# Patient Record
Sex: MALE | Race: WHITE | NOT HISPANIC OR LATINO | Employment: OTHER | URBAN - METROPOLITAN AREA
[De-identification: names, ages, dates, MRNs, and addresses within clinical notes are randomized per-mention and may not be internally consistent; named-entity substitution may affect disease eponyms.]

---

## 2017-01-01 PROBLEM — J96.01 ACUTE HYPOXEMIC RESPIRATORY FAILURE: Status: ACTIVE | Noted: 2017-01-01

## 2017-01-03 ENCOUNTER — OFFICE VISIT (OUTPATIENT)
Dept: INTERNAL MEDICINE | Facility: CLINIC | Age: 72
End: 2017-01-03
Payer: MEDICARE

## 2017-01-03 VITALS
SYSTOLIC BLOOD PRESSURE: 148 MMHG | TEMPERATURE: 98 F | HEIGHT: 65 IN | HEART RATE: 88 BPM | DIASTOLIC BLOOD PRESSURE: 88 MMHG | RESPIRATION RATE: 15 BRPM | BODY MASS INDEX: 25.68 KG/M2 | WEIGHT: 154.13 LBS

## 2017-01-03 DIAGNOSIS — E78.5 HYPERLIPIDEMIA, UNSPECIFIED HYPERLIPIDEMIA TYPE: ICD-10-CM

## 2017-01-03 DIAGNOSIS — G20.A1 PARKINSON DISEASE: ICD-10-CM

## 2017-01-03 DIAGNOSIS — I10 ESSENTIAL HYPERTENSION: ICD-10-CM

## 2017-01-03 DIAGNOSIS — G89.29 CHRONIC LOW BACK PAIN, UNSPECIFIED BACK PAIN LATERALITY, WITH SCIATICA PRESENCE UNSPECIFIED: ICD-10-CM

## 2017-01-03 DIAGNOSIS — I27.20 PULMONARY HTN: ICD-10-CM

## 2017-01-03 DIAGNOSIS — I25.118 CORONARY ARTERY DISEASE OF NATIVE ARTERY OF NATIVE HEART WITH STABLE ANGINA PECTORIS: ICD-10-CM

## 2017-01-03 DIAGNOSIS — F41.9 ANXIETY: ICD-10-CM

## 2017-01-03 DIAGNOSIS — R06.09 DYSPNEA ON EXERTION: Primary | ICD-10-CM

## 2017-01-03 DIAGNOSIS — M54.5 CHRONIC LOW BACK PAIN, UNSPECIFIED BACK PAIN LATERALITY, WITH SCIATICA PRESENCE UNSPECIFIED: ICD-10-CM

## 2017-01-03 DIAGNOSIS — J20.9 ACUTE BRONCHITIS, UNSPECIFIED ORGANISM: ICD-10-CM

## 2017-01-03 PROBLEM — M54.50 CHRONIC LOW BACK PAIN: Status: ACTIVE | Noted: 2017-01-03

## 2017-01-03 PROCEDURE — 99215 OFFICE O/P EST HI 40 MIN: CPT | Mod: S$PBB,,, | Performed by: INTERNAL MEDICINE

## 2017-01-03 PROCEDURE — 99999 PR PBB SHADOW E&M-EST. PATIENT-LVL IV: CPT | Mod: PBBFAC,,, | Performed by: INTERNAL MEDICINE

## 2017-01-03 PROCEDURE — 99214 OFFICE O/P EST MOD 30 MIN: CPT | Mod: PBBFAC,PO | Performed by: INTERNAL MEDICINE

## 2017-01-03 NOTE — PROGRESS NOTES
Subjective:       Patient ID: Hector Kellogg is a 71 y.o. male.    Chief Complaint: Follow-up    HPI   Pt with Parkinson's disease on Sinemet is here for evaluation of 2 episodes of low BP associated with dizziness/lightheadedness. Pt denies syncope. His BP was in the 70s/40s at that time. Pt was then hospitalized from 12/30/16- 1/1/17 for SOB with chills and cough. He had a low grade fever of 100.8 but vitals were stable and BP remained normal during the stay. CXR was negative for infiltrate, Labs were unremarkable, LE US negative for DVT and Cx came back negative including for flu. Pt was started on Avelox. He had a 6 minute walk test which was positive for hypoxemia and he was given O2 to use with activity. Last ECHO showed mild pulmonary HTN but preserved EF. Since discharge, pt denies any hypotension and breathing has improved.    Review of Systems   Constitutional: Negative for activity change, appetite change, chills, diaphoresis, fatigue, fever and unexpected weight change.   HENT: Negative for congestion, mouth sores, postnasal drip, rhinorrhea, sinus pressure, sneezing, sore throat, trouble swallowing and voice change.    Eyes: Negative for discharge, itching and visual disturbance.   Respiratory: Negative for cough, chest tightness, shortness of breath and wheezing.    Cardiovascular: Negative for chest pain, palpitations and leg swelling.   Gastrointestinal: Negative for abdominal pain, blood in stool, constipation, diarrhea, nausea and vomiting.   Endocrine: Negative for cold intolerance and heat intolerance.   Genitourinary: Negative for difficulty urinating, dysuria, flank pain, hematuria and urgency.   Musculoskeletal: Negative for arthralgias, back pain, myalgias and neck pain.   Skin: Negative for rash and wound.   Allergic/Immunologic: Negative for environmental allergies and food allergies.   Neurological: Positive for tremors. Negative for dizziness, seizures, syncope, weakness and  headaches.   Hematological: Negative for adenopathy. Does not bruise/bleed easily.   Psychiatric/Behavioral: Negative for confusion, sleep disturbance and suicidal ideas. The patient is not nervous/anxious.        Objective:      Physical Exam   Constitutional: He is oriented to person, place, and time. He appears well-developed and well-nourished. No distress.   HENT:   Head: Normocephalic and atraumatic.   Right Ear: External ear normal.   Left Ear: External ear normal.   Nose: Nose normal.   Mouth/Throat: Oropharynx is clear and moist. No oropharyngeal exudate.   Eyes: Conjunctivae and EOM are normal. Pupils are equal, round, and reactive to light. Right eye exhibits no discharge. Left eye exhibits no discharge. No scleral icterus.   Neck: Normal range of motion. Neck supple. No JVD present. No thyromegaly present.   Cardiovascular: Normal rate, regular rhythm, normal heart sounds and intact distal pulses.    No murmur heard.  Pulmonary/Chest: Effort normal and breath sounds normal. No respiratory distress. He has no wheezes. He has no rales.   Abdominal: Soft. Bowel sounds are normal. He exhibits no distension. There is no tenderness. There is no guarding.   Musculoskeletal: He exhibits no edema.   Lymphadenopathy:     He has no cervical adenopathy.   Neurological: He is alert and oriented to person, place, and time.   Skin: Skin is warm and dry. No rash noted. He is not diaphoretic. No pallor.   Psychiatric: He has a normal mood and affect. Judgment normal.       Assessment:       1. Dyspnea on exertion    2. Pulmonary HTN    3. Acute bronchitis, unspecified organism    4. Coronary artery disease of native artery of native heart with stable angina pectoris    5. Essential hypertension    6. Hyperlipidemia, unspecified hyperlipidemia type    7. Parkinson disease    8. Anxiety    9. Chronic low back pain, unspecified back pain laterality, with sciatica presence unspecified        Plan:    1. MARINA with mild Pulm  HTN- Improving since given home O2, suspect pulmonary etiology        Pt will f/u with Pulmonary for PFTs/further evaluation    2. Acute bronchitis- resolved, s/p therapy with Avelox   3. CAD- S/P NSTEMI and PCI in 2006, with repeat CATH(10/11/15) placing stents to RCA and OM1- stable       Continue BB/statin/ASA; pt has stopped the Imdur 2/2 to low BP       F/u with Cardiology on 11/3/15   4. HTN- stable on Toprol XL 50 mg daily   5. HLD- controlled, continue Lipitor 10 mg daily   6. Parkinson's disease- stable on Sinemet, managed by Neuro   7. Anxiety- stable on Celexa 20 mg daily   8. LBP with Neuropathic pain- stable on Gabapentin 300 mg BID    Over 1/2 of 40 minute visit spent reviewing pt's medical/hospitalization records, education/discussion of pt's medical conditions and medical management

## 2017-01-13 ENCOUNTER — HOSPITAL ENCOUNTER (OUTPATIENT)
Dept: PULMONOLOGY | Facility: CLINIC | Age: 72
Discharge: HOME | End: 2017-01-13
Payer: MEDICARE

## 2017-01-13 ENCOUNTER — OFFICE VISIT (OUTPATIENT)
Dept: PULMONOLOGY | Facility: CLINIC | Age: 72
End: 2017-01-13
Payer: MEDICARE

## 2017-01-13 VITALS
BODY MASS INDEX: 25.71 KG/M2 | OXYGEN SATURATION: 96 % | SYSTOLIC BLOOD PRESSURE: 140 MMHG | DIASTOLIC BLOOD PRESSURE: 84 MMHG | WEIGHT: 154.31 LBS | HEIGHT: 65 IN | RESPIRATION RATE: 14 BRPM | HEART RATE: 83 BPM

## 2017-01-13 VITALS — BODY MASS INDEX: 27.29 KG/M2 | HEIGHT: 63 IN | WEIGHT: 154 LBS

## 2017-01-13 DIAGNOSIS — R06.09 DYSPNEA ON EXERTION: ICD-10-CM

## 2017-01-13 DIAGNOSIS — I25.118 CORONARY ARTERY DISEASE OF NATIVE ARTERY OF NATIVE HEART WITH STABLE ANGINA PECTORIS: ICD-10-CM

## 2017-01-13 DIAGNOSIS — J44.9 CHRONIC OBSTRUCTIVE PULMONARY DISEASE, UNSPECIFIED COPD TYPE: ICD-10-CM

## 2017-01-13 DIAGNOSIS — R06.09 DYSPNEA ON EXERTION: Primary | ICD-10-CM

## 2017-01-13 PROBLEM — J96.01 ACUTE HYPOXEMIC RESPIRATORY FAILURE: Status: RESOLVED | Noted: 2017-01-01 | Resolved: 2017-01-13

## 2017-01-13 LAB
POST FEV1 FVC: 0.53
POST FEV1: 1.28
POST FVC: 2.41
PRE FEV1 FVC: 51
PRE FEV1: 1.14
PRE FVC: 2.23
PREDICTED FEV1 FVC: 81
PREDICTED FEV1: 2.07
PREDICTED FVC: 2.59

## 2017-01-13 PROCEDURE — 99204 OFFICE O/P NEW MOD 45 MIN: CPT | Mod: 25,S$PBB,, | Performed by: INTERNAL MEDICINE

## 2017-01-13 PROCEDURE — 94620 PR PULMONARY STRESS TESTING,SIMPLE: CPT | Mod: PBBFAC | Performed by: INTERNAL MEDICINE

## 2017-01-13 PROCEDURE — 94620 PR PULMONARY STRESS TESTING,SIMPLE: CPT | Mod: 26,S$PBB,, | Performed by: INTERNAL MEDICINE

## 2017-01-13 PROCEDURE — 99999 PR PBB SHADOW E&M-EST. PATIENT-LVL III: CPT | Mod: PBBFAC,,, | Performed by: INTERNAL MEDICINE

## 2017-01-13 PROCEDURE — 94060 EVALUATION OF WHEEZING: CPT | Mod: 26,S$PBB,, | Performed by: INTERNAL MEDICINE

## 2017-01-13 PROCEDURE — 94729 DIFFUSING CAPACITY: CPT | Mod: 26,S$PBB,, | Performed by: INTERNAL MEDICINE

## 2017-01-13 NOTE — PATIENT INSTRUCTIONS
Pre/Post Spirometry, DLCO, and 6MWT.  Phone review and decide on medical therapy if obstructive lung disease is confirmed.

## 2017-01-13 NOTE — PROGRESS NOTES
Subjective:       Patient ID: Hector Kellogg is a 71 y.o. male.    Chief Complaint: Shortness of Breath    HPI HISTORY OF PRESENT ILLNESS:  Mr. Kellogg is a 71-year-old former smoker who   comes for evaluation of exertional dyspnea.  He was hospitalized here at the end   of December with fever and an episode of chills or rigors.  Micro studies done   during that admission were negative; however, he was treated with empiric   antibiotics and had a good clinical response.  During that admission, he was   also noted to have shortness of breath and exercise-related hypoxemia.  He was   discharged with supplemental oxygen.  Although, he feels that his respiratory   symptoms are now back to baseline, he does have the longstanding symptom of   shortness of breath with modest exertion.  The fever and chills have not   recurred.    Mr. Kellogg and his family believe that his shortness of breath has been   present for several years.  He has never sought any evaluation for this problem.    He does not have any associated symptoms of cough or wheezing.  He has not had   exertional or pleuritic pain.  He has used a Dulera inhaler (prescribed for his   sister) on a few occasions and feels that this has provided a beneficial effect.    Mr. Kellogg does not know of any history for childhood respiratory problems   or allergic disease.  He does have the history of coronary artery disease and is   status post placement of several coronary artery stents.  He also is followed   in the Neurology Clinic here for treatment of Parkinson disease.  He has not had   any prominent upper GI symptoms.  He is not usually bothered by sinus   symptoms.    Mr. Kellogg worked for many years as a  and .  He believes   that he had fairly regular exposure to asbestos during his working years.  He   also has smoked approximately one pack of cigarettes per day for 40 years.  He   discontinued smoking two years ago.  His  "family history is of note in that his   sister has some form of lung disease.  She is a nonsmoker, but has been told   that she has "large lungs."  He does not know of any other family history for   lung problems.    DATA:  I have reviewed the images from a chest x-ray done last month at the time   of his hospital admission.  The cardiac silhouette does not appear enlarged.    There are no acute cardiovascular abnormalities.  The lungs appear clear.  The   diaphragm is somewhat flattened suggesting obstructive lung disease.  There are   no changes in this recent radiograph in comparison to a previous film from   October 2015.      CB/HN  dd: 01/13/2017 17:38:09 (CST)  td: 01/14/2017 13:02:55 (CST)  Doc ID   #[id number]  Job ID #[id number]    CC:       Review of Systems   Constitutional: Negative for fever and fatigue.   HENT: Negative for postnasal drip, sinus pressure, voice change and congestion.    Respiratory: Positive for shortness of breath and dyspnea on extertion. Negative for cough, sputum production and wheezing.    Cardiovascular: Negative for chest pain and leg swelling.   Genitourinary: Negative for difficulty urinating.   Musculoskeletal: Positive for gait problem. Negative for arthralgias and back pain.   Skin: Negative for rash.   Gastrointestinal: Negative for abdominal pain and acid reflux.   Neurological: Negative for dizziness and weakness.   Hematological: Negative for adenopathy.       Objective:      Physical Exam   Constitutional: He is oriented to person, place, and time. He appears well-developed and well-nourished.   HENT:   Head: Normocephalic.   Mouth/Throat: Oropharynx is clear and moist. No oropharyngeal exudate.   Neck: Normal range of motion. Neck supple. No JVD present. No tracheal deviation present. No thyromegaly present.   Cardiovascular: Normal rate, regular rhythm and normal heart sounds.    No murmur heard.  Pulmonary/Chest: Symmetric chest wall expansion. No stridor. He has " decreased breath sounds. He has no wheezes. He has no rhonchi. He has no rales. He exhibits no tenderness.   Abdominal: Soft.   Musculoskeletal: He exhibits no edema.   Lymphadenopathy:     He has no cervical adenopathy.   Neurological: He is alert and oriented to person, place, and time.   Resting tremor UEs.   Skin: Skin is warm and dry. No rash noted. No erythema. Nails show no clubbing.   Psychiatric: He has a normal mood and affect.   Vitals reviewed.    Personal Diagnostic Review    Pulmonary Function Tests 1/13/2017   Ordering Provider MD Kelly   Performing nurse/tech/RT SCOT Oneill   Diagnosis Shortness of Breath   Height 63   Weight 2464   BMI (Calculated) 27.3   Patient Race    6MWT Status completed without stopping   Patient Reported Dyspnea   Was O2 used? No   6MW Distance walked (feet) 1100   Distance walked (meters) 335.28   Did patient stop? No   Type of assistive device(s) used? no assistive devices   Oxygen Saturation 97   Supplemental Oxygen Room Air   Heart Rate 78   Blood Pressure 142/76   [place] Dyspnea Rating  nothing at all   Oxygen Saturation 93   Supplemental Oxygen Room Air   Heart Rate 101   Blood Pressure 144/74   [place] Dyspnea Rating  light   Recovery Time (seconds) 40   Oxygen Saturation 97   Supplemental Oxygen Room Air   Heart Rate 89   Is procedure ready for interpretation? Yes         Assessment:       1. Dyspnea on exertion    2. Coronary artery disease of native artery of native heart with stable angina pectoris    3. Chronic obstructive pulmonary disease, unspecified COPD type        Outpatient Encounter Prescriptions as of 1/13/2017   Medication Sig Dispense Refill    aspirin (ECOTRIN) 81 MG EC tablet Take 81 mg by mouth once daily.      atorvastatin (LIPITOR) 10 MG tablet Take 1 tablet (10 mg total) by mouth once daily. 90 tablet 3    carbidopa-levodopa  mg (SINEMET)  mg per tablet Take 1 tablet by mouth 4 (four) times daily. 360 tablet 3    citalopram  (CELEXA) 20 MG tablet TAKE ONE TABLET BY MOUTH ONCE DAILY 30 tablet 11    co-enzyme Q-10 50 mg capsule Take 100 mg by mouth once daily.      gabapentin (NEURONTIN) 400 MG capsule Take 1 capsule (400 mg total) by mouth 2 (two) times daily. 180 capsule 3    loratadine (CLARITIN) 10 mg tablet Take 10 mg by mouth as needed for Allergies.      metoprolol succinate (TOPROL-XL) 50 MG 24 hr tablet Take 1 tablet (50 mg total) by mouth once daily. 90 tablet 3    mometasone-formoterol 100-5 mcg/actuation HFAA Inhale into the lungs. Take 2 puffs twice daily. Patient takes as needed.      multivitamin capsule Take 1 capsule by mouth once daily.      nitroGLYCERIN (NITROSTAT) 0.4 MG SL tablet Place 1 tablet (0.4 mg total) under the tongue every 5 (five) minutes as needed for Chest pain. 25 tablet 4     No facility-administered encounter medications on file as of 1/13/2017.      Orders Placed This Encounter   Procedures    Spirometry with/without bronchodilator     Standing Status:   Future     Number of Occurrences:   1     Standing Expiration Date:   1/13/2018    DLCO-Carbon Monoxide Diffusing Capacity     Standing Status:   Future     Number of Occurrences:   1     Standing Expiration Date:   1/13/2018    Stress test, pulmonary     Standing Status:   Future     Number of Occurrences:   1     Standing Expiration Date:   1/13/2018     Plan:     Pre/Post Spirometry, DLCO, and 6MWT.  Phone review and decide on medical therapy if obstructive lung disease is confirmed.

## 2017-01-13 NOTE — LETTER
January 13, 2017      Blue Mcgill, DO  [address] [facility] [place] [address]           [facility]  [address]  [place] [address]  Phone: [phone]          Patient: Hector Kellogg   MR Number: [id number]   YOB: 1945   Date of Visit: 1/13/2017       Dear Dr. Blue Mcgill:    Thank you for referring Hector Kellogg to me for evaluation. Attached you will find relevant portions of my assessment and plan of care.    If you have questions, please do not hesitate to call me. I look forward to following Hector Kellogg along with you.    Sincerely,    MARIO Mendoza MD    Enclosure  CC:  No Recipients    If you would like to receive this communication electronically, please contact [email or web address] or [phone] to request more information on [employer] access.    For providers and/or their staff who would like to refer a patient to [facility], please contact us through our one-stop-shop provider referral line, [facility], at [phone].    If you feel you have received this communication in error or would no longer like to receive these types of communications, please e-mail [email or web address]

## 2017-01-14 NOTE — PROCEDURES
Hector Kellogg is a 71 y.o.  male patient, who presents for a 6 minute walk test ordered by Camden Mendoza MD.  The diagnosis is Shortness of Breath.  The patient's BMI is 27.3 kg/m2.  Predicted distance (lower limit of normal) is 341.11 meters.      Test Results:    The test was completed without stopping.  The total time walked was 360 seconds.  During walking, the patient reported:  Dyspnea. The patient used no assistive devices during testing.     01/13/2017---------Distance: 335.28 meters (1100 feet)     O2 Sat % Supplemental Oxygen Heart Rate Blood Pressure Salina Scale   Pre-exercise  (Resting) 97 % Room Air 78 bpm 142/76 mmHg 0   During Exercise 93 % Room Air 101 bpm 144/74 mmHg 2   Post-exercise  (Recovery) 97 % Room Air  89 bpm       Recovery Time: 40 seconds    Performing nurse/tech: SCOT Oneill      PREVIOUS STUDY:   The patient has not had a previous study.      CLINICAL INTERPRETATION:  Six minute walk distance is 335.28 meters (1100 feet) with light dyspnea.  During exercise, there was desaturation while breathing room air.  Blood pressure remained stable and Heart rate increased significantly with walking.  The patient did not report non-pulmonary symptoms during exercise.  No previous study performed.  Based upon age and body mass index, exercise capacity is less than predicted.

## 2017-01-19 ENCOUNTER — PATIENT MESSAGE (OUTPATIENT)
Dept: PULMONOLOGY | Facility: CLINIC | Age: 72
End: 2017-01-19

## 2017-01-23 ENCOUNTER — TELEPHONE (OUTPATIENT)
Dept: PULMONOLOGY | Facility: CLINIC | Age: 72
End: 2017-01-23

## 2017-01-23 DIAGNOSIS — J43.2 CENTRILOBULAR EMPHYSEMA: Primary | ICD-10-CM

## 2017-01-23 RX ORDER — BUDESONIDE AND FORMOTEROL FUMARATE DIHYDRATE 160; 4.5 UG/1; UG/1
2 AEROSOL RESPIRATORY (INHALATION) EVERY 12 HOURS
Qty: 1 INHALER | Refills: 6 | Status: SHIPPED | OUTPATIENT
Start: 2017-01-23 | End: 2017-03-21

## 2017-01-23 NOTE — TELEPHONE ENCOUNTER
Pt informed that recent PFTs show obstruction with some BD response.  I have asked him to begin Symbicort on a twice daily schedule in place of the intermittent use of Dulera.  He will call if resp symptoms remain unimproved.  He may also need Albuterol if he feels he needs a short term med for control of daily symptoms.

## 2017-01-23 NOTE — TELEPHONE ENCOUNTER
----- Message from Starla Bay sent at 1/23/2017 10:51 AM CST -----  Contact: Patient wife Eileen: [phone]  Patient wife is calling for the results from PFT.  She can be reached at [phone].    Please call.  Thanks

## 2017-03-21 ENCOUNTER — TELEPHONE (OUTPATIENT)
Dept: PULMONOLOGY | Facility: CLINIC | Age: 72
End: 2017-03-21

## 2017-03-21 DIAGNOSIS — J43.2 CENTRILOBULAR EMPHYSEMA: Primary | ICD-10-CM

## 2017-03-21 NOTE — TELEPHONE ENCOUNTER
----- Message from Chase Dennis sent at 3/21/2017  1:57 PM CDT -----  Contact: Pt   Pt would like a call back from nurse in ref to in home oxygen     Pt also would like to speak about refill for inhaler.      Can be reached at [phone]

## 2017-04-17 DIAGNOSIS — F41.1 GENERALIZED ANXIETY DISORDER: ICD-10-CM

## 2017-04-17 RX ORDER — CITALOPRAM 20 MG/1
20 TABLET, FILM COATED ORAL DAILY
Qty: 30 TABLET | Refills: 11 | Status: SHIPPED | OUTPATIENT
Start: 2017-04-17 | End: 2018-04-16 | Stop reason: SDUPTHER

## 2017-06-30 ENCOUNTER — PATIENT MESSAGE (OUTPATIENT)
Dept: CARDIOLOGY | Facility: CLINIC | Age: 72
End: 2017-06-30

## 2017-06-30 RX ORDER — METOPROLOL SUCCINATE 50 MG/1
50 TABLET, EXTENDED RELEASE ORAL DAILY
Qty: 90 TABLET | Refills: 3 | Status: SHIPPED | OUTPATIENT
Start: 2017-06-30 | End: 2018-07-02 | Stop reason: SDUPTHER

## 2017-07-21 ENCOUNTER — PATIENT MESSAGE (OUTPATIENT)
Dept: PULMONOLOGY | Facility: CLINIC | Age: 72
End: 2017-07-21

## 2017-07-21 ENCOUNTER — LAB VISIT (OUTPATIENT)
Dept: LAB | Facility: HOSPITAL | Age: 72
End: 2017-07-21
Attending: INTERNAL MEDICINE
Payer: MEDICARE

## 2017-07-21 DIAGNOSIS — I10 ESSENTIAL HYPERTENSION: ICD-10-CM

## 2017-07-21 DIAGNOSIS — E78.2 MIXED HYPERLIPIDEMIA: ICD-10-CM

## 2017-07-21 DIAGNOSIS — Z78.9 STATIN INTOLERANCE: ICD-10-CM

## 2017-07-21 DIAGNOSIS — I25.118 CORONARY ARTERY DISEASE OF NATIVE ARTERY OF NATIVE HEART WITH STABLE ANGINA PECTORIS: ICD-10-CM

## 2017-07-21 LAB
ALBUMIN SERPL BCP-MCNC: 3.5 G/DL
ALP SERPL-CCNC: 88 U/L
ALT SERPL W/O P-5'-P-CCNC: 22 U/L
ANION GAP SERPL CALC-SCNC: 7 MMOL/L
AST SERPL-CCNC: 26 U/L
BASOPHILS # BLD AUTO: 0.01 K/UL
BASOPHILS NFR BLD: 0.2 %
BILIRUB SERPL-MCNC: 0.5 MG/DL
BUN SERPL-MCNC: 23 MG/DL
CALCIUM SERPL-MCNC: 10 MG/DL
CHLORIDE SERPL-SCNC: 103 MMOL/L
CHOLEST/HDLC SERPL: 7.2 {RATIO}
CO2 SERPL-SCNC: 30 MMOL/L
CREAT SERPL-MCNC: 1.2 MG/DL
DIFFERENTIAL METHOD: NORMAL
EOSINOPHIL # BLD AUTO: 0.1 K/UL
EOSINOPHIL NFR BLD: 1.6 %
ERYTHROCYTE [DISTWIDTH] IN BLOOD BY AUTOMATED COUNT: 13.7 %
EST. GFR  (AFRICAN AMERICAN): >60 ML/MIN/1.73 M^2
EST. GFR  (NON AFRICAN AMERICAN): >60 ML/MIN/1.73 M^2
GLUCOSE SERPL-MCNC: 91 MG/DL
HCT VFR BLD AUTO: 45 %
HDL/CHOLESTEROL RATIO: 13.9 %
HDLC SERPL-MCNC: 296 MG/DL
HDLC SERPL-MCNC: 41 MG/DL
HGB BLD-MCNC: 14.5 G/DL
LDLC SERPL CALC-MCNC: 200.8 MG/DL
LYMPHOCYTES # BLD AUTO: 2 K/UL
LYMPHOCYTES NFR BLD: 32.8 %
MCH RBC QN AUTO: 30.8 PG
MCHC RBC AUTO-ENTMCNC: 32.2 G/DL
MCV RBC AUTO: 96 FL
MONOCYTES # BLD AUTO: 0.5 K/UL
MONOCYTES NFR BLD: 8.1 %
NEUTROPHILS # BLD AUTO: 3.5 K/UL
NEUTROPHILS NFR BLD: 57.1 %
NONHDLC SERPL-MCNC: 255 MG/DL
PLATELET # BLD AUTO: 198 K/UL
PMV BLD AUTO: 10.1 FL
POTASSIUM SERPL-SCNC: 4.6 MMOL/L
PROT SERPL-MCNC: 7.2 G/DL
RBC # BLD AUTO: 4.71 M/UL
SODIUM SERPL-SCNC: 140 MMOL/L
TRIGL SERPL-MCNC: 271 MG/DL
WBC # BLD AUTO: 6.15 K/UL

## 2017-07-21 PROCEDURE — 80061 LIPID PANEL: CPT

## 2017-07-21 PROCEDURE — 36415 COLL VENOUS BLD VENIPUNCTURE: CPT

## 2017-07-21 PROCEDURE — 80053 COMPREHEN METABOLIC PANEL: CPT

## 2017-07-21 PROCEDURE — 85025 COMPLETE CBC W/AUTO DIFF WBC: CPT

## 2017-07-28 ENCOUNTER — OFFICE VISIT (OUTPATIENT)
Dept: CARDIOLOGY | Facility: CLINIC | Age: 72
End: 2017-07-28
Payer: MEDICARE

## 2017-07-28 VITALS
DIASTOLIC BLOOD PRESSURE: 79 MMHG | HEART RATE: 72 BPM | SYSTOLIC BLOOD PRESSURE: 156 MMHG | HEIGHT: 65 IN | BODY MASS INDEX: 25.68 KG/M2 | WEIGHT: 154.13 LBS

## 2017-07-28 DIAGNOSIS — I10 ESSENTIAL HYPERTENSION: ICD-10-CM

## 2017-07-28 DIAGNOSIS — E78.2 MIXED HYPERLIPIDEMIA: ICD-10-CM

## 2017-07-28 DIAGNOSIS — Z78.9 STATIN INTOLERANCE: ICD-10-CM

## 2017-07-28 DIAGNOSIS — I25.118 CORONARY ARTERY DISEASE OF NATIVE ARTERY OF NATIVE HEART WITH STABLE ANGINA PECTORIS: Primary | ICD-10-CM

## 2017-07-28 PROCEDURE — 99999 PR PBB SHADOW E&M-EST. PATIENT-LVL III: CPT | Mod: PBBFAC,,, | Performed by: INTERNAL MEDICINE

## 2017-07-28 PROCEDURE — 1126F AMNT PAIN NOTED NONE PRSNT: CPT | Mod: ,,, | Performed by: INTERNAL MEDICINE

## 2017-07-28 PROCEDURE — 99213 OFFICE O/P EST LOW 20 MIN: CPT | Mod: PBBFAC | Performed by: INTERNAL MEDICINE

## 2017-07-28 PROCEDURE — 1159F MED LIST DOCD IN RCRD: CPT | Mod: ,,, | Performed by: INTERNAL MEDICINE

## 2017-07-28 PROCEDURE — 99214 OFFICE O/P EST MOD 30 MIN: CPT | Mod: S$PBB,,, | Performed by: INTERNAL MEDICINE

## 2017-07-28 RX ORDER — EZETIMIBE 10 MG/1
10 TABLET ORAL DAILY
Qty: 30 TABLET | Refills: 11 | Status: SHIPPED | OUTPATIENT
Start: 2017-07-28 | End: 2017-10-27

## 2017-07-28 NOTE — PROGRESS NOTES
Subjective:   Patient ID:  Hector Kellogg is a 72 y.o. male who presents for follow-up of Coronary Artery Disease (6 months fu)      HPI: He is accompanied by his wife and daughter. He hs been feeling well. He stopped pravastatin due to myalgias. He has not tolerated any statins. He is eating sweets. Hector Kellogg denies any chest pain, PND, orthopnea, palpitations, leg edema, or syncope. He is doing yard work. He has chronic dyspnea and was recently diagnosed with diagnosed. His symptoms improve with use of his inhaler.    Echo (11/15): LVEF 55-60%.    ECG (12/3/16): NSR 84 bpm.    Past Medical History:   Diagnosis Date    Chronic back pain greater than 3 months duration     Coronary artery disease     Hyperlipidemia     Hypertension     MI, old 2006    Parkinson disease        Past Surgical History:   Procedure Laterality Date    CARDIAC CATHETERIZATION  2006    x 3    CARDIAC CATHETERIZATION  10/11/2015    x 2    CORONARY ANGIOPLASTY  2006, 2015    3 stents    HEMORRHOID SURGERY         Social History     Social History    Marital status:      Spouse name: N/A    Number of children: N/A    Years of education: N/A     Social History Main Topics    Smoking status: Former Smoker     Packs/day: 1.00     Years: 40.00     Quit date: 2/1/2014    Smokeless tobacco: Never Used    Alcohol use Yes      Comment: socially    Drug use: Unknown    Sexual activity: Not on file     Other Topics Concern    Not on file     Social History Narrative    No narrative on file       Family History   Problem Relation Age of Onset    Heart attack Father     Heart disease Maternal Uncle     Heart disease Paternal Uncle     Hypertension Neg Hx     Asthma Neg Hx     Emphysema Neg Hx        Patient's Medications   New Prescriptions    EVOLOCUMAB (REPATHA SURECLICK) 140 MG/ML PNIJ    Inject 140 mg into the skin every 14 (fourteen) days.    EZETIMIBE (ZETIA) 10 MG TABLET    Take 1 tablet (10 mg total)  by mouth once daily.   Previous Medications    ASPIRIN (ECOTRIN) 81 MG EC TABLET    Take 81 mg by mouth once daily.    CARBIDOPA-LEVODOPA  MG (SINEMET)  MG PER TABLET    Take 1 tablet by mouth 4 (four) times daily.    CITALOPRAM (CELEXA) 20 MG TABLET    Take 1 tablet (20 mg total) by mouth once daily.    CO-ENZYME Q-10 50 MG CAPSULE    Take 100 mg by mouth once daily.    GABAPENTIN (NEURONTIN) 400 MG CAPSULE    Take 1 capsule (400 mg total) by mouth 2 (two) times daily.    LORATADINE (CLARITIN) 10 MG TABLET    Take 10 mg by mouth as needed for Allergies.    METOPROLOL SUCCINATE (TOPROL-XL) 50 MG 24 HR TABLET    Take 1 tablet (50 mg total) by mouth once daily.    MOMETASONE-FORMOTEROL (DULERA) 200-5 MCG/ACTUATION INHALER    Inhale 2 puffs into the lungs 2 (two) times daily.    MULTIVITAMIN CAPSULE    Take 1 capsule by mouth once daily.    NITROGLYCERIN (NITROSTAT) 0.4 MG SL TABLET    Place 1 tablet (0.4 mg total) under the tongue every 5 (five) minutes as needed for Chest pain.   Modified Medications    No medications on file   Discontinued Medications    ATORVASTATIN (LIPITOR) 10 MG TABLET    Take 1 tablet (10 mg total) by mouth once daily.       Review of Systems   Constitution: Negative for weakness, malaise/fatigue and weight gain.   HENT: Negative for headaches and hearing loss.    Eyes: Negative for visual disturbance.   Cardiovascular: Negative for chest pain, claudication, dyspnea on exertion, leg swelling, near-syncope, orthopnea, palpitations, paroxysmal nocturnal dyspnea and syncope.   Respiratory: Negative for cough, shortness of breath, sleep disturbances due to breathing, snoring and wheezing.    Endocrine: Negative for cold intolerance, heat intolerance, polydipsia, polyphagia and polyuria.   Hematologic/Lymphatic: Negative for bleeding problem. Does not bruise/bleed easily.   Skin: Negative for rash and suspicious lesions.   Musculoskeletal: Positive for back pain. Negative for arthritis,  "falls, joint pain, muscle weakness and myalgias.   Gastrointestinal: Negative for abdominal pain, change in bowel habit, constipation, diarrhea, heartburn, hematochezia, melena and nausea.   Genitourinary: Negative for hematuria and nocturia.   Neurological: Positive for tremors. Negative for excessive daytime sleepiness, dizziness, light-headedness and loss of balance.   Psychiatric/Behavioral: Negative for depression. The patient is not nervous/anxious.    Allergic/Immunologic: Negative for environmental allergies.       BP (!) 156/79 (BP Location: Left arm, Patient Position: Sitting, BP Method: Automatic)   Pulse 72   Ht 5' 5" (1.651 m)   Wt 69.9 kg (154 lb 1.6 oz)   BMI 25.64 kg/m²     Objective:   Physical Exam   Constitutional: He is oriented to person, place, and time. He appears well-developed and well-nourished.        HENT:   Head: Normocephalic and atraumatic.   Mouth/Throat: Oropharynx is clear and moist.   Eyes: Conjunctivae and EOM are normal. Pupils are equal, round, and reactive to light. No scleral icterus.   Neck: Normal range of motion. Neck supple. No hepatojugular reflux and no JVD present. No tracheal deviation present. No thyromegaly present.   Cardiovascular: Normal rate, regular rhythm, normal heart sounds and intact distal pulses.  PMI is not displaced.    Pulses:       Carotid pulses are 2+ on the right side, and 2+ on the left side.       Radial pulses are 2+ on the right side, and 2+ on the left side.        Dorsalis pedis pulses are 2+ on the right side, and 2+ on the left side.        Posterior tibial pulses are 2+ on the right side, and 2+ on the left side.   Pulmonary/Chest: Effort normal and breath sounds normal.   Abdominal: Soft. Bowel sounds are normal. He exhibits no distension and no mass. There is no hepatosplenomegaly. There is no tenderness.   Musculoskeletal: He exhibits no edema or tenderness.   Lymphadenopathy:     He has no cervical adenopathy.   Neurological: He is " alert and oriented to person, place, and time.   Resting tremor left hand.   Skin: Skin is warm and dry. No rash noted. No cyanosis or erythema. Nails show no clubbing.   Psychiatric: He has a normal mood and affect. His speech is normal and behavior is normal.       Lab Results   Component Value Date     07/21/2017    K 4.6 07/21/2017     07/21/2017    CO2 30 (H) 07/21/2017    BUN 23 07/21/2017    CREATININE 1.2 07/21/2017    GLU 91 07/21/2017    MG 2.1 12/31/2016    AST 26 07/21/2017    ALT 22 07/21/2017    ALBUMIN 3.5 07/21/2017    PROT 7.2 07/21/2017    BILITOT 0.5 07/21/2017    WBC 6.15 07/21/2017    HGB 14.5 07/21/2017    HCT 45.0 07/21/2017    MCV 96 07/21/2017     07/21/2017    INR 1.0 10/10/2015    TSH 1.196 08/21/2014    CHOL 296 (H) 07/21/2017    HDL 41 07/21/2017    LDLCALC 200.8 (H) 07/21/2017    TRIG 271 (H) 07/21/2017    BNP 48 12/30/2016       Assessment:     1. Coronary artery disease of native artery of native heart with stable angina pectoris : He is asymptomatic with preserved LVEF. Last stent 2015. Continue asa. He is intolerant of statins.   2. Essential hypertension : Repeat blood pressure 122/84. Continue metoprolol.   3. Mixed hyperlipidemia : Start zetia 10 mg daily. Repatha ordered for known CAD, statin intolerance and LDL of 200. Follow up FLP and liver panel in 6 weeks.   4. Statin intolerance        Plan:     Hector was seen today for coronary artery disease.    Diagnoses and all orders for this visit:    Coronary artery disease of native artery of native heart with stable angina pectoris  -     ezetimibe (ZETIA) 10 mg tablet; Take 1 tablet (10 mg total) by mouth once daily.  -     evolocumab (REPATHA SURECLICK) 140 mg/mL PnIj; Inject 140 mg into the skin every 14 (fourteen) days.  -     Hepatic function panel; Future  -     Lipid panel; Future    Essential hypertension  -     ezetimibe (ZETIA) 10 mg tablet; Take 1 tablet (10 mg total) by mouth once daily.  -      evolocumab (REPATHA SURECLICK) 140 mg/mL PnIj; Inject 140 mg into the skin every 14 (fourteen) days.  -     Hepatic function panel; Future  -     Lipid panel; Future    Mixed hyperlipidemia  -     ezetimibe (ZETIA) 10 mg tablet; Take 1 tablet (10 mg total) by mouth once daily.  -     evolocumab (REPATHA SURECLICK) 140 mg/mL PnIj; Inject 140 mg into the skin every 14 (fourteen) days.  -     Hepatic function panel; Future  -     Lipid panel; Future    Statin intolerance  -     ezetimibe (ZETIA) 10 mg tablet; Take 1 tablet (10 mg total) by mouth once daily.  -     evolocumab (REPATHA SURECLICK) 140 mg/mL PnIj; Inject 140 mg into the skin every 14 (fourteen) days.  -     Hepatic function panel; Future  -     Lipid panel; Future        Thank you for allowing me to participate in this patient's care. Please do not hesitate to contact me with any questions or concerns.

## 2017-07-31 ENCOUNTER — TELEPHONE (OUTPATIENT)
Dept: PHARMACY | Facility: CLINIC | Age: 72
End: 2017-07-31

## 2017-08-02 ENCOUNTER — OFFICE VISIT (OUTPATIENT)
Dept: NEUROLOGY | Facility: CLINIC | Age: 72
End: 2017-08-02
Payer: MEDICARE

## 2017-08-02 VITALS
BODY MASS INDEX: 25.85 KG/M2 | HEIGHT: 65 IN | DIASTOLIC BLOOD PRESSURE: 84 MMHG | HEART RATE: 69 BPM | SYSTOLIC BLOOD PRESSURE: 145 MMHG | WEIGHT: 155.19 LBS

## 2017-08-02 DIAGNOSIS — M48.061 STENOSIS OF LATERAL RECESS OF LUMBAR SPINE: ICD-10-CM

## 2017-08-02 DIAGNOSIS — G20.A1 PARKINSON DISEASE: ICD-10-CM

## 2017-08-02 PROCEDURE — 99214 OFFICE O/P EST MOD 30 MIN: CPT | Mod: S$PBB,,, | Performed by: PSYCHIATRY & NEUROLOGY

## 2017-08-02 PROCEDURE — 3077F SYST BP >= 140 MM HG: CPT | Mod: ,,, | Performed by: PSYCHIATRY & NEUROLOGY

## 2017-08-02 PROCEDURE — 3008F BODY MASS INDEX DOCD: CPT | Mod: ,,, | Performed by: PSYCHIATRY & NEUROLOGY

## 2017-08-02 PROCEDURE — 1159F MED LIST DOCD IN RCRD: CPT | Mod: ,,, | Performed by: PSYCHIATRY & NEUROLOGY

## 2017-08-02 PROCEDURE — 1126F AMNT PAIN NOTED NONE PRSNT: CPT | Mod: ,,, | Performed by: PSYCHIATRY & NEUROLOGY

## 2017-08-02 PROCEDURE — 99999 PR PBB SHADOW E&M-EST. PATIENT-LVL III: CPT | Mod: PBBFAC,,, | Performed by: PSYCHIATRY & NEUROLOGY

## 2017-08-02 PROCEDURE — 3079F DIAST BP 80-89 MM HG: CPT | Mod: ,,, | Performed by: PSYCHIATRY & NEUROLOGY

## 2017-08-02 PROCEDURE — 99213 OFFICE O/P EST LOW 20 MIN: CPT | Mod: PBBFAC | Performed by: PSYCHIATRY & NEUROLOGY

## 2017-08-02 RX ORDER — GABAPENTIN 400 MG/1
400 CAPSULE ORAL 2 TIMES DAILY
Qty: 180 CAPSULE | Refills: 3 | Status: SHIPPED | OUTPATIENT
Start: 2017-08-02 | End: 2018-10-29 | Stop reason: SDUPTHER

## 2017-08-02 RX ORDER — CARBIDOPA AND LEVODOPA 25; 100 MG/1; MG/1
1 TABLET ORAL
Qty: 450 TABLET | Refills: 3 | Status: SHIPPED | OUTPATIENT
Start: 2017-08-02 | End: 2018-10-29 | Stop reason: SDUPTHER

## 2017-08-02 NOTE — PROGRESS NOTES
"Name: Hector Kellogg  MRN: [id number]   CSN: [id number]      Date: 08/02/2017    Chief Complaint / Interval History:  PD  - a bit more off time between dosing  - peak is pretty good  - gait is frustrating  - memory holding up  - family pleased    From 12/06  1) A bit more off time in between dosing  2) More cramping when off, gabapentin helps at night  3) Would be willing to take meds four times per day    History of Present Illness (HPI):  68 yo with diagnosis of PD, symptoms began 1.5 years ago.  First noted tremor in the L hand, "all left side".  Drags his left foot, generally slower overall.  Dx 1 year ago, had [employer] as a confirmatory study.  Says he had an MRI of the low back as well, was feeling "pinched nerves" in the back and into the both legs.  Was prescribed "some drug that cost $400" and didn;t take. Also prescribed pramipexole per the notes but also said he "never took it."    Was in [place] for 2 years, now re-establishing care here.    Has questions about gabapentin - 800 now taking 1/2 tab at night only and has no significant pain.  Will ibuprofen for breakthrough.  Not taking Norco.    Retired , [employer], left at age 65.    Nonmotor/Premotor ROS:  Hyposmia (HENT)?No per him  RBD/sleep issues (Constitutional)?Yes - acts out dreams  Depression/anxiety (Psychiatric)?No - better on Citalopram  Fatigue (Constitutional)?Yes  Constipation (GI)?Yes  - uses OTC stool softener  Urinary issues ()?Yes - urgency  Sexual dysfunction ()?Yes - some ED.    Orthostasis (Cardiovascular)?No  Leg swelling (Cardiovascular)? No  Falls (Musculoskeletal)?No  Cognitive impairment (Neurologic)?No  Psychoses (Psychiatric)?No  Pain/Paresthesia (Neurologic)?Yes  Visual changes (Eyes)?No  Moles / skin changes (Skin)?Yes - seborrhea, has a scaly mole on the R side of the head  Stridor / SOB (Pulm)?Yes - with acticity  Bruising (Heme)?No    Past Medical History: The patient  has a past medical history of Chronic back " pain greater than 3 months duration; Coronary artery disease; Hyperlipidemia; Hypertension; MI, old (2006); and Parkinson disease.    Social History: The patient  reports that he quit smoking about 3 years ago. He has a 40.00 pack-year smoking history. He has never used smokeless tobacco. He reports that he drinks alcohol.  Rare social drink.    Family History: Their family history includes Heart attack in his father; Heart disease in his maternal uncle and paternal uncle.    Allergies: Statins-hmg-coa reductase inhibitors     Meds:   Current Outpatient Prescriptions on File Prior to Visit   Medication Sig Dispense Refill    aspirin (ECOTRIN) 81 MG EC tablet Take 81 mg by mouth once daily.      carbidopa-levodopa  mg (SINEMET)  mg per tablet Take 1 tablet by mouth 4 (four) times daily. 360 tablet 3    citalopram (CELEXA) 20 MG tablet Take 1 tablet (20 mg total) by mouth once daily. 30 tablet 11    co-enzyme Q-10 50 mg capsule Take 100 mg by mouth once daily.      evolocumab (REPATHA SURECLICK) 140 mg/mL PnIj Inject 140 mg into the skin every 14 (fourteen) days. 2 Syringe 11    gabapentin (NEURONTIN) 400 MG capsule Take 1 capsule (400 mg total) by mouth 2 (two) times daily. 180 capsule 3    loratadine (CLARITIN) 10 mg tablet Take 10 mg by mouth as needed for Allergies.      metoprolol succinate (TOPROL-XL) 50 MG 24 hr tablet Take 1 tablet (50 mg total) by mouth once daily. 90 tablet 3    multivitamin capsule Take 1 capsule by mouth once daily.      ezetimibe (ZETIA) 10 mg tablet Take 1 tablet (10 mg total) by mouth once daily. 30 tablet 11    mometasone-formoterol (DULERA) 200-5 mcg/actuation inhaler Inhale 2 puffs into the lungs 2 (two) times daily. 1 Inhaler 6    nitroGLYCERIN (NITROSTAT) 0.4 MG SL tablet Place 1 tablet (0.4 mg total) under the tongue every 5 (five) minutes as needed for Chest pain. 25 tablet 4     No current facility-administered medications on file prior to visit.   "    Exam:  BP (!) 145/84   Pulse 69   Ht 5' 5" (1.651 m)   Wt 70.4 kg (155 lb 3.3 oz)   BMI 25.83 kg/m²     Constitutional  Well-developed, well-nourished, appears stated age   * Specialized movement exam  Mild hypophonic speech.    Mild facial masking.   L>R cogwheel rigidity - moderate at worst     L>R bradykinesia - mild.   Only rare rest tremor in L hand   No other dystonia, chorea, athetosis, myoclonus, or tics.   No motor impersistence.   Normal-based gait.   + mildly abnormal arm swing B, worse on the L  Good stride length, a bit of freezing   No postural instability.      Laboratory/Radiological:  - Results:  Lab Visit on 07/21/2017   Component Date Value Ref Range Status    Cholesterol 07/21/2017 296* 120 - 199 mg/dL Final    Triglycerides 07/21/2017 271* 30 - 150 mg/dL Final    HDL 07/21/2017 41  40 - 75 mg/dL Final    LDL Cholesterol 07/21/2017 200.8* 63.0 - 159.0 mg/dL Final    HDL/Chol Ratio 07/21/2017 13.9* 20.0 - 50.0 % Final    Total Cholesterol/HDL Ratio 07/21/2017 7.2* 2.0 - 5.0 Final    Non-HDL Cholesterol 07/21/2017 255  mg/dL Final    WBC 07/21/2017 6.15  3.90 - 12.70 K/uL Final    RBC 07/21/2017 4.71  4.60 - 6.20 M/uL Final    Hemoglobin 07/21/2017 14.5  14.0 - 18.0 g/dL Final    Hematocrit 07/21/2017 45.0  40.0 - 54.0 % Final    MCV 07/21/2017 96  82 - 98 fL Final    MCH 07/21/2017 30.8  27.0 - 31.0 pg Final    MCHC 07/21/2017 32.2  32.0 - 36.0 g/dL Final    RDW 07/21/2017 13.7  11.5 - 14.5 % Final    Platelets 07/21/2017 198  150 - 350 K/uL Final    MPV 07/21/2017 10.1  9.2 - 12.9 fL Final    Gran # 07/21/2017 3.5  1.8 - 7.7 K/uL Final    Lymph # 07/21/2017 2.0  1.0 - 4.8 K/uL Final    Mono # 07/21/2017 0.5  0.3 - 1.0 K/uL Final    Eos # 07/21/2017 0.1  0.0 - 0.5 K/uL Final    Baso # 07/21/2017 0.01  0.00 - 0.20 K/uL Final    Gran% 07/21/2017 57.1  38.0 - 73.0 % Final    Lymph% 07/21/2017 32.8  18.0 - 48.0 % Final    Mono% 07/21/2017 8.1  4.0 - 15.0 % Final    " Eosinophil% 2017 1.6  0.0 - 8.0 % Final    Basophil% 2017 0.2  0.0 - 1.9 % Final    Differential Method 2017 Automated   Final    Sodium 2017 140  136 - 145 mmol/L Final    Potassium 2017 4.6  3.5 - 5.1 mmol/L Final    Chloride 2017 103  95 - 110 mmol/L Final    CO2 2017 30* 23 - 29 mmol/L Final    Glucose 2017 91  70 - 110 mg/dL Final    BUN, Bld 2017 23  8 - 23 mg/dL Final    Creatinine 2017 1.2  0.5 - 1.4 mg/dL Final    Calcium 2017 10.0  8.7 - 10.5 mg/dL Final    Total Protein 2017 7.2  6.0 - 8.4 g/dL Final    Albumin 2017 3.5  3.5 - 5.2 g/dL Final    Total Bilirubin 2017 0.5  0.1 - 1.0 mg/dL Final    Alkaline Phosphatase 2017 88  55 - 135 U/L Final    AST 2017 26  10 - 40 U/L Final    ALT 2017 22  10 - 44 U/L Final    Anion Gap 2017 7* 8 - 16 mmol/L Final    eGFR if African American 2017 >60.0  >60 mL/min/1.73 m^2 Final    eGFR if non African American 2017 >60.0  >60 mL/min/1.73 m^2 Final       - Independent review of images: none available.    Reviewed records of:  1) Grade 1 anterolisthesis of spine  2) Positive SHAHRZAD scan    Diagnoses:          1) Idiopathic PD, tremor dominant.  On CD/LD.  2) Low back pain    Medical Decision Makin) CD/LD 5x a day now  2) Consider additional Azilect or Xadago next  3) Medi diet and exercise    Isis f/u    Eliu Klein MD, MPH  Division of Movement and Memory Disorders  [facility]  [phone]

## 2017-08-02 NOTE — LETTER
August 5, 2017      Blue Mcgill, DO  [address] [facility] [id number]           [facility]  [address]  [place] [address]  Phone: [phone]  Fax: [phone]          Patient: Hector Kellogg   MR Number: [id number]   YOB: 1945   Date of Visit: 8/2/2017       Dear Dr. Blue Mcgill:    Thank you for referring Hector Kellogg to me for evaluation. Attached you will find relevant portions of my assessment and plan of care.    If you have questions, please do not hesitate to call me. I look forward to following Hector Kellogg along with you.    Sincerely,        Enclosure  CC:  No Recipients    If you would like to receive this communication electronically, please contact [email or web address] or [phone] to request more information on [facility] access.    For providers and/or their staff who would like to refer a patient to [facility], please contact us through our one-stop-shop provider referral line, [facility], at [phone].    If you feel you have received this communication in error or would no longer like to receive these types of communications, please e-mail [email or web address]

## 2017-08-25 ENCOUNTER — PATIENT MESSAGE (OUTPATIENT)
Dept: CARDIOLOGY | Facility: CLINIC | Age: 72
End: 2017-08-25

## 2017-08-25 DIAGNOSIS — Z12.11 COLON CANCER SCREENING: ICD-10-CM

## 2017-10-24 ENCOUNTER — TELEPHONE (OUTPATIENT)
Dept: CARDIOLOGY | Facility: CLINIC | Age: 72
End: 2017-10-24

## 2017-10-24 NOTE — TELEPHONE ENCOUNTER
The Repatha shouldn't lower his blood pressure. It can be a side effect of the Parkinsons. If his pressure remains low, he should hold the metoprolol.

## 2017-10-24 NOTE — TELEPHONE ENCOUNTER
----- Message from Cristy Schmidt sent at 10/24/2017  2:43 PM CDT -----  Contact: pt's wife  Pt's wife called because the pt has been having a very low blood pressure the past couple days.  She states he just started the Repatha shots and since his BP has been low.  (86/56)  The pt did not take the Metoporal this am because the BP was low.  She has questions on what they should do.  She can be reached @ [phone].  He is a pt of Dr. MESSI Salinas and LOV 7/28/17.  Thanks!!

## 2017-10-24 NOTE — TELEPHONE ENCOUNTER
,         LOV:7/28/17,Next F/U:10/27/17,this Fri.Pt's wife said that over the past several days her husbands b/p has been running on the low side.She reports that it has been 90's/60's.She said that he has been c/o being weak and light headed with standing.She said that today is the first day that she decided to hold his metoprolol dose b/c his b/p this morning was 120/84 but later it dropped to 86/56.She does report that he is not c/o c/p,SOB or swelling,states that he does not drink enough fluids.He is scheduled to see you on Friday.She thought that the low b/p could be a S.E.of the Repatha b/c he did take his first shot recently.She wanted to know if his b/p remains low if she should hold the metoprolol tomorrow as well.Otherwise he will see you on Friday as scheduled.Please advise.Thanks,Chasity

## 2017-10-25 ENCOUNTER — LAB VISIT (OUTPATIENT)
Dept: LAB | Facility: HOSPITAL | Age: 72
End: 2017-10-25
Attending: INTERNAL MEDICINE
Payer: MEDICARE

## 2017-10-25 DIAGNOSIS — Z78.9 STATIN INTOLERANCE: ICD-10-CM

## 2017-10-25 DIAGNOSIS — E78.2 MIXED HYPERLIPIDEMIA: ICD-10-CM

## 2017-10-25 DIAGNOSIS — I25.118 CORONARY ARTERY DISEASE OF NATIVE ARTERY OF NATIVE HEART WITH STABLE ANGINA PECTORIS: ICD-10-CM

## 2017-10-25 DIAGNOSIS — I10 ESSENTIAL HYPERTENSION: ICD-10-CM

## 2017-10-25 LAB
ALBUMIN SERPL BCP-MCNC: 3.4 G/DL
ALP SERPL-CCNC: 89 U/L
ALT SERPL W/O P-5'-P-CCNC: <5 U/L
AST SERPL-CCNC: 20 U/L
BILIRUB DIRECT SERPL-MCNC: 0.2 MG/DL
BILIRUB SERPL-MCNC: 0.5 MG/DL
CHOLEST SERPL-MCNC: 165 MG/DL
CHOLEST/HDLC SERPL: 3.2 {RATIO}
HDLC SERPL-MCNC: 51 MG/DL
HDLC SERPL: 30.9 %
LDLC SERPL CALC-MCNC: 75.4 MG/DL
NONHDLC SERPL-MCNC: 114 MG/DL
PROT SERPL-MCNC: 7.3 G/DL
TRIGL SERPL-MCNC: 193 MG/DL

## 2017-10-25 PROCEDURE — 36415 COLL VENOUS BLD VENIPUNCTURE: CPT

## 2017-10-25 PROCEDURE — 80061 LIPID PANEL: CPT

## 2017-10-25 PROCEDURE — 80076 HEPATIC FUNCTION PANEL: CPT

## 2017-10-27 ENCOUNTER — OFFICE VISIT (OUTPATIENT)
Dept: CARDIOLOGY | Facility: CLINIC | Age: 72
End: 2017-10-27
Payer: MEDICARE

## 2017-10-27 VITALS
HEART RATE: 85 BPM | HEIGHT: 65 IN | BODY MASS INDEX: 25.9 KG/M2 | WEIGHT: 155.44 LBS | SYSTOLIC BLOOD PRESSURE: 145 MMHG | DIASTOLIC BLOOD PRESSURE: 69 MMHG

## 2017-10-27 DIAGNOSIS — Z78.9 STATIN INTOLERANCE: ICD-10-CM

## 2017-10-27 DIAGNOSIS — E78.2 MIXED HYPERLIPIDEMIA: ICD-10-CM

## 2017-10-27 DIAGNOSIS — G20.A1 PARKINSON'S DISEASE: ICD-10-CM

## 2017-10-27 DIAGNOSIS — I25.10 CORONARY ARTERY DISEASE INVOLVING NATIVE CORONARY ARTERY OF NATIVE HEART WITHOUT ANGINA PECTORIS: Primary | ICD-10-CM

## 2017-10-27 DIAGNOSIS — I10 ESSENTIAL HYPERTENSION: ICD-10-CM

## 2017-10-27 DIAGNOSIS — J43.2 CENTRILOBULAR EMPHYSEMA: ICD-10-CM

## 2017-10-27 PROCEDURE — 99213 OFFICE O/P EST LOW 20 MIN: CPT | Mod: S$PBB,,, | Performed by: INTERNAL MEDICINE

## 2017-10-27 PROCEDURE — 99214 OFFICE O/P EST MOD 30 MIN: CPT | Mod: PBBFAC | Performed by: INTERNAL MEDICINE

## 2017-10-27 PROCEDURE — 99999 PR PBB SHADOW E&M-EST. PATIENT-LVL IV: CPT | Mod: PBBFAC,,, | Performed by: INTERNAL MEDICINE

## 2017-10-27 NOTE — PROGRESS NOTES
Subjective:   Patient ID:  Hector Kellogg is a 72 y.o. male who presents for follow-up of Coronary Artery Disease (3 month f/u )      HPI: He had some low blood pressure readings last week which his wife thinks was due to him not drinking enough water. He had only been drinking about one small bottle a day. After increasing his PO intake, his blood pressure readings normalized. He has stable exertional dyspnea, class III due to COPD. He has not had any chest discomfort. Home blood pressure readings are now running 120/70's. His last echo was 11/15 and showed an LVEF of 55%. He has been tolerating Repatha.    ECG (12/30/16): NSR 84 bpm.    Past Medical History:   Diagnosis Date    Chronic back pain greater than 3 months duration     Coronary artery disease     Hyperlipidemia     Hypertension     MI, old 2006    Parkinson disease        Past Surgical History:   Procedure Laterality Date    CARDIAC CATHETERIZATION  2006    x 3    CARDIAC CATHETERIZATION  10/11/2015    x 2    CORONARY ANGIOPLASTY  2006, 2015    3 stents    HEMORRHOID SURGERY         Social History     Social History    Marital status:      Spouse name: N/A    Number of children: N/A    Years of education: N/A     Social History Main Topics    Smoking status: Former Smoker     Packs/day: 1.00     Years: 40.00     Quit date: 2/1/2014    Smokeless tobacco: Never Used    Alcohol use Yes      Comment: socially    Drug use: Unknown    Sexual activity: Not Asked     Other Topics Concern    None     Social History Narrative    None       Family History   Problem Relation Age of Onset    Heart attack Father     Heart disease Maternal Uncle     Heart disease Paternal Uncle     Hypertension Neg Hx     Asthma Neg Hx     Emphysema Neg Hx        Patient's Medications   New Prescriptions    No medications on file   Previous Medications    ASPIRIN (ECOTRIN) 81 MG EC TABLET    Take 81 mg by mouth once daily.    CARBIDOPA-LEVODOPA   MG (SINEMET)  MG PER TABLET    Take 1 tablet by mouth 5 (five) times daily.    CITALOPRAM (CELEXA) 20 MG TABLET    Take 1 tablet (20 mg total) by mouth once daily.    EVOLOCUMAB (REPATHA SURECLICK) 140 MG/ML PNIJ    Inject 140 mg into the skin every 14 (fourteen) days.    GABAPENTIN (NEURONTIN) 400 MG CAPSULE    Take 1 capsule (400 mg total) by mouth 2 (two) times daily.    LORATADINE (CLARITIN) 10 MG TABLET    Take 10 mg by mouth as needed for Allergies.    METOPROLOL SUCCINATE (TOPROL-XL) 50 MG 24 HR TABLET    Take 1 tablet (50 mg total) by mouth once daily.    MOMETASONE-FORMOTEROL (DULERA) 200-5 MCG/ACTUATION INHALER    Inhale 2 puffs into the lungs 2 (two) times daily.    MULTIVITAMIN CAPSULE    Take 1 capsule by mouth once daily.    NITROGLYCERIN (NITROSTAT) 0.4 MG SL TABLET    Place 1 tablet (0.4 mg total) under the tongue every 5 (five) minutes as needed for Chest pain.   Modified Medications    No medications on file   Discontinued Medications    CO-ENZYME Q-10 50 MG CAPSULE    Take 100 mg by mouth once daily.    EZETIMIBE (ZETIA) 10 MG TABLET    Take 1 tablet (10 mg total) by mouth once daily.       Review of Systems   Constitution: Negative for weakness, malaise/fatigue and weight gain.   HENT: Negative for hearing loss.    Eyes: Negative for visual disturbance.   Cardiovascular: Positive for dyspnea on exertion. Negative for chest pain, claudication, leg swelling, near-syncope, orthopnea, palpitations, paroxysmal nocturnal dyspnea and syncope.   Respiratory: Negative for cough, shortness of breath, sleep disturbances due to breathing, snoring and wheezing.    Endocrine: Negative for cold intolerance, heat intolerance, polydipsia, polyphagia and polyuria.   Hematologic/Lymphatic: Negative for bleeding problem. Does not bruise/bleed easily.   Skin: Negative for rash and suspicious lesions.   Musculoskeletal: Positive for muscle cramps. Negative for arthritis, falls, joint pain, muscle weakness  "and myalgias.   Gastrointestinal: Negative for abdominal pain, change in bowel habit, constipation, diarrhea, heartburn, hematochezia, melena and nausea.   Genitourinary: Negative for hematuria and nocturia.   Neurological: Positive for tremors. Negative for excessive daytime sleepiness, dizziness, headaches, light-headedness and loss of balance.   Psychiatric/Behavioral: Negative for depression. The patient is not nervous/anxious.    Allergic/Immunologic: Negative for environmental allergies.       BP (!) 145/69 (BP Location: Left arm, Patient Position: Sitting, BP Method: Medium (Automatic))   Pulse 85   Ht 5' 5" (1.651 m)   Wt 70.5 kg (155 lb 6.8 oz)   BMI 25.86 kg/m²     Objective:   Physical Exam   Constitutional: He is oriented to person, place, and time. He appears well-developed and well-nourished.        HENT:   Head: Normocephalic and atraumatic.   Mouth/Throat: Oropharynx is clear and moist.   Eyes: Conjunctivae and EOM are normal. Pupils are equal, round, and reactive to light. No scleral icterus.   Neck: Normal range of motion. Neck supple. No hepatojugular reflux and no JVD present. No tracheal deviation present. No thyromegaly present.   Cardiovascular: Normal rate, regular rhythm, normal heart sounds and intact distal pulses.  PMI is not displaced.    Pulses:       Carotid pulses are 2+ on the right side, and 2+ on the left side.       Radial pulses are 2+ on the right side, and 2+ on the left side.        Dorsalis pedis pulses are 2+ on the right side, and 2+ on the left side.        Posterior tibial pulses are 2+ on the right side, and 2+ on the left side.   Pulmonary/Chest: Effort normal and breath sounds normal.   Abdominal: Soft. Bowel sounds are normal. He exhibits no distension and no mass. There is no hepatosplenomegaly. There is no tenderness.   Musculoskeletal: He exhibits no edema or tenderness.   Lymphadenopathy:     He has no cervical adenopathy.   Neurological: He is alert and " oriented to person, place, and time.   Resting tremor   Skin: Skin is warm and dry. No rash noted. No cyanosis or erythema. Nails show no clubbing.   Psychiatric: He has a normal mood and affect. His speech is normal and behavior is normal.       Lab Results   Component Value Date     07/21/2017    K 4.6 07/21/2017     07/21/2017    CO2 30 (H) 07/21/2017    BUN 23 07/21/2017    CREATININE 1.2 07/21/2017    GLU 91 07/21/2017    MG 2.1 12/31/2016    AST 20 10/25/2017    ALT <5 (L) 10/25/2017    ALBUMIN 3.4 (L) 10/25/2017    PROT 7.3 10/25/2017    BILITOT 0.5 10/25/2017    WBC 6.15 07/21/2017    HGB 14.5 07/21/2017    HCT 45.0 07/21/2017    MCV 96 07/21/2017     07/21/2017    INR 1.0 10/10/2015    TSH 1.196 08/21/2014    CHOL 165 10/25/2017    HDL 51 10/25/2017    LDLCALC 75.4 10/25/2017    TRIG 193 (H) 10/25/2017    BNP 48 12/30/2016       Assessment:     1. Coronary artery disease involving native coronary artery of native heart without angina pectoris : He is asymptomatic with preserved LVEF. Continue asa and Repatha.   2. Centrilobular emphysema    3. Parkinson's disease    4. Mixed hyperlipidemia : He has responded nicely to Repatha. Stop zetia.   5. Essential hypertension : Home blood pressure readings are at goal. Some lability likely due to underlying autonomic dysfunction.   6. Statin intolerance        Plan:     Hector was seen today for coronary artery disease.    Diagnoses and all orders for this visit:    Coronary artery disease involving native coronary artery of native heart without angina pectoris    Centrilobular emphysema    Parkinson's disease    Mixed hyperlipidemia    Essential hypertension    Statin intolerance        Thank you for allowing me to participate in this patient's care. Please do not hesitate to contact me with any questions or concerns.

## 2017-12-12 DIAGNOSIS — J43.8 OTHER EMPHYSEMA: Primary | ICD-10-CM

## 2017-12-13 ENCOUNTER — OFFICE VISIT (OUTPATIENT)
Dept: PULMONOLOGY | Facility: CLINIC | Age: 72
End: 2017-12-13
Payer: MEDICARE

## 2017-12-13 ENCOUNTER — HOSPITAL ENCOUNTER (OUTPATIENT)
Dept: PULMONOLOGY | Facility: CLINIC | Age: 72
Discharge: HOME | End: 2017-12-13
Payer: MEDICARE

## 2017-12-13 VITALS — BODY MASS INDEX: 24.99 KG/M2 | HEIGHT: 65 IN | WEIGHT: 150 LBS

## 2017-12-13 VITALS
SYSTOLIC BLOOD PRESSURE: 142 MMHG | BODY MASS INDEX: 27.7 KG/M2 | DIASTOLIC BLOOD PRESSURE: 98 MMHG | HEIGHT: 63 IN | WEIGHT: 156.31 LBS | HEART RATE: 85 BPM | OXYGEN SATURATION: 95 % | RESPIRATION RATE: 16 BRPM

## 2017-12-13 DIAGNOSIS — G20.A1 PARKINSON'S DISEASE: Primary | ICD-10-CM

## 2017-12-13 DIAGNOSIS — J43.2 CENTRILOBULAR EMPHYSEMA: ICD-10-CM

## 2017-12-13 DIAGNOSIS — R09.02 EXERCISE HYPOXEMIA: ICD-10-CM

## 2017-12-13 DIAGNOSIS — J43.8 OTHER EMPHYSEMA: ICD-10-CM

## 2017-12-13 PROCEDURE — 99213 OFFICE O/P EST LOW 20 MIN: CPT | Mod: PBBFAC | Performed by: INTERNAL MEDICINE

## 2017-12-13 PROCEDURE — 94620 PR PULMONARY STRESS TESTING,SIMPLE: CPT | Mod: PBBFAC | Performed by: INTERNAL MEDICINE

## 2017-12-13 PROCEDURE — 99214 OFFICE O/P EST MOD 30 MIN: CPT | Mod: 25,S$PBB,, | Performed by: INTERNAL MEDICINE

## 2017-12-13 PROCEDURE — 94620 PR PULMONARY STRESS TESTING,SIMPLE: CPT | Mod: 26,S$PBB,, | Performed by: INTERNAL MEDICINE

## 2017-12-13 PROCEDURE — 99999 PR PBB SHADOW E&M-EST. PATIENT-LVL III: CPT | Mod: PBBFAC,,, | Performed by: INTERNAL MEDICINE

## 2017-12-14 NOTE — PATIENT INSTRUCTIONS
Urged more regular use of Dulera.  OK to stop home O2.  Return visit 6 months with Spirometry and DLCO.

## 2017-12-14 NOTE — PROGRESS NOTES
Subjective:       Patient ID: Hector Kellogg is a 72 y.o. male.    Chief Complaint: COPD    HPI HISTORY OF PRESENT ILLNESS:  Mr. Kellogg is a 72-year-old former smoker who   comes for an interval assessment of chronic obstructive lung disease.  His most   recent previous visit here was in January.  He is interested in having his home   oxygen picked up since he has not used this in recent months.  He does describe   shortness of breath with exertion, but this has been stable in degree.  He has   not had any recent symptoms to suggest an active respiratory infection.  Mr. Kellogg has been prescribed Dulera to use for maintenance treatment of   chronic obstructive lung disease.  He has not used this medication on a daily   basis.  He does feel that it provides a beneficial effect at those times when he   has used it.  Mr. Kellogg is also currently taking Claritin for treatment of   chronic rhinitis.  He does not have any associated symptoms to suggest sinus   infection.  He is not having any prominent upper GI symptoms.    Mr. Kellogg is followed in the [facility] for treatment of Parkinson   disease.  He feels that his symptoms related to this condition are fairly   stable.    DATA:  I have reviewed the results of the six-minute walk study which Mr. Kellogg did earlier today.  He was able to walk a distance of 1000 feet.    His room air oximetry declined from a resting value of 99% to 89%.  He describes   minimal dyspnea at rest and mild dyspnea during the walk.  These results are   similar to the walk which was performed in January.  At that time he was able   to walk a distance of 1100 feet.  His oxygen saturation declined from 97% to   93%.      CB/HN  dd: 12/13/2017 20:35:20 (CST)  td: 12/14/2017 16:41:23 (CST)  Doc ID   #[id number]  Job ID #[id number]    CC:       Review of Systems   Constitutional: Negative for fever and fatigue.   HENT: Negative for postnasal drip, sinus pressure, voice  change and congestion.    Respiratory: Positive for dyspnea on extertion. Negative for cough, sputum production, shortness of breath and wheezing.    Cardiovascular: Negative for chest pain and leg swelling.   Genitourinary: Negative for difficulty urinating.   Musculoskeletal: Positive for gait problem. Negative for arthralgias and back pain.   Skin: Negative for rash.   Gastrointestinal: Negative for abdominal pain and acid reflux.   Neurological: Negative for dizziness and weakness.   Hematological: Negative for adenopathy.       Objective:      Physical Exam   Constitutional: He is oriented to person, place, and time. He appears well-developed and well-nourished.   HENT:   Head: Normocephalic.   Mouth/Throat: Oropharynx is clear and moist. No oropharyngeal exudate.   Neck: Normal range of motion. Neck supple. No JVD present. No tracheal deviation present. No thyromegaly present.   Cardiovascular: Normal rate, regular rhythm and normal heart sounds.    No murmur heard.  Pulmonary/Chest: Symmetric chest wall expansion. No stridor. He has decreased breath sounds. He has no wheezes. He has no rhonchi. He has no rales. He exhibits no tenderness.   Abdominal: Soft.   Musculoskeletal: He exhibits no edema.   Lymphadenopathy:     He has no cervical adenopathy.   Neurological: He is alert and oriented to person, place, and time.   Skin: Skin is warm and dry. No rash noted. No erythema. Nails show no clubbing.   Psychiatric: He has a normal mood and affect.   Vitals reviewed.    Personal Diagnostic Review    Pulmonary Function Tests 12/13/2017   Ordering Provider MD Kelly.   Performing nurse/tech/RT SCOT Javier.   Diagnosis Qualify for Oxygen   Height 65   Weight 2400   BMI (Calculated) 25   Patient Race    6MWT Status completed without stopping   Patient Reported Dyspnea;Leg pain   Was O2 used? No   6MW Distance walked (feet) 1000   Distance walked (meters) 304.8   Did patient stop? No   Type of assistive  device(s) used? no assistive devices   Oxygen Saturation 99   Supplemental Oxygen Room Air   Heart Rate 84   Blood Pressure 156/76   [place] Dyspnea Rating  very light   Oxygen Saturation 89   Supplemental Oxygen Room Air   Heart Rate 108   Blood Pressure 143/80   [place] Dyspnea Rating  light   Recovery Time (seconds) 135   Oxygen Saturation 93   Supplemental Oxygen Room Air   Heart Rate 89   Is procedure ready for interpretation? Yes   Some recent data might be hidden         Assessment:       1. Parkinson's disease    2. Centrilobular emphysema    3. Exercise hypoxemia        Outpatient Encounter Prescriptions as of 12/13/2017   Medication Sig Dispense Refill    aspirin (ECOTRIN) 81 MG EC tablet Take 81 mg by mouth once daily.      carbidopa-levodopa  mg (SINEMET)  mg per tablet Take 1 tablet by mouth 5 (five) times daily. 450 tablet 3    citalopram (CELEXA) 20 MG tablet Take 1 tablet (20 mg total) by mouth once daily. 30 tablet 11    evolocumab (REPATHA SURECLICK) 140 mg/mL PnIj Inject 140 mg into the skin every 14 (fourteen) days. 2 Syringe 11    gabapentin (NEURONTIN) 400 MG capsule Take 1 capsule (400 mg total) by mouth 2 (two) times daily. 180 capsule 3    loratadine (CLARITIN) 10 mg tablet Take 10 mg by mouth as needed for Allergies.      metoprolol succinate (TOPROL-XL) 50 MG 24 hr tablet Take 1 tablet (50 mg total) by mouth once daily. 90 tablet 3    mometasone-formoterol (DULERA) 200-5 mcg/actuation inhaler Inhale 2 puffs into the lungs 2 (two) times daily. 1 Inhaler 6    multivitamin capsule Take 1 capsule by mouth once daily.      nitroGLYCERIN (NITROSTAT) 0.4 MG SL tablet Place 1 tablet (0.4 mg total) under the tongue every 5 (five) minutes as needed for Chest pain. 25 tablet 4     No facility-administered encounter medications on file as of 12/13/2017.      Orders Placed This Encounter   Procedures    Spirometry without Bronchodilator     Standing Status:   Future     Standing  Expiration Date:   8/14/2018    DLCO-Carbon Monoxide Diffusing Capacity     Standing Status:   Future     Standing Expiration Date:   8/14/2018     Plan:     Urged more regular use of Dulera.  OK to stop home O2.  Return visit 6 months with Spirometry and DLCO.

## 2017-12-14 NOTE — PROCEDURES
Hector Kellogg is a 72 y.o.  male patient, who presents for a 6 minute walk test ordered by Camden Mendoza MD.  The diagnosis is Qualify for Oxygen; COPD/Emphysema.  The patient's BMI is 25 kg/m2.  Predicted distance (lower limit of normal) is 347.07 meters.      Test Results:    The test was completed without stopping.  The total time walked was 360 seconds.  During walking, the patient reported:  Dyspnea, Leg pain.  The patient used no assistive devices during testing.     12/13/2017---------Distance: 304.8 meters (1000 feet)     O2 Sat % Supplemental Oxygen Heart Rate Blood Pressure [place] Scale   Pre-exercise  (Resting) 99 % Room Air 84 bpm 156/76 mmHg 1   During Exercise 89 % Room Air 108 bpm 143/80 mmHg 2   Post-exercise  (Recovery) 93 % Room Air  89 bpm       Recovery Time:  135 seconds    Performing nurse/tech:  SCOT Javier.      PREVIOUS STUDY:   01/13/2017---------Distance: 335.28 meters (1100 feet)       O2 Sat % Supplemental Oxygen Heart Rate Blood Pressure [place] Scale   Pre-exercise  (Resting) 97 % Room Air 78 bpm 142/76 mmHg 0   During Exercise 93 % Room Air 101 bpm 144/74 mmHg 2   Post-exercise  (Recovery) 97 % Room Air  89 bpm           CLINICAL INTERPRETATION:  Six minute walk distance is 304.8 meters (1000 feet) with light dyspnea.  During exercise, there was significant desaturation while breathing room air.  Blood pressure remained stable and Heart rate increased significantly with walking.  Hypertension was present prior to exercise.  The patient reported non-pulmonary symptoms during exercise.  Since the previous study in January 2017, exercise capacity may be somewhat worse.  Based upon age and body mass index, exercise capacity is less than predicted.

## 2017-12-15 ENCOUNTER — OFFICE VISIT (OUTPATIENT)
Dept: OPTOMETRY | Facility: CLINIC | Age: 72
End: 2017-12-15
Payer: MEDICARE

## 2017-12-15 DIAGNOSIS — H25.13 NUCLEAR SCLEROSIS, BILATERAL: Primary | ICD-10-CM

## 2017-12-15 DIAGNOSIS — H40.053 BILATERAL OCULAR HYPERTENSION: ICD-10-CM

## 2017-12-15 PROCEDURE — 99999 PR PBB SHADOW E&M-EST. PATIENT-LVL II: CPT | Mod: PBBFAC,,, | Performed by: OPTOMETRIST

## 2017-12-15 PROCEDURE — 92015 DETERMINE REFRACTIVE STATE: CPT | Mod: ,,, | Performed by: OPTOMETRIST

## 2017-12-15 PROCEDURE — 92004 COMPRE OPH EXAM NEW PT 1/>: CPT | Mod: S$PBB,,, | Performed by: OPTOMETRIST

## 2017-12-15 PROCEDURE — 99212 OFFICE O/P EST SF 10 MIN: CPT | Mod: PBBFAC,PO | Performed by: OPTOMETRIST

## 2017-12-20 ENCOUNTER — PATIENT MESSAGE (OUTPATIENT)
Dept: PULMONOLOGY | Facility: CLINIC | Age: 72
End: 2017-12-20

## 2018-02-12 ENCOUNTER — OFFICE VISIT (OUTPATIENT)
Dept: OPHTHALMOLOGY | Facility: CLINIC | Age: 73
End: 2018-02-12
Payer: MEDICARE

## 2018-02-12 DIAGNOSIS — H40.003 GLAUCOMA SUSPECT OF BOTH EYES: ICD-10-CM

## 2018-02-12 DIAGNOSIS — H25.13 NUCLEAR SCLEROSIS OF BOTH EYES: Primary | ICD-10-CM

## 2018-02-12 DIAGNOSIS — H52.7 REFRACTIVE ERROR: ICD-10-CM

## 2018-02-12 DIAGNOSIS — I10 ESSENTIAL HYPERTENSION: ICD-10-CM

## 2018-02-12 PROCEDURE — 76514 ECHO EXAM OF EYE THICKNESS: CPT | Mod: 26,S$PBB,, | Performed by: OPHTHALMOLOGY

## 2018-02-12 PROCEDURE — 99212 OFFICE O/P EST SF 10 MIN: CPT | Mod: PBBFAC,PO | Performed by: OPHTHALMOLOGY

## 2018-02-12 PROCEDURE — 92014 COMPRE OPH EXAM EST PT 1/>: CPT | Mod: S$PBB,,, | Performed by: OPHTHALMOLOGY

## 2018-02-12 PROCEDURE — 76514 ECHO EXAM OF EYE THICKNESS: CPT | Mod: PBBFAC,PO | Performed by: OPHTHALMOLOGY

## 2018-02-12 PROCEDURE — 92136 OPHTHALMIC BIOMETRY: CPT | Mod: PBBFAC,PO | Performed by: OPHTHALMOLOGY

## 2018-02-12 PROCEDURE — 99999 PR PBB SHADOW E&M-EST. PATIENT-LVL II: CPT | Mod: PBBFAC,,, | Performed by: OPHTHALMOLOGY

## 2018-02-12 RX ORDER — OFLOXACIN 3 MG/ML
1 SOLUTION/ DROPS OPHTHALMIC 4 TIMES DAILY
Qty: 5 ML | Refills: 1 | Status: SHIPPED | OUTPATIENT
Start: 2018-03-03 | End: 2018-03-13

## 2018-02-12 RX ORDER — NEPAFENAC 3 MG/ML
1 SUSPENSION/ DROPS OPHTHALMIC DAILY
Qty: 3 ML | Refills: 1 | Status: SHIPPED | OUTPATIENT
Start: 2018-03-03 | End: 2018-02-14 | Stop reason: ALTCHOICE

## 2018-02-12 RX ORDER — DIFLUPREDNATE OPHTHALMIC 0.5 MG/ML
1 EMULSION OPHTHALMIC 4 TIMES DAILY
Qty: 5 ML | Refills: 1 | Status: SHIPPED | OUTPATIENT
Start: 2018-03-06 | End: 2018-02-14 | Stop reason: ALTCHOICE

## 2018-02-12 NOTE — PROGRESS NOTES
Subjective:       Patient ID: Hector Kellogg is a 72 y.o. male.    Chief Complaint: Cataract (Cataract Eval per Dr. Jones )    HPI     Cataract    Additional comments: Cataract Eval per Dr. Jones            Comments   Cataract Eval per Dr. Jones   Denies eye pain. H/o floaters every once in a while. Sometimes see   sparkles after coughing.   Vision is always blurry. Pt states that he don't wear glasses.   Tearing due to allergies. No burning or itching. No problems with glare.     Meds: No gtt        Last edited by ROX Strong on 2/12/2018  9:35 AM. (History)             Assessment:       1. Nuclear sclerosis of both eyes    2. Glaucoma suspect of both eyes    3. Essential hypertension    4. Refractive error        Plan:       Visually significant cataract OU -Pt. Wants Sx.     Glaucoma suspect OU-Pt with elevated IOP's OU, increased c/d ratios (OD>OS) with healthy appearing rims. CCT's:  (-1),  (-1).  HTN-No retinopathy OU.  RE      Cataract Surgery Consent: Patient with a visually significant cataract with difficulties of ADLs, reading, driving, night vision, glare (any and all).  Discussed with Patient/Family/Caregiver: options, risks and benefits, expectations of cataract surgery, utilized an eye model with questions and answers to facilitate discussion.  Discussed lens options and patient understands that glasses may be required for optimal vision for distance and/or near vision after cataract surgery.  The Patient/Family/Caregiver  voice good understanding and patient wishes to proceed with surgery.  The patient will likely benefit from surgery and patient signed consent for Right Eye.  CE OD 3/6/18 SN60WF 22.0,        OS 3/20/18 SN60WF 22.0.  Start Alphagan P bid OU.  Control HTN.

## 2018-02-14 ENCOUNTER — TELEPHONE (OUTPATIENT)
Dept: OPHTHALMOLOGY | Facility: CLINIC | Age: 73
End: 2018-02-14

## 2018-02-14 DIAGNOSIS — H25.13 NUCLEAR SCLEROTIC CATARACT OF BOTH EYES: Primary | ICD-10-CM

## 2018-02-14 RX ORDER — KETOROLAC TROMETHAMINE 5 MG/ML
1 SOLUTION OPHTHALMIC 4 TIMES DAILY
Qty: 5 ML | Refills: 1 | Status: SHIPPED | OUTPATIENT
Start: 2018-03-03 | End: 2018-05-16

## 2018-02-14 RX ORDER — PREDNISOLONE ACETATE 10 MG/ML
1 SUSPENSION/ DROPS OPHTHALMIC 4 TIMES DAILY
Qty: 5 ML | Refills: 1 | Status: SHIPPED | OUTPATIENT
Start: 2018-03-06 | End: 2018-04-05

## 2018-02-14 NOTE — TELEPHONE ENCOUNTER
----- Message from Lucas Strong sent at 2/14/2018  8:07 AM CST -----  Contact: Mrs. Valdez Kellogg states when they were in the office on two days ago, they received a coupon for the drops but they can't afford the drops. She would like for you to contact her at [phone]

## 2018-02-15 ENCOUNTER — TELEPHONE (OUTPATIENT)
Dept: OPHTHALMOLOGY | Facility: CLINIC | Age: 73
End: 2018-02-15

## 2018-02-15 ENCOUNTER — PATIENT MESSAGE (OUTPATIENT)
Dept: SURGERY | Facility: HOSPITAL | Age: 73
End: 2018-02-15

## 2018-02-15 DIAGNOSIS — H25.11 NUCLEAR SCLEROSIS, RIGHT: Primary | ICD-10-CM

## 2018-02-27 ENCOUNTER — TELEPHONE (OUTPATIENT)
Dept: OPHTHALMOLOGY | Facility: CLINIC | Age: 73
End: 2018-02-27

## 2018-02-27 DIAGNOSIS — H25.12 NS (NUCLEAR SCLEROSIS), LEFT: Primary | ICD-10-CM

## 2018-02-28 ENCOUNTER — TELEPHONE (OUTPATIENT)
Dept: OPHTHALMOLOGY | Facility: CLINIC | Age: 73
End: 2018-02-28

## 2018-03-03 NOTE — H&P
[facility]  History & Physical    Subjective:      Chief Complaint/Reason for Admission:     Hector Kellogg is a 72 y.o. male.    Past Medical History:   Diagnosis Date    Cataract     Chronic back pain greater than 3 months duration     Coronary artery disease     Hyperlipidemia     Hypertension     MI, old 2006    Parkinson disease      Past Surgical History:   Procedure Laterality Date    CARDIAC CATHETERIZATION  2006    x 3    CARDIAC CATHETERIZATION  10/11/2015    x 2    CORONARY ANGIOPLASTY  2006, 2015    3 stents    HEMORRHOID SURGERY       Family History   Problem Relation Age of Onset    Heart attack Father     Cataracts Father     Heart disease Maternal Uncle     Heart disease Paternal Uncle     Hypertension Neg Hx     Asthma Neg Hx     Emphysema Neg Hx     Amblyopia Neg Hx     Blindness Neg Hx     Macular degeneration Neg Hx     Retinal detachment Neg Hx     Strabismus Neg Hx      Social History   Substance Use Topics    Smoking status: Former Smoker     Packs/day: 1.00     Years: 40.00     Quit date: 2/1/2014    Smokeless tobacco: Never Used    Alcohol use Yes      Comment: socially       No prescriptions prior to admission.     Review of patient's allergies indicates:   Allergen Reactions    Statins-hmg-coa reductase inhibitors Other (See Comments)     Weakness and muscle aches        Review of Systems   Eyes: Positive for blurred vision.   All other systems reviewed and are negative.      Objective:      Vital Signs (Most Recent)       Vital Signs Range (Last 24H):       Physical Exam   Constitutional: He is oriented to person, place, and time. He appears well-developed and well-nourished.   HENT:   Head: Normocephalic.   Eyes: Conjunctivae and EOM are normal. Pupils are equal, round, and reactive to light.   Neck: Normal range of motion. Neck supple.   Cardiovascular: Normal rate.    Pulmonary/Chest: Effort normal and breath sounds normal.    Abdominal: Soft. Bowel sounds are normal.   Musculoskeletal: Normal range of motion.   Neurological: He is alert and oriented to person, place, and time.   Skin: Skin is warm.   Psychiatric: He has a normal mood and affect.       Data Review:   ECG:     Assessment:      Cataract OD.    Plan:    CE OD.

## 2018-03-05 NOTE — PRE-PROCEDURE INSTRUCTIONS
PreOp Instructions given:     - Verbal medication information (what to hold and what to take)   - NPO guidelines   - Arrival place directions given;    - Bathing with antibacterial soap   - Don't wear any jewelry or bring any valuables AM of surgery   - No makeup or moisturizer to face   - No perfume/cologne, powder, lotions or aftershave     Pt.'S WIFE verbalized understanding.     PT'S WIFE, CARLOS ALBERTO, Denies any  history of side effects or issues with anesthesia or sedation.    PT'S WIFE, CARLOS ALBERTO, REPORTS TO THIS RN THAT THE PT WILL ONLY TAKE HIS B/P MEDICATION IN THE AM AND TAKE THE REMAINDER OF HIS ROUTINE MEDS WHEN HE RETURNS HOME AFTER SURGERY.

## 2018-03-06 ENCOUNTER — ANESTHESIA EVENT (OUTPATIENT)
Dept: SURGERY | Facility: HOSPITAL | Age: 73
End: 2018-03-06
Payer: MEDICARE

## 2018-03-06 ENCOUNTER — HOSPITAL ENCOUNTER (OUTPATIENT)
Facility: HOSPITAL | Age: 73
Discharge: HOME | End: 2018-03-06
Attending: OPHTHALMOLOGY | Admitting: OPHTHALMOLOGY
Payer: MEDICARE

## 2018-03-06 ENCOUNTER — SURGERY (OUTPATIENT)
Age: 73
End: 2018-03-06

## 2018-03-06 ENCOUNTER — ANESTHESIA (OUTPATIENT)
Dept: SURGERY | Facility: HOSPITAL | Age: 73
End: 2018-03-06
Payer: MEDICARE

## 2018-03-06 VITALS
HEIGHT: 65 IN | TEMPERATURE: 99 F | HEART RATE: 68 BPM | WEIGHT: 150 LBS | SYSTOLIC BLOOD PRESSURE: 130 MMHG | BODY MASS INDEX: 24.99 KG/M2 | DIASTOLIC BLOOD PRESSURE: 80 MMHG | RESPIRATION RATE: 18 BRPM | OXYGEN SATURATION: 100 %

## 2018-03-06 DIAGNOSIS — H25.10 SENILE NUCLEAR SCLEROSIS: ICD-10-CM

## 2018-03-06 PROCEDURE — 37000008 HC ANESTHESIA 1ST 15 MINUTES: Performed by: OPHTHALMOLOGY

## 2018-03-06 PROCEDURE — 36000706: Performed by: OPHTHALMOLOGY

## 2018-03-06 PROCEDURE — 63600175 PHARM REV CODE 636 W HCPCS: Performed by: NURSE ANESTHETIST, CERTIFIED REGISTERED

## 2018-03-06 PROCEDURE — 71000015 HC POSTOP RECOV 1ST HR: Performed by: OPHTHALMOLOGY

## 2018-03-06 PROCEDURE — V2632 POST CHMBR INTRAOCULAR LENS: HCPCS | Performed by: OPHTHALMOLOGY

## 2018-03-06 PROCEDURE — 37000009 HC ANESTHESIA EA ADD 15 MINS: Performed by: OPHTHALMOLOGY

## 2018-03-06 PROCEDURE — 63600175 PHARM REV CODE 636 W HCPCS: Performed by: OPHTHALMOLOGY

## 2018-03-06 PROCEDURE — D9220A PRA ANESTHESIA: Mod: ANES,,, | Performed by: ANESTHESIOLOGY

## 2018-03-06 PROCEDURE — D9220A PRA ANESTHESIA: Mod: CRNA,,, | Performed by: NURSE ANESTHETIST, CERTIFIED REGISTERED

## 2018-03-06 PROCEDURE — 25000003 PHARM REV CODE 250: Performed by: OPHTHALMOLOGY

## 2018-03-06 PROCEDURE — 36000707: Performed by: OPHTHALMOLOGY

## 2018-03-06 PROCEDURE — 66984 XCAPSL CTRC RMVL W/O ECP: CPT | Mod: RT,,, | Performed by: OPHTHALMOLOGY

## 2018-03-06 DEVICE — LENS 22.0: Type: IMPLANTABLE DEVICE | Site: EYE | Status: FUNCTIONAL

## 2018-03-06 RX ORDER — HYDROCODONE BITARTRATE AND ACETAMINOPHEN 5; 325 MG/1; MG/1
1 TABLET ORAL EVERY 4 HOURS PRN
Status: DISCONTINUED | OUTPATIENT
Start: 2018-03-06 | End: 2018-03-06 | Stop reason: HOSPADM

## 2018-03-06 RX ORDER — PHENYLEPHRINE HYDROCHLORIDE 25 MG/ML
1 SOLUTION/ DROPS OPHTHALMIC
Status: DISCONTINUED | OUTPATIENT
Start: 2018-03-06 | End: 2018-03-06 | Stop reason: HOSPADM

## 2018-03-06 RX ORDER — SODIUM CHLORIDE 9 MG/ML
INJECTION, SOLUTION INTRAVENOUS CONTINUOUS
Status: DISCONTINUED | OUTPATIENT
Start: 2018-03-06 | End: 2018-03-06 | Stop reason: HOSPADM

## 2018-03-06 RX ORDER — TROPICAMIDE 10 MG/ML
1 SOLUTION/ DROPS OPHTHALMIC
Status: DISCONTINUED | OUTPATIENT
Start: 2018-03-06 | End: 2018-03-06 | Stop reason: HOSPADM

## 2018-03-06 RX ORDER — LIDOCAINE HYDROCHLORIDE 40 MG/ML
INJECTION, SOLUTION RETROBULBAR
Status: DISCONTINUED
Start: 2018-03-06 | End: 2018-03-06 | Stop reason: HOSPADM

## 2018-03-06 RX ORDER — OFLOXACIN 3 MG/ML
1 SOLUTION/ DROPS OPHTHALMIC
Status: COMPLETED | OUTPATIENT
Start: 2018-03-06 | End: 2018-03-06

## 2018-03-06 RX ORDER — ACETAMINOPHEN 325 MG/1
650 TABLET ORAL EVERY 4 HOURS PRN
Status: DISCONTINUED | OUTPATIENT
Start: 2018-03-06 | End: 2018-03-06 | Stop reason: HOSPADM

## 2018-03-06 RX ORDER — MIDAZOLAM HYDROCHLORIDE 1 MG/ML
INJECTION, SOLUTION INTRAMUSCULAR; INTRAVENOUS
Status: DISCONTINUED | OUTPATIENT
Start: 2018-03-06 | End: 2018-03-06

## 2018-03-06 RX ORDER — CYCLOPENTOLATE HYDROCHLORIDE 10 MG/ML
1 SOLUTION/ DROPS OPHTHALMIC
Status: COMPLETED | OUTPATIENT
Start: 2018-03-06 | End: 2018-03-06

## 2018-03-06 RX ORDER — PROPARACAINE HYDROCHLORIDE 5 MG/ML
1 SOLUTION/ DROPS OPHTHALMIC
Status: DISCONTINUED | OUTPATIENT
Start: 2018-03-06 | End: 2018-03-06 | Stop reason: HOSPADM

## 2018-03-06 RX ORDER — EPINEPHRINE 1 MG/ML
INJECTION, SOLUTION INTRACARDIAC; INTRAMUSCULAR; INTRAVENOUS; SUBCUTANEOUS
Status: DISCONTINUED | OUTPATIENT
Start: 2018-03-06 | End: 2018-03-06 | Stop reason: HOSPADM

## 2018-03-06 RX ORDER — LIDOCAINE HYDROCHLORIDE 40 MG/ML
INJECTION, SOLUTION RETROBULBAR
Status: DISCONTINUED | OUTPATIENT
Start: 2018-03-06 | End: 2018-03-06 | Stop reason: HOSPADM

## 2018-03-06 RX ORDER — EPINEPHRINE 1 MG/ML
INJECTION, SOLUTION INTRACARDIAC; INTRAMUSCULAR; INTRAVENOUS; SUBCUTANEOUS
Status: DISCONTINUED
Start: 2018-03-06 | End: 2018-03-06 | Stop reason: HOSPADM

## 2018-03-06 RX ORDER — OFLOXACIN 3 MG/ML
SOLUTION/ DROPS OPHTHALMIC
Status: DISCONTINUED | OUTPATIENT
Start: 2018-03-06 | End: 2018-03-06 | Stop reason: HOSPADM

## 2018-03-06 RX ORDER — PREDNISOLONE ACETATE 10 MG/ML
SUSPENSION/ DROPS OPHTHALMIC
Status: DISCONTINUED
Start: 2018-03-06 | End: 2018-03-06 | Stop reason: HOSPADM

## 2018-03-06 RX ORDER — PREDNISOLONE ACETATE 10 MG/ML
SUSPENSION/ DROPS OPHTHALMIC
Status: DISCONTINUED | OUTPATIENT
Start: 2018-03-06 | End: 2018-03-06 | Stop reason: HOSPADM

## 2018-03-06 RX ADMIN — EPINEPHRINE 0.3 MG: 1 INJECTION, SOLUTION INTRAMUSCULAR; SUBCUTANEOUS at 08:03

## 2018-03-06 RX ADMIN — TROPICAMIDE 1 DROP: 10 SOLUTION/ DROPS OPHTHALMIC at 07:03

## 2018-03-06 RX ADMIN — LIDOCAINE HYDROCHLORIDE 5 ML: 40 INJECTION, SOLUTION RETROBULBAR; TOPICAL at 08:03

## 2018-03-06 RX ADMIN — CYCLOPENTOLATE HYDROCHLORIDE 1 DROP: 10 SOLUTION/ DROPS OPHTHALMIC at 07:03

## 2018-03-06 RX ADMIN — MIDAZOLAM HYDROCHLORIDE 1 MG: 1 INJECTION, SOLUTION INTRAMUSCULAR; INTRAVENOUS at 08:03

## 2018-03-06 RX ADMIN — OFLOXACIN 1 DROP: 3 SOLUTION OPHTHALMIC at 07:03

## 2018-03-06 RX ADMIN — PHENYLEPHRINE HYDROCHLORIDE 1 DROP: 25 SOLUTION/ DROPS OPHTHALMIC at 07:03

## 2018-03-06 RX ADMIN — SODIUM CHLORIDE 1000 ML: 0.9 INJECTION, SOLUTION INTRAVENOUS at 07:03

## 2018-03-06 RX ADMIN — PROPARACAINE HYDROCHLORIDE 1 DROP: 5 SOLUTION/ DROPS OPHTHALMIC at 07:03

## 2018-03-06 RX ADMIN — OFLOXACIN 1 DROP: 3 SOLUTION/ DROPS OPHTHALMIC at 08:03

## 2018-03-06 RX ADMIN — PREDNISOLONE ACETATE 1 DROP: 10 SUSPENSION/ DROPS OPHTHALMIC at 08:03

## 2018-03-06 NOTE — ANESTHESIA POSTPROCEDURE EVALUATION
"Anesthesia Post Evaluation    Patient: Hector Kellogg    Procedure(s) Performed: Procedure(s) (LRB):  PHACOEMULSIFICATION-ASPIRATION-CATARACT (Right)  INSERTION-INTRAOCULAR LENS (IOL) (Right)    Final Anesthesia Type: MAC  Patient location during evaluation: PACU  Patient participation: Yes- Able to Participate  Level of consciousness: awake and alert  Post-procedure vital signs: reviewed and stable  Pain management: adequate  Airway patency: patent  PONV status at discharge: No PONV  Anesthetic complications: no      Cardiovascular status: blood pressure returned to baseline  Respiratory status: unassisted  Hydration status: euvolemic  Follow-up not needed.        Visit Vitals  /80   Pulse 68   Temp 37.1 °C (98.8 °F) (Temporal)   Resp 18   Ht 5' 5" (1.651 m)   Wt 68 kg (150 lb)   SpO2 100%   BMI 24.96 kg/m²       Pain/Lis Score: Pain Assessment Performed: Yes (3/6/2018  9:18 AM)  Presence of Pain: denies (3/6/2018  9:18 AM)  Lis Score: 10 (3/6/2018  9:11 AM)      "

## 2018-03-06 NOTE — BRIEF OP NOTE
Operative Note     SUMMARY     Surgery Date: 3/6/2018    Surgeon(s) and Role:      Luke Liu MD - Primary    Pre-op Diagnosis:  Nuclear sclerosis     Post-op/ Diagnosis:  Same    Final Diagnosis: Cataract    Procedure(s) (LRB):  PHACOEMULSIFICATION-ASPIRATION-CATARACT   INSERTION-INTRAOCULAR LENS (IOL)     Anesthesia: Monitored Anesthesia Care    Findings/Key Components:  Cataract    Outcome: Tolerated procedure well    Estimated Blood Loss: None         Specimens     None          Follow-up:  Tomorrow in clinic      Discharge Note      SUMMARY     Admit Date: 3/6/2018    Attending Physician: Luke Liu MD    Discharge Physician: Luke Liu MD    Discharge Date: 3/6/2018    Final Diagnosis: Cataract    Outcome: Tolerated procedure well    Disposition: Discharge to Home.    Medications:       Hector Kellogg   Home Medication Instructions [id number]    Printed on:03/06/18 0847   Medication Information                      aspirin (ECOTRIN) 81 MG EC tablet  Take 81 mg by mouth once daily.             carbidopa-levodopa  mg (SINEMET)  mg per tablet  Take 1 tablet by mouth 5 (five) times daily.             citalopram (CELEXA) 20 MG tablet  Take 1 tablet (20 mg total) by mouth once daily.             gabapentin (NEURONTIN) 400 MG capsule  Take 1 capsule (400 mg total) by mouth 2 (two) times daily.             ketorolac 0.5% (ACULAR) 0.5 % Drop  Place 1 drop into the right eye 4 (four) times daily.             loratadine (CLARITIN) 10 mg tablet  Take 10 mg by mouth as needed for Allergies.             metoprolol succinate (TOPROL-XL) 50 MG 24 hr tablet  Take 1 tablet (50 mg total) by mouth once daily.             mometasone-formoterol (DULERA) 200-5 mcg/actuation inhaler  Inhale 2 puffs into the lungs 2 (two) times daily.             multivitamin capsule  Take 1 capsule by mouth once daily.             nitroGLYCERIN (NITROSTAT) 0.4 MG SL tablet  Place 1 tablet  (0.4 mg total) under the tongue every 5 (five) minutes as needed for Chest pain.             ofloxacin (OCUFLOX) 0.3 % ophthalmic solution  Place 1 drop into the right eye 4 (four) times daily.             prednisoLONE acetate (PRED FORTE) 1 % DrpS  Place 1 drop into the right eye 4 (four) times daily.                   Patient Discharge Instructions:     Keep Deluna Shield over operated eye when not using drops.     DIET:  Regular    Activity: No heavy lifting or bending X 1 week.    Follow-up:  Tomorrow in clinic

## 2018-03-06 NOTE — ANESTHESIA PREPROCEDURE EVALUATION
03/06/2018  Hector Kellogg is a 72 y.o., male.    Anesthesia Evaluation         Review of Systems  Anesthesia Hx:  No problems with previous Anesthesia   Social:  Former Smoker, Alcohol Use    EENT/Dental:   Cataract   Cardiovascular:   Hypertension Past MI CAD   hyperlipidemia Pulmonary HTN   Pulmonary:   COPD Shortness of breath    Musculoskeletal:  Spine Disorders: lumbar Chronic Pain    Neurological:   Parkinson disease   Psych:   anxiety          Physical Exam  General:  Well nourished    Airway/Jaw/Neck:  Airway Findings: Mouth Opening: Normal Tongue: Normal  General Airway Assessment: Adult  Mallampati: II            Mental Status:  Mental Status Findings:  Alert and Oriented, Cooperative         Anesthesia Plan  Type of Anesthesia, risks & benefits discussed:  Anesthesia Type:  MAC  Patient's Preference:   Intra-op Monitoring Plan: standard ASA monitors  Intra-op Monitoring Plan Comments:   Post Op Pain Control Plan:   Post Op Pain Control Plan Comments:   Induction:   IV  Beta Blocker:  Patient is on a Beta-Blocker and has received one dose within the past 24 hours (No further documentation required).       Informed Consent: Patient understands risks and agrees with Anesthesia plan.  Questions answered. Anesthesia consent signed with patient.  ASA Score: 3     Day of Surgery Review of History & Physical:    H&P update referred to the surgeon.         Ready For Surgery From Anesthesia Perspective.

## 2018-03-06 NOTE — OP NOTE
DATE OF PROCEDURE:  03/06/2018.    SURGEON:  Luke Liu M.D.    PREOPERATIVE DIAGNOSIS:  Nuclear sclerotic cataract, right eye.     POSTOPERATIVE DIAGNOSIS:  Nuclear sclerotic cataract, right eye.     PROCEDURE:  Clear corneal phacoemulsification with posterior chamber intraocular   lens implant, right eye.     ANESTHESIA:  Monitored anesthesia care with 2% Xylocaine jelly topically, 1%   free lidocaine topically and intrachamberly.     PROCEDURE IN DETAIL:  After the appropriate preoperative consent was obtained,   the patient had the 2% Xylocaine jelly applied to the cornea.  The patient was   then brought to the operating room and placed into the supine position.  The   right periorbit was then prepped and draped in the usual sterile ophthalmic   fashion.  A lid speculum was then inserted into the right eye.  Several drops of   the 1% lidocaine were placed onto the right cornea.  The 1% lidocaine was also   applied to the perilimbal region with the Weck-seble sponge.  Using the 0.12-mm   forceps and the Super Sharp blade, a paracentesis site was made at the 12   o'clock position.  Approximately 0.5 mL of the 1% lidocaine was injected into   the anterior chamber.  Next, Viscoat was injected into the anterior chamber   through the paracentesis site.  The 2.75-mm keratome and the cyclodialysis   spatula were used to create a 2.75-mm clear corneal temporal groove.  The   cystitomy needle and Utrata forceps were then used to create a continuous tear   360-degree capsulorrhexis.  BSS in a cannula was then used for hydrodissection.    Phacoemulsification then proceeded in a stop-and-chop fashion without any   complications.  Another 0.5 mL of the 1% lidocaine was injected into the   anterior chamber.  The curved tip irrigation aspiration handpiece was then used   to remove the residual cortical material from the capsular bag.  Again, the 1%   lidocaine was applied to the perilimbal region with the Weck-seble  sponge.  [place]   GV was then injected into the anterior chamber and capsular bag.  An [employer] intraocular lens model SN60WF, 22.0 diopters in power, and serial   #[id number] was injected into the capsular bag with the lens injector.  The   Sinskey hook was used to position the lens into its proper place.  The residual   viscoelastic material was removed from the anterior chamber and capsular bag   with the curved tip irrigation aspiration handpiece.  Both wounds were hydrated   with BSS on a cannula.  Both wounds were checked and found to be watertight.    The lid speculum was then removed from the right eye.  The patient then had 1   drop of Vigamox ophthalmic drop and 1 drop of Econopred +1% ophthalmic drop   instilled onto the right cornea.  The eye was then shielded.  The patient   tolerated the procedure well and was then brought to the recovery room in good   condition.      TIM  dd: 03/06/2018 12:39:59 (CST)  td: 03/06/2018 15:54:19 (CST)  Doc ID   #[id number]  Job ID #[id number]    CC:

## 2018-03-06 NOTE — TRANSFER OF CARE
"Anesthesia Transfer of Care Note    Patient: Hector Kellogg    Procedure(s) Performed: Procedure(s) (LRB):  PHACOEMULSIFICATION-ASPIRATION-CATARACT (Right)  INSERTION-INTRAOCULAR LENS (IOL) (Right)    Patient location: PACU    Anesthesia Type: MAC    Transport from OR: Transported from OR on room air with adequate spontaneous ventilation    Post pain: adequate analgesia    Post assessment: no apparent anesthetic complications and tolerated procedure well    Post vital signs: stable    Level of consciousness: awake, alert and oriented    Nausea/Vomiting: no nausea/vomiting    Complications: none    Transfer of care protocol was followed      Last vitals:   Visit Vitals  BP (!) 156/87 (BP Location: Right arm, Patient Position: Lying)   Pulse 75   Temp 37.1 °C (98.8 °F) (Temporal)   Resp 20   Ht 5' 5" (1.651 m)   Wt 68 kg (150 lb)   SpO2 97%   BMI 24.96 kg/m²     "

## 2018-03-07 ENCOUNTER — OFFICE VISIT (OUTPATIENT)
Dept: OPHTHALMOLOGY | Facility: CLINIC | Age: 73
End: 2018-03-07
Payer: MEDICARE

## 2018-03-07 VITALS — DIASTOLIC BLOOD PRESSURE: 82 MMHG | HEART RATE: 78 BPM | SYSTOLIC BLOOD PRESSURE: 134 MMHG

## 2018-03-07 DIAGNOSIS — Z98.890 POST-OPERATIVE STATE: Primary | ICD-10-CM

## 2018-03-07 DIAGNOSIS — H40.003 GLAUCOMA SUSPECT OF BOTH EYES: ICD-10-CM

## 2018-03-07 PROCEDURE — 99213 OFFICE O/P EST LOW 20 MIN: CPT | Mod: PBBFAC,PO | Performed by: OPHTHALMOLOGY

## 2018-03-07 PROCEDURE — 99999 PR PBB SHADOW E&M-EST. PATIENT-LVL III: CPT | Mod: PBBFAC,,, | Performed by: OPHTHALMOLOGY

## 2018-03-07 PROCEDURE — 99024 POSTOP FOLLOW-UP VISIT: CPT | Mod: POP,,, | Performed by: OPHTHALMOLOGY

## 2018-03-07 NOTE — PROGRESS NOTES
Subjective:       Patient ID: Hector Kellogg is a 72 y.o. male.    Chief Complaint: Post-op Evaluation (1 day PO OD. CE IOL 3/06/2018 OD. )    HPI     Post-op Evaluation    Additional comments: 1 day PO OD. CE IOL 3/06/2018 OD.            Comments   1 day PO OD. CE IOL 3/06/2018 OD.   Denies eye pain and f/f.   No discomfort on today.     Meds: Ofloxacin QID OD             Pred  QID OD             Ketorolac QID OD              Alphagan P BID OU ( did not used drops on last night)       Last edited by ROX Strogn on 3/7/2018  8:09 AM. (History)             Assessment:       1. Post-operative state    2. Glaucoma suspect of both eyes        Plan:       S/p CE OD- Doing well.     Glaucoma suspect-IOP's elevated OU but Pt's wife has not put in Alphagan in 2 days.      Restart Alphagan bid OU.  CPM OD.  RTC 1 wk.

## 2018-03-14 ENCOUNTER — OFFICE VISIT (OUTPATIENT)
Dept: OPHTHALMOLOGY | Facility: CLINIC | Age: 73
End: 2018-03-14
Payer: MEDICARE

## 2018-03-14 ENCOUNTER — TELEPHONE (OUTPATIENT)
Dept: OPHTHALMOLOGY | Facility: CLINIC | Age: 73
End: 2018-03-14

## 2018-03-14 DIAGNOSIS — Z98.890 POST-OPERATIVE STATE: Primary | ICD-10-CM

## 2018-03-14 DIAGNOSIS — H25.12 NS (NUCLEAR SCLEROSIS), LEFT: ICD-10-CM

## 2018-03-14 DIAGNOSIS — H40.003 GLAUCOMA SUSPECT OF BOTH EYES: ICD-10-CM

## 2018-03-14 PROCEDURE — 99212 OFFICE O/P EST SF 10 MIN: CPT | Mod: PBBFAC,PO,25 | Performed by: OPHTHALMOLOGY

## 2018-03-14 PROCEDURE — 99999 PR PBB SHADOW E&M-EST. PATIENT-LVL II: CPT | Mod: PBBFAC,,, | Performed by: OPHTHALMOLOGY

## 2018-03-14 PROCEDURE — 92136 OPHTHALMIC BIOMETRY: CPT | Mod: 26,S$PBB,LT, | Performed by: OPHTHALMOLOGY

## 2018-03-14 PROCEDURE — 99024 POSTOP FOLLOW-UP VISIT: CPT | Mod: POP,,, | Performed by: OPHTHALMOLOGY

## 2018-03-14 PROCEDURE — 92136 OPHTHALMIC BIOMETRY: CPT | Mod: PBBFAC,PO | Performed by: OPHTHALMOLOGY

## 2018-03-14 NOTE — PROGRESS NOTES
Subjective:       Patient ID: Hector Kellogg is a 72 y.o. male.    Chief Complaint: Post-op Evaluation (1 week po od)    HPI     Post-op Evaluation    Additional comments: 1 week po od           Comments   1 week po od    Pt states Va OD is well.  Pt denies pain and discharge.     Eyemeds  PF TID OD  Ketorlac TID OD       Last edited by Lino Schwartz on 3/14/2018  9:53 AM. (History)             Assessment:       1. Post-operative state    2. Glaucoma suspect of both eyes    3. NS (nuclear sclerosis), left        Plan:       S/p CE OD- Doing well.     Glaucoma suspect OU-IOP's still elevated OU on Alphagan.  Visually significant cataract OS -Pt. Wants Sx.          Taper gtts OD.  D/c Alphagan OU & start Combigan OU.  Cataract Surgery Consent: Patient with a visually significant cataract with difficulties of ADLs, reading, driving, night vision, glare (any and all).  Discussed with Patient/Family/Caregiver: options, risks and benefits, expectations of cataract surgery, utilized an eye model with questions and answers to facilitate discussion.  Discussed lens options and patient understands that glasses may be required for optimal vision for distance and/or near vision after cataract surgery.  The Patient/Family/Caregiver  voice good understanding and patient wishes to proceed with surgery.  The patient will likely benefit from surgery and patient signed consent for Left Eye.  CE OS 3/20/18.

## 2018-03-15 ENCOUNTER — TELEPHONE (OUTPATIENT)
Dept: OPTOMETRY | Facility: CLINIC | Age: 73
End: 2018-03-15

## 2018-03-15 NOTE — TELEPHONE ENCOUNTER
Consulted w/ Dr. Cross due to Dr. Liu being in sx.  Dr. Cross advised pt discontinue gttps until Dr. Liu advises what tx he wants for the pt.  If pt symptoms worsen to call PCP immediately.  Ms. Kellogg verbally agreed/understood.  Pt BP at 230 PM was 127/74 saying pt feeling better.

## 2018-03-15 NOTE — TELEPHONE ENCOUNTER
Notified Mrs. Arellano per Dr. Liu to remain on Combigan that it is unlikely the gtts would cause a great decrease in BP unless pt is dehydrated or not eating/drinking right.  Mrs. Arellano said she thought about it and pt was dehydrated a bit all morning due to not drinking a lot.  Also advised pt to put slight pressure on punctal areas to avoid gtts draining down into chest area; if pt continues feeling negative side effects to call Dr. Liu and PCP.

## 2018-03-17 NOTE — H&P
[facility]  History & Physical    Subjective:      Chief Complaint/Reason for Admission:     Hector Kellogg is a 72 y.o. male.    Past Medical History:   Diagnosis Date    Cataract     Chronic back pain greater than 3 months duration     Coronary artery disease     Hyperlipidemia     Hypertension     MI, old 2006    Parkinson disease      Past Surgical History:   Procedure Laterality Date    CARDIAC CATHETERIZATION  2006    x 3    CARDIAC CATHETERIZATION  10/11/2015    x 2    CATARACT EXTRACTION W/  INTRAOCULAR LENS IMPLANT Right 03/06/2018    Dr. Liu    CORONARY ANGIOPLASTY  2006, 2015    3 stents    HEMORRHOID SURGERY       Family History   Problem Relation Age of Onset    Heart attack Father     Cataracts Father     Heart disease Maternal Uncle     Heart disease Paternal Uncle     Hypertension Neg Hx     Asthma Neg Hx     Emphysema Neg Hx     Amblyopia Neg Hx     Blindness Neg Hx     Macular degeneration Neg Hx     Retinal detachment Neg Hx     Strabismus Neg Hx      Social History   Substance Use Topics    Smoking status: Former Smoker     Packs/day: 1.00     Years: 40.00     Quit date: 2/1/2014    Smokeless tobacco: Never Used    Alcohol use Yes      Comment: socially       No prescriptions prior to admission.     Review of patient's allergies indicates:   Allergen Reactions    Statins-hmg-coa reductase inhibitors Other (See Comments)     Weakness and muscle aches        Review of Systems   Eyes: Positive for blurred vision.   All other systems reviewed and are negative.      Objective:      Vital Signs (Most Recent)       Vital Signs Range (Last 24H):       Physical Exam   Constitutional: He is oriented to person, place, and time. He appears well-developed and well-nourished.   HENT:   Head: Normocephalic.   Eyes: Conjunctivae and EOM are normal. Pupils are equal, round, and reactive to light.   Neck: Normal range of motion. Neck supple.    Cardiovascular: Normal rate.    Pulmonary/Chest: Effort normal and breath sounds normal.   Abdominal: Soft. Bowel sounds are normal.   Musculoskeletal: Normal range of motion.   Neurological: He is alert and oriented to person, place, and time.   Skin: Skin is warm.   Psychiatric: He has a normal mood and affect.       Data Review:   ECG     Assessment:      Cataract OS.    Plan:    CE OS.

## 2018-03-20 ENCOUNTER — ANESTHESIA (OUTPATIENT)
Dept: SURGERY | Facility: HOSPITAL | Age: 73
End: 2018-03-20
Payer: MEDICARE

## 2018-03-20 ENCOUNTER — ANESTHESIA EVENT (OUTPATIENT)
Dept: SURGERY | Facility: HOSPITAL | Age: 73
End: 2018-03-20
Payer: MEDICARE

## 2018-03-20 ENCOUNTER — HOSPITAL ENCOUNTER (OUTPATIENT)
Facility: HOSPITAL | Age: 73
Discharge: HOME | End: 2018-03-20
Attending: OPHTHALMOLOGY | Admitting: OPHTHALMOLOGY
Payer: MEDICARE

## 2018-03-20 ENCOUNTER — SURGERY (OUTPATIENT)
Age: 73
End: 2018-03-20

## 2018-03-20 VITALS
HEART RATE: 64 BPM | WEIGHT: 150 LBS | TEMPERATURE: 98 F | HEIGHT: 65 IN | OXYGEN SATURATION: 95 % | RESPIRATION RATE: 18 BRPM | DIASTOLIC BLOOD PRESSURE: 72 MMHG | BODY MASS INDEX: 24.99 KG/M2 | SYSTOLIC BLOOD PRESSURE: 149 MMHG

## 2018-03-20 DIAGNOSIS — H25.10 SENILE NUCLEAR SCLEROSIS: ICD-10-CM

## 2018-03-20 PROCEDURE — 63600175 PHARM REV CODE 636 W HCPCS: Performed by: NURSE ANESTHETIST, CERTIFIED REGISTERED

## 2018-03-20 PROCEDURE — 37000009 HC ANESTHESIA EA ADD 15 MINS: Performed by: OPHTHALMOLOGY

## 2018-03-20 PROCEDURE — D9220A PRA ANESTHESIA: Mod: CRNA,,, | Performed by: NURSE ANESTHETIST, CERTIFIED REGISTERED

## 2018-03-20 PROCEDURE — D9220A PRA ANESTHESIA: Mod: ANES,,, | Performed by: ANESTHESIOLOGY

## 2018-03-20 PROCEDURE — 37000008 HC ANESTHESIA 1ST 15 MINUTES: Performed by: OPHTHALMOLOGY

## 2018-03-20 PROCEDURE — 36000706: Performed by: OPHTHALMOLOGY

## 2018-03-20 PROCEDURE — 71000015 HC POSTOP RECOV 1ST HR: Performed by: OPHTHALMOLOGY

## 2018-03-20 PROCEDURE — 25000003 PHARM REV CODE 250

## 2018-03-20 PROCEDURE — 66984 XCAPSL CTRC RMVL W/O ECP: CPT | Mod: 79,LT,, | Performed by: OPHTHALMOLOGY

## 2018-03-20 PROCEDURE — 36000707: Performed by: OPHTHALMOLOGY

## 2018-03-20 PROCEDURE — 25000003 PHARM REV CODE 250: Performed by: OPHTHALMOLOGY

## 2018-03-20 PROCEDURE — V2632 POST CHMBR INTRAOCULAR LENS: HCPCS | Performed by: OPHTHALMOLOGY

## 2018-03-20 PROCEDURE — 63600175 PHARM REV CODE 636 W HCPCS: Performed by: OPHTHALMOLOGY

## 2018-03-20 DEVICE — LENS 22.0: Type: IMPLANTABLE DEVICE | Site: EYE | Status: FUNCTIONAL

## 2018-03-20 RX ORDER — CYCLOPENTOLATE HYDROCHLORIDE 10 MG/ML
1 SOLUTION/ DROPS OPHTHALMIC
Status: DISCONTINUED | OUTPATIENT
Start: 2018-03-20 | End: 2018-03-20 | Stop reason: HOSPADM

## 2018-03-20 RX ORDER — LIDOCAINE HYDROCHLORIDE 40 MG/ML
INJECTION, SOLUTION RETROBULBAR
Status: DISCONTINUED | OUTPATIENT
Start: 2018-03-20 | End: 2018-03-20 | Stop reason: HOSPADM

## 2018-03-20 RX ORDER — EPINEPHRINE 1 MG/ML
INJECTION, SOLUTION INTRACARDIAC; INTRAMUSCULAR; INTRAVENOUS; SUBCUTANEOUS
Status: DISCONTINUED | OUTPATIENT
Start: 2018-03-20 | End: 2018-03-20 | Stop reason: HOSPADM

## 2018-03-20 RX ORDER — PHENYLEPHRINE HYDROCHLORIDE 25 MG/ML
SOLUTION/ DROPS OPHTHALMIC
Status: COMPLETED
Start: 2018-03-20 | End: 2018-03-20

## 2018-03-20 RX ORDER — LIDOCAINE HYDROCHLORIDE 40 MG/ML
INJECTION, SOLUTION RETROBULBAR
Status: DISCONTINUED
Start: 2018-03-20 | End: 2018-03-20 | Stop reason: HOSPADM

## 2018-03-20 RX ORDER — PREDNISOLONE ACETATE 10 MG/ML
SUSPENSION/ DROPS OPHTHALMIC
Status: DISCONTINUED
Start: 2018-03-20 | End: 2018-03-20 | Stop reason: HOSPADM

## 2018-03-20 RX ORDER — PHENYLEPHRINE HYDROCHLORIDE 25 MG/ML
1 SOLUTION/ DROPS OPHTHALMIC
Status: DISCONTINUED | OUTPATIENT
Start: 2018-03-20 | End: 2018-03-20 | Stop reason: HOSPADM

## 2018-03-20 RX ORDER — PREDNISOLONE ACETATE 10 MG/ML
SUSPENSION/ DROPS OPHTHALMIC
Status: DISCONTINUED | OUTPATIENT
Start: 2018-03-20 | End: 2018-03-20 | Stop reason: HOSPADM

## 2018-03-20 RX ORDER — SODIUM CHLORIDE 9 MG/ML
INJECTION, SOLUTION INTRAVENOUS CONTINUOUS
Status: DISCONTINUED | OUTPATIENT
Start: 2018-03-20 | End: 2018-03-20 | Stop reason: HOSPADM

## 2018-03-20 RX ORDER — FENTANYL CITRATE 50 UG/ML
INJECTION, SOLUTION INTRAMUSCULAR; INTRAVENOUS
Status: DISCONTINUED | OUTPATIENT
Start: 2018-03-20 | End: 2018-03-20

## 2018-03-20 RX ORDER — ACETAMINOPHEN 325 MG/1
650 TABLET ORAL EVERY 4 HOURS PRN
Status: DISCONTINUED | OUTPATIENT
Start: 2018-03-20 | End: 2018-03-20 | Stop reason: HOSPADM

## 2018-03-20 RX ORDER — TROPICAMIDE 10 MG/ML
SOLUTION/ DROPS OPHTHALMIC
Status: COMPLETED
Start: 2018-03-20 | End: 2018-03-20

## 2018-03-20 RX ORDER — PROPARACAINE HYDROCHLORIDE 5 MG/ML
1 SOLUTION/ DROPS OPHTHALMIC
Status: DISCONTINUED | OUTPATIENT
Start: 2018-03-20 | End: 2018-03-20 | Stop reason: HOSPADM

## 2018-03-20 RX ORDER — TROPICAMIDE 10 MG/ML
1 SOLUTION/ DROPS OPHTHALMIC
Status: DISCONTINUED | OUTPATIENT
Start: 2018-03-20 | End: 2018-03-20 | Stop reason: HOSPADM

## 2018-03-20 RX ORDER — MIDAZOLAM HYDROCHLORIDE 1 MG/ML
INJECTION, SOLUTION INTRAMUSCULAR; INTRAVENOUS
Status: DISCONTINUED | OUTPATIENT
Start: 2018-03-20 | End: 2018-03-20

## 2018-03-20 RX ORDER — EPINEPHRINE 1 MG/ML
INJECTION, SOLUTION INTRACARDIAC; INTRAMUSCULAR; INTRAVENOUS; SUBCUTANEOUS
Status: DISCONTINUED
Start: 2018-03-20 | End: 2018-03-20 | Stop reason: HOSPADM

## 2018-03-20 RX ORDER — CYCLOPENTOLATE HYDROCHLORIDE 10 MG/ML
SOLUTION/ DROPS OPHTHALMIC
Status: COMPLETED
Start: 2018-03-20 | End: 2018-03-20

## 2018-03-20 RX ORDER — PROPARACAINE HYDROCHLORIDE 5 MG/ML
SOLUTION/ DROPS OPHTHALMIC
Status: COMPLETED
Start: 2018-03-20 | End: 2018-03-20

## 2018-03-20 RX ORDER — HYDROCODONE BITARTRATE AND ACETAMINOPHEN 5; 325 MG/1; MG/1
1 TABLET ORAL EVERY 4 HOURS PRN
Status: DISCONTINUED | OUTPATIENT
Start: 2018-03-20 | End: 2018-03-20 | Stop reason: HOSPADM

## 2018-03-20 RX ORDER — OFLOXACIN 3 MG/ML
SOLUTION/ DROPS OPHTHALMIC
Status: COMPLETED
Start: 2018-03-20 | End: 2018-03-20

## 2018-03-20 RX ORDER — OFLOXACIN 3 MG/ML
1 SOLUTION/ DROPS OPHTHALMIC
Status: COMPLETED | OUTPATIENT
Start: 2018-03-20 | End: 2018-03-20

## 2018-03-20 RX ADMIN — SODIUM CHLORIDE 1000 ML: 0.9 INJECTION, SOLUTION INTRAVENOUS at 08:03

## 2018-03-20 RX ADMIN — PROPARACAINE HYDROCHLORIDE 1 DROP: 5 SOLUTION/ DROPS OPHTHALMIC at 08:03

## 2018-03-20 RX ADMIN — PHENYLEPHRINE HYDROCHLORIDE 1 DROP: 25 SOLUTION/ DROPS OPHTHALMIC at 08:03

## 2018-03-20 RX ADMIN — TROPICAMIDE 1 DROP: 10 SOLUTION/ DROPS OPHTHALMIC at 08:03

## 2018-03-20 RX ADMIN — LIDOCAINE HYDROCHLORIDE 5 ML: 40 INJECTION, SOLUTION RETROBULBAR; TOPICAL at 10:03

## 2018-03-20 RX ADMIN — MIDAZOLAM HYDROCHLORIDE 1 MG: 1 INJECTION, SOLUTION INTRAMUSCULAR; INTRAVENOUS at 10:03

## 2018-03-20 RX ADMIN — CYCLOPENTOLATE HYDROCHLORIDE 1 DROP: 10 SOLUTION/ DROPS OPHTHALMIC at 08:03

## 2018-03-20 RX ADMIN — SODIUM CHONDROITIN SULFATE / SODIUM HYALURONATE 1.05 ML: 0.55-0.5 INJECTION INTRAOCULAR at 10:03

## 2018-03-20 RX ADMIN — MIDAZOLAM HYDROCHLORIDE 1 MG: 1 INJECTION, SOLUTION INTRAMUSCULAR; INTRAVENOUS at 09:03

## 2018-03-20 RX ADMIN — OFLOXACIN 1 DROP: 3 SOLUTION/ DROPS OPHTHALMIC at 08:03

## 2018-03-20 RX ADMIN — EPINEPHRINE 0.3 ML: 1 INJECTION, SOLUTION INTRAMUSCULAR; SUBCUTANEOUS at 10:03

## 2018-03-20 RX ADMIN — PREDNISOLONE ACETATE 2 DROP: 10 SUSPENSION/ DROPS OPHTHALMIC at 10:03

## 2018-03-20 RX ADMIN — FENTANYL CITRATE 50 MCG: 50 INJECTION, SOLUTION INTRAMUSCULAR; INTRAVENOUS at 10:03

## 2018-03-20 RX ADMIN — OFLOXACIN 1 DROP: 3 SOLUTION OPHTHALMIC at 08:03

## 2018-03-20 NOTE — OP NOTE
DATE OF PROCEDURE:  03/20/2018    SURGEON:  Luke Liu M.D.    PREOPERATIVE DIAGNOSIS:  Nuclear sclerotic cataract, left eye.    POSTOPERATIVE DIAGNOSIS:  Nuclear sclerotic cataract, left eye.    PROCEDURE:  Clear corneal phacoemulsification with posterior chamber intraocular   lens implant, left eye.    ANESTHESIA:  Monitored anesthesia care with 2% Xylocaine jelly topically, 1%   free lidocaine topically and intrachamberly.    PROCEDURE IN DETAIL:  After the appropriate preoperative consent was obtained,   the patient had the 2% Xylocaine jelly applied to the cornea.  The patient was   then brought to the operating room and placed into the supine position.  The   left periorbit was then prepped and draped in the usual sterile ophthalmic   fashion.  A lid speculum was then inserted into the left eye.  Several drops of   the 1% lidocaine were placed onto the left cornea.  The 1% lidocaine was also   applied to the perilimbal region with the Weck-seble sponge.  Using the 0.12-mm   forceps and the Super Sharp blade, a paracentesis site was made at the 6 o'clock   position.  Approximately 0.5 mL of the 1% lidocaine was injected into the   anterior chamber.  Next, Viscoat was injected into the anterior chamber through   the paracentesis site.  The 2.75-mm keratome and the cyclodialysis spatula were   used to create a 2.75-mm clear corneal temporal groove.  The cystitomy needle   and Utrata forceps were then used to create a continuous tear 360-degree   capsulorrhexis.  BSS in a cannula was then used for hydrodissection.    Phacoemulsification then proceeded in a stop-and-chop fashion without any   complications.  Another 0.5 mL of the 1% lidocaine was injected into the   anterior chamber.  The curved tip irrigation aspiration handpiece was then used   to remove the residual cortical material from the capsular bag.  Again, the 1%   lidocaine was applied to the perilimbal region with the Weck-seble sponge.  [place]    GV was then injected into the anterior chamber and capsular bag.  An [employer] intraocular lens model SN60WF, 22.0 diopters in power, and serial   #[id number] was injected into the capsular bag with the lens injector.  The   Sinskey hook was used to position the lens into its proper place.  The residual   viscoelastic material was removed from the anterior chamber and capsular bag   with the curved tip irrigation aspiration handpiece.  Both wounds were hydrated   with BSS on a cannula.  Both wounds were checked and found to be watertight.    The lid speculum was then removed from the left eye.  The patient then had 1   drop of Vigamox ophthalmic drop and 1 drop of Econopred +1% ophthalmic drop   instilled onto the left cornea.  The eye was then shielded.  The patient   tolerated the procedure well and was then brought to the recovery room in good   condition.      JAMES/IN  dd: 03/20/2018 14:10:38 (CDT)  td: 03/20/2018 18:48:31 (CDT)  Doc ID   #[id number]  Job ID #[id number]    CC:

## 2018-03-20 NOTE — BRIEF OP NOTE
Operative Note     SUMMARY     Surgery Date: 3/20/2018    Surgeon(s) and Role:      Luke Liu MD - Primary    Pre-op Diagnosis:  Nuclear sclerosis     Post-op/ Diagnosis:  Same    Final Diagnosis: Cataract    Procedure(s) (LRB):  PHACOEMULSIFICATION-ASPIRATION-CATARACT   INSERTION-INTRAOCULAR LENS (IOL)     Anesthesia: Monitored Anesthesia Care    Findings/Key Components:  Cataract    Outcome: Tolerated procedure well    Estimated Blood Loss: None         Specimens     None          Follow-up:  Tomorrow in clinic      Discharge Note      SUMMARY     Admit Date: 3/20/2018    Attending Physician: Luke Liu MD    Discharge Physician: Luke Liu MD    Discharge Date: 3/20/2018    Final Diagnosis: Cataract    Outcome: Tolerated procedure well    Disposition: Discharge to Home.    Medications:       Hector Kellogg   Home Medication Instructions [id number]    Printed on:03/20/18 1031   Medication Information                      aspirin (ECOTRIN) 81 MG EC tablet  Take 81 mg by mouth once daily.             carbidopa-levodopa  mg (SINEMET)  mg per tablet  Take 1 tablet by mouth 5 (five) times daily.             citalopram (CELEXA) 20 MG tablet  Take 1 tablet (20 mg total) by mouth once daily.             gabapentin (NEURONTIN) 400 MG capsule  Take 1 capsule (400 mg total) by mouth 2 (two) times daily.             ketorolac 0.5% (ACULAR) 0.5 % Drop  Place 1 drop into the right eye 4 (four) times daily.             loratadine (CLARITIN) 10 mg tablet  Take 10 mg by mouth as needed for Allergies.             metoprolol succinate (TOPROL-XL) 50 MG 24 hr tablet  Take 1 tablet (50 mg total) by mouth once daily.             mometasone-formoterol (DULERA) 200-5 mcg/actuation inhaler  Inhale 2 puffs into the lungs 2 (two) times daily.             multivitamin capsule  Take 1 capsule by mouth once daily.             nitroGLYCERIN (NITROSTAT) 0.4 MG SL tablet  Place 1 tablet  (0.4 mg total) under the tongue every 5 (five) minutes as needed for Chest pain.             prednisoLONE acetate (PRED FORTE) 1 % DrpS  Place 1 drop into the right eye 4 (four) times daily.                   Patient Discharge Instructions:     Keep Deluna Shield over operated eye when not using drops.     DIET:  Regular    Activity: No heavy lifting or bending X 1 week.    Follow-up:  Tomorrow in clinic

## 2018-03-20 NOTE — TRANSFER OF CARE
"Anesthesia Transfer of Care Note    Patient: Hector Kellogg    Procedure(s) Performed: Procedure(s) (LRB):  PHACOEMULSIFICATION-ASPIRATION-CATARACT (Left)  INSERTION-INTRAOCULAR LENS (IOL) (Left)    Patient location: PACU    Anesthesia Type: MAC    Transport from OR: Transported from OR on room air with adequate spontaneous ventilation    Post pain: adequate analgesia    Post assessment: no apparent anesthetic complications    Post vital signs: stable    Level of consciousness: awake, alert and oriented    Nausea/Vomiting: no nausea/vomiting    Complications: none    Transfer of care protocol was followed      Last vitals:   Visit Vitals  BP (!) 161/85 (BP Location: Right arm, Patient Position: Lying)   Pulse 70   Temp 36.7 °C (98.1 °F) (Temporal)   Resp 18   Ht 5' 5" (1.651 m)   Wt 68 kg (150 lb)   SpO2 98%   BMI 24.96 kg/m²     "

## 2018-03-20 NOTE — ANESTHESIA PREPROCEDURE EVALUATION
03/20/2018  Hector Kellogg is a 72 y.o., male.    Anesthesia Evaluation    I have reviewed the Patient Summary Reports.    I have reviewed the Nursing Notes.   I have reviewed the Medications.     Review of Systems  Anesthesia Hx:  No problems with previous Anesthesia  History of prior surgery of interest to airway management or planning: Previous anesthesia: General Denies Family Hx of Anesthesia complications.   Denies Personal Hx of Anesthesia complications.   Social:  Non-Smoker, Former Smoker    Hematology/Oncology:  Hematology Normal   Oncology Normal     EENT/Dental:EENT/Dental Normal   Cardiovascular:   Exercise tolerance: poor Hypertension Past MI CAD  CABG/stent  hyperlipidemia    Pulmonary:   COPD, mild Shortness of breath    Renal/:  Renal/ Normal     Hepatic/GI:   GERD, well controlled    Musculoskeletal:  Musculoskeletal Normal    Neurological:   Neuromuscular Disease, Parkinsons disease   Endocrine:  Endocrine Normal    Dermatological:  Skin Normal    Psych:  Psychiatric Normal           Physical Exam  General:  Well nourished    Airway/Jaw/Neck:  Airway Findings: Mouth Opening: Small, but > 3cm Tongue: Normal  General Airway Assessment: Adult, Average  Mallampati: III  Improves to II with phonation.  TM Distance: 4 - 6 cm        Eyes/Ears/Nose:  EYES/EARS/NOSE FINDINGS: Normal   Dental:  Dental Findings: Periodontal disease, Mild   Chest/Lungs:  Chest/Lungs Findings: Clear to auscultation, Normal Respiratory Rate     Heart/Vascular:  Heart Findings: Rate: Normal  Rhythm: Regular Rhythm  Sounds: Normal  Heart murmur: negative Vascular Findings: Normal    Abdomen:  Abdomen Findings: Normal    Musculoskeletal:  Musculoskeletal Findings: Normal   Skin:  Skin Findings: Normal    Mental Status:  Mental Status Findings:  Cooperative, Alert and Oriented         Anesthesia Plan  Type of  Anesthesia, risks & benefits discussed:  Anesthesia Type:  general, MAC  Patient's Preference:   Intra-op Monitoring Plan: standard ASA monitors  Intra-op Monitoring Plan Comments:   Post Op Pain Control Plan:   Post Op Pain Control Plan Comments:   Induction:   IV  Beta Blocker:  Patient is on a Beta-Blocker and has received one dose within the past 24 hours (No further documentation required).       Informed Consent: Patient understands risks and agrees with Anesthesia plan.  Questions answered. Anesthesia consent signed with patient.  ASA Score: 3     Day of Surgery Review of History & Physical:            Ready For Surgery From Anesthesia Perspective.

## 2018-03-20 NOTE — ANESTHESIA POSTPROCEDURE EVALUATION
"Anesthesia Post Evaluation    Patient: Hector Kellogg    Procedure(s) Performed: Procedure(s) (LRB):  PHACOEMULSIFICATION-ASPIRATION-CATARACT (Left)  INSERTION-INTRAOCULAR LENS (IOL) (Left)    Final Anesthesia Type: MAC  Patient location during evaluation: PACU  Patient participation: Yes- Able to Participate  Level of consciousness: awake and alert and oriented  Post-procedure vital signs: reviewed and stable  Pain management: adequate  Airway patency: patent  PONV status at discharge: No PONV  Anesthetic complications: no      Cardiovascular status: hemodynamically stable  Respiratory status: unassisted, spontaneous ventilation and room air  Hydration status: euvolemic  Follow-up not needed.        Visit Vitals  BP (!) 152/82 (BP Location: Right arm, Patient Position: Lying)   Pulse 70   Temp 36.7 °C (98.1 °F) (Temporal)   Resp 18   Ht 5' 5" (1.651 m)   Wt 68 kg (150 lb)   SpO2 95%   BMI 24.96 kg/m²       Pain/Lis Score: Pain Assessment Performed: Yes (3/20/2018 10:53 AM)  Presence of Pain: denies (3/20/2018 10:53 AM)  Lis Score: 10 (3/20/2018 10:33 AM)      "

## 2018-03-20 NOTE — ANESTHESIA RELEASE NOTE
"Anesthesia Release from PACU Note    Patient: Hector Kellogg    Procedure(s) Performed: Procedure(s) (LRB):  PHACOEMULSIFICATION-ASPIRATION-CATARACT (Left)  INSERTION-INTRAOCULAR LENS (IOL) (Left)    Anesthesia type: MAC    Post pain: Adequate analgesia    Post assessment: no apparent anesthetic complications, tolerated procedure well and no evidence of recall    Last Vitals:   Visit Vitals  BP (!) 152/82 (BP Location: Right arm, Patient Position: Lying)   Pulse 70   Temp 36.7 °C (98.1 °F) (Temporal)   Resp 18   Ht 5' 5" (1.651 m)   Wt 68 kg (150 lb)   SpO2 95%   BMI 24.96 kg/m²       Post vital signs: stable    Level of consciousness: awake, alert  and oriented    Nausea/Vomiting: no nausea/no vomiting    Complications: none    Airway Patency: patent    Respiratory: unassisted, spontaneous ventilation, room air    Cardiovascular: stable and blood pressure at baseline    Hydration: euvolemic  "

## 2018-03-20 NOTE — PLAN OF CARE
Discharge instructions given. Patient verbalized understanding. Pt states that he has drops at home. Consents in chart. No complaints at this time.

## 2018-03-21 ENCOUNTER — OFFICE VISIT (OUTPATIENT)
Dept: OPHTHALMOLOGY | Facility: CLINIC | Age: 73
End: 2018-03-21
Payer: MEDICARE

## 2018-03-21 VITALS — DIASTOLIC BLOOD PRESSURE: 86 MMHG | SYSTOLIC BLOOD PRESSURE: 134 MMHG | HEART RATE: 68 BPM

## 2018-03-21 DIAGNOSIS — H40.003 GLAUCOMA SUSPECT OF BOTH EYES: ICD-10-CM

## 2018-03-21 DIAGNOSIS — Z98.890 POST-OPERATIVE STATE: Primary | ICD-10-CM

## 2018-03-21 PROCEDURE — 99024 POSTOP FOLLOW-UP VISIT: CPT | Mod: POP,,, | Performed by: OPHTHALMOLOGY

## 2018-03-21 PROCEDURE — 99213 OFFICE O/P EST LOW 20 MIN: CPT | Mod: PBBFAC,PO | Performed by: OPHTHALMOLOGY

## 2018-03-21 PROCEDURE — 99999 PR PBB SHADOW E&M-EST. PATIENT-LVL III: CPT | Mod: PBBFAC,,, | Performed by: OPHTHALMOLOGY

## 2018-03-21 NOTE — PROGRESS NOTES
Subjective:       Patient ID: Hector Kellogg is a 72 y.o. male.    Chief Complaint: Post-op Evaluation (1 day po os)    HPI     Post-op Evaluation    Additional comments: 1 day po os           Comments   S/p phaco OS w/IOL - 3/20/18    1 day po os -    Pt states sx went well. Pt states OS feels sheri. Pt denies pain and   discomfort.     Eyemeds  Ket/PF/Oflx QID OS           Last edited by Lino Schwartz on 3/21/2018 10:03 AM. (History)             Assessment:       1. Post-operative state    2. Glaucoma suspect of both eyes        Plan:       S/p CE OU- Doing well.  Glaucoma suspect OU-IOP fine OD but elevated OS. IOP OS lowered in office by burping incision.           CPM OS.  Taper gtts OD.  Cont Combigan OU.  RTC 1 wk.

## 2018-03-28 ENCOUNTER — OFFICE VISIT (OUTPATIENT)
Dept: OPHTHALMOLOGY | Facility: CLINIC | Age: 73
End: 2018-03-28
Payer: MEDICARE

## 2018-03-28 DIAGNOSIS — Z98.890 POST-OPERATIVE STATE: Primary | ICD-10-CM

## 2018-03-28 DIAGNOSIS — H40.003 GLAUCOMA SUSPECT OF BOTH EYES: ICD-10-CM

## 2018-03-28 PROCEDURE — 99999 PR PBB SHADOW E&M-EST. PATIENT-LVL II: CPT | Mod: PBBFAC,,, | Performed by: OPHTHALMOLOGY

## 2018-03-28 PROCEDURE — 99212 OFFICE O/P EST SF 10 MIN: CPT | Mod: PBBFAC,PO | Performed by: OPHTHALMOLOGY

## 2018-03-28 PROCEDURE — 99024 POSTOP FOLLOW-UP VISIT: CPT | Mod: POP,,, | Performed by: OPHTHALMOLOGY

## 2018-03-28 NOTE — PROGRESS NOTES
Subjective:       Patient ID: Hector Kellogg is a 73 y.o. male.    Chief Complaint: Post-op Evaluation (1 week po os)    HPI     Post-op Evaluation    Additional comments: 1 week po os           Comments   1 week po os    Pt states VA OS is well. Pt denies pain and discomfort.     Eyemeds  PF/ Ket TID OS       Last edited by Lino Schwartz on 3/28/2018  9:07 AM. (History)             Assessment:       1. Post-operative state    2. Glaucoma suspect of both eyes        Plan:       S/p CE OU- Doing well.  Glaucoma suspect OU-IOP better OS today.        Taper gtts OU.  Cont Combigan OU.  RTC 3 wks.

## 2018-04-16 DIAGNOSIS — F41.1 GENERALIZED ANXIETY DISORDER: ICD-10-CM

## 2018-04-17 RX ORDER — CITALOPRAM 20 MG/1
20 TABLET, FILM COATED ORAL DAILY
Qty: 30 TABLET | Refills: 11 | Status: SHIPPED | OUTPATIENT
Start: 2018-04-17 | End: 2019-04-10 | Stop reason: SDUPTHER

## 2018-04-18 ENCOUNTER — OFFICE VISIT (OUTPATIENT)
Dept: OPHTHALMOLOGY | Facility: CLINIC | Age: 73
End: 2018-04-18
Payer: MEDICARE

## 2018-04-18 DIAGNOSIS — H40.003 GLAUCOMA SUSPECT OF BOTH EYES: ICD-10-CM

## 2018-04-18 DIAGNOSIS — Z98.890 POST-OPERATIVE STATE: Primary | ICD-10-CM

## 2018-04-18 PROCEDURE — 99024 POSTOP FOLLOW-UP VISIT: CPT | Mod: POP,,, | Performed by: OPHTHALMOLOGY

## 2018-04-18 PROCEDURE — 99212 OFFICE O/P EST SF 10 MIN: CPT | Mod: PBBFAC,PO | Performed by: OPHTHALMOLOGY

## 2018-04-18 PROCEDURE — 99999 PR PBB SHADOW E&M-EST. PATIENT-LVL II: CPT | Mod: PBBFAC,,, | Performed by: OPHTHALMOLOGY

## 2018-04-18 NOTE — PROGRESS NOTES
Subjective:       Patient ID: Hector Kellogg is a 73 y.o. male.    Chief Complaint: Post-op Evaluation and Glaucoma Suspect    HPI     3 wk. PO CE/IOL OU. Using Combigan BID OU.  Vision good  Wearing OTC   +2.25 readers.    Last edited by Evelyne Souza on 4/18/2018  8:52 AM. (History)             Assessment:       1. Post-operative state    2. Glaucoma suspect of both eyes        Plan:       S/p CE OU- Doing well.  Glaucoma suspect OU-IOP's better OU today.        D/c Combigan OU.  Restart Alphagan P bid OU.  RTC 4 wks for IOP's & recheck MRx.

## 2018-05-16 ENCOUNTER — OFFICE VISIT (OUTPATIENT)
Dept: OPHTHALMOLOGY | Facility: CLINIC | Age: 73
End: 2018-05-16
Payer: MEDICARE

## 2018-05-16 DIAGNOSIS — H40.003 GLAUCOMA SUSPECT OF BOTH EYES: ICD-10-CM

## 2018-05-16 DIAGNOSIS — Z98.890 POST-OPERATIVE STATE: Primary | ICD-10-CM

## 2018-05-16 DIAGNOSIS — H52.7 REFRACTIVE ERROR: ICD-10-CM

## 2018-05-16 PROCEDURE — 99999 PR PBB SHADOW E&M-EST. PATIENT-LVL II: CPT | Mod: PBBFAC,,, | Performed by: OPHTHALMOLOGY

## 2018-05-16 PROCEDURE — 99024 POSTOP FOLLOW-UP VISIT: CPT | Mod: POP,,, | Performed by: OPHTHALMOLOGY

## 2018-05-16 PROCEDURE — 99212 OFFICE O/P EST SF 10 MIN: CPT | Mod: PBBFAC,PO | Performed by: OPHTHALMOLOGY

## 2018-05-16 RX ORDER — TIMOLOL MALEATE 5 MG/ML
1 SOLUTION/ DROPS OPHTHALMIC 2 TIMES DAILY
Qty: 5 ML | Refills: 6 | Status: SHIPPED | OUTPATIENT
Start: 2018-05-16 | End: 2020-05-11

## 2018-05-16 NOTE — PROGRESS NOTES
Subjective:       Patient ID: Hector Kellogg is a 73 y.o. male.    Chief Complaint: Glaucoma Suspect (IOP and MRX)    HPI     Glaucoma Suspect    Additional comments: IOP and MRX           Comments   DSL- 4/18/18    Pt is here for IOP Check. Pt has been using Alphagan as directed. Pt has   eye allergies.     [employer]  Alphagan P BID OU        Last edited by Lino Schwartz on 5/16/2018  9:21 AM. (History)             Assessment:       1. Post-operative state    2. Glaucoma suspect of both eyes    3. Refractive error        Plan:       S/p CE OU- Doing well.  Glaucoma suspect OU-IOP's acceptable on Alphagan OU.  RE-Pt does not want MRx.      D/c Alphagan OU.  Start T1/2 bid OU to save $.  RTC Dr Jones in 3 mos for IOP's.

## 2018-07-02 RX ORDER — METOPROLOL SUCCINATE 50 MG/1
50 TABLET, EXTENDED RELEASE ORAL DAILY
Qty: 90 TABLET | Refills: 3 | Status: SHIPPED | OUTPATIENT
Start: 2018-07-02 | End: 2019-07-01 | Stop reason: SDUPTHER

## 2018-07-31 ENCOUNTER — TELEPHONE (OUTPATIENT)
Dept: NEUROLOGY | Facility: CLINIC | Age: 73
End: 2018-07-31

## 2018-07-31 NOTE — TELEPHONE ENCOUNTER
----- Message from Sue Albert sent at 7/31/2018 11:30 AM CDT -----  Contact: Eileen (Wife) [phone]  Needs Advice    Reason for call:    Eileen would like to speak to someone regarding scheduling the patient's follow up appointment     Communication Preference:PHONE     Additional Information:

## 2018-09-24 ENCOUNTER — TELEPHONE (OUTPATIENT)
Dept: OPHTHALMOLOGY | Facility: CLINIC | Age: 73
End: 2018-09-24

## 2018-10-09 ENCOUNTER — OFFICE VISIT (OUTPATIENT)
Dept: OPTOMETRY | Facility: CLINIC | Age: 73
End: 2018-10-09
Payer: MEDICARE

## 2018-10-09 DIAGNOSIS — H40.053 BILATERAL OCULAR HYPERTENSION: Primary | ICD-10-CM

## 2018-10-09 PROCEDURE — 99212 OFFICE O/P EST SF 10 MIN: CPT | Mod: PBBFAC,PO | Performed by: OPTOMETRIST

## 2018-10-09 PROCEDURE — 99999 PR PBB SHADOW E&M-EST. PATIENT-LVL II: CPT | Mod: PBBFAC,,, | Performed by: OPTOMETRIST

## 2018-10-09 PROCEDURE — 92012 INTRM OPH EXAM EST PATIENT: CPT | Mod: S$PBB,,, | Performed by: OPTOMETRIST

## 2018-10-09 PROCEDURE — 92020 GONIOSCOPY: CPT | Mod: S$PBB,,, | Performed by: OPTOMETRIST

## 2018-10-09 PROCEDURE — 92020 GONIOSCOPY: CPT | Mod: PBBFAC,PO | Performed by: OPTOMETRIST

## 2018-10-09 NOTE — PROGRESS NOTES
HPI     DLS:5/16/18 Dr. Liu  Pt states his [facility] is good here for IOP check might need reading glasses but    Does not want glasses at this time. Has OTC reader +1.75 did not bring   them.   No f/f  timolol gtts BID OU    Last edited by Blue Jones, OD on 10/9/2018  9:32 AM. (History)        ROS     Positive for: Eyes (cat surgery Ou/glauc tx)    Last edited by Blue Jones, OD on 10/9/2018  9:47 AM. (History)        Assessment /Plan     For exam results, see Encounter Report.    Bilateral ocular hypertension  -     [place] Visual Field - OU - Extended - Both Eyes; Future  -     Posterior Segment OCT Optic Nerve- Both eyes; Future      1. Mild pco sp pciol OU--pt happy w otc readers  2. Hx OHT OU.  When I saw pt prior to cat surgery iop 24 OU without meds.  Now 16 on gurdeep BID OU from Dr vicnent.  Mod cupping.  Pach: 557/558.   Angles open by gonio    PLAN:    1. Since IOP good will have pt reduce GURDEEP to qAM OU  2. rtc 3 mos: HVF 24-2 sf/OCT.  appt w me after for iop ck/review.  If VF good may have pt stop drops altogether

## 2018-10-11 ENCOUNTER — OFFICE VISIT (OUTPATIENT)
Dept: NEUROLOGY | Facility: CLINIC | Age: 73
End: 2018-10-11
Payer: MEDICARE

## 2018-10-11 VITALS
WEIGHT: 155.44 LBS | HEART RATE: 68 BPM | SYSTOLIC BLOOD PRESSURE: 154 MMHG | BODY MASS INDEX: 25.9 KG/M2 | DIASTOLIC BLOOD PRESSURE: 90 MMHG | HEIGHT: 65 IN

## 2018-10-11 DIAGNOSIS — G20.A1 PARKINSON DISEASE: Primary | ICD-10-CM

## 2018-10-11 PROCEDURE — 99213 OFFICE O/P EST LOW 20 MIN: CPT | Mod: S$PBB,,, | Performed by: PSYCHIATRY & NEUROLOGY

## 2018-10-11 PROCEDURE — 99999 PR PBB SHADOW E&M-EST. PATIENT-LVL III: CPT | Mod: PBBFAC,,, | Performed by: PSYCHIATRY & NEUROLOGY

## 2018-10-11 PROCEDURE — 99213 OFFICE O/P EST LOW 20 MIN: CPT | Mod: PBBFAC | Performed by: PSYCHIATRY & NEUROLOGY

## 2018-10-11 NOTE — PROGRESS NOTES
"Name: Hector Kellogg  MRN: [id number]   CSN: [id number]      Date: 10/11/2018    Chief Complaint / Interval History:  PD  - still getting pinched nerve pain in the back or legs  - will take an extra gabapentin  - takes cd/ld every 3-4 hours  - gait is stable  -   -   -   -   -     From Summer 2017:  - a bit more off time between dosing  - peak is pretty good  - gait is frustrating  - memory holding up  - family pleased    From 12/06  1) A bit more off time in between dosing  2) More cramping when off, gabapentin helps at night  3) Would be willing to take meds four times per day    History of Present Illness (HPI):  68 yo with diagnosis of PD, symptoms began 1.5 years ago.  First noted tremor in the L hand, "all left side".  Drags his left foot, generally slower overall.  Dx 1 year ago, had [employer] as a confirmatory study.  Says he had an MRI of the low back as well, was feeling "pinched nerves" in the back and into the both legs.  Was prescribed "some drug that cost $400" and didn;t take. Also prescribed pramipexole per the notes but also said he "never took it."    Was in [place] for 2 years, now re-establishing care here.    Has questions about gabapentin - 800 now taking 1/2 tab at night only and has no significant pain.  Will ibuprofen for breakthrough.  Not taking Norco.    Retired , [employer], left at age 65.    Nonmotor/Premotor ROS:  Hyposmia (HENT)?No per him  RBD/sleep issues (Constitutional)?Yes - acts out dreams  Depression/anxiety (Psychiatric)?No - better on Citalopram  Fatigue (Constitutional)?Yes  Constipation (GI)?Yes  - uses OTC stool softener  Urinary issues ()?Yes - urgency  Sexual dysfunction ()?Yes - some ED.    Orthostasis (Cardiovascular)?No  Leg swelling (Cardiovascular)? No  Falls (Musculoskeletal)?No  Cognitive impairment (Neurologic)?No  Psychoses (Psychiatric)?No  Pain/Paresthesia (Neurologic)?Yes  Visual changes (Eyes)?No  Moles / skin changes (Skin)?Yes - seborrhea, has a " scaly mole on the R side of the head  Stridor / SOB (Pulm)?Yes - with acticity  Bruising (Heme)?No    Past Medical History: The patient  has a past medical history of Cataract, Chronic back pain greater than 3 months duration, Coronary artery disease, Hyperlipidemia, Hypertension, MI, old (2006), and Parkinson disease.    Social History: The patient  reports that he quit smoking about 4 years ago. He has a 40.00 pack-year smoking history. he has never used smokeless tobacco. He reports that he drinks alcohol. He reports that he does not use drugs.  Rare social drink.    Family History: Their family history includes Cataracts in his father; Heart attack in his father; Heart disease in his maternal uncle and paternal uncle.    Allergies: Statins-hmg-coa reductase inhibitors     Meds:   Current Outpatient Medications on File Prior to Visit   Medication Sig Dispense Refill    aspirin (ECOTRIN) 81 MG EC tablet Take 81 mg by mouth once daily.      carbidopa-levodopa  mg (SINEMET)  mg per tablet Take 1 tablet by mouth 5 (five) times daily. 450 tablet 3    citalopram (CELEXA) 20 MG tablet Take 1 tablet (20 mg total) by mouth once daily. 30 tablet 11    gabapentin (NEURONTIN) 400 MG capsule Take 1 capsule (400 mg total) by mouth 2 (two) times daily. 180 capsule 3    loratadine (CLARITIN) 10 mg tablet Take 10 mg by mouth as needed for Allergies.      metoprolol succinate (TOPROL-XL) 50 MG 24 hr tablet Take 1 tablet (50 mg total) by mouth once daily. 90 tablet 3    mometasone-formoterol (DULERA) 200-5 mcg/actuation inhaler Inhale 2 puffs into the lungs 2 (two) times daily. 1 Inhaler 6    multivitamin capsule Take 1 capsule by mouth once daily.      timolol maleate 0.5% (TIMOPTIC) 0.5 % Drop Place 1 drop into both eyes 2 (two) times daily. 5 mL 6    nitroGLYCERIN (NITROSTAT) 0.4 MG SL tablet Place 1 tablet (0.4 mg total) under the tongue every 5 (five) minutes as needed for Chest pain. 25 tablet 4     No  "current facility-administered medications on file prior to visit.      Exam:  BP (!) 154/90   Pulse 68   Ht 5' 5" (1.651 m)   Wt 70.5 kg (155 lb 6.8 oz)   BMI 25.86 kg/m²     Constitutional  Well-developed, well-nourished, appears stated age   * Specialized movement exam  Mild hypophonic speech.    Mild facial masking.   L>R cogwheel rigidity - MILD at worst     L>R bradykinesia - mild.   Only rare rest tremor in L hand   No other dystonia, chorea, athetosis, myoclonus, or tics.   No motor impersistence.   Normal-based gait.   + mildly abnormal arm swing B, worse on the L  Good stride length, NO FREEZING NOW   No postural instability.      Laboratory/Radiological:  - Results:  No visits with results within 3 Month(s) from this visit.   Latest known visit with results is:   Lab Visit on 10/25/2017   Component Date Value Ref Range Status    Total Protein 10/25/2017 7.3  6.0 - 8.4 g/dL Final    Albumin 10/25/2017 3.4* 3.5 - 5.2 g/dL Final    Total Bilirubin 10/25/2017 0.5  0.1 - 1.0 mg/dL Final    Bilirubin, Direct 10/25/2017 0.2  0.1 - 0.3 mg/dL Final    AST 10/25/2017 20  10 - 40 U/L Final    ALT 10/25/2017 <5* 10 - 44 U/L Final    Alkaline Phosphatase 10/25/2017 89  55 - 135 U/L Final    Cholesterol 10/25/2017 165  120 - 199 mg/dL Final    Triglycerides 10/25/2017 193* 30 - 150 mg/dL Final    HDL 10/25/2017 51  40 - 75 mg/dL Final    LDL Cholesterol 10/25/2017 75.4  63.0 - 159.0 mg/dL Final    HDL/Chol Ratio 10/25/2017 30.9  20.0 - 50.0 % Final    Total Cholesterol/HDL Ratio 10/25/2017 3.2  2.0 - 5.0 Final    Non-HDL Cholesterol 10/25/2017 114  mg/dL Final     - Independent review of images: none available.    Reviewed records of:  1) Grade 1 anterolisthesis of spine  2) Positive SHAHRZAD scan    Diagnoses:          1) Idiopathic PD, tremor dominant.  On CD/LD.  2) Low back pain  3) INSOMNIA - WORSE.    Medical Decision Makin) CD/LD 5x a day now  2) Consider additional Azilect or Xadago next - HE " DECLINES AGAIN  3) Medi diet and exercise  4) ADD MELATONIN FOR SLEEP NOW          Eliu Klein MD, MPH  Division of Movement and Memory Disorders  [facility]  [phone]

## 2018-10-29 ENCOUNTER — PATIENT MESSAGE (OUTPATIENT)
Dept: NEUROLOGY | Facility: CLINIC | Age: 73
End: 2018-10-29

## 2018-10-29 DIAGNOSIS — G20.A1 PARKINSON DISEASE: ICD-10-CM

## 2018-10-29 DIAGNOSIS — M48.061 STENOSIS OF LATERAL RECESS OF LUMBAR SPINE: ICD-10-CM

## 2018-10-30 ENCOUNTER — PATIENT MESSAGE (OUTPATIENT)
Dept: NEUROLOGY | Facility: CLINIC | Age: 73
End: 2018-10-30

## 2018-10-30 RX ORDER — CARBIDOPA AND LEVODOPA 25; 100 MG/1; MG/1
1 TABLET ORAL
Qty: 450 TABLET | Refills: 3 | Status: SHIPPED | OUTPATIENT
Start: 2018-10-30 | End: 2021-04-01

## 2018-10-30 RX ORDER — GABAPENTIN 400 MG/1
400 CAPSULE ORAL 2 TIMES DAILY
Qty: 180 CAPSULE | Refills: 3 | Status: SHIPPED | OUTPATIENT
Start: 2018-10-30 | End: 2019-08-16

## 2018-10-30 NOTE — TELEPHONE ENCOUNTER
----- Message from Karan Peterson sent at 10/30/2018 11:28 AM CDT -----  Contact: Patient @ [phone]  Patient is calling to get an update on the medication refill request for ( carbidopa-levodopa  mg (SINEMET)  mg per tablet ) (gabapentin (NEURONTIN) 400 MG capsule )     [id number] [address]  Phone: [phone] Fax: [phone]

## 2018-10-30 NOTE — TELEPHONE ENCOUNTER
See mychart message from ANDER Kahn notifying patient medication has been sent to provider to fill.

## 2018-11-16 DIAGNOSIS — Z12.11 COLON CANCER SCREENING: ICD-10-CM

## 2018-12-18 ENCOUNTER — OFFICE VISIT (OUTPATIENT)
Dept: UROLOGY | Facility: CLINIC | Age: 73
End: 2018-12-18
Attending: UROLOGY
Payer: MEDICARE

## 2018-12-18 VITALS
BODY MASS INDEX: 25.83 KG/M2 | SYSTOLIC BLOOD PRESSURE: 148 MMHG | HEIGHT: 65 IN | WEIGHT: 155 LBS | HEART RATE: 74 BPM | DIASTOLIC BLOOD PRESSURE: 89 MMHG

## 2018-12-18 DIAGNOSIS — R31.0 GROSS HEMATURIA: ICD-10-CM

## 2018-12-18 PROCEDURE — 99204 OFFICE O/P NEW MOD 45 MIN: CPT | Mod: S$GLB,,, | Performed by: UROLOGY

## 2018-12-18 PROCEDURE — 51798 US URINE CAPACITY MEASURE: CPT | Mod: S$GLB,,, | Performed by: UROLOGY

## 2018-12-18 PROCEDURE — 87086 URINE CULTURE/COLONY COUNT: CPT

## 2018-12-18 NOTE — PROGRESS NOTES
"Subjective:      Hector Kellogg is a 73 y.o. male who was self-referred for evaluation of hematuria.      Hematuria  Patient complains of gross hematuria. Onset of hematuria was 3 days ago and was sudden in onset. He had similar episode 6 mos ago. There is a history of nephrolithiasis. There is not a history of urologic trauma. Other urologic symptoms include nocturia, urgency, frequency. Patient admits to history of tobacco use - smoked until 4 years ago. Patient denies history of  surgeries. Prior workup has been none.    The following portions of the patient's history were reviewed and updated as appropriate: allergies, current medications, past family history, past medical history, past social history, past surgical history and problem list.    Review of Systems  Constitutional: no fever or chills  ENT: no nasal congestion or sore throat  Respiratory: no cough or shortness of breath  Cardiovascular: no chest pain or palpitations  Gastrointestinal: no nausea or vomiting, tolerating diet  Genitourinary: as per HPI  Hematologic/Lymphatic: no easy bruising or lymphadenopathy  Musculoskeletal: no arthralgias or myalgias  Neurological: no seizures or tremors  Behavioral/Psych: no auditory or visual hallucinations     Objective:   Vitals: BP (!) 148/89 (BP Location: Left arm, Patient Position: Sitting, BP Method: Large (Automatic))   Pulse 74   Ht 5' 5" (1.651 m)   Wt 70.3 kg (155 lb)   BMI 25.79 kg/m²     Physical Exam   Physical Exam   Constitutional: He is oriented to person, place, and time. He appears well-developed and well-nourished. No distress.   HENT:   Head: Normocephalic and atraumatic.   Right Ear: External ear normal.   Left Ear: External ear normal.   Nose: Nose normal.   Mouth/Throat: Oropharynx is clear and moist.   Eyes: Conjunctivae and EOM are normal. Right eye exhibits no discharge. Left eye exhibits no discharge. No scleral icterus.   Neck: Neck supple. No tracheal deviation present. No " thyromegaly present.   Cardiovascular: Normal rate and regular rhythm. PMI is not displaced.   Pulmonary/Chest: Effort normal. No respiratory distress. He exhibits no tenderness.   Abdominal: Soft. He exhibits no distension. There is no tenderness. There is no CVA tenderness. No hernia.   Musculoskeletal: He exhibits no edema.   Neurological: He is alert and oriented to person, place, and time.   Skin: Skin is warm and dry. No rash noted. No erythema.   Psychiatric: He has a normal mood and affect. His speech is normal and behavior is normal. Judgment and thought content normal. His mood appears not anxious. Cognition and memory are normal.      Lab Review   Urinalysis demonstrates positive for red blood cells  Lab Results   Component Value Date    WBC 6.15 07/21/2017    HGB 14.5 07/21/2017    HCT 45.0 07/21/2017    MCV 96 07/21/2017     07/21/2017     Lab Results   Component Value Date    CREATININE 1.2 07/21/2017    BUN 23 07/21/2017       Assessment:     1. Gross hematuria        Plan:   -- Discussed differential diagnosis for hematuria, including infection, nephrolithiasis, benign prostatic bleeding, and neoplasms of kidney, ureter, or bladder.  -- Recommended proceeding with full hematuria workup, to include CT urogram and cystoscopy  -- Will send urine for culture  -- CT urogram next available  -- Cystoscopy in clinic after CT

## 2018-12-19 LAB — BACTERIA UR CULT: NO GROWTH

## 2018-12-27 ENCOUNTER — HOSPITAL ENCOUNTER (OUTPATIENT)
Dept: RADIOLOGY | Facility: OTHER | Age: 73
Discharge: HOME | End: 2018-12-27
Attending: UROLOGY
Payer: MEDICARE

## 2018-12-27 ENCOUNTER — PES CALL (OUTPATIENT)
Dept: ADMINISTRATIVE | Facility: CLINIC | Age: 73
End: 2018-12-27

## 2018-12-27 DIAGNOSIS — R31.0 GROSS HEMATURIA: ICD-10-CM

## 2018-12-27 PROCEDURE — 25500020 PHARM REV CODE 255: Performed by: UROLOGY

## 2018-12-27 PROCEDURE — 74178 CT ABD&PLV WO CNTR FLWD CNTR: CPT | Mod: 26,,, | Performed by: RADIOLOGY

## 2018-12-27 PROCEDURE — 74178 CT ABD&PLV WO CNTR FLWD CNTR: CPT | Mod: TC

## 2018-12-27 RX ADMIN — IOHEXOL 150 ML: 350 INJECTION, SOLUTION INTRAVENOUS at 10:12

## 2019-01-07 ENCOUNTER — PROCEDURE VISIT (OUTPATIENT)
Dept: UROLOGY | Facility: CLINIC | Age: 74
End: 2019-01-07
Attending: UROLOGY
Payer: MEDICARE

## 2019-01-07 VITALS
WEIGHT: 154.13 LBS | BODY MASS INDEX: 25.68 KG/M2 | SYSTOLIC BLOOD PRESSURE: 143 MMHG | HEIGHT: 65 IN | DIASTOLIC BLOOD PRESSURE: 88 MMHG | HEART RATE: 87 BPM

## 2019-01-07 DIAGNOSIS — N20.0 NEPHROLITHIASIS: Primary | ICD-10-CM

## 2019-01-07 DIAGNOSIS — R31.0 GROSS HEMATURIA: ICD-10-CM

## 2019-01-07 LAB
BILIRUB SERPL-MCNC: ABNORMAL MG/DL
BLOOD URINE, POC: 50
COLOR, POC UA: ABNORMAL
GLUCOSE UR QL STRIP: NORMAL
KETONES UR QL STRIP: ABNORMAL
LEUKOCYTE ESTERASE URINE, POC: ABNORMAL
NITRITE, POC UA: ABNORMAL
PH, POC UA: 7
PROTEIN, POC: ABNORMAL
SPECIFIC GRAVITY, POC UA: 1.01
UROBILINOGEN, POC UA: NORMAL

## 2019-01-07 PROCEDURE — 52000 CYSTOSCOPY: ICD-10-PCS | Mod: S$GLB,,, | Performed by: UROLOGY

## 2019-01-07 PROCEDURE — 52000 CYSTOURETHROSCOPY: CPT | Mod: S$GLB,,, | Performed by: UROLOGY

## 2019-01-07 PROCEDURE — 81002 URINALYSIS NONAUTO W/O SCOPE: CPT | Mod: S$GLB,,, | Performed by: UROLOGY

## 2019-01-07 PROCEDURE — 81002 POCT URINE DIPSTICK WITHOUT MICROSCOPE: ICD-10-PCS | Mod: S$GLB,,, | Performed by: UROLOGY

## 2019-01-07 RX ORDER — LIDOCAINE HYDROCHLORIDE 20 MG/ML
JELLY TOPICAL
Status: COMPLETED | OUTPATIENT
Start: 2019-01-07 | End: 2019-01-07

## 2019-01-07 RX ORDER — CIPROFLOXACIN 500 MG/1
500 TABLET ORAL
Status: COMPLETED | OUTPATIENT
Start: 2019-01-07 | End: 2019-01-07

## 2019-01-07 RX ADMIN — CIPROFLOXACIN 500 MG: 500 TABLET ORAL at 12:01

## 2019-01-07 RX ADMIN — LIDOCAINE HYDROCHLORIDE 5 ML: 20 JELLY TOPICAL at 12:01

## 2019-01-07 NOTE — PROCEDURES
Cystoscopy  Date/Time: 1/7/2019 12:18 PM  Performed by: Ottoniel Obando MD  Authorized by: Ottoniel Obando MD     Consent Done?:  Yes (Written)  Indications: hematuria    Position:  Supine  Anesthesia:  Lidocaine jelly  Preparation: Patient was prepped and draped in usual sterile fashion      Scope type:  Flexible cystoscope  External exam normal: Yes    Urethra normal: Yes    Prostate normal: No          Hyperplasia (Bilobar)(Length (cm):  5)Bladder neck normal: Bladder neck normal   Bladder normal: Yes (No tumors, lesions, or stones noted.  Normal bilateral UOs.)      Patient tolerance:  Patient tolerated the procedure well with no immediate complications    Imaging  CT Urogram reviewed.  Small renal stones but no lesions.     Impression:   Negative hematuria workup for malignancy  Nephrolithiasis    Plan:  -- FU 6 mos w/ KUB to monitor stones

## 2019-01-08 LAB
BILIRUB SERPL-MCNC: ABNORMAL MG/DL
BLOOD URINE, POC: 250
COLOR, POC UA: ABNORMAL
GLUCOSE UR QL STRIP: ABNORMAL
KETONES UR QL STRIP: ABNORMAL
LEUKOCYTE ESTERASE URINE, POC: ABNORMAL
NITRITE, POC UA: ABNORMAL
PH, POC UA: 8
POC RESIDUAL URINE VOLUME: 23 ML (ref 0–100)
PROTEIN, POC: ABNORMAL
SPECIFIC GRAVITY, POC UA: 1
UROBILINOGEN, POC UA: ABNORMAL

## 2019-04-10 DIAGNOSIS — F41.1 GENERALIZED ANXIETY DISORDER: ICD-10-CM

## 2019-04-10 RX ORDER — CITALOPRAM 20 MG/1
20 TABLET, FILM COATED ORAL DAILY
Qty: 30 TABLET | Refills: 11 | Status: SHIPPED | OUTPATIENT
Start: 2019-04-10 | End: 2020-03-30 | Stop reason: SDUPTHER

## 2019-06-06 ENCOUNTER — HOSPITAL ENCOUNTER (OUTPATIENT)
Dept: CARDIOLOGY | Facility: CLINIC | Age: 74
Discharge: HOME | End: 2019-06-06
Payer: MEDICARE

## 2019-06-06 ENCOUNTER — OFFICE VISIT (OUTPATIENT)
Dept: CARDIOLOGY | Facility: CLINIC | Age: 74
End: 2019-06-06
Payer: MEDICARE

## 2019-06-06 VITALS
HEART RATE: 81 BPM | SYSTOLIC BLOOD PRESSURE: 143 MMHG | HEIGHT: 65 IN | BODY MASS INDEX: 26 KG/M2 | WEIGHT: 156.06 LBS | OXYGEN SATURATION: 97 % | DIASTOLIC BLOOD PRESSURE: 90 MMHG

## 2019-06-06 DIAGNOSIS — I73.9 CLAUDICATION: ICD-10-CM

## 2019-06-06 DIAGNOSIS — E78.2 MIXED HYPERLIPIDEMIA: ICD-10-CM

## 2019-06-06 DIAGNOSIS — G20.A1 PARKINSON'S DISEASE: Primary | ICD-10-CM

## 2019-06-06 DIAGNOSIS — Z78.9 STATIN INTOLERANCE: ICD-10-CM

## 2019-06-06 DIAGNOSIS — J43.2 CENTRILOBULAR EMPHYSEMA: ICD-10-CM

## 2019-06-06 DIAGNOSIS — I25.10 CORONARY ARTERY DISEASE INVOLVING NATIVE CORONARY ARTERY OF NATIVE HEART WITHOUT ANGINA PECTORIS: ICD-10-CM

## 2019-06-06 DIAGNOSIS — R06.09 DYSPNEA ON EXERTION: ICD-10-CM

## 2019-06-06 DIAGNOSIS — I10 ESSENTIAL HYPERTENSION: ICD-10-CM

## 2019-06-06 PROCEDURE — 99999 PR PBB SHADOW E&M-EST. PATIENT-LVL IV: ICD-10-PCS | Mod: PBBFAC,,, | Performed by: INTERNAL MEDICINE

## 2019-06-06 PROCEDURE — 1101F PR PT FALLS ASSESS DOC 0-1 FALLS W/OUT INJ PAST YR: ICD-10-PCS | Mod: CPTII,S$GLB,, | Performed by: INTERNAL MEDICINE

## 2019-06-06 PROCEDURE — 3077F PR MOST RECENT SYSTOLIC BLOOD PRESSURE >= 140 MM HG: ICD-10-PCS | Mod: CPTII,S$GLB,, | Performed by: INTERNAL MEDICINE

## 2019-06-06 PROCEDURE — 93000 ELECTROCARDIOGRAM COMPLETE: CPT | Mod: S$GLB,,, | Performed by: INTERNAL MEDICINE

## 2019-06-06 PROCEDURE — 3080F PR MOST RECENT DIASTOLIC BLOOD PRESSURE >= 90 MM HG: ICD-10-PCS | Mod: CPTII,S$GLB,, | Performed by: INTERNAL MEDICINE

## 2019-06-06 PROCEDURE — 99214 OFFICE O/P EST MOD 30 MIN: CPT | Mod: S$GLB,,, | Performed by: INTERNAL MEDICINE

## 2019-06-06 PROCEDURE — 3080F DIAST BP >= 90 MM HG: CPT | Mod: CPTII,S$GLB,, | Performed by: INTERNAL MEDICINE

## 2019-06-06 PROCEDURE — 99999 PR PBB SHADOW E&M-EST. PATIENT-LVL IV: CPT | Mod: PBBFAC,,, | Performed by: INTERNAL MEDICINE

## 2019-06-06 PROCEDURE — 93000 EKG 12-LEAD: ICD-10-PCS | Mod: S$GLB,,, | Performed by: INTERNAL MEDICINE

## 2019-06-06 PROCEDURE — 99214 PR OFFICE/OUTPT VISIT, EST, LEVL IV, 30-39 MIN: ICD-10-PCS | Mod: S$GLB,,, | Performed by: INTERNAL MEDICINE

## 2019-06-06 PROCEDURE — 1101F PT FALLS ASSESS-DOCD LE1/YR: CPT | Mod: CPTII,S$GLB,, | Performed by: INTERNAL MEDICINE

## 2019-06-06 PROCEDURE — 99499 RISK ADDL DX/OHS AUDIT: ICD-10-PCS | Mod: S$GLB,,, | Performed by: INTERNAL MEDICINE

## 2019-06-06 PROCEDURE — 3077F SYST BP >= 140 MM HG: CPT | Mod: CPTII,S$GLB,, | Performed by: INTERNAL MEDICINE

## 2019-06-06 PROCEDURE — 99499 UNLISTED E&M SERVICE: CPT | Mod: S$GLB,,, | Performed by: INTERNAL MEDICINE

## 2019-06-06 RX ORDER — ROSUVASTATIN CALCIUM 40 MG/1
40 TABLET, COATED ORAL NIGHTLY
Qty: 90 TABLET | Refills: 3 | Status: SHIPPED | OUTPATIENT
Start: 2019-06-06 | End: 2020-06-04 | Stop reason: SDUPTHER

## 2019-06-06 NOTE — PATIENT INSTRUCTIONS
Start rosuvastatin 40 mg nightly.    Fasting blood work in 6 weeks.    If muscle pain worsens, stop the rosuvastatin and we will retry Repatha.

## 2019-06-06 NOTE — PROGRESS NOTES
Subjective:   Patient ID:  Hector Kellogg is a 74 y.o. male who presents for follow-up of Coronary Artery Disease; Hyperlipidemia; and Hypertension      HPI: I last saw Mr. Kellogg in 2017. He feels like his Parkinson's has progressed. He has stable FC II dyspnea and reports bilateral leg pain from the hip down R> L after walking about 1/2 block. He has no rest pain, pnd, orthopnea, leg edema or chest pain. His patient assistance for Repatha ran out after three months. He has had muscle pain and weakness in the past attributed to statin use, however, it is still present off of all statins for years.  His last echo was 11/15 and showed an LVEF of 55%.    ECG (12/30/16): NSR 84    Past Medical History:   Diagnosis Date    Cataract     Chronic back pain greater than 3 months duration     Coronary artery disease     Hyperlipidemia     Hypertension     MI, old 2006    Parkinson disease        Past Surgical History:   Procedure Laterality Date    CARDIAC CATHETERIZATION  2006    x 3    CARDIAC CATHETERIZATION  10/11/2015    x 2    CATARACT EXTRACTION W/  INTRAOCULAR LENS IMPLANT Right 03/06/2018    Dr. Liu    CORONARY ANGIOPLASTY  2006, 2015    3 stents    HEMORRHOID SURGERY      INSERTION-INTRAOCULAR LENS (IOL) Left 3/20/2018    Performed by [facility] at [facility] OR [facility]    INSERTION-INTRAOCULAR LENS (IOL) Right 3/6/2018    [facility]formed [facility] at [facility] OR [facility]    PHACOEMULSIFICATION-ASPIRATION-CATARACT Left 3/20/2018    Performed by Luke iLu [facility] at [facility] OR [facility]    PHACOEMULSIFICATION-ASPIRATION-CATARACT Right 3/6/2018    Performed by Luke [facility] at [facility] OR [facility]       Social History     Socioeconomic History    Marital status:      Spouse name: Not on file    Number of children: Not on file    Years of education: Not on file    Highest education level: Not on file   Occupational History    Not on file   Social  Needs    Financial resource strain: Not on file    Food insecurity:     Worry: Not on file     Inability: Not on file    Transportation needs:     Medical: Not on file     Non-medical: Not on file   Tobacco Use    Smoking status: Former Smoker     Packs/day: 1.00     Years: 40.00     Pack years: 40.00     Last attempt to quit: 2014     Years since quittin.3    Smokeless tobacco: Never Used   Substance and Sexual Activity    Alcohol use: Yes     Comment: socially    Drug use: No    Sexual activity: Not on file   Lifestyle    Physical activity:     Days per week: Not on file     Minutes per session: Not on file    Stress: Not on file   Relationships    Social connections:     Talks on phone: Not on file     Gets together: Not on file     Attends Buddhist service: Not on file     Active member of club or organization: Not on file     Attends meetings of clubs or organizations: Not on file     Relationship status: Not on file   Other Topics Concern    Not on file   Social History Narrative    Not on file       Family History   Problem Relation Age of Onset    Heart attack Father     Cataracts Father     Heart disease Maternal Uncle     Heart disease Paternal Uncle     Hypertension Neg Hx     Asthma Neg Hx     Emphysema Neg Hx     Amblyopia Neg Hx     Blindness Neg Hx     Macular degeneration Neg Hx     Retinal detachment Neg Hx     Strabismus Neg Hx        Patient's Medications   New Prescriptions    ROSUVASTATIN (CRESTOR) 40 MG TAB    Take 1 tablet (40 mg total) by mouth every evening.   Previous Medications    ASPIRIN (ECOTRIN) 81 MG EC TABLET    Take 81 mg by mouth once daily.    CARBIDOPA-LEVODOPA  MG (SINEMET)  MG PER TABLET    Take 1 tablet by mouth 5 (five) times daily.    CITALOPRAM (CELEXA) 20 MG TABLET    Take 1 tablet (20 mg total) by mouth once daily.    GABAPENTIN (NEURONTIN) 400 MG CAPSULE    Take 1 capsule (400 mg total) by mouth 2 (two) times daily.     LORATADINE (CLARITIN) 10 MG TABLET    Take 10 mg by mouth as needed for Allergies.    METOPROLOL SUCCINATE (TOPROL-XL) 50 MG 24 HR TABLET    Take 1 tablet (50 mg total) by mouth once daily.    MOMETASONE-FORMOTEROL (DULERA) 200-5 MCG/ACTUATION INHALER    Inhale 2 puffs into the lungs 2 (two) times daily.    MULTIVITAMIN CAPSULE    Take 1 capsule by mouth once daily.    NITROGLYCERIN (NITROSTAT) 0.4 MG SL TABLET    Place 1 tablet (0.4 mg total) under the tongue every 5 (five) minutes as needed for Chest pain.    TIMOLOL MALEATE 0.5% (TIMOPTIC) 0.5 % DROP    Place 1 drop into both eyes 2 (two) times daily.   Modified Medications    No medications on file   Discontinued Medications    No medications on file       Review of Systems   Constitution: Negative for malaise/fatigue and weight gain.   HENT: Negative for hearing loss.    Eyes: Negative for visual disturbance.   Cardiovascular: Positive for claudication and dyspnea on exertion. Negative for chest pain, leg swelling, near-syncope, orthopnea, palpitations, paroxysmal nocturnal dyspnea and syncope.   Respiratory: Negative for cough, shortness of breath, sleep disturbances due to breathing, snoring and wheezing.    Endocrine: Negative for cold intolerance, heat intolerance, polydipsia, polyphagia and polyuria.   Hematologic/Lymphatic: Negative for bleeding problem. Does not bruise/bleed easily.   Skin: Negative for rash and suspicious lesions.   Musculoskeletal: Negative for arthritis, falls, joint pain, muscle weakness and myalgias.   Gastrointestinal: Negative for abdominal pain, change in bowel habit, constipation, diarrhea, heartburn, hematochezia, melena and nausea.   Genitourinary: Negative for hematuria and nocturia.   Neurological: Positive for loss of balance and tremors. Negative for excessive daytime sleepiness, dizziness, headaches, light-headedness and weakness.   Psychiatric/Behavioral: Negative for depression. The patient is not nervous/anxious.   "  Allergic/Immunologic: Negative for environmental allergies.       BP (!) 143/90   Pulse 81   Ht 5' 5" (1.651 m)   Wt 70.8 kg (156 lb 1.4 oz)   SpO2 97%   BMI 25.97 kg/m²     Objective:   Physical Exam   Constitutional: He is oriented to person, place, and time. He appears well-developed and well-nourished.        HENT:   Head: Normocephalic and atraumatic.   Mouth/Throat: Oropharynx is clear and moist.   Eyes: Pupils are equal, round, and reactive to light. Conjunctivae and EOM are normal. No scleral icterus.   Neck: Normal range of motion. Neck supple. No hepatojugular reflux and no JVD present. No tracheal deviation present. No thyromegaly present.   Cardiovascular: Normal rate, regular rhythm, normal heart sounds and intact distal pulses. PMI is not displaced.   Pulses:       Carotid pulses are 2+ on the right side, and 2+ on the left side.       Radial pulses are 2+ on the right side, and 2+ on the left side.        Dorsalis pedis pulses are 2+ on the right side, and 2+ on the left side.        Posterior tibial pulses are 2+ on the right side, and 2+ on the left side.   Pulmonary/Chest: Effort normal and breath sounds normal.   Abdominal: Soft. Bowel sounds are normal. He exhibits no distension and no mass. There is no hepatosplenomegaly. There is no tenderness.   Musculoskeletal: He exhibits no edema or tenderness.   Lymphadenopathy:     He has no cervical adenopathy.   Neurological: He is alert and oriented to person, place, and time.   Resting tremor present   Skin: Skin is warm and dry. No rash noted. No cyanosis or erythema. Nails show no clubbing.   Psychiatric: He has a normal mood and affect. His speech is normal and behavior is normal.       Lab Results   Component Value Date     07/21/2017    K 4.6 07/21/2017     07/21/2017    CO2 30 (H) 07/21/2017    BUN 23 07/21/2017    CREATININE 1.0 12/18/2018    GLU 91 07/21/2017    MG 2.1 12/31/2016    AST 20 10/25/2017    ALT <5 (L) 10/25/2017 "    ALBUMIN 3.4 (L) 10/25/2017    PROT 7.3 10/25/2017    BILITOT 0.5 10/25/2017    WBC 6.15 07/21/2017    HGB 14.5 07/21/2017    HCT 45.0 07/21/2017    MCV 96 07/21/2017     07/21/2017    INR 1.0 10/10/2015    TSH 1.196 08/21/2014    CHOL 165 10/25/2017    HDL 51 10/25/2017    LDLCALC 75.4 10/25/2017    TRIG 193 (H) 10/25/2017    BNP 48 12/30/2016       Assessment:     1. Parkinson's disease    2. Centrilobular emphysema    3. Coronary artery disease involving native coronary artery of native heart without angina pectoris : no angina. Will retry rosuvastatin 40 mg daily with follow up labs in 6 weeks. ECG today. Echo prior to next visit. Continue asa.   4. Mixed hyperlipidemia : Start crestor 40 mg daily and monitor for symptoms. If myalgias worsen, will re-apply for PCSK-9 inhibitor.   5. Essential hypertension : I have enrolled him in Digital HTN program. Home readings reported as normal. He did not take his metoprolol today.   6. Dyspnea on exertion : Stable, likely secondary to #2. No evidence of CHF.   7. Statin intolerance    8. Claudication : ALFONSO's ordered.       Plan:     Hector was seen today for coronary artery disease, hyperlipidemia and hypertension.    Diagnoses and all orders for this visit:    Parkinson's disease    Centrilobular emphysema    Coronary artery disease involving native coronary artery of native heart without angina pectoris  -     Comprehensive metabolic panel; Future  -     Lipid panel; Future  -     CBC auto differential; Future  -     TSH; Future  -     CV US Ankle Br[employer]ial [employer] WO Stress; Future  -     EKG 12-lead  -     Transthoracic echo (TTE) 2D with Color Flow; Future    Mixed hyperlipidemia  -     Comprehensive metabolic panel; Future  -     Lipid panel; Future  -     CBC auto differential; Future  -     TSH; Future  -     CV US Ankle Br[employer]ial [employer] WO Stress; Future  -     EKG 12-lead  -     Transthoracic echo (TTE) 2D with Color Flow;  Future    Essential hypertension  -     Hypertension Digital Medicine ([facility]) Enrollment Order  -     Hypertension Digital Medicine ([facility]): Assign Onboarding Questionnaires  -     Comprehensive metabolic panel; Future  -     Lipid panel; Future  -     CBC auto differential; Future  -     TSH; Future  -     CV US Ankle Br[employer] WO Stress; Future  -     EKG 12-lead  -     Transthoracic echo (TTE) 2D with Color Flow; Future    Dyspnea on exertion  -     Comprehensive metabolic panel; Future  -     Lipid panel; Future  -     CBC auto differential; Future  -     TSH; Future  -     CV US Ankle [employer] WO Stress; Future  -     EKG 12-lead  -     Transthoracic echo (TTE) 2D with Color Flow; Future    Statin intolerance  -     Comprehensive metabolic panel; Future  -     Lipid panel; Future  -     CBC auto differential; Future  -     TSH; Future  -     CV US Ankle Br[employer] WO Stress; Future  -     EKG 12-lead  -     Transthoracic echo (TTE) 2D with Color Flow; Future    Claudication  -     Comprehensive metabolic panel; Future  -     Lipid panel; Future  -     CBC auto differential; Future  -     TSH; Future  -     CV US Ankle Br[employer] WO Stress; Future  -     EKG 12-lead  -     Transthoracic echo (TTE) 2D with Color Flow; Future    Other orders  -     rosuvastatin (CRESTOR) 40 MG Tab; Take 1 tablet (40 mg total) by mouth every evening.        Thank you for allowing me to participate in this patient's care. Please do not hesitate to contact me with any questions or concerns.

## 2019-06-29 ENCOUNTER — PATIENT MESSAGE (OUTPATIENT)
Dept: CARDIOLOGY | Facility: CLINIC | Age: 74
End: 2019-06-29

## 2019-07-01 RX ORDER — METOPROLOL SUCCINATE 50 MG/1
50 TABLET, EXTENDED RELEASE ORAL DAILY
Qty: 90 TABLET | Refills: 3 | Status: SHIPPED | OUTPATIENT
Start: 2019-07-01 | End: 2020-06-23 | Stop reason: SDUPTHER

## 2019-07-02 RX ORDER — METOPROLOL SUCCINATE 50 MG/1
TABLET, EXTENDED RELEASE ORAL
Qty: 90 TABLET | Refills: 3 | Status: SHIPPED | OUTPATIENT
Start: 2019-07-02 | End: 2019-08-01

## 2019-07-02 RX ORDER — METOPROLOL SUCCINATE 50 MG/1
50 TABLET, EXTENDED RELEASE ORAL DAILY
Qty: 90 TABLET | Refills: 3 | Status: SHIPPED | OUTPATIENT
Start: 2019-07-02 | End: 2019-08-01

## 2019-07-18 ENCOUNTER — CLINICAL SUPPORT (OUTPATIENT)
Dept: CARDIOLOGY | Facility: CLINIC | Age: 74
End: 2019-07-18
Attending: INTERNAL MEDICINE
Payer: MEDICARE

## 2019-07-18 ENCOUNTER — PATIENT MESSAGE (OUTPATIENT)
Dept: CARDIOLOGY | Facility: CLINIC | Age: 74
End: 2019-07-18

## 2019-07-18 ENCOUNTER — LAB VISIT (OUTPATIENT)
Dept: LAB | Facility: HOSPITAL | Age: 74
End: 2019-07-18
Attending: INTERNAL MEDICINE
Payer: MEDICARE

## 2019-07-18 DIAGNOSIS — R06.09 DYSPNEA ON EXERTION: ICD-10-CM

## 2019-07-18 DIAGNOSIS — I25.10 CORONARY ARTERY DISEASE INVOLVING NATIVE CORONARY ARTERY OF NATIVE HEART WITHOUT ANGINA PECTORIS: ICD-10-CM

## 2019-07-18 DIAGNOSIS — I10 ESSENTIAL HYPERTENSION: ICD-10-CM

## 2019-07-18 DIAGNOSIS — E78.2 MIXED HYPERLIPIDEMIA: ICD-10-CM

## 2019-07-18 DIAGNOSIS — I73.9 CLAUDICATION: ICD-10-CM

## 2019-07-18 DIAGNOSIS — Z78.9 STATIN INTOLERANCE: ICD-10-CM

## 2019-07-18 LAB
ALBUMIN SERPL BCP-MCNC: 3.6 G/DL (ref 3.5–5.2)
ALP SERPL-CCNC: 86 U/L (ref 55–135)
ALT SERPL W/O P-5'-P-CCNC: 23 U/L (ref 10–44)
ANION GAP SERPL CALC-SCNC: 9 MMOL/L (ref 8–16)
AST SERPL-CCNC: 30 U/L (ref 10–40)
BASOPHILS # BLD AUTO: 0.03 K/UL (ref 0–0.2)
BASOPHILS NFR BLD: 0.5 % (ref 0–1.9)
BILIRUB SERPL-MCNC: 0.6 MG/DL (ref 0.1–1)
BUN SERPL-MCNC: 17 MG/DL (ref 8–23)
CALCIUM SERPL-MCNC: 9.8 MG/DL (ref 8.7–10.5)
CHLORIDE SERPL-SCNC: 106 MMOL/L (ref 95–110)
CHOLEST SERPL-MCNC: 158 MG/DL (ref 120–199)
CHOLEST/HDLC SERPL: 2.9 {RATIO} (ref 2–5)
CO2 SERPL-SCNC: 29 MMOL/L (ref 23–29)
CREAT SERPL-MCNC: 1.1 MG/DL (ref 0.5–1.4)
DIFFERENTIAL METHOD: ABNORMAL
EOSINOPHIL # BLD AUTO: 0.1 K/UL (ref 0–0.5)
EOSINOPHIL NFR BLD: 2.1 % (ref 0–8)
ERYTHROCYTE [DISTWIDTH] IN BLOOD BY AUTOMATED COUNT: 12.5 % (ref 11.5–14.5)
EST. GFR  (AFRICAN AMERICAN): >60 ML/MIN/1.73 M^2
EST. GFR  (NON AFRICAN AMERICAN): >60 ML/MIN/1.73 M^2
GLUCOSE SERPL-MCNC: 88 MG/DL (ref 70–110)
HCT VFR BLD AUTO: 48.1 % (ref 40–54)
HDLC SERPL-MCNC: 55 MG/DL (ref 40–75)
HDLC SERPL: 34.8 % (ref 20–50)
HGB BLD-MCNC: 15.1 G/DL (ref 14–18)
IMM GRANULOCYTES # BLD AUTO: 0.01 K/UL (ref 0–0.04)
IMM GRANULOCYTES NFR BLD AUTO: 0.2 % (ref 0–0.5)
LDLC SERPL CALC-MCNC: 80.8 MG/DL (ref 63–159)
LYMPHOCYTES # BLD AUTO: 1.9 K/UL (ref 1–4.8)
LYMPHOCYTES NFR BLD: 31.3 % (ref 18–48)
MCH RBC QN AUTO: 31.5 PG (ref 27–31)
MCHC RBC AUTO-ENTMCNC: 31.4 G/DL (ref 32–36)
MCV RBC AUTO: 100 FL (ref 82–98)
MONOCYTES # BLD AUTO: 0.7 K/UL (ref 0.3–1)
MONOCYTES NFR BLD: 11.4 % (ref 4–15)
NEUTROPHILS # BLD AUTO: 3.4 K/UL (ref 1.8–7.7)
NEUTROPHILS NFR BLD: 54.5 % (ref 38–73)
NONHDLC SERPL-MCNC: 103 MG/DL
NRBC BLD-RTO: 0 /100 WBC
PLATELET # BLD AUTO: 164 K/UL (ref 150–350)
PMV BLD AUTO: 10.4 FL (ref 9.2–12.9)
POTASSIUM SERPL-SCNC: 4.7 MMOL/L (ref 3.5–5.1)
PROT SERPL-MCNC: 7.1 G/DL (ref 6–8.4)
RBC # BLD AUTO: 4.79 M/UL (ref 4.6–6.2)
SODIUM SERPL-SCNC: 144 MMOL/L (ref 136–145)
TRIGL SERPL-MCNC: 111 MG/DL (ref 30–150)
TSH SERPL DL<=0.005 MIU/L-ACNC: 2.15 UIU/ML (ref 0.4–4)
WBC # BLD AUTO: 6.14 K/UL (ref 3.9–12.7)

## 2019-07-18 PROCEDURE — 93922 CV US ANKLE BRACHIAL INDICES RESTING (CUPID ONLY): ICD-10-PCS | Mod: S$GLB,,, | Performed by: INTERNAL MEDICINE

## 2019-07-18 PROCEDURE — 93922 UPR/L XTREMITY ART 2 LEVELS: CPT | Mod: S$GLB,,, | Performed by: INTERNAL MEDICINE

## 2019-07-18 PROCEDURE — 36415 COLL VENOUS BLD VENIPUNCTURE: CPT

## 2019-07-18 PROCEDURE — 80061 LIPID PANEL: CPT

## 2019-07-18 PROCEDURE — 85025 COMPLETE CBC W/AUTO DIFF WBC: CPT

## 2019-07-18 PROCEDURE — 80053 COMPREHEN METABOLIC PANEL: CPT

## 2019-07-18 PROCEDURE — 84443 ASSAY THYROID STIM HORMONE: CPT

## 2019-07-19 ENCOUNTER — PATIENT MESSAGE (OUTPATIENT)
Dept: CARDIOLOGY | Facility: CLINIC | Age: 74
End: 2019-07-19

## 2019-07-19 LAB
LEFT ABI: 1.09
LEFT ARM BP: 138 MMHG
LEFT DORSALIS PEDIS: 149 MMHG
LEFT POSTERIOR TIBIAL: 161 MMHG
RIGHT ABI: 1.09
RIGHT ARM BP: 148 MMHG
RIGHT DORSALIS PEDIS: 138 MMHG
RIGHT POSTERIOR TIBIAL: 162 MMHG

## 2019-07-22 ENCOUNTER — TELEPHONE (OUTPATIENT)
Dept: NEUROLOGY | Facility: CLINIC | Age: 74
End: 2019-07-22

## 2019-07-22 NOTE — TELEPHONE ENCOUNTER
----- Message from Brianna Somers sent at 7/22/2019 11:30 AM CDT -----  Contact: Eileen (wife) @ [phone]  Pts wife says she left a message last week to request a f/u appt with Dr Klein but has not heard from anyone yet.  Pls call.

## 2019-08-01 ENCOUNTER — PATIENT MESSAGE (OUTPATIENT)
Dept: UROLOGY | Facility: CLINIC | Age: 74
End: 2019-08-01

## 2019-08-01 ENCOUNTER — OFFICE VISIT (OUTPATIENT)
Dept: UROLOGY | Facility: CLINIC | Age: 74
End: 2019-08-01
Attending: UROLOGY
Payer: MEDICARE

## 2019-08-01 ENCOUNTER — HOSPITAL ENCOUNTER (OUTPATIENT)
Dept: RADIOLOGY | Facility: OTHER | Age: 74
Discharge: HOME | End: 2019-08-01
Attending: UROLOGY
Payer: MEDICARE

## 2019-08-01 VITALS
SYSTOLIC BLOOD PRESSURE: 167 MMHG | HEART RATE: 68 BPM | HEIGHT: 65 IN | WEIGHT: 156.06 LBS | BODY MASS INDEX: 26 KG/M2 | DIASTOLIC BLOOD PRESSURE: 95 MMHG

## 2019-08-01 DIAGNOSIS — N20.0 NEPHROLITHIASIS: ICD-10-CM

## 2019-08-01 DIAGNOSIS — R39.15 URGENCY OF URINATION: Primary | ICD-10-CM

## 2019-08-01 LAB
BILIRUB SERPL-MCNC: ABNORMAL MG/DL
BLOOD URINE, POC: ABNORMAL
COLOR, POC UA: ABNORMAL
GLUCOSE UR QL STRIP: NORMAL
KETONES UR QL STRIP: ABNORMAL
LEUKOCYTE ESTERASE URINE, POC: ABNORMAL
NITRITE, POC UA: ABNORMAL
PH, POC UA: 7
POC RESIDUAL URINE VOLUME: 52 ML (ref 0–100)
PROTEIN, POC: ABNORMAL
SPECIFIC GRAVITY, POC UA: 1
UROBILINOGEN, POC UA: NORMAL

## 2019-08-01 PROCEDURE — 51798 POCT BLADDER SCAN: ICD-10-PCS | Mod: S$GLB,,, | Performed by: UROLOGY

## 2019-08-01 PROCEDURE — 3077F PR MOST RECENT SYSTOLIC BLOOD PRESSURE >= 140 MM HG: ICD-10-PCS | Mod: CPTII,S$GLB,, | Performed by: UROLOGY

## 2019-08-01 PROCEDURE — 74018 RADEX ABDOMEN 1 VIEW: CPT | Mod: 26,,, | Performed by: RADIOLOGY

## 2019-08-01 PROCEDURE — 3080F DIAST BP >= 90 MM HG: CPT | Mod: CPTII,S$GLB,, | Performed by: UROLOGY

## 2019-08-01 PROCEDURE — 81002 URINALYSIS NONAUTO W/O SCOPE: CPT | Mod: S$GLB,,, | Performed by: UROLOGY

## 2019-08-01 PROCEDURE — 3080F PR MOST RECENT DIASTOLIC BLOOD PRESSURE >= 90 MM HG: ICD-10-PCS | Mod: CPTII,S$GLB,, | Performed by: UROLOGY

## 2019-08-01 PROCEDURE — 74018 RADEX ABDOMEN 1 VIEW: CPT | Mod: TC,FY

## 2019-08-01 PROCEDURE — 74018 XR ABDOMEN AP 1 VIEW: ICD-10-PCS | Mod: 26,,, | Performed by: RADIOLOGY

## 2019-08-01 PROCEDURE — 81002 POCT URINE DIPSTICK WITHOUT MICROSCOPE: ICD-10-PCS | Mod: S$GLB,,, | Performed by: UROLOGY

## 2019-08-01 PROCEDURE — 1101F PR PT FALLS ASSESS DOC 0-1 FALLS W/OUT INJ PAST YR: ICD-10-PCS | Mod: CPTII,S$GLB,, | Performed by: UROLOGY

## 2019-08-01 PROCEDURE — 1101F PT FALLS ASSESS-DOCD LE1/YR: CPT | Mod: CPTII,S$GLB,, | Performed by: UROLOGY

## 2019-08-01 PROCEDURE — 99214 PR OFFICE/OUTPT VISIT, EST, LEVL IV, 30-39 MIN: ICD-10-PCS | Mod: 25,S$GLB,, | Performed by: UROLOGY

## 2019-08-01 PROCEDURE — 99214 OFFICE O/P EST MOD 30 MIN: CPT | Mod: 25,S$GLB,, | Performed by: UROLOGY

## 2019-08-01 PROCEDURE — 51798 US URINE CAPACITY MEASURE: CPT | Mod: S$GLB,,, | Performed by: UROLOGY

## 2019-08-01 PROCEDURE — 3077F SYST BP >= 140 MM HG: CPT | Mod: CPTII,S$GLB,, | Performed by: UROLOGY

## 2019-08-01 NOTE — PROGRESS NOTES
"Subjective:      Hector Kellogg is a 74 y.o. male who returns today regarding his renal stones; also complains of LUTS.    He previously presented for gross hematuria in 12/2018. Cystoscopy was negative aside from showing bilobar hyperplasia of the prostate. CT urogram was negative other than nonobstructing stones in the left renal pelvis. Urine culture was negative. He presents today for follow up of stones. KUB shows persistent 4 mm stone in the approximate location of the left renal pelvis. There is what appears to be a pelvic phlebolith on the right. He has no left flank pain, only lower back pain on the right, radiating to the hip and down his right leg.     He does report LUTS. He complains of urgency with incontinence, as well as frequency, nocturia, with occasional incomplete emptying and weak stream. No gross hematuria or dysuria. AUA SS 9/3. His main complaint is urgency. He is not on any medications to help him urinate. He has Parkinson's disease. Prostate was noted to be enlarged on prior UT urogram.    The following portions of the patient's history were reviewed and updated as appropriate: allergies, current medications, past family history, past medical history, past social history, past surgical history and problem list.    Review of Systems  A comprehensive multipoint review of systems was negative except as otherwise stated in the HPI.     Objective:   Vitals: BP (!) 167/95 (BP Location: Left arm, Patient Position: Sitting, BP Method: Large (Automatic))   Pulse 68   Ht 5' 5" (1.651 m)   Wt 70.8 kg (156 lb 1.4 oz)   BMI 25.97 kg/m²     Physical Exam   General: alert and oriented, no acute distress  Respiratory: Symmetric expansion, non-labored breathing  Cardiovascular: regular rate and rhythm  Abdomen: soft, non distended  Genitourinary: no penile lesions or discharge, no testicular masses, normal scrotum  Rectal: the prostate is 50 gms and without nodules and normal rectal tone  Skin: " normal coloration and turgor, no rashes, no suspicious skin lesions noted  Neuro: resting tremor in bilateral upper extremities  Psych: normal judgment and insight, normal mood/affect and non-anxious    PVR 52 cc    Lab Review   Urinalysis demonstrates positive for leukocytes, red blood cells  Lab Results   Component Value Date    WBC 6.14 07/18/2019    HGB 15.1 07/18/2019    HCT 48.1 07/18/2019     (H) 07/18/2019     07/18/2019     Lab Results   Component Value Date    CREATININE 1.1 07/18/2019    BUN 17 07/18/2019     Imaging (all images personally reviewed; agree with report below)  Results for orders placed during the hospital encounter of 08/01/19   X-Ray Abdomen AP 1 View    Narrative EXAMINATION:  XR ABDOMEN AP 1 VIEW    CLINICAL HISTORY:  Calculus of kidney    TECHNIQUE:  AP View(s) of the abdomen was performed.    COMPARISON:  CT 12/27/2018    FINDINGS:  There is an approximately 4 mm calcific density overlying the left hemiabdomen which may represent the left renal calculus seen on prior CT.  No definitive calcific densities overlying the expected region of the right kidney.  No evidence of intestinal obstruction.  No free fluid or free gas.  No acute osseous findings.      Impression 4 mm calcification likely representing a left-sided nephrolith.      Electronically signed by: Desean Traore MD  Date:    08/01/2019  Time:    10:02       Assessment and Plan:   Urgency of urination  Nephrolithiasis    Monitor renal stones w/ KUB q 6 mos  Explained that urinary symptoms may be 2/2 Parkinson's or BPH (or both)  Will trial myrbetriq  FU 6 weeks

## 2019-08-06 RX ORDER — SOLIFENACIN SUCCINATE 10 MG/1
10 TABLET, FILM COATED ORAL DAILY
Qty: 30 TABLET | Refills: 11 | Status: SHIPPED | OUTPATIENT
Start: 2019-08-06 | End: 2020-01-10

## 2019-08-16 ENCOUNTER — PATIENT MESSAGE (OUTPATIENT)
Dept: NEUROLOGY | Facility: CLINIC | Age: 74
End: 2019-08-16

## 2019-08-16 DIAGNOSIS — G20.A1 PARKINSON DISEASE: ICD-10-CM

## 2019-08-16 DIAGNOSIS — M48.061 STENOSIS OF LATERAL RECESS OF LUMBAR SPINE: ICD-10-CM

## 2019-08-20 RX ORDER — GABAPENTIN 400 MG/1
400 CAPSULE ORAL 3 TIMES DAILY
Qty: 180 CAPSULE | Refills: 3 | Status: SHIPPED | OUTPATIENT
Start: 2019-08-20 | End: 2020-09-24

## 2019-09-05 ENCOUNTER — OFFICE VISIT (OUTPATIENT)
Dept: NEUROLOGY | Facility: CLINIC | Age: 74
End: 2019-09-05
Payer: MEDICARE

## 2019-09-05 VITALS
BODY MASS INDEX: 25.64 KG/M2 | DIASTOLIC BLOOD PRESSURE: 73 MMHG | WEIGHT: 153.88 LBS | HEART RATE: 75 BPM | SYSTOLIC BLOOD PRESSURE: 119 MMHG | HEIGHT: 65 IN

## 2019-09-05 DIAGNOSIS — J44.9 CHRONIC OBSTRUCTIVE PULMONARY DISEASE, UNSPECIFIED COPD TYPE: Primary | ICD-10-CM

## 2019-09-05 DIAGNOSIS — G20.A1 PARKINSON DISEASE: ICD-10-CM

## 2019-09-05 PROCEDURE — 3074F SYST BP LT 130 MM HG: CPT | Mod: CPTII,S$GLB,, | Performed by: PSYCHIATRY & NEUROLOGY

## 2019-09-05 PROCEDURE — 3078F PR MOST RECENT DIASTOLIC BLOOD PRESSURE < 80 MM HG: ICD-10-PCS | Mod: CPTII,S$GLB,, | Performed by: PSYCHIATRY & NEUROLOGY

## 2019-09-05 PROCEDURE — 3078F DIAST BP <80 MM HG: CPT | Mod: CPTII,S$GLB,, | Performed by: PSYCHIATRY & NEUROLOGY

## 2019-09-05 PROCEDURE — 99999 PR PBB SHADOW E&M-EST. PATIENT-LVL IV: CPT | Mod: PBBFAC,,, | Performed by: PSYCHIATRY & NEUROLOGY

## 2019-09-05 PROCEDURE — 99499 RISK ADDL DX/OHS AUDIT: ICD-10-PCS | Mod: S$GLB,,, | Performed by: PSYCHIATRY & NEUROLOGY

## 2019-09-05 PROCEDURE — 1101F PR PT FALLS ASSESS DOC 0-1 FALLS W/OUT INJ PAST YR: ICD-10-PCS | Mod: CPTII,S$GLB,, | Performed by: PSYCHIATRY & NEUROLOGY

## 2019-09-05 PROCEDURE — 99999 PR PBB SHADOW E&M-EST. PATIENT-LVL IV: ICD-10-PCS | Mod: PBBFAC,,, | Performed by: PSYCHIATRY & NEUROLOGY

## 2019-09-05 PROCEDURE — 1101F PT FALLS ASSESS-DOCD LE1/YR: CPT | Mod: CPTII,S$GLB,, | Performed by: PSYCHIATRY & NEUROLOGY

## 2019-09-05 PROCEDURE — 99499 UNLISTED E&M SERVICE: CPT | Mod: S$GLB,,, | Performed by: PSYCHIATRY & NEUROLOGY

## 2019-09-05 PROCEDURE — 99214 PR OFFICE/OUTPT VISIT, EST, LEVL IV, 30-39 MIN: ICD-10-PCS | Mod: S$GLB,,, | Performed by: PSYCHIATRY & NEUROLOGY

## 2019-09-05 PROCEDURE — 99214 OFFICE O/P EST MOD 30 MIN: CPT | Mod: S$GLB,,, | Performed by: PSYCHIATRY & NEUROLOGY

## 2019-09-05 PROCEDURE — 3074F PR MOST RECENT SYSTOLIC BLOOD PRESSURE < 130 MM HG: ICD-10-PCS | Mod: CPTII,S$GLB,, | Performed by: PSYCHIATRY & NEUROLOGY

## 2019-09-05 NOTE — PROGRESS NOTES
"Name: Hector Kellogg  MRN: [id number]   CSN: [id number]      Date: 09/05/2019    Chief Complaint / Interval History:  PD  - feels like he cannot do things  - not apathy, wants to work or do things  - lung issue are limiting him from COPD  - still taking cd/ld 25/100 1 TID-5x/day  - memory is ok  - appetite is good  - no constipation now  - bladder is quick    From Oct 2018:  - still getting pinched nerve pain in the back or legs  - will take an extra gabapentin  - takes cd/ld every 3-4 hours  - gait is stable    From Summer 2017:  - a bit more off time between dosing  - peak is pretty good  - gait is frustrating  - memory holding up  - family pleased    From 12/06  1) A bit more off time in between dosing  2) More cramping when off, gabapentin helps at night  3) Would be willing to take meds four times per day    History of Present Illness (HPI):  70 yo with diagnosis of PD, symptoms began 1.5 years ago.  First noted tremor in the L hand, "all left side".  Drags his left foot, generally slower overall.  Dx 1 year ago, had [employer] as a confirmatory study.  Says he had an MRI of the low back as well, was feeling "pinched nerves" in the back and into the both legs.  Was prescribed "some drug that cost $400" and didn;t take. Also prescribed pramipexole per the notes but also said he "never took it."    Was in [place] for 2 years, now re-establishing care here.    Has questions about gabapentin - 800 now taking 1/2 tab at night only and has no significant pain.  Will ibuprofen for breakthrough.  Not taking Norco.    Retired , [employer], left at age 65.    Nonmotor/Premotor ROS:  Hyposmia (HENT)?No per him  RBD/sleep issues (Constitutional)?Yes - acts out dreams  Depression/anxiety (Psychiatric)?No - better on Citalopram  Fatigue (Constitutional)?Yes  Constipation (GI)?Yes  - uses OTC stool softener  Urinary issues ()?Yes - urgency  Sexual dysfunction ()?Yes - some ED.    Orthostasis (Cardiovascular)?No  Leg " swelling (Cardiovascular)? No  Falls (Musculoskeletal)?No  Cognitive impairment (Neurologic)?No  Psychoses (Psychiatric)?No  Pain/Paresthesia (Neurologic)?Yes  Visual changes (Eyes)?No  Moles / skin changes (Skin)?Yes - seborrhea, has a scaly mole on the R side of the head  Stridor / SOB (Pulm)?Yes - with acticity  Bruising (Heme)?No    Past Medical History: The patient  has a past medical history of Cataract, Chronic back pain greater than 3 months duration, Coronary artery disease, Hyperlipidemia, Hypertension, MI, old (2006), and Parkinson disease.    Social History: The patient  reports that he quit smoking about 5 years ago. He has a 40.00 pack-year smoking history. He has never used smokeless tobacco. He reports that he drinks alcohol. He reports that he does not use drugs.  Rare social drink.    Family History: Their family history includes Cataracts in his father; Heart attack in his father; Heart disease in his maternal uncle and paternal uncle.    Allergies: Statins-hmg-coa reductase inhibitors     Meds:   Current Outpatient Medications on File Prior to Visit   Medication Sig Dispense Refill    aspirin (ECOTRIN) 81 MG EC tablet Take 81 mg by mouth once daily.      carbidopa-levodopa  mg (SINEMET)  mg per tablet Take 1 tablet by mouth 5 (five) times daily. 450 tablet 3    citalopram (CELEXA) 20 MG tablet Take 1 tablet (20 mg total) by mouth once daily. 30 tablet 11    gabapentin (NEURONTIN) 400 MG capsule Take 1 capsule (400 mg total) by mouth 3 (three) times daily. 180 capsule 3    metoprolol succinate (TOPROL-XL) 50 MG 24 hr tablet Take 1 tablet (50 mg total) by mouth once daily. 90 tablet 3    mometasone-formoterol (DULERA) 200-5 mcg/actuation inhaler Inhale 2 puffs into the lungs 2 (two) times daily. 1 Inhaler 6    multivitamin capsule Take 1 capsule by mouth once daily.      nitroGLYCERIN (NITROSTAT) 0.4 MG SL tablet Place 1 tablet (0.4 mg total) under the tongue every 5 (five)  "minutes as needed for Chest pain. 25 tablet 4    rosuvastatin (CRESTOR) 40 MG Tab Take 1 tablet (40 mg total) by mouth every evening. 90 tablet 3    loratadine (CLARITIN) 10 mg tablet Take 10 mg by mouth as needed for Allergies.      mirabegron (MYRBETRIQ) 50 mg Tb24 Take 1 tablet (50 mg total) by mouth once daily. 30 tablet 11    solifenacin (VESICARE) 10 MG tablet Take 1 tablet (10 mg total) by mouth once daily. 30 tablet 11    timolol maleate 0.5% (TIMOPTIC) 0.5 % Drop Place 1 drop into both eyes 2 (two) times daily. 5 mL 6     No current facility-administered medications on file prior to visit.      Exam:  /73   Pulse 75   Ht 5' 5" (1.651 m)   Wt 69.8 kg (153 lb 14.1 oz)   BMI 25.61 kg/m²     Constitutional  Well-developed, well-nourished, appears stated age   * Specialized movement exam  Mild hypophonic speech.    Mild facial masking.   L>R cogwheel rigidity     L>R bradykinesia - mild.   Only rare rest tremor in L hand   No other dystonia, chorea, athetosis, myoclonus, or tics.   No motor impersistence.   Normal-based gait.   + mildly abnormal arm swing B, worse on the L  Good stride length, no freezing   No postural instability.      Laboratory/Radiological:  - Results:  Office Visit on 08/01/2019   Component Date Value Ref Range Status    Color, UA 08/01/2019 Yel   Final    Spec Grav UA 08/01/2019 1.000   Final    pH, UA 08/01/2019 7   Final    WBC, UA 08/01/2019 Trace   Final    Nitrite, UA 08/01/2019 neg   Final    Protein 08/01/2019 Trace   Final    Glucose, UA 08/01/2019 normal   Final    Ketones, UA 08/01/2019 neg   Final    Urobilinogen, UA 08/01/2019 normal   Final    Bilirubin 08/01/2019 neg   Final    Blood, UA 08/01/2019 5-10   Final    POC Residual Urine Volume 08/01/2019 52  0 - 100 mL Final   Clinical Support on 07/18/2019   Component Date Value Ref Range Status    Left arm BP 07/18/2019 138  mmHg Final    Right arm BP 07/18/2019 148  mmHg Final    Left posterior " tibial 07/18/2019 161  mmHg Final    Right posterior tibial 07/18/2019 162  mmHg Final    Left dorsalis pedis 07/18/2019 149  mmHg Final    Right dorsalis pedis 07/18/2019 138  mmHg Final    Left ALFONSO 07/18/2019 1.09   Final    Right ALFONSO 07/18/2019 1.09   Final   Lab Visit on 07/18/2019   Component Date Value Ref Range Status    Sodium 07/18/2019 144  136 - 145 mmol/L Final    Potassium 07/18/2019 4.7  3.5 - 5.1 mmol/L Final    Chloride 07/18/2019 106  95 - 110 mmol/L Final    CO2 07/18/2019 29  23 - 29 mmol/L Final    Glucose 07/18/2019 88  70 - 110 mg/dL Final    BUN, Bld 07/18/2019 17  8 - 23 mg/dL Final    Creatinine 07/18/2019 1.1  0.5 - 1.4 mg/dL Final    Calcium 07/18/2019 9.8  8.7 - 10.5 mg/dL Final    Total Protein 07/18/2019 7.1  6.0 - 8.4 g/dL Final    Albumin 07/18/2019 3.6  3.5 - 5.2 g/dL Final    Total Bilirubin 07/18/2019 0.6  0.1 - 1.0 mg/dL Final    Alkaline Phosphatase 07/18/2019 86  55 - 135 U/L Final    AST 07/18/2019 30  10 - 40 U/L Final    ALT 07/18/2019 23  10 - 44 U/L Final    Anion Gap 07/18/2019 9  8 - 16 mmol/L Final    eGFR if African American 07/18/2019 >60.0  >60 mL/min/1.73 m^2 Final    eGFR if non African American 07/18/2019 >60.0  >60 mL/min/1.73 m^2 Final    Cholesterol 07/18/2019 158  120 - 199 mg/dL Final    Triglycerides 07/18/2019 111  30 - 150 mg/dL Final    HDL 07/18/2019 55  40 - 75 mg/dL Final    LDL Cholesterol 07/18/2019 80.8  63.0 - 159.0 mg/dL Final    Hdl/Cholesterol Ratio 07/18/2019 34.8  20.0 - 50.0 % Final    Total Cholesterol/HDL Ratio 07/18/2019 2.9  2.0 - 5.0 Final    Non-HDL Cholesterol 07/18/2019 103  mg/dL Final    WBC 07/18/2019 6.14  3.90 - 12.70 K/uL Final    RBC 07/18/2019 4.79  4.60 - 6.20 M/uL Final    Hemoglobin 07/18/2019 15.1  14.0 - 18.0 g/dL Final    Hematocrit 07/18/2019 48.1  40.0 - 54.0 % Final    Mean Corpuscular Volume 07/18/2019 100* 82 - 98 fL Final    Mean Corpuscular Hemoglobin 07/18/2019 31.5* 27.0 - 31.0  pg Final    Mean Corpuscular Hemoglobin Conc 07/18/2019 31.4* 32.0 - 36.0 g/dL Final    RDW 07/18/2019 12.5  11.5 - 14.5 % Final    Platelets 07/18/2019 164  150 - 350 K/uL Final    MPV 07/18/2019 10.4  9.2 - 12.9 fL Final    Immature Granulocytes 07/18/2019 0.2  0.0 - 0.5 % Final    Gran # (ANC) 07/18/2019 3.4  1.8 - 7.7 K/uL Final    Immature Grans (Abs) 07/18/2019 0.01  0.00 - 0.04 K/uL Final    Lymph # 07/18/2019 1.9  1.0 - 4.8 K/uL Final    Mono # 07/18/2019 0.7  0.3 - 1.0 K/uL Final    Eos # 07/18/2019 0.1  0.0 - 0.5 K/uL Final    Baso # 07/18/2019 0.03  0.00 - 0.20 K/uL Final    nRBC 07/18/2019 0  0 /100 WBC Final    Gran% 07/18/2019 54.5  38.0 - 73.0 % Final    Lymph% 07/18/2019 31.3  18.0 - 48.0 % Final    Mono% 07/18/2019 11.4  4.0 - 15.0 % Final    Eosinophil% 07/18/2019 2.1  0.0 - 8.0 % Final    Basophil% 07/18/2019 0.5  0.0 - 1.9 % Final    Differential Method 07/18/2019 Automated   Final    TSH 07/18/2019 2.152  0.400 - 4.000 uIU/mL Final     - Independent review of images: none available.    Reviewed records of:  1) Grade 1 anterolisthesis of spine  2) Positive SHAHRZAD scan    Diagnoses:          1) Idiopathic PD, tremor dominant.  On CD/LD.  2) Low back pain  3) SOB - 2/2 COPD    Medical Decision Making:  - boost the cd/ld 25/100 1.5 up to QID (9-1-5-9)  - continue to push exercise  -           Eliu Klein MD, MPH  Division of Movement and Memory Disorders  [facility]  [phone]

## 2019-12-16 DIAGNOSIS — M25.551 RIGHT HIP PAIN: Primary | ICD-10-CM

## 2019-12-19 ENCOUNTER — HOSPITAL ENCOUNTER (OUTPATIENT)
Dept: RADIOLOGY | Facility: HOSPITAL | Age: 74
Discharge: HOME | End: 2019-12-19
Attending: ORTHOPAEDIC SURGERY
Payer: MEDICARE

## 2019-12-19 ENCOUNTER — OFFICE VISIT (OUTPATIENT)
Dept: ORTHOPEDICS | Facility: CLINIC | Age: 74
End: 2019-12-19
Payer: MEDICARE

## 2019-12-19 VITALS
SYSTOLIC BLOOD PRESSURE: 119 MMHG | HEIGHT: 65 IN | WEIGHT: 153.88 LBS | BODY MASS INDEX: 25.64 KG/M2 | RESPIRATION RATE: 18 BRPM | HEART RATE: 77 BPM | TEMPERATURE: 98 F | DIASTOLIC BLOOD PRESSURE: 70 MMHG

## 2019-12-19 DIAGNOSIS — S76.011A MUSCLE STRAIN OF RIGHT GLUTEAL REGION, INITIAL ENCOUNTER: Primary | ICD-10-CM

## 2019-12-19 DIAGNOSIS — M62.9 HAMSTRING TIGHTNESS OF BOTH LOWER EXTREMITIES: ICD-10-CM

## 2019-12-19 DIAGNOSIS — M25.551 RIGHT HIP PAIN: ICD-10-CM

## 2019-12-19 PROCEDURE — 99204 PR OFFICE/OUTPT VISIT, NEW, LEVL IV, 45-59 MIN: ICD-10-PCS | Mod: S$GLB,,, | Performed by: ORTHOPAEDIC SURGERY

## 2019-12-19 PROCEDURE — 1159F PR MEDICATION LIST DOCUMENTED IN MEDICAL RECORD: ICD-10-PCS | Mod: S$GLB,,, | Performed by: ORTHOPAEDIC SURGERY

## 2019-12-19 PROCEDURE — 3078F PR MOST RECENT DIASTOLIC BLOOD PRESSURE < 80 MM HG: ICD-10-PCS | Mod: CPTII,S$GLB,, | Performed by: ORTHOPAEDIC SURGERY

## 2019-12-19 PROCEDURE — 73502 X-RAY EXAM HIP UNI 2-3 VIEWS: CPT | Mod: 26,RT,, | Performed by: RADIOLOGY

## 2019-12-19 PROCEDURE — 3074F SYST BP LT 130 MM HG: CPT | Mod: CPTII,S$GLB,, | Performed by: ORTHOPAEDIC SURGERY

## 2019-12-19 PROCEDURE — 99499 UNLISTED E&M SERVICE: CPT | Mod: S$GLB,,, | Performed by: ORTHOPAEDIC SURGERY

## 2019-12-19 PROCEDURE — 99999 PR PBB SHADOW E&M-EST. PATIENT-LVL III: ICD-10-PCS | Mod: PBBFAC,,, | Performed by: ORTHOPAEDIC SURGERY

## 2019-12-19 PROCEDURE — 1125F PR PAIN SEVERITY QUANTIFIED, PAIN PRESENT: ICD-10-PCS | Mod: S$GLB,,, | Performed by: ORTHOPAEDIC SURGERY

## 2019-12-19 PROCEDURE — 73502 X-RAY EXAM HIP UNI 2-3 VIEWS: CPT | Mod: TC,RT

## 2019-12-19 PROCEDURE — 99999 PR PBB SHADOW E&M-EST. PATIENT-LVL III: CPT | Mod: PBBFAC,,, | Performed by: ORTHOPAEDIC SURGERY

## 2019-12-19 PROCEDURE — 3078F DIAST BP <80 MM HG: CPT | Mod: CPTII,S$GLB,, | Performed by: ORTHOPAEDIC SURGERY

## 2019-12-19 PROCEDURE — 1101F PT FALLS ASSESS-DOCD LE1/YR: CPT | Mod: CPTII,S$GLB,, | Performed by: ORTHOPAEDIC SURGERY

## 2019-12-19 PROCEDURE — 99499 RISK ADDL DX/OHS AUDIT: ICD-10-PCS | Mod: S$GLB,,, | Performed by: ORTHOPAEDIC SURGERY

## 2019-12-19 PROCEDURE — 1101F PR PT FALLS ASSESS DOC 0-1 FALLS W/OUT INJ PAST YR: ICD-10-PCS | Mod: CPTII,S$GLB,, | Performed by: ORTHOPAEDIC SURGERY

## 2019-12-19 PROCEDURE — 1125F AMNT PAIN NOTED PAIN PRSNT: CPT | Mod: S$GLB,,, | Performed by: ORTHOPAEDIC SURGERY

## 2019-12-19 PROCEDURE — 3074F PR MOST RECENT SYSTOLIC BLOOD PRESSURE < 130 MM HG: ICD-10-PCS | Mod: CPTII,S$GLB,, | Performed by: ORTHOPAEDIC SURGERY

## 2019-12-19 PROCEDURE — 1159F MED LIST DOCD IN RCRD: CPT | Mod: S$GLB,,, | Performed by: ORTHOPAEDIC SURGERY

## 2019-12-19 PROCEDURE — 99204 OFFICE O/P NEW MOD 45 MIN: CPT | Mod: S$GLB,,, | Performed by: ORTHOPAEDIC SURGERY

## 2019-12-19 PROCEDURE — 73502 XR HIP 2 VIEW RIGHT: ICD-10-PCS | Mod: 26,RT,, | Performed by: RADIOLOGY

## 2019-12-19 NOTE — PROGRESS NOTES
Orthopaedic Surgery  H&P Note    eHctor Kellogg  12/19/2019     HPI: Hector Kellogg is a 74 y.o. male with a history of Parkinson's CAD and dyspnea on exertion who presents to clinic with a couple month history of right posterior buttock pain.  Patient states that he has pain walking which is alleviated when he lays down the patient typically ambulates without any assistance.  He denies any numbness or tingling.  His recent ABIs were within normal limits.  He has no groin pain has not had any recent falls.    Past Medical History:   Diagnosis Date    Cataract     Chronic back pain greater than 3 months duration     Coronary artery disease     Hyperlipidemia     Hypertension     MI, old 2006    Parkinson disease      Past Surgical History:   Procedure Laterality Date    CARDIAC CATHETERIZATION  2006    x 3    CARDIAC CATHETERIZATION  10/11/2015    x 2    CATARACT EXTRACTION W/  INTRAOCULAR LENS IMPLANT Right 03/06/2018    Dr. Liu    CORONARY ANGIOPLASTY  2006, 2015    3 stents    HEMORRHOID SURGERY       Family History   Problem Relation Age of Onset    Heart attack Father     Cataracts Father     Heart disease Maternal Uncle     Heart disease Paternal Uncle     Hypertension Neg Hx     Asthma Neg Hx     Emphysema Neg Hx     Amblyopia Neg Hx     Blindness Neg Hx     Macular degeneration Neg Hx     Retinal detachment Neg Hx     Strabismus Neg Hx      Social History     Socioeconomic History    Marital status:      Spouse name: Not on file    Number of children: Not on file    Years of education: Not on file    Highest education level: Not on file   Occupational History    Not on file   Social Needs    Financial resource strain: Not on file    Food insecurity:     Worry: Not on file     Inability: Not on file    Transportation needs:     Medical: Not on file     Non-medical: Not on file   Tobacco Use    Smoking status: Former Smoker     Packs/day: 1.00     Years:  40.00     Pack years: 40.00     Last attempt to quit: 2014     Years since quittin.8    Smokeless tobacco: Never Used   Substance and Sexual Activity    Alcohol use: Yes     Comment: socially    Drug use: No    Sexual activity: Not on file   Lifestyle    Physical activity:     Days per week: Not on file     Minutes per session: Not on file    Stress: Not on file   Relationships    Social connections:     Talks on phone: Not on file     Gets together: Not on file     Attends Anabaptist service: Not on file     Active member of club or organization: Not on file     Attends meetings of clubs or organizations: Not on file     Relationship status: Not on file   Other Topics Concern    Not on file   Social History Narrative    Not on file     Current Outpatient Medications on File Prior to Visit   Medication Sig    aspirin (ECOTRIN) 81 MG EC tablet Take 81 mg by mouth once daily.    carbidopa-levodopa  mg (SINEMET)  mg per tablet Take 1 tablet by mouth 5 (five) times daily.    citalopram (CELEXA) 20 MG tablet Take 1 tablet (20 mg total) by mouth once daily.    gabapentin (NEURONTIN) 400 MG capsule Take 1 capsule (400 mg total) by mouth 3 (three) times daily.    loratadine (CLARITIN) 10 mg tablet Take 10 mg by mouth as needed for Allergies.    metoprolol succinate (TOPROL-XL) 50 MG 24 hr tablet Take 1 tablet (50 mg total) by mouth once daily.    multivitamin capsule Take 1 capsule by mouth once daily.    rosuvastatin (CRESTOR) 40 MG Tab Take 1 tablet (40 mg total) by mouth every evening.    mirabegron (MYRBETRIQ) 50 mg Tb24 Take 1 tablet (50 mg total) by mouth once daily.    mometasone-formoterol (DULERA) 200-5 mcg/actuation inhaler Inhale 2 puffs into the lungs 2 (two) times daily.    nitroGLYCERIN (NITROSTAT) 0.4 MG SL tablet Place 1 tablet (0.4 mg total) under the tongue every 5 (five) minutes as needed for Chest pain.    solifenacin (VESICARE) 10 MG tablet Take 1 tablet (10 mg  total) by mouth once daily.    timolol maleate 0.5% (TIMOPTIC) 0.5 % Drop Place 1 drop into both eyes 2 (two) times daily.     No current facility-administered medications on file prior to visit.        Review of Systems:    Patient denies constitutional symptoms, cardiac symptoms, GI symptoms, psychiatric symptoms, endocrine related symptoms. He does get dyspnea on exertion.  The remainder of the musculoskeletal ROS is included in the HPI.    Physical Exam:    PE:    AA&O x 4.  NAD  HEENT:  NCAT, sclera nonicteric  Lungs:  Respirations are equal and unlabored.  CV:  2+ bilateral upper and lower extremity pulses.  Skin:  Intact throughout.    MSK:  HEENT: normocephalic, atraumatic  Resp: no increased work of breathing  CV: regular rate and rhythm  MSK: moves b/l upper extremities well    left upper and right upper extremity:  Skin intact  no ecchymoses, erythema, or swelling  Sensation intact in M/U/R nerve distributions  Motor intact AIN/PIN/U/R nerves  TTP none  2+ DR pulse    right lower extremity:  Skin intact  no ecchymoses, erythema, or swelling  Sensation intact SP/DP/T/Sural/Saphenous nerves  Motor intact EHL/FHL/TA/gastroc  There is mild tenderness to palpation the posterior buttocks area in the region of the short external rotators  Straight leg raise test is negative Stinchfield is also negative  Significantly tight gluteal musculature as well as significant tightness of the hamstrings and posterior chain  Palpable DP/PT pulses    He has a stooped shuffling gait secondary to Parkinson's.  He is consistently performing isometric muscle contractions to maintain this posterior.    Diagnostic Results: radiographs of the right hip show no acute fracture and minimal arthritic change    A/P:  74 y.o. male with posterior gluteal and hamstring tightness with a history of Parkinson's CAD and dyspnea on exertion.  - discussed with the patient the nature of his overall clinical picture with his Parkinson's he stands  in a slightly hunched position and puts excess strain on his low back and posterior gluteal muscles.  Discussed with him the importance of conditioning and stretching of the posterior chain of his lower extremities.  I went over a series of stretches with him to work on this and discussed posture with him as well. This will be more difficult given his Parkinson's.  He would like to think about beginning formal physical therapy and he will get back with us.  At present he gets short of breath very quickly and searching for a pulmonologist.  He does not feel that he would be able to meet the requirements of formal physical therapy at this point time due to his dyspnea.

## 2019-12-26 DIAGNOSIS — M48.061 STENOSIS OF LATERAL RECESS OF LUMBAR SPINE: ICD-10-CM

## 2019-12-26 DIAGNOSIS — G20.A1 PARKINSON DISEASE: ICD-10-CM

## 2019-12-27 RX ORDER — CARBIDOPA AND LEVODOPA 25; 100 MG/1; MG/1
1 TABLET ORAL
Qty: 450 TABLET | Refills: 3 | Status: CANCELLED | OUTPATIENT
Start: 2019-12-27

## 2019-12-27 NOTE — TELEPHONE ENCOUNTER
Carbidopa/levodopa refill called into [facility] pharmacy. # [phone] with 3 additional refills.

## 2020-01-03 ENCOUNTER — TELEPHONE (OUTPATIENT)
Dept: PULMONOLOGY | Facility: CLINIC | Age: 75
End: 2020-01-03

## 2020-01-03 DIAGNOSIS — J44.9 CHRONIC OBSTRUCTIVE PULMONARY DISEASE, UNSPECIFIED COPD TYPE: Primary | ICD-10-CM

## 2020-01-10 ENCOUNTER — HOSPITAL ENCOUNTER (OUTPATIENT)
Dept: PULMONOLOGY | Facility: CLINIC | Age: 75
Discharge: HOME | End: 2020-01-10
Payer: MEDICARE

## 2020-01-10 ENCOUNTER — OFFICE VISIT (OUTPATIENT)
Dept: PULMONOLOGY | Facility: CLINIC | Age: 75
End: 2020-01-10
Payer: MEDICARE

## 2020-01-10 VITALS
OXYGEN SATURATION: 96 % | BODY MASS INDEX: 25.64 KG/M2 | SYSTOLIC BLOOD PRESSURE: 140 MMHG | WEIGHT: 153.88 LBS | HEART RATE: 77 BPM | HEIGHT: 65 IN | DIASTOLIC BLOOD PRESSURE: 90 MMHG

## 2020-01-10 DIAGNOSIS — Z12.9 SCREENING FOR CANCER: ICD-10-CM

## 2020-01-10 DIAGNOSIS — J44.9 CHRONIC OBSTRUCTIVE PULMONARY DISEASE, UNSPECIFIED COPD TYPE: ICD-10-CM

## 2020-01-10 DIAGNOSIS — J44.9 CHRONIC OBSTRUCTIVE PULMONARY DISEASE, UNSPECIFIED COPD TYPE: Primary | ICD-10-CM

## 2020-01-10 LAB
DLCO ADJ PRE: 11.16 ML/(MIN*MMHG) (ref 13.56–27.42)
DLCO SINGLE BREATH LLN: 13.56
DLCO SINGLE BREATH PRE REF: 54.5 %
DLCO SINGLE BREATH REF: 20.49
DLCOC SBVA LLN: 2.17
DLCOC SBVA PRE REF: 98.5 %
DLCOC SBVA REF: 3.59
DLCOC SINGLE BREATH LLN: 13.56
DLCOC SINGLE BREATH PRE REF: 54.5 %
DLCOC SINGLE BREATH REF: 20.49
DLCOCSBVAULN: 5.01
DLCOCSINGLEBREATHULN: 27.42
DLCOSINGLEBREATHULN: 27.42
DLCOVA LLN: 2.17
DLCOVA PRE REF: 98.5 %
DLCOVA PRE: 3.54 ML/(MIN*MMHG*L) (ref 2.17–5.01)
DLCOVA REF: 3.59
DLCOVAULN: 5.01
DLVAADJ PRE: 3.54 ML/(MIN*MMHG*L) (ref 2.17–5.01)
FEF 25 75 LLN: 0.76
FEF 25 75 PRE REF: 21.3 %
FEF 25 75 REF: 1.87
FEV05 LLN: 0.78
FEV05 REF: 1.92
FEV1 FVC LLN: 62
FEV1 FVC PRE REF: 66.2 %
FEV1 FVC REF: 77
FEV1 LLN: 1.71
FEV1 PRE REF: 40.2 %
FEV1 REF: 2.41
FVC LLN: 2.31
FVC PRE REF: 60.6 %
FVC REF: 3.16
IVC PRE: 1.85 L (ref 2.31–4)
IVC SINGLE BREATH LLN: 2.31
IVC SINGLE BREATH PRE REF: 58.6 %
IVC SINGLE BREATH REF: 3.16
IVCSINGLEBREATHULN: 4
PEF LLN: 4.56
PEF PRE REF: 54.5 %
PEF REF: 6.44
PHYSICIAN COMMENT: ABNORMAL
PRE DLCO: 11.16 ML/(MIN*MMHG) (ref 13.56–27.42)
PRE FEF 25 75: 0.4 L/S (ref 0.76–2.97)
PRE FET 100: 6.66 SEC
PRE FEV05 REF: 35.3 %
PRE FEV1 FVC: 50.73 % (ref 62.3–90.88)
PRE FEV1: 0.97 L (ref 1.71–3.11)
PRE FEV5: 0.68 L (ref 0.78–3.05)
PRE FVC: 1.91 L (ref 2.31–4)
PRE PEF: 3.51 L/S (ref 4.56–8.32)
VA PRE: 3.16 L (ref 5.55–5.55)
VA SINGLE BREATH LLN: 5.55
VA SINGLE BREATH PRE REF: 56.8 %
VA SINGLE BREATH REF: 5.55
VASINGLEBREATHULN: 5.55

## 2020-01-10 PROCEDURE — 99999 PR PBB SHADOW E&M-EST. PATIENT-LVL III: CPT | Mod: PBBFAC,,, | Performed by: NURSE PRACTITIONER

## 2020-01-10 PROCEDURE — 99499 UNLISTED E&M SERVICE: CPT | Mod: S$GLB,,, | Performed by: NURSE PRACTITIONER

## 2020-01-10 PROCEDURE — 99214 PR OFFICE/OUTPT VISIT, EST, LEVL IV, 30-39 MIN: ICD-10-PCS | Mod: 25,S$GLB,, | Performed by: NURSE PRACTITIONER

## 2020-01-10 PROCEDURE — 94010 BREATHING CAPACITY TEST: ICD-10-PCS | Mod: S$GLB,,, | Performed by: INTERNAL MEDICINE

## 2020-01-10 PROCEDURE — 94729 DIFFUSING CAPACITY: CPT | Mod: S$GLB,,, | Performed by: INTERNAL MEDICINE

## 2020-01-10 PROCEDURE — 94729 PR C02/MEMBANE DIFFUSE CAPACITY: ICD-10-PCS | Mod: S$GLB,,, | Performed by: INTERNAL MEDICINE

## 2020-01-10 PROCEDURE — 94010 BREATHING CAPACITY TEST: CPT | Mod: S$GLB,,, | Performed by: INTERNAL MEDICINE

## 2020-01-10 PROCEDURE — 99999 PR PBB SHADOW E&M-EST. PATIENT-LVL III: ICD-10-PCS | Mod: PBBFAC,,, | Performed by: NURSE PRACTITIONER

## 2020-01-10 PROCEDURE — 99499 RISK ADDL DX/OHS AUDIT: ICD-10-PCS | Mod: S$GLB,,, | Performed by: NURSE PRACTITIONER

## 2020-01-10 PROCEDURE — 99214 OFFICE O/P EST MOD 30 MIN: CPT | Mod: 25,S$GLB,, | Performed by: NURSE PRACTITIONER

## 2020-01-10 NOTE — PROGRESS NOTES
Subjective:       Patient ID: Hector Kellogg is a 74 y.o. male.    Chief Complaint: Shortness of Breath    HPI:   Hector Kellogg is a 74 y.o. male with hx of parkinson, HTN, HLD, CAD and COPD  who presents with SOB. Has previously followed with Dr. Mendoza, pulmonary in 2017. Pt is new to me.     Since 2017. He reports COPD has been stable with use of Dulera. Over the last few months, he explains having an increase in shortness of breath. He wanted to follow up with pulmonary for COPD evaluation.     Patient  experiences chronic mMRC 3 symptoms of dyspnea. Notes to have mild chronic cough. Explains had 0 COPD exacerbations in the last one year; 0 hospitalizations for respiratory problems. Reports has not required increase in albuterol inhaler. Reports able to perform ADLs. Discloses has been on oxygen therapy in the past and does not want to use at th    He explains not on oxygen therapy and does not want to use at this time.     Former smoker quit in . Reports 40 pack year hx.     Review of Systems   Constitutional: Negative for fever, chills, weight loss and night sweats.   HENT: Negative for postnasal drip, rhinorrhea, trouble swallowing and congestion.    Respiratory: Positive for cough and shortness of breath. Negative for sputum production, choking, chest tightness, wheezing, dyspnea on extertion and use of rescue inhaler.    Cardiovascular: Negative for chest pain and leg swelling.   Musculoskeletal: Positive for gait problem (See HPI). Negative for joint swelling.   Skin: Negative for rash.   Gastrointestinal: Negative for acid reflux.   Neurological: Negative for dizziness and light-headedness.   Hematological: Negative for adenopathy.   Psychiatric/Behavioral: Negative for sleep disturbance.       Social History     Tobacco Use    Smoking status: Former Smoker     Packs/day: 1.00     Years: 40.00     Pack years: 40.00     Last attempt to quit: 2014     Years since quittin.9     Smokeless tobacco: Never Used   Substance Use Topics    Alcohol use: Yes     Comment: socially       Review of patient's allergies indicates:   Allergen Reactions    Statins-hmg-coa reductase inhibitors Other (See Comments)     Weakness and muscle aches     Past Medical History:   Diagnosis Date    Cataract     Chronic back pain greater than 3 months duration     Coronary artery disease     Hyperlipidemia     Hypertension     MI, old 2006    Parkinson disease      Past Surgical History:   Procedure Laterality Date    CARDIAC CATHETERIZATION  2006    x 3    CARDIAC CATHETERIZATION  10/11/2015    x 2    CATARACT EXTRACTION W/  INTRAOCULAR LENS IMPLANT Right 03/06/2018    Dr. Liu    CORONARY ANGIOPLASTY  2006, 2015    3 stents    HEMORRHOID SURGERY       Current Outpatient Medications on File Prior to Visit   Medication Sig    aspirin (ECOTRIN) 81 MG EC tablet Take 81 mg by mouth once daily.    carbidopa-levodopa  mg (SINEMET)  mg per tablet Take 1 tablet by mouth 5 (five) times daily.    citalopram (CELEXA) 20 MG tablet Take 1 tablet (20 mg total) by mouth once daily.    gabapentin (NEURONTIN) 400 MG capsule Take 1 capsule (400 mg total) by mouth 3 (three) times daily.    loratadine (CLARITIN) 10 mg tablet Take 10 mg by mouth as needed for Allergies.    metoprolol succinate (TOPROL-XL) 50 MG 24 hr tablet Take 1 tablet (50 mg total) by mouth once daily.    mirabegron (MYRBETRIQ) 50 mg Tb24 Take 1 tablet (50 mg total) by mouth once daily.    multivitamin capsule Take 1 capsule by mouth once daily.    rosuvastatin (CRESTOR) 40 MG Tab Take 1 tablet (40 mg total) by mouth every evening.    nitroGLYCERIN (NITROSTAT) 0.4 MG SL tablet Place 1 tablet (0.4 mg total) under the tongue every 5 (five) minutes as needed for Chest pain.    timolol maleate 0.5% (TIMOPTIC) 0.5 % Drop Place 1 drop into both eyes 2 (two) times daily.     No current facility-administered medications on file  "prior to visit.        Objective:       Vitals:    01/10/20 1317   BP: (!) 140/90   BP Location: Left arm   Patient Position: Sitting   Pulse: 77   SpO2: 96%   Weight: 69.8 kg (153 lb 14.1 oz)   Height: 5' 5" (1.651 m)       Physical Exam   Constitutional: He is oriented to person, place, and time. He appears well-developed and well-nourished. No distress.   HENT:   Head: Normocephalic.   Neck: Normal range of motion. Neck supple.   Cardiovascular: Normal rate, regular rhythm and normal heart sounds.   Pulmonary/Chest: Normal expansion, effort normal and breath sounds normal. No respiratory distress. He has no wheezes. He has no rhonchi.   Abdominal: Soft. Bowel sounds are normal. There is no tenderness.   Musculoskeletal: Normal range of motion. He exhibits no edema.   Lymphadenopathy: No supraclavicular adenopathy is present.     He has no cervical adenopathy.   Neurological: He is alert and oriented to person, place, and time. Gait abnormal.   Skin: Skin is warm and dry. Nails show no clubbing.   Psychiatric: He has a normal mood and affect. His behavior is normal.   Vitals reviewed.    Personal Diagnostic Review  Reviewed PFTs with patient in office today.       Assessment:     Problem List Items Addressed This Visit        Pulmonary    Chronic obstructive pulmonary disease - Primary    Overview     Spirometry shows obstruction with severe ventilatory impairment. This impairment may be due to the obstruction  alone but restrictive disease is not ruled out. DLCO is moderately decreased.         Current Assessment & Plan     Reports experiencing increase in SOB.     Plan:  -Prescribe Trelegy. Has severe ventilatory impairment and increase shortness of breath. He may benefit from triple therapy. I discussed side effects of medication including LAMA side effects. He verbalizes understanding and agrees to start medication.   -Continue Albuterol as needed.   -Discussed 6 MWT to qualify for oxygen- he reports does not " want oxygen and will hold off on 6 MWT to see if improvement with Trelegy.   -Reports UTD on vaccines; cannot take Flu vaccine as per medical hx.   -Need LDCT with smoking hx and quit < 15 years ago.   -Follow up in 3 month- would like to schedule in [facility].            Relevant Medications    fluticasone-umeclidin-vilanter (TRELEGY ELLIPTA) 100-62.5-25 mcg DsDv       Oncology    Screening for cancer    Current Assessment & Plan     Low Dose Screening CT Chest    Cigarettes - 40 pack yr hx  Still smoking -  NO  QUIT 2014    Date of last LD CT - none    I have discussed with pt about using a screening CT of the chest due to history of cigarette smoking.  We have discussed the possible findings and possible actions as a result of these findings.  The pt would like to proceed.           Relevant Medications    fluticasone-umeclidin-vilanter (TRELEGY ELLIPTA) 100-62.5-25 mcg DsDv    Other Relevant Orders    CT Chest Without Contrast      Follow up in 3 months -[place] location

## 2020-01-10 NOTE — LETTER
January 11, 2020      Eliu Klein MD  [address]  [facility] [address]           [facility]  [address]  [facility] [address]  Phone: [phone]          Patient: Hector Kellogg   MR Number: [id number]   YOB: 1945   Date of Visit: 1/10/2020       Dear Dr. Eliu Klein:    Thank you for referring Hector Kellogg to me for evaluation. Attached you will find relevant portions of my assessment and plan of care.    If you have questions, please do not hesitate to call me. I look forward to following Hector Kellogg along with you.    Sincerely,    Lisa Rosa, NP    Enclosure  CC:  No Recipients    If you would like to receive this communication electronically, please contact [email or web address] or [phone] to request more information on [employer] Link access.    For providers and/or their staff who would like to refer a patient to [facility], please contact us through our one-stop-shop provider referral line, [facility], at [phone].    If you feel you have received this communication in error or would no longer like to receive these types of communications, please e-mail [email or web address]

## 2020-01-11 PROBLEM — Z12.9 SCREENING FOR CANCER: Status: ACTIVE | Noted: 2020-01-11

## 2020-01-11 NOTE — ASSESSMENT & PLAN NOTE
Reports experiencing increase in SOB.     Plan:  -Prescribe Trelegy. Has severe ventilatory impairment and increase shortness of breath. He may benefit from triple therapy. I discussed side effects of medication including LAMA side effects. He verbalizes understanding and agrees to start medication.   -Continue Albuterol as needed.   -Discussed 6 MWT to qualify for oxygen- he reports does not want oxygen and will hold off on 6 MWT to see if improvement with Trelegy.   -Reports UTD on vaccines; cannot take Flu vaccine as per medical hx.   -Need LDCT with smoking hx and quit < 15 years ago.   -Follow up in 3 month- would like to schedule in [place] location.

## 2020-01-21 ENCOUNTER — HOSPITAL ENCOUNTER (OUTPATIENT)
Dept: RADIOLOGY | Facility: HOSPITAL | Age: 75
Discharge: HOME | End: 2020-01-21
Attending: NURSE PRACTITIONER
Payer: MEDICARE

## 2020-01-21 DIAGNOSIS — Z12.9 SCREENING FOR CANCER: ICD-10-CM

## 2020-01-21 PROCEDURE — 71250 CT THORAX DX C-: CPT | Mod: TC

## 2020-01-21 PROCEDURE — 71250 CT THORAX DX C-: CPT | Mod: 26,,, | Performed by: RADIOLOGY

## 2020-01-21 PROCEDURE — 71250 CT CHEST WITHOUT CONTRAST: ICD-10-PCS | Mod: 26,,, | Performed by: RADIOLOGY

## 2020-02-04 ENCOUNTER — PATIENT MESSAGE (OUTPATIENT)
Dept: NEUROLOGY | Facility: CLINIC | Age: 75
End: 2020-02-04

## 2020-02-05 DIAGNOSIS — R91.1 LUNG NODULE: ICD-10-CM

## 2020-02-05 NOTE — PROGRESS NOTES
Spoke with patient and discussed the CT results.  Told that he had a 0.6 cm lung nodule.  Will repeat  Ct follow-up  as per guidelines in 1 year.     Also discussed with patient having 1.8 cm thyroid nodule.  Will refer him to his primary care for further evaluation.    Patient explains doing well with trelegy inhaler states his breathing has significantly improved.

## 2020-02-06 ENCOUNTER — PATIENT MESSAGE (OUTPATIENT)
Dept: NEUROLOGY | Facility: CLINIC | Age: 75
End: 2020-02-06

## 2020-02-18 ENCOUNTER — TELEPHONE (OUTPATIENT)
Dept: NEUROLOGY | Facility: CLINIC | Age: 75
End: 2020-02-18

## 2020-02-18 NOTE — TELEPHONE ENCOUNTER
----- Message from Celeste Arreaga MA sent at 2/17/2020  5:36 PM CST -----  Contact: pt      ----- Message -----  From: Cristy Moore  Sent: 2/17/2020   1:41 PM CST  To: [facility] Now[facility] Staff    Pt would like to be called back regarding his back ang leg pain- pt states that the muscle relaxed is not working - he would like to be seen asap    Pt can be reached at [phone]

## 2020-02-19 ENCOUNTER — TELEPHONE (OUTPATIENT)
Dept: INTERNAL MEDICINE | Facility: CLINIC | Age: 75
End: 2020-02-19

## 2020-02-19 DIAGNOSIS — E04.1 THYROID NODULE: Primary | ICD-10-CM

## 2020-02-19 NOTE — TELEPHONE ENCOUNTER
----- Message from Lisa Rosa NP sent at 2/5/2020  3:04 PM CST -----  Panlobular emphysematous changes.  No mass, pneumothorax, or pleural fluid.  0.6 cm solid soft tissue nodule in the inferior lingular segment the left lower lobe (axial series 4, 350).  Additional scattered pulmonary micro nodules measuring between 0.1-0.3 cm    Incidental finding of 1.8 cm heterogenous nodule in the right lobe of the thyroid. Will refer patient to PCP for further evaluation.

## 2020-02-19 NOTE — TELEPHONE ENCOUNTER
Spoke with wife and patient on speaker phone.    Gave 's commetns and said someone will call to use ultrasound.    They expressed understanding.

## 2020-02-20 ENCOUNTER — HOSPITAL ENCOUNTER (OUTPATIENT)
Dept: RADIOLOGY | Facility: HOSPITAL | Age: 75
Discharge: HOME | End: 2020-02-20
Attending: INTERNAL MEDICINE
Payer: MEDICARE

## 2020-02-20 ENCOUNTER — PATIENT MESSAGE (OUTPATIENT)
Dept: NEUROLOGY | Facility: CLINIC | Age: 75
End: 2020-02-20

## 2020-02-20 DIAGNOSIS — E04.1 THYROID NODULE: ICD-10-CM

## 2020-02-20 PROCEDURE — 76536 US SOFT TISSUE HEAD NECK THYROID: ICD-10-PCS | Mod: 26,,, | Performed by: RADIOLOGY

## 2020-02-20 PROCEDURE — 76536 US EXAM OF HEAD AND NECK: CPT | Mod: 26,,, | Performed by: RADIOLOGY

## 2020-02-20 PROCEDURE — 76536 US EXAM OF HEAD AND NECK: CPT | Mod: TC

## 2020-02-22 ENCOUNTER — TELEPHONE (OUTPATIENT)
Dept: INTERNAL MEDICINE | Facility: CLINIC | Age: 75
End: 2020-02-22

## 2020-03-06 ENCOUNTER — OFFICE VISIT (OUTPATIENT)
Dept: NEUROLOGY | Facility: CLINIC | Age: 75
End: 2020-03-06
Payer: MEDICARE

## 2020-03-06 VITALS
BODY MASS INDEX: 25.49 KG/M2 | HEIGHT: 65 IN | DIASTOLIC BLOOD PRESSURE: 87 MMHG | WEIGHT: 153 LBS | HEART RATE: 81 BPM | SYSTOLIC BLOOD PRESSURE: 132 MMHG

## 2020-03-06 DIAGNOSIS — M48.061 STENOSIS OF LATERAL RECESS OF LUMBAR SPINE: Primary | ICD-10-CM

## 2020-03-06 DIAGNOSIS — G20.A1 PARKINSON DISEASE: ICD-10-CM

## 2020-03-06 PROCEDURE — 3075F PR MOST RECENT SYSTOLIC BLOOD PRESS GE 130-139MM HG: ICD-10-PCS | Mod: CPTII,S$GLB,, | Performed by: PSYCHIATRY & NEUROLOGY

## 2020-03-06 PROCEDURE — 99215 OFFICE O/P EST HI 40 MIN: CPT | Mod: S$GLB,,, | Performed by: PSYCHIATRY & NEUROLOGY

## 2020-03-06 PROCEDURE — 1159F PR MEDICATION LIST DOCUMENTED IN MEDICAL RECORD: ICD-10-PCS | Mod: S$GLB,,, | Performed by: PSYCHIATRY & NEUROLOGY

## 2020-03-06 PROCEDURE — 1101F PR PT FALLS ASSESS DOC 0-1 FALLS W/OUT INJ PAST YR: ICD-10-PCS | Mod: CPTII,S$GLB,, | Performed by: PSYCHIATRY & NEUROLOGY

## 2020-03-06 PROCEDURE — 3075F SYST BP GE 130 - 139MM HG: CPT | Mod: CPTII,S$GLB,, | Performed by: PSYCHIATRY & NEUROLOGY

## 2020-03-06 PROCEDURE — 1159F MED LIST DOCD IN RCRD: CPT | Mod: S$GLB,,, | Performed by: PSYCHIATRY & NEUROLOGY

## 2020-03-06 PROCEDURE — 1125F AMNT PAIN NOTED PAIN PRSNT: CPT | Mod: S$GLB,,, | Performed by: PSYCHIATRY & NEUROLOGY

## 2020-03-06 PROCEDURE — 1101F PT FALLS ASSESS-DOCD LE1/YR: CPT | Mod: CPTII,S$GLB,, | Performed by: PSYCHIATRY & NEUROLOGY

## 2020-03-06 PROCEDURE — 3079F PR MOST RECENT DIASTOLIC BLOOD PRESSURE 80-89 MM HG: ICD-10-PCS | Mod: CPTII,S$GLB,, | Performed by: PSYCHIATRY & NEUROLOGY

## 2020-03-06 PROCEDURE — 99215 PR OFFICE/OUTPT VISIT, EST, LEVL V, 40-54 MIN: ICD-10-PCS | Mod: S$GLB,,, | Performed by: PSYCHIATRY & NEUROLOGY

## 2020-03-06 PROCEDURE — 99999 PR PBB SHADOW E&M-EST. PATIENT-LVL III: CPT | Mod: PBBFAC,,, | Performed by: PSYCHIATRY & NEUROLOGY

## 2020-03-06 PROCEDURE — 1125F PR PAIN SEVERITY QUANTIFIED, PAIN PRESENT: ICD-10-PCS | Mod: S$GLB,,, | Performed by: PSYCHIATRY & NEUROLOGY

## 2020-03-06 PROCEDURE — 99999 PR PBB SHADOW E&M-EST. PATIENT-LVL III: ICD-10-PCS | Mod: PBBFAC,,, | Performed by: PSYCHIATRY & NEUROLOGY

## 2020-03-06 PROCEDURE — 3079F DIAST BP 80-89 MM HG: CPT | Mod: CPTII,S$GLB,, | Performed by: PSYCHIATRY & NEUROLOGY

## 2020-03-06 RX ORDER — LIDOCAINE 50 MG/G
1 PATCH TOPICAL DAILY
Qty: 30 PATCH | Refills: 5 | Status: SHIPPED | OUTPATIENT
Start: 2020-03-06 | End: 2020-06-08

## 2020-03-06 RX ORDER — CYCLOBENZAPRINE HCL 5 MG
1 TABLET ORAL 2 TIMES DAILY
COMMUNITY
Start: 2020-02-07 | End: 2020-06-08

## 2020-03-06 NOTE — PROGRESS NOTES
"Name: Hector Kellogg  MRN: [id number]   CSN: [id number]      Date: 03/06/2020    Chief Complaint / Interval History:  PD  - pain is the biggest issues now, worse with walking, better to sit or lie flat  - more lower back and into the R leg  - rare tingling of the leg and into the toes, toes will cramp  -  -  -  -  -    From Sept 2019:  - feels like he cannot do things  - not apathy, wants to work or do things  - lung issue are limiting him from COPD  - still taking cd/ld 25/100 1 TID-5x/day  - memory is ok  - appetite is good  - no constipation now  - bladder is quick    From Oct 2018:  - still getting pinched nerve pain in the back or legs  - will take an extra gabapentin  - takes cd/ld every 3-4 hours  - gait is stable    From Summer 2017:  - a bit more off time between dosing  - peak is pretty good  - gait is frustrating  - memory holding up  - family pleased    From 12/06  1) A bit more off time in between dosing  2) More cramping when off, gabapentin helps at night  3) Would be willing to take meds four times per day    History of Present Illness (HPI):  70 yo with diagnosis of PD, symptoms began 1.5 years ago.  First noted tremor in the L hand, "all left side".  Drags his left foot, generally slower overall.  Dx 1 year ago, had [employer] as a confirmatory study.  Says he had an MRI of the low back as well, was feeling "pinched nerves" in the back and into the both legs.  Was prescribed "some drug that cost $400" and didn;t take. Also prescribed pramipexole per the notes but also said he "never took it."    Was in [place] for 2 years, now re-establishing care here.    Has questions about gabapentin - 800 now taking 1/2 tab at night only and has no significant pain.  Will ibuprofen for breakthrough.  Not taking Norco.    Retired , [employer], left at age 65.    Nonmotor/Premotor ROS:  Hyposmia (HENT)?No per him  RBD/sleep issues (Constitutional)?Yes - acts out dreams  Depression/anxiety (Psychiatric)?No - " better on Citalopram  Fatigue (Constitutional)?Yes  Constipation (GI)?Yes  - uses OTC stool softener  Urinary issues ()?Yes - urgency  Sexual dysfunction ()?Yes - some ED.    Orthostasis (Cardiovascular)?No  Leg swelling (Cardiovascular)? No  Falls (Musculoskeletal)?No  Cognitive impairment (Neurologic)?No  Psychoses (Psychiatric)?No  Pain/Paresthesia (Neurologic)?Yes  Visual changes (Eyes)?No  Moles / skin changes (Skin)?Yes - seborrhea, has a scaly mole on the R side of the head  Stridor / SOB (Pulm)?Yes - with acticity  Bruising (Heme)?No    Past Medical History: The patient  has a past medical history of Cataract, Chronic back pain greater than 3 months duration, Coronary artery disease, Hyperlipidemia, Hypertension, MI, old (2006), and Parkinson disease.    Social History: The patient  reports that he quit smoking about 6 years ago. He has a 40.00 pack-year smoking history. He has never used smokeless tobacco. He reports that he drinks alcohol. He reports that he does not use drugs.  Rare social drink.    Family History: Their family history includes Cataracts in his father; Heart attack in his father; Heart disease in his maternal uncle and paternal uncle.    Allergies: Statins-hmg-coa reductase inhibitors     Meds:   Current Outpatient Medications on File Prior to Visit   Medication Sig Dispense Refill    aspirin (ECOTRIN) 81 MG EC tablet Take 81 mg by mouth once daily.      carbidopa-levodopa  mg (SINEMET)  mg per tablet Take 1 tablet by mouth 5 (five) times daily. 450 tablet 3    citalopram (CELEXA) 20 MG tablet Take 1 tablet (20 mg total) by mouth once daily. 30 tablet 11    fluticasone-umeclidin-vilanter (TRELEGY ELLIPTA) 100-62.5-25 mcg DsDv Inhale 1 puff into the lungs once daily. 60 each 6    gabapentin (NEURONTIN) 400 MG capsule Take 1 capsule (400 mg total) by mouth 3 (three) times daily. 180 capsule 3    loratadine (CLARITIN) 10 mg tablet Take 10 mg by mouth as needed for  "Allergies.      metoprolol succinate (TOPROL-XL) 50 MG 24 hr tablet Take 1 tablet (50 mg total) by mouth once daily. 90 tablet 3    multivitamin capsule Take 1 capsule by mouth once daily.      rosuvastatin (CRESTOR) 40 MG Tab Take 1 tablet (40 mg total) by mouth every evening. 90 tablet 3    cyclobenzaprine (FLEXERIL) 5 MG tablet Take 1 tablet by mouth 2 (two) times daily.      mirabegron (MYRBETRIQ) 50 mg Tb24 Take 1 tablet (50 mg total) by mouth once daily. 30 tablet 11    nitroGLYCERIN (NITROSTAT) 0.4 MG SL tablet Place 1 tablet (0.4 mg total) under the tongue every 5 (five) minutes as needed for Chest pain. 25 tablet 4    timolol maleate 0.5% (TIMOPTIC) 0.5 % Drop Place 1 drop into both eyes 2 (two) times daily. 5 mL 6     No current facility-administered medications on file prior to visit.      Exam:  /87   Pulse 81   Ht 5' 5" (1.651 m)   Wt 69.4 kg (153 lb)   BMI 25.46 kg/m²     Constitutional  Well-developed, well-nourished, appears stated age   * Specialized movement exam  Mild hypophonic speech.    Mild facial masking.   Automatism of the left arm   L>R cogwheel rigidity - mild     L>R bradykinesia - mild.   Only rare rest tremor in L hand   No other dystonia, chorea, athetosis, myoclonus, or tics.   No motor impersistence.   Normal-based gait.   + mildly abnormal arm swing B, worse on the L  Good stride length, no freezing   No postural instability.      Laboratory/Radiological:  - Results:  Hospital Outpatient Visit on 01/10/2020   Component Date Value Ref Range Status    Physician Comment 01/10/2020    Final                    Value:Spirometry shows obstruction with severe ventilatory impairment.  This impairment may be due to the obstruction alone but restrictive disease is not ruled out. DLCO is moderately decreased.     Notes:  No recent hemoglobin value available.  DLCO interpretation assumes a normal hemoglobin value. Consider lung volume determination to help explain the etiology " for the reduced FVC (restriction and/or air trapping).       Pre FVC 01/10/2020 1.91* 2.31 - 4.00 L Final    PRE FEV5 01/10/2020 0.68* 0.78 - 3.05 L Final    Pre FEV1 01/10/2020 0.97* 1.71 - 3.11 L Final    Pre FEV1 FVC 01/10/2020 50.73* 62.30 - 90.88 % Final    Pre FEF 25 75 01/10/2020 0.40* 0.76 - 2.97 L/s Final    Pre PEF 01/10/2020 3.51* 4.56 - 8.32 L/s Final    Pre  01/10/2020 6.66  sec Final    Pre DLCO 01/10/2020 11.16* 13.56 - 27.42 ml/(min*mmHg) Final    DLCO ADJ PRE 01/10/2020 11.16* 13.56 - 27.42 ml/(min*mmHg) Final    DLCOVA PRE 01/10/2020 3.54  2.17 - 5.01 ml/(min*mmHg*L) Final    DLVAAdj PRE 01/10/2020 3.54  2.17 - 5.01 ml/(min*mmHg*L) Final    VA PRE 01/10/2020 3.16* 5.55 - 5.55 L Final    IVC PRE 01/10/2020 1.85* 2.31 - 4.00 L Final    FVC Ref 01/10/2020 3.16   Final    FVC LLN 01/10/2020 2.31   Final    FVC Pre Ref 01/10/2020 60.6  % Final    FEV05 REF 01/10/2020 1.92   Final    FEV05 LLN 01/10/2020 0.78   Final    PRE FEV05 REF 01/10/2020 35.3  % Final    FEV1 Ref 01/10/2020 2.41   Final    FEV1 LLN 01/10/2020 1.71   Final    FEV1 Pre Ref 01/10/2020 40.2  % Final    FEV1 FVC Ref 01/10/2020 77   Final    FEV1 FVC LLN 01/10/2020 62   Final    FEV1 FVC Pre Ref 01/10/2020 66.2  % Final    FEF 25 75 Ref 01/10/2020 1.87   Final    FEF 25 75 LLN 01/10/2020 0.76   Final    FEF 25 75 Pre Ref 01/10/2020 21.3  % Final    PEF Ref 01/10/2020 6.44   Final    PEF LLN 01/10/2020 4.56   Final    PEF Pre Ref 01/10/2020 54.5  % Final    DLCO Single Breath Ref 01/10/2020 20.49   Final    DLCO Single Breath LLN 01/10/2020 13.56   Final    DLCO SINGLEBREATH ULN 01/10/2020 27.42   Final    DLCO Single Breath Pre Ref 01/10/2020 54.5  % Final    DLCOc Single Breath Ref 01/10/2020 20.49   Final    DLCOc Single Breath LLN 01/10/2020 13.56   Final    DLCOC SINGLEBREATH ULN 01/10/2020 27.42   Final    DLCOc Single Breath Pre Ref 01/10/2020 54.5  % Final    DLCOVA Ref 01/10/2020  3.59   Final    DLCOVA LLN 01/10/2020 2.17   Final    DLCOVA ULN 01/10/2020 5.01   Final    DLCOVA Pre Ref 01/10/2020 98.5  % Final    DLCOc SBVA Ref 01/10/2020 3.59   Final    DLCOc SBVA LLN 01/10/2020 2.17   Final    DLCOCSBVA ULN 01/10/2020 5.01   Final    DLCOc SBVA Pre Ref 01/10/2020 98.5  % Final    VA Single Breath Ref 01/10/2020 5.55   Final    VA Single Breath LLN 01/10/2020 5.55   Final    VA SINGLEBREATH ULN 01/10/2020 5.55   Final    VA Single Breath Pre Ref 01/10/2020 56.8  % Final    IVC Single Breath Ref 01/10/2020 3.16   Final    IVC Single Breath LLN 01/10/2020 2.31   Final    IVC SINGLEBREATH ULN 01/10/2020 4.00   Final    IVC Single Breath Pre Ref 01/10/2020 58.6  % Final     - Independent review of images: none available.    Reviewed records of:  1) Grade 1 anterolisthesis of spine  2) Positive SHAHRZAD scan    Diagnoses:          1) Idiopathic PD, tremor dominant.  On CD/LD.  2) Low back pain --> now worse.  C/W LSS and sciatica    Medical Decision Making:  - continue the cd/ld 25/100 1.5 up to QID (9-1-5-9)  - MRI lumbar spine --> Back/Spine to follow  - continue to push exercise as tolerated          Eliu Klein MD, MPH  Division of Movement and Memory Disorders  [facility]  [phone]

## 2020-03-07 ENCOUNTER — PATIENT MESSAGE (OUTPATIENT)
Dept: NEUROLOGY | Facility: CLINIC | Age: 75
End: 2020-03-07

## 2020-03-09 ENCOUNTER — TELEPHONE (OUTPATIENT)
Dept: NEUROLOGY | Facility: CLINIC | Age: 75
End: 2020-03-09

## 2020-03-09 NOTE — TELEPHONE ENCOUNTER
----- Message from Reginald Mendoza sent at 3/9/2020  3:05 PM CDT -----  Contact: daughter @ [phone]  Asking to speak w/ staff, states PA is needed for lidocaine (LIDODERM) 5 %

## 2020-03-10 ENCOUNTER — TELEPHONE (OUTPATIENT)
Dept: NEUROLOGY | Facility: CLINIC | Age: 75
End: 2020-03-10

## 2020-03-10 NOTE — TELEPHONE ENCOUNTER
----- Message from Alanna Cobb sent at 3/10/2020 10:55 AM CDT -----  Contact: Brigitte with [employer].  [phone]   Brigitte is calling to get prior authorization  On ( Lidocaine Patch 5% )  Reference # [id number].  Please call Brigitte at [phone].  Thanks

## 2020-03-12 ENCOUNTER — HOSPITAL ENCOUNTER (OUTPATIENT)
Dept: RADIOLOGY | Facility: HOSPITAL | Age: 75
Discharge: HOME | End: 2020-03-12
Attending: PSYCHIATRY & NEUROLOGY
Payer: MEDICARE

## 2020-03-12 DIAGNOSIS — M48.061 STENOSIS OF LATERAL RECESS OF LUMBAR SPINE: ICD-10-CM

## 2020-03-12 DIAGNOSIS — G20.A1 PARKINSON DISEASE: ICD-10-CM

## 2020-03-12 PROCEDURE — 72148 MRI LUMBAR SPINE W/O DYE: CPT | Mod: TC

## 2020-03-12 PROCEDURE — 72148 MRI LUMBAR SPINE W/O DYE: CPT | Mod: 26,,, | Performed by: RADIOLOGY

## 2020-03-12 PROCEDURE — 72148 MRI LUMBAR SPINE WITHOUT CONTRAST: ICD-10-PCS | Mod: 26,,, | Performed by: RADIOLOGY

## 2020-03-16 ENCOUNTER — TELEPHONE (OUTPATIENT)
Dept: NEUROLOGY | Facility: CLINIC | Age: 75
End: 2020-03-16

## 2020-03-16 NOTE — TELEPHONE ENCOUNTER
----- Message from Susanne Menjivar MA sent at 3/16/2020 12:59 PM CDT -----  Contact: self/[phone]  Lab results . Needs more explanation.

## 2020-03-16 NOTE — TELEPHONE ENCOUNTER
----- Message from Karan Peterson sent at 3/16/2020  4:44 PM CDT -----  Contact: Eileen ( spouse ) @ [phone]  Caller calling to discuss the test results, antonella call

## 2020-03-23 ENCOUNTER — TELEPHONE (OUTPATIENT)
Dept: NEUROLOGY | Facility: CLINIC | Age: 75
End: 2020-03-23

## 2020-03-23 NOTE — TELEPHONE ENCOUNTER
----- Message from Reginald Mendoza sent at 3/23/2020  3:12 PM CDT -----  Contact: wife @ [phone]  Calling to speak w/ the doctor to confirm pt's next step in care

## 2020-03-30 ENCOUNTER — PATIENT MESSAGE (OUTPATIENT)
Dept: NEUROLOGY | Facility: CLINIC | Age: 75
End: 2020-03-30

## 2020-03-30 DIAGNOSIS — F41.1 GENERALIZED ANXIETY DISORDER: ICD-10-CM

## 2020-04-01 DIAGNOSIS — F41.1 GENERALIZED ANXIETY DISORDER: ICD-10-CM

## 2020-04-01 RX ORDER — CITALOPRAM 20 MG/1
20 TABLET, FILM COATED ORAL DAILY
Qty: 30 TABLET | Refills: 11 | Status: SHIPPED | OUTPATIENT
Start: 2020-04-01 | End: 2020-06-08

## 2020-04-06 RX ORDER — CITALOPRAM 20 MG/1
20 TABLET, FILM COATED ORAL DAILY
Qty: 30 TABLET | Refills: 10 | Status: SHIPPED | OUTPATIENT
Start: 2020-04-06 | End: 2021-04-01

## 2020-04-21 ENCOUNTER — PATIENT MESSAGE (OUTPATIENT)
Dept: NEUROLOGY | Facility: CLINIC | Age: 75
End: 2020-04-21

## 2020-05-07 ENCOUNTER — TELEPHONE (OUTPATIENT)
Dept: NEUROLOGY | Facility: CLINIC | Age: 75
End: 2020-05-07

## 2020-05-07 NOTE — TELEPHONE ENCOUNTER
----- Message from Reginald Mendoza sent at 5/7/2020  9:33 AM CDT -----  Pranav Torres,    Mrs. Kellogg called saying she's hasn't gotten a response back about MRI results that were completed on 03/12, ordered by Dr. Klein. Chart shows she called on 03/16 and 04/21. She's asking for a call back asap today, pls. She can be reached at [phone].

## 2020-05-08 ENCOUNTER — TELEPHONE (OUTPATIENT)
Dept: NEUROLOGY | Facility: CLINIC | Age: 75
End: 2020-05-08

## 2020-05-08 NOTE — TELEPHONE ENCOUNTER
----- Message from Lalitha Mack sent at 5/8/2020  1:16 PM CDT -----  Contact: Eileen (wife) @ [phone]   asking to speak with someone in Dr. Klein's office regarding the patient's condition, and wants to get results from the March MRI done, also needing to know the plan of care. Please call.

## 2020-05-11 ENCOUNTER — OFFICE VISIT (OUTPATIENT)
Dept: INTERNAL MEDICINE | Facility: CLINIC | Age: 75
End: 2020-05-11
Payer: MEDICARE

## 2020-05-11 VITALS
DIASTOLIC BLOOD PRESSURE: 60 MMHG | HEART RATE: 94 BPM | TEMPERATURE: 98 F | SYSTOLIC BLOOD PRESSURE: 124 MMHG | HEIGHT: 65 IN | RESPIRATION RATE: 16 BRPM | WEIGHT: 161.63 LBS | OXYGEN SATURATION: 95 % | BODY MASS INDEX: 26.93 KG/M2

## 2020-05-11 DIAGNOSIS — I73.9 CLAUDICATION: ICD-10-CM

## 2020-05-11 DIAGNOSIS — I27.20 PULMONARY HTN: ICD-10-CM

## 2020-05-11 DIAGNOSIS — M54.16 SPINAL STENOSIS OF LUMBAR REGION WITH RADICULOPATHY: Primary | ICD-10-CM

## 2020-05-11 DIAGNOSIS — M48.061 SPINAL STENOSIS OF LUMBAR REGION WITH RADICULOPATHY: Primary | ICD-10-CM

## 2020-05-11 PROCEDURE — 99999 PR PBB SHADOW E&M-EST. PATIENT-LVL III: ICD-10-PCS | Mod: PBBFAC,,, | Performed by: INTERNAL MEDICINE

## 2020-05-11 PROCEDURE — 1159F PR MEDICATION LIST DOCUMENTED IN MEDICAL RECORD: ICD-10-PCS | Mod: S$GLB,,, | Performed by: INTERNAL MEDICINE

## 2020-05-11 PROCEDURE — 99999 PR PBB SHADOW E&M-EST. PATIENT-LVL III: CPT | Mod: PBBFAC,,, | Performed by: INTERNAL MEDICINE

## 2020-05-11 PROCEDURE — 99499 RISK ADDL DX/OHS AUDIT: ICD-10-PCS | Mod: S$GLB,,, | Performed by: INTERNAL MEDICINE

## 2020-05-11 PROCEDURE — 1101F PT FALLS ASSESS-DOCD LE1/YR: CPT | Mod: CPTII,S$GLB,, | Performed by: INTERNAL MEDICINE

## 2020-05-11 PROCEDURE — 1125F AMNT PAIN NOTED PAIN PRSNT: CPT | Mod: S$GLB,,, | Performed by: INTERNAL MEDICINE

## 2020-05-11 PROCEDURE — 3078F PR MOST RECENT DIASTOLIC BLOOD PRESSURE < 80 MM HG: ICD-10-PCS | Mod: CPTII,S$GLB,, | Performed by: INTERNAL MEDICINE

## 2020-05-11 PROCEDURE — 3074F SYST BP LT 130 MM HG: CPT | Mod: CPTII,S$GLB,, | Performed by: INTERNAL MEDICINE

## 2020-05-11 PROCEDURE — 1101F PR PT FALLS ASSESS DOC 0-1 FALLS W/OUT INJ PAST YR: ICD-10-PCS | Mod: CPTII,S$GLB,, | Performed by: INTERNAL MEDICINE

## 2020-05-11 PROCEDURE — 3074F PR MOST RECENT SYSTOLIC BLOOD PRESSURE < 130 MM HG: ICD-10-PCS | Mod: CPTII,S$GLB,, | Performed by: INTERNAL MEDICINE

## 2020-05-11 PROCEDURE — 1159F MED LIST DOCD IN RCRD: CPT | Mod: S$GLB,,, | Performed by: INTERNAL MEDICINE

## 2020-05-11 PROCEDURE — 99203 OFFICE O/P NEW LOW 30 MIN: CPT | Mod: S$GLB,,, | Performed by: INTERNAL MEDICINE

## 2020-05-11 PROCEDURE — 99499 UNLISTED E&M SERVICE: CPT | Mod: S$GLB,,, | Performed by: INTERNAL MEDICINE

## 2020-05-11 PROCEDURE — 1125F PR PAIN SEVERITY QUANTIFIED, PAIN PRESENT: ICD-10-PCS | Mod: S$GLB,,, | Performed by: INTERNAL MEDICINE

## 2020-05-11 PROCEDURE — 3078F DIAST BP <80 MM HG: CPT | Mod: CPTII,S$GLB,, | Performed by: INTERNAL MEDICINE

## 2020-05-11 PROCEDURE — 99203 PR OFFICE/OUTPT VISIT, NEW, LEVL III, 30-44 MIN: ICD-10-PCS | Mod: S$GLB,,, | Performed by: INTERNAL MEDICINE

## 2020-05-11 RX ORDER — MELOXICAM 15 MG/1
15 TABLET ORAL DAILY
Qty: 30 TABLET | Refills: 3 | Status: SHIPPED | OUTPATIENT
Start: 2020-05-11 | End: 2020-06-08

## 2020-05-11 NOTE — PROGRESS NOTES
Subjective:       Patient ID: Hector Kellogg is a 75 y.o. male.    Chief Complaint: Back Pain (lower) and Leg Pain (right)    HPI   Pt with lumbar spinal stenosis, pulmonary HTN, claudication is here for f/u. He was seen by Neurology back in March and had a MRI of his lumbar spine which showed advance arthritis with neural foraminal narrowing and moderate spinal stenosis. He has continues to have B/L LBP which right sided leg pain.   Review of Systems   Constitutional: Negative for activity change, appetite change, chills, diaphoresis, fatigue, fever and unexpected weight change.   HENT: Negative for postnasal drip, rhinorrhea, sinus pressure, sneezing, sore throat, trouble swallowing and voice change.    Respiratory: Negative for cough, shortness of breath and wheezing.    Cardiovascular: Negative for chest pain, palpitations and leg swelling.   Gastrointestinal: Negative for abdominal pain, blood in stool, constipation, diarrhea, nausea and vomiting.   Genitourinary: Negative for dysuria and hematuria.   Musculoskeletal: Positive for arthralgias and back pain. Negative for myalgias.   Skin: Negative for rash and wound.   Allergic/Immunologic: Negative for environmental allergies and food allergies.   Neurological: Positive for numbness. Negative for weakness and headaches.   Hematological: Negative for adenopathy. Does not bruise/bleed easily.       Objective:      Physical Exam   Constitutional: He is oriented to person, place, and time. He appears well-developed and well-nourished. No distress.   HENT:   Head: Normocephalic and atraumatic.   Eyes: Pupils are equal, round, and reactive to light. Conjunctivae and EOM are normal. Right eye exhibits no discharge. Left eye exhibits no discharge. No scleral icterus.   Neck: Normal range of motion. Neck supple. No JVD present.   Cardiovascular: Normal rate, regular rhythm and normal heart sounds.   No murmur heard.  Pulmonary/Chest: Effort normal and breath sounds  normal. No respiratory distress. He has no wheezes. He has no rales.   Musculoskeletal: He exhibits no edema.        Lumbar back: He exhibits tenderness and pain.   Lymphadenopathy:     He has no cervical adenopathy.   Neurological: He is alert and oriented to person, place, and time.   Skin: Skin is warm and dry. No rash noted. He is not diaphoretic. No pallor.       Assessment:       1. Spinal stenosis of lumbar region with radiculopathy    2. Pulmonary HTN    3. Claudication        Plan:    1. Referral to Pain Management for evaluation        Increase Gabapentin 400 mg to TID, Rx Mobic 15 mg daily    2/3. Stable, will follow

## 2020-05-14 ENCOUNTER — PATIENT MESSAGE (OUTPATIENT)
Dept: PAIN MEDICINE | Facility: OTHER | Age: 75
End: 2020-05-14

## 2020-05-14 ENCOUNTER — OFFICE VISIT (OUTPATIENT)
Dept: PAIN MEDICINE | Facility: CLINIC | Age: 75
End: 2020-05-14
Attending: ANESTHESIOLOGY
Payer: MEDICARE

## 2020-05-14 VITALS
HEART RATE: 94 BPM | TEMPERATURE: 98 F | HEIGHT: 65 IN | SYSTOLIC BLOOD PRESSURE: 138 MMHG | WEIGHT: 161 LBS | BODY MASS INDEX: 26.82 KG/M2 | DIASTOLIC BLOOD PRESSURE: 77 MMHG | RESPIRATION RATE: 18 BRPM

## 2020-05-14 DIAGNOSIS — M47.9 OSTEOARTHRITIS OF SPINE, UNSPECIFIED SPINAL OSTEOARTHRITIS COMPLICATION STATUS, UNSPECIFIED SPINAL REGION: ICD-10-CM

## 2020-05-14 DIAGNOSIS — M47.819 SPONDYLOSIS WITHOUT MYELOPATHY: ICD-10-CM

## 2020-05-14 DIAGNOSIS — M51.36 DDD (DEGENERATIVE DISC DISEASE), LUMBAR: ICD-10-CM

## 2020-05-14 DIAGNOSIS — M46.1 SACROILIITIS: ICD-10-CM

## 2020-05-14 DIAGNOSIS — M54.15 RADICULOPATHY OF THORACOLUMBAR REGION: Primary | ICD-10-CM

## 2020-05-14 PROCEDURE — 1159F PR MEDICATION LIST DOCUMENTED IN MEDICAL RECORD: ICD-10-PCS | Mod: S$GLB,,, | Performed by: ANESTHESIOLOGY

## 2020-05-14 PROCEDURE — 1101F PR PT FALLS ASSESS DOC 0-1 FALLS W/OUT INJ PAST YR: ICD-10-PCS | Mod: CPTII,S$GLB,, | Performed by: ANESTHESIOLOGY

## 2020-05-14 PROCEDURE — 99999 PR PBB SHADOW E&M-EST. PATIENT-LVL IV: CPT | Mod: PBBFAC,,, | Performed by: ANESTHESIOLOGY

## 2020-05-14 PROCEDURE — 99204 OFFICE O/P NEW MOD 45 MIN: CPT | Mod: GC,S$GLB,, | Performed by: ANESTHESIOLOGY

## 2020-05-14 PROCEDURE — 1101F PT FALLS ASSESS-DOCD LE1/YR: CPT | Mod: CPTII,S$GLB,, | Performed by: ANESTHESIOLOGY

## 2020-05-14 PROCEDURE — 99499 UNLISTED E&M SERVICE: CPT | Mod: S$GLB,,, | Performed by: ANESTHESIOLOGY

## 2020-05-14 PROCEDURE — 1125F PR PAIN SEVERITY QUANTIFIED, PAIN PRESENT: ICD-10-PCS | Mod: S$GLB,,, | Performed by: ANESTHESIOLOGY

## 2020-05-14 PROCEDURE — 3078F PR MOST RECENT DIASTOLIC BLOOD PRESSURE < 80 MM HG: ICD-10-PCS | Mod: CPTII,S$GLB,, | Performed by: ANESTHESIOLOGY

## 2020-05-14 PROCEDURE — 3075F SYST BP GE 130 - 139MM HG: CPT | Mod: CPTII,S$GLB,, | Performed by: ANESTHESIOLOGY

## 2020-05-14 PROCEDURE — 1125F AMNT PAIN NOTED PAIN PRSNT: CPT | Mod: S$GLB,,, | Performed by: ANESTHESIOLOGY

## 2020-05-14 PROCEDURE — 3078F DIAST BP <80 MM HG: CPT | Mod: CPTII,S$GLB,, | Performed by: ANESTHESIOLOGY

## 2020-05-14 PROCEDURE — 3075F PR MOST RECENT SYSTOLIC BLOOD PRESS GE 130-139MM HG: ICD-10-PCS | Mod: CPTII,S$GLB,, | Performed by: ANESTHESIOLOGY

## 2020-05-14 PROCEDURE — 1159F MED LIST DOCD IN RCRD: CPT | Mod: S$GLB,,, | Performed by: ANESTHESIOLOGY

## 2020-05-14 PROCEDURE — 99499 RISK ADDL DX/OHS AUDIT: ICD-10-PCS | Mod: S$GLB,,, | Performed by: ANESTHESIOLOGY

## 2020-05-14 PROCEDURE — 99999 PR PBB SHADOW E&M-EST. PATIENT-LVL IV: ICD-10-PCS | Mod: PBBFAC,,, | Performed by: ANESTHESIOLOGY

## 2020-05-14 PROCEDURE — 99204 PR OFFICE/OUTPT VISIT, NEW, LEVL IV, 45-59 MIN: ICD-10-PCS | Mod: GC,S$GLB,, | Performed by: ANESTHESIOLOGY

## 2020-05-14 NOTE — LETTER
May 16, 2020      Blue Mcgill, DO  [address]  [place] [address]           [facility]  [address]  [place] [address]  Phone: [phone]  Fax: [phone]          Patient: Hector Kellogg   MR Number: [id number]   YOB: 1945   Date of Visit: 5/14/2020       Dear Dr. Blue Mcgill:    Thank you for referring Hector Kellogg to me for evaluation. Attached you will find relevant portions of my assessment and plan of care.    If you have questions, please do not hesitate to call me. I look forward to following Hector Kellogg along with you.    Sincerely,    Ramiro Calvillo MD    Enclosure  CC:  No Recipients    If you would like to receive this communication electronically, please contact [email or web address] or [phone] to request more information on [email or web address] Link access.    For providers and/or their staff who would like to refer a patient to [facility], please contact us through our one-stop-shop provider referral line, [facility], at [phone].    If you feel you have received this communication in error or would no longer like to receive these types of communications, please e-mail [email or web address]

## 2020-05-14 NOTE — PROGRESS NOTES
Chronic Pain - New Consult    Referring Physician: Blue Mcgill, *    Chief Complaint: Right Lower Back Pain       SUBJECTIVE:    Hector Kellogg presents to the clinic for the evaluation of right lower back buttock pain. The pain started 5-10 years ago following falling off scaffold at work. Since then, symptoms have been worsening.  The pain is located in the right buttock area and radiates to the down right leg to knee.  The pain is described as sharp and is rated as 4/10. The pain is rated with a score of  4/10 on the BEST day and a score of 9/10 on the WORST day.   Symptoms interfere with daily activity.   Has occasional paresthesias and numbness to RLE all the down the leg.   The pain is exacerbated by Standing, Walking and Morning.    The pain is mitigated by heat, laying down and rest.   He reports spending 2 hours per day reclining. The patient reports 4 hours of uninterrupted sleep per night.    Patient denies night fever/night sweats, urinary incontinence, bowel incontinence, significant weight loss, significant motor weakness and loss of sensations.    Interval History 05/14/2020:  74 y/o M with hx of CAD s/p PCI (2008 and 2017), mild Pulm HTN, Essential HTN, Parkinson's Disease and COPD( Former smoker x 40 years).  On ASA 81 mg for CAD. Formerly on Plavix for DAPT  On Medical Therapy with Gabapentin without any side effects.   Started Meloxican this week and acknowledges being effective.     Has not had evaluation from Orthopaedics / Neurosurgery  No procedure hx for current symptoms     Physical Therapy/Home Exercise: No     Pain Disability Index Review:  Last 3 PDI Scores 5/14/2020   Pain Disability Index (PDI) 56       Pain Medications:    Gabapentin 1200 mg TID  Meloxican 15 mg    Pain Procedures: none    Imaging:   3/12/20 MRI Lumbar Spine  Narrative     EXAMINATION:  MRI LUMBAR SPINE WITHOUT CONTRAST    CLINICAL HISTORY:  Low back pain, >6wks conservative tx, persistent-progressive  sx, surgical candidate; Spinal stenosis, lumbar region without neurogenic claudication    TECHNIQUE:  Multiplanar, multisequence MR images were acquired from the thoracolumbar junction to the sacrum without contrast.    COMPARISON:  None available.    FINDINGS:  Alignment: Grade 1 anterolisthesis of L4-L5 and L5-S1.  Alignment of the lumbar spine is otherwise anatomic.    Vertebrae: Normal marrow signal. No fracture.  Bilateral L5 spondylolysis.    Discs: Scattered mild to moderate disc height loss of the lumbar spine.  Note made of increased STIR signal within the L2-L3 disc space.    Cord: Normal.  Conus terminates at L1.    Degenerative findings:    T12-L1: No spinal canal stenosis or neural foraminal narrowing.    L1-L2: Circumferential disc bulge with mild bilateral facet arthropathy.  No spinal canal stenosis or neural foraminal narrowing.    L2-L3: Circumferential disc bulge with mild bilateral facet arthropathy resulting in mild bilateral neural foraminal narrowing.  No spinal canal stenosis    L3-L4: Circumferential disc bulge with moderate bilateral facet arthropathy and ligamentum flavum hypertrophy results in mild spinal canal stenosis and mild bilateral neural foraminal narrowing.    L4-L5: Grade 1 anterolisthesis with diffuse disc bulge with severe bilateral facet arthropathy and ligamentum flavum hypertrophy results in moderate spinal canal stenosis and severe bilateral neural foraminal narrowing.    L5-S1: Circumferential disc bulge with moderate bilateral facet arthropathy resulting in moderate spinal canal stenosis and moderate bilateral neural foraminal narrowing.    Paraspinal muscles & soft tissues: Exophytic simple cysts in the left kidney.      Impression       1. Multilevel degenerative changes of the lumbar spine, most significant at the levels of L4-L5 and L5-S1.  At L4-L5 there is moderate spinal canal stenosis and severe bilateral neural foraminal narrowing.  L5-S1, there is moderate  spinal canal stenosis and moderate bilateral neural foraminal narrowing.  2. Bilateral spondylolysis at L5 grade 1 anterolisthesis of L5-S1.  3. Increased STIR signal within the L2-L3 disc space.  While this is favored to be degenerative, similar findings may be seen with early discitis.  Clinical correlation with lab parameters advised.         Past Medical History:   Diagnosis Date    Cataract     Chronic back pain greater than 3 months duration     Coronary artery disease     Hyperlipidemia     Hypertension     MI, old 2006    Parkinson disease      Past Surgical History:   Procedure Laterality Date    CARDIAC CATHETERIZATION  2006    x 3    CARDIAC CATHETERIZATION  10/11/2015    x 2    CATARACT EXTRACTION W/  INTRAOCULAR LENS IMPLANT Right 2018    Dr. Liu    CORONARY ANGIOPLASTY  ,     3 stents    HEMORRHOID SURGERY       Social History     Socioeconomic History    Marital status:      Spouse name: Not on file    Number of children: Not on file    Years of education: Not on file    Highest education level: Not on file   Occupational History    Not on file   Social Needs    Financial resource strain: Not hard at all    Food insecurity:     Worry: Never true     Inability: Never true    Transportation needs:     Medical: No     Non-medical: No   Tobacco Use    Smoking status: Former Smoker     Packs/day: 1.00     Years: 40.00     Pack years: 40.00     Last attempt to quit: 2014     Years since quittin.2    Smokeless tobacco: Never Used   Substance and Sexual Activity    Alcohol use: Yes     Frequency: Never     Binge frequency: Never     Comment: socially    Drug use: No    Sexual activity: Not on file   Lifestyle    Physical activity:     Days per week: 0 days     Minutes per session: Not on file    Stress: Only a little   Relationships    Social connections:     Talks on phone: Once a week     Gets together: Once a week     Attends [employer]  service: Not on file     Active member of club or organization: No     Attends meetings of clubs or organizations: Never     Relationship status:    Other Topics Concern    Not on file   Social History Narrative    Not on file     Family History   Problem Relation Age of Onset    Heart attack Father     Cataracts Father     Heart disease Maternal Uncle     Heart disease Paternal Uncle     Hypertension Neg Hx     Asthma Neg Hx     Emphysema Neg Hx     Amblyopia Neg Hx     Blindness Neg Hx     Macular degeneration Neg Hx     Retinal detachment Neg Hx     Strabismus Neg Hx        Review of patient's allergies indicates:   Allergen Reactions    Statins-hmg-coa reductase inhibitors Other (See Comments)     Weakness and muscle aches       Current Outpatient Medications   Medication Sig    aspirin (ECOTRIN) 81 MG EC tablet Take 81 mg by mouth once daily.    carbidopa-levodopa  mg (SINEMET)  mg per tablet Take 1 tablet by mouth 5 (five) times daily.    citalopram (CELEXA) 20 MG tablet Take 1 tablet (20 mg total) by mouth once daily.    citalopram (CELEXA) 20 MG tablet TAKE 1 TABLET (20 MG TOTAL) BY MOUTH ONCE DAILY.    cyclobenzaprine (FLEXERIL) 5 MG tablet Take 1 tablet by mouth 2 (two) times daily.    fluticasone-umeclidin-vilanter (TRELEGY ELLIPTA) 100-62.5-25 mcg DsDv Inhale 1 puff into the lungs once daily.    gabapentin (NEURONTIN) 400 MG capsule Take 1 capsule (400 mg total) by mouth 3 (three) times daily.    lidocaine (LIDODERM) 5 % Place 1 patch onto the skin once daily. Remove & Discard patch within 12 hours or as directed by MD    loratadine (CLARITIN) 10 mg tablet Take 10 mg by mouth as needed for Allergies.    meloxicam (MOBIC) 15 MG tablet Take 1 tablet (15 mg total) by mouth once daily.    metoprolol succinate (TOPROL-XL) 50 MG 24 hr tablet Take 1 tablet (50 mg total) by mouth once daily.    multivitamin capsule Take 1 capsule by mouth once daily.     "nitroGLYCERIN (NITROSTAT) 0.4 MG SL tablet Place 1 tablet (0.4 mg total) under the tongue every 5 (five) minutes as needed for Chest pain.    rosuvastatin (CRESTOR) 40 MG Tab Take 1 tablet (40 mg total) by mouth every evening.     No current facility-administered medications for this visit.        REVIEW OF SYSTEMS:    GENERAL:  No weight loss, malaise or fevers.  HEENT:  Negative for frequent or significant headaches.  NECK:  Negative for lumps, goiter, pain and significant neck swelling.  RESPIRATORY:  Negative for cough, wheezing or shortness of breath.  CARDIOVASCULAR:  Negative for chest pain, leg swelling or palpitations.  GI:  Negative for abdominal discomfort, blood in stools or black stools or change in bowel habits.  MUSCULOSKELETAL:  See HPI.  SKIN:  Negative for lesions, rash, and itching.  PSYCH:  Positive for sleep disturbance, mood disorder.  HEMATOLOGY/LYMPHOLOGY:  Negative for prolonged bleeding, bruising easily or swollen nodes.  NEURO:   UE tremors present  All other reviewed and negative other than HPI.    OBJECTIVE:    Resp 18   Ht 5' 5" (1.651 m)   BMI 26.89 kg/m²     PHYSICAL EXAMINATION:    General appearance: Well appearing, in no acute distress, alert and oriented x3.  Psych:  Mood and affect appropriate.  Skin: Skin color, texture, turgor normal, no rashes or lesions, in both upper and lower body.  Head/face:  Normocephalic, atraumatic. No palpable lymph nodes.  Neck: No pain to palpation over the cervical paraspinous muscles. Spurling Negative. No pain with neck flexion, extension, or lateral flexion.   Cor: RRR  Pulm: CTA  GI:  Soft and non-tender.  Back: Straight leg raising in the sitting and supine positions is negative to radicular pain. Moderate pain to palpation over the spine or costovertebral angles. Normal range of motion without pain reproduction. Positive axial loading test bilateral. Positive FABERE,Ganselin and Yeoman's test on the both side.negative FADIR  Extremities: " Peripheral joint ROM is full and pain free without obvious instability or laxity in all four extremities. No deformities, edema, or skin discoloration. Good capillary refill.  Musculoskeletal: Shoulder, hip, sacroiliac and knee provocative maneuvers are negative. Bilateral upper and lower extremity strength is normal and symmetric.    No paraspinal muscle tenderness. Point tenderness to Right GTB. Point tenderness at Right Gluteus. No atrophy or tone abnormalities are noted.  Neuro: Bilateral upper and lower extremity coordination and muscle stretch reflexes are physiologic and symmetric.  Plantar response are downgoing. No loss of sensation is noted. UE tremor is present  Gait: normal.    ASSESSMENT: 75 y.o. year old male with right lower back pain, consistent with l    1. Radiculopathy of thoracolumbar region  Ambulatory referral/consult to Pain Clinic    X-Ray Lumbar Complete With Flex And Ext    X-Ray Hips Bilateral 2 View Incl AP Pelvis   2. DDD (degenerative disc disease), lumbar  Ambulatory referral/consult to Pain Clinic    X-Ray Lumbar Complete With Flex And Ext    X-Ray Hips Bilateral 2 View Incl AP Pelvis   3. Sacroiliitis  Ambulatory referral/consult to Pain Clinic    X-Ray Lumbar Complete With Flex And Ext    X-Ray Hips Bilateral 2 View Incl AP Pelvis   4. Spondylosis without myelopathy  Ambulatory referral/consult to Pain Clinic    X-Ray Lumbar Complete With Flex And Ext    X-Ray Hips Bilateral 2 View Incl AP Pelvis   5. Osteoarthritis of spine, unspecified spinal osteoarthritis complication status, unspecified spinal region  Ambulatory referral/consult to Pain Clinic    X-Ray Lumbar Complete With Flex And Ext    X-Ray Hips Bilateral 2 View Incl AP Pelvis         PLAN:     - I have stressed the importance of physical activity and a home exercise plan to help with pain and improve health.  - Order Flexion-Extension X-rays of the Lumbar spine and bilateral hips to rule out any instability.  - Patient can  continue with medications for now since they are providing benefits, using them appropriately, and without side effects.  - Schedule for a Right Transforaminal epidural steroid injection at L4/5 and L5/S1 to help with his pain and progress with a home exercise plan.  - RTC 10 days after procedure  - Counseled patient regarding the importance of activity modification, constant sleeping habits and physical therapy.    The above plan and management options were discussed at length with patient. Patient is in agreement with the above and verbalized understanding. It will be communicated with the referring physician via electronic record, fax, or mail.     I have personally reviewed the history and exam of this patient and agree with the resident/fellow/NPs note as stated above.    Ramiro Calvillo MD    05/14/2020

## 2020-05-14 NOTE — H&P (VIEW-ONLY)
Chronic Pain - New Consult    Referring Physician: Blue Mcgill, *    Chief Complaint: Right Lower Back Pain       SUBJECTIVE:    Hector Kellogg presents to the clinic for the evaluation of right lower back buttock pain. The pain started 5-10 years ago following falling off scaffold at work. Since then, symptoms have been worsening.  The pain is located in the right buttock area and radiates to the down right leg to knee.  The pain is described as sharp and is rated as 4/10. The pain is rated with a score of  4/10 on the BEST day and a score of 9/10 on the WORST day.   Symptoms interfere with daily activity.   Has occasional paresthesias and numbness to RLE all the down the leg.   The pain is exacerbated by Standing, Walking and Morning.    The pain is mitigated by heat, laying down and rest.   He reports spending 2 hours per day reclining. The patient reports 4 hours of uninterrupted sleep per night.    Patient denies night fever/night sweats, urinary incontinence, bowel incontinence, significant weight loss, significant motor weakness and loss of sensations.    Interval History 05/14/2020:  74 y/o M with hx of CAD s/p PCI (2008 and 2017), mild Pulm HTN, Essential HTN, Parkinson's Disease and COPD( Former smoker x 40 years).  On ASA 81 mg for CAD. Formerly on Plavix for DAPT  On Medical Therapy with Gabapentin without any side effects.   Started Meloxican this week and acknowledges being effective.     Has not had evaluation from Orthopaedics / Neurosurgery  No procedure hx for current symptoms     Physical Therapy/Home Exercise: No     Pain Disability Index Review:  Last 3 PDI Scores 5/14/2020   Pain Disability Index (PDI) 56       Pain Medications:    Gabapentin 1200 mg TID  Meloxican 15 mg    Pain Procedures: none    Imaging:   3/12/20 MRI Lumbar Spine  Narrative     EXAMINATION:  MRI LUMBAR SPINE WITHOUT CONTRAST    CLINICAL HISTORY:  Low back pain, >6wks conservative tx, persistent-progressive  sx, surgical candidate; Spinal stenosis, lumbar region without neurogenic claudication    TECHNIQUE:  Multiplanar, multisequence MR images were acquired from the thoracolumbar junction to the sacrum without contrast.    COMPARISON:  None available.    FINDINGS:  Alignment: Grade 1 anterolisthesis of L4-L5 and L5-S1.  Alignment of the lumbar spine is otherwise anatomic.    Vertebrae: Normal marrow signal. No fracture.  Bilateral L5 spondylolysis.    Discs: Scattered mild to moderate disc height loss of the lumbar spine.  Note made of increased STIR signal within the L2-L3 disc space.    Cord: Normal.  Conus terminates at L1.    Degenerative findings:    T12-L1: No spinal canal stenosis or neural foraminal narrowing.    L1-L2: Circumferential disc bulge with mild bilateral facet arthropathy.  No spinal canal stenosis or neural foraminal narrowing.    L2-L3: Circumferential disc bulge with mild bilateral facet arthropathy resulting in mild bilateral neural foraminal narrowing.  No spinal canal stenosis    L3-L4: Circumferential disc bulge with moderate bilateral facet arthropathy and ligamentum flavum hypertrophy results in mild spinal canal stenosis and mild bilateral neural foraminal narrowing.    L4-L5: Grade 1 anterolisthesis with diffuse disc bulge with severe bilateral facet arthropathy and ligamentum flavum hypertrophy results in moderate spinal canal stenosis and severe bilateral neural foraminal narrowing.    L5-S1: Circumferential disc bulge with moderate bilateral facet arthropathy resulting in moderate spinal canal stenosis and moderate bilateral neural foraminal narrowing.    Paraspinal muscles & soft tissues: Exophytic simple cysts in the left kidney.      Impression       1. Multilevel degenerative changes of the lumbar spine, most significant at the levels of L4-L5 and L5-S1.  At L4-L5 there is moderate spinal canal stenosis and severe bilateral neural foraminal narrowing.  L5-S1, there is moderate  spinal canal stenosis and moderate bilateral neural foraminal narrowing.  2. Bilateral spondylolysis at L5 grade 1 anterolisthesis of L5-S1.  3. Increased STIR signal within the L2-L3 disc space.  While this is favored to be degenerative, similar findings may be seen with early discitis.  Clinical correlation with lab parameters advised.         Past Medical History:   Diagnosis Date    Cataract     Chronic back pain greater than 3 months duration     Coronary artery disease     Hyperlipidemia     Hypertension     MI, old 2006    Parkinson disease      Past Surgical History:   Procedure Laterality Date    CARDIAC CATHETERIZATION  2006    x 3    CARDIAC CATHETERIZATION  10/11/2015    x 2    CATARACT EXTRACTION W/  INTRAOCULAR LENS IMPLANT Right 2018    Dr. Liu    CORONARY ANGIOPLASTY  ,     3 stents    HEMORRHOID SURGERY       Social History     Socioeconomic History    Marital status:      Spouse name: Not on file    Number of children: Not on file    Years of education: Not on file    Highest education level: Not on file   Occupational History    Not on file   Social Needs    Financial resource strain: Not hard at all    Food insecurity:     Worry: Never true     Inability: Never true    Transportation needs:     Medical: No     Non-medical: No   Tobacco Use    Smoking status: Former Smoker     Packs/day: 1.00     Years: 40.00     Pack years: 40.00     Last attempt to quit: 2014     Years since quittin.2    Smokeless tobacco: Never Used   Substance and Sexual Activity    Alcohol use: Yes     Frequency: Never     Binge frequency: Never     Comment: socially    Drug use: No    Sexual activity: Not on file   Lifestyle    Physical activity:     Days per week: 0 days     Minutes per session: Not on file    Stress: Only a little   Relationships    Social connections:     Talks on phone: Once a week     Gets together: Once a week     Attends [employer]  service: Not on file     Active member of club or organization: No     Attends meetings of clubs or organizations: Never     Relationship status:    Other Topics Concern    Not on file   Social History Narrative    Not on file     Family History   Problem Relation Age of Onset    Heart attack Father     Cataracts Father     Heart disease Maternal Uncle     Heart disease Paternal Uncle     Hypertension Neg Hx     Asthma Neg Hx     Emphysema Neg Hx     Amblyopia Neg Hx     Blindness Neg Hx     Macular degeneration Neg Hx     Retinal detachment Neg Hx     Strabismus Neg Hx        Review of patient's allergies indicates:   Allergen Reactions    Statins-hmg-coa reductase inhibitors Other (See Comments)     Weakness and muscle aches       Current Outpatient Medications   Medication Sig    aspirin (ECOTRIN) 81 MG EC tablet Take 81 mg by mouth once daily.    carbidopa-levodopa  mg (SINEMET)  mg per tablet Take 1 tablet by mouth 5 (five) times daily.    citalopram (CELEXA) 20 MG tablet Take 1 tablet (20 mg total) by mouth once daily.    citalopram (CELEXA) 20 MG tablet TAKE 1 TABLET (20 MG TOTAL) BY MOUTH ONCE DAILY.    cyclobenzaprine (FLEXERIL) 5 MG tablet Take 1 tablet by mouth 2 (two) times daily.    fluticasone-umeclidin-vilanter (TRELEGY ELLIPTA) 100-62.5-25 mcg DsDv Inhale 1 puff into the lungs once daily.    gabapentin (NEURONTIN) 400 MG capsule Take 1 capsule (400 mg total) by mouth 3 (three) times daily.    lidocaine (LIDODERM) 5 % Place 1 patch onto the skin once daily. Remove & Discard patch within 12 hours or as directed by MD    loratadine (CLARITIN) 10 mg tablet Take 10 mg by mouth as needed for Allergies.    meloxicam (MOBIC) 15 MG tablet Take 1 tablet (15 mg total) by mouth once daily.    metoprolol succinate (TOPROL-XL) 50 MG 24 hr tablet Take 1 tablet (50 mg total) by mouth once daily.    multivitamin capsule Take 1 capsule by mouth once daily.     "nitroGLYCERIN (NITROSTAT) 0.4 MG SL tablet Place 1 tablet (0.4 mg total) under the tongue every 5 (five) minutes as needed for Chest pain.    rosuvastatin (CRESTOR) 40 MG Tab Take 1 tablet (40 mg total) by mouth every evening.     No current facility-administered medications for this visit.        REVIEW OF SYSTEMS:    GENERAL:  No weight loss, malaise or fevers.  HEENT:  Negative for frequent or significant headaches.  NECK:  Negative for lumps, goiter, pain and significant neck swelling.  RESPIRATORY:  Negative for cough, wheezing or shortness of breath.  CARDIOVASCULAR:  Negative for chest pain, leg swelling or palpitations.  GI:  Negative for abdominal discomfort, blood in stools or black stools or change in bowel habits.  MUSCULOSKELETAL:  See HPI.  SKIN:  Negative for lesions, rash, and itching.  PSYCH:  Positive for sleep disturbance, mood disorder.  HEMATOLOGY/LYMPHOLOGY:  Negative for prolonged bleeding, bruising easily or swollen nodes.  NEURO:   UE tremors present  All other reviewed and negative other than HPI.    OBJECTIVE:    Resp 18   Ht 5' 5" (1.651 m)   BMI 26.89 kg/m²     PHYSICAL EXAMINATION:    General appearance: Well appearing, in no acute distress, alert and oriented x3.  Psych:  Mood and affect appropriate.  Skin: Skin color, texture, turgor normal, no rashes or lesions, in both upper and lower body.  Head/face:  Normocephalic, atraumatic. No palpable lymph nodes.  Neck: No pain to palpation over the cervical paraspinous muscles. Spurling Negative. No pain with neck flexion, extension, or lateral flexion.   Cor: RRR  Pulm: CTA  GI:  Soft and non-tender.  Back: Straight leg raising in the sitting and supine positions is negative to radicular pain. Moderate pain to palpation over the spine or costovertebral angles. Normal range of motion without pain reproduction. Positive axial loading test bilateral. Positive FABERE,Ganselin and Yeoman's test on the both side.negative FADIR  Extremities: " Peripheral joint ROM is full and pain free without obvious instability or laxity in all four extremities. No deformities, edema, or skin discoloration. Good capillary refill.  Musculoskeletal: Shoulder, hip, sacroiliac and knee provocative maneuvers are negative. Bilateral upper and lower extremity strength is normal and symmetric.    No paraspinal muscle tenderness. Point tenderness to Right GTB. Point tenderness at Right Gluteus. No atrophy or tone abnormalities are noted.  Neuro: Bilateral upper and lower extremity coordination and muscle stretch reflexes are physiologic and symmetric.  Plantar response are downgoing. No loss of sensation is noted. UE tremor is present  Gait: normal.    ASSESSMENT: 75 y.o. year old male with right lower back pain, consistent with l    1. Radiculopathy of thoracolumbar region  Ambulatory referral/consult to Pain Clinic    X-Ray Lumbar Complete With Flex And Ext    X-Ray Hips Bilateral 2 View Incl AP Pelvis   2. DDD (degenerative disc disease), lumbar  Ambulatory referral/consult to Pain Clinic    X-Ray Lumbar Complete With Flex And Ext    X-Ray Hips Bilateral 2 View Incl AP Pelvis   3. Sacroiliitis  Ambulatory referral/consult to Pain Clinic    X-Ray Lumbar Complete With Flex And Ext    X-Ray Hips Bilateral 2 View Incl AP Pelvis   4. Spondylosis without myelopathy  Ambulatory referral/consult to Pain Clinic    X-Ray Lumbar Complete With Flex And Ext    X-Ray Hips Bilateral 2 View Incl AP Pelvis   5. Osteoarthritis of spine, unspecified spinal osteoarthritis complication status, unspecified spinal region  Ambulatory referral/consult to Pain Clinic    X-Ray Lumbar Complete With Flex And Ext    X-Ray Hips Bilateral 2 View Incl AP Pelvis         PLAN:     - I have stressed the importance of physical activity and a home exercise plan to help with pain and improve health.  - Order Flexion-Extension X-rays of the Lumbar spine and bilateral hips to rule out any instability.  - Patient can  continue with medications for now since they are providing benefits, using them appropriately, and without side effects.  - Schedule for a Right Transforaminal epidural steroid injection at L4/5 and L5/S1 to help with his pain and progress with a home exercise plan.  - RTC 10 days after procedure  - Counseled patient regarding the importance of activity modification, constant sleeping habits and physical therapy.    The above plan and management options were discussed at length with patient. Patient is in agreement with the above and verbalized understanding. It will be communicated with the referring physician via electronic record, fax, or mail.     I have personally reviewed the history and exam of this patient and agree with the resident/fellow/NPs note as stated above.    Ramiro Calvillo MD    05/14/2020

## 2020-05-15 DIAGNOSIS — Z12.11 COLON CANCER SCREENING: ICD-10-CM

## 2020-05-16 PROBLEM — M47.819 SPONDYLOSIS WITHOUT MYELOPATHY: Status: ACTIVE | Noted: 2020-05-16

## 2020-05-16 PROBLEM — M51.369 DDD (DEGENERATIVE DISC DISEASE), LUMBAR: Status: ACTIVE | Noted: 2020-05-16

## 2020-05-16 PROBLEM — M47.9 OSTEOARTHRITIS OF SPINE: Status: ACTIVE | Noted: 2020-05-16

## 2020-05-16 PROBLEM — M54.15 RADICULOPATHY OF THORACOLUMBAR REGION: Status: ACTIVE | Noted: 2020-05-16

## 2020-05-16 PROBLEM — M51.36 DDD (DEGENERATIVE DISC DISEASE), LUMBAR: Status: ACTIVE | Noted: 2020-05-16

## 2020-05-16 NOTE — PATIENT INSTRUCTIONS
Exercise guide:    https://order.edilma.nih.gov/sites/default/files/2018-04/edilma-exercise-guide.pdf    https://ck1sgxe.edilma.nih.gov/workout-videos/    To rate your experience with Dr Calvillo please click on the link below    [email or web address]

## 2020-05-25 ENCOUNTER — LAB VISIT (OUTPATIENT)
Dept: INTERNAL MEDICINE | Facility: CLINIC | Age: 75
End: 2020-05-25
Payer: MEDICARE

## 2020-05-25 DIAGNOSIS — Z01.818 PREOP TESTING: ICD-10-CM

## 2020-05-25 DIAGNOSIS — Z01.818 PREOP TESTING: Primary | ICD-10-CM

## 2020-05-25 LAB — SARS-COV-2 RNA RESP QL NAA+PROBE: NOT DETECTED

## 2020-05-25 PROCEDURE — U0003 INFECTIOUS AGENT DETECTION BY NUCLEIC ACID (DNA OR RNA); SEVERE ACUTE RESPIRATORY SYNDROME CORONAVIRUS 2 (SARS-COV-2) (CORONAVIRUS DISEASE [COVID-19]), AMPLIFIED PROBE TECHNIQUE, MAKING USE OF HIGH THROUGHPUT TECHNOLOGIES AS DESCRIBED BY CMS-2020-01-R: HCPCS

## 2020-05-27 ENCOUNTER — HOSPITAL ENCOUNTER (OUTPATIENT)
Facility: OTHER | Age: 75
Discharge: HOME | End: 2020-05-27
Attending: ANESTHESIOLOGY | Admitting: ANESTHESIOLOGY
Payer: MEDICARE

## 2020-05-27 VITALS
HEART RATE: 86 BPM | HEIGHT: 64 IN | TEMPERATURE: 99 F | OXYGEN SATURATION: 95 % | BODY MASS INDEX: 26.46 KG/M2 | DIASTOLIC BLOOD PRESSURE: 86 MMHG | SYSTOLIC BLOOD PRESSURE: 142 MMHG | WEIGHT: 155 LBS | RESPIRATION RATE: 18 BRPM

## 2020-05-27 DIAGNOSIS — M54.15 RADICULOPATHY OF THORACOLUMBAR REGION: Primary | ICD-10-CM

## 2020-05-27 DIAGNOSIS — G89.29 CHRONIC PAIN: ICD-10-CM

## 2020-05-27 PROCEDURE — 64484 NJX AA&/STRD TFRM EPI L/S EA: CPT | Mod: RT,,, | Performed by: ANESTHESIOLOGY

## 2020-05-27 PROCEDURE — 64484 PRA INJECT ANES/STEROID FORAMEN LUMBAR/SACRAL W IMG GUIDE ,EA ADD LEVEL: ICD-10-PCS | Mod: RT,,, | Performed by: ANESTHESIOLOGY

## 2020-05-27 PROCEDURE — 25000003 PHARM REV CODE 250: Performed by: ANESTHESIOLOGY

## 2020-05-27 PROCEDURE — 64483 PR EPIDURAL INJ, ANES/STEROID, TRANSFORAMINAL, LUMB/SACR, SNGL LEVL: ICD-10-PCS | Mod: RT,,, | Performed by: ANESTHESIOLOGY

## 2020-05-27 PROCEDURE — 25500020 PHARM REV CODE 255: Performed by: ANESTHESIOLOGY

## 2020-05-27 PROCEDURE — 63600175 PHARM REV CODE 636 W HCPCS: Performed by: ANESTHESIOLOGY

## 2020-05-27 PROCEDURE — 64483 NJX AA&/STRD TFRM EPI L/S 1: CPT

## 2020-05-27 PROCEDURE — 64484 NJX AA&/STRD TFRM EPI L/S EA: CPT

## 2020-05-27 PROCEDURE — 64483 NJX AA&/STRD TFRM EPI L/S 1: CPT | Mod: RT,,, | Performed by: ANESTHESIOLOGY

## 2020-05-27 PROCEDURE — 25000003 PHARM REV CODE 250: Performed by: PHYSICAL MEDICINE & REHABILITATION

## 2020-05-27 RX ORDER — LIDOCAINE HYDROCHLORIDE 5 MG/ML
INJECTION, SOLUTION INFILTRATION; INTRAVENOUS
Status: DISCONTINUED | OUTPATIENT
Start: 2020-05-27 | End: 2020-05-27 | Stop reason: HOSPADM

## 2020-05-27 RX ORDER — FENTANYL CITRATE 50 UG/ML
INJECTION, SOLUTION INTRAMUSCULAR; INTRAVENOUS
Status: DISCONTINUED | OUTPATIENT
Start: 2020-05-27 | End: 2020-05-27 | Stop reason: HOSPADM

## 2020-05-27 RX ORDER — LIDOCAINE HYDROCHLORIDE 10 MG/ML
INJECTION INFILTRATION; PERINEURAL
Status: DISCONTINUED | OUTPATIENT
Start: 2020-05-27 | End: 2020-05-27 | Stop reason: HOSPADM

## 2020-05-27 RX ORDER — MIDAZOLAM HYDROCHLORIDE 1 MG/ML
INJECTION INTRAMUSCULAR; INTRAVENOUS
Status: DISCONTINUED | OUTPATIENT
Start: 2020-05-27 | End: 2020-05-27 | Stop reason: HOSPADM

## 2020-05-27 RX ORDER — SODIUM CHLORIDE 9 MG/ML
500 INJECTION, SOLUTION INTRAVENOUS CONTINUOUS
Status: DISCONTINUED | OUTPATIENT
Start: 2020-05-27 | End: 2020-05-27 | Stop reason: HOSPADM

## 2020-05-27 RX ORDER — DEXAMETHASONE SODIUM PHOSPHATE 10 MG/ML
INJECTION INTRAMUSCULAR; INTRAVENOUS
Status: DISCONTINUED | OUTPATIENT
Start: 2020-05-27 | End: 2020-05-27 | Stop reason: HOSPADM

## 2020-05-27 RX ADMIN — SODIUM CHLORIDE 500 ML: 0.9 INJECTION, SOLUTION INTRAVENOUS at 10:05

## 2020-05-27 NOTE — DISCHARGE INSTRUCTIONS

## 2020-05-27 NOTE — DISCHARGE SUMMARY
Discharge Note  Short Stay      SUMMARY     Admit Date: 5/27/2020    Attending Physician: Naseem Rae      Discharge Physician: Naseem Rae      Discharge Date: 5/27/2020 11:22 AM    Procedure(s) (LRB):  INJECTION, STEROID, EPIDURAL, TRANSFORAMINAL APPROACH (Right)    Final Diagnosis: Lumbar radiculopathy [M54.16]    Disposition: Home or self care    Patient Instructions:   Current Discharge Medication List      CONTINUE these medications which have NOT CHANGED    Details   aspirin (ECOTRIN) 81 MG EC tablet Take 81 mg by mouth once daily.      carbidopa-levodopa  mg (SINEMET)  mg per tablet Take 1 tablet by mouth 5 (five) times daily.  Qty: 450 tablet, Refills: 3    Associated Diagnoses: Parkinson disease; Stenosis of lateral recess of lumbar spine      !! citalopram (CELEXA) 20 MG tablet Take 1 tablet (20 mg total) by mouth once daily.  Qty: 30 tablet, Refills: 11    Associated Diagnoses: Generalized anxiety disorder      !! citalopram (CELEXA) 20 MG tablet TAKE 1 TABLET (20 MG TOTAL) BY MOUTH ONCE DAILY.  Qty: 30 tablet, Refills: 10    Associated Diagnoses: Generalized anxiety disorder      cyclobenzaprine (FLEXERIL) 5 MG tablet Take 1 tablet by mouth 2 (two) times daily.      fluticasone-umeclidin-vilanter (TRELEGY ELLIPTA) 100-62.5-25 mcg DsDv Inhale 1 puff into the lungs once daily.  Qty: 60 each, Refills: 6    Associated Diagnoses: Screening for cancer; Chronic obstructive pulmonary disease, unspecified COPD type      gabapentin (NEURONTIN) 400 MG capsule Take 1 capsule (400 mg total) by mouth 3 (three) times daily.  Qty: 180 capsule, Refills: 3    Associated Diagnoses: Parkinson disease; Stenosis of lateral recess of lumbar spine      lidocaine (LIDODERM) 5 % Place 1 patch onto the skin once daily. Remove & Discard patch within 12 hours or as directed by MD  Qty: 30 patch, Refills: 5    Associated Diagnoses: Stenosis of lateral recess of lumbar spine; Parkinson disease      loratadine (CLARITIN) 10  mg tablet Take 10 mg by mouth as needed for Allergies.      meloxicam (MOBIC) 15 MG tablet Take 1 tablet (15 mg total) by mouth once daily.  Qty: 30 tablet, Refills: 3      metoprolol succinate (TOPROL-XL) 50 MG 24 hr tablet Take 1 tablet (50 mg total) by mouth once daily.  Qty: 90 tablet, Refills: 3      multivitamin capsule Take 1 capsule by mouth once daily.      nitroGLYCERIN (NITROSTAT) 0.4 MG SL tablet Place 1 tablet (0.4 mg total) under the tongue every 5 (five) minutes as needed for Chest pain.  Qty: 25 tablet, Refills: 4      rosuvastatin (CRESTOR) 40 MG Tab Take 1 tablet (40 mg total) by mouth every evening.  Qty: 90 tablet, Refills: 3       !! - Potential duplicate medications found. Please discuss with provider.              Discharge Diagnosis: Lumbar radiculopathy [M54.16]  Condition on Discharge: Stable with no complications to procedure   Diet on Discharge: Same as before.  Activity: as per instruction sheet.  Discharge to: Home with a responsible adult.  Follow up: 2-4 weeks       Please call my office or pager at [phone] if experienced any weakness or loss of sensation, fever > 101.5, pain uncontrolled with oral medications, persistent nausea/vomiting/or diarrhea, redness or drainage from the incisions, or any other worrisome concerns. If physician on call was not reached or could not communicate with our office for any reason please go to the nearest emergency department

## 2020-05-27 NOTE — OP NOTE
Patient Name: Hector Kellogg  MRN: [id number]    INFORMED CONSENT: The procedure, risks, benefits and options were discussed with patient. There are no contraindications to the procedure. The patient expressed understanding and agreed to proceed. The personnel performing the procedure was discussed. I verify that I personally obtained Hector's consent prior to the start of the procedure and the signed consent can be found on the patient's chart.    Procedure Date: 05/27/2020    Anesthesia: Topical    Pre Procedure diagnosis: Lumbar radiculopathy [M54.16]  1. Radiculopathy of thoracolumbar region    2. Chronic pain      Post-Procedure diagnosis: SAME      Sedation: Yes - Fentanyl 100 mcg and Midazolam 2 mg    PROCEDURE:Right L4/5 and L5/S1   TRANSFORAMINAL EPIDURAL STEROID INJECTION        DESCRIPTION OF PROCEDURE: The patient was brought to the procedure room. After performing time out IV access was obtained prior to the procedure. The patient was positioned prone on the fluoroscopy table. Continuous hemodynamic monitoring was initiated including blood pressure, EKG, and pulse oximetry. . The skin was prepped with chlorhexidine three times and draped in a sterile fashion. Skin anesthesia was achieved using 3 mL of lidocaine 1% over the respective injection site.     An oblique fluoroscopic view was obtained, with the superior articular process of the inferior vertebral body aligned with the pedicle. The tip of a 22-gauge 3.5-inch Quincke-type spinal needle was advanced toward the 6 oclock position of the pedicle under intermittent fluoroscopic guidance. Confirmation of proper needle position was made with AP, oblique, and lateral fluoroscopic views. Negative aspiration for blood or CSF was confirmed. 2 mL of Omnipaque 300 was injected. Live fluoroscopic imaging revealed a clear outline of the spinal nerve with proximal spread of agent through the neural foramen into the anterior epidural space. A total  combination of 3 mL of Lidocaine 0.5% and 10 mg decadron was injected at each level. Contrast spread was noted from L4 to S1 level. There was no pain on injection. The needle was removed and bleeding was nil.  A sterile dressing was applied. Hector was taken back to the recovery room for further observation.     Blood Loss: Nill  Specimen: None    Naseem Rae MD

## 2020-06-08 ENCOUNTER — OFFICE VISIT (OUTPATIENT)
Dept: PAIN MEDICINE | Facility: CLINIC | Age: 75
End: 2020-06-08
Payer: MEDICARE

## 2020-06-08 DIAGNOSIS — M51.36 DDD (DEGENERATIVE DISC DISEASE), LUMBAR: Primary | ICD-10-CM

## 2020-06-08 DIAGNOSIS — M47.9 OSTEOARTHRITIS OF SPINE, UNSPECIFIED SPINAL OSTEOARTHRITIS COMPLICATION STATUS, UNSPECIFIED SPINAL REGION: ICD-10-CM

## 2020-06-08 DIAGNOSIS — M47.819 SPONDYLOSIS WITHOUT MYELOPATHY: ICD-10-CM

## 2020-06-08 DIAGNOSIS — M54.15 RADICULOPATHY OF THORACOLUMBAR REGION: ICD-10-CM

## 2020-06-08 DIAGNOSIS — M54.16 LUMBAR RADICULOPATHY: ICD-10-CM

## 2020-06-08 PROCEDURE — 99442 PR PHYSICIAN TELEPHONE EVALUATION 11-20 MIN: CPT | Mod: 95,,, | Performed by: NURSE PRACTITIONER

## 2020-06-08 PROCEDURE — 99499 UNLISTED E&M SERVICE: CPT | Mod: 95,,, | Performed by: NURSE PRACTITIONER

## 2020-06-08 PROCEDURE — 99499 RISK ADDL DX/OHS AUDIT: ICD-10-PCS | Mod: 95,,, | Performed by: NURSE PRACTITIONER

## 2020-06-08 PROCEDURE — 99442 PR PHYSICIAN TELEPHONE EVALUATION 11-20 MIN: ICD-10-PCS | Mod: 95,,, | Performed by: NURSE PRACTITIONER

## 2020-06-08 NOTE — PROGRESS NOTES
Chronic Pain - New Consult    Referring Physician: No ref. provider found    Chief Complaint: Right Lower Back Pain       SUBJECTIVE:    Hector Kellogg presents to the clinic for the evaluation of right lower back buttock pain. The pain started 5-10 years ago following falling off scaffold at work. Since then, symptoms have been worsening.  The pain is located in the right buttock area and radiates to the down right leg to knee.  The pain is described as sharp and is rated as 4/10. The pain is rated with a score of  4/10 on the BEST day and a score of 9/10 on the WORST day.   Symptoms interfere with daily activity.   Has occasional paresthesias and numbness to RLE all the down the leg.   The pain is exacerbated by Standing, Walking and Morning.    The pain is mitigated by heat, laying down and rest.   He reports spending 2 hours per day reclining. The patient reports 4 hours of uninterrupted sleep per night.    Patient denies night fever/night sweats, urinary incontinence, bowel incontinence, significant weight loss, significant motor weakness and loss of sensations.    Interval History 05/14/2020:  76 y/o M with hx of CAD s/p PCI (2008 and 2017), mild Pulm HTN, Essential HTN, Parkinson's Disease and COPD( Former smoker x 40 years).  On ASA 81 mg for CAD. Formerly on Plavix for DAPT  On Medical Therapy with Gabapentin without any side effects.   Started Meloxican this week and acknowledges being effective.       Chronic Pain-Tele-Medicine-Established Note (Follow up visit)      The patient location is: Home  The chief complaint leading to consultation is: pain  Visit type: Virtual visit with synchronous audio and video  Total time spent with patient: 11 min  Each patient to whom he or she provides medical services by telemedicine is:  (1) informed of the relationship between the physician and patient and the respective role of any other health care provider with respect to management of the patient; and (2)  notified that he or she may decline to receive medical services by telemedicine and may withdraw from such care at any time.    Interval history 06/08/2020  Patient presents for telephone follow-up of lower back pain and right lower leg pain.  Patient is status post right L4/5 and L5/S1 TFESI on 5/27/2020.  Patient states 100% resolution of lower back pain and right leg pain.  Patient denies any neurological changes, denies any health changes.  Patient denies any neurological emergency symptoms such as loss of bowel/bladder, and denies saddle paresthesias.  Patient rates pain 0/10.  Patient is no longer taking Mobic, takes Flexeril 5 mg g and gabapentin 400 mg p.r.n.    Has not had evaluation from Orthopaedics / Neurosurgery  No procedure hx for current symptoms     Physical Therapy/Home Exercise: No     Pain Disability Index Review:  Last 3 PDI Scores 5/14/2020   Pain Disability Index (PDI) 56       Pain Medications:    Gabapentin 1200 mg TID  Meloxican 15 mg    Pain Procedures:   5/27/2020 Right L4/5, and L5/S1 TFESI 100% relief.     Imaging:   3/12/20 MRI Lumbar Spine  Narrative     EXAMINATION:  MRI LUMBAR SPINE WITHOUT CONTRAST    CLINICAL HISTORY:  Low back pain, >6wks conservative tx, persistent-progressive sx, surgical candidate; Spinal stenosis, lumbar region without neurogenic claudication    TECHNIQUE:  Multiplanar, multisequence MR images were acquired from the thoracolumbar junction to the sacrum without contrast.    COMPARISON:  None available.    FINDINGS:  Alignment: Grade 1 anterolisthesis of L4-L5 and L5-S1.  Alignment of the lumbar spine is otherwise anatomic.    Vertebrae: Normal marrow signal. No fracture.  Bilateral L5 spondylolysis.    Discs: Scattered mild to moderate disc height loss of the lumbar spine.  Note made of increased STIR signal within the L2-L3 disc space.    Cord: Normal.  Conus terminates at L1.    Degenerative findings:    T12-L1: No spinal canal stenosis or neural foraminal  narrowing.    L1-L2: Circumferential disc bulge with mild bilateral facet arthropathy.  No spinal canal stenosis or neural foraminal narrowing.    L2-L3: Circumferential disc bulge with mild bilateral facet arthropathy resulting in mild bilateral neural foraminal narrowing.  No spinal canal stenosis    L3-L4: Circumferential disc bulge with moderate bilateral facet arthropathy and ligamentum flavum hypertrophy results in mild spinal canal stenosis and mild bilateral neural foraminal narrowing.    L4-L5: Grade 1 anterolisthesis with diffuse disc bulge with severe bilateral facet arthropathy and ligamentum flavum hypertrophy results in moderate spinal canal stenosis and severe bilateral neural foraminal narrowing.    L5-S1: Circumferential disc bulge with moderate bilateral facet arthropathy resulting in moderate spinal canal stenosis and moderate bilateral neural foraminal narrowing.    Paraspinal muscles & soft tissues: Exophytic simple cysts in the left kidney.      Impression       1. Multilevel degenerative changes of the lumbar spine, most significant at the levels of L4-L5 and L5-S1.  At L4-L5 there is moderate spinal canal stenosis and severe bilateral neural foraminal narrowing.  L5-S1, there is moderate spinal canal stenosis and moderate bilateral neural foraminal narrowing.  2. Bilateral spondylolysis at L5 grade 1 anterolisthesis of L5-S1.  3. Increased STIR signal within the L2-L3 disc space.  While this is favored to be degenerative, similar findings may be seen with early discitis.  Clinical correlation with lab parameters advised.         Past Medical History:   Diagnosis Date    Cataract     Chronic back pain greater than 3 months duration     Coronary artery disease     DDD (degenerative disc disease), lumbar 5/16/2020    Hyperlipidemia     Hypertension     MI, old 2006    Parkinson disease      Past Surgical History:   Procedure Laterality Date    CARDIAC CATHETERIZATION  2006    x 3     CARDIAC CATHETERIZATION  10/11/2015    x 2    CATARACT EXTRACTION W/  INTRAOCULAR LENS IMPLANT Right 2018    Dr. Liu    CORONARY ANGIOPLASTY  ,     3 stents    HEMORRHOID SURGERY      TRANSFORAMINAL EPIDURAL INJECTION OF STEROID Right 2020    Procedure: INJECTION, STEROID, EPIDURAL, TRANSFORAMINAL APPROACH;  Surgeon: Ramiro Calvillo MD;  Location: [facility];  Service: Pain Management;  Laterality: Right;  Right TF ASHISH L4-L5, L5-S1    PTcannot come earlier than 12:30     Social History     Socioeconomic History    Marital status:      Spouse name: Not on file    Number of children: Not on file    Years of education: Not on file    Highest education level: Not on file   Occupational History    Not on file   Social Needs    Financial resource strain: Not hard at all    Food insecurity:     Worry: Never true     Inability: Never true    Transportation needs:     Medical: No     Non-medical: No   Tobacco Use    Smoking status: Former Smoker     Packs/day: 1.00     Years: 40.00     Pack years: 40.00     Last attempt to quit: 2014     Years since quittin.3    Smokeless tobacco: Never Used   Substance and Sexual Activity    Alcohol use: Yes     Frequency: Never     Binge frequency: Never     Comment: socially    Drug use: No    Sexual activity: Not on file   Lifestyle    Physical activity:     Days per week: 0 days     Minutes per session: Not on file    Stress: Only a little   Relationships    Social connections:     Talks on phone: Once a week     Gets together: Once a week     Attends Nondenominational service: Not on file     Active member of club or organization: No     Attends meetings of clubs or organizations: Never     Relationship status:    Other Topics Concern    Not on file   Social History Narrative    Not on file     Family History   Problem Relation Age of Onset    Heart attack Father     Cataracts Father     Heart disease Maternal Uncle      Heart disease Paternal Uncle     Hypertension Neg Hx     Asthma Neg Hx     Emphysema Neg Hx     Amblyopia Neg Hx     Blindness Neg Hx     Macular degeneration Neg Hx     Retinal detachment Neg Hx     Strabismus Neg Hx        Review of patient's allergies indicates:   Allergen Reactions    Statins-hmg-coa reductase inhibitors Other (See Comments)     Weakness and muscle aches       Current Outpatient Medications   Medication Sig    aspirin (ECOTRIN) 81 MG EC tablet Take 81 mg by mouth once daily.    carbidopa-levodopa  mg (SINEMET)  mg per tablet Take 1 tablet by mouth 5 (five) times daily.    citalopram (CELEXA) 20 MG tablet TAKE 1 TABLET (20 MG TOTAL) BY MOUTH ONCE DAILY.    fluticasone-umeclidin-vilanter (TRELEGY ELLIPTA) 100-62.5-25 mcg DsDv Inhale 1 puff into the lungs once daily.    gabapentin (NEURONTIN) 400 MG capsule Take 1 capsule (400 mg total) by mouth 3 (three) times daily.    loratadine (CLARITIN) 10 mg tablet Take 10 mg by mouth as needed for Allergies.    metoprolol succinate (TOPROL-XL) 50 MG 24 hr tablet Take 1 tablet (50 mg total) by mouth once daily.    multivitamin capsule Take 1 capsule by mouth once daily.    nitroGLYCERIN (NITROSTAT) 0.4 MG SL tablet Place 1 tablet (0.4 mg total) under the tongue every 5 (five) minutes as needed for Chest pain.    rosuvastatin (CRESTOR) 40 MG Tab TAKE 1 TABLET (40 MG TOTAL) BY MOUTH EVERY EVENING.     No current facility-administered medications for this visit.        REVIEW OF SYSTEMS:    GENERAL:  No weight loss, malaise or fevers.  HEENT:  Negative for frequent or significant headaches.  NECK:  Negative for lumps, goiter, pain and significant neck swelling.  RESPIRATORY:  Negative for cough, wheezing or shortness of breath.  CARDIOVASCULAR:  Negative for chest pain, leg swelling or palpitations.  GI:  Negative for abdominal discomfort, blood in stools or black stools or change in bowel habits.  MUSCULOSKELETAL:  See  HPI.  SKIN:  Negative for lesions, rash, and itching.  PSYCH:  Positive for sleep disturbance, mood disorder.  HEMATOLOGY/LYMPHOLOGY:  Negative for prolonged bleeding, bruising easily or swollen nodes.  NEURO:   UE tremors present  All other reviewed and negative other than HPI.    OBJECTIVE:    There were no vitals taken for this visit.    PHYSICAL EXAMINATION:  Voice normal.  Normal affect without evidence of distress.      Prior exam PHYSICAL EXAMINATION:    General appearance: Well appearing, in no acute distress, alert and oriented x3.  Psych:  Mood and affect appropriate.  Skin: Skin color, texture, turgor normal, no rashes or lesions, in both upper and lower body.  Head/face:  Normocephalic, atraumatic. No palpable lymph nodes.  Neck: No pain to palpation over the cervical paraspinous muscles. Spurling Negative. No pain with neck flexion, extension, or lateral flexion.   Cor: RRR  Pulm: CTA  GI:  Soft and non-tender.  Back: Straight leg raising in the sitting and supine positions is negative to radicular pain. Moderate pain to palpation over the spine or costovertebral angles. Normal range of motion without pain reproduction. Positive axial loading test bilateral. Positive FABERE,Ganselin and Yeoman's test on the both side.negative FADIR  Extremities: Peripheral joint ROM is full and pain free without obvious instability or laxity in all four extremities. No deformities, edema, or skin discoloration. Good capillary refill.  Musculoskeletal: Shoulder, hip, sacroiliac and knee provocative maneuvers are negative. Bilateral upper and lower extremity strength is normal and symmetric.    No paraspinal muscle tenderness. Point tenderness to Right GTB. Point tenderness at Right Gluteus. No atrophy or tone abnormalities are noted.  Neuro: Bilateral upper and lower extremity coordination and muscle stretch reflexes are physiologic and symmetric.  Plantar response are downgoing. No loss of sensation is noted. UE tremor  is present  Gait: normal.    ASSESSMENT: 75 y.o. year old male with right lower back pain, consistent with l    1. DDD (degenerative disc disease), lumbar     2. Spondylosis without myelopathy     3. Osteoarthritis of spine, unspecified spinal osteoarthritis complication status, unspecified spinal region     4. Lumbar radiculopathy     5. Radiculopathy of thoracolumbar region           PLAN:     - Prior images reviewed  - Pt s/p Right L4/5 &L5/S1 TFESI with 100% resolution of symptoms.   - Continue Gabapentin   -Continue Flexiril  - I have stressed the importance of physical activity and a home exercise plan to help with pain and improve health.  - Patient can continue with medications for now since they are providing benefits, using them appropriately, and without side effects.  - Counseled patient regarding the importance of physical therapy.  - RTC 2 months or sooner if needed.     The above plan and management options were discussed at length with patient. Patient is in agreement with the above and verbalized understanding. It will be communicated with the referring physician via electronic record, fax, or mail.    Marcelo Washington NP    06/08/2020

## 2020-06-10 ENCOUNTER — OFFICE VISIT (OUTPATIENT)
Dept: PULMONOLOGY | Facility: CLINIC | Age: 75
End: 2020-06-10
Payer: MEDICARE

## 2020-06-10 VITALS
BODY MASS INDEX: 26.89 KG/M2 | WEIGHT: 157.5 LBS | OXYGEN SATURATION: 98 % | HEIGHT: 64 IN | DIASTOLIC BLOOD PRESSURE: 81 MMHG | SYSTOLIC BLOOD PRESSURE: 125 MMHG | HEART RATE: 90 BPM

## 2020-06-10 DIAGNOSIS — R91.8 LUNG NODULES: ICD-10-CM

## 2020-06-10 DIAGNOSIS — J44.9 CHRONIC OBSTRUCTIVE PULMONARY DISEASE, UNSPECIFIED COPD TYPE: Primary | ICD-10-CM

## 2020-06-10 PROCEDURE — 99213 PR OFFICE/OUTPT VISIT, EST, LEVL III, 20-29 MIN: ICD-10-PCS | Mod: S$GLB,,, | Performed by: NURSE PRACTITIONER

## 2020-06-10 PROCEDURE — 99213 OFFICE O/P EST LOW 20 MIN: CPT | Mod: S$GLB,,, | Performed by: NURSE PRACTITIONER

## 2020-06-10 PROCEDURE — 99999 PR PBB SHADOW E&M-EST. PATIENT-LVL III: ICD-10-PCS | Mod: PBBFAC,,, | Performed by: NURSE PRACTITIONER

## 2020-06-10 PROCEDURE — 99999 PR PBB SHADOW E&M-EST. PATIENT-LVL III: CPT | Mod: PBBFAC,,, | Performed by: NURSE PRACTITIONER

## 2020-06-10 RX ORDER — ALBUTEROL SULFATE 90 UG/1
2 AEROSOL, METERED RESPIRATORY (INHALATION) EVERY 6 HOURS PRN
Qty: 18 G | Refills: 6 | Status: SHIPPED | OUTPATIENT
Start: 2020-06-10 | End: 2021-11-12 | Stop reason: SDUPTHER

## 2020-06-10 NOTE — ASSESSMENT & PLAN NOTE
Reports having some improvement with use of Trelegy daily. Can experience some shortness of breath with activity. Has chronic lowe back pain which he has not been able to address because of COVID pandemic. He reports has been deconditioned. Recently he reports followed with pain management and had steriod injection- now able to perform ADL (baseline with Parkinson Hx).     Plan:  -Continue Trelegy. He reports benefiting from medication use. Discussed to rinse mouth after use.   -Prescribe albuterol as needed for shortness of breath or wheezing  -Discussed increase in exercise regimen for possible deconditioning.   -Continue annual LDCT screening- has < 1 cm lung nodules and significant smoking hx.   - Reports UTD on vaccines- cannot take Flu vaccine as per past medical HX.   -Follow up in 6 months.

## 2020-06-10 NOTE — PROGRESS NOTES
Subjective:       Patient ID: Hector Kellogg is a 75 y.o. male.    Chief Complaint: Shortness of Breath    HPI:   Hector Kellogg is a 75 y.o. male with hx of parkinson, HTN, HLD, CAD and COPD  who presents with SOB. Has previously followed with Dr. Mendoza, pulmonary in 2017. Pt is new to me.     Since 2017. He reports COPD has been stable with use of Dulera. Over the last few months, he explains having an increase in shortness of breath. He wanted to follow up with pulmonary for COPD evaluation.     Patient  experiences chronic mMRC 3 symptoms of dyspnea. Notes to have mild chronic cough. Explains had 0 COPD exacerbations in the last one year; 0 hospitalizations for respiratory problems. Reports has not required increase in albuterol inhaler. Reports able to perform ADLs. Discloses has been on oxygen therapy in the past and does not want to use at th    He explains not on oxygen therapy and does not want to use at this time.     Former smoker quit in 2014. Reports 40 pack year hx.     Interval hx 6/10/2020-    Mr. Kellogg presents to Pulmonary Clinic for COPD follow-up.  He is accompanied by his wife.  On his last visitation in January, he was started on Trelegy for his COPD.  Both he and his wife explain trilogy has significantly helped in his cough and breathing issues.  Today, he reports having no chronic cough.  Explains had 0 COPD exacerbations or hospitalizations for respiratory problems.  He remains experiencing chronic MMRC symptoms of dyspnea.  He does endorse some of his shortness of breath is from deconditioning.  Explains having chronic low back pain and was unable to follow-up with pain management in relation to the COVID-19 pandemic.  However, he was recently followed with pain management and received a steroid injection and now able to perform his ADLs.  He reports his Parkinson's is at baseline without any increase of symptoms.  He denies fever or chills.     Review of Systems    Constitutional: Negative for fever, chills, weight loss and night sweats.   HENT: Negative for postnasal drip, rhinorrhea, trouble swallowing and congestion.    Respiratory: Positive for shortness of breath. Negative for cough, sputum production, choking, chest tightness, wheezing, dyspnea on extertion and use of rescue inhaler.    Cardiovascular: Negative for chest pain and leg swelling.   Musculoskeletal: Positive for back pain (Reports chronic) and gait problem (See HPI).   Neurological: Negative for dizziness.       Social History     Tobacco Use    Smoking status: Former Smoker     Packs/day: 1.00     Years: 40.00     Pack years: 40.00     Last attempt to quit: 2014     Years since quittin.3    Smokeless tobacco: Never Used   Substance Use Topics    Alcohol use: Yes     Frequency: Never     Binge frequency: Never     Comment: socially       Review of patient's allergies indicates:   Allergen Reactions    Statins-hmg-coa reductase inhibitors Other (See Comments)     Weakness and muscle aches     Past Medical History:   Diagnosis Date    Cataract     Chronic back pain greater than 3 months duration     Coronary artery disease     DDD (degenerative disc disease), lumbar 2020    Hyperlipidemia     Hypertension     MI, old 2006    Parkinson disease      Past Surgical History:   Procedure Laterality Date    CARDIAC CATHETERIZATION  2006    x 3    CARDIAC CATHETERIZATION  10/11/2015    x 2    CATARACT EXTRACTION W/  INTRAOCULAR LENS IMPLANT Right 2018    Dr. Liu    CORONARY ANGIOPLASTY  ,     3 stents    HEMORRHOID SURGERY      TRANSFORAMINAL EPIDURAL INJECTION OF STEROID Right 2020    Procedure: INJECTION, STEROID, EPIDURAL, TRANSFORAMINAL APPROACH;  Surgeon: Ramiro Calvillo MD;  Location: [facility];  Service: Pain Management;  Laterality: Right;  Right TF ASHISH L4-L5, L5-S1    PTcannot come earlier than 12:30     Current Outpatient Medications on File  "Prior to Visit   Medication Sig    aspirin (ECOTRIN) 81 MG EC tablet Take 81 mg by mouth once daily.    carbidopa-levodopa  mg (SINEMET)  mg per tablet Take 1 tablet by mouth 5 (five) times daily.    citalopram (CELEXA) 20 MG tablet TAKE 1 TABLET (20 MG TOTAL) BY MOUTH ONCE DAILY.    fluticasone-umeclidin-vilanter (TRELEGY ELLIPTA) 100-62.5-25 mcg DsDv Inhale 1 puff into the lungs once daily.    gabapentin (NEURONTIN) 400 MG capsule Take 1 capsule (400 mg total) by mouth 3 (three) times daily.    loratadine (CLARITIN) 10 mg tablet Take 10 mg by mouth as needed for Allergies.    metoprolol succinate (TOPROL-XL) 50 MG 24 hr tablet Take 1 tablet (50 mg total) by mouth once daily.    multivitamin capsule Take 1 capsule by mouth once daily.    rosuvastatin (CRESTOR) 40 MG Tab TAKE 1 TABLET (40 MG TOTAL) BY MOUTH EVERY EVENING.    nitroGLYCERIN (NITROSTAT) 0.4 MG SL tablet Place 1 tablet (0.4 mg total) under the tongue every 5 (five) minutes as needed for Chest pain.     No current facility-administered medications on file prior to visit.      Objective:       Vitals:    06/10/20 1303   BP: 125/81   BP Location: Left arm   Patient Position: Sitting   BP Method: Large (Automatic)   Pulse: 90   SpO2: 98%   Weight: 71.5 kg (157 lb 8.3 oz)   Height: 5' 4" (1.626 m)   Ambulated the length of hallway Pre HR 86 and O2 sats 96%; Post walk  and O2 sats 93%.     Physical Exam   Constitutional: He is oriented to person, place, and time. He appears well-developed and well-nourished. No distress.   Cardiovascular: Normal rate, regular rhythm and normal heart sounds.   Pulmonary/Chest: Normal expansion, effort normal and breath sounds normal. No respiratory distress. He has no wheezes. He has no rhonchi.   Musculoskeletal: Normal range of motion. He exhibits no edema.   Lymphadenopathy: No supraclavicular adenopathy is present.     He has no cervical adenopathy.   Neurological: He is alert and oriented to " person, place, and time. Gait abnormal.   He is able to ambulate in hallway without issue.    Skin: Skin is warm and dry. Nails show no clubbing.   Psychiatric: He has a normal mood and affect. His behavior is normal.   Vitals reviewed.    Personal Diagnostic Review  Spirometry 1/10/2020-  Spirometry shows obstruction with severe ventilatory impairment.  This impairment may be due to the obstruction alone but restrictive disease is not ruled out. DLCO is moderately decreased.     CT of chest 1/10/2020-  Airways: Central airways are patent.    Lungs/Pleura: Lungs are expanded.  Panlobular emphysematous changes.  No mass, pneumothorax, or pleural fluid.  0.6 cm solid soft tissue nodule in the inferior lingular segment the left lower lobe (axial series 4, 350).  Additional scattered pulmonary micro nodules measuring between 0.1-0.3 cm.    Esophagus: Normal.    Upper Abdomen: Small hiatal hernia.  Hyperdense material within stomach and duodenum, likely ingested material.  No acute abnormality of the partially imaged upper abdomen.        6 MWT 12/13/2017-  12/13/2017---------Distance: 304.8 meters (1000 feet)     O2 Sat % Supplemental Oxygen Heart Rate Blood Pressure [place]   Scale   Pre-exercise  (Resting) 99 % Room Air 84 bpm 156/76 mmHg 1   During Exercise 89 % Room Air 108 bpm 143/80 mmHg 2   Post-exercise  (Recovery) 93 % Room Air  89 bpm         Assessment:     Problem List Items Addressed This Visit        Pulmonary    Chronic obstructive pulmonary disease - Primary    Overview     Spirometry shows obstruction with severe ventilatory impairment. This impairment may be due to the obstruction  alone but restrictive disease is not ruled out. DLCO is moderately decreased.         Current Assessment & Plan     Reports having some improvement with use of Trelegy daily. Can experience some shortness of breath with activity. Has chronic lowe back pain which he has not been able to address because of COVID pandemic. He  reports has been deconditioned. Recently he reports followed with pain management and had steriod injection- now able to perform ADL (baseline with Parkinson Hx).     Plan:  -Continue Trelegy. He reports benefiting from medication use. Discussed to rinse mouth after use.   -Prescribe albuterol as needed for shortness of breath or wheezing  -Discussed increase in exercise regimen for possible deconditioning.   -Continue annual LDCT screening- has < 1 cm lung nodules and significant smoking hx.   - Reports UTD on vaccines- cannot take Flu vaccine as per past medical HX.   -Follow up in 6 months.          Relevant Medications    albuterol (PROVENTIL/VENTOLIN HFA) 90 mcg/actuation inhaler    Lung nodules    Overview     CT of chest 1/10/2020  Lungs/Pleura: Lungs are expanded.  Panlobular emphysematous changes.  No mass, pneumothorax, or pleural fluid.  0.6 cm solid soft tissue nodule in the inferior lingular segment the left lower lobe (axial series 4, 350).  Additional scattered pulmonary micro nodules measuring between 0.1-0.3 cm.         Current Assessment & Plan     Follow up with non contrast CT of chest in 12 months (1/2021).            Follow up in 6 months or sooner if needed.

## 2020-07-10 ENCOUNTER — OFFICE VISIT (OUTPATIENT)
Dept: NEUROLOGY | Facility: CLINIC | Age: 75
End: 2020-07-10
Payer: MEDICARE

## 2020-07-10 VITALS
HEART RATE: 82 BPM | BODY MASS INDEX: 26.46 KG/M2 | SYSTOLIC BLOOD PRESSURE: 123 MMHG | WEIGHT: 155 LBS | DIASTOLIC BLOOD PRESSURE: 81 MMHG | HEIGHT: 64 IN

## 2020-07-10 DIAGNOSIS — M48.061 STENOSIS OF LATERAL RECESS OF LUMBAR SPINE: ICD-10-CM

## 2020-07-10 DIAGNOSIS — G20.A1 PARKINSON DISEASE: Primary | ICD-10-CM

## 2020-07-10 PROCEDURE — 3079F PR MOST RECENT DIASTOLIC BLOOD PRESSURE 80-89 MM HG: ICD-10-PCS | Mod: CPTII,S$GLB,, | Performed by: PSYCHIATRY & NEUROLOGY

## 2020-07-10 PROCEDURE — 3079F DIAST BP 80-89 MM HG: CPT | Mod: CPTII,S$GLB,, | Performed by: PSYCHIATRY & NEUROLOGY

## 2020-07-10 PROCEDURE — 99999 PR PBB SHADOW E&M-EST. PATIENT-LVL III: ICD-10-PCS | Mod: PBBFAC,,, | Performed by: PSYCHIATRY & NEUROLOGY

## 2020-07-10 PROCEDURE — 99214 OFFICE O/P EST MOD 30 MIN: CPT | Mod: S$GLB,,, | Performed by: PSYCHIATRY & NEUROLOGY

## 2020-07-10 PROCEDURE — 3074F PR MOST RECENT SYSTOLIC BLOOD PRESSURE < 130 MM HG: ICD-10-PCS | Mod: CPTII,S$GLB,, | Performed by: PSYCHIATRY & NEUROLOGY

## 2020-07-10 PROCEDURE — 1101F PT FALLS ASSESS-DOCD LE1/YR: CPT | Mod: CPTII,S$GLB,, | Performed by: PSYCHIATRY & NEUROLOGY

## 2020-07-10 PROCEDURE — 99214 PR OFFICE/OUTPT VISIT, EST, LEVL IV, 30-39 MIN: ICD-10-PCS | Mod: S$GLB,,, | Performed by: PSYCHIATRY & NEUROLOGY

## 2020-07-10 PROCEDURE — 1125F PR PAIN SEVERITY QUANTIFIED, PAIN PRESENT: ICD-10-PCS | Mod: S$GLB,,, | Performed by: PSYCHIATRY & NEUROLOGY

## 2020-07-10 PROCEDURE — 1101F PR PT FALLS ASSESS DOC 0-1 FALLS W/OUT INJ PAST YR: ICD-10-PCS | Mod: CPTII,S$GLB,, | Performed by: PSYCHIATRY & NEUROLOGY

## 2020-07-10 PROCEDURE — 1125F AMNT PAIN NOTED PAIN PRSNT: CPT | Mod: S$GLB,,, | Performed by: PSYCHIATRY & NEUROLOGY

## 2020-07-10 PROCEDURE — 99999 PR PBB SHADOW E&M-EST. PATIENT-LVL III: CPT | Mod: PBBFAC,,, | Performed by: PSYCHIATRY & NEUROLOGY

## 2020-07-10 PROCEDURE — 1159F MED LIST DOCD IN RCRD: CPT | Mod: S$GLB,,, | Performed by: PSYCHIATRY & NEUROLOGY

## 2020-07-10 PROCEDURE — 3074F SYST BP LT 130 MM HG: CPT | Mod: CPTII,S$GLB,, | Performed by: PSYCHIATRY & NEUROLOGY

## 2020-07-10 PROCEDURE — 1159F PR MEDICATION LIST DOCUMENTED IN MEDICAL RECORD: ICD-10-PCS | Mod: S$GLB,,, | Performed by: PSYCHIATRY & NEUROLOGY

## 2020-07-10 RX ORDER — ALBUTEROL SULFATE 108 UG/1
AEROSOL, METERED RESPIRATORY (INHALATION)
COMMUNITY
Start: 2020-06-10 | End: 2021-01-13

## 2020-07-10 RX ORDER — METHYLPREDNISOLONE 4 MG/1
TABLET ORAL
Qty: 1 PACKAGE | Refills: 0 | Status: SHIPPED | OUTPATIENT
Start: 2020-07-10 | End: 2020-07-31

## 2020-07-10 RX ORDER — FLUTICASONE FUROATE, UMECLIDINIUM BROMIDE AND VILANTEROL TRIFENATATE 100; 62.5; 25 UG/1; UG/1; UG/1
POWDER RESPIRATORY (INHALATION)
COMMUNITY
Start: 2020-06-09 | End: 2020-09-30 | Stop reason: SDUPTHER

## 2020-07-10 NOTE — PROGRESS NOTES
"Name: Hector Kellogg  MRN: [id number]   CSN: [id number]      Date: 07/10/2020    Chief Complaint / Interval History:    From Sept 2019:  - feels like he cannot do things  - not apathy, wants to work or do things  - lung issue are limiting him from COPD  - still taking cd/ld 25/100 1 TID-5x/day  - memory is ok  - appetite is good  - no constipation now  - bladder is quick    From Oct 2018:  - still getting pinched nerve pain in the back or legs  - will take an extra gabapentin  - takes cd/ld every 3-4 hours  - gait is stable    From Summer 2017:  - a bit more off time between dosing  - peak is pretty good  - gait is frustrating  - memory holding up  - family pleased    From 12/06  1) A bit more off time in between dosing  2) More cramping when off, gabapentin helps at night  3) Would be willing to take meds four times per day    History of Present Illness (HPI):  68 yo with diagnosis of PD, symptoms began 1.5 years ago.  First noted tremor in the L hand, "all left side".  Drags his left foot, generally slower overall.  Dx 1 year ago, had [employer] as a confirmatory study.  Says he had an MRI of the low back as well, was feeling "pinched nerves" in the back and into the both legs.  Was prescribed "some drug that cost $400" and didn;t take. Also prescribed pramipexole per the notes but also said he "never took it."    Was in [place] for 2 years, now re-establishing care here.    Has questions about gabapentin - 800 now taking 1/2 tab at night only and has no significant pain.  Will ibuprofen for breakthrough.  Not taking Norco.    Retired , [employer], left at age 65.    Nonmotor/Premotor ROS:  Hyposmia (HENT)?No per him  RBD/sleep issues (Constitutional)?Yes - acts out dreams  Depression/anxiety (Psychiatric)?No - better on Citalopram  Fatigue (Constitutional)?Yes  Constipation (GI)?Yes  - uses OTC stool softener  Urinary issues ()?Yes - urgency  Sexual dysfunction ()?Yes - some ED.    Orthostasis " (Cardiovascular)?No  Leg swelling (Cardiovascular)? No  Falls (Musculoskeletal)?No  Cognitive impairment (Neurologic)?No  Psychoses (Psychiatric)?No  Pain/Paresthesia (Neurologic)?Yes  Visual changes (Eyes)?No  Moles / skin changes (Skin)?Yes - seborrhea, has a scaly mole on the R side of the head  Stridor / SOB (Pulm)?Yes - with acticity  Bruising (Heme)?No    Past Medical History: The patient  has a past medical history of Cataract, Chronic back pain greater than 3 months duration, Coronary artery disease, DDD (degenerative disc disease), lumbar (5/16/2020), Hyperlipidemia, Hypertension, MI, old (2006), and Parkinson disease.    Social History: The patient  reports that he quit smoking about 6 years ago. He has a 40.00 pack-year smoking history. He has never used smokeless tobacco. He reports current alcohol use. He reports that he does not use drugs.  Rare social drink.    Family History: Their family history includes Cataracts in his father; Heart attack in his father; Heart disease in his maternal uncle and paternal uncle.    Allergies: Statins-hmg-coa reductase inhibitors     Meds:   Current Outpatient Medications on File Prior to Visit   Medication Sig Dispense Refill    albuterol (PROVENTIL/VENTOLIN HFA) 90 mcg/actuation inhaler Inhale 2 puffs into the lungs every 6 (six) hours as needed for Wheezing or Shortness of Breath. Rescue 18 g 6    aspirin (ECOTRIN) 81 MG EC tablet Take 81 mg by mouth once daily.      carbidopa-levodopa  mg (SINEMET)  mg per tablet Take 1 tablet by mouth 5 (five) times daily. 450 tablet 3    citalopram (CELEXA) 20 MG tablet TAKE 1 TABLET (20 MG TOTAL) BY MOUTH ONCE DAILY. 30 tablet 10    fluticasone-umeclidin-vilanter (TRELEGY ELLIPTA) 100-62.5-25 mcg DsDv Inhale 1 puff into the lungs once daily. 60 each 6    loratadine (CLARITIN) 10 mg tablet Take 10 mg by mouth as needed for Allergies.      metoprolol succinate (TOPROL-XL) 50 MG 24 hr tablet Take 1 tablet (50 mg  "total) by mouth once daily. 90 tablet 3    multivitamin capsule Take 1 capsule by mouth once daily.      PROVENTIL HFA 90 mcg/actuation inhaler       rosuvastatin (CRESTOR) 40 MG Tab TAKE 1 TABLET (40 MG TOTAL) BY MOUTH EVERY EVENING. 90 tablet 2    TRELEGY ELLIPTA 100-62.5-25 mcg DsDv       gabapentin (NEURONTIN) 400 MG capsule Take 1 capsule (400 mg total) by mouth 3 (three) times daily. (Patient not taking: Reported on 7/10/2020) 180 capsule 3    nitroGLYCERIN (NITROSTAT) 0.4 MG SL tablet Place 1 tablet (0.4 mg total) under the tongue every 5 (five) minutes as needed for Chest pain. 25 tablet 4     No current facility-administered medications on file prior to visit.      Exam:  /81   Pulse 82   Ht 5' 4" (1.626 m)   Wt 70.3 kg (155 lb)   BMI 26.61 kg/m²     Constitutional  Well-developed, well-nourished, appears stated age   * Specialized movement exam  Mild hypophonic speech.    Mild facial masking.   Automatism of the left arm   L>R cogwheel rigidity - mild     L>R bradykinesia - mild.   Only rare rest tremor in L hand   No other dystonia, chorea, athetosis, myoclonus, or tics.   No motor impersistence.   Normal-based gait.   + mildly abnormal arm swing B, worse on the L  Good stride length, no freezing   No postural instability.      Laboratory/Radiological:  - Results:  Lab Visit on 2020   Component Date Value Ref Range Status    SARS-CoV2 (COVID-19) Qualitative P* 2020 Not Detected  Not Detected Final     - Independent review of images: none available.    Reviewed records of:  1) Grade 1 anterolisthesis of spine  2) Positive SHAHRZAD scan    Diagnoses:          1) Idiopathic PD, tremor dominant.  On CD/LD.  2) Low back pain --> now worse.  C/W LSS and sciatica    Medical Decision Makin) add Medrol dose pack now  2) reconsider facet blcokw with Pain  3) maybe more oral pain meds  4) continue sinemet for now          Eliu Klein MD, MPH  Division of Movement and Memory " Disorders  [facility]  [phone]

## 2020-07-10 NOTE — LETTER
July 21, 2020      Blue Mcgill, DO  [address] [facility] [place] [address]           [facility]  [address]  [place] [address]  Phone: [phone]  Fax: [phone]          Patient: Hector Kellogg   MR Number: [id number]   YOB: 1945   Date of Visit: 7/10/2020       Dear Dr. Blue Mcgill:    Thank you for referring Hector Kellogg to me for evaluation. Attached you will find relevant portions of my assessment and plan of care.    If you have questions, please do not hesitate to call me. I look forward to following Hector Kellogg along with you.    Sincerely,    Eliu Klein MD    Enclosure  CC:  No Recipients    If you would like to receive this communication electronically, please contact [email or web address] or [phone] to request more information on [employer] access.    For providers and/or their staff who would like to refer a patient to [facility], please contact us through our one-stop-shop provider referral line, [facility], at [phone].    If you feel you have received this communication in error or would no longer like to receive these types of communications, please e-mail [email or web address]

## 2020-07-31 ENCOUNTER — TELEPHONE (OUTPATIENT)
Dept: PAIN MEDICINE | Facility: CLINIC | Age: 75
End: 2020-07-31

## 2020-07-31 NOTE — TELEPHONE ENCOUNTER
Staff left detailed message regarding appointment 8/3/20 at 1:20pm with Marcelo Washington Np as in office visit and to inform patient of direct line to call before entering the building also to arrive 15 minutes early but we ask that patient come alone unless its an essential visitor.

## 2020-08-03 ENCOUNTER — OFFICE VISIT (OUTPATIENT)
Dept: PAIN MEDICINE | Facility: CLINIC | Age: 75
End: 2020-08-03
Payer: MEDICARE

## 2020-08-03 VITALS
WEIGHT: 154.13 LBS | RESPIRATION RATE: 18 BRPM | HEIGHT: 64 IN | BODY MASS INDEX: 26.31 KG/M2 | DIASTOLIC BLOOD PRESSURE: 77 MMHG | TEMPERATURE: 98 F | SYSTOLIC BLOOD PRESSURE: 132 MMHG | HEART RATE: 73 BPM | OXYGEN SATURATION: 98 %

## 2020-08-03 DIAGNOSIS — M51.36 DDD (DEGENERATIVE DISC DISEASE), LUMBAR: ICD-10-CM

## 2020-08-03 DIAGNOSIS — M54.16 LUMBAR RADICULOPATHY: Primary | ICD-10-CM

## 2020-08-03 DIAGNOSIS — M47.817 LUMBOSACRAL SPONDYLOSIS WITHOUT MYELOPATHY: ICD-10-CM

## 2020-08-03 PROCEDURE — 3075F PR MOST RECENT SYSTOLIC BLOOD PRESS GE 130-139MM HG: ICD-10-PCS | Mod: CPTII,S$GLB,, | Performed by: NURSE PRACTITIONER

## 2020-08-03 PROCEDURE — 99213 OFFICE O/P EST LOW 20 MIN: CPT | Mod: S$GLB,,, | Performed by: NURSE PRACTITIONER

## 2020-08-03 PROCEDURE — 99999 PR PBB SHADOW E&M-EST. PATIENT-LVL IV: ICD-10-PCS | Mod: PBBFAC,,, | Performed by: NURSE PRACTITIONER

## 2020-08-03 PROCEDURE — 99999 PR PBB SHADOW E&M-EST. PATIENT-LVL IV: CPT | Mod: PBBFAC,,, | Performed by: NURSE PRACTITIONER

## 2020-08-03 PROCEDURE — 99213 PR OFFICE/OUTPT VISIT, EST, LEVL III, 20-29 MIN: ICD-10-PCS | Mod: S$GLB,,, | Performed by: NURSE PRACTITIONER

## 2020-08-03 PROCEDURE — 3078F DIAST BP <80 MM HG: CPT | Mod: CPTII,S$GLB,, | Performed by: NURSE PRACTITIONER

## 2020-08-03 PROCEDURE — 1101F PR PT FALLS ASSESS DOC 0-1 FALLS W/OUT INJ PAST YR: ICD-10-PCS | Mod: CPTII,S$GLB,, | Performed by: NURSE PRACTITIONER

## 2020-08-03 PROCEDURE — 3078F PR MOST RECENT DIASTOLIC BLOOD PRESSURE < 80 MM HG: ICD-10-PCS | Mod: CPTII,S$GLB,, | Performed by: NURSE PRACTITIONER

## 2020-08-03 PROCEDURE — 3075F SYST BP GE 130 - 139MM HG: CPT | Mod: CPTII,S$GLB,, | Performed by: NURSE PRACTITIONER

## 2020-08-03 PROCEDURE — 1101F PT FALLS ASSESS-DOCD LE1/YR: CPT | Mod: CPTII,S$GLB,, | Performed by: NURSE PRACTITIONER

## 2020-08-03 PROCEDURE — 1125F AMNT PAIN NOTED PAIN PRSNT: CPT | Mod: S$GLB,,, | Performed by: NURSE PRACTITIONER

## 2020-08-03 PROCEDURE — 1125F PR PAIN SEVERITY QUANTIFIED, PAIN PRESENT: ICD-10-PCS | Mod: S$GLB,,, | Performed by: NURSE PRACTITIONER

## 2020-08-03 PROCEDURE — 99499 UNLISTED E&M SERVICE: CPT | Mod: S$GLB,,, | Performed by: NURSE PRACTITIONER

## 2020-08-03 PROCEDURE — 99499 RISK ADDL DX/OHS AUDIT: ICD-10-PCS | Mod: S$GLB,,, | Performed by: NURSE PRACTITIONER

## 2020-08-03 PROCEDURE — 1159F PR MEDICATION LIST DOCUMENTED IN MEDICAL RECORD: ICD-10-PCS | Mod: S$GLB,,, | Performed by: NURSE PRACTITIONER

## 2020-08-03 PROCEDURE — 1159F MED LIST DOCD IN RCRD: CPT | Mod: S$GLB,,, | Performed by: NURSE PRACTITIONER

## 2020-08-03 NOTE — H&P (VIEW-ONLY)
Chronic Pain - New Consult    Referring Physician: No ref. provider found    Chief Complaint: Right Lower Back Pain       SUBJECTIVE:    Hector Kellogg presents to the clinic for the evaluation of right lower back buttock pain. The pain started 5-10 years ago following falling off scaffold at work. Since then, symptoms have been worsening.  The pain is located in the right buttock area and radiates to the down right leg to knee.  The pain is described as sharp and is rated as 4/10. The pain is rated with a score of  4/10 on the BEST day and a score of 9/10 on the WORST day.   Symptoms interfere with daily activity.   Has occasional paresthesias and numbness to RLE all the down the leg.   The pain is exacerbated by Standing, Walking and Morning.    The pain is mitigated by heat, laying down and rest.   He reports spending 2 hours per day reclining. The patient reports 4 hours of uninterrupted sleep per night.    Patient denies night fever/night sweats, urinary incontinence, bowel incontinence, significant weight loss, significant motor weakness and loss of sensations.    Interval History 05/14/2020:  76 y/o M with hx of CAD s/p PCI (2008 and 2017), mild Pulm HTN, Essential HTN, Parkinson's Disease and COPD( Former smoker x 40 years).  On ASA 81 mg for CAD. Formerly on Plavix for DAPT  On Medical Therapy with Gabapentin without any side effects.   Started Meloxican this week and acknowledges being effective.       Interval history 06/08/2020:  Patient presents for telephone follow-up of lower back pain and right lower leg pain.  Patient is status post right L4/5 and L5/S1 TFESI on 5/27/2020.  Patient states 100% resolution of lower back pain and right leg pain.  Patient denies any neurological changes, denies any health changes.  Patient denies any neurological emergency symptoms such as loss of bowel/bladder, and denies saddle paresthesias.  Patient rates pain 0/10.  Patient is no longer taking Mobic, takes  Flexeril 5 mg g and gabapentin 400 mg p.r.n.      Interval History 8/4/2020:  The patient presents for follow-up of lower back pain.  He has associated radiculopathy down lateral right leg stopping at ankle.  He has had prior TF ASHISH with good relief of the symptoms which lasted approx 4 weeks. He states now however Back has become more bothersome than leg pain. Denies any loss of b/b or saddle paresthesias. Continue to take Flexeril 5mg and gaapentin 400mg PRN which provide some relief without adverse side effects.     Has not had evaluation from Orthopaedics / Neurosurgery  No procedure hx for current symptoms     Physical Therapy/Home Exercise: No     Pain Disability Index Review:  Last 3 PDI Scores 8/3/2020 5/14/2020   Pain Disability Index (PDI) 57 56       Pain Medications:    Gabapentin 1200 mg TID  Meloxican 15 mg    Pain Procedures:   5/27/2020 Right L4/5, and L5/S1 TFESI 100% relief.     Imaging:   3/12/20 MRI Lumbar Spine  Narrative     EXAMINATION:  MRI LUMBAR SPINE WITHOUT CONTRAST    CLINICAL HISTORY:  Low back pain, >6wks conservative tx, persistent-progressive sx, surgical candidate; Spinal stenosis, lumbar region without neurogenic claudication    TECHNIQUE:  Multiplanar, multisequence MR images were acquired from the thoracolumbar junction to the sacrum without contrast.    COMPARISON:  None available.    FINDINGS:  Alignment: Grade 1 anterolisthesis of L4-L5 and L5-S1.  Alignment of the lumbar spine is otherwise anatomic.    Vertebrae: Normal marrow signal. No fracture.  Bilateral L5 spondylolysis.    Discs: Scattered mild to moderate disc height loss of the lumbar spine.  Note made of increased STIR signal within the L2-L3 disc space.    Cord: Normal.  Conus terminates at L1.    Degenerative findings:    T12-L1: No spinal canal stenosis or neural foraminal narrowing.    L1-L2: Circumferential disc bulge with mild bilateral facet arthropathy.  No spinal canal stenosis or neural foraminal  narrowing.    L2-L3: Circumferential disc bulge with mild bilateral facet arthropathy resulting in mild bilateral neural foraminal narrowing.  No spinal canal stenosis    L3-L4: Circumferential disc bulge with moderate bilateral facet arthropathy and ligamentum flavum hypertrophy results in mild spinal canal stenosis and mild bilateral neural foraminal narrowing.    L4-L5: Grade 1 anterolisthesis with diffuse disc bulge with severe bilateral facet arthropathy and ligamentum flavum hypertrophy results in moderate spinal canal stenosis and severe bilateral neural foraminal narrowing.    L5-S1: Circumferential disc bulge with moderate bilateral facet arthropathy resulting in moderate spinal canal stenosis and moderate bilateral neural foraminal narrowing.    Paraspinal muscles & soft tissues: Exophytic simple cysts in the left kidney.      Impression       1. Multilevel degenerative changes of the lumbar spine, most significant at the levels of L4-L5 and L5-S1.  At L4-L5 there is moderate spinal canal stenosis and severe bilateral neural foraminal narrowing.  L5-S1, there is moderate spinal canal stenosis and moderate bilateral neural foraminal narrowing.  2. Bilateral spondylolysis at L5 grade 1 anterolisthesis of L5-S1.  3. Increased STIR signal within the L2-L3 disc space.  While this is favored to be degenerative, similar findings may be seen with early discitis.  Clinical correlation with lab parameters advised.         Past Medical History:   Diagnosis Date    Cataract     Chronic back pain greater than 3 months duration     Coronary artery disease     DDD (degenerative disc disease), lumbar 5/16/2020    Hyperlipidemia     Hypertension     MI, old 2006    Parkinson disease      Past Surgical History:   Procedure Laterality Date    CARDIAC CATHETERIZATION  2006    x 3    CARDIAC CATHETERIZATION  10/11/2015    x 2    CATARACT EXTRACTION W/  INTRAOCULAR LENS IMPLANT Right 03/06/2018    Dr. Liu     CORONARY ANGIOPLASTY  ,     3 stents    HEMORRHOID SURGERY      TRANSFORAMINAL EPIDURAL INJECTION OF STEROID Right 2020    Procedure: INJECTION, STEROID, EPIDURAL, TRANSFORAMINAL APPROACH;  Surgeon: Ramiro Calvillo MD;  Location: [facility] [place];  Service: Pain Management;  Laterality: Right;  Right TF ASHISH L4-L5, L5-S1    PTcannot come earlier than 12:30     Social History     Socioeconomic History    Marital status:      Spouse name: Not on file    Number of children: Not on file    Years of education: Not on file    Highest education level: Not on file   Occupational History    Not on file   Social Needs    Financial resource strain: Not hard at all    Food insecurity     Worry: Never true     Inability: Never true    Transportation needs     Medical: No     Non-medical: No   Tobacco Use    Smoking status: Former Smoker     Packs/day: 1.00     Years: 40.00     Pack years: 40.00     Quit date: 2014     Years since quittin.5    Smokeless tobacco: Never Used   Substance and Sexual Activity    Alcohol use: Yes     Frequency: Never     Binge frequency: Never     Comment: socially    Drug use: No    Sexual activity: Not on file   Lifestyle    Physical activity     Days per week: 0 days     Minutes per session: Not on file    Stress: Only a little   Relationships    Social connections     Talks on phone: Once a week     Gets together: Once a week     Attends Voodoo service: Not on file     Active member of club or organization: No     Attends meetings of clubs or organizations: Never     Relationship status:    Other Topics Concern    Not on file   Social History Narrative    Not on file     Family History   Problem Relation Age of Onset    Heart attack Father     Cataracts Father     Heart disease Maternal Uncle     Heart disease Paternal Uncle     Hypertension Neg Hx     Asthma Neg Hx     Emphysema Neg Hx     Amblyopia Neg Hx     Blindness Neg Hx      Macular degeneration Neg Hx     Retinal detachment Neg Hx     Strabismus Neg Hx        Review of patient's allergies indicates:  No Active Allergies    Current Outpatient Medications   Medication Sig    albuterol (PROVENTIL/VENTOLIN HFA) 90 mcg/actuation inhaler Inhale 2 puffs into the lungs every 6 (six) hours as needed for Wheezing or Shortness of Breath. Rescue    aspirin (ECOTRIN) 81 MG EC tablet Take 81 mg by mouth once daily.    carbidopa-levodopa  mg (SINEMET)  mg per tablet Take 1 tablet by mouth 5 (five) times daily.    citalopram (CELEXA) 20 MG tablet TAKE 1 TABLET (20 MG TOTAL) BY MOUTH ONCE DAILY.    fluticasone-umeclidin-vilanter (TRELEGY ELLIPTA) 100-62.5-25 mcg DsDv Inhale 1 puff into the lungs once daily.    gabapentin (NEURONTIN) 400 MG capsule Take 1 capsule (400 mg total) by mouth 3 (three) times daily.    loratadine (CLARITIN) 10 mg tablet Take 10 mg by mouth as needed for Allergies.    metoprolol succinate (TOPROL-XL) 50 MG 24 hr tablet Take 1 tablet (50 mg total) by mouth once daily.    multivitamin capsule Take 1 capsule by mouth once daily.    PROVENTIL HFA 90 mcg/actuation inhaler     rosuvastatin (CRESTOR) 40 MG Tab TAKE 1 TABLET (40 MG TOTAL) BY MOUTH EVERY EVENING.    TRELEGY ELLIPTA 100-62.5-25 mcg DsDv     nitroGLYCERIN (NITROSTAT) 0.4 MG SL tablet Place 1 tablet (0.4 mg total) under the tongue every 5 (five) minutes as needed for Chest pain.     No current facility-administered medications for this visit.        REVIEW OF SYSTEMS:    GENERAL:  No weight loss, malaise or fevers.  HEENT:  Negative for frequent or significant headaches.  NECK:  Negative for lumps, goiter, pain and significant neck swelling.  RESPIRATORY:  Negative for cough, wheezing or shortness of breath.  CARDIOVASCULAR:  Negative for chest pain, leg swelling or palpitations.  GI:  Negative for abdominal discomfort, blood in stools or black stools or change in bowel habits.  MUSCULOSKELETAL:   "See HPI.  SKIN:  Negative for lesions, rash, and itching.  PSYCH:  Positive for sleep disturbance, mood disorder.  HEMATOLOGY/LYMPHOLOGY:  Negative for prolonged bleeding, bruising easily or swollen nodes.  NEURO:   UE tremors present  All other reviewed and negative other than HPI.    OBJECTIVE:    /77   Pulse 73   Temp 97.7 °F (36.5 °C) (Temporal)   Resp 18   Ht 5' 4" (1.626 m)   Wt 69.9 kg (154 lb 1.6 oz)   SpO2 98%   BMI 26.45 kg/m²         Prior exam PHYSICAL EXAMINATION:    General appearance: Well appearing, in no acute distress, alert and oriented x3.  Psych:  Mood and affect appropriate.  Skin: Skin color, texture, turgor normal, no rashes or lesions, in both upper and lower body.  Head/face:  Normocephalic, atraumatic. No palpable lymph nodes.  Neck: No pain to palpation over the cervical paraspinous muscles. Spurling Negative. No pain with neck flexion, extension, or lateral flexion.   Cor: RRR  Pulm: CTA  GI:  Soft and non-tender.  Back: +Slump on right, +facet loading to right lower lumbar> pain with forward flexion.   Extremities: Peripheral joint ROM is full and pain free without obvious instability or laxity in all four extremities. No deformities, edema, or skin discoloration. Good capillary refill.  Musculoskeletal: Shoulder, hip, sacroiliac and knee provocative maneuvers are negative. Bilateral upper and lower extremity strength is normal and symmetric.    No paraspinal muscle tenderness. Point tenderness to Right GTB. Point tenderness at Right Gluteus. No atrophy or tone abnormalities are noted.  Neuro: Bilateral upper and lower extremity coordination and muscle stretch reflexes are physiologic and symmetric.  Plantar response are downgoing. No loss of sensation is noted. UE tremor is present  Gait: normal.    ASSESSMENT: 75 y.o. year old male with right lower back pain, consistent with l    1. Lumbar radiculopathy     2. Lumbosacral spondylosis without myelopathy     3. DDD " (degenerative disc disease), lumbar           PLAN:     - Prior records reviewed   - Pt s/p Right L4/5 &L5/S1 TFESI with 100% resolution of symptoms but returned one month after  - He has seen Dr. Klein who recommended considering Facet injection  - With pain reproduced by Facet loading and significant a.m. stiffness voiced   - Schedule Right L4-5,L5-S1 facet joint injection  - Consider MBB/RFA if limited relief  - Also consider repeat Right L4/5, L5/S1 TFESI as it was beneficial in past but again voiced Back>Leg pain at this time.   - Continue Gabapentin   - Continue Flexiril  - I have stressed the importance of physical activity and a home exercise plan to help with pain and improve health.  - Patient can continue with medications for now since they are providing benefits, using them appropriately, and without side effects.  - Counseled patient regarding the importance of physical therapy.  - RTC 2 weeks after procedure for re-evaluation.     The above plan and management options were discussed at length with patient. Patient is in agreement with the above and verbalized understanding. It will be communicated with the referring physician via electronic record, fax, or mail.    Marcelo Washington NP    08/03/2020

## 2020-08-03 NOTE — PROGRESS NOTES
Chronic Pain - New Consult    Referring Physician: No ref. provider found    Chief Complaint: Right Lower Back Pain       SUBJECTIVE:    Hector Kellogg presents to the clinic for the evaluation of right lower back buttock pain. The pain started 5-10 years ago following falling off scaffold at work. Since then, symptoms have been worsening.  The pain is located in the right buttock area and radiates to the down right leg to knee.  The pain is described as sharp and is rated as 4/10. The pain is rated with a score of  4/10 on the BEST day and a score of 9/10 on the WORST day.   Symptoms interfere with daily activity.   Has occasional paresthesias and numbness to RLE all the down the leg.   The pain is exacerbated by Standing, Walking and Morning.    The pain is mitigated by heat, laying down and rest.   He reports spending 2 hours per day reclining. The patient reports 4 hours of uninterrupted sleep per night.    Patient denies night fever/night sweats, urinary incontinence, bowel incontinence, significant weight loss, significant motor weakness and loss of sensations.    Interval History 05/14/2020:  76 y/o M with hx of CAD s/p PCI (2008 and 2017), mild Pulm HTN, Essential HTN, Parkinson's Disease and COPD( Former smoker x 40 years).  On ASA 81 mg for CAD. Formerly on Plavix for DAPT  On Medical Therapy with Gabapentin without any side effects.   Started Meloxican this week and acknowledges being effective.       Interval history 06/08/2020:  Patient presents for telephone follow-up of lower back pain and right lower leg pain.  Patient is status post right L4/5 and L5/S1 TFESI on 5/27/2020.  Patient states 100% resolution of lower back pain and right leg pain.  Patient denies any neurological changes, denies any health changes.  Patient denies any neurological emergency symptoms such as loss of bowel/bladder, and denies saddle paresthesias.  Patient rates pain 0/10.  Patient is no longer taking Mobic, takes  Flexeril 5 mg g and gabapentin 400 mg p.r.n.      Interval History 8/4/2020:  The patient presents for follow-up of lower back pain.  He has associated radiculopathy down lateral right leg stopping at ankle.  He has had prior TF ASHISH with good relief of the symptoms which lasted approx 4 weeks. He states now however Back has become more bothersome than leg pain. Denies any loss of b/b or saddle paresthesias. Continue to take Flexeril 5mg and gaapentin 400mg PRN which provide some relief without adverse side effects.     Has not had evaluation from Orthopaedics / Neurosurgery  No procedure hx for current symptoms     Physical Therapy/Home Exercise: No     Pain Disability Index Review:  Last 3 PDI Scores 8/3/2020 5/14/2020   Pain Disability Index (PDI) 57 56       Pain Medications:    Gabapentin 1200 mg TID  Meloxican 15 mg    Pain Procedures:   5/27/2020 Right L4/5, and L5/S1 TFESI 100% relief.     Imaging:   3/12/20 MRI Lumbar Spine  Narrative     EXAMINATION:  MRI LUMBAR SPINE WITHOUT CONTRAST    CLINICAL HISTORY:  Low back pain, >6wks conservative tx, persistent-progressive sx, surgical candidate; Spinal stenosis, lumbar region without neurogenic claudication    TECHNIQUE:  Multiplanar, multisequence MR images were acquired from the thoracolumbar junction to the sacrum without contrast.    COMPARISON:  None available.    FINDINGS:  Alignment: Grade 1 anterolisthesis of L4-L5 and L5-S1.  Alignment of the lumbar spine is otherwise anatomic.    Vertebrae: Normal marrow signal. No fracture.  Bilateral L5 spondylolysis.    Discs: Scattered mild to moderate disc height loss of the lumbar spine.  Note made of increased STIR signal within the L2-L3 disc space.    Cord: Normal.  Conus terminates at L1.    Degenerative findings:    T12-L1: No spinal canal stenosis or neural foraminal narrowing.    L1-L2: Circumferential disc bulge with mild bilateral facet arthropathy.  No spinal canal stenosis or neural foraminal  narrowing.    L2-L3: Circumferential disc bulge with mild bilateral facet arthropathy resulting in mild bilateral neural foraminal narrowing.  No spinal canal stenosis    L3-L4: Circumferential disc bulge with moderate bilateral facet arthropathy and ligamentum flavum hypertrophy results in mild spinal canal stenosis and mild bilateral neural foraminal narrowing.    L4-L5: Grade 1 anterolisthesis with diffuse disc bulge with severe bilateral facet arthropathy and ligamentum flavum hypertrophy results in moderate spinal canal stenosis and severe bilateral neural foraminal narrowing.    L5-S1: Circumferential disc bulge with moderate bilateral facet arthropathy resulting in moderate spinal canal stenosis and moderate bilateral neural foraminal narrowing.    Paraspinal muscles & soft tissues: Exophytic simple cysts in the left kidney.      Impression       1. Multilevel degenerative changes of the lumbar spine, most significant at the levels of L4-L5 and L5-S1.  At L4-L5 there is moderate spinal canal stenosis and severe bilateral neural foraminal narrowing.  L5-S1, there is moderate spinal canal stenosis and moderate bilateral neural foraminal narrowing.  2. Bilateral spondylolysis at L5 grade 1 anterolisthesis of L5-S1.  3. Increased STIR signal within the L2-L3 disc space.  While this is favored to be degenerative, similar findings may be seen with early discitis.  Clinical correlation with lab parameters advised.         Past Medical History:   Diagnosis Date    Cataract     Chronic back pain greater than 3 months duration     Coronary artery disease     DDD (degenerative disc disease), lumbar 5/16/2020    Hyperlipidemia     Hypertension     MI, old 2006    Parkinson disease      Past Surgical History:   Procedure Laterality Date    CARDIAC CATHETERIZATION  2006    x 3    CARDIAC CATHETERIZATION  10/11/2015    x 2    CATARACT EXTRACTION W/  INTRAOCULAR LENS IMPLANT Right 03/06/2018    Dr. Liu     CORONARY ANGIOPLASTY  ,     3 stents    HEMORRHOID SURGERY      TRANSFORAMINAL EPIDURAL INJECTION OF STEROID Right 2020    Procedure: INJECTION, STEROID, EPIDURAL, TRANSFORAMINAL APPROACH;  Surgeon: Ramiro Calvillo MD;  Location: [facility] [place];  Service: Pain Management;  Laterality: Right;  Right TF ASHISH L4-L5, L5-S1    PTcannot come earlier than 12:30     Social History     Socioeconomic History    Marital status:      Spouse name: Not on file    Number of children: Not on file    Years of education: Not on file    Highest education level: Not on file   Occupational History    Not on file   Social Needs    Financial resource strain: Not hard at all    Food insecurity     Worry: Never true     Inability: Never true    Transportation needs     Medical: No     Non-medical: No   Tobacco Use    Smoking status: Former Smoker     Packs/day: 1.00     Years: 40.00     Pack years: 40.00     Quit date: 2014     Years since quittin.5    Smokeless tobacco: Never Used   Substance and Sexual Activity    Alcohol use: Yes     Frequency: Never     Binge frequency: Never     Comment: socially    Drug use: No    Sexual activity: Not on file   Lifestyle    Physical activity     Days per week: 0 days     Minutes per session: Not on file    Stress: Only a little   Relationships    Social connections     Talks on phone: Once a week     Gets together: Once a week     Attends Cheondo[employer] service: Not on file     Active member of club or organization: No     Attends meetings of clubs or organizations: Never     Relationship status:    Other Topics Concern    Not on file   Social History Narrative    Not on file     Family History   Problem Relation Age of Onset    Heart attack Father     Cataracts Father     Heart disease Maternal Uncle     Heart disease Paternal Uncle     Hypertension Neg Hx     Asthma Neg Hx     Emphysema Neg Hx     Amblyopia Neg Hx     Blindness Neg Hx      Macular degeneration Neg Hx     Retinal detachment Neg Hx     Strabismus Neg Hx        Review of patient's allergies indicates:  No Active Allergies    Current Outpatient Medications   Medication Sig    albuterol (PROVENTIL/VENTOLIN HFA) 90 mcg/actuation inhaler Inhale 2 puffs into the lungs every 6 (six) hours as needed for Wheezing or Shortness of Breath. Rescue    aspirin (ECOTRIN) 81 MG EC tablet Take 81 mg by mouth once daily.    carbidopa-levodopa  mg (SINEMET)  mg per tablet Take 1 tablet by mouth 5 (five) times daily.    citalopram (CELEXA) 20 MG tablet TAKE 1 TABLET (20 MG TOTAL) BY MOUTH ONCE DAILY.    fluticasone-umeclidin-vilanter (TRELEGY ELLIPTA) 100-62.5-25 mcg DsDv Inhale 1 puff into the lungs once daily.    gabapentin (NEURONTIN) 400 MG capsule Take 1 capsule (400 mg total) by mouth 3 (three) times daily.    loratadine (CLARITIN) 10 mg tablet Take 10 mg by mouth as needed for Allergies.    metoprolol succinate (TOPROL-XL) 50 MG 24 hr tablet Take 1 tablet (50 mg total) by mouth once daily.    multivitamin capsule Take 1 capsule by mouth once daily.    PROVENTIL HFA 90 mcg/actuation inhaler     rosuvastatin (CRESTOR) 40 MG Tab TAKE 1 TABLET (40 MG TOTAL) BY MOUTH EVERY EVENING.    TRELEGY ELLIPTA 100-62.5-25 mcg DsDv     nitroGLYCERIN (NITROSTAT) 0.4 MG SL tablet Place 1 tablet (0.4 mg total) under the tongue every 5 (five) minutes as needed for Chest pain.     No current facility-administered medications for this visit.        REVIEW OF SYSTEMS:    GENERAL:  No weight loss, malaise or fevers.  HEENT:  Negative for frequent or significant headaches.  NECK:  Negative for lumps, goiter, pain and significant neck swelling.  RESPIRATORY:  Negative for cough, wheezing or shortness of breath.  CARDIOVASCULAR:  Negative for chest pain, leg swelling or palpitations.  GI:  Negative for abdominal discomfort, blood in stools or black stools or change in bowel habits.  MUSCULOSKELETAL:   "See HPI.  SKIN:  Negative for lesions, rash, and itching.  PSYCH:  Positive for sleep disturbance, mood disorder.  HEMATOLOGY/LYMPHOLOGY:  Negative for prolonged bleeding, bruising easily or swollen nodes.  NEURO:   UE tremors present  All other reviewed and negative other than HPI.    OBJECTIVE:    /77   Pulse 73   Temp 97.7 °F (36.5 °C) (Temporal)   Resp 18   Ht 5' 4" (1.626 m)   Wt 69.9 kg (154 lb 1.6 oz)   SpO2 98%   BMI 26.45 kg/m²         Prior exam PHYSICAL EXAMINATION:    General appearance: Well appearing, in no acute distress, alert and oriented x3.  Psych:  Mood and affect appropriate.  Skin: Skin color, texture, turgor normal, no rashes or lesions, in both upper and lower body.  Head/face:  Normocephalic, atraumatic. No palpable lymph nodes.  Neck: No pain to palpation over the cervical paraspinous muscles. Spurling Negative. No pain with neck flexion, extension, or lateral flexion.   Cor: RRR  Pulm: CTA  GI:  Soft and non-tender.  Back: +Slump on right, +facet loading to right lower lumbar> pain with forward flexion.   Extremities: Peripheral joint ROM is full and pain free without obvious instability or laxity in all four extremities. No deformities, edema, or skin discoloration. Good capillary refill.  Musculoskeletal: Shoulder, hip, sacroiliac and knee provocative maneuvers are negative. Bilateral upper and lower extremity strength is normal and symmetric.    No paraspinal muscle tenderness. Point tenderness to Right GTB. Point tenderness at Right Gluteus. No atrophy or tone abnormalities are noted.  Neuro: Bilateral upper and lower extremity coordination and muscle stretch reflexes are physiologic and symmetric.  Plantar response are downgoing. No loss of sensation is noted. UE tremor is present  Gait: normal.    ASSESSMENT: 75 y.o. year old male with right lower back pain, consistent with l    1. Lumbar radiculopathy     2. Lumbosacral spondylosis without myelopathy     3. DDD " (degenerative disc disease), lumbar           PLAN:     - Prior records reviewed   - Pt s/p Right L4/5 &L5/S1 TFESI with 100% resolution of symptoms but returned one month after  - He has seen Dr. Klein who recommended considering Facet injection  - With pain reproduced by Facet loading and significant a.m. stiffness voiced   - Schedule Right L4-5,L5-S1 facet joint injection  - Consider MBB/RFA if limited relief  - Also consider repeat Right L4/5, L5/S1 TFESI as it was beneficial in past but again voiced Back>Leg pain at this time.   - Continue Gabapentin   - Continue Flexiril  - I have stressed the importance of physical activity and a home exercise plan to help with pain and improve health.  - Patient can continue with medications for now since they are providing benefits, using them appropriately, and without side effects.  - Counseled patient regarding the importance of physical therapy.  - RTC 2 weeks after procedure for re-evaluation.     The above plan and management options were discussed at length with patient. Patient is in agreement with the above and verbalized understanding. It will be communicated with the referring physician via electronic record, fax, or mail.    Marcelo Washington NP    08/03/2020

## 2020-08-05 ENCOUNTER — HOSPITAL ENCOUNTER (OUTPATIENT)
Facility: OTHER | Age: 75
Discharge: HOME | End: 2020-08-05
Attending: ANESTHESIOLOGY | Admitting: ANESTHESIOLOGY
Payer: MEDICARE

## 2020-08-05 VITALS
WEIGHT: 150 LBS | TEMPERATURE: 99 F | DIASTOLIC BLOOD PRESSURE: 74 MMHG | HEART RATE: 69 BPM | SYSTOLIC BLOOD PRESSURE: 146 MMHG | RESPIRATION RATE: 20 BRPM | HEIGHT: 64 IN | OXYGEN SATURATION: 95 % | BODY MASS INDEX: 25.61 KG/M2

## 2020-08-05 DIAGNOSIS — M47.819 SPONDYLOSIS WITHOUT MYELOPATHY: Primary | ICD-10-CM

## 2020-08-05 DIAGNOSIS — M47.817 SPONDYLOSIS OF LUMBOSACRAL REGION, UNSPECIFIED SPINAL OSTEOARTHRITIS COMPLICATION STATUS: ICD-10-CM

## 2020-08-05 DIAGNOSIS — M47.9 OSTEOARTHRITIS OF SPINE: ICD-10-CM

## 2020-08-05 PROCEDURE — 25000003 PHARM REV CODE 250: Performed by: ANESTHESIOLOGY

## 2020-08-05 PROCEDURE — 64493 INJ PARAVERT F JNT L/S 1 LEV: CPT | Mod: RT | Performed by: ANESTHESIOLOGY

## 2020-08-05 PROCEDURE — 64493 PR INJ DX/THER AGNT PARAVERT FACET JOINT,IMG GUIDE,LUMBAR/SAC,1ST LVL: ICD-10-PCS | Mod: RT,,, | Performed by: ANESTHESIOLOGY

## 2020-08-05 PROCEDURE — 63600175 PHARM REV CODE 636 W HCPCS: Performed by: ANESTHESIOLOGY

## 2020-08-05 PROCEDURE — 64494 INJ PARAVERT F JNT L/S 2 LEV: CPT | Mod: RT,,, | Performed by: ANESTHESIOLOGY

## 2020-08-05 PROCEDURE — 64494 PR INJ DX/THER AGNT PARAVERT FACET JOINT,IMG GUIDE,LUMBAR/SAC, 2ND LEVEL: ICD-10-PCS | Mod: RT,,, | Performed by: ANESTHESIOLOGY

## 2020-08-05 PROCEDURE — 64493 INJ PARAVERT F JNT L/S 1 LEV: CPT | Mod: RT,,, | Performed by: ANESTHESIOLOGY

## 2020-08-05 PROCEDURE — 64494 INJ PARAVERT F JNT L/S 2 LEV: CPT | Mod: RT | Performed by: ANESTHESIOLOGY

## 2020-08-05 PROCEDURE — 25500020 PHARM REV CODE 255: Performed by: ANESTHESIOLOGY

## 2020-08-05 RX ORDER — DEXAMETHASONE SODIUM PHOSPHATE 4 MG/ML
INJECTION, SOLUTION INTRA-ARTICULAR; INTRALESIONAL; INTRAMUSCULAR; INTRAVENOUS; SOFT TISSUE
Status: DISCONTINUED | OUTPATIENT
Start: 2020-08-05 | End: 2020-08-05 | Stop reason: HOSPADM

## 2020-08-05 RX ORDER — LIDOCAINE HYDROCHLORIDE 10 MG/ML
INJECTION INFILTRATION; PERINEURAL
Status: DISCONTINUED | OUTPATIENT
Start: 2020-08-05 | End: 2020-08-05 | Stop reason: HOSPADM

## 2020-08-05 RX ORDER — MIDAZOLAM HYDROCHLORIDE 1 MG/ML
INJECTION INTRAMUSCULAR; INTRAVENOUS
Status: DISCONTINUED | OUTPATIENT
Start: 2020-08-05 | End: 2020-08-05 | Stop reason: HOSPADM

## 2020-08-05 RX ORDER — BUPIVACAINE HYDROCHLORIDE 2.5 MG/ML
INJECTION, SOLUTION EPIDURAL; INFILTRATION; INTRACAUDAL
Status: DISCONTINUED | OUTPATIENT
Start: 2020-08-05 | End: 2020-08-05 | Stop reason: HOSPADM

## 2020-08-05 RX ORDER — SODIUM CHLORIDE 9 MG/ML
INJECTION, SOLUTION INTRAVENOUS ONCE
Status: COMPLETED | OUTPATIENT
Start: 2020-08-05 | End: 2020-08-05

## 2020-08-05 RX ORDER — ALPRAZOLAM 0.5 MG/1
0.5 TABLET ORAL ONCE
Status: DISCONTINUED | OUTPATIENT
Start: 2020-08-05 | End: 2020-08-05 | Stop reason: HOSPADM

## 2020-08-05 RX ADMIN — SODIUM CHLORIDE: 0.9 INJECTION, SOLUTION INTRAVENOUS at 01:08

## 2020-08-05 NOTE — DISCHARGE INSTRUCTIONS

## 2020-08-05 NOTE — INTERVAL H&P NOTE
The patient has been examined and the H&P has been reviewed:    I concur with the findings and no changes have occurred since H&P was written.    Anesthesia/Surgery risks, benefits and alternative options discussed and understood by patient/family.          Active Hospital Problems    Diagnosis  POA    Osteoarthritis of spine [M47.9]  Yes      Resolved Hospital Problems   No resolved problems to display.

## 2020-08-05 NOTE — DISCHARGE SUMMARY
Discharge Note  Short Stay      SUMMARY     Admit Date: 8/5/2020    Attending Physician: Ramiro Calvillo      Discharge Physician: Ramiro Calvillo      Discharge Date: 8/5/2020 2:47 PM    Procedure(s) (LRB):  INJECTION, FACET JOINT, L4-L5 , L5-S1 (Right)    Final Diagnosis: Lumbar spondylosis [M47.816]    Disposition: Home or self care    Patient Instructions:   Current Discharge Medication List      CONTINUE these medications which have NOT CHANGED    Details   carbidopa-levodopa  mg (SINEMET)  mg per tablet Take 1 tablet by mouth 5 (five) times daily.  Qty: 450 tablet, Refills: 3    Associated Diagnoses: Parkinson disease; Stenosis of lateral recess of lumbar spine      citalopram (CELEXA) 20 MG tablet TAKE 1 TABLET (20 MG TOTAL) BY MOUTH ONCE DAILY.  Qty: 30 tablet, Refills: 10    Associated Diagnoses: Generalized anxiety disorder      gabapentin (NEURONTIN) 400 MG capsule Take 1 capsule (400 mg total) by mouth 3 (three) times daily.  Qty: 180 capsule, Refills: 3    Associated Diagnoses: Parkinson disease; Stenosis of lateral recess of lumbar spine      metoprolol succinate (TOPROL-XL) 50 MG 24 hr tablet Take 1 tablet (50 mg total) by mouth once daily.  Qty: 90 tablet, Refills: 3    Comments: .      rosuvastatin (CRESTOR) 40 MG Tab TAKE 1 TABLET (40 MG TOTAL) BY MOUTH EVERY EVENING.  Qty: 90 tablet, Refills: 2      !! albuterol (PROVENTIL/VENTOLIN HFA) 90 mcg/actuation inhaler Inhale 2 puffs into the lungs every 6 (six) hours as needed for Wheezing or Shortness of Breath. Rescue  Qty: 18 g, Refills: 6    Associated Diagnoses: Chronic obstructive pulmonary disease, unspecified COPD type      aspirin (ECOTRIN) 81 MG EC tablet Take 81 mg by mouth once daily.      !! fluticasone-umeclidin-vilanter (TRELEGY ELLIPTA) 100-62.5-25 mcg DsDv Inhale 1 puff into the lungs once daily.  Qty: 60 each, Refills: 6    Associated Diagnoses: Screening for cancer; Chronic obstructive pulmonary disease, unspecified COPD  type      loratadine (CLARITIN) 10 mg tablet Take 10 mg by mouth as needed for Allergies.      multivitamin capsule Take 1 capsule by mouth once daily.      nitroGLYCERIN (NITROSTAT) 0.4 MG SL tablet Place 1 tablet (0.4 mg total) under the tongue every 5 (five) minutes as needed for Chest pain.  Qty: 25 tablet, Refills: 4      !! PROVENTIL HFA 90 mcg/actuation inhaler       !! TRELEGY ELLIPTA 100-62.5-25 mcg DsDv        !! - Potential duplicate medications found. Please discuss with provider.              Discharge Diagnosis: Lumbar spondylosis [M47.816]  Condition on Discharge: Stable with no complications to procedure   Diet on Discharge: Same as before.  Activity: as per instruction sheet.  Discharge to: Home with a responsible adult.  Follow up: 2-4 weeks       Please call my office or pager at [phone] if experienced any weakness or loss of sensation, fever > 101.5, pain uncontrolled with oral medications, persistent nausea/vomiting/or diarrhea, redness or drainage from the incisions, or any other worrisome concerns. If physician on call was not reached or could not communicate with our office for any reason please go to the nearest emergency department

## 2020-08-19 ENCOUNTER — TELEPHONE (OUTPATIENT)
Dept: PAIN MEDICINE | Facility: CLINIC | Age: 75
End: 2020-08-19

## 2020-08-19 NOTE — TELEPHONE ENCOUNTER
Staff spoke with patient to confirm appointment scheduled 8/21/20 at 11:20 am with Marcelo Washington as a audio call patient was informed that provider may call 15 minutes before or after patient verbalized understanding.

## 2020-08-21 ENCOUNTER — OFFICE VISIT (OUTPATIENT)
Dept: PAIN MEDICINE | Facility: CLINIC | Age: 75
End: 2020-08-21
Payer: MEDICARE

## 2020-08-21 DIAGNOSIS — M54.16 LUMBAR RADICULOPATHY: ICD-10-CM

## 2020-08-21 DIAGNOSIS — M47.819 SPONDYLOSIS WITHOUT MYELOPATHY: ICD-10-CM

## 2020-08-21 DIAGNOSIS — M47.817 SPONDYLOSIS OF LUMBOSACRAL REGION, UNSPECIFIED SPINAL OSTEOARTHRITIS COMPLICATION STATUS: Primary | ICD-10-CM

## 2020-08-21 PROCEDURE — 99442 PR PHYSICIAN TELEPHONE EVALUATION 11-20 MIN: ICD-10-PCS | Mod: 95,,, | Performed by: NURSE PRACTITIONER

## 2020-08-21 PROCEDURE — 99499 RISK ADDL DX/OHS AUDIT: ICD-10-PCS | Mod: 95,,, | Performed by: NURSE PRACTITIONER

## 2020-08-21 PROCEDURE — 99499 UNLISTED E&M SERVICE: CPT | Mod: 95,,, | Performed by: NURSE PRACTITIONER

## 2020-08-21 PROCEDURE — 99442 PR PHYSICIAN TELEPHONE EVALUATION 11-20 MIN: CPT | Mod: 95,,, | Performed by: NURSE PRACTITIONER

## 2020-08-21 NOTE — PROGRESS NOTES
Chronic Pain - New Consult    Referring Physician: No ref. provider found    Chief Complaint: Right Lower Back Pain       SUBJECTIVE:    Hector Kellogg presents to the clinic for the evaluation of right lower back buttock pain. The pain started 5-10 years ago following falling off scaffold at work. Since then, symptoms have been worsening.  The pain is located in the right buttock area and radiates to the down right leg to knee.  The pain is described as sharp and is rated as 4/10. The pain is rated with a score of  4/10 on the BEST day and a score of 9/10 on the WORST day.   Symptoms interfere with daily activity.   Has occasional paresthesias and numbness to RLE all the down the leg.   The pain is exacerbated by Standing, Walking and Morning.    The pain is mitigated by heat, laying down and rest.   He reports spending 2 hours per day reclining. The patient reports 4 hours of uninterrupted sleep per night.    Patient denies night fever/night sweats, urinary incontinence, bowel incontinence, significant weight loss, significant motor weakness and loss of sensations.    Interval History 05/14/2020:  74 y/o M with hx of CAD s/p PCI (2008 and 2017), mild Pulm HTN, Essential HTN, Parkinson's Disease and COPD( Former smoker x 40 years).  On ASA 81 mg for CAD. Formerly on Plavix for DAPT  On Medical Therapy with Gabapentin without any side effects.   Started Meloxican this week and acknowledges being effective.       Interval history 06/08/2020:  Patient presents for telephone follow-up of lower back pain and right lower leg pain.  Patient is status post right L4/5 and L5/S1 TFESI on 5/27/2020.  Patient states 100% resolution of lower back pain and right leg pain.  Patient denies any neurological changes, denies any health changes.  Patient denies any neurological emergency symptoms such as loss of bowel/bladder, and denies saddle paresthesias.  Patient rates pain 0/10.  Patient is no longer taking Mobic, takes  Flexeril 5 mg g and gabapentin 400 mg p.r.n.      Interval History 8/4/2020:  The patient presents for follow-up of lower back pain.  He has associated radiculopathy down lateral right leg stopping at ankle.  He has had prior TF ASHISH with good relief of the symptoms which lasted approx 4 weeks. He states now however Back has become more bothersome than leg pain. Denies any loss of b/b or saddle paresthesias. Continue to take Flexeril 5mg and gaapentin 400mg PRN which provide some relief without adverse side effects.     Interval History 8/21/2020:  The patient present for follow up of lower back pain. Pt states he had no relief from prior Right L4-5 & L5-S1 facet joint injection not even short term per patient. Pt continues to have focal right sided pain, states radiculopathy goes down anterior and posterior thigh which stops just below the knee. The patient denies myelopathic symptoms such as handwriting changes or difficulty with buttons/coins/keys. Denies perineal paresthesias, bowel/bladder dysfunction. He is taking Gabapentin which provided good relief.       Has not had evaluation from Orthopaedics / Neurosurgery  No procedure hx for current symptoms     Physical Therapy/Home Exercise: No     Pain Disability Index Review:  Last 3 PDI Scores 8/3/2020 5/14/2020   Pain Disability Index (PDI) 57 56       Pain Medications:    Gabapentin 1200 mg TID  Meloxican 15 mg    Pain Procedures:   5/27/2020 Right L4/5, and L5/S1 TFESI 100% relief for 4 weeks  8/5/2020   Right L4-5 & L5-S1 Facet joint injection with minimal relief.     Imaging:   3/12/20 MRI Lumbar Spine  Narrative     EXAMINATION:  MRI LUMBAR SPINE WITHOUT CONTRAST    CLINICAL HISTORY:  Low back pain, >6wks conservative tx, persistent-progressive sx, surgical candidate; Spinal stenosis, lumbar region without neurogenic claudication    TECHNIQUE:  Multiplanar, multisequence MR images were acquired from the thoracolumbar junction to the sacrum without  contrast.    COMPARISON:  None available.    FINDINGS:  Alignment: Grade 1 anterolisthesis of L4-L5 and L5-S1.  Alignment of the lumbar spine is otherwise anatomic.    Vertebrae: Normal marrow signal. No fracture.  Bilateral L5 spondylolysis.    Discs: Scattered mild to moderate disc height loss of the lumbar spine.  Note made of increased STIR signal within the L2-L3 disc space.    Cord: Normal.  Conus terminates at L1.    Degenerative findings:    T12-L1: No spinal canal stenosis or neural foraminal narrowing.    L1-L2: Circumferential disc bulge with mild bilateral facet arthropathy.  No spinal canal stenosis or neural foraminal narrowing.    L2-L3: Circumferential disc bulge with mild bilateral facet arthropathy resulting in mild bilateral neural foraminal narrowing.  No spinal canal stenosis    L3-L4: Circumferential disc bulge with moderate bilateral facet arthropathy and ligamentum flavum hypertrophy results in mild spinal canal stenosis and mild bilateral neural foraminal narrowing.    L4-L5: Grade 1 anterolisthesis with diffuse disc bulge with severe bilateral facet arthropathy and ligamentum flavum hypertrophy results in moderate spinal canal stenosis and severe bilateral neural foraminal narrowing.    L5-S1: Circumferential disc bulge with moderate bilateral facet arthropathy resulting in moderate spinal canal stenosis and moderate bilateral neural foraminal narrowing.    Paraspinal muscles & soft tissues: Exophytic simple cysts in the left kidney.      Impression       1. Multilevel degenerative changes of the lumbar spine, most significant at the levels of L4-L5 and L5-S1.  At L4-L5 there is moderate spinal canal stenosis and severe bilateral neural foraminal narrowing.  L5-S1, there is moderate spinal canal stenosis and moderate bilateral neural foraminal narrowing.  2. Bilateral spondylolysis at L5 grade 1 anterolisthesis of L5-S1.  3. Increased STIR signal within the L2-L3 disc space.  While this is  favored to be degenerative, similar findings may be seen with early discitis.  Clinical correlation with lab parameters advised.         Past Medical History:   Diagnosis Date    Cataract     Chronic back pain greater than 3 months duration     Coronary artery disease     DDD (degenerative disc disease), lumbar 2020    Hyperlipidemia     Hypertension     MI, old 2006    Parkinson disease      Past Surgical History:   Procedure Laterality Date    CARDIAC CATHETERIZATION  2006    x 3    CARDIAC CATHETERIZATION  10/11/2015    x 2    CATARACT EXTRACTION W/  INTRAOCULAR LENS IMPLANT Right 2018    Dr. Liu    CORONARY ANGIOPLASTY  ,     3 stents    HEMORRHOID SURGERY      INJECTION OF FACET JOINT Right 2020    Procedure: INJECTION, FACET JOINT, L4-L5 , L5-S1;  Surgeon: Ramiro Calvillo MD;  Location: [facility];  Service: Pain Management;  Laterality: Right;    TRANSFORAMINAL EPIDURAL INJECTION OF STEROID Right 2020    Procedure: INJECTION, STEROID, EPIDURAL, TRANSFORAMINAL APPROACH;  Surgeon: Ramiro Calvillo MD;  Location: [facility];  Service: Pain Management;  Laterality: Right;  Right TF ASHISH L4-L5, L5-S1    PTcannot come earlier than 12:30     Social History     Socioeconomic History    Marital status:      Spouse name: Not on file    Number of children: Not on file    Years of education: Not on file    Highest education level: Not on file   Occupational History    Not on file   Social Needs    Financial resource strain: Not hard at all    Food insecurity     Worry: Never true     Inability: Never true    Transportation needs     Medical: No     Non-medical: No   Tobacco Use    Smoking status: Former Smoker     Packs/day: 1.00     Years: 40.00     Pack years: 40.00     Quit date: 2014     Years since quittin.5    Smokeless tobacco: Never Used   Substance and Sexual Activity    Alcohol use: Yes     Frequency: Never     Binge  frequency: Never     Comment: socially    Drug use: No    Sexual activity: Not on file   Lifestyle    Physical activity     Days per week: 0 days     Minutes per session: Not on file    Stress: Only a little   Relationships    Social connections     Talks on phone: Once a week     Gets together: Once a week     Attends Congregational service: Not on file     Active member of club or organization: No     Attends meetings of clubs or organizations: Never     Relationship status:    Other Topics Concern    Not on file   Social History Narrative    Not on file     Family History   Problem Relation Age of Onset    Heart attack Father     Cataracts Father     Heart disease Maternal Uncle     Heart disease Paternal Uncle     Hypertension Neg Hx     Asthma Neg Hx     Emphysema Neg Hx     Amblyopia Neg Hx     Blindness Neg Hx     Macular degeneration Neg Hx     Retinal detachment Neg Hx     Strabismus Neg Hx        Review of patient's allergies indicates:  No Known Allergies    Current Outpatient Medications   Medication Sig    albuterol (PROVENTIL/VENTOLIN HFA) 90 mcg/actuation inhaler Inhale 2 puffs into the lungs every 6 (six) hours as needed for Wheezing or Shortness of Breath. Rescue    aspirin (ECOTRIN) 81 MG EC tablet Take 81 mg by mouth once daily.    carbidopa-levodopa  mg (SINEMET)  mg per tablet Take 1 tablet by mouth 5 (five) times daily.    citalopram (CELEXA) 20 MG tablet TAKE 1 TABLET (20 MG TOTAL) BY MOUTH ONCE DAILY.    fluticasone-umeclidin-vilanter (TRELEGY ELLIPTA) 100-62.5-25 mcg DsDv Inhale 1 puff into the lungs once daily.    gabapentin (NEURONTIN) 400 MG capsule Take 1 capsule (400 mg total) by mouth 3 (three) times daily.    loratadine (CLARITIN) 10 mg tablet Take 10 mg by mouth as needed for Allergies.    metoprolol succinate (TOPROL-XL) 50 MG 24 hr tablet Take 1 tablet (50 mg total) by mouth once daily.    multivitamin capsule Take 1 capsule by mouth once  daily.    nitroGLYCERIN (NITROSTAT) 0.4 MG SL tablet Place 1 tablet (0.4 mg total) under the tongue every 5 (five) minutes as needed for Chest pain.    PROVENTIL HFA 90 mcg/actuation inhaler     rosuvastatin (CRESTOR) 40 MG Tab TAKE 1 TABLET (40 MG TOTAL) BY MOUTH EVERY EVENING.    TRELEGY ELLIPTA 100-62.5-25 mcg DsDv      No current facility-administered medications for this visit.        REVIEW OF SYSTEMS:    GENERAL:  No weight loss, malaise or fevers.  HEENT:  Negative for frequent or significant headaches.  NECK:  Negative for lumps, goiter, pain and significant neck swelling.  RESPIRATORY:  Negative for cough, wheezing or shortness of breath.  CARDIOVASCULAR:  Negative for chest pain, leg swelling or palpitations.  GI:  Negative for abdominal discomfort, blood in stools or black stools or change in bowel habits.  MUSCULOSKELETAL:  See HPI.  SKIN:  Negative for lesions, rash, and itching.  PSYCH:  Positive for sleep disturbance, mood disorder.  HEMATOLOGY/LYMPHOLOGY:  Negative for prolonged bleeding, bruising easily or swollen nodes.  NEURO:   UE tremors present  All other reviewed and negative other than HPI.    OBJECTIVE:    There were no vitals taken for this visit.    PHYSICAL EXAMINATION:  Voice normal.  Normal affect without evidence of distress.      Prior exam PHYSICAL EXAMINATION:    General appearance: Well appearing, in no acute distress, alert and oriented x3.  Psych:  Mood and affect appropriate.  Skin: Skin color, texture, turgor normal, no rashes or lesions, in both upper and lower body.  Head/face:  Normocephalic, atraumatic. No palpable lymph nodes.  Neck: No pain to palpation over the cervical paraspinous muscles. Spurling Negative. No pain with neck flexion, extension, or lateral flexion.   Cor: RRR  Pulm: CTA  GI:  Soft and non-tender.  Back: +Slump on right, +facet loading to right lower lumbar> pain with forward flexion.   Extremities: Peripheral joint ROM is full and pain free without  obvious instability or laxity in all four extremities. No deformities, edema, or skin discoloration. Good capillary refill.  Musculoskeletal: Shoulder, hip, sacroiliac and knee provocative maneuvers are negative. Bilateral upper and lower extremity strength is normal and symmetric.    No paraspinal muscle tenderness. Point tenderness to Right GTB. Point tenderness at Right Gluteus. No atrophy or tone abnormalities are noted.  Neuro: Bilateral upper and lower extremity coordination and muscle stretch reflexes are physiologic and symmetric.  Plantar response are downgoing. No loss of sensation is noted. UE tremor is present  Gait: normal.    ASSESSMENT: 75 y.o. year old male with right lower back pain, consistent with l    1. Spondylosis of lumbosacral region, unspecified spinal osteoarthritis complication status     2. Lumbar radiculopathy     3. Spondylosis without myelopathy         PLAN:     - Prior records reviewed   - He is s/p Right L4-5,L5-[id number] joint injection with no stated benefit, not even short term  - Pt s/p Right L4/5 &L5/S1 TFESI with 100% resolution of symptoms but returned one month after  - Will schedule for repeat TFESI as there was benefit    - Continue Gabapentin   - Continue Flexiril  - I have stressed the importance of physical activity and a home exercise plan to help with pain and improve health.  - Patient can continue with medications for now since they are providing benefits, using them appropriately, and without side effects.  - Counseled patient regarding the importance of physical therapy.  - RTC 2 weeks after procedure for re-evaluation in person for a re-examination and discuss further Treatment  Options.    The above plan and management options were discussed at length with patient. Patient is in agreement with the above and verbalized understanding. It will be communicated with the referring physician via electronic record, fax, or mail.    Marcelo Washington NP    08/24/2020

## 2020-08-21 NOTE — H&P (VIEW-ONLY)
Chronic Pain - New Consult    Referring Physician: No ref. provider found    Chief Complaint: Right Lower Back Pain       SUBJECTIVE:    Hector Kellogg presents to the clinic for the evaluation of right lower back buttock pain. The pain started 5-10 years ago following falling off scaffold at work. Since then, symptoms have been worsening.  The pain is located in the right buttock area and radiates to the down right leg to knee.  The pain is described as sharp and is rated as 4/10. The pain is rated with a score of  4/10 on the BEST day and a score of 9/10 on the WORST day.   Symptoms interfere with daily activity.   Has occasional paresthesias and numbness to RLE all the down the leg.   The pain is exacerbated by Standing, Walking and Morning.    The pain is mitigated by heat, laying down and rest.   He reports spending 2 hours per day reclining. The patient reports 4 hours of uninterrupted sleep per night.    Patient denies night fever/night sweats, urinary incontinence, bowel incontinence, significant weight loss, significant motor weakness and loss of sensations.    Interval History 05/14/2020:  76 y/o M with hx of CAD s/p PCI (2008 and 2017), mild Pulm HTN, Essential HTN, Parkinson's Disease and COPD( Former smoker x 40 years).  On ASA 81 mg for CAD. Formerly on Plavix for DAPT  On Medical Therapy with Gabapentin without any side effects.   Started Meloxican this week and acknowledges being effective.       Interval history 06/08/2020:  Patient presents for telephone follow-up of lower back pain and right lower leg pain.  Patient is status post right L4/5 and L5/S1 TFESI on 5/27/2020.  Patient states 100% resolution of lower back pain and right leg pain.  Patient denies any neurological changes, denies any health changes.  Patient denies any neurological emergency symptoms such as loss of bowel/bladder, and denies saddle paresthesias.  Patient rates pain 0/10.  Patient is no longer taking Mobic, takes  Flexeril 5 mg g and gabapentin 400 mg p.r.n.      Interval History 8/4/2020:  The patient presents for follow-up of lower back pain.  He has associated radiculopathy down lateral right leg stopping at ankle.  He has had prior TF ASHISH with good relief of the symptoms which lasted approx 4 weeks. He states now however Back has become more bothersome than leg pain. Denies any loss of b/b or saddle paresthesias. Continue to take Flexeril 5mg and gaapentin 400mg PRN which provide some relief without adverse side effects.     Interval History 8/21/2020:  The patient present for follow up of lower back pain. Pt states he had no relief from prior Right L4-5 & L5-S1 facet joint injection not even short term per patient. Pt continues to have focal right sided pain, states radiculopathy goes down anterior and posterior thigh which stops just below the knee. The patient denies myelopathic symptoms such as handwriting changes or difficulty with buttons/coins/keys. Denies perineal paresthesias, bowel/bladder dysfunction. He is taking Gabapentin which provided good relief.       Has not had evaluation from Orthopaedics / Neurosurgery  No procedure hx for current symptoms     Physical Therapy/Home Exercise: No     Pain Disability Index Review:  Last 3 PDI Scores 8/3/2020 5/14/2020   Pain Disability Index (PDI) 57 56       Pain Medications:    Gabapentin 1200 mg TID  Meloxican 15 mg    Pain Procedures:   5/27/2020 Right L4/5, and L5/S1 TFESI 100% relief for 4 weeks  8/5/2020   Right L4-5 & L5-S1 Facet joint injection with minimal relief.     Imaging:   3/12/20 MRI Lumbar Spine  Narrative     EXAMINATION:  MRI LUMBAR SPINE WITHOUT CONTRAST    CLINICAL HISTORY:  Low back pain, >6wks conservative tx, persistent-progressive sx, surgical candidate; Spinal stenosis, lumbar region without neurogenic claudication    TECHNIQUE:  Multiplanar, multisequence MR images were acquired from the thoracolumbar junction to the sacrum without  contrast.    COMPARISON:  None available.    FINDINGS:  Alignment: Grade 1 anterolisthesis of L4-L5 and L5-S1.  Alignment of the lumbar spine is otherwise anatomic.    Vertebrae: Normal marrow signal. No fracture.  Bilateral L5 spondylolysis.    Discs: Scattered mild to moderate disc height loss of the lumbar spine.  Note made of increased STIR signal within the L2-L3 disc space.    Cord: Normal.  Conus terminates at L1.    Degenerative findings:    T12-L1: No spinal canal stenosis or neural foraminal narrowing.    L1-L2: Circumferential disc bulge with mild bilateral facet arthropathy.  No spinal canal stenosis or neural foraminal narrowing.    L2-L3: Circumferential disc bulge with mild bilateral facet arthropathy resulting in mild bilateral neural foraminal narrowing.  No spinal canal stenosis    L3-L4: Circumferential disc bulge with moderate bilateral facet arthropathy and ligamentum flavum hypertrophy results in mild spinal canal stenosis and mild bilateral neural foraminal narrowing.    L4-L5: Grade 1 anterolisthesis with diffuse disc bulge with severe bilateral facet arthropathy and ligamentum flavum hypertrophy results in moderate spinal canal stenosis and severe bilateral neural foraminal narrowing.    L5-S1: Circumferential disc bulge with moderate bilateral facet arthropathy resulting in moderate spinal canal stenosis and moderate bilateral neural foraminal narrowing.    Paraspinal muscles & soft tissues: Exophytic simple cysts in the left kidney.      Impression       1. Multilevel degenerative changes of the lumbar spine, most significant at the levels of L4-L5 and L5-S1.  At L4-L5 there is moderate spinal canal stenosis and severe bilateral neural foraminal narrowing.  L5-S1, there is moderate spinal canal stenosis and moderate bilateral neural foraminal narrowing.  2. Bilateral spondylolysis at L5 grade 1 anterolisthesis of L5-S1.  3. Increased STIR signal within the L2-L3 disc space.  While this is  favored to be degenerative, similar findings may be seen with early discitis.  Clinical correlation with lab parameters advised.         Past Medical History:   Diagnosis Date    Cataract     Chronic back pain greater than 3 months duration     Coronary artery disease     DDD (degenerative disc disease), lumbar 2020    Hyperlipidemia     Hypertension     MI, old 2006    Parkinson disease      Past Surgical History:   Procedure Laterality Date    CARDIAC CATHETERIZATION  2006    x 3    CARDIAC CATHETERIZATION  10/11/2015    x 2    CATARACT EXTRACTION W/  INTRAOCULAR LENS IMPLANT Right 2018    Dr. Liu    CORONARY ANGIOPLASTY  ,     3 stents    HEMORRHOID SURGERY      INJECTION OF FACET JOINT Right 2020    Procedure: INJECTION, FACET JOINT, L4-L5 , L5-S1;  Surgeon: Ramiro Calvillo MD;  Location: [facility];  Service: Pain Management;  Laterality: Right;    TRANSFORAMINAL EPIDURAL INJECTION OF STEROID Right 2020    Procedure: INJECTION, STEROID, EPIDURAL, TRANSFORAMINAL APPROACH;  Surgeon: Ramiro Calvillo MD;  Location: [facility];  Service: Pain Management;  Laterality: Right;  Right TF ASHISH L4-L5, L5-S1    PTcannot come earlier than 12:30     Social History     Socioeconomic History    Marital status:      Spouse name: Not on file    Number of children: Not on file    Years of education: Not on file    Highest education level: Not on file   Occupational History    Not on file   Social Needs    Financial resource strain: Not hard at all    Food insecurity     Worry: Never true     Inability: Never true    Transportation needs     Medical: No     Non-medical: No   Tobacco Use    Smoking status: Former Smoker     Packs/day: 1.00     Years: 40.00     Pack years: 40.00     Quit date: 2014     Years since quittin.5    Smokeless tobacco: Never Used   Substance and Sexual Activity    Alcohol use: Yes     Frequency: Never     Binge  frequency: Never     Comment: socially    Drug use: No    Sexual activity: Not on file   Lifestyle    Physical activity     Days per week: 0 days     Minutes per session: Not on file    Stress: Only a little   Relationships    Social connections     Talks on phone: Once a week     Gets together: Once a week     Attends Sabianism service: Not on file     Active member of club or organization: No     Attends meetings of clubs or organizations: Never     Relationship status:    Other Topics Concern    Not on file   Social History Narrative    Not on file     Family History   Problem Relation Age of Onset    Heart attack Father     Cataracts Father     Heart disease Maternal Uncle     Heart disease Paternal Uncle     Hypertension Neg Hx     Asthma Neg Hx     Emphysema Neg Hx     Amblyopia Neg Hx     Blindness Neg Hx     Macular degeneration Neg Hx     Retinal detachment Neg Hx     Strabismus Neg Hx        Review of patient's allergies indicates:  No Known Allergies    Current Outpatient Medications   Medication Sig    albuterol (PROVENTIL/VENTOLIN HFA) 90 mcg/actuation inhaler Inhale 2 puffs into the lungs every 6 (six) hours as needed for Wheezing or Shortness of Breath. Rescue    aspirin (ECOTRIN) 81 MG EC tablet Take 81 mg by mouth once daily.    carbidopa-levodopa  mg (SINEMET)  mg per tablet Take 1 tablet by mouth 5 (five) times daily.    citalopram (CELEXA) 20 MG tablet TAKE 1 TABLET (20 MG TOTAL) BY MOUTH ONCE DAILY.    fluticasone-umeclidin-vilanter (TRELEGY ELLIPTA) 100-62.5-25 mcg DsDv Inhale 1 puff into the lungs once daily.    gabapentin (NEURONTIN) 400 MG capsule Take 1 capsule (400 mg total) by mouth 3 (three) times daily.    loratadine (CLARITIN) 10 mg tablet Take 10 mg by mouth as needed for Allergies.    metoprolol succinate (TOPROL-XL) 50 MG 24 hr tablet Take 1 tablet (50 mg total) by mouth once daily.    multivitamin capsule Take 1 capsule by mouth once  daily.    nitroGLYCERIN (NITROSTAT) 0.4 MG SL tablet Place 1 tablet (0.4 mg total) under the tongue every 5 (five) minutes as needed for Chest pain.    PROVENTIL HFA 90 mcg/actuation inhaler     rosuvastatin (CRESTOR) 40 MG Tab TAKE 1 TABLET (40 MG TOTAL) BY MOUTH EVERY EVENING.    TRELEGY ELLIPTA 100-62.5-25 mcg DsDv      No current facility-administered medications for this visit.        REVIEW OF SYSTEMS:    GENERAL:  No weight loss, malaise or fevers.  HEENT:  Negative for frequent or significant headaches.  NECK:  Negative for lumps, goiter, pain and significant neck swelling.  RESPIRATORY:  Negative for cough, wheezing or shortness of breath.  CARDIOVASCULAR:  Negative for chest pain, leg swelling or palpitations.  GI:  Negative for abdominal discomfort, blood in stools or black stools or change in bowel habits.  MUSCULOSKELETAL:  See HPI.  SKIN:  Negative for lesions, rash, and itching.  PSYCH:  Positive for sleep disturbance, mood disorder.  HEMATOLOGY/LYMPHOLOGY:  Negative for prolonged bleeding, bruising easily or swollen nodes.  NEURO:   UE tremors present  All other reviewed and negative other than HPI.    OBJECTIVE:    There were no vitals taken for this visit.    PHYSICAL EXAMINATION:  Voice normal.  Normal affect without evidence of distress.      Prior exam PHYSICAL EXAMINATION:    General appearance: Well appearing, in no acute distress, alert and oriented x3.  Psych:  Mood and affect appropriate.  Skin: Skin color, texture, turgor normal, no rashes or lesions, in both upper and lower body.  Head/face:  Normocephalic, atraumatic. No palpable lymph nodes.  Neck: No pain to palpation over the cervical paraspinous muscles. Spurling Negative. No pain with neck flexion, extension, or lateral flexion.   Cor: RRR  Pulm: CTA  GI:  Soft and non-tender.  Back: +Slump on right, +facet loading to right lower lumbar> pain with forward flexion.   Extremities: Peripheral joint ROM is full and pain free without  obvious instability or laxity in all four extremities. No deformities, edema, or skin discoloration. Good capillary refill.  Musculoskeletal: Shoulder, hip, sacroiliac and knee provocative maneuvers are negative. Bilateral upper and lower extremity strength is normal and symmetric.    No paraspinal muscle tenderness. Point tenderness to Right GTB. Point tenderness at Right Gluteus. No atrophy or tone abnormalities are noted.  Neuro: Bilateral upper and lower extremity coordination and muscle stretch reflexes are physiologic and symmetric.  Plantar response are downgoing. No loss of sensation is noted. UE tremor is present  Gait: normal.    ASSESSMENT: 75 y.o. year old male with right lower back pain, consistent with l    1. Spondylosis of lumbosacral region, unspecified spinal osteoarthritis complication status     2. Lumbar radiculopathy     3. Spondylosis without myelopathy         PLAN:     - Prior records reviewed   - He is s/p Right L4-5,L5-P7fnewu joint injection with no stated benefit, not even short term  - Pt s/p Right L4/5 &L5/S1 TFESI with 100% resolution of symptoms but returned one month after  - Will schedule for repeat TFESI as there was benefit    - Continue Gabapentin   - Continue Flexiril  - I have stressed the importance of physical activity and a home exercise plan to help with pain and improve health.  - Patient can continue with medications for now since they are providing benefits, using them appropriately, and without side effects.  - Counseled patient regarding the importance of physical therapy.  - RTC 2 weeks after procedure for re-evaluation in person for a re-examination and discuss further Treatment  Options.    The above plan and management options were discussed at length with patient. Patient is in agreement with the above and verbalized understanding. It will be communicated with the referring physician via electronic record, fax, or mail.    Marcelo Washington NP    08/24/2020

## 2020-09-09 ENCOUNTER — HOSPITAL ENCOUNTER (OUTPATIENT)
Facility: OTHER | Age: 75
Discharge: HOME | End: 2020-09-09
Attending: ANESTHESIOLOGY | Admitting: ANESTHESIOLOGY
Payer: MEDICARE

## 2020-09-09 VITALS
OXYGEN SATURATION: 95 % | WEIGHT: 150 LBS | TEMPERATURE: 99 F | DIASTOLIC BLOOD PRESSURE: 82 MMHG | SYSTOLIC BLOOD PRESSURE: 147 MMHG | RESPIRATION RATE: 18 BRPM | HEIGHT: 64 IN | BODY MASS INDEX: 25.61 KG/M2 | HEART RATE: 80 BPM

## 2020-09-09 DIAGNOSIS — G89.29 CHRONIC PAIN: ICD-10-CM

## 2020-09-09 DIAGNOSIS — M51.36 DDD (DEGENERATIVE DISC DISEASE), LUMBAR: Primary | ICD-10-CM

## 2020-09-09 PROCEDURE — 64483 NJX AA&/STRD TFRM EPI L/S 1: CPT | Mod: RT,,, | Performed by: ANESTHESIOLOGY

## 2020-09-09 PROCEDURE — 64483 PR EPIDURAL INJ, ANES/STEROID, TRANSFORAMINAL, LUMB/SACR, SNGL LEVL: ICD-10-PCS | Mod: RT,,, | Performed by: ANESTHESIOLOGY

## 2020-09-09 PROCEDURE — 64484 PRA INJECT ANES/STEROID FORAMEN LUMBAR/SACRAL W IMG GUIDE ,EA ADD LEVEL: ICD-10-PCS | Mod: RT,,, | Performed by: ANESTHESIOLOGY

## 2020-09-09 PROCEDURE — 25000003 PHARM REV CODE 250: Performed by: ANESTHESIOLOGY

## 2020-09-09 PROCEDURE — 64484 NJX AA&/STRD TFRM EPI L/S EA: CPT | Mod: RT | Performed by: ANESTHESIOLOGY

## 2020-09-09 PROCEDURE — 64483 NJX AA&/STRD TFRM EPI L/S 1: CPT | Mod: RT | Performed by: ANESTHESIOLOGY

## 2020-09-09 PROCEDURE — 64484 NJX AA&/STRD TFRM EPI L/S EA: CPT | Mod: RT,,, | Performed by: ANESTHESIOLOGY

## 2020-09-09 PROCEDURE — 63600175 PHARM REV CODE 636 W HCPCS: Performed by: ANESTHESIOLOGY

## 2020-09-09 PROCEDURE — 25000003 PHARM REV CODE 250: Performed by: STUDENT IN AN ORGANIZED HEALTH CARE EDUCATION/TRAINING PROGRAM

## 2020-09-09 PROCEDURE — 25500020 PHARM REV CODE 255: Performed by: ANESTHESIOLOGY

## 2020-09-09 RX ORDER — LIDOCAINE HYDROCHLORIDE 10 MG/ML
INJECTION INFILTRATION; PERINEURAL
Status: DISCONTINUED | OUTPATIENT
Start: 2020-09-09 | End: 2020-09-09 | Stop reason: HOSPADM

## 2020-09-09 RX ORDER — FENTANYL CITRATE 50 UG/ML
INJECTION, SOLUTION INTRAMUSCULAR; INTRAVENOUS
Status: DISCONTINUED | OUTPATIENT
Start: 2020-09-09 | End: 2020-09-09 | Stop reason: HOSPADM

## 2020-09-09 RX ORDER — DEXAMETHASONE SODIUM PHOSPHATE 10 MG/ML
INJECTION INTRAMUSCULAR; INTRAVENOUS
Status: DISCONTINUED | OUTPATIENT
Start: 2020-09-09 | End: 2020-09-09 | Stop reason: HOSPADM

## 2020-09-09 RX ORDER — MIDAZOLAM HYDROCHLORIDE 1 MG/ML
INJECTION INTRAMUSCULAR; INTRAVENOUS
Status: DISCONTINUED | OUTPATIENT
Start: 2020-09-09 | End: 2020-09-09 | Stop reason: HOSPADM

## 2020-09-09 RX ORDER — LIDOCAINE HYDROCHLORIDE 5 MG/ML
INJECTION, SOLUTION INFILTRATION; INTRAVENOUS
Status: DISCONTINUED | OUTPATIENT
Start: 2020-09-09 | End: 2020-09-09 | Stop reason: HOSPADM

## 2020-09-09 RX ORDER — SODIUM CHLORIDE 9 MG/ML
500 INJECTION, SOLUTION INTRAVENOUS CONTINUOUS
Status: DISCONTINUED | OUTPATIENT
Start: 2020-09-09 | End: 2020-09-09 | Stop reason: HOSPADM

## 2020-09-09 RX ADMIN — SODIUM CHLORIDE 500 ML: 0.9 INJECTION, SOLUTION INTRAVENOUS at 09:09

## 2020-09-09 NOTE — DISCHARGE INSTRUCTIONS

## 2020-09-09 NOTE — DISCHARGE SUMMARY
Discharge Note  Short Stay      SUMMARY     Admit Date: 9/9/2020    Attending Physician: Ramiro Calvillo      Discharge Physician: Ramiro Calvillo      Discharge Date: 9/9/2020 10:51 AM    Procedure(s) (LRB):  INJECTION, STEROID, EPIDURAL, TRANSFORAMINAL APPROACH (Right)    Final Diagnosis: Lumbar radiculopathy [M54.16]    Disposition: Home or self care    Patient Instructions:   Current Discharge Medication List      CONTINUE these medications which have NOT CHANGED    Details   !! albuterol (PROVENTIL/VENTOLIN HFA) 90 mcg/actuation inhaler Inhale 2 puffs into the lungs every 6 (six) hours as needed for Wheezing or Shortness of Breath. Rescue  Qty: 18 g, Refills: 6    Associated Diagnoses: Chronic obstructive pulmonary disease, unspecified COPD type      aspirin (ECOTRIN) 81 MG EC tablet Take 81 mg by mouth once daily.      carbidopa-levodopa  mg (SINEMET)  mg per tablet Take 1 tablet by mouth 5 (five) times daily.  Qty: 450 tablet, Refills: 3    Associated Diagnoses: Parkinson disease; Stenosis of lateral recess of lumbar spine      citalopram (CELEXA) 20 MG tablet TAKE 1 TABLET (20 MG TOTAL) BY MOUTH ONCE DAILY.  Qty: 30 tablet, Refills: 10    Associated Diagnoses: Generalized anxiety disorder      !! fluticasone-umeclidin-vilanter (TRELEGY ELLIPTA) 100-62.5-25 mcg DsDv Inhale 1 puff into the lungs once daily.  Qty: 60 each, Refills: 6    Associated Diagnoses: Screening for cancer; Chronic obstructive pulmonary disease, unspecified COPD type      gabapentin (NEURONTIN) 400 MG capsule Take 1 capsule (400 mg total) by mouth 3 (three) times daily.  Qty: 180 capsule, Refills: 3    Associated Diagnoses: Parkinson disease; Stenosis of lateral recess of lumbar spine      loratadine (CLARITIN) 10 mg tablet Take 10 mg by mouth as needed for Allergies.      metoprolol succinate (TOPROL-XL) 50 MG 24 hr tablet Take 1 tablet (50 mg total) by mouth once daily.  Qty: 90 tablet, Refills: 3    Comments: .       multivitamin capsule Take 1 capsule by mouth once daily.      nitroGLYCERIN (NITROSTAT) 0.4 MG SL tablet Place 1 tablet (0.4 mg total) under the tongue every 5 (five) minutes as needed for Chest pain.  Qty: 25 tablet, Refills: 4      !! PROVENTIL HFA 90 mcg/actuation inhaler       rosuvastatin (CRESTOR) 40 MG Tab TAKE 1 TABLET (40 MG TOTAL) BY MOUTH EVERY EVENING.  Qty: 90 tablet, Refills: 2      !! TRELEGY ELLIPTA 100-62.5-25 mcg DsDv        !! - Potential duplicate medications found. Please discuss with provider.              Discharge Diagnosis: Lumbar radiculopathy [M54.16]  Condition on Discharge: Stable with no complications to procedure   Diet on Discharge: Same as before.  Activity: as per instruction sheet.  Discharge to: Home with a responsible adult.  Follow up: 2-4 weeks       Please call my office or pager at [phone] if experienced any weakness or loss of sensation, fever > 101.5, pain uncontrolled with oral medications, persistent nausea/vomiting/or diarrhea, redness or drainage from the incisions, or any other worrisome concerns. If physician on call was not reached or could not communicate with our office for any reason please go to the nearest emergency department

## 2020-09-09 NOTE — OP NOTE
Patient Name: Hector Kellogg  MRN: [id number]    INFORMED CONSENT: The procedure, risks, benefits and options were discussed with patient. There are no contraindications to the procedure. The patient expressed understanding and agreed to proceed. The personnel performing the procedure was discussed. I verify that I personally obtained Hector's consent prior to the start of the procedure and the signed consent can be found on the patient's chart.    Procedure Date: 09/09/2020    Anesthesia: Topical    Pre Procedure diagnosis: Lumbar radiculopathy [M54.16]  1. DDD (degenerative disc disease), lumbar    2. Chronic pain      Post-Procedure diagnosis: SAME      Sedation: Yes - Fentanyl 50 mcg and Midazolam 2 mg    PROCEDURE:Right L4/5 and L5/S1 TRANSFORAMINAL EPIDURAL STEROID INJECTION    DESCRIPTION OF PROCEDURE: The patient was brought to the procedure room. After performing time out IV access was obtained prior to the procedure. The patient was positioned prone on the fluoroscopy table. Continuous hemodynamic monitoring was initiated including blood pressure, EKG, and pulse oximetry. . The skin was prepped with chlorhexidine three times and draped in a sterile fashion. Skin anesthesia was achieved using 3 mL of lidocaine 1% over the respective injection site.     An oblique fluoroscopic view was obtained, with the superior articular process of the inferior vertebral body aligned with the pedicle. The tip of a 22-gauge 3.5-inch Quincke-type spinal needle was advanced toward the 6 oclock position of the pedicle under intermittent fluoroscopic guidance. Confirmation of proper needle position was made with AP, oblique, and lateral fluoroscopic views. Negative aspiration for blood or CSF was confirmed. 2 mL of Omnipaque 300 was injected. Live fluoroscopic imaging revealed a clear outline of the spinal nerve with proximal spread of agent through the neural foramen into the anterior epidural space. A total combination of  3 mL of Lidocaine 0.5% and 5 mg decadron was injected at each level. Contrast spread was noted from L3 to L5 level. There was no pain on injection. The needle was removed and bleeding was nil.  A sterile dressing was applied. Hector was taken back to the recovery room for further observation.     Blood Loss: Nill  Specimen: None    Ramiro Calvillo MD

## 2020-09-11 ENCOUNTER — TELEPHONE (OUTPATIENT)
Dept: PAIN MEDICINE | Facility: CLINIC | Age: 75
End: 2020-09-11

## 2020-09-11 NOTE — TELEPHONE ENCOUNTER
Contacted/spoke to patient regarding rescheduling appointment on 09/21/2020 at 11:15am with Dr. Calvillo due to provider in surgery.    Patient agreed, appointment rescheduled to 09/17/2020 at 11:30am. Patient expressed understanding.

## 2020-09-16 ENCOUNTER — TELEPHONE (OUTPATIENT)
Dept: PAIN MEDICINE | Facility: CLINIC | Age: 75
End: 2020-09-16

## 2020-09-17 ENCOUNTER — OFFICE VISIT (OUTPATIENT)
Dept: PAIN MEDICINE | Facility: CLINIC | Age: 75
End: 2020-09-17
Attending: ANESTHESIOLOGY
Payer: MEDICARE

## 2020-09-17 VITALS
BODY MASS INDEX: 25.97 KG/M2 | RESPIRATION RATE: 18 BRPM | SYSTOLIC BLOOD PRESSURE: 169 MMHG | HEART RATE: 78 BPM | DIASTOLIC BLOOD PRESSURE: 91 MMHG | OXYGEN SATURATION: 100 % | WEIGHT: 152.13 LBS | HEIGHT: 64 IN

## 2020-09-17 DIAGNOSIS — M54.17 LUMBOSACRAL RADICULOPATHY: ICD-10-CM

## 2020-09-17 DIAGNOSIS — M47.819 SPONDYLOSIS WITHOUT MYELOPATHY: ICD-10-CM

## 2020-09-17 DIAGNOSIS — M51.36 DDD (DEGENERATIVE DISC DISEASE), LUMBAR: Primary | ICD-10-CM

## 2020-09-17 PROCEDURE — 1125F AMNT PAIN NOTED PAIN PRSNT: CPT | Mod: S$GLB,,, | Performed by: ANESTHESIOLOGY

## 2020-09-17 PROCEDURE — 1159F PR MEDICATION LIST DOCUMENTED IN MEDICAL RECORD: ICD-10-PCS | Mod: S$GLB,,, | Performed by: ANESTHESIOLOGY

## 2020-09-17 PROCEDURE — 99214 PR OFFICE/OUTPT VISIT, EST, LEVL IV, 30-39 MIN: ICD-10-PCS | Mod: GC,S$GLB,, | Performed by: ANESTHESIOLOGY

## 2020-09-17 PROCEDURE — 1159F MED LIST DOCD IN RCRD: CPT | Mod: S$GLB,,, | Performed by: ANESTHESIOLOGY

## 2020-09-17 PROCEDURE — 3080F PR MOST RECENT DIASTOLIC BLOOD PRESSURE >= 90 MM HG: ICD-10-PCS | Mod: CPTII,S$GLB,, | Performed by: ANESTHESIOLOGY

## 2020-09-17 PROCEDURE — 1125F PR PAIN SEVERITY QUANTIFIED, PAIN PRESENT: ICD-10-PCS | Mod: S$GLB,,, | Performed by: ANESTHESIOLOGY

## 2020-09-17 PROCEDURE — 99214 OFFICE O/P EST MOD 30 MIN: CPT | Mod: GC,S$GLB,, | Performed by: ANESTHESIOLOGY

## 2020-09-17 PROCEDURE — 3080F DIAST BP >= 90 MM HG: CPT | Mod: CPTII,S$GLB,, | Performed by: ANESTHESIOLOGY

## 2020-09-17 PROCEDURE — 1101F PR PT FALLS ASSESS DOC 0-1 FALLS W/OUT INJ PAST YR: ICD-10-PCS | Mod: CPTII,S$GLB,, | Performed by: ANESTHESIOLOGY

## 2020-09-17 PROCEDURE — 3077F SYST BP >= 140 MM HG: CPT | Mod: CPTII,S$GLB,, | Performed by: ANESTHESIOLOGY

## 2020-09-17 PROCEDURE — 3077F PR MOST RECENT SYSTOLIC BLOOD PRESSURE >= 140 MM HG: ICD-10-PCS | Mod: CPTII,S$GLB,, | Performed by: ANESTHESIOLOGY

## 2020-09-17 PROCEDURE — 99999 PR PBB SHADOW E&M-EST. PATIENT-LVL V: CPT | Mod: PBBFAC,,, | Performed by: ANESTHESIOLOGY

## 2020-09-17 PROCEDURE — 1101F PT FALLS ASSESS-DOCD LE1/YR: CPT | Mod: CPTII,S$GLB,, | Performed by: ANESTHESIOLOGY

## 2020-09-17 PROCEDURE — 99999 PR PBB SHADOW E&M-EST. PATIENT-LVL V: ICD-10-PCS | Mod: PBBFAC,,, | Performed by: ANESTHESIOLOGY

## 2020-09-17 NOTE — PROGRESS NOTES
Chronic patient Established Note (Follow up visit)      SUBJECTIVE:  Interval History 09/17/2020:  Hector Kellogg presents to the clinic for a follow-up appointment from 09/09/2020 -RIGHT TF ASHISH L4/5 and L5/S1 . Since the last visit, Hector Kellogg states the pain has been improving. Current pain intensity is 4/10.  He reports 50% pain improvement.  Currently he is taking gabapentin 400mg BID (he was prescribed to take it TID).  He has not done any physical therapy.    HPI  Hector Kellogg presents to the clinic for the evaluation of right lower back buttock pain. The pain started 5-10 years ago following falling off scaffold at work. Since then, symptoms have been worsening.  The pain is located in the right buttock area and radiates to the down right leg to knee.  The pain is described as sharp and is rated as 4/10. The pain is rated with a score of  4/10 on the BEST day and a score of 9/10 on the WORST day.   Symptoms interfere with daily activity.   Has occasional paresthesias and numbness to RLE all the down the leg.   The pain is exacerbated by Standing, Walking and Morning.    The pain is mitigated by heat, laying down and rest.   He reports spending 2 hours per day reclining. The patient reports 4 hours of uninterrupted sleep per night.     Patient denies night fever/night sweats, urinary incontinence, bowel incontinence, significant weight loss, significant motor weakness and loss of sensations.     Interval History 05/14/2020:  74 y/o M with hx of CAD s/p PCI (2008 and 2017), mild Pulm HTN, Essential HTN, Parkinson's Disease and COPD( Former smoker x 40 years).  On ASA 81 mg for CAD. Formerly on Plavix for DAPT  On Medical Therapy with Gabapentin without any side effects.   Started Meloxican this week and acknowledges being effective.         Interval history 06/08/2020:  Patient presents for telephone follow-up of lower back pain and right lower leg pain.  Patient is status post right L4/5  and L5/S1 TFESI on 5/27/2020.  Patient states 100% resolution of lower back pain and right leg pain.  Patient denies any neurological changes, denies any health changes.  Patient denies any neurological emergency symptoms such as loss of bowel/bladder, and denies saddle paresthesias.  Patient rates pain 0/10.  Patient is no longer taking Mobic, takes Flexeril 5 mg g and gabapentin 400 mg p.r.n.        Interval History 8/4/2020:  The patient presents for follow-up of lower back pain.  He has associated radiculopathy down lateral right leg stopping at ankle.  He has had prior TF ASHISH with good relief of the symptoms which lasted approx 4 weeks. He states now however Back has become more bothersome than leg pain. Denies any loss of b/b or saddle paresthesias. Continue to take Flexeril 5mg and gaapentin 400mg PRN which provide some relief without adverse side effects.      Interval History 8/21/2020:  The patient present for follow up of lower back pain. Pt states he had no relief from prior Right L4-5 & L5-S1 facet joint injection not even short term per patient. Pt continues to have focal right sided pain, states radiculopathy goes down anterior and posterior thigh which stops just below the knee. The patient denies myelopathic symptoms such as handwriting changes or difficulty with buttons/coins/keys. Denies perineal paresthesias, bowel/bladder dysfunction. He is taking Gabapentin which provided good relief.        Pain Medications:  Gabapentin 400 mg TID  Meloxican 15 mg  Opioid Contract: no     report:  Not applicable    Pain Procedures:   09/09/2020 - RIGHT TF ASHISH L4/5 and L5/S1 - 50% improvement  5/27/2020 Right L4/5, and L5/S1 TFESI 100% relief for 4 weeks  8/5/2020   Right L4-5 & L5-S1 Facet joint injection with minimal relief.      Physical Therapy/Home Exercise: no    Imaging:   3/12/20 MRI Lumbar Spine  Narrative       EXAMINATION:  MRI LUMBAR SPINE WITHOUT CONTRAST    CLINICAL HISTORY:  Low back pain,  >6wks conservative tx, persistent-progressive sx, surgical candidate; Spinal stenosis, lumbar region without neurogenic claudication    TECHNIQUE:  Multiplanar, multisequence MR images were acquired from the thoracolumbar junction to the sacrum without contrast.    COMPARISON:  None available.    FINDINGS:  Alignment: Grade 1 anterolisthesis of L4-L5 and L5-S1.  Alignment of the lumbar spine is otherwise anatomic.    Vertebrae: Normal marrow signal. No fracture.  Bilateral L5 spondylolysis.    Discs: Scattered mild to moderate disc height loss of the lumbar spine.  Note made of increased STIR signal within the L2-L3 disc space.    Cord: Normal.  Conus terminates at L1.    Degenerative findings:    T12-L1: No spinal canal stenosis or neural foraminal narrowing.    L1-L2: Circumferential disc bulge with mild bilateral facet arthropathy.  No spinal canal stenosis or neural foraminal narrowing.    L2-L3: Circumferential disc bulge with mild bilateral facet arthropathy resulting in mild bilateral neural foraminal narrowing.  No spinal canal stenosis    L3-L4: Circumferential disc bulge with moderate bilateral facet arthropathy and ligamentum flavum hypertrophy results in mild spinal canal stenosis and mild bilateral neural foraminal narrowing.    L4-L5: Grade 1 anterolisthesis with diffuse disc bulge with severe bilateral facet arthropathy and ligamentum flavum hypertrophy results in moderate spinal canal stenosis and severe bilateral neural foraminal narrowing.    L5-S1: Circumferential disc bulge with moderate bilateral facet arthropathy resulting in moderate spinal canal stenosis and moderate bilateral neural foraminal narrowing.    Paraspinal muscles & soft tissues: Exophytic simple cysts in the left kidney.       Impression         1. Multilevel degenerative changes of the lumbar spine, most significant at the levels of L4-L5 and L5-S1.  At L4-L5 there is moderate spinal canal stenosis and severe bilateral neural  foraminal narrowing.  L5-S1, there is moderate spinal canal stenosis and moderate bilateral neural foraminal narrowing.  2. Bilateral spondylolysis at L5 grade 1 anterolisthesis of L5-S1.  3. Increased STIR signal within the L2-L3 disc space.  While this is favored to be degenerative, similar findings may be seen with early discitis.  Clinical correlation with lab parameters advised.       Allergies: Review of patient's allergies indicates:  No Known Allergies    Current Medications:   Current Outpatient Medications   Medication Sig Dispense Refill    albuterol (PROVENTIL/VENTOLIN HFA) 90 mcg/actuation inhaler Inhale 2 puffs into the lungs every 6 (six) hours as needed for Wheezing or Shortness of Breath. Rescue 18 g 6    aspirin (ECOTRIN) 81 MG EC tablet Take 81 mg by mouth once daily.      carbidopa-levodopa  mg (SINEMET)  mg per tablet Take 1 tablet by mouth 5 (five) times daily. 450 tablet 3    citalopram (CELEXA) 20 MG tablet TAKE 1 TABLET (20 MG TOTAL) BY MOUTH ONCE DAILY. 30 tablet 10    fluticasone-umeclidin-vilanter (TRELEGY ELLIPTA) 100-62.5-25 mcg DsDv Inhale 1 puff into the lungs once daily. 60 each 6    gabapentin (NEURONTIN) 400 MG capsule Take 1 capsule (400 mg total) by mouth 3 (three) times daily. 180 capsule 3    loratadine (CLARITIN) 10 mg tablet Take 10 mg by mouth as needed for Allergies.      metoprolol succinate (TOPROL-XL) 50 MG 24 hr tablet Take 1 tablet (50 mg total) by mouth once daily. 90 tablet 3    multivitamin capsule Take 1 capsule by mouth once daily.      nitroGLYCERIN (NITROSTAT) 0.4 MG SL tablet Place 1 tablet (0.4 mg total) under the tongue every 5 (five) minutes as needed for Chest pain. 25 tablet 4    PROVENTIL HFA 90 mcg/actuation inhaler       rosuvastatin (CRESTOR) 40 MG Tab TAKE 1 TABLET (40 MG TOTAL) BY MOUTH EVERY EVENING. 90 tablet 2    TRELEGY ELLIPTA 100-62.5-25 mcg DsDv        No current facility-administered medications for this visit.       REVIEW OF SYSTEMS:    GENERAL:  No weight loss, malaise or fevers.  HEENT:  Negative for frequent or significant headaches. H/o cataracts.  NECK:  Negative for lumps, goiter, pain and significant neck swelling.  RESPIRATORY:  Negative for cough, wheezing or shortness of breath.  CARDIOVASCULAR:  Negative for chest pain, leg swelling or palpitations. HTN. HLD. CAD. H/o MI.  GI:  Negative for abdominal discomfort, blood in stools or black stools or change in bowel habits.  MUSCULOSKELETAL:  See HPI.  SKIN:  Negative for lesions, rash, and itching.  PSYCH:  Depression.  HEMATOLOGY/LYMPHOLOGY:  Negative for prolonged bleeding, bruising easily or swollen nodes.  NEURO:   Parkinson's disease.  All other reviewed and negative other than HPI.    Past Medical History:  Past Medical History:   Diagnosis Date    Cataract     Chronic back pain greater than 3 months duration     Coronary artery disease     DDD (degenerative disc disease), lumbar 5/16/2020    Hyperlipidemia     Hypertension     MI, old 2006    Parkinson disease        Past Surgical History:  Past Surgical History:   Procedure Laterality Date    CARDIAC CATHETERIZATION  2006    x 3    CARDIAC CATHETERIZATION  10/11/2015    x 2    CATARACT EXTRACTION W/  INTRAOCULAR LENS IMPLANT Right 03/06/2018    Dr. Liu    CORONARY ANGIOPLASTY  2006, 2015    3 stents    HEMORRHOID SURGERY      INJECTION OF FACET JOINT Right 8/5/2020    Procedure: INJECTION, FACET JOINT, L4-L5 , L5-S1;  Surgeon: Ramiro Calvillo MD;  Location: [facility] PAIN T;  Service: Pain Management;  Laterality: Right;    TRANSFORAMINAL EPIDURAL INJECTION OF STEROID Right 5/27/2020    Procedure: INJECTION, STEROID, EPIDURAL, TRANSFORAMINAL APPROACH;  Surgeon: Ramiro Calvillo MD;  Location: [facility] PAIN MGT;  Service: Pain Management;  Laterality: Right;  Right TF ASHISH L4-L5, L5-S1    PTcannot come earlier than 12:30    TRANSFORAMINAL EPIDURAL INJECTION OF STEROID Right 9/9/2020     Procedure: INJECTION, STEROID, EPIDURAL, TRANSFORAMINAL APPROACH;  Surgeon: Ramiro Calvillo MD;  Location: [facility] [place] PAIN T;  Service: Pain Management;  Laterality: Right;  RIGHT TF ASHISH L4/5 and L5/S1       Family History:  Family History   Problem Relation Age of Onset    Heart attack Father     Cataracts Father     Heart disease Maternal Uncle     Heart disease Paternal Uncle     Hypertension Neg Hx     Asthma Neg Hx     Emphysema Neg Hx     Amblyopia Neg Hx     Blindness Neg Hx     Macular degeneration Neg Hx     Retinal detachment Neg Hx     Strabismus Neg Hx        Social History:  Social History     Socioeconomic History    Marital status:      Spouse name: Not on file    Number of children: Not on file    Years of education: Not on file    Highest education level: Not on file   Occupational History    Not on file   Social Needs    Financial resource strain: Not hard at all    Food insecurity     Worry: Never true     Inability: Never true    Transportation needs     Medical: No     Non-medical: No   Tobacco Use    Smoking status: Former Smoker     Packs/day: 1.00     Years: 40.00     Pack years: 40.00     Quit date: 2014     Years since quittin.6    Smokeless tobacco: Never Used   Substance and Sexual Activity    Alcohol use: Yes     Frequency: Never     Binge frequency: Never     Comment: socially    Drug use: No    Sexual activity: Not on file   Lifestyle    Physical activity     Days per week: 0 days     Minutes per session: Not on file    Stress: Only a little   Relationships    Social connections     Talks on phone: Once a week     Gets together: Once a week     Attends Restorationism service: Not on file     Active member of club or organization: No     Attends meetings of clubs or organizations: Never     Relationship status:    Other Topics Concern    Not on file   Social History Narrative    Not on file       OBJECTIVE:    BP (!) 169/91   Pulse 78    "Resp 18   Ht 5' 4" (1.626 m)   Wt 69 kg (152 lb 1.9 oz)   SpO2 100%   BMI 26.11 kg/m²     PHYSICAL EXAMINATION:    General appearance: Well appearing, in no acute distress, alert and oriented x3.  Psych:  Mood and affect appropriate.  Skin: Skin color, texture, turgor normal, no rashes or lesions, in both upper and lower body.  Head/face:  Atraumatic, normocephalic. No palpable lymph nodes  Neck: No pain to palpation over the cervical paraspinous muscles. Spurling Negative. No pain with neck flexion, extension, or lateral flexion. .  Cor: RRR  Pulm: CTA  GI: Abdomen soft and non-tender.  Back: Straight leg raising in supine position is negative to radicular pain. Positive facet loading. Decreased ROM in flexion and extension  Extremities: Peripheral joint ROM is full and pain free without obvious instability or laxity in both lower extremities. No deformities, edema, or skin discoloration. Good capillary refill.  Musculoskeletal: Hip, sacroiliac and knee provocative maneuvers are negative. Bilateral lower extremity strength is normal and symmetric.  No atrophy or tone abnormalities are noted.  Neuro: Bilateral lower extremity coordination and muscle stretch reflexes are physiologic and symmetric.  Plantar response are downgoing. No loss of sensation is noted.  Gait: antalgic    ASSESSMENT: 75 y.o. year old male with low back pain, consistent with        1. DDD (degenerative disc disease), lumbar  Ambulatory referral/consult to Physical/Occupational Therapy   2. Lumbosacral radiculopathy  Ambulatory referral/consult to Physical/Occupational Therapy   3. Spondylosis without myelopathy  Ambulatory referral/consult to Physical/Occupational Therapy       PLAN:     - I have stressed the importance of physical activity and a home exercise plan to help with pain and improve health.  - Patient can continue with medications for now since they are providing benefits, using them appropriately, and without side effects.  - " will order PT/OT for evaluation and treatment  - RTC 4 weeks with MODESTA Robertson  - Counseled patient regarding the importance of activity modification, constant sleeping habits and physical therapy.    The above plan and management options were discussed at length with patient. Patient is in agreement with the above and verbalized understanding.    Mara Brice,    I have personally reviewed the history and exam of this patient and agree with the resident/fellow/NPs note as stated above.    Ramiro Calvillo MD    09/17/2020

## 2020-09-22 ENCOUNTER — PATIENT MESSAGE (OUTPATIENT)
Dept: RESEARCH | Facility: OTHER | Age: 75
End: 2020-09-22

## 2020-10-05 ENCOUNTER — PATIENT MESSAGE (OUTPATIENT)
Dept: ADMINISTRATIVE | Facility: HOSPITAL | Age: 75
End: 2020-10-05

## 2020-10-14 ENCOUNTER — TELEPHONE (OUTPATIENT)
Dept: PAIN MEDICINE | Facility: CLINIC | Age: 75
End: 2020-10-14

## 2020-10-14 NOTE — TELEPHONE ENCOUNTER
"This message is for patient in regards to his/her appointment 10/15/20 at 11 am.      [facility] Policy: For the safety of all patients and staff members.     Patient Visitor policy: Due to social distancing and limited seating staff are requesting patient to arrive to their schedule appointments alone. If patient do not need assistance with their visit, we're asking all visitors to remain outside the waiting area.    Upon arriving to facility; patient will have temperature taking and are required to wear a face mask, if patient do not have a face mask one will be provided. Upon arriving patient we ask patients to contact clinic at this number [phone] to notify staff that they have arrived or they may do do by utilizing the Mobile checked in [place](if patient have patient portal; clinical staff will send a message through there letting them know it's okay to proceed to their visit). Staff will give the patient the "okay" to come up or wait inside their vehicle until clinic is ready for patient to be seen by Marcelo Washington Np If you have any questions or concerns please contact [phone]        "

## 2020-10-15 ENCOUNTER — OFFICE VISIT (OUTPATIENT)
Dept: PAIN MEDICINE | Facility: CLINIC | Age: 75
End: 2020-10-15
Payer: MEDICARE

## 2020-10-15 VITALS
DIASTOLIC BLOOD PRESSURE: 78 MMHG | RESPIRATION RATE: 18 BRPM | SYSTOLIC BLOOD PRESSURE: 140 MMHG | BODY MASS INDEX: 25.97 KG/M2 | OXYGEN SATURATION: 100 % | HEIGHT: 64 IN | HEART RATE: 73 BPM | WEIGHT: 152.13 LBS | TEMPERATURE: 98 F

## 2020-10-15 DIAGNOSIS — M48.061 STENOSIS OF LATERAL RECESS OF LUMBAR SPINE: ICD-10-CM

## 2020-10-15 DIAGNOSIS — M47.897 OTHER OSTEOARTHRITIS OF SPINE, LUMBOSACRAL REGION: Primary | ICD-10-CM

## 2020-10-15 DIAGNOSIS — G20.A1 PARKINSON DISEASE: ICD-10-CM

## 2020-10-15 PROCEDURE — 1101F PR PT FALLS ASSESS DOC 0-1 FALLS W/OUT INJ PAST YR: ICD-10-PCS | Mod: CPTII,S$GLB,, | Performed by: NURSE PRACTITIONER

## 2020-10-15 PROCEDURE — 3078F DIAST BP <80 MM HG: CPT | Mod: CPTII,S$GLB,, | Performed by: NURSE PRACTITIONER

## 2020-10-15 PROCEDURE — 99213 OFFICE O/P EST LOW 20 MIN: CPT | Mod: S$GLB,,, | Performed by: NURSE PRACTITIONER

## 2020-10-15 PROCEDURE — 99999 PR PBB SHADOW E&M-EST. PATIENT-LVL IV: ICD-10-PCS | Mod: PBBFAC,,, | Performed by: NURSE PRACTITIONER

## 2020-10-15 PROCEDURE — 1159F MED LIST DOCD IN RCRD: CPT | Mod: S$GLB,,, | Performed by: NURSE PRACTITIONER

## 2020-10-15 PROCEDURE — 1125F PR PAIN SEVERITY QUANTIFIED, PAIN PRESENT: ICD-10-PCS | Mod: S$GLB,,, | Performed by: NURSE PRACTITIONER

## 2020-10-15 PROCEDURE — 99999 PR PBB SHADOW E&M-EST. PATIENT-LVL IV: CPT | Mod: PBBFAC,,, | Performed by: NURSE PRACTITIONER

## 2020-10-15 PROCEDURE — 1159F PR MEDICATION LIST DOCUMENTED IN MEDICAL RECORD: ICD-10-PCS | Mod: S$GLB,,, | Performed by: NURSE PRACTITIONER

## 2020-10-15 PROCEDURE — 1125F AMNT PAIN NOTED PAIN PRSNT: CPT | Mod: S$GLB,,, | Performed by: NURSE PRACTITIONER

## 2020-10-15 PROCEDURE — 3078F PR MOST RECENT DIASTOLIC BLOOD PRESSURE < 80 MM HG: ICD-10-PCS | Mod: CPTII,S$GLB,, | Performed by: NURSE PRACTITIONER

## 2020-10-15 PROCEDURE — 3077F SYST BP >= 140 MM HG: CPT | Mod: CPTII,S$GLB,, | Performed by: NURSE PRACTITIONER

## 2020-10-15 PROCEDURE — 3077F PR MOST RECENT SYSTOLIC BLOOD PRESSURE >= 140 MM HG: ICD-10-PCS | Mod: CPTII,S$GLB,, | Performed by: NURSE PRACTITIONER

## 2020-10-15 PROCEDURE — 1101F PT FALLS ASSESS-DOCD LE1/YR: CPT | Mod: CPTII,S$GLB,, | Performed by: NURSE PRACTITIONER

## 2020-10-15 PROCEDURE — 99213 PR OFFICE/OUTPT VISIT, EST, LEVL III, 20-29 MIN: ICD-10-PCS | Mod: S$GLB,,, | Performed by: NURSE PRACTITIONER

## 2020-10-15 RX ORDER — GABAPENTIN 400 MG/1
800 CAPSULE ORAL 3 TIMES DAILY
Qty: 180 CAPSULE | Refills: 2 | Status: SHIPPED | OUTPATIENT
Start: 2020-10-15 | End: 2021-04-20

## 2020-10-15 NOTE — PROGRESS NOTES
Chronic patient Established Note (Follow up visit)      SUBJECTIVE:      ZACHARIAH Kellogg presents to the clinic for the evaluation of right lower back buttock pain. The pain started 5-10 years ago following falling off scaffold at work. Since then, symptoms have been worsening.  The pain is located in the right buttock area and radiates to the down right leg to knee.  The pain is described as sharp and is rated as 4/10. The pain is rated with a score of  4/10 on the BEST day and a score of 9/10 on the WORST day.   Symptoms interfere with daily activity.   Has occasional paresthesias and numbness to RLE all the down the leg.   The pain is exacerbated by Standing, Walking and Morning.    The pain is mitigated by heat, laying down and rest.   He reports spending 2 hours per day reclining. The patient reports 4 hours of uninterrupted sleep per night.     Patient denies night fever/night sweats, urinary incontinence, bowel incontinence, significant weight loss, significant motor weakness and loss of sensations.     Interval History 05/14/2020:  76 y/o M with hx of CAD s/p PCI (2008 and 2017), mild Pulm HTN, Essential HTN, Parkinson's Disease and COPD( Former smoker x 40 years).  On ASA 81 mg for CAD. Formerly on Plavix for DAPT  On Medical Therapy with Gabapentin without any side effects.   Started Meloxican this week and acknowledges being effective.         Interval history 06/08/2020:  Patient presents for telephone follow-up of lower back pain and right lower leg pain.  Patient is status post right L4/5 and L5/S1 TFESI on 5/27/2020.  Patient states 100% resolution of lower back pain and right leg pain.  Patient denies any neurological changes, denies any health changes.  Patient denies any neurological emergency symptoms such as loss of bowel/bladder, and denies saddle paresthesias.  Patient rates pain 0/10.  Patient is no longer taking Mobic, takes Flexeril 5 mg g and gabapentin 400 mg  laci        Interval History 8/4/2020:  The patient presents for follow-up of lower back pain.  He has associated radiculopathy down lateral right leg stopping at ankle.  He has had prior TF ASHISH with good relief of the symptoms which lasted approx 4 weeks. He states now however Back has become more bothersome than leg pain. Denies any loss of b/b or saddle paresthesias. Continue to take Flexeril 5mg and gaapentin 400mg PRN which provide some relief without adverse side effects.      Interval History 8/21/2020:  The patient present for follow up of lower back pain. Pt states he had no relief from prior Right L4-5 & L5-S1 facet joint injection not even short term per patient. Pt continues to have focal right sided pain, states radiculopathy goes down anterior and posterior thigh which stops just below the knee. The patient denies myelopathic symptoms such as handwriting changes or difficulty with buttons/coins/keys. Denies perineal paresthesias, bowel/bladder dysfunction. He is taking Gabapentin which provided good relief.     Interval History 09/17/2020:  Hector Kellogg presents to the clinic for a follow-up appointment from 09/09/2020 -RIGHT TF ASHISH L4/5 and L5/S1 . Since the last visit, Hector Kellogg states the pain has been improving. Current pain intensity is 4/10.  He reports 50% pain improvement.  Currently he is taking gabapentin 400mg BID (he was prescribed to take it TID).  He has not done any physical therapy.    Interval History 10/15/2020:  The patient presents for follow-up of lower back pain.  She he has approximately 1 month postprocedure.  He did initially endorse he had some benefit but denies any focal benefit.  He states back pain is worse than leg pain at this time.  He is currently taking gabapentin 800 mg Q a.m. and has recently started to do 800 mg q.h.s..  He is not taking t.i.d. which was prescribed.  The got discussed to do so as he finds it beneficial but does not last over  several hours.  He has questions regarding RFA says they have been beneficial to his family member.  It was discussed he had a facet injection and he states is unclear whether he had any short-term benefit.  He is unsure if he had any short-term benefit. Denies perineal paresthesias, bowel/bladder dysfunction.         Pain Medications:  Gabapentin 400 mg TID  Meloxican 15 mg  Opioid Contract: no     report:  Not applicable    Pain Procedures:   5/27/2020 Right L4/5, and L5/S1 TFESI 100% relief for 4 weeks  8/5/2020   Right L4-5 & L5-S1 Facet joint injection with minimal relief.   09/09/2020 - RIGHT TF ASHISH L4/5 and L5/S1 - 50% improvement  Physical Therapy/Home Exercise: no    Imaging:   3/12/20 MRI Lumbar Spine  Narrative       EXAMINATION:  MRI LUMBAR SPINE WITHOUT CONTRAST    CLINICAL HISTORY:  Low back pain, >6wks conservative tx, persistent-progressive sx, surgical candidate; Spinal stenosis, lumbar region without neurogenic claudication    TECHNIQUE:  Multiplanar, multisequence MR images were acquired from the thoracolumbar junction to the sacrum without contrast.    COMPARISON:  None available.    FINDINGS:  Alignment: Grade 1 anterolisthesis of L4-L5 and L5-S1.  Alignment of the lumbar spine is otherwise anatomic.    Vertebrae: Normal marrow signal. No fracture.  Bilateral L5 spondylolysis.    Discs: Scattered mild to moderate disc height loss of the lumbar spine.  Note made of increased STIR signal within the L2-L3 disc space.    Cord: Normal.  Conus terminates at L1.    Degenerative findings:    T12-L1: No spinal canal stenosis or neural foraminal narrowing.    L1-L2: Circumferential disc bulge with mild bilateral facet arthropathy.  No spinal canal stenosis or neural foraminal narrowing.    L2-L3: Circumferential disc bulge with mild bilateral facet arthropathy resulting in mild bilateral neural foraminal narrowing.  No spinal canal stenosis    L3-L4: Circumferential disc bulge with moderate bilateral  facet arthropathy and ligamentum flavum hypertrophy results in mild spinal canal stenosis and mild bilateral neural foraminal narrowing.    L4-L5: Grade 1 anterolisthesis with diffuse disc bulge with severe bilateral facet arthropathy and ligamentum flavum hypertrophy results in moderate spinal canal stenosis and severe bilateral neural foraminal narrowing.    L5-S1: Circumferential disc bulge with moderate bilateral facet arthropathy resulting in moderate spinal canal stenosis and moderate bilateral neural foraminal narrowing.    Paraspinal muscles & soft tissues: Exophytic simple cysts in the left kidney.       Impression         1. Multilevel degenerative changes of the lumbar spine, most significant at the levels of L4-L5 and L5-S1.  At L4-L5 there is moderate spinal canal stenosis and severe bilateral neural foraminal narrowing.  L5-S1, there is moderate spinal canal stenosis and moderate bilateral neural foraminal narrowing.  2. Bilateral spondylolysis at L5 grade 1 anterolisthesis of L5-S1.  3. Increased STIR signal within the L2-L3 disc space.  While this is favored to be degenerative, similar findings may be seen with early discitis.  Clinical correlation with lab parameters advised.       Allergies: Review of patient's allergies indicates:  No Known Allergies    Current Medications:   Current Outpatient Medications   Medication Sig Dispense Refill    albuterol (PROVENTIL/VENTOLIN HFA) 90 mcg/actuation inhaler Inhale 2 puffs into the lungs every 6 (six) hours as needed for Wheezing or Shortness of Breath. Rescue 18 g 6    aspirin (ECOTRIN) 81 MG EC tablet Take 81 mg by mouth once daily.      carbidopa-levodopa  mg (SINEMET)  mg per tablet Take 1 tablet by mouth 5 (five) times daily. 450 tablet 3    citalopram (CELEXA) 20 MG tablet TAKE 1 TABLET (20 MG TOTAL) BY MOUTH ONCE DAILY. 30 tablet 10    fluticasone-umeclidin-vilanter (TRELEGY ELLIPTA) 100-62.5-25 mcg DsDv Inhale 1 puff into the  lungs once daily. 60 each 6    gabapentin (NEURONTIN) 400 MG capsule TAKE ONE CAPSULE BY MOUTH THREE TIMES A DAY  180 capsule 3    loratadine (CLARITIN) 10 mg tablet Take 10 mg by mouth as needed for Allergies.      metoprolol succinate (TOPROL-XL) 50 MG 24 hr tablet Take 1 tablet (50 mg total) by mouth once daily. 90 tablet 3    multivitamin capsule Take 1 capsule by mouth once daily.      rosuvastatin (CRESTOR) 40 MG Tab TAKE 1 TABLET (40 MG TOTAL) BY MOUTH EVERY EVENING. 90 tablet 2    nitroGLYCERIN (NITROSTAT) 0.4 MG SL tablet Place 1 tablet (0.4 mg total) under the tongue every 5 (five) minutes as needed for Chest pain. 25 tablet 4    PROVENTIL HFA 90 mcg/actuation inhaler        No current facility-administered medications for this visit.      REVIEW OF SYSTEMS:    GENERAL:  No weight loss, malaise or fevers.  HEENT:  Negative for frequent or significant headaches. H/o cataracts.  NECK:  Negative for lumps, goiter, pain and significant neck swelling.  RESPIRATORY:  Negative for cough, wheezing or shortness of breath.  CARDIOVASCULAR:  Negative for chest pain, leg swelling or palpitations. HTN. HLD. CAD. H/o MI.  GI:  Negative for abdominal discomfort, blood in stools or black stools or change in bowel habits.  MUSCULOSKELETAL:  See HPI.  SKIN:  Negative for lesions, rash, and itching.  PSYCH:  Depression.  HEMATOLOGY/LYMPHOLOGY:  Negative for prolonged bleeding, bruising easily or swollen nodes.  NEURO:   Parkinson's disease.  All other reviewed and negative other than HPI.    Past Medical History:  Past Medical History:   Diagnosis Date    Cataract     Chronic back pain greater than 3 months duration     Coronary artery disease     DDD (degenerative disc disease), lumbar 5/16/2020    Hyperlipidemia     Hypertension     MI, old 2006    Parkinson disease        Past Surgical History:  Past Surgical History:   Procedure Laterality Date    CARDIAC CATHETERIZATION  2006    x 3    CARDIAC  CATHETERIZATION  10/11/2015    x 2    CATARACT EXTRACTION W/  INTRAOCULAR LENS IMPLANT Right 03/06/2018    Dr. Liu    CORONARY ANGIOPLASTY  2006, 2015    3 stents    HEMORRHOID SURGERY      INJECTION OF FACET JOINT Right 8/5/2020    Procedure: INJECTION, FACET JOINT, L4-L5 , L5-S1;  Surgeon: Ramiro Calvillo MD;  Location: [facility];  Service: Pain Management;  Laterality: Right;    TRANSFORAMINAL EPIDURAL INJECTION OF STEROID Right 5/27/2020    Procedure: INJECTION, STEROID, EPIDURAL, TRANSFORAMINAL APPROACH;  Surgeon: Ramiro Calvillo MD;  Location: [facility];  Service: Pain Management;  Laterality: Right;  Right TF ASHISH L4-L5, L5-S1    PTcannot come earlier than 12:30    TRANSFORAMINAL EPIDURAL INJECTION OF STEROID Right 9/9/2020    Procedure: INJECTION, STEROID, EPIDURAL, TRANSFORAMINAL APPROACH;  Surgeon: Ramiro Calvillo MD;  Location: [facility];  Service: Pain Management;  Laterality: Right;  RIGHT TF ASHISH L4/5 and L5/S1       Family History:  Family History   Problem Relation Age of Onset    Heart attack Father     Cataracts Father     Heart disease Maternal Uncle     Heart disease Paternal Uncle     Hypertension Neg Hx     Asthma Neg Hx     Emphysema Neg Hx     Amblyopia Neg Hx     Blindness Neg Hx     Macular degeneration Neg Hx     Retinal detachment Neg Hx     Strabismus Neg Hx        Social History:  Social History     Socioeconomic History    Marital status:      Spouse name: Not on file    Number of children: Not on file    Years of education: Not on file    Highest education level: Not on file   Occupational History    Not on file   Social Needs    Financial resource strain: Not hard at all    Food insecurity     Worry: Never true     Inability: Never true    Transportation needs     Medical: No     Non-medical: No   Tobacco Use    Smoking status: Former Smoker     Packs/day: 1.00     Years: 40.00     Pack years: 40.00     Quit date: 2/1/2014  "    Years since quittin.7    Smokeless tobacco: Never Used   Substance and Sexual Activity    Alcohol use: Yes     Frequency: Never     Binge frequency: Never     Comment: socially    Drug use: No    Sexual activity: Not on file   Lifestyle    Physical activity     Days per week: 0 days     Minutes per session: Not on file    Stress: Only a little   Relationships    Social connections     Talks on phone: Once a week     Gets together: Once a week     Attends [employer] service: Not on file     Active member of club or organization: No     Attends meetings of clubs or organizations: Never     Relationship status:    Other Topics Concern    Not on file   Social History Narrative    Not on file       OBJECTIVE:    BP (!) 140/78   Pulse 73   Temp 98 °F (36.7 °C)   Resp 18   Ht 5' 4" (1.626 m)   Wt 69 kg (152 lb 1.9 oz)   SpO2 100%   BMI 26.11 kg/m²     PHYSICAL EXAMINATION:    General appearance: Well appearing, in no acute distress, alert and oriented x3.  Psych:  Mood and affect appropriate.  Skin: Skin color, texture, turgor normal, no rashes or lesions, in both upper and lower body.  Head/face:  Atraumatic, normocephalic. No palpable lymph nodes  Neck: No pain to palpation over the cervical paraspinous muscles. Spurling Negative. No pain with neck flexion, extension, or lateral flexion. .  Cor:Normal rate  Pulm: normal effort  Back: Straight leg raising in supine position is negative to radicular pain. Positive facet loading Right>Left. Decreased ROM in flexion and extension  Extremities: Peripheral joint ROM is full and pain free without obvious instability or laxity in both lower extremities. No deformities, edema, or skin discoloration.Musculoskeletal: Hip, sacroiliac and knee provocative maneuvers are negative. Bilateral lower extremity strength is normal and symmetric.  No atrophy or tone abnormalities are noted.  Neuro: Bilateral lower extremity coordination and muscle stretch " reflexes are physiologic and symmetric.  Plantar response are downgoing. No loss of sensation is noted.  Gait: antalgic    ASSESSMENT: 75 y.o. year old male with low back pain, consistent with        1. Parkinson disease  gabapentin (NEURONTIN) 400 MG capsule   2. Stenosis of lateral recess of lumbar spine  gabapentin (NEURONTIN) 400 MG capsule       PLAN:     - Prior records reviewed   - He again states no lasting benefit from ESIs  - He states pain is Back>legs and worse in a.m.  - Thorough discussion had and Pt does not wish to seek NS evaluation which I recommended  - He questioned RFA as option. I did discussed he stated having no ST benefit from Facet so this may not be the issue  - Will schedule for x1 MBB of Bilateral L2,3,4,5. R>L pain. If diagnostic will repeat MBB, if not then will consider alternative options including SCS consideration.  - Also increased frequency of neurontin from 800mg BID to TID dosing as it is beneficial but does not last 12hrs.  - I have stressed the importance of physical activity and a home exercise plan to help with pain and improve health.  - Counseled patient regarding the importance of activity modification, constant sleeping habits and physical therapy.  - RTC in 2 weeks after procedure. May also call     The above plan and management options were discussed at length with patient. Patient is in agreement with the above and verbalized understanding.    Marcelo Washington NP    10/15/2020

## 2020-10-15 NOTE — H&P (VIEW-ONLY)
Chronic patient Established Note (Follow up visit)      SUBJECTIVE:      ZACHARIAH Kellogg presents to the clinic for the evaluation of right lower back buttock pain. The pain started 5-10 years ago following falling off scaffold at work. Since then, symptoms have been worsening.  The pain is located in the right buttock area and radiates to the down right leg to knee.  The pain is described as sharp and is rated as 4/10. The pain is rated with a score of  4/10 on the BEST day and a score of 9/10 on the WORST day.   Symptoms interfere with daily activity.   Has occasional paresthesias and numbness to RLE all the down the leg.   The pain is exacerbated by Standing, Walking and Morning.    The pain is mitigated by heat, laying down and rest.   He reports spending 2 hours per day reclining. The patient reports 4 hours of uninterrupted sleep per night.     Patient denies night fever/night sweats, urinary incontinence, bowel incontinence, significant weight loss, significant motor weakness and loss of sensations.     Interval History 05/14/2020:  76 y/o M with hx of CAD s/p PCI (2008 and 2017), mild Pulm HTN, Essential HTN, Parkinson's Disease and COPD( Former smoker x 40 years).  On ASA 81 mg for CAD. Formerly on Plavix for DAPT  On Medical Therapy with Gabapentin without any side effects.   Started Meloxican this week and acknowledges being effective.         Interval history 06/08/2020:  Patient presents for telephone follow-up of lower back pain and right lower leg pain.  Patient is status post right L4/5 and L5/S1 TFESI on 5/27/2020.  Patient states 100% resolution of lower back pain and right leg pain.  Patient denies any neurological changes, denies any health changes.  Patient denies any neurological emergency symptoms such as loss of bowel/bladder, and denies saddle paresthesias.  Patient rates pain 0/10.  Patient is no longer taking Mobic, takes Flexeril 5 mg g and gabapentin 400 mg  laci        Interval History 8/4/2020:  The patient presents for follow-up of lower back pain.  He has associated radiculopathy down lateral right leg stopping at ankle.  He has had prior TF ASHISH with good relief of the symptoms which lasted approx 4 weeks. He states now however Back has become more bothersome than leg pain. Denies any loss of b/b or saddle paresthesias. Continue to take Flexeril 5mg and gaapentin 400mg PRN which provide some relief without adverse side effects.      Interval History 8/21/2020:  The patient present for follow up of lower back pain. Pt states he had no relief from prior Right L4-5 & L5-S1 facet joint injection not even short term per patient. Pt continues to have focal right sided pain, states radiculopathy goes down anterior and posterior thigh which stops just below the knee. The patient denies myelopathic symptoms such as handwriting changes or difficulty with buttons/coins/keys. Denies perineal paresthesias, bowel/bladder dysfunction. He is taking Gabapentin which provided good relief.     Interval History 09/17/2020:  Hector Kellogg presents to the clinic for a follow-up appointment from 09/09/2020 -RIGHT TF ASHISH L4/5 and L5/S1 . Since the last visit, Hector Kellogg states the pain has been improving. Current pain intensity is 4/10.  He reports 50% pain improvement.  Currently he is taking gabapentin 400mg BID (he was prescribed to take it TID).  He has not done any physical therapy.    Interval History 10/15/2020:  The patient presents for follow-up of lower back pain.  She he has approximately 1 month postprocedure.  He did initially endorse he had some benefit but denies any focal benefit.  He states back pain is worse than leg pain at this time.  He is currently taking gabapentin 800 mg Q a.m. and has recently started to do 800 mg q.h.s..  He is not taking t.i.d. which was prescribed.  The got discussed to do so as he finds it beneficial but does not last over  several hours.  He has questions regarding RFA says they have been beneficial to his family member.  It was discussed he had a facet injection and he states is unclear whether he had any short-term benefit.  He is unsure if he had any short-term benefit. Denies perineal paresthesias, bowel/bladder dysfunction.         Pain Medications:  Gabapentin 400 mg TID  Meloxican 15 mg  Opioid Contract: no     report:  Not applicable    Pain Procedures:   5/27/2020 Right L4/5, and L5/S1 TFESI 100% relief for 4 weeks  8/5/2020   Right L4-5 & L5-S1 Facet joint injection with minimal relief.   09/09/2020 - RIGHT TF ASHISH L4/5 and L5/S1 - 50% improvement  Physical Therapy/Home Exercise: no    Imaging:   3/12/20 MRI Lumbar Spine  Narrative       EXAMINATION:  MRI LUMBAR SPINE WITHOUT CONTRAST    CLINICAL HISTORY:  Low back pain, >6wks conservative tx, persistent-progressive sx, surgical candidate; Spinal stenosis, lumbar region without neurogenic claudication    TECHNIQUE:  Multiplanar, multisequence MR images were acquired from the thoracolumbar junction to the sacrum without contrast.    COMPARISON:  None available.    FINDINGS:  Alignment: Grade 1 anterolisthesis of L4-L5 and L5-S1.  Alignment of the lumbar spine is otherwise anatomic.    Vertebrae: Normal marrow signal. No fracture.  Bilateral L5 spondylolysis.    Discs: Scattered mild to moderate disc height loss of the lumbar spine.  Note made of increased STIR signal within the L2-L3 disc space.    Cord: Normal.  Conus terminates at L1.    Degenerative findings:    T12-L1: No spinal canal stenosis or neural foraminal narrowing.    L1-L2: Circumferential disc bulge with mild bilateral facet arthropathy.  No spinal canal stenosis or neural foraminal narrowing.    L2-L3: Circumferential disc bulge with mild bilateral facet arthropathy resulting in mild bilateral neural foraminal narrowing.  No spinal canal stenosis    L3-L4: Circumferential disc bulge with moderate bilateral  facet arthropathy and ligamentum flavum hypertrophy results in mild spinal canal stenosis and mild bilateral neural foraminal narrowing.    L4-L5: Grade 1 anterolisthesis with diffuse disc bulge with severe bilateral facet arthropathy and ligamentum flavum hypertrophy results in moderate spinal canal stenosis and severe bilateral neural foraminal narrowing.    L5-S1: Circumferential disc bulge with moderate bilateral facet arthropathy resulting in moderate spinal canal stenosis and moderate bilateral neural foraminal narrowing.    Paraspinal muscles & soft tissues: Exophytic simple cysts in the left kidney.       Impression         1. Multilevel degenerative changes of the lumbar spine, most significant at the levels of L4-L5 and L5-S1.  At L4-L5 there is moderate spinal canal stenosis and severe bilateral neural foraminal narrowing.  L5-S1, there is moderate spinal canal stenosis and moderate bilateral neural foraminal narrowing.  2. Bilateral spondylolysis at L5 grade 1 anterolisthesis of L5-S1.  3. Increased STIR signal within the L2-L3 disc space.  While this is favored to be degenerative, similar findings may be seen with early discitis.  Clinical correlation with lab parameters advised.       Allergies: Review of patient's allergies indicates:  No Known Allergies    Current Medications:   Current Outpatient Medications   Medication Sig Dispense Refill    albuterol (PROVENTIL/VENTOLIN HFA) 90 mcg/actuation inhaler Inhale 2 puffs into the lungs every 6 (six) hours as needed for Wheezing or Shortness of Breath. Rescue 18 g 6    aspirin (ECOTRIN) 81 MG EC tablet Take 81 mg by mouth once daily.      carbidopa-levodopa  mg (SINEMET)  mg per tablet Take 1 tablet by mouth 5 (five) times daily. 450 tablet 3    citalopram (CELEXA) 20 MG tablet TAKE 1 TABLET (20 MG TOTAL) BY MOUTH ONCE DAILY. 30 tablet 10    fluticasone-umeclidin-vilanter (TRELEGY ELLIPTA) 100-62.5-25 mcg DsDv Inhale 1 puff into the  lungs once daily. 60 each 6    gabapentin (NEURONTIN) 400 MG capsule TAKE ONE CAPSULE BY MOUTH THREE TIMES A DAY  180 capsule 3    loratadine (CLARITIN) 10 mg tablet Take 10 mg by mouth as needed for Allergies.      metoprolol succinate (TOPROL-XL) 50 MG 24 hr tablet Take 1 tablet (50 mg total) by mouth once daily. 90 tablet 3    multivitamin capsule Take 1 capsule by mouth once daily.      rosuvastatin (CRESTOR) 40 MG Tab TAKE 1 TABLET (40 MG TOTAL) BY MOUTH EVERY EVENING. 90 tablet 2    nitroGLYCERIN (NITROSTAT) 0.4 MG SL tablet Place 1 tablet (0.4 mg total) under the tongue every 5 (five) minutes as needed for Chest pain. 25 tablet 4    PROVENTIL HFA 90 mcg/actuation inhaler        No current facility-administered medications for this visit.      REVIEW OF SYSTEMS:    GENERAL:  No weight loss, malaise or fevers.  HEENT:  Negative for frequent or significant headaches. H/o cataracts.  NECK:  Negative for lumps, goiter, pain and significant neck swelling.  RESPIRATORY:  Negative for cough, wheezing or shortness of breath.  CARDIOVASCULAR:  Negative for chest pain, leg swelling or palpitations. HTN. HLD. CAD. H/o MI.  GI:  Negative for abdominal discomfort, blood in stools or black stools or change in bowel habits.  MUSCULOSKELETAL:  See HPI.  SKIN:  Negative for lesions, rash, and itching.  PSYCH:  Depression.  HEMATOLOGY/LYMPHOLOGY:  Negative for prolonged bleeding, bruising easily or swollen nodes.  NEURO:   Parkinson's disease.  All other reviewed and negative other than HPI.    Past Medical History:  Past Medical History:   Diagnosis Date    Cataract     Chronic back pain greater than 3 months duration     Coronary artery disease     DDD (degenerative disc disease), lumbar 5/16/2020    Hyperlipidemia     Hypertension     MI, old 2006    Parkinson disease        Past Surgical History:  Past Surgical History:   Procedure Laterality Date    CARDIAC CATHETERIZATION  2006    x 3    CARDIAC  CATHETERIZATION  10/11/2015    x 2    CATARACT EXTRACTION W/  INTRAOCULAR LENS IMPLANT Right 03/06/2018    Dr. Liu    CORONARY ANGIOPLASTY  2006, 2015    3 stents    HEMORRHOID SURGERY      INJECTION OF FACET JOINT Right 8/5/2020    Procedure: INJECTION, FACET JOINT, L4-L5 , L5-S1;  Surgeon: Ramiro Calvillo MD;  Location: [facility];  Service: Pain Management;  Laterality: Right;    TRANSFORAMINAL EPIDURAL INJECTION OF STEROID Right 5/27/2020    Procedure: INJECTION, STEROID, EPIDURAL, TRANSFORAMINAL APPROACH;  Surgeon: Ramiro Calvillo MD;  Location: [facility];  Service: Pain Management;  Laterality: Right;  Right TF ASHISH L4-L5, L5-S1    PTcannot come earlier than 12:30    TRANSFORAMINAL EPIDURAL INJECTION OF STEROID Right 9/9/2020    Procedure: INJECTION, STEROID, EPIDURAL, TRANSFORAMINAL APPROACH;  Surgeon: Ramiro Calvillo MD;  Location: [facility];  Service: Pain Management;  Laterality: Right;  RIGHT TF ASHISH L4/5 and L5/S1       Family History:  Family History   Problem Relation Age of Onset    Heart attack Father     Cataracts Father     Heart disease Maternal Uncle     Heart disease Paternal Uncle     Hypertension Neg Hx     Asthma Neg Hx     Emphysema Neg Hx     Amblyopia Neg Hx     Blindness Neg Hx     Macular degeneration Neg Hx     Retinal detachment Neg Hx     Strabismus Neg Hx        Social History:  Social History     Socioeconomic History    Marital status:      Spouse name: Not on file    Number of children: Not on file    Years of education: Not on file    Highest education level: Not on file   Occupational History    Not on file   Social Needs    Financial resource strain: Not hard at all    Food insecurity     Worry: Never true     Inability: Never true    Transportation needs     Medical: No     Non-medical: No   Tobacco Use    Smoking status: Former Smoker     Packs/day: 1.00     Years: 40.00     Pack years: 40.00     Quit date: 2/1/2014  "    Years since quittin.7    Smokeless tobacco: Never Used   Substance and Sexual Activity    Alcohol use: Yes     Frequency: Never     Binge frequency: Never     Comment: socially    Drug use: No    Sexual activity: Not on file   Lifestyle    Physical activity     Days per week: 0 days     Minutes per session: Not on file    Stress: Only a little   Relationships    Social connections     Talks on phone: Once a week     Gets together: Once a week     Attends Hindu service: Not on file     Active member of club or organization: No     Attends meetings of clubs or organizations: Never     Relationship status:    Other Topics Concern    Not on file   Social History Narrative    Not on file       OBJECTIVE:    BP (!) 140/78   Pulse 73   Temp 98 °F (36.7 °C)   Resp 18   Ht 5' 4" (1.626 m)   Wt 69 kg (152 lb 1.9 oz)   SpO2 100%   BMI 26.11 kg/m²     PHYSICAL EXAMINATION:    General appearance: Well appearing, in no acute distress, alert and oriented x3.  Psych:  Mood and affect appropriate.  Skin: Skin color, texture, turgor normal, no rashes or lesions, in both upper and lower body.  Head/face:  Atraumatic, normocephalic. No palpable lymph nodes  Neck: No pain to palpation over the cervical paraspinous muscles. Spurling Negative. No pain with neck flexion, extension, or lateral flexion. .  Cor:Normal rate  Pulm: normal effort  Back: Straight leg raising in supine position is negative to radicular pain. Positive facet loading Right>Left. Decreased ROM in flexion and extension  Extremities: Peripheral joint ROM is full and pain free without obvious instability or laxity in both lower extremities. No deformities, edema, or skin discoloration.Musculoskeletal: Hip, sacroiliac and knee provocative maneuvers are negative. Bilateral lower extremity strength is normal and symmetric.  No atrophy or tone abnormalities are noted.  Neuro: Bilateral lower extremity coordination and muscle stretch " reflexes are physiologic and symmetric.  Plantar response are downgoing. No loss of sensation is noted.  Gait: antalgic    ASSESSMENT: 75 y.o. year old male with low back pain, consistent with        1. Parkinson disease  gabapentin (NEURONTIN) 400 MG capsule   2. Stenosis of lateral recess of lumbar spine  gabapentin (NEURONTIN) 400 MG capsule       PLAN:     - Prior records reviewed   - He again states no lasting benefit from ESIs  - He states pain is Back>legs and worse in a.m.  - Thorough discussion had and Pt does not wish to seek NS evaluation which I recommended  - He questioned RFA as option. I did discussed he stated having no ST benefit from Facet so this may not be the issue  - Will schedule for x1 MBB of Bilateral L2,3,4,5. R>L pain. If diagnostic will repeat MBB, if not then will consider alternative options including SCS consideration.  - Also increased frequency of neurontin from 800mg BID to TID dosing as it is beneficial but does not last 12hrs.  - I have stressed the importance of physical activity and a home exercise plan to help with pain and improve health.  - Counseled patient regarding the importance of activity modification, constant sleeping habits and physical therapy.  - RTC in 2 weeks after procedure. May also call     The above plan and management options were discussed at length with patient. Patient is in agreement with the above and verbalized understanding.    Marcelo Washington NP    10/15/2020

## 2020-10-21 ENCOUNTER — TELEPHONE (OUTPATIENT)
Dept: ADMINISTRATIVE | Facility: OTHER | Age: 75
End: 2020-10-21

## 2020-10-21 NOTE — TELEPHONE ENCOUNTER
----- Message from Valeria Chakraborty LPN sent at 10/20/2020  2:17 PM CDT -----  Hi,  Patient requesting sooner procedure date  ----- Message -----  From: Jasbir Bejarano  Sent: 10/20/2020  11:22 AM CDT  To: Rajinder Rubio Staff    Name of Who is Calling: GABRIELLE SON [[id number]]    What is the request in detail:GABRIELLE SON [[id number]] is calling in regards to sooner procedure date ...  Please contact to further discuss and advise      Can the clinic reply by [facility]: no     What Number to Call Back if not in [facility]:  [phone] (home)

## 2020-10-27 ENCOUNTER — PATIENT MESSAGE (OUTPATIENT)
Dept: PAIN MEDICINE | Facility: OTHER | Age: 75
End: 2020-10-27

## 2020-10-27 ENCOUNTER — TELEPHONE (OUTPATIENT)
Dept: PHYSICAL MEDICINE AND REHAB | Facility: CLINIC | Age: 75
End: 2020-10-27

## 2020-10-27 NOTE — TELEPHONE ENCOUNTER
----- Message from Valeria Chakraborty LPN sent at 10/27/2020  2:22 PM CDT -----  Regarding: Scooter Evaluation  Hi,  Dr Calvillo placed an order for this patient to be evaluated for a scooter. Can you please contact the patient for scheduling. Thanks        Valeria

## 2020-10-27 NOTE — TELEPHONE ENCOUNTER
Patient appointment scheduled first available.  Reminder letter mailed to patient address in chart.

## 2020-10-30 ENCOUNTER — PATIENT MESSAGE (OUTPATIENT)
Dept: PAIN MEDICINE | Facility: OTHER | Age: 75
End: 2020-10-30

## 2020-10-30 NOTE — TELEPHONE ENCOUNTER
Contacted patient daughter, She reporting that patient is in a lot of pain. Pain increases to the point of causing him to have shortness of breath. The only time he has relief is when he is laying down. She is wondering if there was anyway he could have his procedure sooner.     She was informed that Dr. Calvillo only does procedures on Wednesday's and next Wednesday is the only opportunity to be moved sooner.     She was informed that we could add him the the cancellation list, but if she wanted it with a new provider it would require a new authorization and PHN takes upto 14 days which would push is current date back.     She is not interested in that option. She would like to see if they added him to the list when her mom requested it last week.     She was informed her request would be sent to schedulers to confirm with her.

## 2020-11-02 ENCOUNTER — TELEPHONE (OUTPATIENT)
Dept: PAIN MEDICINE | Facility: CLINIC | Age: 75
End: 2020-11-02

## 2020-11-02 NOTE — TELEPHONE ENCOUNTER
----- Message from Ramiro Calvillo MD sent at 10/30/2020  4:12 PM CDT -----  Pls ask procedure if the can double book  ----- Message -----  From: Janell Conde MA  Sent: 10/30/2020   3:24 PM CDT  To: Ramiro Calvillo MD    Patient Daughter contacted office in regards to patient being in extreme pain and possibly getting his MBB procedure moved up sooner than 11/11- the only opportunity for this is 11/4 but you are booked.      Schedulers want to know if patient can be placed on that day as family is concerned the increase pain is diminishing his life as he only have relief when he lays down.

## 2020-11-03 NOTE — TELEPHONE ENCOUNTER
----- Message from Melissa Isbell sent at 11/3/2020  4:02 PM CST -----  Regarding: call back  Name of Who is Calling: GABRIELLE SON [[id number]] What is the request in detail: Patient is requesting a call back in reference to what time he needs to be in tomorrow for procedure. Can the clinic reply by [facility]: Y What Number to Call Back if not in [facility]: [phone]

## 2020-11-03 NOTE — TELEPHONE ENCOUNTER
Patient was contacted as per patient he stated he looked on the patient portal and the time to arrive had 8:30am.

## 2020-11-04 ENCOUNTER — HOSPITAL ENCOUNTER (OUTPATIENT)
Facility: OTHER | Age: 75
Discharge: HOME | End: 2020-11-04
Attending: ANESTHESIOLOGY | Admitting: ANESTHESIOLOGY
Payer: MEDICARE

## 2020-11-04 VITALS
HEART RATE: 66 BPM | SYSTOLIC BLOOD PRESSURE: 152 MMHG | BODY MASS INDEX: 24.99 KG/M2 | TEMPERATURE: 98 F | RESPIRATION RATE: 18 BRPM | DIASTOLIC BLOOD PRESSURE: 71 MMHG | HEIGHT: 65 IN | WEIGHT: 150 LBS | OXYGEN SATURATION: 97 %

## 2020-11-04 DIAGNOSIS — M47.819 SPONDYLOSIS WITHOUT MYELOPATHY: Primary | ICD-10-CM

## 2020-11-04 DIAGNOSIS — G89.29 CHRONIC PAIN: ICD-10-CM

## 2020-11-04 DIAGNOSIS — M47.817 SPONDYLOSIS OF LUMBOSACRAL REGION, UNSPECIFIED SPINAL OSTEOARTHRITIS COMPLICATION STATUS: ICD-10-CM

## 2020-11-04 PROCEDURE — 64495 PR INJ DX/THER AGNT PARAVERT FACET JOINT,IMG GUIDE,LUMBAR/SAC, ADD LEVEL: ICD-10-PCS | Mod: 50,,, | Performed by: ANESTHESIOLOGY

## 2020-11-04 PROCEDURE — 64494 PR INJ DX/THER AGNT PARAVERT FACET JOINT,IMG GUIDE,LUMBAR/SAC, 2ND LEVEL: ICD-10-PCS | Mod: 50,,, | Performed by: ANESTHESIOLOGY

## 2020-11-04 PROCEDURE — 64495 INJ PARAVERT F JNT L/S 3 LEV: CPT | Mod: 50,,, | Performed by: ANESTHESIOLOGY

## 2020-11-04 PROCEDURE — 25000003 PHARM REV CODE 250: Performed by: ANESTHESIOLOGY

## 2020-11-04 PROCEDURE — 64495 INJ PARAVERT F JNT L/S 3 LEV: CPT | Mod: 50 | Performed by: ANESTHESIOLOGY

## 2020-11-04 PROCEDURE — 64494 INJ PARAVERT F JNT L/S 2 LEV: CPT | Mod: 50,,, | Performed by: ANESTHESIOLOGY

## 2020-11-04 PROCEDURE — 64493 INJ PARAVERT F JNT L/S 1 LEV: CPT | Mod: 50,,, | Performed by: ANESTHESIOLOGY

## 2020-11-04 PROCEDURE — 64493 PR INJ DX/THER AGNT PARAVERT FACET JOINT,IMG GUIDE,LUMBAR/SAC,1ST LVL: ICD-10-PCS | Mod: 50,,, | Performed by: ANESTHESIOLOGY

## 2020-11-04 PROCEDURE — 63600175 PHARM REV CODE 636 W HCPCS: Performed by: ANESTHESIOLOGY

## 2020-11-04 PROCEDURE — 64494 INJ PARAVERT F JNT L/S 2 LEV: CPT | Mod: 50 | Performed by: ANESTHESIOLOGY

## 2020-11-04 PROCEDURE — 64493 INJ PARAVERT F JNT L/S 1 LEV: CPT | Mod: 50 | Performed by: ANESTHESIOLOGY

## 2020-11-04 RX ORDER — MIDAZOLAM HYDROCHLORIDE 1 MG/ML
INJECTION INTRAMUSCULAR; INTRAVENOUS
Status: DISCONTINUED | OUTPATIENT
Start: 2020-11-04 | End: 2020-11-04 | Stop reason: HOSPADM

## 2020-11-04 RX ORDER — LIDOCAINE HYDROCHLORIDE 10 MG/ML
INJECTION INFILTRATION; PERINEURAL
Status: DISCONTINUED | OUTPATIENT
Start: 2020-11-04 | End: 2020-11-04 | Stop reason: HOSPADM

## 2020-11-04 RX ORDER — SODIUM CHLORIDE 9 MG/ML
500 INJECTION, SOLUTION INTRAVENOUS CONTINUOUS
Status: DISCONTINUED | OUTPATIENT
Start: 2020-11-04 | End: 2020-11-04 | Stop reason: HOSPADM

## 2020-11-04 RX ORDER — BUPIVACAINE HYDROCHLORIDE 2.5 MG/ML
INJECTION, SOLUTION EPIDURAL; INFILTRATION; INTRACAUDAL
Status: DISCONTINUED | OUTPATIENT
Start: 2020-11-04 | End: 2020-11-04 | Stop reason: HOSPADM

## 2020-11-04 RX ORDER — DEXAMETHASONE SODIUM PHOSPHATE 4 MG/ML
INJECTION, SOLUTION INTRA-ARTICULAR; INTRALESIONAL; INTRAMUSCULAR; INTRAVENOUS; SOFT TISSUE
Status: DISCONTINUED | OUTPATIENT
Start: 2020-11-04 | End: 2020-11-04 | Stop reason: HOSPADM

## 2020-11-04 RX ADMIN — SODIUM CHLORIDE 500 ML: 0.9 INJECTION, SOLUTION INTRAVENOUS at 08:11

## 2020-11-04 NOTE — DISCHARGE SUMMARY
Discharge Note  Short Stay      SUMMARY     Admit Date: 11/4/2020    Attending Physician: Armond Aldridge      Discharge Physician: Armond Aldridge      Discharge Date: 11/4/2020 9:58 AM    Procedure(s) (LRB):  BLOCK, NERVE BILATERAL L2,3,4,5 (Bilateral)    Final Diagnosis: Lumbar spondylosis [M47.816]    Disposition: Home or self care    Patient Instructions:   Current Discharge Medication List      CONTINUE these medications which have NOT CHANGED    Details   gabapentin (NEURONTIN) 400 MG capsule Take 2 capsules (800 mg total) by mouth 3 (three) times daily.  Qty: 180 capsule, Refills: 2    Associated Diagnoses: Parkinson disease; Stenosis of lateral recess of lumbar spine      metoprolol succinate (TOPROL-XL) 50 MG 24 hr tablet Take 1 tablet (50 mg total) by mouth once daily.  Qty: 90 tablet, Refills: 3    Comments: .      !! albuterol (PROVENTIL/VENTOLIN HFA) 90 mcg/actuation inhaler Inhale 2 puffs into the lungs every 6 (six) hours as needed for Wheezing or Shortness of Breath. Rescue  Qty: 18 g, Refills: 6    Associated Diagnoses: Chronic obstructive pulmonary disease, unspecified COPD type      aspirin (ECOTRIN) 81 MG EC tablet Take 81 mg by mouth once daily.      carbidopa-levodopa  mg (SINEMET)  mg per tablet Take 1 tablet by mouth 5 (five) times daily.  Qty: 450 tablet, Refills: 3    Associated Diagnoses: Parkinson disease; Stenosis of lateral recess of lumbar spine      citalopram (CELEXA) 20 MG tablet TAKE 1 TABLET (20 MG TOTAL) BY MOUTH ONCE DAILY.  Qty: 30 tablet, Refills: 10    Associated Diagnoses: Generalized anxiety disorder      fluticasone-umeclidin-vilanter (TRELEGY ELLIPTA) 100-62.5-25 mcg DsDv Inhale 1 puff into the lungs once daily.  Qty: 60 each, Refills: 6    Associated Diagnoses: Screening for cancer; Chronic obstructive pulmonary disease, unspecified COPD type      loratadine (CLARITIN) 10 mg tablet Take 10 mg by mouth as needed for Allergies.      multivitamin capsule  Take 1 capsule by mouth once daily.      nitroGLYCERIN (NITROSTAT) 0.4 MG SL tablet Place 1 tablet (0.4 mg total) under the tongue every 5 (five) minutes as needed for Chest pain.  Qty: 25 tablet, Refills: 4      !! PROVENTIL HFA 90 mcg/actuation inhaler       rosuvastatin (CRESTOR) 40 MG Tab TAKE 1 TABLET (40 MG TOTAL) BY MOUTH EVERY EVENING.  Qty: 90 tablet, Refills: 2       !! - Potential duplicate medications found. Please discuss with provider.              Discharge Diagnosis: Lumbar spondylosis [M47.816]  Condition on Discharge: Stable with no complications to procedure   Diet on Discharge: Same as before.  Activity: as per instruction sheet.  Discharge to: Home with a responsible adult.  Follow up: 2-4 weeks       Please call my office or pager at [phone] if experienced any weakness or loss of sensation, fever > 101.5, pain uncontrolled with oral medications, persistent nausea/vomiting/or diarrhea, redness or drainage from the incisions, or any other worrisome concerns. If physician on call was not reached or could not communicate with our office for any reason please go to the nearest emergency department

## 2020-11-04 NOTE — OP NOTE
"Patient Name: Hector Kellogg  MRN: [id number]    INFORMED CONSENT: The procedure, risks, benefits and options were discussed with patient. There are no contraindications to the procedure. The patient expressed understanding and agreed to proceed. The personnel performing the procedure was discussed. I verify that I personally obtained Hector's consent prior to the start of the procedure and the signed consent can be found on the patient's chart.    Procedure Date: 11/04/2020    Anesthesia: Topical    Pre Procedure diagnosis: Lumbar spondylosis [M47.816]  1. Spondylosis without myelopathy    2. Spondylosis of lumbosacral region, unspecified spinal osteoarthritis complication status    3. Chronic pain      Post-Procedure diagnosis: SAME      Moderate Sedation: No    PROCEDURE: BILATERAL LUMBAR L2,3,4,5 MEDIAL BRANCH NERVE BLOCK      DESCRIPTION OF PROCEDURE:The patient was brought to the procedure room. After performing time out. IV access was obtained prior to the procedure. The patient was positioned prone on the fluoroscopy table. Continuous hemodynamic monitoring was initiated including blood pressure, EKG, and pulse oximetry. The area of the lumbar spine was prepped chlorhexidine and draped into a sterile field. Fluoroscopy was used to identify the location of the bilateral side L2, L3, L4, and L5 medial branch nerves at the junctions of the superior articular process and the transverse processes of L3, L4, L5, and the sacral ala respectively. Skin anesthesia was achieved using 5 cc of Lidocaine 1% over the injection sites. A 22 gauge, 3 1/2" spinal needle was slowly inserted at each level using AP, lateral and oblique fluoroscopic imaging. Negative aspiration for blood or CSF was confirmed.  8 ml bupivacaine 0.25% was injected at all sites. The needles were removed and bleeding was nil. A sterile dressing was applied. No specimens collected. Hector was taken back to the recovery room for further observation. "     Blood Loss: Nill  Specimen: None    Armond Aldridge MD

## 2020-11-04 NOTE — DISCHARGE INSTRUCTIONS

## 2020-11-18 ENCOUNTER — TELEPHONE (OUTPATIENT)
Dept: PAIN MEDICINE | Facility: CLINIC | Age: 75
End: 2020-11-18

## 2020-11-18 NOTE — TELEPHONE ENCOUNTER
"This message is for patient in regards to his/her appointment 11/19/20 at 10:20am.      [facility] Policy: For the safety of all patients and staff members.     Patient Visitor policy: Due to social distancing and limited seating staff are requesting patient to arrive to their schedule appointments alone. If patient do not need assistance with their visit, we're asking all visitors to remain outside the waiting area.    Upon arriving to facility; patient will have temperature taking and are required to wear a face mask, if patient do not have a face mask one will be provided. Upon arriving patient we ask patients to contact clinic at this number [phone] to notify staff that they have arrived or they may do do by utilizing the Mobile checked in Jose(if patient have patient portal; clinical staff will send a message through there letting them know it's okay to proceed to their visit). Staff will give the patient the "okay" to come up or wait inside their vehicle until clinic is ready for patient to be seen by Marcelo Washington Np If you have any questions or concerns please contact [phone]        "

## 2020-11-19 ENCOUNTER — OFFICE VISIT (OUTPATIENT)
Dept: PAIN MEDICINE | Facility: CLINIC | Age: 75
End: 2020-11-19
Payer: MEDICARE

## 2020-11-19 VITALS
WEIGHT: 149.94 LBS | DIASTOLIC BLOOD PRESSURE: 82 MMHG | HEIGHT: 65 IN | OXYGEN SATURATION: 100 % | SYSTOLIC BLOOD PRESSURE: 138 MMHG | RESPIRATION RATE: 18 BRPM | HEART RATE: 83 BPM | BODY MASS INDEX: 24.98 KG/M2

## 2020-11-19 DIAGNOSIS — M51.36 DDD (DEGENERATIVE DISC DISEASE), LUMBAR: ICD-10-CM

## 2020-11-19 DIAGNOSIS — M47.817 SPONDYLOSIS OF LUMBOSACRAL REGION, UNSPECIFIED SPINAL OSTEOARTHRITIS COMPLICATION STATUS: ICD-10-CM

## 2020-11-19 DIAGNOSIS — M48.062 LUMBAR STENOSIS WITH NEUROGENIC CLAUDICATION: Primary | ICD-10-CM

## 2020-11-19 DIAGNOSIS — M54.16 LUMBAR RADICULOPATHY: ICD-10-CM

## 2020-11-19 DIAGNOSIS — M54.15 RADICULOPATHY OF THORACOLUMBAR REGION: ICD-10-CM

## 2020-11-19 PROCEDURE — 1125F PR PAIN SEVERITY QUANTIFIED, PAIN PRESENT: ICD-10-PCS | Mod: S$GLB,,, | Performed by: NURSE PRACTITIONER

## 2020-11-19 PROCEDURE — 1101F PR PT FALLS ASSESS DOC 0-1 FALLS W/OUT INJ PAST YR: ICD-10-PCS | Mod: CPTII,S$GLB,, | Performed by: NURSE PRACTITIONER

## 2020-11-19 PROCEDURE — 3079F DIAST BP 80-89 MM HG: CPT | Mod: CPTII,S$GLB,, | Performed by: NURSE PRACTITIONER

## 2020-11-19 PROCEDURE — 3079F PR MOST RECENT DIASTOLIC BLOOD PRESSURE 80-89 MM HG: ICD-10-PCS | Mod: CPTII,S$GLB,, | Performed by: NURSE PRACTITIONER

## 2020-11-19 PROCEDURE — 3075F PR MOST RECENT SYSTOLIC BLOOD PRESS GE 130-139MM HG: ICD-10-PCS | Mod: CPTII,S$GLB,, | Performed by: NURSE PRACTITIONER

## 2020-11-19 PROCEDURE — 1101F PT FALLS ASSESS-DOCD LE1/YR: CPT | Mod: CPTII,S$GLB,, | Performed by: NURSE PRACTITIONER

## 2020-11-19 PROCEDURE — 3075F SYST BP GE 130 - 139MM HG: CPT | Mod: CPTII,S$GLB,, | Performed by: NURSE PRACTITIONER

## 2020-11-19 PROCEDURE — 99213 OFFICE O/P EST LOW 20 MIN: CPT | Mod: S$GLB,,, | Performed by: NURSE PRACTITIONER

## 2020-11-19 PROCEDURE — 99999 PR PBB SHADOW E&M-EST. PATIENT-LVL IV: ICD-10-PCS | Mod: PBBFAC,,, | Performed by: NURSE PRACTITIONER

## 2020-11-19 PROCEDURE — 99999 PR PBB SHADOW E&M-EST. PATIENT-LVL IV: CPT | Mod: PBBFAC,,, | Performed by: NURSE PRACTITIONER

## 2020-11-19 PROCEDURE — 3288F FALL RISK ASSESSMENT DOCD: CPT | Mod: CPTII,S$GLB,, | Performed by: NURSE PRACTITIONER

## 2020-11-19 PROCEDURE — 1125F AMNT PAIN NOTED PAIN PRSNT: CPT | Mod: S$GLB,,, | Performed by: NURSE PRACTITIONER

## 2020-11-19 PROCEDURE — 99213 PR OFFICE/OUTPT VISIT, EST, LEVL III, 20-29 MIN: ICD-10-PCS | Mod: S$GLB,,, | Performed by: NURSE PRACTITIONER

## 2020-11-19 PROCEDURE — 1159F PR MEDICATION LIST DOCUMENTED IN MEDICAL RECORD: ICD-10-PCS | Mod: S$GLB,,, | Performed by: NURSE PRACTITIONER

## 2020-11-19 PROCEDURE — 3288F PR FALLS RISK ASSESSMENT DOCUMENTED: ICD-10-PCS | Mod: CPTII,S$GLB,, | Performed by: NURSE PRACTITIONER

## 2020-11-19 PROCEDURE — 1159F MED LIST DOCD IN RCRD: CPT | Mod: S$GLB,,, | Performed by: NURSE PRACTITIONER

## 2020-11-19 NOTE — PROGRESS NOTES
Chronic patient Established Note (Follow up visit)      SUBJECTIVE:      ZACHARIAH Kellogg presents to the clinic for the evaluation of right lower back buttock pain. The pain started 5-10 years ago following falling off scaffold at work. Since then, symptoms have been worsening.  The pain is located in the right buttock area and radiates to the down right leg to knee.  The pain is described as sharp and is rated as 4/10. The pain is rated with a score of  4/10 on the BEST day and a score of 9/10 on the WORST day.   Symptoms interfere with daily activity.   Has occasional paresthesias and numbness to RLE all the down the leg.   The pain is exacerbated by Standing, Walking and Morning.    The pain is mitigated by heat, laying down and rest.   He reports spending 2 hours per day reclining. The patient reports 4 hours of uninterrupted sleep per night.     Patient denies night fever/night sweats, urinary incontinence, bowel incontinence, significant weight loss, significant motor weakness and loss of sensations.     Interval History 05/14/2020:  74 y/o M with hx of CAD s/p PCI (2008 and 2017), mild Pulm HTN, Essential HTN, Parkinson's Disease and COPD( Former smoker x 40 years).  On ASA 81 mg for CAD. Formerly on Plavix for DAPT  On Medical Therapy with Gabapentin without any side effects.   Started Meloxican this week and acknowledges being effective.         Interval history 06/08/2020:  Patient presents for telephone follow-up of lower back pain and right lower leg pain.  Patient is status post right L4/5 and L5/S1 TFESI on 5/27/2020.  Patient states 100% resolution of lower back pain and right leg pain.  Patient denies any neurological changes, denies any health changes.  Patient denies any neurological emergency symptoms such as loss of bowel/bladder, and denies saddle paresthesias.  Patient rates pain 0/10.  Patient is no longer taking Mobic, takes Flexeril 5 mg g and gabapentin 400 mg  laci        Interval History 8/4/2020:  The patient presents for follow-up of lower back pain.  He has associated radiculopathy down lateral right leg stopping at ankle.  He has had prior TF ASHISH with good relief of the symptoms which lasted approx 4 weeks. He states now however Back has become more bothersome than leg pain. Denies any loss of b/b or saddle paresthesias. Continue to take Flexeril 5mg and gaapentin 400mg PRN which provide some relief without adverse side effects.      Interval History 8/21/2020:  The patient present for follow up of lower back pain. Pt states he had no relief from prior Right L4-5 & L5-S1 facet joint injection not even short term per patient. Pt continues to have focal right sided pain, states radiculopathy goes down anterior and posterior thigh which stops just below the knee. The patient denies myelopathic symptoms such as handwriting changes or difficulty with buttons/coins/keys. Denies perineal paresthesias, bowel/bladder dysfunction. He is taking Gabapentin which provided good relief.     Interval History 09/17/2020:  Hector Kellogg presents to the clinic for a follow-up appointment from 09/09/2020 -RIGHT TF ASHISH L4/5 and L5/S1 . Since the last visit, Hector Kellogg states the pain has been improving. Current pain intensity is 4/10.  He reports 50% pain improvement.  Currently he is taking gabapentin 400mg BID (he was prescribed to take it TID).  He has not done any physical therapy.    Interval History 10/15/2020:  The patient presents for follow-up of lower back pain.  She he has approximately 1 month postprocedure.  He did initially endorse he had some benefit but denies any focal benefit.  He states back pain is worse than leg pain at this time.  He is currently taking gabapentin 800 mg Q a.m. and has recently started to do 800 mg q.h.s..  He is not taking t.i.d. which was prescribed.  The got discussed to do so as he finds it beneficial but does not last over  several hours.  He has questions regarding RFA says they have been beneficial to his family member.  It was discussed he had a facet injection and he states is unclear whether he had any short-term benefit.  He is unsure if he had any short-term benefit. Denies perineal paresthesias, bowel/bladder dysfunction.    Interval History 11/19/2020:  Patient presents for follow-up of lower back pain.  Patient has continued lower back pain and bilateral leg pains.  He has difficulty any walking and is in wheelchair for transportation.  He is status post medial branch block with no focal benefit, not even temporarily. He denies any loss of bowel, bladder or saddle paresthesias.          Pain Medications:  Gabapentin 400 mg TID  Meloxican 15 mg  Opioid Contract: no     report:  Not applicable    Pain Procedures:   5/27/2020 Right L4/5, and L5/S1 TFESI 100% relief for 4 weeks  8/5/2020   Right L4-5 & L5-S1 Facet joint injection with minimal relief.   09/09/2020 - RIGHT TF ASHISH L4/5 and L5/S1 - 50% improvement  Physical Therapy/Home Exercise: no    Imaging:   3/12/20 MRI Lumbar Spine  Narrative       EXAMINATION:  MRI LUMBAR SPINE WITHOUT CONTRAST    CLINICAL HISTORY:  Low back pain, >6wks conservative tx, persistent-progressive sx, surgical candidate; Spinal stenosis, lumbar region without neurogenic claudication    TECHNIQUE:  Multiplanar, multisequence MR images were acquired from the thoracolumbar junction to the sacrum without contrast.    COMPARISON:  None available.    FINDINGS:  Alignment: Grade 1 anterolisthesis of L4-L5 and L5-S1.  Alignment of the lumbar spine is otherwise anatomic.    Vertebrae: Normal marrow signal. No fracture.  Bilateral L5 spondylolysis.    Discs: Scattered mild to moderate disc height loss of the lumbar spine.  Note made of increased STIR signal within the L2-L3 disc space.    Cord: Normal.  Conus terminates at L1.    Degenerative findings:    T12-L1: No spinal canal stenosis or neural  foraminal narrowing.    L1-L2: Circumferential disc bulge with mild bilateral facet arthropathy.  No spinal canal stenosis or neural foraminal narrowing.    L2-L3: Circumferential disc bulge with mild bilateral facet arthropathy resulting in mild bilateral neural foraminal narrowing.  No spinal canal stenosis    L3-L4: Circumferential disc bulge with moderate bilateral facet arthropathy and ligamentum flavum hypertrophy results in mild spinal canal stenosis and mild bilateral neural foraminal narrowing.    L4-L5: Grade 1 anterolisthesis with diffuse disc bulge with severe bilateral facet arthropathy and ligamentum flavum hypertrophy results in moderate spinal canal stenosis and severe bilateral neural foraminal narrowing.    L5-S1: Circumferential disc bulge with moderate bilateral facet arthropathy resulting in moderate spinal canal stenosis and moderate bilateral neural foraminal narrowing.    Paraspinal muscles & soft tissues: Exophytic simple cysts in the left kidney.       Impression         1. Multilevel degenerative changes of the lumbar spine, most significant at the levels of L4-L5 and L5-S1.  At L4-L5 there is moderate spinal canal stenosis and severe bilateral neural foraminal narrowing.  L5-S1, there is moderate spinal canal stenosis and moderate bilateral neural foraminal narrowing.  2. Bilateral spondylolysis at L5 grade 1 anterolisthesis of L5-S1.  3. Increased STIR signal within the L2-L3 disc space.  While this is favored to be degenerative, similar findings may be seen with early discitis.  Clinical correlation with lab parameters advised.       Allergies: Review of patient's allergies indicates:  No Known Allergies    Current Medications:   Current Outpatient Medications   Medication Sig Dispense Refill    albuterol (PROVENTIL/VENTOLIN HFA) 90 mcg/actuation inhaler Inhale 2 puffs into the lungs every 6 (six) hours as needed for Wheezing or Shortness of Breath. Rescue 18 g 6    aspirin (ECOTRIN)  81 MG EC tablet Take 81 mg by mouth once daily.      carbidopa-levodopa  mg (SINEMET)  mg per tablet Take 1 tablet by mouth 5 (five) times daily. 450 tablet 3    citalopram (CELEXA) 20 MG tablet TAKE 1 TABLET (20 MG TOTAL) BY MOUTH ONCE DAILY. 30 tablet 10    fluticasone-umeclidin-vilanter (TRELEGY ELLIPTA) 100-62.5-25 mcg DsDv Inhale 1 puff into the lungs once daily. 60 each 6    loratadine (CLARITIN) 10 mg tablet Take 10 mg by mouth as needed for Allergies.      metoprolol succinate (TOPROL-XL) 50 MG 24 hr tablet Take 1 tablet (50 mg total) by mouth once daily. 90 tablet 3    multivitamin capsule Take 1 capsule by mouth once daily.      rosuvastatin (CRESTOR) 40 MG Tab TAKE 1 TABLET (40 MG TOTAL) BY MOUTH EVERY EVENING. 90 tablet 2    gabapentin (NEURONTIN) 400 MG capsule Take 2 capsules (800 mg total) by mouth 3 (three) times daily. 180 capsule 2    nitroGLYCERIN (NITROSTAT) 0.4 MG SL tablet Place 1 tablet (0.4 mg total) under the tongue every 5 (five) minutes as needed for Chest pain. 25 tablet 4    PROVENTIL HFA 90 mcg/actuation inhaler        No current facility-administered medications for this visit.      REVIEW OF SYSTEMS:    GENERAL:  No weight loss, malaise or fevers.  HEENT:  Negative for frequent or significant headaches. H/o cataracts.  NECK:  Negative for lumps, goiter, pain and significant neck swelling.  RESPIRATORY:  Negative for cough, wheezing or shortness of breath.  CARDIOVASCULAR:  Negative for chest pain, leg swelling or palpitations. HTN. HLD. CAD. H/o MI.  GI:  Negative for abdominal discomfort, blood in stools or black stools or change in bowel habits.  MUSCULOSKELETAL:  See HPI.  SKIN:  Negative for lesions, rash, and itching.  PSYCH:  Depression.  HEMATOLOGY/LYMPHOLOGY:  Negative for prolonged bleeding, bruising easily or swollen nodes.  NEURO:   Parkinson's disease.  All other reviewed and negative other than HPI.    Past Medical History:  Past Medical History:    Diagnosis Date    Cataract     Chronic back pain greater than 3 months duration     Coronary artery disease     DDD (degenerative disc disease), lumbar 5/16/2020    Glaucoma suspect of both eyes     Hyperlipidemia     Hypertension     MI, old 2006    Parkinson disease        Past Surgical History:  Past Surgical History:   Procedure Laterality Date    CARDIAC CATHETERIZATION  2006    x 3    CARDIAC CATHETERIZATION  10/11/2015    x 2    CATARACT EXTRACTION W/  INTRAOCULAR LENS IMPLANT Right 03/06/2018    Dr. Liu    CORONARY ANGIOPLASTY  2006, 2015    3 stents    HEMORRHOID SURGERY      INJECTION OF ANESTHETIC AGENT AROUND NERVE Bilateral 11/4/2020    Procedure: BLOCK, NERVE BILATERAL L2,3,4,5;  Surgeon: Ramiro Calvillo MD;  Location: [facility];  Service: Pain Management;  Laterality: Bilateral;  BLOCK, NERVE BILATERAL L2,3,4,5    INJECTION OF FACET JOINT Right 8/5/2020    Procedure: INJECTION, FACET JOINT, L4-L5 , L5-S1;  Surgeon: Ramiro Calvillo MD;  Location: [facility];  Service: Pain Management;  Laterality: Right;    TRANSFORAMINAL EPIDURAL INJECTION OF STEROID Right 5/27/2020    Procedure: INJECTION, STEROID, EPIDURAL, TRANSFORAMINAL APPROACH;  Surgeon: Ramiro Calvillo MD;  Location: [facility];  Service: Pain Management;  Laterality: Right;  Right TF ASHISH L4-L5, L5-S1    PTcannot come earlier than 12:30    TRANSFORAMINAL EPIDURAL INJECTION OF STEROID Right 9/9/2020    Procedure: INJECTION, STEROID, EPIDURAL, TRANSFORAMINAL APPROACH;  Surgeon: Ramiro Calvillo MD;  Location: [facility];  Service: Pain Management;  Laterality: Right;  RIGHT TF ASHISH L4/5 and L5/S1       Family History:  Family History   Problem Relation Age of Onset    Heart attack Father     Cataracts Father     Heart disease Maternal Uncle     Heart disease Paternal Uncle     Hypertension Neg Hx     Asthma Neg Hx     Emphysema Neg Hx     Amblyopia Neg Hx     Blindness Neg Hx     Macular  "degeneration Neg Hx     Retinal detachment Neg Hx     Strabismus Neg Hx        Social History:  Social History     Socioeconomic History    Marital status:      Spouse name: Not on file    Number of children: Not on file    Years of education: Not on file    Highest education level: Not on file   Occupational History    Not on file   Social Needs    Financial resource strain: Not hard at all    Food insecurity     Worry: Never true     Inability: Never true    Transportation needs     Medical: No     Non-medical: No   Tobacco Use    Smoking status: Former Smoker     Packs/day: 1.00     Years: 40.00     Pack years: 40.00     Quit date: 2014     Years since quittin.8    Smokeless tobacco: Never Used   Substance and Sexual Activity    Alcohol use: Yes     Frequency: Never     Binge frequency: Never     Comment: socially    Drug use: No    Sexual activity: Not on file   Lifestyle    Physical activity     Days per week: 0 days     Minutes per session: Not on file    Stress: Only a little   Relationships    Social connections     Talks on phone: Once a week     Gets together: Once a week     Attends Jewish service: Not on file     Active member of club or organization: No     Attends meetings of clubs or organizations: Never     Relationship status:    Other Topics Concern    Not on file   Social History Narrative    Not on file       OBJECTIVE:    /82   Pulse 83   Resp 18   Ht 5' 5" (1.651 m)   Wt 68 kg (149 lb 14.6 oz)   SpO2 100%   BMI 24.95 kg/m²     PHYSICAL EXAMINATION:  Voice normal.  Normal affect without evidence of distress.  Gait: sitting in wheelchair, appears in some discomfort     Prior PHYSICAL EXAMINATION:    General appearance: Well appearing, in no acute distress, alert and oriented x3.  Psych:  Mood and affect appropriate.  Skin: Skin color, texture, turgor normal, no rashes or lesions, in both upper and lower body.  Head/face:  Atraumatic, " normocephalic. No palpable lymph nodes  Neck: No pain to palpation over the cervical paraspinous muscles. Spurling Negative. No pain with neck flexion, extension, or lateral flexion. .  Cor:Normal rate  Pulm: normal effort  Back: Straight leg raising in supine position is negative to radicular pain. Positive facet loading Right>Left. Decreased ROM in flexion and extension  Extremities: Peripheral joint ROM is full and pain free without obvious instability or laxity in both lower extremities. No deformities, edema, or skin discoloration.Musculoskeletal: Hip, sacroiliac and knee provocative maneuvers are negative. Bilateral lower extremity strength is normal and symmetric.  No atrophy or tone abnormalities are noted.  Neuro: Bilateral lower extremity coordination and muscle stretch reflexes are physiologic and symmetric.  Plantar response are downgoing. No loss of sensation is noted.  Gait: antalgic    ASSESSMENT: 75 y.o. year old male with low back pain, consistent with        1. Lumbar stenosis with neurogenic claudication  Ambulatory referral/consult to Neurosurgery   2. DDD (degenerative disc disease), lumbar     3. Spondylosis of lumbosacral region, unspecified spinal osteoarthritis complication status     4. Lumbar radiculopathy     5. Radiculopathy of thoracolumbar region         PLAN:     - Prior records reviewed   - He again states no lasting benefit from ESIs  - He is most recently s/p MBB without any benefit which we was hoping for to be candidate for RFA.   - Thorough discussion had, will Refer for NS evlauation  - If they do not feel Pt is a surgical candidate will consider SCS as option.   - Continue neurontin 800mg TID dosing as it is beneficial   - I have stressed the importance of physical activity and a home exercise plan to help with pain and improve health.  - Counseled patient regarding the importance of activity modification, constant sleeping habits and physical therapy.  - RTC after seeking NS  evaluation or sooner if needed.     The above plan and management options were discussed at length with patient. Patient is in agreement with the above and verbalized understanding.    Marcelo Washington NP    11/19/2020

## 2020-11-20 ENCOUNTER — PATIENT MESSAGE (OUTPATIENT)
Dept: PAIN MEDICINE | Facility: CLINIC | Age: 75
End: 2020-11-20

## 2020-11-24 ENCOUNTER — TELEPHONE (OUTPATIENT)
Dept: PAIN MEDICINE | Facility: CLINIC | Age: 75
End: 2020-11-24

## 2020-11-24 NOTE — TELEPHONE ENCOUNTER
Staff spoke with patient in regards to concerns once provider gets back with staff we will contact patient regarding provider response.patient verbalized understanding.

## 2020-11-24 NOTE — TELEPHONE ENCOUNTER
----- Message from Melissa Isbell sent at 11/24/2020 10:28 AM CST -----  Regarding: call back  Name of Who is Calling: Eileen patient wife What is the request in detail: Eileen is requesting a call back to discuss patient condition. She stated he stays in pain and the gabapentin on last an hour. She also stated that she ordered a wheelchair for him to get around in. Can the clinic reply by [facility]: Yes What Number to Call Back [facility]: [phone]

## 2020-12-02 ENCOUNTER — OFFICE VISIT (OUTPATIENT)
Dept: NEUROSURGERY | Facility: CLINIC | Age: 75
End: 2020-12-02
Payer: MEDICARE

## 2020-12-02 ENCOUNTER — HOSPITAL ENCOUNTER (OUTPATIENT)
Dept: RADIOLOGY | Facility: HOSPITAL | Age: 75
Discharge: HOME | End: 2020-12-02
Attending: PHYSICIAN ASSISTANT
Payer: MEDICARE

## 2020-12-02 VITALS
DIASTOLIC BLOOD PRESSURE: 71 MMHG | BODY MASS INDEX: 25.61 KG/M2 | HEIGHT: 64 IN | SYSTOLIC BLOOD PRESSURE: 107 MMHG | TEMPERATURE: 98 F | WEIGHT: 150 LBS | HEART RATE: 69 BPM

## 2020-12-02 DIAGNOSIS — R26.89 SHUFFLING GAIT: ICD-10-CM

## 2020-12-02 DIAGNOSIS — M48.062 LUMBAR STENOSIS WITH NEUROGENIC CLAUDICATION: ICD-10-CM

## 2020-12-02 DIAGNOSIS — R26.0 ATAXIC GAIT: ICD-10-CM

## 2020-12-02 DIAGNOSIS — M48.062 LUMBAR STENOSIS WITH NEUROGENIC CLAUDICATION: Primary | ICD-10-CM

## 2020-12-02 PROCEDURE — 3074F SYST BP LT 130 MM HG: CPT | Mod: CPTII,S$GLB,, | Performed by: PHYSICIAN ASSISTANT

## 2020-12-02 PROCEDURE — 72082 XR SCOLIOSIS COMPLETE: ICD-10-PCS | Mod: 26,,, | Performed by: RADIOLOGY

## 2020-12-02 PROCEDURE — 3078F DIAST BP <80 MM HG: CPT | Mod: CPTII,S$GLB,, | Performed by: PHYSICIAN ASSISTANT

## 2020-12-02 PROCEDURE — 3074F PR MOST RECENT SYSTOLIC BLOOD PRESSURE < 130 MM HG: ICD-10-PCS | Mod: CPTII,S$GLB,, | Performed by: PHYSICIAN ASSISTANT

## 2020-12-02 PROCEDURE — 3288F FALL RISK ASSESSMENT DOCD: CPT | Mod: CPTII,S$GLB,, | Performed by: PHYSICIAN ASSISTANT

## 2020-12-02 PROCEDURE — 1101F PT FALLS ASSESS-DOCD LE1/YR: CPT | Mod: CPTII,S$GLB,, | Performed by: PHYSICIAN ASSISTANT

## 2020-12-02 PROCEDURE — 72082 X-RAY EXAM ENTIRE SPI 2/3 VW: CPT | Mod: TC

## 2020-12-02 PROCEDURE — 3078F PR MOST RECENT DIASTOLIC BLOOD PRESSURE < 80 MM HG: ICD-10-PCS | Mod: CPTII,S$GLB,, | Performed by: PHYSICIAN ASSISTANT

## 2020-12-02 PROCEDURE — 99999 PR PBB SHADOW E&M-EST. PATIENT-LVL IV: ICD-10-PCS | Mod: PBBFAC,,, | Performed by: PHYSICIAN ASSISTANT

## 2020-12-02 PROCEDURE — 1126F PR PAIN SEVERITY QUANTIFIED, NO PAIN PRESENT: ICD-10-PCS | Mod: S$GLB,,, | Performed by: PHYSICIAN ASSISTANT

## 2020-12-02 PROCEDURE — 1159F PR MEDICATION LIST DOCUMENTED IN MEDICAL RECORD: ICD-10-PCS | Mod: S$GLB,,, | Performed by: PHYSICIAN ASSISTANT

## 2020-12-02 PROCEDURE — 99204 OFFICE O/P NEW MOD 45 MIN: CPT | Mod: S$GLB,,, | Performed by: PHYSICIAN ASSISTANT

## 2020-12-02 PROCEDURE — 99999 PR PBB SHADOW E&M-EST. PATIENT-LVL IV: CPT | Mod: PBBFAC,,, | Performed by: PHYSICIAN ASSISTANT

## 2020-12-02 PROCEDURE — 1101F PR PT FALLS ASSESS DOC 0-1 FALLS W/OUT INJ PAST YR: ICD-10-PCS | Mod: CPTII,S$GLB,, | Performed by: PHYSICIAN ASSISTANT

## 2020-12-02 PROCEDURE — 99204 PR OFFICE/OUTPT VISIT, NEW, LEVL IV, 45-59 MIN: ICD-10-PCS | Mod: S$GLB,,, | Performed by: PHYSICIAN ASSISTANT

## 2020-12-02 PROCEDURE — 1126F AMNT PAIN NOTED NONE PRSNT: CPT | Mod: S$GLB,,, | Performed by: PHYSICIAN ASSISTANT

## 2020-12-02 PROCEDURE — 3288F PR FALLS RISK ASSESSMENT DOCUMENTED: ICD-10-PCS | Mod: CPTII,S$GLB,, | Performed by: PHYSICIAN ASSISTANT

## 2020-12-02 PROCEDURE — 72082 X-RAY EXAM ENTIRE SPI 2/3 VW: CPT | Mod: 26,,, | Performed by: RADIOLOGY

## 2020-12-02 PROCEDURE — 1159F MED LIST DOCD IN RCRD: CPT | Mod: S$GLB,,, | Performed by: PHYSICIAN ASSISTANT

## 2020-12-02 NOTE — LETTER
December 2, 2020      Marcelo Washington, NP  [address] [facility] [address]           [facility] 7th Fl  [address]  [facility] [address]  Phone: [phone]  Fax: [phone]          Patient: Hector Kellogg   MR Number: [id number]   YOB: 1945   Date of Visit: 12/2/2020       Dear Marcelo Washington:    Thank you for referring Hector Kellogg to me for evaluation. Attached you will find relevant portions of my assessment and plan of care.    If you have questions, please do not hesitate to call me. I look forward to following Hector Kellogg along with you.    Sincerely,    Jamia German PA-C    Enclosure  CC:  No Recipients    If you would like to receive this communication electronically, please contact [email or web address] or [phone] to request more information on [facility] access.    For providers and/or their staff who would like to refer a patient to [facility], please contact us through our one-stop-shop provider referral line, [facility], at [phone].    If you feel you have received this communication in error or would no longer like to receive these types of communications, please e-mail [email or web address]

## 2020-12-02 NOTE — PROGRESS NOTES
[facility] - Neurosurgery 7th Fl  Neurosurgery    SUBJECTIVE:     History of Present Illness:  Hector Kellogg is a 75 y.o. male with hx of Parkinson's disease, HTN, HLD, anemia, CAD, lumbosacral spondylosis, DDD, and radiculopathy who presents today with constant, progressively worsening lower back and buttock pain with intermittent numbness/tingling of the right anterior shin. Pt was not a candidate for RFA per pain mgmt, so he would like to further exhaust all non-surgical options before considering surgery. His pain began spontaneously about a year ago with no prior falls or injuries. The pain has intensified and is limiting his daily activities to about 5-10 minute intervals before he requires rest. Pain increases with walking and standing for prolonged period of time, decreases with sitting. He states he sometimes has to lean forward while standing to decrease his pain. His pain gradually decreases with sitting or lying down. The anterior shin symptoms began a few months later. Pt has been followed by Dr. Chahal with pain management for the past few months and has had minimal relief with L4/5 and L5/S1 ASHISH and L2-5 BL medial branch block. He is not interested in starting PT because he has COPD and does not have the endurance to participate. He has only taken Gabapentin for pain which was increased to 800mg TID last month, but reports feeling dizzy with no additional analgesia on this dose. Patient has also experienced urinary urgency for the past few months about 3x/week and does have accidents because he cannot get to the bathroom in time. He has not had any accidents in which he was unaware that he was urinating. He is being followed by urology for this, and it is not believed to be neuro related. Pt denies urinary retention, constipation, bowel incontinence, saddle anesthesia, falls, weakness, color or temperature changes to the BLE. He does report unsteady gait, but attributes this to his parkinson's  diagnosis. Wife reports difference in handwriting over the past year. Denies dropping items or difficulty with feeding self.    Symptom onset: about 1 year ago; worsening the past 5 months  Location: lower back and right buttocks  Pain level: 10 with walking  Inciting factors: movement  Relieving factors: rest  Numbness: left anterior shin  Weakness: N/A    Treatments tried:  -PT: no  -ASHISH: L4/5 and L5/S1 on 9/9/2020, and BL L2-5 MBB on 11/4/2020  -Gabapentin: yes, dose increased to 300 TID in November. No pain relief; experiencing dizziness at current dose.   -OTC medications: None  -Muscle relaxer: No  -Rx pain medications: None  -Spine surgery: None     Blood thinners: Aspirin 81mg qd  Hx of DVT or PE: no  Smoking Hx: former smoker 40 pk yrs; quit 7 years ago  Alcohol Use: no  Illicit Drug Use: no      Review of patient's allergies indicates:  No Known Allergies    Past Medical History:   Diagnosis Date    Cataract     Chronic back pain greater than 3 months duration     Coronary artery disease     DDD (degenerative disc disease), lumbar 5/16/2020    Glaucoma suspect of both eyes     Hyperlipidemia     Hypertension     MI, old 2006    Parkinson disease        Past Surgical History:   Procedure Laterality Date    CARDIAC CATHETERIZATION  2006    x 3    CARDIAC CATHETERIZATION  10/11/2015    x 2    CATARACT EXTRACTION W/  INTRAOCULAR LENS IMPLANT Right 03/06/2018    Dr. Liu    CORONARY ANGIOPLASTY  2006, 2015    3 stents    HEMORRHOID SURGERY      INJECTION OF ANESTHETIC AGENT AROUND NERVE Bilateral 11/4/2020    Procedure: BLOCK, NERVE BILATERAL L2,3,4,5;  Surgeon: Ramiro Calvillo MD;  Location: [facility] PAIN [facility]GT;  Service: Pain Management;  Laterality: Bilateral;  BLOCK, NERVE BILATERAL L2,3,4,5    INJECTION OF FACET JOINT Right 8/5/2020    Procedure: INJECTION, FACET JOINT, L4-L5 , L5-S1;  Surgeon: Ramiro Calvillo MD;  Location: [facility];  Service: Pain Management;  Laterality:  Right;    TRANSFORAMINAL EPIDURAL INJECTION OF STEROID Right 2020    Procedure: INJECTION, STEROID, EPIDURAL, TRANSFORAMINAL APPROACH;  Surgeon: Ramiro Calvillo MD;  Location: [facility];  Service: Pain Management;  Laterality: Right;  Right TF ASHISH L4-L5, L5-S1    PTcannot come earlier than 12:30    TRANSFORAMINAL EPIDURAL INJECTION OF STEROID Right 2020    Procedure: INJECTION, STEROID, EPIDURAL, TRANSFORAMINAL APPROACH;  Surgeon: Ramiro Calvillo MD;  Location: [facility]IN [facility];  Service: Pain Management;  Laterality: Right;  RIGHT TF ASHISH L4/5 and L5/S1        Family History   Problem Relation Age of Onset    Heart attack Father     Cataracts Father     Heart disease Maternal Uncle     Heart disease Paternal Uncle     Hypertension Neg Hx     Asthma Neg Hx     Emphysema Neg Hx     Amblyopia Neg Hx     Blindness Neg Hx     Macular degeneration Neg Hx     Retinal detachment Neg Hx     Strabismus Neg Hx        Social History     Socioeconomic History    Marital status:      Spouse name: Not on file    Number of children: Not on file    Years of education: Not on file    Highest education level: Not on file   Occupational History    Not on file   Social Needs    Financial resource strain: Not hard at all    Food insecurity     Worry: Never true     Inability: Never true    Transportation needs     Medical: No     Non-medical: No   Tobacco Use    Smoking status: Former Smoker     Packs/day: 1.00     Years: 40.00     Pack years: 40.00     Quit date: 2014     Years since quittin.8    Smokeless tobacco: Never Used   Substance and Sexual Activity    Alcohol use: Yes     Frequency: Never     Binge frequency: Never     Comment: socially    Drug use: No    Sexual activity: Not on file   Lifestyle    Physical activity     Days per week: 0 days     Minutes per session: Not on file    Stress: Only a little   Relationships    Social connections     Talks on phone: Once a  "week     Gets together: Once a week     Attends [employer] service: Not on file     Active member of club or organization: No     Attends meetings of clubs or organizations: Never     Relationship status:    Other Topics Concern    Not on file   Social History Narrative    Not on file       Review of Systems:  Constitutional: no fever or chills  Eyes: no visual changes   ENT: no nasal congestion or sore throat   Respiratory: no cough or shortness of breath   Cardiovascular: no chest pain or palpitations   Gastrointestinal: no nausea or vomiting   Genitourinary: no hematuria or dysuria   Integument/Breast: no rash or pruritis   Hematologic/Lymphatic: + easy bruising and bleeding  Musculoskeletal: + back pain   Neurological: no seizures, R>L UE resting tremor consistent with Parkinson's diagnosis   Behavioral/Psych: no auditory or visual hallucinations   Endocrine: no heat or cold intolerance     OBJECTIVE:     Vital Signs (Most Recent):  Vitals:    12/02/20 1201   BP: 107/71   Pulse: 69   Temp: 98.3 °F (36.8 °C)   TempSrc: Oral   Weight: 68 kg (150 lb)   Height: 5' 4" (1.626 m)       Physical Exam:  General: well developed, well nourished, no distress.   Head: normocephalic, atraumatic  Neurologic: Alert and oriented. Thought content appropriate.  GCS: Motor: 6/Verbal: 5/Eyes: 4 GCS Total: 15  Mental Status: Awake, Alert, Oriented x 4  Language: No aphasia  Speech: No dysarthria  Cranial nerves: face symmetric, tongue midline, CN II-XII grossly intact.   Eyes: pupils equal, round, reactive to light with accomodation, EOMI.  Pulmonary: normal respirations, no signs of respiratory distress  Abdomen: soft, non-distended, not tender to palpation  Sensory: intact to light touch throughout    Motor Strength: Moves all extremities spontaneously with good tone.  Full strength upper and lower extremities. No abnormal movements seen.     Strength  Deltoids Triceps Biceps Wrist Extension Wrist Flexion Hand    Upper: " R 5/5 5/5 5/5 5/5 5/5 5/5    L 5/5 5/5 5/5 5/5 5/5 5/5     Iliopsoas Quadriceps Knee  Flexion Tibialis  anterior Gastro- cnemius EHL   Lower: R 5/5 5/5 5/5 5/5 5/5 5/5    L 5/5 5/5 5/5 5/5 5/5 5/5     DTR's: Diffusely hyporeflexic   Pronator Drift: no drift noted  Mclean's: absent  Clonus: absent  Pulses: 2+ and symmetric radial and dorsalis pedis.  Skin: Skin is warm, dry and intact.  Straight leg raise: negative  Gait: slow shuffling gait, instability Tandem Gait: attempted with diffculty          Able to stand on heels & toes with increased pain with standing on heels  Cervical ROM: limited extension  Lumbar ROM: full, with pain on lateral bend and extension  SI Joint tenderness: + right  Pain on Hip ROM: Negative  No midline tenderness to palpation. No surrounding paraspinal muscle tenderness.      Diagnostic Results:  I have personally reviewed all pertinent imaging.    MRI lumbar spine 3/12/2020:  - L4-5, L5-S1 disc bulge and facet arthropathy contributing to moderate canal stenosis and bilateral neural foraminal stenosis, worse at L4-5    XR lumbar spine flex/ext 5/14/2020:  - 8mm anterolisthesis L4 on L5 without dynamic instability    ASSESSMENT/PLAN:     Hector Kellogg is a 75 y.o. male who presents today with constant, progressively worsening lower back and buttock pain with intermittent numbness/tingling of the right anterior shin. He is neurologically stable on exam however he does complain of gait instability and handwriting changes and exhibits difficulty with tandem gait. Will obtain an MRI cervical spine to further evaluate s/s of cervical myelopathy. Will obtain an XR scoliosis to assess spinal alignment and evaluate for any sagittal imbalance. He is willing to consider surgery if it is recommended and he has exhausted conservative treatment. Referral placed for PT. Lastly, he will follow up with Dr. Klein in regards to Gabapentin dose and trying Lyrica instead. All questions answered.  Patient and wife agree with the above plan. Discussed with Dr. Sheldon.    - Obtain XR  - MRI cervical spine  - Recommended PT, referral sent  - Follow-up with Dr. Sheldon in 4-6 weeks to discuss surgical intervention    Jamia German PA-C  Neurosurgery

## 2020-12-03 ENCOUNTER — TELEPHONE (OUTPATIENT)
Dept: NEUROSURGERY | Facility: CLINIC | Age: 75
End: 2020-12-03

## 2020-12-03 NOTE — TELEPHONE ENCOUNTER
Spouse confirmed the pt' MRI scheduled for 12.18.2020 and consult per Jamia scherer/Dr. Sheldon for 01.07.2020.  Appt letter in the mail, V/U.----- Message from Jamia German PA-C sent at 12/2/2020  7:38 PM CST -----  Can you schedule this patient for follow-up with Dr. Sheldon in 6 weeks to discuss surgical options for lumbar stenosis with neurogenic claudication? I also ordered an MRI cervical spine - can you schedule this to be done in the next few weeks.    Thanks,  Jamia

## 2020-12-15 PROBLEM — R29.898 WEAKNESS OF BOTH LOWER EXTREMITIES: Status: ACTIVE | Noted: 2020-12-15

## 2020-12-15 PROBLEM — Z74.09 IMPAIRED FUNCTIONAL MOBILITY, BALANCE, GAIT, AND ENDURANCE: Status: ACTIVE | Noted: 2020-12-15

## 2020-12-15 PROBLEM — M53.86 DECREASED ROM OF LUMBAR SPINE: Status: ACTIVE | Noted: 2020-12-15

## 2021-01-04 ENCOUNTER — PATIENT MESSAGE (OUTPATIENT)
Dept: ADMINISTRATIVE | Facility: HOSPITAL | Age: 76
End: 2021-01-04

## 2021-01-06 ENCOUNTER — TELEPHONE (OUTPATIENT)
Dept: NEUROSURGERY | Facility: CLINIC | Age: 76
End: 2021-01-06

## 2021-01-07 ENCOUNTER — OFFICE VISIT (OUTPATIENT)
Dept: NEUROSURGERY | Facility: CLINIC | Age: 76
End: 2021-01-07
Payer: MEDICARE

## 2021-01-07 ENCOUNTER — TELEPHONE (OUTPATIENT)
Dept: NEUROSURGERY | Facility: CLINIC | Age: 76
End: 2021-01-07

## 2021-01-07 VITALS — DIASTOLIC BLOOD PRESSURE: 84 MMHG | TEMPERATURE: 97 F | SYSTOLIC BLOOD PRESSURE: 137 MMHG | HEART RATE: 75 BPM

## 2021-01-07 DIAGNOSIS — Z01.818 PREOPERATIVE CLEARANCE: ICD-10-CM

## 2021-01-07 DIAGNOSIS — M43.16 SPONDYLOLISTHESIS OF LUMBAR REGION: Primary | ICD-10-CM

## 2021-01-07 PROCEDURE — 99214 OFFICE O/P EST MOD 30 MIN: CPT | Mod: S$GLB,,, | Performed by: NEUROLOGICAL SURGERY

## 2021-01-07 PROCEDURE — 3079F PR MOST RECENT DIASTOLIC BLOOD PRESSURE 80-89 MM HG: ICD-10-PCS | Mod: CPTII,S$GLB,, | Performed by: NEUROLOGICAL SURGERY

## 2021-01-07 PROCEDURE — 99214 PR OFFICE/OUTPT VISIT, EST, LEVL IV, 30-39 MIN: ICD-10-PCS | Mod: S$GLB,,, | Performed by: NEUROLOGICAL SURGERY

## 2021-01-07 PROCEDURE — 99999 PR PBB SHADOW E&M-EST. PATIENT-LVL III: CPT | Mod: PBBFAC,,, | Performed by: NEUROLOGICAL SURGERY

## 2021-01-07 PROCEDURE — 1101F PR PT FALLS ASSESS DOC 0-1 FALLS W/OUT INJ PAST YR: ICD-10-PCS | Mod: CPTII,S$GLB,, | Performed by: NEUROLOGICAL SURGERY

## 2021-01-07 PROCEDURE — 1125F PR PAIN SEVERITY QUANTIFIED, PAIN PRESENT: ICD-10-PCS | Mod: S$GLB,,, | Performed by: NEUROLOGICAL SURGERY

## 2021-01-07 PROCEDURE — 3075F SYST BP GE 130 - 139MM HG: CPT | Mod: CPTII,S$GLB,, | Performed by: NEUROLOGICAL SURGERY

## 2021-01-07 PROCEDURE — 1101F PT FALLS ASSESS-DOCD LE1/YR: CPT | Mod: CPTII,S$GLB,, | Performed by: NEUROLOGICAL SURGERY

## 2021-01-07 PROCEDURE — 1159F PR MEDICATION LIST DOCUMENTED IN MEDICAL RECORD: ICD-10-PCS | Mod: S$GLB,,, | Performed by: NEUROLOGICAL SURGERY

## 2021-01-07 PROCEDURE — 1125F AMNT PAIN NOTED PAIN PRSNT: CPT | Mod: S$GLB,,, | Performed by: NEUROLOGICAL SURGERY

## 2021-01-07 PROCEDURE — 3288F PR FALLS RISK ASSESSMENT DOCUMENTED: ICD-10-PCS | Mod: CPTII,S$GLB,, | Performed by: NEUROLOGICAL SURGERY

## 2021-01-07 PROCEDURE — 3288F FALL RISK ASSESSMENT DOCD: CPT | Mod: CPTII,S$GLB,, | Performed by: NEUROLOGICAL SURGERY

## 2021-01-07 PROCEDURE — 3079F DIAST BP 80-89 MM HG: CPT | Mod: CPTII,S$GLB,, | Performed by: NEUROLOGICAL SURGERY

## 2021-01-07 PROCEDURE — 3075F PR MOST RECENT SYSTOLIC BLOOD PRESS GE 130-139MM HG: ICD-10-PCS | Mod: CPTII,S$GLB,, | Performed by: NEUROLOGICAL SURGERY

## 2021-01-07 PROCEDURE — 1159F MED LIST DOCD IN RCRD: CPT | Mod: S$GLB,,, | Performed by: NEUROLOGICAL SURGERY

## 2021-01-07 PROCEDURE — 99999 PR PBB SHADOW E&M-EST. PATIENT-LVL III: ICD-10-PCS | Mod: PBBFAC,,, | Performed by: NEUROLOGICAL SURGERY

## 2021-01-11 ENCOUNTER — TELEPHONE (OUTPATIENT)
Dept: INTERNAL MEDICINE | Facility: CLINIC | Age: 76
End: 2021-01-11

## 2021-01-11 DIAGNOSIS — R10.10 UPPER ABDOMINAL PAIN, UNSPECIFIED: ICD-10-CM

## 2021-01-11 DIAGNOSIS — R60.9 EDEMA, UNSPECIFIED TYPE: ICD-10-CM

## 2021-01-11 DIAGNOSIS — Z01.818 PRE-OP EXAM: Primary | ICD-10-CM

## 2021-01-12 ENCOUNTER — PATIENT OUTREACH (OUTPATIENT)
Dept: ADMINISTRATIVE | Facility: HOSPITAL | Age: 76
End: 2021-01-12

## 2021-01-13 ENCOUNTER — HOSPITAL ENCOUNTER (OUTPATIENT)
Dept: RADIOLOGY | Facility: HOSPITAL | Age: 76
Discharge: HOME | End: 2021-01-13
Attending: INTERNAL MEDICINE
Payer: MEDICARE

## 2021-01-13 ENCOUNTER — OFFICE VISIT (OUTPATIENT)
Dept: INTERNAL MEDICINE | Facility: CLINIC | Age: 76
End: 2021-01-13
Payer: MEDICARE

## 2021-01-13 VITALS
WEIGHT: 154.31 LBS | DIASTOLIC BLOOD PRESSURE: 68 MMHG | HEIGHT: 64 IN | SYSTOLIC BLOOD PRESSURE: 120 MMHG | HEART RATE: 81 BPM | BODY MASS INDEX: 26.34 KG/M2 | RESPIRATION RATE: 16 BRPM | TEMPERATURE: 97 F | OXYGEN SATURATION: 95 %

## 2021-01-13 DIAGNOSIS — Z01.818 PRE-OP EXAM: Primary | ICD-10-CM

## 2021-01-13 DIAGNOSIS — Z01.818 PRE-OP EXAM: ICD-10-CM

## 2021-01-13 DIAGNOSIS — I10 ESSENTIAL HYPERTENSION: ICD-10-CM

## 2021-01-13 DIAGNOSIS — I25.10 CORONARY ARTERY DISEASE INVOLVING NATIVE CORONARY ARTERY OF NATIVE HEART WITHOUT ANGINA PECTORIS: ICD-10-CM

## 2021-01-13 DIAGNOSIS — E78.2 MIXED HYPERLIPIDEMIA: ICD-10-CM

## 2021-01-13 DIAGNOSIS — M43.06 LUMBAR SPONDYLOLYSIS: ICD-10-CM

## 2021-01-13 DIAGNOSIS — G20.A1 PARKINSON'S DISEASE: ICD-10-CM

## 2021-01-13 DIAGNOSIS — I27.20 PULMONARY HTN: ICD-10-CM

## 2021-01-13 DIAGNOSIS — J44.9 CHRONIC OBSTRUCTIVE PULMONARY DISEASE, UNSPECIFIED COPD TYPE: ICD-10-CM

## 2021-01-13 PROBLEM — Z12.9 SCREENING FOR CANCER: Status: RESOLVED | Noted: 2020-01-11 | Resolved: 2021-01-13

## 2021-01-13 PROCEDURE — 3078F PR MOST RECENT DIASTOLIC BLOOD PRESSURE < 80 MM HG: ICD-10-PCS | Mod: CPTII,S$GLB,, | Performed by: INTERNAL MEDICINE

## 2021-01-13 PROCEDURE — 1126F PR PAIN SEVERITY QUANTIFIED, NO PAIN PRESENT: ICD-10-PCS | Mod: S$GLB,,, | Performed by: INTERNAL MEDICINE

## 2021-01-13 PROCEDURE — 1101F PT FALLS ASSESS-DOCD LE1/YR: CPT | Mod: CPTII,S$GLB,, | Performed by: INTERNAL MEDICINE

## 2021-01-13 PROCEDURE — 71046 X-RAY EXAM CHEST 2 VIEWS: CPT | Mod: TC,PO

## 2021-01-13 PROCEDURE — 3288F FALL RISK ASSESSMENT DOCD: CPT | Mod: CPTII,S$GLB,, | Performed by: INTERNAL MEDICINE

## 2021-01-13 PROCEDURE — 93005 EKG 12-LEAD: ICD-10-PCS | Mod: S$GLB,,, | Performed by: INTERNAL MEDICINE

## 2021-01-13 PROCEDURE — 71046 X-RAY EXAM CHEST 2 VIEWS: CPT | Mod: 26,,, | Performed by: RADIOLOGY

## 2021-01-13 PROCEDURE — 99999 PR PBB SHADOW E&M-EST. PATIENT-LVL III: ICD-10-PCS | Mod: PBBFAC,,, | Performed by: INTERNAL MEDICINE

## 2021-01-13 PROCEDURE — 3078F DIAST BP <80 MM HG: CPT | Mod: CPTII,S$GLB,, | Performed by: INTERNAL MEDICINE

## 2021-01-13 PROCEDURE — 93010 EKG 12-LEAD: ICD-10-PCS | Mod: S$GLB,,, | Performed by: INTERNAL MEDICINE

## 2021-01-13 PROCEDURE — 93010 ELECTROCARDIOGRAM REPORT: CPT | Mod: S$GLB,,, | Performed by: INTERNAL MEDICINE

## 2021-01-13 PROCEDURE — 99214 PR OFFICE/OUTPT VISIT, EST, LEVL IV, 30-39 MIN: ICD-10-PCS | Mod: S$GLB,,, | Performed by: INTERNAL MEDICINE

## 2021-01-13 PROCEDURE — 1126F AMNT PAIN NOTED NONE PRSNT: CPT | Mod: S$GLB,,, | Performed by: INTERNAL MEDICINE

## 2021-01-13 PROCEDURE — 99999 PR PBB SHADOW E&M-EST. PATIENT-LVL III: CPT | Mod: PBBFAC,,, | Performed by: INTERNAL MEDICINE

## 2021-01-13 PROCEDURE — 99214 OFFICE O/P EST MOD 30 MIN: CPT | Mod: S$GLB,,, | Performed by: INTERNAL MEDICINE

## 2021-01-13 PROCEDURE — 1159F MED LIST DOCD IN RCRD: CPT | Mod: S$GLB,,, | Performed by: INTERNAL MEDICINE

## 2021-01-13 PROCEDURE — 93005 ELECTROCARDIOGRAM TRACING: CPT | Mod: S$GLB,,, | Performed by: INTERNAL MEDICINE

## 2021-01-13 PROCEDURE — 71046 XR CHEST PA AND LATERAL: ICD-10-PCS | Mod: 26,,, | Performed by: RADIOLOGY

## 2021-01-13 PROCEDURE — 3074F PR MOST RECENT SYSTOLIC BLOOD PRESSURE < 130 MM HG: ICD-10-PCS | Mod: CPTII,S$GLB,, | Performed by: INTERNAL MEDICINE

## 2021-01-13 PROCEDURE — 3074F SYST BP LT 130 MM HG: CPT | Mod: CPTII,S$GLB,, | Performed by: INTERNAL MEDICINE

## 2021-01-13 PROCEDURE — 1101F PR PT FALLS ASSESS DOC 0-1 FALLS W/OUT INJ PAST YR: ICD-10-PCS | Mod: CPTII,S$GLB,, | Performed by: INTERNAL MEDICINE

## 2021-01-13 PROCEDURE — 3288F PR FALLS RISK ASSESSMENT DOCUMENTED: ICD-10-PCS | Mod: CPTII,S$GLB,, | Performed by: INTERNAL MEDICINE

## 2021-01-13 PROCEDURE — 1159F PR MEDICATION LIST DOCUMENTED IN MEDICAL RECORD: ICD-10-PCS | Mod: S$GLB,,, | Performed by: INTERNAL MEDICINE

## 2021-01-14 ENCOUNTER — TELEPHONE (OUTPATIENT)
Dept: INTERNAL MEDICINE | Facility: CLINIC | Age: 76
End: 2021-01-14

## 2021-01-14 ENCOUNTER — TELEPHONE (OUTPATIENT)
Dept: PREADMISSION TESTING | Facility: HOSPITAL | Age: 76
End: 2021-01-14

## 2021-01-14 DIAGNOSIS — R35.0 FREQUENCY OF URINATION: Primary | ICD-10-CM

## 2021-01-14 DIAGNOSIS — Z01.818 PREOPERATIVE TESTING: Primary | ICD-10-CM

## 2021-01-15 ENCOUNTER — LAB VISIT (OUTPATIENT)
Dept: LAB | Facility: HOSPITAL | Age: 76
End: 2021-01-15
Attending: INTERNAL MEDICINE
Payer: MEDICARE

## 2021-01-15 DIAGNOSIS — R35.0 FREQUENCY OF URINATION: ICD-10-CM

## 2021-01-15 PROCEDURE — 87086 URINE CULTURE/COLONY COUNT: CPT

## 2021-01-17 LAB — BACTERIA UR CULT: NO GROWTH

## 2021-01-19 ENCOUNTER — TELEPHONE (OUTPATIENT)
Dept: INTERNAL MEDICINE | Facility: CLINIC | Age: 76
End: 2021-01-19

## 2021-01-19 DIAGNOSIS — R31.9 HEMATURIA, UNSPECIFIED TYPE: Primary | ICD-10-CM

## 2021-01-21 PROBLEM — Z74.09 IMPAIRED FUNCTIONAL MOBILITY, BALANCE, GAIT, AND ENDURANCE: Status: RESOLVED | Noted: 2020-12-15 | Resolved: 2021-01-21

## 2021-01-21 PROBLEM — R29.898 WEAKNESS OF BOTH LOWER EXTREMITIES: Status: RESOLVED | Noted: 2020-12-15 | Resolved: 2021-01-21

## 2021-01-21 PROBLEM — M53.86 DECREASED ROM OF LUMBAR SPINE: Status: RESOLVED | Noted: 2020-12-15 | Resolved: 2021-01-21

## 2021-01-22 ENCOUNTER — PATIENT OUTREACH (OUTPATIENT)
Dept: ADMINISTRATIVE | Facility: OTHER | Age: 76
End: 2021-01-22

## 2021-01-22 ENCOUNTER — HOSPITAL ENCOUNTER (OUTPATIENT)
Dept: PREADMISSION TESTING | Facility: HOSPITAL | Age: 76
Discharge: HOME | End: 2021-01-22
Attending: ANESTHESIOLOGY
Payer: MEDICARE

## 2021-01-22 VITALS
DIASTOLIC BLOOD PRESSURE: 64 MMHG | BODY MASS INDEX: 25.83 KG/M2 | HEART RATE: 77 BPM | OXYGEN SATURATION: 96 % | WEIGHT: 155 LBS | SYSTOLIC BLOOD PRESSURE: 113 MMHG | HEIGHT: 65 IN

## 2021-01-25 ENCOUNTER — ANESTHESIA EVENT (OUTPATIENT)
Dept: SURGERY | Facility: HOSPITAL | Age: 76
End: 2021-01-25
Payer: MEDICARE

## 2021-01-26 ENCOUNTER — OFFICE VISIT (OUTPATIENT)
Dept: UROLOGY | Facility: CLINIC | Age: 76
End: 2021-01-26
Payer: MEDICARE

## 2021-01-26 VITALS
SYSTOLIC BLOOD PRESSURE: 124 MMHG | DIASTOLIC BLOOD PRESSURE: 62 MMHG | WEIGHT: 155 LBS | BODY MASS INDEX: 25.83 KG/M2 | HEART RATE: 80 BPM | HEIGHT: 65 IN

## 2021-01-26 DIAGNOSIS — R31.0 GROSS HEMATURIA: ICD-10-CM

## 2021-01-26 DIAGNOSIS — N20.0 NEPHROLITHIASIS: Primary | ICD-10-CM

## 2021-01-26 LAB
BACTERIA #/AREA URNS AUTO: ABNORMAL /HPF
BILIRUB SERPL-MCNC: ABNORMAL MG/DL
BLOOD URINE, POC: 250
CLARITY, POC UA: CLEAR
COLOR, POC UA: ABNORMAL
GLUCOSE UR QL STRIP: NORMAL
HYALINE CASTS UR QL AUTO: 4 /LPF
KETONES UR QL STRIP: ABNORMAL
LEUKOCYTE ESTERASE URINE, POC: ABNORMAL
MICROSCOPIC COMMENT: ABNORMAL
NITRITE, POC UA: ABNORMAL
PH, POC UA: 6
PROTEIN, POC: ABNORMAL
RBC #/AREA URNS AUTO: 34 /HPF (ref 0–4)
SPECIFIC GRAVITY, POC UA: 1.02
UROBILINOGEN, POC UA: NORMAL
WBC #/AREA URNS AUTO: 7 /HPF (ref 0–5)

## 2021-01-26 PROCEDURE — 3078F DIAST BP <80 MM HG: CPT | Mod: CPTII,S$GLB,, | Performed by: NURSE PRACTITIONER

## 2021-01-26 PROCEDURE — 1159F PR MEDICATION LIST DOCUMENTED IN MEDICAL RECORD: ICD-10-PCS | Mod: S$GLB,,, | Performed by: NURSE PRACTITIONER

## 2021-01-26 PROCEDURE — 81001 URINALYSIS AUTO W/SCOPE: CPT

## 2021-01-26 PROCEDURE — 1126F AMNT PAIN NOTED NONE PRSNT: CPT | Mod: S$GLB,,, | Performed by: NURSE PRACTITIONER

## 2021-01-26 PROCEDURE — 1101F PR PT FALLS ASSESS DOC 0-1 FALLS W/OUT INJ PAST YR: ICD-10-PCS | Mod: CPTII,S$GLB,, | Performed by: NURSE PRACTITIONER

## 2021-01-26 PROCEDURE — 87086 URINE CULTURE/COLONY COUNT: CPT

## 2021-01-26 PROCEDURE — 1126F PR PAIN SEVERITY QUANTIFIED, NO PAIN PRESENT: ICD-10-PCS | Mod: S$GLB,,, | Performed by: NURSE PRACTITIONER

## 2021-01-26 PROCEDURE — 99213 PR OFFICE/OUTPT VISIT, EST, LEVL III, 20-29 MIN: ICD-10-PCS | Mod: 25,S$GLB,, | Performed by: NURSE PRACTITIONER

## 2021-01-26 PROCEDURE — 3074F PR MOST RECENT SYSTOLIC BLOOD PRESSURE < 130 MM HG: ICD-10-PCS | Mod: CPTII,S$GLB,, | Performed by: NURSE PRACTITIONER

## 2021-01-26 PROCEDURE — 3288F FALL RISK ASSESSMENT DOCD: CPT | Mod: CPTII,S$GLB,, | Performed by: NURSE PRACTITIONER

## 2021-01-26 PROCEDURE — 1101F PT FALLS ASSESS-DOCD LE1/YR: CPT | Mod: CPTII,S$GLB,, | Performed by: NURSE PRACTITIONER

## 2021-01-26 PROCEDURE — 3078F PR MOST RECENT DIASTOLIC BLOOD PRESSURE < 80 MM HG: ICD-10-PCS | Mod: CPTII,S$GLB,, | Performed by: NURSE PRACTITIONER

## 2021-01-26 PROCEDURE — 1159F MED LIST DOCD IN RCRD: CPT | Mod: S$GLB,,, | Performed by: NURSE PRACTITIONER

## 2021-01-26 PROCEDURE — 81002 URINALYSIS NONAUTO W/O SCOPE: CPT | Mod: S$GLB,,, | Performed by: NURSE PRACTITIONER

## 2021-01-26 PROCEDURE — 99213 OFFICE O/P EST LOW 20 MIN: CPT | Mod: 25,S$GLB,, | Performed by: NURSE PRACTITIONER

## 2021-01-26 PROCEDURE — 81002 POCT URINE DIPSTICK WITHOUT MICROSCOPE: ICD-10-PCS | Mod: S$GLB,,, | Performed by: NURSE PRACTITIONER

## 2021-01-26 PROCEDURE — 3288F PR FALLS RISK ASSESSMENT DOCUMENTED: ICD-10-PCS | Mod: CPTII,S$GLB,, | Performed by: NURSE PRACTITIONER

## 2021-01-26 PROCEDURE — 3074F SYST BP LT 130 MM HG: CPT | Mod: CPTII,S$GLB,, | Performed by: NURSE PRACTITIONER

## 2021-01-27 ENCOUNTER — TELEPHONE (OUTPATIENT)
Dept: INTERNAL MEDICINE | Facility: CLINIC | Age: 76
End: 2021-01-27

## 2021-01-28 ENCOUNTER — TELEPHONE (OUTPATIENT)
Dept: UROLOGY | Facility: CLINIC | Age: 76
End: 2021-01-28

## 2021-01-28 ENCOUNTER — TELEPHONE (OUTPATIENT)
Dept: NEUROSURGERY | Facility: CLINIC | Age: 76
End: 2021-01-28

## 2021-01-28 ENCOUNTER — PATIENT MESSAGE (OUTPATIENT)
Dept: UROLOGY | Facility: CLINIC | Age: 76
End: 2021-01-28

## 2021-01-28 DIAGNOSIS — R31.0 GROSS HEMATURIA: Primary | ICD-10-CM

## 2021-01-28 LAB — BACTERIA UR CULT: NO GROWTH

## 2021-02-01 ENCOUNTER — HOSPITAL ENCOUNTER (OUTPATIENT)
Facility: HOSPITAL | Age: 76
Discharge: HOME | End: 2021-02-01
Attending: NEUROLOGICAL SURGERY | Admitting: NEUROLOGICAL SURGERY
Payer: MEDICARE

## 2021-02-01 ENCOUNTER — ANESTHESIA (OUTPATIENT)
Dept: SURGERY | Facility: HOSPITAL | Age: 76
End: 2021-02-01
Payer: MEDICARE

## 2021-02-01 VITALS
WEIGHT: 150 LBS | TEMPERATURE: 98 F | RESPIRATION RATE: 27 BRPM | HEART RATE: 75 BPM | HEIGHT: 65 IN | DIASTOLIC BLOOD PRESSURE: 77 MMHG | SYSTOLIC BLOOD PRESSURE: 161 MMHG | BODY MASS INDEX: 24.99 KG/M2 | OXYGEN SATURATION: 96 %

## 2021-02-01 DIAGNOSIS — M43.16 SPONDYLOLISTHESIS OF LUMBAR REGION: Primary | ICD-10-CM

## 2021-02-01 PROCEDURE — 27201423 OPTIME MED/SURG SUP & DEVICES STERILE SUPPLY: Performed by: NEUROLOGICAL SURGERY

## 2021-02-01 PROCEDURE — 71000033 HC RECOVERY, INTIAL HOUR: Performed by: NEUROLOGICAL SURGERY

## 2021-02-01 PROCEDURE — 37000008 HC ANESTHESIA 1ST 15 MINUTES: Performed by: NEUROLOGICAL SURGERY

## 2021-02-01 PROCEDURE — D9220A PRA ANESTHESIA: ICD-10-PCS | Mod: CRNA,,, | Performed by: NURSE ANESTHETIST, CERTIFIED REGISTERED

## 2021-02-01 PROCEDURE — 36000711: Performed by: NEUROLOGICAL SURGERY

## 2021-02-01 PROCEDURE — 25000003 PHARM REV CODE 250: Performed by: NEUROLOGICAL SURGERY

## 2021-02-01 PROCEDURE — 63600175 PHARM REV CODE 636 W HCPCS: Performed by: PHYSICIAN ASSISTANT

## 2021-02-01 PROCEDURE — 63047 LAM FACETEC & FORAMOT LUMBAR: CPT | Mod: ,,, | Performed by: NEUROLOGICAL SURGERY

## 2021-02-01 PROCEDURE — 63047 PR LAMINEC/FACETECT/FORAMIN,LUMBAR 1 SEG: ICD-10-PCS | Mod: ,,, | Performed by: NEUROLOGICAL SURGERY

## 2021-02-01 PROCEDURE — 63600175 PHARM REV CODE 636 W HCPCS: Performed by: NEUROLOGICAL SURGERY

## 2021-02-01 PROCEDURE — 63600175 PHARM REV CODE 636 W HCPCS: Performed by: NURSE ANESTHETIST, CERTIFIED REGISTERED

## 2021-02-01 PROCEDURE — D9220A PRA ANESTHESIA: Mod: CRNA,,, | Performed by: NURSE ANESTHETIST, CERTIFIED REGISTERED

## 2021-02-01 PROCEDURE — 94761 N-INVAS EAR/PLS OXIMETRY MLT: CPT

## 2021-02-01 PROCEDURE — 25000003 PHARM REV CODE 250: Performed by: NURSE ANESTHETIST, CERTIFIED REGISTERED

## 2021-02-01 PROCEDURE — 37000009 HC ANESTHESIA EA ADD 15 MINS: Performed by: NEUROLOGICAL SURGERY

## 2021-02-01 PROCEDURE — D9220A PRA ANESTHESIA: ICD-10-PCS | Mod: ANES,,, | Performed by: ANESTHESIOLOGY

## 2021-02-01 PROCEDURE — 99900035 HC TECH TIME PER 15 MIN (STAT)

## 2021-02-01 PROCEDURE — 36000710: Performed by: NEUROLOGICAL SURGERY

## 2021-02-01 PROCEDURE — 25000003 PHARM REV CODE 250: Performed by: ANESTHESIOLOGY

## 2021-02-01 PROCEDURE — D9220A PRA ANESTHESIA: Mod: ANES,,, | Performed by: ANESTHESIOLOGY

## 2021-02-01 PROCEDURE — 25000003 PHARM REV CODE 250: Performed by: PHYSICIAN ASSISTANT

## 2021-02-01 PROCEDURE — 71000015 HC POSTOP RECOV 1ST HR: Performed by: NEUROLOGICAL SURGERY

## 2021-02-01 RX ORDER — ONDANSETRON HYDROCHLORIDE 2 MG/ML
INJECTION, SOLUTION INTRAMUSCULAR; INTRAVENOUS
Status: DISCONTINUED | OUTPATIENT
Start: 2021-02-01 | End: 2021-02-01

## 2021-02-01 RX ORDER — FENTANYL CITRATE 50 UG/ML
INJECTION, SOLUTION INTRAMUSCULAR; INTRAVENOUS
Status: DISCONTINUED | OUTPATIENT
Start: 2021-02-01 | End: 2021-02-01

## 2021-02-01 RX ORDER — LIDOCAINE HYDROCHLORIDE 10 MG/ML
INJECTION, SOLUTION EPIDURAL; INFILTRATION; INTRACAUDAL; PERINEURAL
Status: DISCONTINUED | OUTPATIENT
Start: 2021-02-01 | End: 2021-02-01 | Stop reason: HOSPADM

## 2021-02-01 RX ORDER — SODIUM CHLORIDE 9 MG/ML
INJECTION, SOLUTION INTRAVENOUS CONTINUOUS
Status: DISCONTINUED | OUTPATIENT
Start: 2021-02-01 | End: 2021-02-01 | Stop reason: HOSPADM

## 2021-02-01 RX ORDER — ONDANSETRON 4 MG/1
8 TABLET, ORALLY DISINTEGRATING ORAL EVERY 6 HOURS PRN
Status: DISCONTINUED | OUTPATIENT
Start: 2021-02-01 | End: 2021-02-01 | Stop reason: HOSPADM

## 2021-02-01 RX ORDER — TRAMADOL HYDROCHLORIDE 50 MG/1
50 TABLET ORAL EVERY 6 HOURS PRN
Qty: 50 TABLET | Refills: 0 | Status: SHIPPED | OUTPATIENT
Start: 2021-02-01 | End: 2021-10-08

## 2021-02-01 RX ORDER — SODIUM CHLORIDE 0.9 % (FLUSH) 0.9 %
10 SYRINGE (ML) INJECTION
Status: DISCONTINUED | OUTPATIENT
Start: 2021-02-01 | End: 2021-02-01 | Stop reason: HOSPADM

## 2021-02-01 RX ORDER — ACETAMINOPHEN 500 MG
1000 TABLET ORAL
Status: COMPLETED | OUTPATIENT
Start: 2021-02-01 | End: 2021-02-01

## 2021-02-01 RX ORDER — TRAMADOL HYDROCHLORIDE 50 MG/1
50 TABLET ORAL EVERY 6 HOURS PRN
Status: DISCONTINUED | OUTPATIENT
Start: 2021-02-01 | End: 2021-02-01 | Stop reason: HOSPADM

## 2021-02-01 RX ORDER — PROPOFOL 10 MG/ML
VIAL (ML) INTRAVENOUS
Status: DISCONTINUED | OUTPATIENT
Start: 2021-02-01 | End: 2021-02-01

## 2021-02-01 RX ORDER — FENTANYL CITRATE 50 UG/ML
25 INJECTION, SOLUTION INTRAMUSCULAR; INTRAVENOUS EVERY 5 MIN PRN
Status: DISCONTINUED | OUTPATIENT
Start: 2021-02-01 | End: 2021-02-01 | Stop reason: HOSPADM

## 2021-02-01 RX ORDER — MIDAZOLAM HYDROCHLORIDE 1 MG/ML
INJECTION INTRAMUSCULAR; INTRAVENOUS
Status: DISCONTINUED | OUTPATIENT
Start: 2021-02-01 | End: 2021-02-01

## 2021-02-01 RX ORDER — EPHEDRINE SULFATE 50 MG/ML
INJECTION, SOLUTION INTRAVENOUS
Status: DISCONTINUED | OUTPATIENT
Start: 2021-02-01 | End: 2021-02-01

## 2021-02-01 RX ORDER — SUCCINYLCHOLINE CHLORIDE 20 MG/ML
INJECTION INTRAMUSCULAR; INTRAVENOUS
Status: DISCONTINUED | OUTPATIENT
Start: 2021-02-01 | End: 2021-02-01

## 2021-02-01 RX ORDER — CEFAZOLIN SODIUM 1 G/3ML
2 INJECTION, POWDER, FOR SOLUTION INTRAMUSCULAR; INTRAVENOUS
Status: COMPLETED | OUTPATIENT
Start: 2021-02-01 | End: 2021-02-01

## 2021-02-01 RX ORDER — KETAMINE HYDROCHLORIDE 100 MG/ML
INJECTION, SOLUTION INTRAMUSCULAR; INTRAVENOUS
Status: DISCONTINUED | OUTPATIENT
Start: 2021-02-01 | End: 2021-02-01

## 2021-02-01 RX ORDER — ONDANSETRON 2 MG/ML
4 INJECTION INTRAMUSCULAR; INTRAVENOUS DAILY PRN
Status: DISCONTINUED | OUTPATIENT
Start: 2021-02-01 | End: 2021-02-01 | Stop reason: HOSPADM

## 2021-02-01 RX ORDER — ACETAMINOPHEN 500 MG
500 TABLET ORAL EVERY 6 HOURS PRN
Status: DISCONTINUED | OUTPATIENT
Start: 2021-02-01 | End: 2021-02-01 | Stop reason: HOSPADM

## 2021-02-01 RX ORDER — METHOCARBAMOL 750 MG/1
750 TABLET, FILM COATED ORAL EVERY 8 HOURS PRN
Qty: 40 TABLET | Refills: 0 | Status: SHIPPED | OUTPATIENT
Start: 2021-02-01 | End: 2021-02-11

## 2021-02-01 RX ORDER — METHOCARBAMOL 750 MG/1
750 TABLET, FILM COATED ORAL EVERY 8 HOURS PRN
Status: DISCONTINUED | OUTPATIENT
Start: 2021-02-01 | End: 2021-02-01 | Stop reason: HOSPADM

## 2021-02-01 RX ORDER — MUPIROCIN 20 MG/G
OINTMENT TOPICAL 2 TIMES DAILY
Status: DISCONTINUED | OUTPATIENT
Start: 2021-02-01 | End: 2021-02-01 | Stop reason: HOSPADM

## 2021-02-01 RX ORDER — DEXAMETHASONE SODIUM PHOSPHATE 4 MG/ML
INJECTION, SOLUTION INTRA-ARTICULAR; INTRALESIONAL; INTRAMUSCULAR; INTRAVENOUS; SOFT TISSUE
Status: DISCONTINUED | OUTPATIENT
Start: 2021-02-01 | End: 2021-02-01

## 2021-02-01 RX ORDER — MUPIROCIN 20 MG/G
1 OINTMENT TOPICAL 2 TIMES DAILY
Status: DISCONTINUED | OUTPATIENT
Start: 2021-02-01 | End: 2021-02-01 | Stop reason: HOSPADM

## 2021-02-01 RX ORDER — MUPIROCIN 20 MG/G
OINTMENT TOPICAL
Status: DISCONTINUED | OUTPATIENT
Start: 2021-02-01 | End: 2021-02-01 | Stop reason: HOSPADM

## 2021-02-01 RX ORDER — VANCOMYCIN HYDROCHLORIDE 1 G/20ML
INJECTION, POWDER, LYOPHILIZED, FOR SOLUTION INTRAVENOUS
Status: DISCONTINUED | OUTPATIENT
Start: 2021-02-01 | End: 2021-02-01 | Stop reason: HOSPADM

## 2021-02-01 RX ORDER — LIDOCAINE HCL/PF 100 MG/5ML
SYRINGE (ML) INTRAVENOUS
Status: DISCONTINUED | OUTPATIENT
Start: 2021-02-01 | End: 2021-02-01

## 2021-02-01 RX ADMIN — EPHEDRINE SULFATE 15 MG: 50 INJECTION INTRAVENOUS at 12:02

## 2021-02-01 RX ADMIN — MUPIROCIN: 20 OINTMENT TOPICAL at 07:02

## 2021-02-01 RX ADMIN — DEXAMETHASONE SODIUM PHOSPHATE 8 MG: 4 INJECTION, SOLUTION INTRAMUSCULAR; INTRAVENOUS at 11:02

## 2021-02-01 RX ADMIN — KETAMINE HYDROCHLORIDE 25 MG: 100 INJECTION, SOLUTION, CONCENTRATE INTRAMUSCULAR; INTRAVENOUS at 11:02

## 2021-02-01 RX ADMIN — LIDOCAINE HYDROCHLORIDE 100 MG: 20 INJECTION, SOLUTION INTRAVENOUS at 11:02

## 2021-02-01 RX ADMIN — SUCCINYLCHOLINE CHLORIDE 100 MG: 20 INJECTION, SOLUTION INTRAMUSCULAR; INTRAVENOUS at 11:02

## 2021-02-01 RX ADMIN — SODIUM CHLORIDE, SODIUM GLUCONATE, SODIUM ACETATE, POTASSIUM CHLORIDE, MAGNESIUM CHLORIDE, SODIUM PHOSPHATE, DIBASIC, AND POTASSIUM PHOSPHATE: .53; .5; .37; .037; .03; .012; .00082 INJECTION, SOLUTION INTRAVENOUS at 12:02

## 2021-02-01 RX ADMIN — CEFAZOLIN 2 G: 330 INJECTION, POWDER, FOR SOLUTION INTRAMUSCULAR; INTRAVENOUS at 11:02

## 2021-02-01 RX ADMIN — FENTANYL CITRATE 100 MCG: 50 INJECTION, SOLUTION INTRAMUSCULAR; INTRAVENOUS at 11:02

## 2021-02-01 RX ADMIN — KETAMINE HYDROCHLORIDE 5 MG: 100 INJECTION, SOLUTION, CONCENTRATE INTRAMUSCULAR; INTRAVENOUS at 12:02

## 2021-02-01 RX ADMIN — ACETAMINOPHEN 1000 MG: 500 TABLET ORAL at 07:02

## 2021-02-01 RX ADMIN — PROPOFOL 30 MG: 10 INJECTION, EMULSION INTRAVENOUS at 01:02

## 2021-02-01 RX ADMIN — SODIUM CHLORIDE: 0.9 INJECTION, SOLUTION INTRAVENOUS at 07:02

## 2021-02-01 RX ADMIN — MIDAZOLAM HYDROCHLORIDE 2 MG: 1 INJECTION, SOLUTION INTRAMUSCULAR; INTRAVENOUS at 11:02

## 2021-02-01 RX ADMIN — PROPOFOL 50 MG: 10 INJECTION, EMULSION INTRAVENOUS at 12:02

## 2021-02-01 RX ADMIN — ONDANSETRON 4 MG: 2 INJECTION, SOLUTION INTRAMUSCULAR; INTRAVENOUS at 01:02

## 2021-02-01 RX ADMIN — PROPOFOL 100 MG: 10 INJECTION, EMULSION INTRAVENOUS at 11:02

## 2021-02-05 ENCOUNTER — TELEPHONE (OUTPATIENT)
Dept: INTERNAL MEDICINE | Facility: CLINIC | Age: 76
End: 2021-02-05

## 2021-02-05 ENCOUNTER — TELEPHONE (OUTPATIENT)
Dept: UROLOGY | Facility: CLINIC | Age: 76
End: 2021-02-05

## 2021-02-17 ENCOUNTER — PATIENT MESSAGE (OUTPATIENT)
Dept: NEUROSURGERY | Facility: CLINIC | Age: 76
End: 2021-02-17

## 2021-02-17 ENCOUNTER — TELEPHONE (OUTPATIENT)
Dept: NEUROSURGERY | Facility: CLINIC | Age: 76
End: 2021-02-17

## 2021-02-17 ENCOUNTER — CLINICAL SUPPORT (OUTPATIENT)
Dept: NEUROSURGERY | Facility: CLINIC | Age: 76
End: 2021-02-17
Payer: MEDICARE

## 2021-03-17 ENCOUNTER — TELEPHONE (OUTPATIENT)
Dept: NEUROSURGERY | Facility: CLINIC | Age: 76
End: 2021-03-17

## 2021-03-18 ENCOUNTER — TELEPHONE (OUTPATIENT)
Dept: NEUROSURGERY | Facility: CLINIC | Age: 76
End: 2021-03-18

## 2021-03-18 ENCOUNTER — OFFICE VISIT (OUTPATIENT)
Dept: NEUROSURGERY | Facility: CLINIC | Age: 76
End: 2021-03-18
Payer: MEDICARE

## 2021-03-18 VITALS — DIASTOLIC BLOOD PRESSURE: 73 MMHG | SYSTOLIC BLOOD PRESSURE: 125 MMHG | HEART RATE: 70 BPM

## 2021-03-18 DIAGNOSIS — Z98.890 HX OF DECOMPRESSIVE LUMBAR LAMINECTOMY: Primary | ICD-10-CM

## 2021-03-18 PROCEDURE — 99999 PR PBB SHADOW E&M-EST. PATIENT-LVL III: CPT | Mod: PBBFAC,,, | Performed by: NEUROLOGICAL SURGERY

## 2021-03-18 PROCEDURE — 3288F FALL RISK ASSESSMENT DOCD: CPT | Mod: CPTII,S$GLB,, | Performed by: NEUROLOGICAL SURGERY

## 2021-03-18 PROCEDURE — 3288F PR FALLS RISK ASSESSMENT DOCUMENTED: ICD-10-PCS | Mod: CPTII,S$GLB,, | Performed by: NEUROLOGICAL SURGERY

## 2021-03-18 PROCEDURE — 1126F AMNT PAIN NOTED NONE PRSNT: CPT | Mod: S$GLB,,, | Performed by: NEUROLOGICAL SURGERY

## 2021-03-18 PROCEDURE — 99999 PR PBB SHADOW E&M-EST. PATIENT-LVL III: ICD-10-PCS | Mod: PBBFAC,,, | Performed by: NEUROLOGICAL SURGERY

## 2021-03-18 PROCEDURE — 1101F PT FALLS ASSESS-DOCD LE1/YR: CPT | Mod: CPTII,S$GLB,, | Performed by: NEUROLOGICAL SURGERY

## 2021-03-18 PROCEDURE — 99024 POSTOP FOLLOW-UP VISIT: CPT | Mod: S$GLB,,, | Performed by: NEUROLOGICAL SURGERY

## 2021-03-18 PROCEDURE — 1101F PR PT FALLS ASSESS DOC 0-1 FALLS W/OUT INJ PAST YR: ICD-10-PCS | Mod: CPTII,S$GLB,, | Performed by: NEUROLOGICAL SURGERY

## 2021-03-18 PROCEDURE — 1126F PR PAIN SEVERITY QUANTIFIED, NO PAIN PRESENT: ICD-10-PCS | Mod: S$GLB,,, | Performed by: NEUROLOGICAL SURGERY

## 2021-03-18 PROCEDURE — 99024 PR POST-OP FOLLOW-UP VISIT: ICD-10-PCS | Mod: S$GLB,,, | Performed by: NEUROLOGICAL SURGERY

## 2021-03-25 ENCOUNTER — PATIENT MESSAGE (OUTPATIENT)
Dept: UROLOGY | Facility: CLINIC | Age: 76
End: 2021-03-25

## 2021-03-25 ENCOUNTER — TELEPHONE (OUTPATIENT)
Dept: UROLOGY | Facility: CLINIC | Age: 76
End: 2021-03-25

## 2021-03-25 ENCOUNTER — HOSPITAL ENCOUNTER (OUTPATIENT)
Dept: RADIOLOGY | Facility: OTHER | Age: 76
Discharge: HOME | End: 2021-03-25
Attending: NURSE PRACTITIONER
Payer: MEDICARE

## 2021-03-25 DIAGNOSIS — N20.0 NEPHROLITHIASIS: Primary | ICD-10-CM

## 2021-03-25 DIAGNOSIS — R31.0 GROSS HEMATURIA: ICD-10-CM

## 2021-03-25 DIAGNOSIS — R31.29 MICROHEMATURIA: Primary | ICD-10-CM

## 2021-03-25 PROCEDURE — 74178 CT ABD&PLV WO CNTR FLWD CNTR: CPT | Mod: TC

## 2021-03-25 PROCEDURE — 25500020 PHARM REV CODE 255: Performed by: NURSE PRACTITIONER

## 2021-03-25 PROCEDURE — 74178 CT UROGRAM ABD PELVIS W WO: ICD-10-PCS | Mod: 26,,, | Performed by: RADIOLOGY

## 2021-03-25 PROCEDURE — 74178 CT ABD&PLV WO CNTR FLWD CNTR: CPT | Mod: 26,,, | Performed by: RADIOLOGY

## 2021-03-25 RX ADMIN — IOHEXOL 125 ML: 350 INJECTION, SOLUTION INTRAVENOUS at 10:03

## 2021-03-28 ENCOUNTER — PATIENT OUTREACH (OUTPATIENT)
Dept: ADMINISTRATIVE | Facility: OTHER | Age: 76
End: 2021-03-28

## 2021-03-29 ENCOUNTER — PROCEDURE VISIT (OUTPATIENT)
Dept: UROLOGY | Facility: CLINIC | Age: 76
End: 2021-03-29
Attending: UROLOGY
Payer: MEDICARE

## 2021-03-29 VITALS
BODY MASS INDEX: 24.99 KG/M2 | HEIGHT: 65 IN | WEIGHT: 150 LBS | SYSTOLIC BLOOD PRESSURE: 114 MMHG | DIASTOLIC BLOOD PRESSURE: 64 MMHG | HEART RATE: 70 BPM

## 2021-03-29 DIAGNOSIS — N20.0 NEPHROLITHIASIS: ICD-10-CM

## 2021-03-29 DIAGNOSIS — R31.0 GROSS HEMATURIA: Primary | ICD-10-CM

## 2021-03-29 DIAGNOSIS — N20.0 NEPHROLITHIASIS: Primary | ICD-10-CM

## 2021-03-29 PROCEDURE — 52000 CYSTOSCOPY: ICD-10-PCS | Mod: S$GLB,,, | Performed by: UROLOGY

## 2021-03-29 PROCEDURE — 52000 CYSTOURETHROSCOPY: CPT | Mod: S$GLB,,, | Performed by: UROLOGY

## 2021-03-29 RX ORDER — CEPHALEXIN 500 MG/1
500 CAPSULE ORAL
Status: COMPLETED | OUTPATIENT
Start: 2021-03-29 | End: 2021-03-29

## 2021-03-29 RX ORDER — LIDOCAINE HYDROCHLORIDE 20 MG/ML
JELLY TOPICAL
Status: COMPLETED | OUTPATIENT
Start: 2021-03-29 | End: 2021-03-29

## 2021-03-29 RX ADMIN — LIDOCAINE HYDROCHLORIDE 5 ML: 20 JELLY TOPICAL at 11:03

## 2021-03-29 RX ADMIN — CEPHALEXIN 500 MG: 500 CAPSULE ORAL at 11:03

## 2021-04-16 ENCOUNTER — HOSPITAL ENCOUNTER (OUTPATIENT)
Dept: PREADMISSION TESTING | Facility: OTHER | Age: 76
Discharge: HOME | End: 2021-04-16
Attending: UROLOGY
Payer: MEDICARE

## 2021-04-16 ENCOUNTER — ANESTHESIA EVENT (OUTPATIENT)
Dept: SURGERY | Facility: OTHER | Age: 76
End: 2021-04-16
Payer: MEDICARE

## 2021-04-16 VITALS
TEMPERATURE: 98 F | SYSTOLIC BLOOD PRESSURE: 134 MMHG | HEART RATE: 83 BPM | DIASTOLIC BLOOD PRESSURE: 64 MMHG | BODY MASS INDEX: 24.99 KG/M2 | OXYGEN SATURATION: 95 % | HEIGHT: 65 IN | WEIGHT: 150 LBS

## 2021-04-16 RX ORDER — ALBUTEROL SULFATE 2.5 MG/.5ML
2.5 SOLUTION RESPIRATORY (INHALATION)
Status: CANCELLED | OUTPATIENT
Start: 2021-04-16 | End: 2021-04-16

## 2021-04-16 RX ORDER — SODIUM CHLORIDE, SODIUM LACTATE, POTASSIUM CHLORIDE, CALCIUM CHLORIDE 600; 310; 30; 20 MG/100ML; MG/100ML; MG/100ML; MG/100ML
INJECTION, SOLUTION INTRAVENOUS CONTINUOUS
Status: CANCELLED | OUTPATIENT
Start: 2021-04-16

## 2021-04-16 RX ORDER — LIDOCAINE HYDROCHLORIDE 10 MG/ML
0.5 INJECTION, SOLUTION EPIDURAL; INFILTRATION; INTRACAUDAL; PERINEURAL ONCE
Status: CANCELLED | OUTPATIENT
Start: 2021-04-16 | End: 2021-04-16

## 2021-04-19 ENCOUNTER — TELEPHONE (OUTPATIENT)
Dept: UROLOGY | Facility: CLINIC | Age: 76
End: 2021-04-19

## 2021-04-20 ENCOUNTER — HOSPITAL ENCOUNTER (OUTPATIENT)
Facility: OTHER | Age: 76
Discharge: HOME | End: 2021-04-20
Attending: UROLOGY | Admitting: UROLOGY
Payer: MEDICARE

## 2021-04-20 ENCOUNTER — ANESTHESIA (OUTPATIENT)
Dept: SURGERY | Facility: OTHER | Age: 76
End: 2021-04-20
Payer: MEDICARE

## 2021-04-20 VITALS
DIASTOLIC BLOOD PRESSURE: 67 MMHG | WEIGHT: 150 LBS | SYSTOLIC BLOOD PRESSURE: 122 MMHG | HEART RATE: 80 BPM | OXYGEN SATURATION: 97 % | BODY MASS INDEX: 24.99 KG/M2 | HEIGHT: 65 IN | RESPIRATION RATE: 16 BRPM | TEMPERATURE: 98 F

## 2021-04-20 DIAGNOSIS — N20.0 NEPHROLITHIASIS: Primary | ICD-10-CM

## 2021-04-20 DIAGNOSIS — R31.0 GROSS HEMATURIA: ICD-10-CM

## 2021-04-20 PROCEDURE — 71000016 HC POSTOP RECOV ADDL HR: Performed by: UROLOGY

## 2021-04-20 PROCEDURE — 71000015 HC POSTOP RECOV 1ST HR: Performed by: UROLOGY

## 2021-04-20 PROCEDURE — 63600175 PHARM REV CODE 636 W HCPCS: Performed by: NURSE ANESTHETIST, CERTIFIED REGISTERED

## 2021-04-20 PROCEDURE — 50590 FRAGMENTING OF KIDNEY STONE: CPT | Mod: LT,,, | Performed by: UROLOGY

## 2021-04-20 PROCEDURE — 51702 INSERT TEMP BLADDER CATH: CPT | Performed by: UROLOGY

## 2021-04-20 PROCEDURE — 71000039 HC RECOVERY, EACH ADD'L HOUR: Performed by: UROLOGY

## 2021-04-20 PROCEDURE — 50590 PR FRAGMENT KIDNEY STONE/ ESWL: ICD-10-PCS | Mod: LT,,, | Performed by: UROLOGY

## 2021-04-20 PROCEDURE — 25000242 PHARM REV CODE 250 ALT 637 W/ HCPCS: Performed by: ANESTHESIOLOGY

## 2021-04-20 PROCEDURE — 36000705 HC OR TIME LEV I EA ADD 15 MIN: Performed by: UROLOGY

## 2021-04-20 PROCEDURE — 51798 US URINE CAPACITY MEASURE: CPT | Performed by: UROLOGY

## 2021-04-20 PROCEDURE — 63600175 PHARM REV CODE 636 W HCPCS

## 2021-04-20 PROCEDURE — 63600175 PHARM REV CODE 636 W HCPCS: Performed by: ANESTHESIOLOGY

## 2021-04-20 PROCEDURE — 37000008 HC ANESTHESIA 1ST 15 MINUTES: Performed by: UROLOGY

## 2021-04-20 PROCEDURE — 37000009 HC ANESTHESIA EA ADD 15 MINS: Performed by: UROLOGY

## 2021-04-20 PROCEDURE — 36000704 HC OR TIME LEV I 1ST 15 MIN: Performed by: UROLOGY

## 2021-04-20 PROCEDURE — 71000033 HC RECOVERY, INTIAL HOUR: Performed by: UROLOGY

## 2021-04-20 PROCEDURE — 25000003 PHARM REV CODE 250: Performed by: NURSE ANESTHETIST, CERTIFIED REGISTERED

## 2021-04-20 PROCEDURE — 94640 AIRWAY INHALATION TREATMENT: CPT

## 2021-04-20 RX ORDER — FENTANYL CITRATE 50 UG/ML
INJECTION, SOLUTION INTRAMUSCULAR; INTRAVENOUS
Status: DISCONTINUED | OUTPATIENT
Start: 2021-04-20 | End: 2021-04-20

## 2021-04-20 RX ORDER — HYDRALAZINE HYDROCHLORIDE 20 MG/ML
10 INJECTION INTRAMUSCULAR; INTRAVENOUS ONCE
Status: COMPLETED | OUTPATIENT
Start: 2021-04-20 | End: 2021-04-20

## 2021-04-20 RX ORDER — HYDRALAZINE HYDROCHLORIDE 20 MG/ML
INJECTION INTRAMUSCULAR; INTRAVENOUS
Status: COMPLETED
Start: 2021-04-20 | End: 2021-04-20

## 2021-04-20 RX ORDER — LIDOCAINE HCL/PF 100 MG/5ML
SYRINGE (ML) INTRAVENOUS
Status: DISCONTINUED | OUTPATIENT
Start: 2021-04-20 | End: 2021-04-20

## 2021-04-20 RX ORDER — ONDANSETRON 2 MG/ML
4 INJECTION INTRAMUSCULAR; INTRAVENOUS DAILY PRN
Status: DISCONTINUED | OUTPATIENT
Start: 2021-04-20 | End: 2021-04-20 | Stop reason: HOSPADM

## 2021-04-20 RX ORDER — EPHEDRINE SULFATE 50 MG/ML
INJECTION, SOLUTION INTRAVENOUS
Status: DISCONTINUED | OUTPATIENT
Start: 2021-04-20 | End: 2021-04-20

## 2021-04-20 RX ORDER — PROPOFOL 10 MG/ML
INJECTION, EMULSION INTRAVENOUS
Status: DISCONTINUED | OUTPATIENT
Start: 2021-04-20 | End: 2021-04-20

## 2021-04-20 RX ORDER — ONDANSETRON 2 MG/ML
INJECTION INTRAMUSCULAR; INTRAVENOUS
Status: DISCONTINUED | OUTPATIENT
Start: 2021-04-20 | End: 2021-04-20

## 2021-04-20 RX ORDER — HYDROMORPHONE HYDROCHLORIDE 2 MG/ML
0.4 INJECTION, SOLUTION INTRAMUSCULAR; INTRAVENOUS; SUBCUTANEOUS EVERY 5 MIN PRN
Status: DISCONTINUED | OUTPATIENT
Start: 2021-04-20 | End: 2021-04-20 | Stop reason: HOSPADM

## 2021-04-20 RX ORDER — LIDOCAINE HYDROCHLORIDE 10 MG/ML
0.5 INJECTION, SOLUTION EPIDURAL; INFILTRATION; INTRACAUDAL; PERINEURAL ONCE
Status: DISCONTINUED | OUTPATIENT
Start: 2021-04-20 | End: 2021-04-20 | Stop reason: HOSPADM

## 2021-04-20 RX ORDER — SODIUM CHLORIDE, SODIUM LACTATE, POTASSIUM CHLORIDE, CALCIUM CHLORIDE 600; 310; 30; 20 MG/100ML; MG/100ML; MG/100ML; MG/100ML
INJECTION, SOLUTION INTRAVENOUS CONTINUOUS
Status: DISCONTINUED | OUTPATIENT
Start: 2021-04-20 | End: 2021-04-20 | Stop reason: HOSPADM

## 2021-04-20 RX ORDER — ALBUTEROL SULFATE 2.5 MG/.5ML
2.5 SOLUTION RESPIRATORY (INHALATION)
Status: COMPLETED | OUTPATIENT
Start: 2021-04-20 | End: 2021-04-20

## 2021-04-20 RX ORDER — DIPHENHYDRAMINE HYDROCHLORIDE 50 MG/ML
12.5 INJECTION INTRAMUSCULAR; INTRAVENOUS EVERY 30 MIN PRN
Status: DISCONTINUED | OUTPATIENT
Start: 2021-04-20 | End: 2021-04-20 | Stop reason: HOSPADM

## 2021-04-20 RX ORDER — SODIUM CHLORIDE 0.9 % (FLUSH) 0.9 %
3 SYRINGE (ML) INJECTION
Status: DISCONTINUED | OUTPATIENT
Start: 2021-04-20 | End: 2021-04-20 | Stop reason: HOSPADM

## 2021-04-20 RX ORDER — OXYCODONE HYDROCHLORIDE 5 MG/1
5 TABLET ORAL
Status: DISCONTINUED | OUTPATIENT
Start: 2021-04-20 | End: 2021-04-20 | Stop reason: HOSPADM

## 2021-04-20 RX ADMIN — PROPOFOL 30 MG: 10 INJECTION, EMULSION INTRAVENOUS at 10:04

## 2021-04-20 RX ADMIN — EPHEDRINE SULFATE 15 MG: 50 INJECTION INTRAVENOUS at 10:04

## 2021-04-20 RX ADMIN — HYDROMORPHONE HYDROCHLORIDE 0.4 MG: 2 INJECTION INTRAMUSCULAR; INTRAVENOUS; SUBCUTANEOUS at 11:04

## 2021-04-20 RX ADMIN — HYDRALAZINE HYDROCHLORIDE 10 MG: 20 INJECTION INTRAMUSCULAR; INTRAVENOUS at 11:04

## 2021-04-20 RX ADMIN — ONDANSETRON HYDROCHLORIDE 4 MG: 2 INJECTION INTRAMUSCULAR; INTRAVENOUS at 10:04

## 2021-04-20 RX ADMIN — SODIUM CHLORIDE, SODIUM LACTATE, POTASSIUM CHLORIDE, AND CALCIUM CHLORIDE: 600; 310; 30; 20 INJECTION, SOLUTION INTRAVENOUS at 09:04

## 2021-04-20 RX ADMIN — PROPOFOL 40 MG: 10 INJECTION, EMULSION INTRAVENOUS at 10:04

## 2021-04-20 RX ADMIN — ALBUTEROL SULFATE 2.5 MG: 2.5 SOLUTION RESPIRATORY (INHALATION) at 08:04

## 2021-04-20 RX ADMIN — PROPOFOL 200 MG: 10 INJECTION, EMULSION INTRAVENOUS at 10:04

## 2021-04-20 RX ADMIN — EPHEDRINE SULFATE 10 MG: 50 INJECTION INTRAVENOUS at 10:04

## 2021-04-20 RX ADMIN — FENTANYL CITRATE 50 MCG: 50 INJECTION, SOLUTION INTRAMUSCULAR; INTRAVENOUS at 10:04

## 2021-04-20 RX ADMIN — CARBOXYMETHYLCELLULOSE SODIUM 2 DROP: 2.5 SOLUTION/ DROPS OPHTHALMIC at 10:04

## 2021-04-20 RX ADMIN — LIDOCAINE HYDROCHLORIDE 50 MG: 20 INJECTION, SOLUTION INTRAVENOUS at 10:04

## 2021-04-21 ENCOUNTER — TELEPHONE (OUTPATIENT)
Dept: UROLOGY | Facility: CLINIC | Age: 76
End: 2021-04-21

## 2021-04-21 ENCOUNTER — OFFICE VISIT (OUTPATIENT)
Dept: UROLOGY | Facility: CLINIC | Age: 76
End: 2021-04-21
Payer: MEDICARE

## 2021-04-21 VITALS
BODY MASS INDEX: 24.99 KG/M2 | DIASTOLIC BLOOD PRESSURE: 71 MMHG | HEART RATE: 92 BPM | SYSTOLIC BLOOD PRESSURE: 134 MMHG | HEIGHT: 65 IN | WEIGHT: 150 LBS

## 2021-04-21 DIAGNOSIS — N40.1 BPH WITH OBSTRUCTION/LOWER URINARY TRACT SYMPTOMS: Primary | ICD-10-CM

## 2021-04-21 DIAGNOSIS — N13.8 BPH WITH OBSTRUCTION/LOWER URINARY TRACT SYMPTOMS: Primary | ICD-10-CM

## 2021-04-21 DIAGNOSIS — R33.9 URINARY RETENTION: ICD-10-CM

## 2021-04-21 PROCEDURE — 1125F PR PAIN SEVERITY QUANTIFIED, PAIN PRESENT: ICD-10-PCS | Mod: S$GLB,,, | Performed by: NURSE PRACTITIONER

## 2021-04-21 PROCEDURE — 99499 NO LOS: ICD-10-PCS | Mod: S$GLB,,, | Performed by: NURSE PRACTITIONER

## 2021-04-21 PROCEDURE — 1101F PR PT FALLS ASSESS DOC 0-1 FALLS W/OUT INJ PAST YR: ICD-10-PCS | Mod: CPTII,S$GLB,, | Performed by: NURSE PRACTITIONER

## 2021-04-21 PROCEDURE — 1159F MED LIST DOCD IN RCRD: CPT | Mod: S$GLB,,, | Performed by: NURSE PRACTITIONER

## 2021-04-21 PROCEDURE — 1101F PT FALLS ASSESS-DOCD LE1/YR: CPT | Mod: CPTII,S$GLB,, | Performed by: NURSE PRACTITIONER

## 2021-04-21 PROCEDURE — 3288F FALL RISK ASSESSMENT DOCD: CPT | Mod: CPTII,S$GLB,, | Performed by: NURSE PRACTITIONER

## 2021-04-21 PROCEDURE — 1125F AMNT PAIN NOTED PAIN PRSNT: CPT | Mod: S$GLB,,, | Performed by: NURSE PRACTITIONER

## 2021-04-21 PROCEDURE — 51700 IRRIGATION OF BLADDER: CPT | Mod: 58,S$GLB,, | Performed by: NURSE PRACTITIONER

## 2021-04-21 PROCEDURE — 51700 PR IRRIGATION, BLADDER: ICD-10-PCS | Mod: 58,S$GLB,, | Performed by: NURSE PRACTITIONER

## 2021-04-21 PROCEDURE — 3288F PR FALLS RISK ASSESSMENT DOCUMENTED: ICD-10-PCS | Mod: CPTII,S$GLB,, | Performed by: NURSE PRACTITIONER

## 2021-04-21 PROCEDURE — 1159F PR MEDICATION LIST DOCUMENTED IN MEDICAL RECORD: ICD-10-PCS | Mod: S$GLB,,, | Performed by: NURSE PRACTITIONER

## 2021-04-21 PROCEDURE — 99499 UNLISTED E&M SERVICE: CPT | Mod: S$GLB,,, | Performed by: NURSE PRACTITIONER

## 2021-04-21 RX ORDER — TAMSULOSIN HYDROCHLORIDE 0.4 MG/1
0.4 CAPSULE ORAL DAILY
Qty: 30 CAPSULE | Refills: 11 | Status: SHIPPED | OUTPATIENT
Start: 2021-04-21 | End: 2022-01-28

## 2021-05-19 ENCOUNTER — TELEPHONE (OUTPATIENT)
Dept: UROLOGY | Facility: CLINIC | Age: 76
End: 2021-05-19

## 2021-05-31 ENCOUNTER — OFFICE VISIT (OUTPATIENT)
Dept: UROLOGY | Facility: CLINIC | Age: 76
End: 2021-05-31
Attending: UROLOGY
Payer: MEDICARE

## 2021-05-31 VITALS
HEART RATE: 72 BPM | HEIGHT: 65 IN | DIASTOLIC BLOOD PRESSURE: 80 MMHG | SYSTOLIC BLOOD PRESSURE: 154 MMHG | BODY MASS INDEX: 24.99 KG/M2 | WEIGHT: 150 LBS

## 2021-05-31 DIAGNOSIS — N20.0 NEPHROLITHIASIS: Primary | ICD-10-CM

## 2021-05-31 PROCEDURE — 1126F PR PAIN SEVERITY QUANTIFIED, NO PAIN PRESENT: ICD-10-PCS | Mod: S$GLB,,, | Performed by: UROLOGY

## 2021-05-31 PROCEDURE — 1101F PT FALLS ASSESS-DOCD LE1/YR: CPT | Mod: CPTII,S$GLB,, | Performed by: UROLOGY

## 2021-05-31 PROCEDURE — 99024 PR POST-OP FOLLOW-UP VISIT: ICD-10-PCS | Mod: S$GLB,,, | Performed by: UROLOGY

## 2021-05-31 PROCEDURE — 3288F FALL RISK ASSESSMENT DOCD: CPT | Mod: CPTII,S$GLB,, | Performed by: UROLOGY

## 2021-05-31 PROCEDURE — 3288F PR FALLS RISK ASSESSMENT DOCUMENTED: ICD-10-PCS | Mod: CPTII,S$GLB,, | Performed by: UROLOGY

## 2021-05-31 PROCEDURE — 1126F AMNT PAIN NOTED NONE PRSNT: CPT | Mod: S$GLB,,, | Performed by: UROLOGY

## 2021-05-31 PROCEDURE — 99024 POSTOP FOLLOW-UP VISIT: CPT | Mod: S$GLB,,, | Performed by: UROLOGY

## 2021-05-31 PROCEDURE — 1101F PR PT FALLS ASSESS DOC 0-1 FALLS W/OUT INJ PAST YR: ICD-10-PCS | Mod: CPTII,S$GLB,, | Performed by: UROLOGY

## 2021-05-31 RX ORDER — TAMSULOSIN HYDROCHLORIDE 0.4 MG/1
0.4 CAPSULE ORAL DAILY
Qty: 30 CAPSULE | Refills: 11 | Status: SHIPPED | OUTPATIENT
Start: 2021-05-31 | End: 2022-01-28

## 2021-06-17 RX ORDER — METOPROLOL SUCCINATE 50 MG/1
50 TABLET, EXTENDED RELEASE ORAL DAILY
Qty: 90 TABLET | Refills: 3 | Status: SHIPPED | OUTPATIENT
Start: 2021-06-17 | End: 2022-06-08

## 2021-08-07 ENCOUNTER — PATIENT OUTREACH (OUTPATIENT)
Dept: ADMINISTRATIVE | Facility: OTHER | Age: 76
End: 2021-08-07

## 2021-08-09 ENCOUNTER — PATIENT MESSAGE (OUTPATIENT)
Dept: INTERNAL MEDICINE | Facility: CLINIC | Age: 76
End: 2021-08-09

## 2021-08-13 ENCOUNTER — IMMUNIZATION (OUTPATIENT)
Dept: PRIMARY CARE CLINIC | Facility: CLINIC | Age: 76
End: 2021-08-13
Payer: MEDICARE

## 2021-08-13 DIAGNOSIS — Z23 NEED FOR VACCINATION: Primary | ICD-10-CM

## 2021-09-03 ENCOUNTER — PATIENT MESSAGE (OUTPATIENT)
Dept: NEUROSURGERY | Facility: CLINIC | Age: 76
End: 2021-09-03

## 2021-09-04 DIAGNOSIS — G20.A1 PARKINSON DISEASE: ICD-10-CM

## 2021-09-04 DIAGNOSIS — M48.061 STENOSIS OF LATERAL RECESS OF LUMBAR SPINE: ICD-10-CM

## 2021-09-04 RX ORDER — GABAPENTIN 400 MG/1
800 CAPSULE ORAL 3 TIMES DAILY
Qty: 180 CAPSULE | Refills: 2 | Status: CANCELLED | OUTPATIENT
Start: 2021-09-04

## 2021-09-10 ENCOUNTER — IMMUNIZATION (OUTPATIENT)
Dept: PRIMARY CARE CLINIC | Facility: CLINIC | Age: 76
End: 2021-09-10
Payer: MEDICARE

## 2021-09-10 DIAGNOSIS — Z23 NEED FOR VACCINATION: Primary | ICD-10-CM

## 2021-09-13 ENCOUNTER — TELEPHONE (OUTPATIENT)
Dept: NEUROLOGY | Facility: CLINIC | Age: 76
End: 2021-09-13
Payer: MEDICARE

## 2021-09-13 DIAGNOSIS — M48.061 STENOSIS OF LATERAL RECESS OF LUMBAR SPINE: ICD-10-CM

## 2021-09-13 DIAGNOSIS — G20.A1 PARKINSON DISEASE: ICD-10-CM

## 2021-09-16 ENCOUNTER — OFFICE VISIT (OUTPATIENT)
Dept: PAIN MEDICINE | Facility: CLINIC | Age: 76
End: 2021-09-16
Payer: MEDICARE

## 2021-09-16 VITALS
WEIGHT: 148 LBS | RESPIRATION RATE: 18 BRPM | DIASTOLIC BLOOD PRESSURE: 92 MMHG | TEMPERATURE: 98 F | HEIGHT: 65 IN | SYSTOLIC BLOOD PRESSURE: 136 MMHG | HEART RATE: 78 BPM | BODY MASS INDEX: 24.66 KG/M2

## 2021-09-16 DIAGNOSIS — G20.A1 PARKINSON DISEASE: ICD-10-CM

## 2021-09-16 DIAGNOSIS — M48.061 STENOSIS OF LATERAL RECESS OF LUMBAR SPINE: ICD-10-CM

## 2021-09-16 PROCEDURE — 99999 PR PBB SHADOW E&M-EST. PATIENT-LVL IV: CPT | Mod: PBBFAC,,, | Performed by: NURSE PRACTITIONER

## 2021-09-16 PROCEDURE — 3080F PR MOST RECENT DIASTOLIC BLOOD PRESSURE >= 90 MM HG: ICD-10-PCS | Mod: CPTII,S$GLB,, | Performed by: NURSE PRACTITIONER

## 2021-09-16 PROCEDURE — 99499 RISK ADDL DX/OHS AUDIT: ICD-10-PCS | Mod: S$GLB,,, | Performed by: NURSE PRACTITIONER

## 2021-09-16 PROCEDURE — 99213 PR OFFICE/OUTPT VISIT, EST, LEVL III, 20-29 MIN: ICD-10-PCS | Mod: S$GLB,,, | Performed by: NURSE PRACTITIONER

## 2021-09-16 PROCEDURE — 1125F AMNT PAIN NOTED PAIN PRSNT: CPT | Mod: CPTII,S$GLB,, | Performed by: NURSE PRACTITIONER

## 2021-09-16 PROCEDURE — 1125F PR PAIN SEVERITY QUANTIFIED, PAIN PRESENT: ICD-10-PCS | Mod: CPTII,S$GLB,, | Performed by: NURSE PRACTITIONER

## 2021-09-16 PROCEDURE — 99213 OFFICE O/P EST LOW 20 MIN: CPT | Mod: S$GLB,,, | Performed by: NURSE PRACTITIONER

## 2021-09-16 PROCEDURE — 1159F MED LIST DOCD IN RCRD: CPT | Mod: CPTII,S$GLB,, | Performed by: NURSE PRACTITIONER

## 2021-09-16 PROCEDURE — 1101F PT FALLS ASSESS-DOCD LE1/YR: CPT | Mod: CPTII,S$GLB,, | Performed by: NURSE PRACTITIONER

## 2021-09-16 PROCEDURE — 1101F PR PT FALLS ASSESS DOC 0-1 FALLS W/OUT INJ PAST YR: ICD-10-PCS | Mod: CPTII,S$GLB,, | Performed by: NURSE PRACTITIONER

## 2021-09-16 PROCEDURE — 3288F PR FALLS RISK ASSESSMENT DOCUMENTED: ICD-10-PCS | Mod: CPTII,S$GLB,, | Performed by: NURSE PRACTITIONER

## 2021-09-16 PROCEDURE — 99499 UNLISTED E&M SERVICE: CPT | Mod: S$GLB,,, | Performed by: NURSE PRACTITIONER

## 2021-09-16 PROCEDURE — 3075F PR MOST RECENT SYSTOLIC BLOOD PRESS GE 130-139MM HG: ICD-10-PCS | Mod: CPTII,S$GLB,, | Performed by: NURSE PRACTITIONER

## 2021-09-16 PROCEDURE — 99999 PR PBB SHADOW E&M-EST. PATIENT-LVL IV: ICD-10-PCS | Mod: PBBFAC,,, | Performed by: NURSE PRACTITIONER

## 2021-09-16 PROCEDURE — 3080F DIAST BP >= 90 MM HG: CPT | Mod: CPTII,S$GLB,, | Performed by: NURSE PRACTITIONER

## 2021-09-16 PROCEDURE — 1159F PR MEDICATION LIST DOCUMENTED IN MEDICAL RECORD: ICD-10-PCS | Mod: CPTII,S$GLB,, | Performed by: NURSE PRACTITIONER

## 2021-09-16 PROCEDURE — 3288F FALL RISK ASSESSMENT DOCD: CPT | Mod: CPTII,S$GLB,, | Performed by: NURSE PRACTITIONER

## 2021-09-16 PROCEDURE — 3075F SYST BP GE 130 - 139MM HG: CPT | Mod: CPTII,S$GLB,, | Performed by: NURSE PRACTITIONER

## 2021-09-16 RX ORDER — GABAPENTIN 800 MG/1
800 TABLET ORAL 3 TIMES DAILY
Qty: 90 TABLET | Refills: 5 | Status: SHIPPED | OUTPATIENT
Start: 2021-09-16 | End: 2021-12-15

## 2021-09-22 ENCOUNTER — TELEPHONE (OUTPATIENT)
Dept: NEUROLOGY | Facility: CLINIC | Age: 76
End: 2021-09-22
Payer: MEDICARE

## 2021-10-07 ENCOUNTER — PATIENT OUTREACH (OUTPATIENT)
Dept: ADMINISTRATIVE | Facility: OTHER | Age: 76
End: 2021-10-07

## 2021-10-08 ENCOUNTER — OFFICE VISIT (OUTPATIENT)
Dept: UROLOGY | Facility: CLINIC | Age: 76
End: 2021-10-08
Payer: MEDICARE

## 2021-10-08 VITALS — SYSTOLIC BLOOD PRESSURE: 116 MMHG | HEIGHT: 65 IN | DIASTOLIC BLOOD PRESSURE: 64 MMHG | BODY MASS INDEX: 24.63 KG/M2

## 2021-10-08 DIAGNOSIS — N20.0 NEPHROLITHIASIS: Primary | ICD-10-CM

## 2021-10-08 PROCEDURE — 1159F MED LIST DOCD IN RCRD: CPT | Mod: CPTII,S$GLB,, | Performed by: UROLOGY

## 2021-10-08 PROCEDURE — 99999 PR PBB SHADOW E&M-EST. PATIENT-LVL III: CPT | Mod: PBBFAC,,, | Performed by: UROLOGY

## 2021-10-08 PROCEDURE — 1159F PR MEDICATION LIST DOCUMENTED IN MEDICAL RECORD: ICD-10-PCS | Mod: CPTII,S$GLB,, | Performed by: UROLOGY

## 2021-10-08 PROCEDURE — 99999 PR PBB SHADOW E&M-EST. PATIENT-LVL III: ICD-10-PCS | Mod: PBBFAC,,, | Performed by: UROLOGY

## 2021-10-08 PROCEDURE — 1101F PT FALLS ASSESS-DOCD LE1/YR: CPT | Mod: CPTII,S$GLB,, | Performed by: UROLOGY

## 2021-10-08 PROCEDURE — 3074F SYST BP LT 130 MM HG: CPT | Mod: CPTII,S$GLB,, | Performed by: UROLOGY

## 2021-10-08 PROCEDURE — 3078F PR MOST RECENT DIASTOLIC BLOOD PRESSURE < 80 MM HG: ICD-10-PCS | Mod: CPTII,S$GLB,, | Performed by: UROLOGY

## 2021-10-08 PROCEDURE — 1160F PR REVIEW ALL MEDS BY PRESCRIBER/CLIN PHARMACIST DOCUMENTED: ICD-10-PCS | Mod: CPTII,S$GLB,, | Performed by: UROLOGY

## 2021-10-08 PROCEDURE — 99214 OFFICE O/P EST MOD 30 MIN: CPT | Mod: S$GLB,,, | Performed by: UROLOGY

## 2021-10-08 PROCEDURE — 3288F FALL RISK ASSESSMENT DOCD: CPT | Mod: CPTII,S$GLB,, | Performed by: UROLOGY

## 2021-10-08 PROCEDURE — 3074F PR MOST RECENT SYSTOLIC BLOOD PRESSURE < 130 MM HG: ICD-10-PCS | Mod: CPTII,S$GLB,, | Performed by: UROLOGY

## 2021-10-08 PROCEDURE — 99214 PR OFFICE/OUTPT VISIT, EST, LEVL IV, 30-39 MIN: ICD-10-PCS | Mod: S$GLB,,, | Performed by: UROLOGY

## 2021-10-08 PROCEDURE — 1101F PR PT FALLS ASSESS DOC 0-1 FALLS W/OUT INJ PAST YR: ICD-10-PCS | Mod: CPTII,S$GLB,, | Performed by: UROLOGY

## 2021-10-08 PROCEDURE — 1160F RVW MEDS BY RX/DR IN RCRD: CPT | Mod: CPTII,S$GLB,, | Performed by: UROLOGY

## 2021-10-08 PROCEDURE — 1126F AMNT PAIN NOTED NONE PRSNT: CPT | Mod: CPTII,S$GLB,, | Performed by: UROLOGY

## 2021-10-08 PROCEDURE — 3078F DIAST BP <80 MM HG: CPT | Mod: CPTII,S$GLB,, | Performed by: UROLOGY

## 2021-10-08 PROCEDURE — 1126F PR PAIN SEVERITY QUANTIFIED, NO PAIN PRESENT: ICD-10-PCS | Mod: CPTII,S$GLB,, | Performed by: UROLOGY

## 2021-10-08 PROCEDURE — 3288F PR FALLS RISK ASSESSMENT DOCUMENTED: ICD-10-PCS | Mod: CPTII,S$GLB,, | Performed by: UROLOGY

## 2021-11-12 ENCOUNTER — OFFICE VISIT (OUTPATIENT)
Dept: PULMONOLOGY | Facility: CLINIC | Age: 76
End: 2021-11-12
Payer: MEDICARE

## 2021-11-12 VITALS
HEIGHT: 65 IN | OXYGEN SATURATION: 98 % | BODY MASS INDEX: 24.36 KG/M2 | HEART RATE: 81 BPM | DIASTOLIC BLOOD PRESSURE: 63 MMHG | SYSTOLIC BLOOD PRESSURE: 109 MMHG | WEIGHT: 146.19 LBS

## 2021-11-12 DIAGNOSIS — R91.8 LUNG NODULES: ICD-10-CM

## 2021-11-12 DIAGNOSIS — J44.9 CHRONIC OBSTRUCTIVE PULMONARY DISEASE, UNSPECIFIED COPD TYPE: Primary | ICD-10-CM

## 2021-11-12 DIAGNOSIS — G20.A1 PARKINSON'S DISEASE: ICD-10-CM

## 2021-11-12 PROCEDURE — 3288F PR FALLS RISK ASSESSMENT DOCUMENTED: ICD-10-PCS | Mod: CPTII,S$GLB,, | Performed by: NURSE PRACTITIONER

## 2021-11-12 PROCEDURE — 99999 PR PBB SHADOW E&M-EST. PATIENT-LVL IV: CPT | Mod: PBBFAC,,, | Performed by: NURSE PRACTITIONER

## 2021-11-12 PROCEDURE — 1159F MED LIST DOCD IN RCRD: CPT | Mod: CPTII,S$GLB,, | Performed by: NURSE PRACTITIONER

## 2021-11-12 PROCEDURE — 1100F PR PT FALLS ASSESS DOC 2+ FALLS/FALL W/INJURY/YR: ICD-10-PCS | Mod: CPTII,S$GLB,, | Performed by: NURSE PRACTITIONER

## 2021-11-12 PROCEDURE — 99214 OFFICE O/P EST MOD 30 MIN: CPT | Mod: S$GLB,,, | Performed by: NURSE PRACTITIONER

## 2021-11-12 PROCEDURE — 1160F RVW MEDS BY RX/DR IN RCRD: CPT | Mod: CPTII,S$GLB,, | Performed by: NURSE PRACTITIONER

## 2021-11-12 PROCEDURE — 1160F PR REVIEW ALL MEDS BY PRESCRIBER/CLIN PHARMACIST DOCUMENTED: ICD-10-PCS | Mod: CPTII,S$GLB,, | Performed by: NURSE PRACTITIONER

## 2021-11-12 PROCEDURE — 3078F PR MOST RECENT DIASTOLIC BLOOD PRESSURE < 80 MM HG: ICD-10-PCS | Mod: CPTII,S$GLB,, | Performed by: NURSE PRACTITIONER

## 2021-11-12 PROCEDURE — 1159F PR MEDICATION LIST DOCUMENTED IN MEDICAL RECORD: ICD-10-PCS | Mod: CPTII,S$GLB,, | Performed by: NURSE PRACTITIONER

## 2021-11-12 PROCEDURE — 3074F PR MOST RECENT SYSTOLIC BLOOD PRESSURE < 130 MM HG: ICD-10-PCS | Mod: CPTII,S$GLB,, | Performed by: NURSE PRACTITIONER

## 2021-11-12 PROCEDURE — 1125F PR PAIN SEVERITY QUANTIFIED, PAIN PRESENT: ICD-10-PCS | Mod: CPTII,S$GLB,, | Performed by: NURSE PRACTITIONER

## 2021-11-12 PROCEDURE — 99999 PR PBB SHADOW E&M-EST. PATIENT-LVL IV: ICD-10-PCS | Mod: PBBFAC,,, | Performed by: NURSE PRACTITIONER

## 2021-11-12 PROCEDURE — 99214 PR OFFICE/OUTPT VISIT, EST, LEVL IV, 30-39 MIN: ICD-10-PCS | Mod: S$GLB,,, | Performed by: NURSE PRACTITIONER

## 2021-11-12 PROCEDURE — 3078F DIAST BP <80 MM HG: CPT | Mod: CPTII,S$GLB,, | Performed by: NURSE PRACTITIONER

## 2021-11-12 PROCEDURE — 3074F SYST BP LT 130 MM HG: CPT | Mod: CPTII,S$GLB,, | Performed by: NURSE PRACTITIONER

## 2021-11-12 PROCEDURE — 3288F FALL RISK ASSESSMENT DOCD: CPT | Mod: CPTII,S$GLB,, | Performed by: NURSE PRACTITIONER

## 2021-11-12 PROCEDURE — 1100F PTFALLS ASSESS-DOCD GE2>/YR: CPT | Mod: CPTII,S$GLB,, | Performed by: NURSE PRACTITIONER

## 2021-11-12 PROCEDURE — 1125F AMNT PAIN NOTED PAIN PRSNT: CPT | Mod: CPTII,S$GLB,, | Performed by: NURSE PRACTITIONER

## 2021-11-12 RX ORDER — ALBUTEROL SULFATE 90 UG/1
2 AEROSOL, METERED RESPIRATORY (INHALATION) EVERY 6 HOURS PRN
Qty: 18 G | Refills: 6 | Status: SHIPPED | OUTPATIENT
Start: 2021-11-12 | End: 2022-10-13 | Stop reason: SDUPTHER

## 2021-11-12 RX ORDER — BUDESONIDE, GLYCOPYRROLATE, AND FORMOTEROL FUMARATE 160; 9; 4.8 UG/1; UG/1; UG/1
2 AEROSOL, METERED RESPIRATORY (INHALATION) 2 TIMES DAILY
Qty: 10.7 G | Refills: 11 | Status: SHIPPED | OUTPATIENT
Start: 2021-11-12 | End: 2021-12-30

## 2021-11-12 RX ORDER — MOMETASONE FUROATE AND FORMOTEROL FUMARATE DIHYDRATE 200; 5 UG/1; UG/1
2 AEROSOL RESPIRATORY (INHALATION) 2 TIMES DAILY
COMMUNITY
End: 2021-11-12 | Stop reason: ALTCHOICE

## 2021-12-08 ENCOUNTER — PATIENT MESSAGE (OUTPATIENT)
Dept: PULMONOLOGY | Facility: CLINIC | Age: 76
End: 2021-12-08
Payer: MEDICARE

## 2021-12-30 ENCOUNTER — PATIENT MESSAGE (OUTPATIENT)
Dept: PULMONOLOGY | Facility: CLINIC | Age: 76
End: 2021-12-30
Payer: MEDICARE

## 2021-12-30 DIAGNOSIS — R06.09 DYSPNEA ON EXERTION: Primary | ICD-10-CM

## 2021-12-30 DIAGNOSIS — J43.2 CENTRILOBULAR EMPHYSEMA: ICD-10-CM

## 2021-12-30 RX ORDER — MOMETASONE FUROATE AND FORMOTEROL FUMARATE DIHYDRATE 200; 5 UG/1; UG/1
2 AEROSOL RESPIRATORY (INHALATION) 2 TIMES DAILY
Qty: 13 G | Refills: 5 | Status: SHIPPED | OUTPATIENT
Start: 2021-12-30 | End: 2022-07-07

## 2022-01-07 ENCOUNTER — PATIENT MESSAGE (OUTPATIENT)
Dept: INTERNAL MEDICINE | Facility: CLINIC | Age: 77
End: 2022-01-07
Payer: MEDICARE

## 2022-01-10 ENCOUNTER — PATIENT OUTREACH (OUTPATIENT)
Dept: ADMINISTRATIVE | Facility: OTHER | Age: 77
End: 2022-01-10
Payer: MEDICARE

## 2022-01-11 NOTE — PROGRESS NOTES
LINKS immunization registry updated  Health Maintenance updated  Chart reviewed for overdue Proactive [facility] ([facility]) health maintenance testing (CRS, Breast Ca, Diabetic Eye Exam)   Orders entered:N/A

## 2022-01-12 ENCOUNTER — TELEPHONE (OUTPATIENT)
Dept: INTERNAL MEDICINE | Facility: CLINIC | Age: 77
End: 2022-01-12
Payer: MEDICARE

## 2022-01-12 ENCOUNTER — PATIENT MESSAGE (OUTPATIENT)
Dept: UROLOGY | Facility: CLINIC | Age: 77
End: 2022-01-12
Payer: MEDICARE

## 2022-01-12 DIAGNOSIS — U07.1 COVID-19 VIRUS INFECTION: Primary | ICD-10-CM

## 2022-01-12 NOTE — TELEPHONE ENCOUNTER
----- Message from Shea Marin sent at 1/12/2022 10:02 AM CST -----  Contact: [phone] Hector  Patient called in regards to testing positive for covid. Patient would like to know if he should get an infusion. Please call and advise.

## 2022-01-12 NOTE — TELEPHONE ENCOUNTER
----- Message from Natalia Miranda sent at 1/12/2022  2:29 PM CST -----  Contact: Wife - [phone]  Caller: Wife    Reason: regarding callback regarding possible infusion / please schedule at [facility] on Thursdays if an infusion is to be scheduled /     Callback: Wife - [phone]

## 2022-01-12 NOTE — TELEPHONE ENCOUNTER
Spoke to wife she stated the above patient tested  positive today for COVID and she wanted to know if he can get the infusion due to COPD , and parkinson. Patient have a cough and sore throat

## 2022-01-13 ENCOUNTER — INFUSION (OUTPATIENT)
Dept: INFECTIOUS DISEASES | Facility: HOSPITAL | Age: 77
End: 2022-01-13
Attending: EMERGENCY MEDICINE
Payer: MEDICARE

## 2022-01-13 VITALS
WEIGHT: 145 LBS | BODY MASS INDEX: 24.16 KG/M2 | TEMPERATURE: 99 F | HEART RATE: 74 BPM | SYSTOLIC BLOOD PRESSURE: 134 MMHG | DIASTOLIC BLOOD PRESSURE: 70 MMHG | HEIGHT: 65 IN | RESPIRATION RATE: 20 BRPM | OXYGEN SATURATION: 96 %

## 2022-01-13 PROCEDURE — 63600175 PHARM REV CODE 636 W HCPCS: Performed by: INTERNAL MEDICINE

## 2022-01-13 PROCEDURE — M0247 HC IV INFUSION, SOTROVIMAB, INCL POST ADMIN MONIT: HCPCS | Performed by: INTERNAL MEDICINE

## 2022-01-13 PROCEDURE — 25000003 PHARM REV CODE 250: Performed by: INTERNAL MEDICINE

## 2022-01-13 RX ORDER — EPINEPHRINE 0.3 MG/.3ML
0.3 INJECTION SUBCUTANEOUS
Status: DISCONTINUED | OUTPATIENT
Start: 2022-01-13 | End: 2022-07-14

## 2022-01-13 RX ORDER — ACETAMINOPHEN 325 MG/1
650 TABLET ORAL ONCE AS NEEDED
Status: DISCONTINUED | OUTPATIENT
Start: 2022-01-13 | End: 2022-07-14

## 2022-01-13 RX ORDER — DIPHENHYDRAMINE HYDROCHLORIDE 50 MG/ML
25 INJECTION INTRAMUSCULAR; INTRAVENOUS ONCE AS NEEDED
Status: DISCONTINUED | OUTPATIENT
Start: 2022-01-13 | End: 2022-07-14

## 2022-01-13 RX ORDER — ALBUTEROL SULFATE 90 UG/1
2 AEROSOL, METERED RESPIRATORY (INHALATION)
Status: DISCONTINUED | OUTPATIENT
Start: 2022-01-13 | End: 2022-07-14

## 2022-01-13 RX ORDER — ONDANSETRON 4 MG/1
4 TABLET, ORALLY DISINTEGRATING ORAL ONCE AS NEEDED
Status: DISCONTINUED | OUTPATIENT
Start: 2022-01-13 | End: 2022-07-14

## 2022-01-13 RX ORDER — SODIUM CHLORIDE 0.9 % (FLUSH) 0.9 %
10 SYRINGE (ML) INJECTION
Status: DISCONTINUED | OUTPATIENT
Start: 2022-01-13 | End: 2022-07-14

## 2022-01-13 RX ADMIN — SODIUM CHLORIDE 500 MG: 9 INJECTION, SOLUTION INTRAVENOUS at 09:01

## 2022-01-13 NOTE — PROGRESS NOTES
Patient remains with no signs of complications noted. Patient received sotrovimab infusion with filtered tubing according to FDA recommendations and [facility] SOP without complications noted and left with mask in place. Drug fact sheet provided. Pt discharged home. Ambulatory. Remains AAox3. No distress noted. RR even and unlabored.     Pt. Confirmed feeling fine.

## 2022-01-13 NOTE — PROGRESS NOTES
sotrovimab Infusion complete. Pt tolerated infusion well. No S/S of infusion reaction noted at present time. One hour observation started.

## 2022-01-18 ENCOUNTER — PATIENT MESSAGE (OUTPATIENT)
Dept: ADMINISTRATIVE | Facility: HOSPITAL | Age: 77
End: 2022-01-18
Payer: MEDICARE

## 2022-01-28 ENCOUNTER — OFFICE VISIT (OUTPATIENT)
Dept: UROLOGY | Facility: CLINIC | Age: 77
End: 2022-01-28
Payer: MEDICARE

## 2022-01-28 VITALS
HEART RATE: 77 BPM | SYSTOLIC BLOOD PRESSURE: 172 MMHG | WEIGHT: 145.06 LBS | BODY MASS INDEX: 24.17 KG/M2 | HEIGHT: 65 IN | DIASTOLIC BLOOD PRESSURE: 99 MMHG

## 2022-01-28 DIAGNOSIS — N20.0 NEPHROLITHIASIS: Primary | ICD-10-CM

## 2022-01-28 PROCEDURE — 3288F FALL RISK ASSESSMENT DOCD: CPT | Mod: CPTII,S$GLB,, | Performed by: UROLOGY

## 2022-01-28 PROCEDURE — 99214 PR OFFICE/OUTPT VISIT, EST, LEVL IV, 30-39 MIN: ICD-10-PCS | Mod: S$GLB,,, | Performed by: UROLOGY

## 2022-01-28 PROCEDURE — 3080F DIAST BP >= 90 MM HG: CPT | Mod: CPTII,S$GLB,, | Performed by: UROLOGY

## 2022-01-28 PROCEDURE — 3077F SYST BP >= 140 MM HG: CPT | Mod: CPTII,S$GLB,, | Performed by: UROLOGY

## 2022-01-28 PROCEDURE — 1101F PT FALLS ASSESS-DOCD LE1/YR: CPT | Mod: CPTII,S$GLB,, | Performed by: UROLOGY

## 2022-01-28 PROCEDURE — 1159F PR MEDICATION LIST DOCUMENTED IN MEDICAL RECORD: ICD-10-PCS | Mod: CPTII,S$GLB,, | Performed by: UROLOGY

## 2022-01-28 PROCEDURE — 1160F RVW MEDS BY RX/DR IN RCRD: CPT | Mod: CPTII,S$GLB,, | Performed by: UROLOGY

## 2022-01-28 PROCEDURE — 1159F MED LIST DOCD IN RCRD: CPT | Mod: CPTII,S$GLB,, | Performed by: UROLOGY

## 2022-01-28 PROCEDURE — 1126F PR PAIN SEVERITY QUANTIFIED, NO PAIN PRESENT: ICD-10-PCS | Mod: CPTII,S$GLB,, | Performed by: UROLOGY

## 2022-01-28 PROCEDURE — 1126F AMNT PAIN NOTED NONE PRSNT: CPT | Mod: CPTII,S$GLB,, | Performed by: UROLOGY

## 2022-01-28 PROCEDURE — 1101F PR PT FALLS ASSESS DOC 0-1 FALLS W/OUT INJ PAST YR: ICD-10-PCS | Mod: CPTII,S$GLB,, | Performed by: UROLOGY

## 2022-01-28 PROCEDURE — 1160F PR REVIEW ALL MEDS BY PRESCRIBER/CLIN PHARMACIST DOCUMENTED: ICD-10-PCS | Mod: CPTII,S$GLB,, | Performed by: UROLOGY

## 2022-01-28 PROCEDURE — 99999 PR PBB SHADOW E&M-EST. PATIENT-LVL IV: CPT | Mod: PBBFAC,,, | Performed by: UROLOGY

## 2022-01-28 PROCEDURE — 3288F PR FALLS RISK ASSESSMENT DOCUMENTED: ICD-10-PCS | Mod: CPTII,S$GLB,, | Performed by: UROLOGY

## 2022-01-28 PROCEDURE — 99214 OFFICE O/P EST MOD 30 MIN: CPT | Mod: S$GLB,,, | Performed by: UROLOGY

## 2022-01-28 PROCEDURE — 3080F PR MOST RECENT DIASTOLIC BLOOD PRESSURE >= 90 MM HG: ICD-10-PCS | Mod: CPTII,S$GLB,, | Performed by: UROLOGY

## 2022-01-28 PROCEDURE — 3077F PR MOST RECENT SYSTOLIC BLOOD PRESSURE >= 140 MM HG: ICD-10-PCS | Mod: CPTII,S$GLB,, | Performed by: UROLOGY

## 2022-01-28 PROCEDURE — 99999 PR PBB SHADOW E&M-EST. PATIENT-LVL IV: ICD-10-PCS | Mod: PBBFAC,,, | Performed by: UROLOGY

## 2022-01-28 RX ORDER — GABAPENTIN 400 MG/1
CAPSULE ORAL
COMMUNITY
Start: 2021-11-23 | End: 2022-03-11

## 2022-01-28 RX ORDER — IBUPROFEN 800 MG/1
800 TABLET ORAL EVERY 6 HOURS PRN
COMMUNITY
Start: 2021-12-08 | End: 2023-11-15 | Stop reason: SDUPTHER

## 2022-01-28 NOTE — PROGRESS NOTES
"Subjective:      Hector Kellogg is a 76 y.o. male who returns today regarding his nephrolithiasis.    He has known low renal stone found on workup for hematuria. He is s/p ESWL in April 2021 with persistent stone noted on FU imaging.    Recently had some mild flank pain and hematuria, but no other c/o.    The following portions of the patient's history were reviewed and updated as appropriate: allergies, current medications, past family history, past medical history, past social history, past surgical history and problem list.    Review of Systems  A comprehensive multipoint review of systems was negative except as otherwise stated in the HPI.     Objective:   Vitals: BP (!) 172/99 (BP Location: Left arm, Patient Position: Sitting, BP Method: Large (Automatic))   Pulse 77   Ht 5' 5" (1.651 m)   Wt 65.8 kg (145 lb 1 oz)   BMI 24.14 kg/m²     Physical Exam   General: alert and oriented, no acute distress  Respiratory: Symmetric expansion, non-labored breathing  Neuro: no gross deficits  Psych: normal judgment and insight, normal mood/affect and non-anxious    Lab Review     Lab Results   Component Value Date    WBC 8.06 01/13/2021    HGB 15.9 01/13/2021    HCT 51.2 01/13/2021     (H) 01/13/2021     01/13/2021     Lab Results   Component Value Date    CREATININE 1.1 03/25/2021    BUN 17 03/25/2021     No results found for: PSA, PSADIAG    Imaging (all images personally reviewed; agree with report below)  Results for orders placed during the hospital encounter of 01/03/22    X-Ray Abdomen AP 1 View    Narrative  EXAMINATION:  XR ABDOMEN AP 1 VIEW    CLINICAL HISTORY:  Calculus of kidney    TECHNIQUE:  AP View(s) of the abdomen was performed.    COMPARISON:  10/05/2021.    FINDINGS:  There is an 11 mm calcification projecting over the medial aspect of the mid left kidney as before likely related to a left renal calculus.  There are punctate calcific densities projecting over the mid right kidney " and lower pole of the left kidney which may represent additional renal calculi.  Multiple rounded calcifications are identified within the pelvis similar to the prior examination likely representing phleboliths.  The intestinal gas pattern is unremarkable.  Bony structures are grossly intact.  There are degenerative changes within the lumbar spine.    Impression  11 mm calcification projecting over the mid medial mid left kidney, likely a left renal calculus, unchanged.  Additional punctate bilateral renal calculi are suspected.    Unremarkable intestinal gas pattern.      Electronically signed by: Abel Wellington MD  Date:    01/03/2022  Time:    11:05      Results for orders placed during the hospital encounter of 01/03/22    US Retroperitoneal Complete    Narrative  EXAMINATION:  US RETROPERITONEAL COMPLETE    CLINICAL HISTORY:  Calculus of kidney    TECHNIQUE:  Ultrasound of the kidneys and urinary bladder was performed including color flow and Doppler evaluation of the kidneys.    COMPARISON:  03/25/2021    FINDINGS:  Right kidney: The right kidney measures 10.1 cm. No cortical thinning. No loss of corticomedullary distinction. Resistive index measures 0.71.  No mass. There is a 0.2 cm echogenic focus in the upper pole of the right kidney concerning for possible nonobstructing renal calculus.  No hydronephrosis.    Left kidney: The left kidney measures 11.9 cm. No cortical thinning. No loss of corticomedullary distinction. Resistive index measures 0.70.  There are 2 anechoic lesions 1 within the midpole and 1 within the lower pole.  The largest 1 measures 5.4 x 4.8 x 5.1 cm and demonstrates possible calcifications along the wall.  This corresponds to a Bosniak class 2 F cyst.  The 1 in the lower pole measures 4.0 x 4.0 x 3.6 cm.  This corresponds to a Bosniak class 1 cyst no solid mass.  Two echogenic foci are associated.  The largest 1 is in the lower pole measuring 1.2 cm and the smaller 1 is in the midpole  measuring 0.6 cm.  No hydronephrosis.    The bladder is partially distended at the time of scanning and has an unremarkable appearance.  The prostate gland is enlarged.  The prostate gland volume is 96.2 cc.    Impression  Bilateral echogenic foci concerning for nonobstructing nephrolithiasis as described above.  The previously described 0.9 cm stone in the renal pelvis is not evident in today's study.    2 renal cysts on the left, the largest 1 corresponding to Bosniak class 2 F. Renal mass protocol CT or MRI in 6 months is recommended.    Enlarged prostate gland.      Electronically signed by: Haroon Warren MD  Date:    01/03/2022  Time:    13:36       Assessment and Plan:   1. Nephrolithiasis  -- Again discussed ureteroscopy as option for further surgical treatment of this stone as it failed to clear with ESWL, however also explained that continued monitoring is also reasonable for now.  -- FU 6 mos w/ KUB

## 2022-01-31 ENCOUNTER — PES CALL (OUTPATIENT)
Dept: ADMINISTRATIVE | Facility: CLINIC | Age: 77
End: 2022-01-31
Payer: MEDICARE

## 2022-02-20 DIAGNOSIS — F41.1 GENERALIZED ANXIETY DISORDER: ICD-10-CM

## 2022-02-21 RX ORDER — CITALOPRAM 20 MG/1
TABLET, FILM COATED ORAL
Qty: 30 TABLET | Refills: 10 | Status: SHIPPED | OUTPATIENT
Start: 2022-02-21 | End: 2022-10-19

## 2022-03-16 ENCOUNTER — PATIENT MESSAGE (OUTPATIENT)
Dept: ADMINISTRATIVE | Facility: HOSPITAL | Age: 77
End: 2022-03-16
Payer: MEDICARE

## 2022-05-16 ENCOUNTER — PES CALL (OUTPATIENT)
Dept: ADMINISTRATIVE | Facility: CLINIC | Age: 77
End: 2022-05-16
Payer: MEDICARE

## 2022-05-19 ENCOUNTER — TELEPHONE (OUTPATIENT)
Dept: NEUROLOGY | Facility: CLINIC | Age: 77
End: 2022-05-19
Payer: MEDICARE

## 2022-05-19 NOTE — TELEPHONE ENCOUNTER
"Spoke with Mr. Kellogg, he was informed Dr. Klein first available will be in September, I can schedule the appt and place the appt on the waiting list. Mr. Kellogg states "is there anyone else I can see, that is too long of a wait". Mr. Kellogg informed the providers are scheduling in August, I can speak with the PA regarding scheduling an appt. He verbalized understanding.   "

## 2022-05-19 NOTE — TELEPHONE ENCOUNTER
----- Message from [facility] Arreaga sent at 5/19/2022  3:03 PM CDT -----  .Type: Appointment Request     Caller is requesting appointment annual and pt is having some pain     Was A Appointment Solution Found When Scheduling? None     Best Call Back Number:  [phone]    Additional Information: None

## 2022-06-08 ENCOUNTER — TELEPHONE (OUTPATIENT)
Dept: INTERNAL MEDICINE | Facility: CLINIC | Age: 77
End: 2022-06-08
Payer: MEDICARE

## 2022-06-08 DIAGNOSIS — Z12.5 PROSTATE CANCER SCREENING: ICD-10-CM

## 2022-06-08 DIAGNOSIS — Z00.00 ANNUAL PHYSICAL EXAM: ICD-10-CM

## 2022-06-08 DIAGNOSIS — E78.2 MIXED HYPERLIPIDEMIA: ICD-10-CM

## 2022-06-08 DIAGNOSIS — I10 ESSENTIAL HYPERTENSION: Primary | ICD-10-CM

## 2022-06-08 RX ORDER — METOPROLOL SUCCINATE 50 MG/1
50 TABLET, EXTENDED RELEASE ORAL DAILY
Qty: 90 TABLET | Refills: 3 | Status: SHIPPED | OUTPATIENT
Start: 2022-06-08 | End: 2023-05-30

## 2022-06-08 NOTE — TELEPHONE ENCOUNTER
Refill Routing Note   Medication(s) are not appropriate for processing by [facility] for the following reason(s):      - Patient has not been seen in over 15 months by PCP  - Required vitals are abnormal  - Patient has been seen in the ED/Hospital since the last PCP visit    [facility] action(s):  Defer          Medication reconciliation completed: No     Appointments  past 12m or future 3m with PCP    Date Provider   Last Visit   1/13/2021 Blue Mcgill, DO   Next Visit   Visit date not found Blue Mcgill, DO   ED visits in past 90 days: 0        Note composed:1:52 PM 06/08/2022

## 2022-06-08 NOTE — TELEPHONE ENCOUNTER
Care Due:                  Date            Visit Type   Department     Provider  --------------------------------------------------------------------------------                                             [facility]  Last Visit: 01-      PRE-OP       MEDICINE       Blue Mcgill  Next Visit: None Scheduled  None         None Found                                                            Last  Test          Frequency    Reason                     Performed    Due Date  --------------------------------------------------------------------------------    Office Visit  12 months..  metoprolol...............  01- 01-    [facility] Embedded Care Gaps. Reference number: [id number]. 6/08/2022   1:43:24 PM CDT

## 2022-06-08 NOTE — TELEPHONE ENCOUNTER
"----- Message from Kristin Hardingparas sent at 6/8/2022  4:00 PM CDT -----  Contact: Eileen [phone]  type: Lab    Caller is requesting to schedule their Lab appointment prior to annual appointment.  Order is not listed in EPIC.  Please enter order and contact patient to schedule.    Name of Caller: Eileen Spouse of Patient     Preferred Date and Time of Labs:06/22/2022 10 am    Date of Annual Physical Appointment: 06/29/2022    Where would they like the lab performed?[facility]    Would the patient rather a call back or a response via [facility]? [email or web address]    Best Call Back Number:[phone]    Additional Information: no    "Do not send messages requesting lab orders prior to Physical appt on these providers: Annetta Dhillon, Latrice Early Ottley-Sharpe, Eliu Krishnamurthy, Shandra and Abdifatah."       "

## 2022-06-13 RX ORDER — ROSUVASTATIN CALCIUM 40 MG/1
TABLET, COATED ORAL
Qty: 90 TABLET | Refills: 0 | Status: SHIPPED | OUTPATIENT
Start: 2022-06-13 | End: 2022-09-30

## 2022-07-06 ENCOUNTER — PATIENT MESSAGE (OUTPATIENT)
Dept: NEUROLOGY | Facility: CLINIC | Age: 77
End: 2022-07-06

## 2022-07-06 ENCOUNTER — OFFICE VISIT (OUTPATIENT)
Dept: NEUROLOGY | Facility: CLINIC | Age: 77
End: 2022-07-06
Payer: MEDICARE

## 2022-07-06 VITALS
DIASTOLIC BLOOD PRESSURE: 82 MMHG | WEIGHT: 147.69 LBS | HEART RATE: 80 BPM | BODY MASS INDEX: 25.21 KG/M2 | HEIGHT: 64 IN | SYSTOLIC BLOOD PRESSURE: 145 MMHG

## 2022-07-06 DIAGNOSIS — M54.17 LUMBOSACRAL RADICULOPATHY: ICD-10-CM

## 2022-07-06 DIAGNOSIS — G20.A1 PARKINSON DISEASE: Primary | ICD-10-CM

## 2022-07-06 PROCEDURE — 1101F PT FALLS ASSESS-DOCD LE1/YR: CPT | Mod: CPTII,S$GLB,, | Performed by: PHYSICIAN ASSISTANT

## 2022-07-06 PROCEDURE — 1101F PR PT FALLS ASSESS DOC 0-1 FALLS W/OUT INJ PAST YR: ICD-10-PCS | Mod: CPTII,S$GLB,, | Performed by: PHYSICIAN ASSISTANT

## 2022-07-06 PROCEDURE — 3288F FALL RISK ASSESSMENT DOCD: CPT | Mod: CPTII,S$GLB,, | Performed by: PHYSICIAN ASSISTANT

## 2022-07-06 PROCEDURE — 99999 PR PBB SHADOW E&M-EST. PATIENT-LVL IV: ICD-10-PCS | Mod: PBBFAC,,, | Performed by: PHYSICIAN ASSISTANT

## 2022-07-06 PROCEDURE — 1125F AMNT PAIN NOTED PAIN PRSNT: CPT | Mod: CPTII,S$GLB,, | Performed by: PHYSICIAN ASSISTANT

## 2022-07-06 PROCEDURE — 1160F PR REVIEW ALL MEDS BY PRESCRIBER/CLIN PHARMACIST DOCUMENTED: ICD-10-PCS | Mod: CPTII,S$GLB,, | Performed by: PHYSICIAN ASSISTANT

## 2022-07-06 PROCEDURE — 3079F PR MOST RECENT DIASTOLIC BLOOD PRESSURE 80-89 MM HG: ICD-10-PCS | Mod: CPTII,S$GLB,, | Performed by: PHYSICIAN ASSISTANT

## 2022-07-06 PROCEDURE — 1159F PR MEDICATION LIST DOCUMENTED IN MEDICAL RECORD: ICD-10-PCS | Mod: CPTII,S$GLB,, | Performed by: PHYSICIAN ASSISTANT

## 2022-07-06 PROCEDURE — 99215 PR OFFICE/OUTPT VISIT, EST, LEVL V, 40-54 MIN: ICD-10-PCS | Mod: S$GLB,,, | Performed by: PHYSICIAN ASSISTANT

## 2022-07-06 PROCEDURE — 3077F SYST BP >= 140 MM HG: CPT | Mod: CPTII,S$GLB,, | Performed by: PHYSICIAN ASSISTANT

## 2022-07-06 PROCEDURE — 99999 PR PBB SHADOW E&M-EST. PATIENT-LVL IV: CPT | Mod: PBBFAC,,, | Performed by: PHYSICIAN ASSISTANT

## 2022-07-06 PROCEDURE — 3077F PR MOST RECENT SYSTOLIC BLOOD PRESSURE >= 140 MM HG: ICD-10-PCS | Mod: CPTII,S$GLB,, | Performed by: PHYSICIAN ASSISTANT

## 2022-07-06 PROCEDURE — 1160F RVW MEDS BY RX/DR IN RCRD: CPT | Mod: CPTII,S$GLB,, | Performed by: PHYSICIAN ASSISTANT

## 2022-07-06 PROCEDURE — 3288F PR FALLS RISK ASSESSMENT DOCUMENTED: ICD-10-PCS | Mod: CPTII,S$GLB,, | Performed by: PHYSICIAN ASSISTANT

## 2022-07-06 PROCEDURE — 99215 OFFICE O/P EST HI 40 MIN: CPT | Mod: S$GLB,,, | Performed by: PHYSICIAN ASSISTANT

## 2022-07-06 PROCEDURE — 1125F PR PAIN SEVERITY QUANTIFIED, PAIN PRESENT: ICD-10-PCS | Mod: CPTII,S$GLB,, | Performed by: PHYSICIAN ASSISTANT

## 2022-07-06 PROCEDURE — 1159F MED LIST DOCD IN RCRD: CPT | Mod: CPTII,S$GLB,, | Performed by: PHYSICIAN ASSISTANT

## 2022-07-06 PROCEDURE — 3079F DIAST BP 80-89 MM HG: CPT | Mod: CPTII,S$GLB,, | Performed by: PHYSICIAN ASSISTANT

## 2022-07-06 RX ORDER — RASAGILINE 1 MG/1
1 TABLET ORAL DAILY
Qty: 30 TABLET | Refills: 11 | Status: SHIPPED | OUTPATIENT
Start: 2022-07-06 | End: 2022-10-13

## 2022-07-06 NOTE — PROGRESS NOTES
"Name: Hector Kellogg  MRN: [id number]   CSN: [id number]      Date: 07/06/2022    Chief Complaint / Interval History:   - last seen July 2020  - cd/ld 25/100 1.5 tabs TID -- 9 am, 2 pm and 7 pm    - tremors gets better but does not completely go away  - meds possibly wear off after about 3 hours   - biggest issue is pain, starts in the hip and radiates down his leg   - takes gabapentin 400 mg TID, does not feel that this helps   - festination of gait in the morning   - can't walk far   - had lumbar laminectomy in Feb 2021 has not followed up with pain management or nsgy  - did not feel that pain management helped   - pain is getting worse, it has been going on for about a year -- can't walk due to the pain   - does not feel that the pain improves with sinemet   - no new weight changes, tries to wear loose pants         From Sept 2019:  - feels like he cannot do things  - not apathy, wants to work or do things  - lung issue are limiting him from COPD  - still taking cd/ld 25/100 1 TID-5x/day  - memory is ok  - appetite is good  - no constipation now  - bladder is quick    From Oct 2018:  - still getting pinched nerve pain in the back or legs  - will take an extra gabapentin  - takes cd/ld every 3-4 hours  - gait is stable    From Summer 2017:  - a bit more off time between dosing  - peak is pretty good  - gait is frustrating  - memory holding up  - family pleased    From 12/06  1) A bit more off time in between dosing  2) More cramping when off, gabapentin helps at night  3) Would be willing to take meds four times per day    History of Present Illness (HPI):  68 yo with diagnosis of PD, symptoms began 1.5 years ago.  First noted tremor in the L hand, "all left side".  Drags his left foot, generally slower overall.  Dx 1 year ago, had [employer] as a confirmatory study.  Says he had an MRI of the low back as well, was feeling "pinched nerves" in the back and into the both legs.  Was prescribed "some drug that cost $400" " "and didn;t take. Also prescribed pramipexole per the notes but also said he "never took it."    Was in [place] for 2 years, now re-establishing care here.    Has questions about gabapentin - 800 now taking 1/2 tab at night only and has no significant pain.  Will ibuprofen for breakthrough.  Not taking Norco.    Retired , [employer], left at age 65.    Nonmotor/Premotor ROS:  Hyposmia (HENT)?No per him  RBD/sleep issues (Constitutional)?Yes - acts out dreams  Depression/anxiety (Psychiatric)?No - better on Citalopram  Fatigue (Constitutional)?Yes  Constipation (GI)?Yes  - uses OTC stool softener  Urinary issues ()?Yes - urgency  Sexual dysfunction ()?Yes - some ED.    Orthostasis (Cardiovascular)?No  Leg swelling (Cardiovascular)? No  Falls (Musculoskeletal)?No  Cognitive impairment (Neurologic)?No  Psychoses (Psychiatric)?No  Pain/Paresthesia (Neurologic)?Yes  Visual changes (Eyes)?No  Moles / skin changes (Skin)?Yes - seborrhea, has a scaly mole on the R side of the head  Stridor / SOB (Pulm)?Yes - with acticity  Bruising (Heme)?No    Past Medical History: The patient  has a past medical history of Cataract, Chronic back pain greater than 3 months duration, Coronary artery disease, DDD (degenerative disc disease), lumbar (5/16/2020), Glaucoma suspect of both eyes, Hyperlipidemia, Hypertension, MI, old (2006), Parkinson disease, and Renal disorder.    Social History: The patient  reports that he quit smoking about 8 years ago. He has a 40.00 pack-year smoking history. He has never used smokeless tobacco. He reports current alcohol use. He reports that he does not use drugs.  Rare social drink.    Family History: Their family history includes Cataracts in his father; Heart attack in his father; Heart disease in his maternal uncle and paternal uncle.    Allergies: Influenza virus vaccines     Meds:   Current Outpatient Medications on File Prior to Visit   Medication Sig Dispense Refill    aspirin (ECOTRIN) 81 " MG EC tablet Take 81 mg by mouth once daily.      carbidopa-levodopa  mg (SINEMET)  mg per tablet TAKE 1 TABLET BY MOUTH FIVE (5) TIMES DAILY  450 tablet 3    citalopram (CELEXA) 20 MG tablet TAKE ONE TABLET BY MOUTH ONCE DAILY 30 tablet 10    gabapentin (NEURONTIN) 400 MG capsule TAKE TWO CAPSULES BY MOUTH THREE TIMES DAILY 180 capsule 2    ibuprofen (ADVIL,MOTRIN) 800 MG tablet Take 800 mg by mouth every 6 (six) hours as needed.      loratadine (CLARITIN) 10 mg tablet Take 10 mg by mouth as needed for Allergies.      metoprolol succinate (TOPROL-XL) 50 MG 24 hr tablet TAKE 1 TABLET (50 MG TOTAL) BY MOUTH ONCE DAILY. 90 tablet 3    multivitamin capsule Take 1 capsule by mouth once daily.      rosuvastatin (CRESTOR) 40 MG Tab TAKE ONE TABLET BY MOUTH EVERY EVENING 90 tablet 0    albuterol (PROVENTIL/VENTOLIN HFA) 90 mcg/actuation inhaler Inhale 2 puffs into the lungs every 6 (six) hours as needed for Wheezing or Shortness of Breath. Rescue (Patient not taking: Reported on 7/6/2022) 18 g 6    mometasone-formoterol (DULERA) 200-5 mcg/actuation inhaler Inhale 2 puffs into the lungs 2 (two) times daily. Controller 13 g 5     Current Facility-Administered Medications on File Prior to Visit   Medication Dose Route Frequency Provider Last Rate Last Admin    acetaminophen tablet 650 mg  650 mg Oral Once PRN Tima Haynes MD        albuterol inhaler 2 puff  2 puff Inhalation Q20 Min PRN Tima Haynes MD        diphenhydrAMINE injection 25 mg  25 mg Intravenous Once PRN Tima Haynes MD        EPINEPHrine (EPIPEN) 0.3 mg/0.3 mL pen injection 0.3 mg  0.3 mg Intramuscular PRN Tima Haynes MD        methylPREDNISolone sodium succinate injection 40 mg  40 mg Intravenous Once PRN Tima Haynes MD        ondansetron disintegrating tablet 4 mg  4 mg Oral Once PRN Tima Haynes MD        sodium chloride 0.9% 500 mL flush bag   Intravenous PRN Tima Haynes MD         "sodium chloride 0.9% flush 10 mL  10 mL Intravenous PRN Tima Haynes MD         Exam:  BP (!) 145/82 (BP Location: Left arm, Patient Position: Sitting, BP Method: Medium (Automatic))   Pulse 80   Ht 5' 4" (1.626 m)   Wt 67 kg (147 lb 11.3 oz)   BMI 25.35 kg/m²     Constitutional  Well-developed, well-nourished, appears stated age   * Specialized movement exam  Mild hypophonic speech.    Mild facial masking.   Automatism of the left arm   L>R cogwheel rigidity - mild     L>R bradykinesia - mild.   bilateral resting tremor, L > R   dystonic posturing of R foot-- mild dyskinesia    No other dystonia, chorea, athetosis, myoclonus, or tics.   No motor impersistence.   Normal-based gait.   + mildly abnormal arm swing B, worse on the L  Good stride length, no freezing   No postural instability.      Laboratory/Radiological:  - Results:  Lab Visit on 06/22/2022   Component Date Value Ref Range Status    WBC 06/22/2022 6.10  3.90 - 12.70 K/uL Final    RBC 06/22/2022 4.61  4.60 - 6.20 M/uL Final    Hemoglobin 06/22/2022 15.1  14.0 - 18.0 g/dL Final    Hematocrit 06/22/2022 47.1  40.0 - 54.0 % Final    MCV 06/22/2022 102 (A) 82 - 98 fL Final    MCH 06/22/2022 32.8 (A) 27.0 - 31.0 pg Final    MCHC 06/22/2022 32.1  32.0 - 36.0 g/dL Final    RDW 06/22/2022 12.1  11.5 - 14.5 % Final    Platelets 06/22/2022 205  150 - 450 K/uL Final    MPV 06/22/2022 9.8  9.2 - 12.9 fL Final    Immature Granulocytes 06/22/2022 0.5  0.0 - 0.5 % Final    Gran # (ANC) 06/22/2022 3.6  1.8 - 7.7 K/uL Final    Immature Grans (Abs) 06/22/2022 0.03  0.00 - 0.04 K/uL Final    Lymph # 06/22/2022 1.8  1.0 - 4.8 K/uL Final    Mono # 06/22/2022 0.6  0.3 - 1.0 K/uL Final    Eos # 06/22/2022 0.1  0.0 - 0.5 K/uL Final    Baso # 06/22/2022 0.03  0.00 - 0.20 K/uL Final    nRBC 06/22/2022 0  0 /100 WBC Final    Gran % 06/22/2022 59.0  38.0 - 73.0 % Final    Lymph % 06/22/2022 28.7  18.0 - 48.0 % Final    Mono % 06/22/2022 9.7  4.0 - " 15.0 % Final    Eosinophil % 06/22/2022 1.6  0.0 - 8.0 % Final    Basophil % 06/22/2022 0.5  0.0 - 1.9 % Final    Differential Method 06/22/2022 Automated   Final    Sodium 06/22/2022 142  136 - 145 mmol/L Final    Potassium 06/22/2022 4.7  3.5 - 5.1 mmol/L Final    Chloride 06/22/2022 106  95 - 110 mmol/L Final    CO2 06/22/2022 27  23 - 29 mmol/L Final    Glucose 06/22/2022 97  70 - 110 mg/dL Final    BUN 06/22/2022 15  8 - 23 mg/dL Final    Creatinine 06/22/2022 1.1  0.5 - 1.4 mg/dL Final    Calcium 06/22/2022 9.3  8.7 - 10.5 mg/dL Final    Total Protein 06/22/2022 6.7  6.0 - 8.4 g/dL Final    Albumin 06/22/2022 3.4 (A) 3.5 - 5.2 g/dL Final    Total Bilirubin 06/22/2022 0.6  0.1 - 1.0 mg/dL Final    Alkaline Phosphatase 06/22/2022 81  55 - 135 U/L Final    AST 06/22/2022 21  10 - 40 U/L Final    ALT 06/22/2022 6 (A) 10 - 44 U/L Final    Anion Gap 06/22/2022 9  8 - 16 mmol/L Final    eGFR if African American 06/22/2022 >60.0  >60 mL/min/1.73 m^2 Final    eGFR if non African American 06/22/2022 >60.0  >60 mL/min/1.73 m^2 Final    TSH 06/22/2022 0.862  0.400 - 4.000 uIU/mL Final    Cholesterol 06/22/2022 147  120 - 199 mg/dL Final    Triglycerides 06/22/2022 104  30 - 150 mg/dL Final    HDL 06/22/2022 38 (A) 40 - 75 mg/dL Final    LDL Cholesterol 06/22/2022 88.2  63.0 - 159.0 mg/dL Final    HDL/Cholesterol Ratio 06/22/2022 25.9  20.0 - 50.0 % Final    Total Cholesterol/HDL Ratio 06/22/2022 3.9  2.0 - 5.0 Final    Non-HDL Cholesterol 06/22/2022 109  mg/dL Final    PSA, Screen 06/22/2022 3.9  0.00 - 4.00 ng/mL Final    RBC, UA 06/22/2022 36 (A) 0 - 4 /hpf Final    WBC, UA 06/22/2022 5  0 - 5 /hpf Final    Bacteria 06/22/2022 Rare  None-Occ /hpf Final    Microscopic Comment 06/22/2022 SEE COMMENT   Final     - Independent review of images: none available.    Reviewed records of:  1) Grade 1 anterolisthesis of spine  2) Positive SHAHRZAD scan    Diagnoses:          1) Idiopathic PD, tremor  dominant.  On CD/LD.  2) Low back pain --> now worse.  C/W LSS and sciatica    Medical Decision Makin) add rasagiline 1 mg   2) defer to pain management for lumbar radiculopathy         Collaborating Physician, Dr. Klein, was available during today's encounter. Any change to plan along with cosign to appear in the EMR.       I spent 45 minutes with the patient, reviewing past encounters, labs and imaging.        Yvette Moore PA-C   [facility]  Department of Neurology  Movement Disorders

## 2022-07-08 ENCOUNTER — TELEPHONE (OUTPATIENT)
Dept: PAIN MEDICINE | Facility: CLINIC | Age: 77
End: 2022-07-08
Payer: MEDICARE

## 2022-07-08 NOTE — TELEPHONE ENCOUNTER
This message is for patient in regards to his/her appointment 07/11/22 at 1:40p   With rita Washington NP.      [facility] Policy: For the safety of all patients and staff members.     Patient Visitor policy: During this visit we're informing all patients that face mask are now optional, upon visit you have the options of requesting clinical staff to wear a mask for your protection and thiers.      If you have any questions or concerns please contact [phone]    Staff c [place]

## 2022-07-11 ENCOUNTER — HOSPITAL ENCOUNTER (OUTPATIENT)
Dept: RADIOLOGY | Facility: OTHER | Age: 77
Discharge: HOME | End: 2022-07-11
Attending: NURSE PRACTITIONER
Payer: MEDICARE

## 2022-07-11 ENCOUNTER — OFFICE VISIT (OUTPATIENT)
Dept: PAIN MEDICINE | Facility: CLINIC | Age: 77
End: 2022-07-11
Payer: MEDICARE

## 2022-07-11 VITALS
HEIGHT: 64 IN | DIASTOLIC BLOOD PRESSURE: 75 MMHG | HEART RATE: 78 BPM | BODY MASS INDEX: 26.8 KG/M2 | OXYGEN SATURATION: 100 % | RESPIRATION RATE: 18 BRPM | SYSTOLIC BLOOD PRESSURE: 120 MMHG | WEIGHT: 157 LBS

## 2022-07-11 DIAGNOSIS — M48.062 LUMBAR STENOSIS WITH NEUROGENIC CLAUDICATION: ICD-10-CM

## 2022-07-11 DIAGNOSIS — M54.9 DORSALGIA, UNSPECIFIED: ICD-10-CM

## 2022-07-11 DIAGNOSIS — M54.16 LUMBAR RADICULOPATHY: ICD-10-CM

## 2022-07-11 DIAGNOSIS — M96.1 POST LAMINECTOMY SYNDROME: Primary | ICD-10-CM

## 2022-07-11 PROCEDURE — 99214 PR OFFICE/OUTPT VISIT, EST, LEVL IV, 30-39 MIN: ICD-10-PCS | Mod: S$GLB,,, | Performed by: NURSE PRACTITIONER

## 2022-07-11 PROCEDURE — 1125F PR PAIN SEVERITY QUANTIFIED, PAIN PRESENT: ICD-10-PCS | Mod: CPTII,S$GLB,, | Performed by: NURSE PRACTITIONER

## 2022-07-11 PROCEDURE — 1159F PR MEDICATION LIST DOCUMENTED IN MEDICAL RECORD: ICD-10-PCS | Mod: CPTII,S$GLB,, | Performed by: NURSE PRACTITIONER

## 2022-07-11 PROCEDURE — 3288F FALL RISK ASSESSMENT DOCD: CPT | Mod: CPTII,S$GLB,, | Performed by: NURSE PRACTITIONER

## 2022-07-11 PROCEDURE — 3288F PR FALLS RISK ASSESSMENT DOCUMENTED: ICD-10-PCS | Mod: CPTII,S$GLB,, | Performed by: NURSE PRACTITIONER

## 2022-07-11 PROCEDURE — 3074F PR MOST RECENT SYSTOLIC BLOOD PRESSURE < 130 MM HG: ICD-10-PCS | Mod: CPTII,S$GLB,, | Performed by: NURSE PRACTITIONER

## 2022-07-11 PROCEDURE — 3074F SYST BP LT 130 MM HG: CPT | Mod: CPTII,S$GLB,, | Performed by: NURSE PRACTITIONER

## 2022-07-11 PROCEDURE — 3078F DIAST BP <80 MM HG: CPT | Mod: CPTII,S$GLB,, | Performed by: NURSE PRACTITIONER

## 2022-07-11 PROCEDURE — 3078F PR MOST RECENT DIASTOLIC BLOOD PRESSURE < 80 MM HG: ICD-10-PCS | Mod: CPTII,S$GLB,, | Performed by: NURSE PRACTITIONER

## 2022-07-11 PROCEDURE — 99214 OFFICE O/P EST MOD 30 MIN: CPT | Mod: S$GLB,,, | Performed by: NURSE PRACTITIONER

## 2022-07-11 PROCEDURE — 1159F MED LIST DOCD IN RCRD: CPT | Mod: CPTII,S$GLB,, | Performed by: NURSE PRACTITIONER

## 2022-07-11 PROCEDURE — 72114 XR LUMBAR SPINE 5 VIEW WITH FLEX AND EXT: ICD-10-PCS | Mod: 26,,, | Performed by: RADIOLOGY

## 2022-07-11 PROCEDURE — 99999 PR PBB SHADOW E&M-EST. PATIENT-LVL V: ICD-10-PCS | Mod: PBBFAC,,, | Performed by: NURSE PRACTITIONER

## 2022-07-11 PROCEDURE — 99999 PR PBB SHADOW E&M-EST. PATIENT-LVL V: CPT | Mod: PBBFAC,,, | Performed by: NURSE PRACTITIONER

## 2022-07-11 PROCEDURE — 72114 X-RAY EXAM L-S SPINE BENDING: CPT | Mod: TC,FY

## 2022-07-11 PROCEDURE — 72114 X-RAY EXAM L-S SPINE BENDING: CPT | Mod: 26,,, | Performed by: RADIOLOGY

## 2022-07-11 PROCEDURE — 1125F AMNT PAIN NOTED PAIN PRSNT: CPT | Mod: CPTII,S$GLB,, | Performed by: NURSE PRACTITIONER

## 2022-07-11 PROCEDURE — 1101F PR PT FALLS ASSESS DOC 0-1 FALLS W/OUT INJ PAST YR: ICD-10-PCS | Mod: CPTII,S$GLB,, | Performed by: NURSE PRACTITIONER

## 2022-07-11 PROCEDURE — 1101F PT FALLS ASSESS-DOCD LE1/YR: CPT | Mod: CPTII,S$GLB,, | Performed by: NURSE PRACTITIONER

## 2022-07-11 NOTE — PROGRESS NOTES
Chronic patient Established Note (Follow up visit)      SUBJECTIVE:      ZACHARIAH Kellogg presents to the clinic for the evaluation of right lower back buttock pain. The pain started 5-10 years ago following falling off scaffold at work. Since then, symptoms have been worsening.  The pain is located in the right buttock area and radiates to the down right leg to knee.  The pain is described as sharp and is rated as 4/10. The pain is rated with a score of  4/10 on the BEST day and a score of 9/10 on the WORST day.   Symptoms interfere with daily activity.   Has occasional paresthesias and numbness to RLE all the down the leg.   The pain is exacerbated by Standing, Walking and Morning.    The pain is mitigated by heat, laying down and rest.   He reports spending 2 hours per day reclining. The patient reports 4 hours of uninterrupted sleep per night.     Patient denies night fever/night sweats, urinary incontinence, bowel incontinence, significant weight loss, significant motor weakness and loss of sensations.     Interval History 05/14/2020:  76 y/o M with hx of CAD s/p PCI (2008 and 2017), mild Pulm HTN, Essential HTN, Parkinson's Disease and COPD( Former smoker x 40 years).  On ASA 81 mg for CAD. Formerly on Plavix for DAPT  On Medical Therapy with Gabapentin without any side effects.   Started Meloxican this week and acknowledges being effective.         Interval history 06/08/2020:  Patient presents for telephone follow-up of lower back pain and right lower leg pain.  Patient is status post right L4/5 and L5/S1 TFESI on 5/27/2020.  Patient states 100% resolution of lower back pain and right leg pain.  Patient denies any neurological changes, denies any health changes.  Patient denies any neurological emergency symptoms such as loss of bowel/bladder, and denies saddle paresthesias.  Patient rates pain 0/10.  Patient is no longer taking Mobic, takes Flexeril 5 mg g and gabapentin 400 mg  laci        Interval History 8/4/2020:  The patient presents for follow-up of lower back pain.  He has associated radiculopathy down lateral right leg stopping at ankle.  He has had prior TF ASHISH with good relief of the symptoms which lasted approx 4 weeks. He states now however Back has become more bothersome than leg pain. Denies any loss of b/b or saddle paresthesias. Continue to take Flexeril 5mg and gaapentin 400mg PRN which provide some relief without adverse side effects.      Interval History 8/21/2020:  The patient present for follow up of lower back pain. Pt states he had no relief from prior Right L4-5 & L5-S1 facet joint injection not even short term per patient. Pt continues to have focal right sided pain, states radiculopathy goes down anterior and posterior thigh which stops just below the knee. The patient denies myelopathic symptoms such as handwriting changes or difficulty with buttons/coins/keys. Denies perineal paresthesias, bowel/bladder dysfunction. He is taking Gabapentin which provided good relief.     Interval History 09/17/2020:  Hector Kellogg presents to the clinic for a follow-up appointment from 09/09/2020 -RIGHT TF ASHISH L4/5 and L5/S1 . Since the last visit, Hector Kellogg states the pain has been improving. Current pain intensity is 4/10.  He reports 50% pain improvement.  Currently he is taking gabapentin 400mg BID (he was prescribed to take it TID).  He has not done any physical therapy.    Interval History 10/15/2020:  The patient presents for follow-up of lower back pain.  She he has approximately 1 month postprocedure.  He did initially endorse he had some benefit but denies any focal benefit.  He states back pain is worse than leg pain at this time.  He is currently taking gabapentin 800 mg Q a.m. and has recently started to do 800 mg q.h.s..  He is not taking t.i.d. which was prescribed.  The got discussed to do so as he finds it beneficial but does not last over  several hours.  He has questions regarding RFA says they have been beneficial to his family member.  It was discussed he had a facet injection and he states is unclear whether he had any short-term benefit.  He is unsure if he had any short-term benefit. Denies perineal paresthesias, bowel/bladder dysfunction.    Interval History 11/19/2020:  Patient presents for follow-up of lower back pain.  Patient has continued lower back pain and bilateral leg pains.  He has difficulty any walking and is in wheelchair for transportation.  He is status post medial branch block with no focal benefit, not even temporarily. He denies any loss of bowel, bladder or saddle paresthesias.     Interval History 9/16/2021:  The patient presents for follow-up evaluation of lower back pain.  Over interval he has had Laminectomy at L4/L5 and is doing significantly.  He is able to ambulate without a walker. He states straining R shoulder recently but improving and does not wish to have PT or intervention as it is easing. He continues to take gabapentin 800 mg t.i.d. side medication. He is hear specifically to refill Neurontin.       Interval History 7/11/2022:  Mr Kellogg presents for delayed FU for lower back pain and pain down primarily R leg pain. Continued leg weakness and pain which has not improved since surgery at this time. Denies any focal complaint concerning for cauda equina and no recent imaging. He continues to take Neurontin 800mg TID and Motrin PRN sparingly.       Pain Medications:  Gabapentin 400 mg TID  Meloxican 15 mg  Opioid Contract: no     report:  Not applicable    Pain Procedures:   5/27/2020 Right L4/5, and L5/S1 TFESI 100% relief for 4 weeks  8/5/2020   Right L4-5 & L5-S1 Facet joint injection with minimal relief.   09/09/2020 - RIGHT TF ASHISH L4/5 and L5/S1 - 50% improvement    Physical Therapy/Home Exercise: no    Imaging:   3/12/20 MRI Lumbar Spine  Narrative       EXAMINATION:  MRI LUMBAR SPINE WITHOUT  CONTRAST    CLINICAL HISTORY:  Low back pain, >6wks conservative tx, persistent-progressive sx, surgical candidate; Spinal stenosis, lumbar region without neurogenic claudication    TECHNIQUE:  Multiplanar, multisequence MR images were acquired from the thoracolumbar junction to the sacrum without contrast.    COMPARISON:  None available.    FINDINGS:  Alignment: Grade 1 anterolisthesis of L4-L5 and L5-S1.  Alignment of the lumbar spine is otherwise anatomic.    Vertebrae: Normal marrow signal. No fracture.  Bilateral L5 spondylolysis.    Discs: Scattered mild to moderate disc height loss of the lumbar spine.  Note made of increased STIR signal within the L2-L3 disc space.    Cord: Normal.  Conus terminates at L1.    Degenerative findings:    T12-L1: No spinal canal stenosis or neural foraminal narrowing.    L1-L2: Circumferential disc bulge with mild bilateral facet arthropathy.  No spinal canal stenosis or neural foraminal narrowing.    L2-L3: Circumferential disc bulge with mild bilateral facet arthropathy resulting in mild bilateral neural foraminal narrowing.  No spinal canal stenosis    L3-L4: Circumferential disc bulge with moderate bilateral facet arthropathy and ligamentum flavum hypertrophy results in mild spinal canal stenosis and mild bilateral neural foraminal narrowing.    L4-L5: Grade 1 anterolisthesis with diffuse disc bulge with severe bilateral facet arthropathy and ligamentum flavum hypertrophy results in moderate spinal canal stenosis and severe bilateral neural foraminal narrowing.    L5-S1: Circumferential disc bulge with moderate bilateral facet arthropathy resulting in moderate spinal canal stenosis and moderate bilateral neural foraminal narrowing.    Paraspinal muscles & soft tissues: Exophytic simple cysts in the left kidney.       Impression         1. Multilevel degenerative changes of the lumbar spine, most significant at the levels of L4-L5 and L5-S1.  At L4-L5 there is moderate spinal  canal stenosis and severe bilateral neural foraminal narrowing.  L5-S1, there is moderate spinal canal stenosis and moderate bilateral neural foraminal narrowing.  2. Bilateral spondylolysis at L5 grade 1 anterolisthesis of L5-S1.  3. Increased STIR signal within the L2-L3 disc space.  While this is favored to be degenerative, similar findings may be seen with early discitis.  Clinical correlation with lab parameters advised.       Allergies:   Review of patient's allergies indicates:   Allergen Reactions    Influenza virus vaccines Other (See Comments)     Caused fever, chills, and made tremors worse       Current Medications:   Current Outpatient Medications   Medication Sig Dispense Refill    aspirin (ECOTRIN) 81 MG EC tablet Take 81 mg by mouth once daily.      carbidopa-levodopa  mg (SINEMET)  mg per tablet TAKE 1 TABLET BY MOUTH FIVE (5) TIMES DAILY  450 tablet 3    citalopram (CELEXA) 20 MG tablet TAKE ONE TABLET BY MOUTH ONCE DAILY 30 tablet 10    gabapentin (NEURONTIN) 400 MG capsule TAKE TWO CAPSULES BY MOUTH THREE TIMES DAILY 180 capsule 2    ibuprofen (ADVIL,MOTRIN) 800 MG tablet Take 800 mg by mouth every 6 (six) hours as needed.      loratadine (CLARITIN) 10 mg tablet Take 10 mg by mouth as needed for Allergies.      metoprolol succinate (TOPROL-XL) 50 MG 24 hr tablet TAKE 1 TABLET (50 MG TOTAL) BY MOUTH ONCE DAILY. 90 tablet 3    multivitamin capsule Take 1 capsule by mouth once daily.      rasagiline (AZILECT) 1 mg Tab Take 1 tablet (1 mg total) by mouth once daily. 30 tablet 11    rosuvastatin (CRESTOR) 40 MG Tab TAKE ONE TABLET BY MOUTH EVERY EVENING 90 tablet 0    albuterol (PROVENTIL/VENTOLIN HFA) 90 mcg/actuation inhaler Inhale 2 puffs into the lungs every 6 (six) hours as needed for Wheezing or Shortness of Breath. Rescue (Patient not taking: No sig reported) 18 g 6     Current Facility-Administered Medications   Medication Dose Route Frequency Provider Last Rate Last  Admin    acetaminophen tablet 650 mg  650 mg Oral Once PRN Tima Haynes MD        albuterol inhaler 2 puff  2 puff Inhalation Q20 Min PRN Tima Haynes MD        diphenhydrAMINE injection 25 mg  25 mg Intravenous Once PRN Tima Haynes MD        EPINEPHrine (EPIPEN) 0.3 mg/0.3 mL pen injection 0.3 mg  0.3 mg Intramuscular PRN Tima Haynes MD        methylPREDNISolone sodium succinate injection 40 mg  40 mg Intravenous Once PRN Tima Haynes MD        ondansetron disintegrating tablet 4 mg  4 mg Oral Once PRN Tima Haynes MD        sodium chloride 0.9% 500 mL flush bag   Intravenous PRN Tima Haynes MD        sodium chloride 0.9% flush 10 mL  10 mL Intravenous PRN Tima Haynes MD         REVIEW OF SYSTEMS:    GENERAL:  No weight loss, malaise or fevers.  HEENT:  Negative for frequent or significant headaches. H/o cataracts.  NECK:  Negative for lumps, goiter, pain and significant neck swelling.  RESPIRATORY:  Negative for cough, wheezing or shortness of breath.  CARDIOVASCULAR:  Negative for chest pain, leg swelling or palpitations. HTN. HLD. CAD. H/o MI.  GI:  Negative for abdominal discomfort, blood in stools or black stools or change in bowel habits.  MUSCULOSKELETAL:  See HPI.  SKIN:  Negative for lesions, rash, and itching.  PSYCH:  Depression.  HEMATOLOGY/LYMPHOLOGY:  Negative for prolonged bleeding, bruising easily or swollen nodes.  NEURO:   Parkinson's disease.  All other reviewed and negative other than HPI.    Past Medical History:  Past Medical History:   Diagnosis Date    Cataract     Chronic back pain greater than 3 months duration     Coronary artery disease     3 stents    DDD (degenerative disc disease), lumbar 5/16/2020    Glaucoma suspect of both eyes     Hyperlipidemia     Hypertension     MI, old 2006    Parkinson disease     Renal disorder     Kidney stones       Past Surgical History:  Past Surgical History:   Procedure Laterality  Date    CARDIAC CATHETERIZATION  2006    x 3    CARDIAC CATHETERIZATION  10/11/2015    x 2    CATARACT EXTRACTION W/  INTRAOCULAR LENS IMPLANT Right 03/06/2018    Dr. Liu    CORONARY ANGIOPLASTY  2006, 2015    3 stents    EXTRACORPOREAL SHOCK WAVE LITHOTRIPSY Left 4/20/2021    Procedure: LITHOTRIPSY-EXTRACORPOREAL SHOCK WAVE;  Surgeon: Ottoniel Obando MD;  Location: [facility] OR;  Service: Urology;  Laterality: Left;    EYE SURGERY      cataracts bilateral    HEMORRHOID SURGERY      INJECTION OF ANESTHETIC AGENT AROUND NERVE Bilateral 11/4/2020    Procedure: BLOCK, NERVE BILATERAL L2,3,4,5;  Surgeon: Ramiro Calvillo MD;  Location: [facility] PAIN MGT;  Service: Pain Management;  Laterality: Bilateral;  BLOCK, NERVE BILATERAL L2,3,4,5    INJECTION OF FACET JOINT Right 8/5/2020    Procedure: INJECTION, FACET JOINT, L4-L5 , L5-S1;  Surgeon: Ramiro Calvillo MD;  Location: [facility] PAIN [facility]GT;  Service: Pain Management;  Laterality: Right;    LAMINOFORAMINOTOMY OF SPINE USING MINIMALLY INVASIVE TECHNIQUE N/A 2/1/2021    Procedure: MIS L4-5 Laminectomy;  Surgeon: Bernard Sheldon MD;  Location: [facility] OR;  Service: Neurosurgery;  Laterality: N/A;  Anesthesia: General  Nerve Monitoring: EMG/SEP  Position: Prone  Bed: Eliot Frame on andres  Headrest: Prone View  Radiology: C-arm  Misc: Microscope, microinstruments    TRANSFORAMINAL EPIDURAL INJECTION OF STEROID Right 5/27/2020    Procedure: INJECTION, STEROID, EPIDURAL, TRANSFORAMINAL APPROACH;  Surgeon: Ramiro Calvillo MD;  Location: [facility] PAIN [facility]GT;  Service: Pain Management;  Laterality: Right;  Right TF ASHISH L4-L5, L5-S1    PTcannot come earlier than 12:30    TRANSFORAMINAL EPIDURAL INJECTION OF STEROID Right 9/9/2020    Procedure: INJECTION, STEROID, EPIDURAL, TRANSFORAMINAL APPROACH;  Surgeon: Ramiro Calvillo MD;  Location: [facility] PAIN [facility]GT;  Service: Pain Management;  Laterality: Right;  RIGHT TF ASHISH L4/5 and L5/S1       Family History:  Family History  "  Problem Relation Age of Onset    Heart attack Father     Cataracts Father     Heart disease Maternal Uncle     Heart disease Paternal Uncle     Hypertension Neg Hx     Asthma Neg Hx     Emphysema Neg Hx     Amblyopia Neg Hx     Blindness Neg Hx     Macular degeneration Neg Hx     Retinal detachment Neg Hx     Strabismus Neg Hx        Social History:  Social History     Socioeconomic History    Marital status:    Tobacco Use    Smoking status: Former Smoker     Packs/day: 1.00     Years: 40.00     Pack years: 40.00     Quit date: 2014     Years since quittin.4    Smokeless tobacco: Never Used   Substance and Sexual Activity    Alcohol use: Yes     Comment: socially    Drug use: No       OBJECTIVE:    /75 (BP Location: Left arm, Patient Position: Sitting, BP Method: Medium (Automatic))   Pulse 78   Resp 18   Ht 5' 4" (1.626 m)   Wt 71.2 kg (157 lb)   SpO2 100%   BMI 26.95 kg/m²     PHYSICAL EXAMINATION:  Voice normal.  Normal affect without evidence of distress.  Gait: shuffling gait  Musculoskeletal: diffuse but equal strength decrease to BLE      ASSESSMENT: 77 y.o. year old male with low back pain, consistent with        1. Post laminectomy syndrome     2. Dorsalgia, unspecified  MRI Lumbar Spine Without Contrast    X-Ray Lumbar Complete Including Flex And Ext   3. Lumbar radiculopathy     4. Lumbar stenosis with neurogenic claudication         PLAN:     - Prior records reviewed   - He presents for delayed FU for continued pain and leg weakness since L4/5 laminectomy  - Will update Imaging prior to considering if intervention indicated as prior ESIs prior to surgery had little to no benefit  - Continue Neoruntin 800mg TID  - Discussed limiting use of Motrin and can use Tylenol as needed as well   - I have stressed the importance of physical activity and a home exercise plan to help with pain and improve health.  - Patient can continue with medications for now since they " are providing benefits, using them appropriately, and without side effects.  - Counseled patient regarding the importance of activity modification.  - RTC after imaging for review    The above plan and management options were discussed at length with patient. Patient is in agreement with the above and verbalized understanding.    Marcelo Washington NP    07/12/2022    I spent a total of 30 minutes on the day of the visit.  This includes face to face time and non-face to face time preparing to see the patient by reviewing previous labs/imaging, obtaining and/or reviewing separately obtained history, documenting clinical information in the electronic or other health record, independently interpreting results and communicating results to the patient/family/caregiver.

## 2022-07-14 ENCOUNTER — OFFICE VISIT (OUTPATIENT)
Dept: INTERNAL MEDICINE | Facility: CLINIC | Age: 77
End: 2022-07-14
Payer: MEDICARE

## 2022-07-14 VITALS
BODY MASS INDEX: 25.25 KG/M2 | SYSTOLIC BLOOD PRESSURE: 120 MMHG | HEIGHT: 64 IN | HEART RATE: 90 BPM | TEMPERATURE: 98 F | DIASTOLIC BLOOD PRESSURE: 72 MMHG | WEIGHT: 147.88 LBS | OXYGEN SATURATION: 96 %

## 2022-07-14 DIAGNOSIS — J44.9 CHRONIC OBSTRUCTIVE PULMONARY DISEASE, UNSPECIFIED COPD TYPE: ICD-10-CM

## 2022-07-14 DIAGNOSIS — Z00.00 ANNUAL PHYSICAL EXAM: Primary | ICD-10-CM

## 2022-07-14 DIAGNOSIS — M46.1 SACROILIITIS: ICD-10-CM

## 2022-07-14 DIAGNOSIS — G20.A1 PARKINSON'S DISEASE: ICD-10-CM

## 2022-07-14 DIAGNOSIS — E78.2 MIXED HYPERLIPIDEMIA: ICD-10-CM

## 2022-07-14 DIAGNOSIS — I27.20 PULMONARY HTN: ICD-10-CM

## 2022-07-14 DIAGNOSIS — E04.1 THYROID NODULE: ICD-10-CM

## 2022-07-14 DIAGNOSIS — F41.9 ANXIETY: ICD-10-CM

## 2022-07-14 DIAGNOSIS — I73.9 CLAUDICATION: ICD-10-CM

## 2022-07-14 DIAGNOSIS — M51.36 DDD (DEGENERATIVE DISC DISEASE), LUMBAR: ICD-10-CM

## 2022-07-14 DIAGNOSIS — N20.0 NEPHROLITHIASIS: ICD-10-CM

## 2022-07-14 DIAGNOSIS — M85.80 OSTEOPENIA, UNSPECIFIED LOCATION: ICD-10-CM

## 2022-07-14 DIAGNOSIS — I10 ESSENTIAL HYPERTENSION: ICD-10-CM

## 2022-07-14 DIAGNOSIS — J43.2 CENTRILOBULAR EMPHYSEMA: ICD-10-CM

## 2022-07-14 DIAGNOSIS — I25.10 CORONARY ARTERY DISEASE INVOLVING NATIVE CORONARY ARTERY OF NATIVE HEART WITHOUT ANGINA PECTORIS: ICD-10-CM

## 2022-07-14 PROCEDURE — 1159F MED LIST DOCD IN RCRD: CPT | Mod: CPTII,S$GLB,, | Performed by: INTERNAL MEDICINE

## 2022-07-14 PROCEDURE — 90732 PPSV23 VACC 2 YRS+ SUBQ/IM: CPT | Mod: S$GLB,,, | Performed by: INTERNAL MEDICINE

## 2022-07-14 PROCEDURE — 3074F PR MOST RECENT SYSTOLIC BLOOD PRESSURE < 130 MM HG: ICD-10-PCS | Mod: CPTII,S$GLB,, | Performed by: INTERNAL MEDICINE

## 2022-07-14 PROCEDURE — 1101F PR PT FALLS ASSESS DOC 0-1 FALLS W/OUT INJ PAST YR: ICD-10-PCS | Mod: CPTII,S$GLB,, | Performed by: INTERNAL MEDICINE

## 2022-07-14 PROCEDURE — 1126F AMNT PAIN NOTED NONE PRSNT: CPT | Mod: CPTII,S$GLB,, | Performed by: INTERNAL MEDICINE

## 2022-07-14 PROCEDURE — 3078F DIAST BP <80 MM HG: CPT | Mod: CPTII,S$GLB,, | Performed by: INTERNAL MEDICINE

## 2022-07-14 PROCEDURE — 1159F PR MEDICATION LIST DOCUMENTED IN MEDICAL RECORD: ICD-10-PCS | Mod: CPTII,S$GLB,, | Performed by: INTERNAL MEDICINE

## 2022-07-14 PROCEDURE — 1160F PR REVIEW ALL MEDS BY PRESCRIBER/CLIN PHARMACIST DOCUMENTED: ICD-10-PCS | Mod: CPTII,S$GLB,, | Performed by: INTERNAL MEDICINE

## 2022-07-14 PROCEDURE — 1101F PT FALLS ASSESS-DOCD LE1/YR: CPT | Mod: CPTII,S$GLB,, | Performed by: INTERNAL MEDICINE

## 2022-07-14 PROCEDURE — 3074F SYST BP LT 130 MM HG: CPT | Mod: CPTII,S$GLB,, | Performed by: INTERNAL MEDICINE

## 2022-07-14 PROCEDURE — 99499 UNLISTED E&M SERVICE: CPT | Mod: S$GLB,,, | Performed by: INTERNAL MEDICINE

## 2022-07-14 PROCEDURE — 99397 PER PM REEVAL EST PAT 65+ YR: CPT | Mod: 25,GZ,S$GLB, | Performed by: INTERNAL MEDICINE

## 2022-07-14 PROCEDURE — 3288F PR FALLS RISK ASSESSMENT DOCUMENTED: ICD-10-PCS | Mod: CPTII,S$GLB,, | Performed by: INTERNAL MEDICINE

## 2022-07-14 PROCEDURE — 3078F PR MOST RECENT DIASTOLIC BLOOD PRESSURE < 80 MM HG: ICD-10-PCS | Mod: CPTII,S$GLB,, | Performed by: INTERNAL MEDICINE

## 2022-07-14 PROCEDURE — 99999 PR PBB SHADOW E&M-EST. PATIENT-LVL V: ICD-10-PCS | Mod: PBBFAC,,, | Performed by: INTERNAL MEDICINE

## 2022-07-14 PROCEDURE — G0009 PNEUMOCOCCAL POLYSACCHARIDE VACCINE 23-VALENT =>2YO SQ IM: ICD-10-PCS | Mod: S$GLB,,, | Performed by: INTERNAL MEDICINE

## 2022-07-14 PROCEDURE — 1160F RVW MEDS BY RX/DR IN RCRD: CPT | Mod: CPTII,S$GLB,, | Performed by: INTERNAL MEDICINE

## 2022-07-14 PROCEDURE — 90732 PNEUMOCOCCAL POLYSACCHARIDE VACCINE 23-VALENT =>2YO SQ IM: ICD-10-PCS | Mod: S$GLB,,, | Performed by: INTERNAL MEDICINE

## 2022-07-14 PROCEDURE — 99397 PR PREVENTIVE VISIT,EST,65 & OVER: ICD-10-PCS | Mod: 25,GZ,S$GLB, | Performed by: INTERNAL MEDICINE

## 2022-07-14 PROCEDURE — 99999 PR PBB SHADOW E&M-EST. PATIENT-LVL V: CPT | Mod: PBBFAC,,, | Performed by: INTERNAL MEDICINE

## 2022-07-14 PROCEDURE — 3288F FALL RISK ASSESSMENT DOCD: CPT | Mod: CPTII,S$GLB,, | Performed by: INTERNAL MEDICINE

## 2022-07-14 PROCEDURE — 1126F PR PAIN SEVERITY QUANTIFIED, NO PAIN PRESENT: ICD-10-PCS | Mod: CPTII,S$GLB,, | Performed by: INTERNAL MEDICINE

## 2022-07-14 PROCEDURE — G0009 ADMIN PNEUMOCOCCAL VACCINE: HCPCS | Mod: S$GLB,,, | Performed by: INTERNAL MEDICINE

## 2022-07-14 PROCEDURE — 99499 RISK ADDL DX/OHS AUDIT: ICD-10-PCS | Mod: S$GLB,,, | Performed by: INTERNAL MEDICINE

## 2022-07-14 RX ORDER — BUDESONIDE, GLYCOPYRROLATE, AND FORMOTEROL FUMARATE 160; 9; 4.8 UG/1; UG/1; UG/1
AEROSOL, METERED RESPIRATORY (INHALATION)
COMMUNITY
Start: 2022-06-13 | End: 2022-10-13 | Stop reason: SDUPTHER

## 2022-07-14 NOTE — PROGRESS NOTES
Subjective:       Patient ID: Hector Kellogg is a 77 y.o. male.    Chief Complaint: Annual Exam (Labs Done )    HPI   77 y.o. Male here for annual exam.     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 Liu  2006, 2015: CORONARY ANGIOPLASTY      Comment:  3 stents  4/20/2021: EXTRACORPOREAL SHOCK WAVE LITHOTRIPSY; Left      Comment:  Procedure: LITHOTRIPSY-EXTRACORPOREAL SHOCK WAVE;                 Surgeon: Ottoniel Obando MD;  Location: [facility] OR;                 Service: Urology;  Laterality: Left;  No date: EYE SURGERY      Comment:  cataracts bilateral  No date: HEMORRHOID SURGERY  11/4/2020: INJECTION OF ANESTHETIC AGENT AROUND NERVE; Bilateral      Comment:  Procedure: BLOCK, NERVE BILATERAL L2,3,4,5;  Surgeon:                Ramiro Calvillo MD;  Location: [facility] PAIN MGT;                 Service: Pain Management;  Laterality: Bilateral;  BLOCK,               NERVE BILATERAL L2,3,4,5  8/5/2020: INJECTION OF FACET JOINT; Right      Comment:  Procedure: INJECTION, FACET JOINT, L4-L5 , L5-S1;                 Surgeon: Ramiro Calvillo MD;  Location: [facility] PAIN [facility]GT;               Service: Pain Management;  Laterality: Right;  2/1/2021: LAMINOFORAMINOTOMY OF SPINE USING MINIMALLY INVASIVE    TECHNIQUE; N/A      Comment:  Procedure: MIS L4-5 Laminectomy;  Surgeon: Bernard Sheldon MD;  Location: [facility] OR;  Service: Neurosurgery;                Laterality: N/A;  Anesthesia: GeneralNerve Monitoring:                EMG/SEPPosition: ProneBed: Eliot Frame on                jacksonHeadrest: Prone ViewRadiology: C-armMisc:                Microscope, microinstruments  2020: TRANSFORAMINAL EPIDURAL INJECTION OF STEROID; Right      Comment:  Procedure: INJECTION, STEROID, EPIDURAL, TRANSFORAMINAL                APPROACH;  Surgeon: Ramiro Calvillo MD;  Location:                [facility] PAIN MGT;  Service: Pain Management;  Laterality:                Right;  Right TF ASHISH L4-L5, L5-W3EVpwewlk come                earlier than 12:30  2020: TRANSFORAMINAL EPIDURAL INJECTION OF STEROID; Right      Comment:  Procedure: INJECTION, STEROID, EPIDURAL, TRANSFORAMINAL                APPROACH;  Surgeon: Ramiro Calvillo MD;  Location:                [facility] PAIN MGT;  Service: Pain Management;  Laterality:                Right;  RIGHT TF ASHISH L4/5 and L5/S1  Social History    Socioeconomic History      Marital status:     Tobacco Use      Smoking status: Former Smoker        Packs/day: 1.00        Years: 40.00        Pack years: 40        Quit date: 2014        Years since quittin.4      Smokeless tobacco: Never Used    Substance and Sexual Activity      Alcohol use: Yes        Comment: socially      Drug use: No    Review of patient's allergies indicates:   -- Influenza virus vaccines -- Other (See Comments)    --  Caused fever, chills, and made tremors worse  Artur KHOI Valdez had no medications administered during this visit.    Review of Systems   Constitutional: Negative for activity change, appetite change, chills, diaphoresis, fatigue, fever and unexpected weight change.   HENT: Negative for nasal congestion, mouth sores, postnasal drip, rhinorrhea, sinus pressure/congestion, sneezing,  sore throat, trouble swallowing and voice change.    Eyes: Negative for discharge, itching and visual disturbance.   Respiratory: Negative for cough, chest tightness, shortness of breath and wheezing.    Cardiovascular: Negative for chest pain, palpitations and leg swelling.   Gastrointestinal: Negative for abdominal pain, blood in stool, constipation, diarrhea, nausea and vomiting.   Endocrine: Negative for cold intolerance and heat intolerance.   Genitourinary: Negative for difficulty urinating, dysuria, flank pain, hematuria and urgency.   Musculoskeletal: Positive for arthralgias and back pain. Negative for myalgias and neck pain.   Integumentary:  Negative for rash and wound.   Allergic/Immunologic: Negative for environmental allergies and food allergies.   Neurological: Positive for tremors. Negative for dizziness, seizures, syncope, weakness and headaches.   Hematological: Negative for adenopathy. Does not bruise/bleed easily.   Psychiatric/Behavioral: Negative for confusion, sleep disturbance and suicidal ideas. The patient is not nervous/anxious.          Objective:      Physical Exam  Constitutional:       General: He is not in acute distress.     Appearance: He is well-developed. He is not diaphoretic.   HENT:      Head: Normocephalic and atraumatic.      Right Ear: External ear normal.      Left Ear: External ear normal.      Nose: Nose normal.      Mouth/Throat:      Pharynx: No oropharyngeal exudate.   Eyes:      General: No scleral icterus.        Right eye: No discharge.         Left eye: No discharge.      Conjunctiva/sclera: Conjunctivae normal.      Pupils: Pupils are equal, round, and reactive to light.   Neck:      Thyroid: No thyromegaly.      Vascular: No JVD.   Cardiovascular:      Rate and Rhythm: Normal rate and regular rhythm.      Heart sounds: Normal heart sounds. No murmur heard.  Pulmonary:      Effort: Pulmonary effort is normal. No respiratory distress.      Breath sounds: Normal  breath sounds. No wheezing or rales.   Abdominal:      General: Bowel sounds are normal. There is no distension.      Palpations: Abdomen is soft.      Tenderness: There is no abdominal tenderness. There is no guarding.   Musculoskeletal:      Cervical back: Normal range of motion and neck supple.      Right lower leg: No edema.      Left lower leg: No edema.   Lymphadenopathy:      Cervical: No cervical adenopathy.   Skin:     General: Skin is warm and dry.      Coloration: Skin is not pale.      Findings: No rash.   Neurological:      Mental Status: He is alert and oriented to person, place, and time.   Psychiatric:         Judgment: Judgment normal.         Assessment:       Problem List Items Addressed This Visit        Neuro    Parkinson's disease    DDD (degenerative disc disease), lumbar       Psychiatric    Anxiety       Pulmonary    Pulmonary HTN    Chronic obstructive pulmonary disease    Centrilobular emphysema       Cardiac/Vascular    Hyperlipidemia    Essential hypertension    Coronary artery disease    Claudication       Renal/    Nephrolithiasis       Orthopedic    Sacroiliitis      Other Visit Diagnoses     Annual physical exam    -  Primary    Relevant Orders    DXA Bone Density Spine And Hip    (In Office Administered) Pneumococcal Polysaccharide Vaccine (23 Valent) (SQ/IM) (Completed)    Thyroid nodule        Relevant Orders    US Soft Tissue Head Neck Thyroid    Osteopenia, unspecified location        Relevant Orders    DXA Bone Density Spine And Hip          Plan:    Blood work reviewed with pt      CAD- S/P NSTEMI and PCI in 2006, with repeat CATH(10/11/15) placing stents to RCA and OM1- stable       Continue BB/statin/ASA; pt has stopped the Imdur 2/2 to low BP      HTN- stable on Toprol XL 50 mg daily      HLD- controlled on Crestor      Parkinson's disease- stable on Sinemet, managed by Neuro      COPD- stable      Mild Pulmonary HTN- stable     Anxiety- stable on Celexa 20 mg daily      Thyroid nodule- repeat US     DJD Lumbar spine- followed by     Nephrolithiasis- stable     F/u in 6 months

## 2022-07-18 ENCOUNTER — PATIENT MESSAGE (OUTPATIENT)
Dept: INTERNAL MEDICINE | Facility: CLINIC | Age: 77
End: 2022-07-18
Payer: MEDICARE

## 2022-07-22 ENCOUNTER — APPOINTMENT (OUTPATIENT)
Dept: RADIOLOGY | Facility: CLINIC | Age: 77
End: 2022-07-22
Attending: INTERNAL MEDICINE
Payer: MEDICARE

## 2022-07-22 DIAGNOSIS — M85.80 OSTEOPENIA, UNSPECIFIED LOCATION: ICD-10-CM

## 2022-07-22 DIAGNOSIS — Z00.00 ANNUAL PHYSICAL EXAM: ICD-10-CM

## 2022-07-22 PROCEDURE — 77080 DXA BONE DENSITY AXIAL: CPT | Mod: 26,,, | Performed by: INTERNAL MEDICINE

## 2022-07-22 PROCEDURE — 77080 DXA BONE DENSITY AXIAL: CPT | Mod: TC,PO

## 2022-07-22 PROCEDURE — 77080 DEXA BONE DENSITY SPINE HIP: ICD-10-PCS | Mod: 26,,, | Performed by: INTERNAL MEDICINE

## 2022-07-29 ENCOUNTER — OFFICE VISIT (OUTPATIENT)
Dept: UROLOGY | Facility: CLINIC | Age: 77
End: 2022-07-29
Payer: MEDICARE

## 2022-07-29 VITALS
SYSTOLIC BLOOD PRESSURE: 156 MMHG | WEIGHT: 147 LBS | DIASTOLIC BLOOD PRESSURE: 84 MMHG | HEART RATE: 75 BPM | BODY MASS INDEX: 25.1 KG/M2 | HEIGHT: 64 IN

## 2022-07-29 DIAGNOSIS — N20.0 NEPHROLITHIASIS: Primary | ICD-10-CM

## 2022-07-29 PROCEDURE — 3288F FALL RISK ASSESSMENT DOCD: CPT | Mod: CPTII,S$GLB,, | Performed by: UROLOGY

## 2022-07-29 PROCEDURE — 3077F PR MOST RECENT SYSTOLIC BLOOD PRESSURE >= 140 MM HG: ICD-10-PCS | Mod: CPTII,S$GLB,, | Performed by: UROLOGY

## 2022-07-29 PROCEDURE — 3079F DIAST BP 80-89 MM HG: CPT | Mod: CPTII,S$GLB,, | Performed by: UROLOGY

## 2022-07-29 PROCEDURE — 3288F PR FALLS RISK ASSESSMENT DOCUMENTED: ICD-10-PCS | Mod: CPTII,S$GLB,, | Performed by: UROLOGY

## 2022-07-29 PROCEDURE — 1101F PT FALLS ASSESS-DOCD LE1/YR: CPT | Mod: CPTII,S$GLB,, | Performed by: UROLOGY

## 2022-07-29 PROCEDURE — 1160F PR REVIEW ALL MEDS BY PRESCRIBER/CLIN PHARMACIST DOCUMENTED: ICD-10-PCS | Mod: CPTII,S$GLB,, | Performed by: UROLOGY

## 2022-07-29 PROCEDURE — 1126F PR PAIN SEVERITY QUANTIFIED, NO PAIN PRESENT: ICD-10-PCS | Mod: CPTII,S$GLB,, | Performed by: UROLOGY

## 2022-07-29 PROCEDURE — 1126F AMNT PAIN NOTED NONE PRSNT: CPT | Mod: CPTII,S$GLB,, | Performed by: UROLOGY

## 2022-07-29 PROCEDURE — 3077F SYST BP >= 140 MM HG: CPT | Mod: CPTII,S$GLB,, | Performed by: UROLOGY

## 2022-07-29 PROCEDURE — 1159F PR MEDICATION LIST DOCUMENTED IN MEDICAL RECORD: ICD-10-PCS | Mod: CPTII,S$GLB,, | Performed by: UROLOGY

## 2022-07-29 PROCEDURE — 99213 PR OFFICE/OUTPT VISIT, EST, LEVL III, 20-29 MIN: ICD-10-PCS | Mod: S$GLB,,, | Performed by: UROLOGY

## 2022-07-29 PROCEDURE — 99999 PR PBB SHADOW E&M-EST. PATIENT-LVL III: ICD-10-PCS | Mod: PBBFAC,,, | Performed by: UROLOGY

## 2022-07-29 PROCEDURE — 99213 OFFICE O/P EST LOW 20 MIN: CPT | Mod: S$GLB,,, | Performed by: UROLOGY

## 2022-07-29 PROCEDURE — 1160F RVW MEDS BY RX/DR IN RCRD: CPT | Mod: CPTII,S$GLB,, | Performed by: UROLOGY

## 2022-07-29 PROCEDURE — 99999 PR PBB SHADOW E&M-EST. PATIENT-LVL III: CPT | Mod: PBBFAC,,, | Performed by: UROLOGY

## 2022-07-29 PROCEDURE — 3079F PR MOST RECENT DIASTOLIC BLOOD PRESSURE 80-89 MM HG: ICD-10-PCS | Mod: CPTII,S$GLB,, | Performed by: UROLOGY

## 2022-07-29 PROCEDURE — 1101F PR PT FALLS ASSESS DOC 0-1 FALLS W/OUT INJ PAST YR: ICD-10-PCS | Mod: CPTII,S$GLB,, | Performed by: UROLOGY

## 2022-07-29 PROCEDURE — 1159F MED LIST DOCD IN RCRD: CPT | Mod: CPTII,S$GLB,, | Performed by: UROLOGY

## 2022-07-29 NOTE — PROGRESS NOTES
"Subjective:      Hector Kellogg is a 77 y.o. male who returns today regarding his nephrolithiasis.    He has known low renal stone found on workup for hematuria. He is s/p ESWL in April 2021 with persistent stone noted on FU imaging.    Recently had some mild flank pain and hematuria, but no other c/o.    The following portions of the patient's history were reviewed and updated as appropriate: allergies, current medications, past family history, past medical history, past social history, past surgical history and problem list.    Review of Systems  A comprehensive multipoint review of systems was negative except as otherwise stated in the HPI.     Objective:   Vitals: BP (!) 156/84   Pulse 75   Ht 5' 4" (1.626 m)   Wt 66.7 kg (147 lb)   BMI 25.23 kg/m²     Physical Exam   General: alert and oriented, no acute distress  Respiratory: Symmetric expansion, non-labored breathing  Neuro: no gross deficits  Psych: normal judgment and insight, normal mood/affect and non-anxious    Lab Review     Lab Results   Component Value Date    WBC 6.10 06/22/2022    HGB 15.1 06/22/2022    HCT 47.1 06/22/2022     (H) 06/22/2022     06/22/2022     Lab Results   Component Value Date    CREATININE 1.1 06/22/2022    BUN 15 06/22/2022     Lab Results   Component Value Date    PSA 3.9 06/22/2022       Imaging (all images personally reviewed; agree with report below)  Results for orders placed during the hospital encounter of 07/25/22    X-Ray Abdomen AP 1 View    Narrative  EXAMINATION:  XR ABDOMEN AP 1 VIEW    CLINICAL HISTORY:  Calculus of kidney    TECHNIQUE:  AP View(s) of the abdomen was performed.    COMPARISON:  Multiple prior radiographs, the most recent January 3, 2022    FINDINGS:  Again, there is an 11 mm calcification to the left of the upper lumbar spine, similar in position to prior imaging and likely relating to a left renal calculus.  No definite additional calculi are demonstrated, noting that the " bowel content partly obscures detail.  There is some vascular calcification within the pelvis.  Degenerative changes are present in the spine.    Impression  11 mm calcification likely left renal calculus, similar to prior.  Additional calculi suspected previously are not well visualized today which may in part relate to overlying bowel.      Electronically signed by: Melania Mariee MD  Date:    07/25/2022  Time:    10:20        Assessment and Plan:   1. Nephrolithiasis  -- Again discussed ureteroscopy as option for further surgical treatment of this stone as it failed to clear with ESWL, however also explained that continued monitoring is also reasonable for now.  -- FU 12 mos w/ KUB

## 2022-08-01 ENCOUNTER — TELEPHONE (OUTPATIENT)
Dept: INTERNAL MEDICINE | Facility: CLINIC | Age: 77
End: 2022-08-01
Payer: MEDICARE

## 2022-08-01 DIAGNOSIS — E04.1 THYROID NODULE: Primary | ICD-10-CM

## 2022-08-05 ENCOUNTER — TELEPHONE (OUTPATIENT)
Dept: SPINE | Facility: CLINIC | Age: 77
End: 2022-08-05
Payer: MEDICARE

## 2022-08-05 NOTE — TELEPHONE ENCOUNTER
This message is for patient in regards to his/her appointment 08/08/22 at 09:20a   With MANDEEP Small.      [facility] Policy: For the safety of all patients and staff members.     Patient Visitor policy: During this visit we're informing all patients that face mask are required.     If you have any questions or concerns please contact [phone]      Pt verbalized understanding and has confirmed appt

## 2022-08-08 ENCOUNTER — OFFICE VISIT (OUTPATIENT)
Dept: PAIN MEDICINE | Facility: CLINIC | Age: 77
End: 2022-08-08
Payer: MEDICARE

## 2022-08-08 VITALS
RESPIRATION RATE: 18 BRPM | DIASTOLIC BLOOD PRESSURE: 93 MMHG | WEIGHT: 147 LBS | HEIGHT: 64 IN | HEART RATE: 72 BPM | SYSTOLIC BLOOD PRESSURE: 150 MMHG | BODY MASS INDEX: 25.1 KG/M2

## 2022-08-08 DIAGNOSIS — M48.062 LUMBAR STENOSIS WITH NEUROGENIC CLAUDICATION: Primary | ICD-10-CM

## 2022-08-08 DIAGNOSIS — M96.1 POST LAMINECTOMY SYNDROME: ICD-10-CM

## 2022-08-08 DIAGNOSIS — M54.9 DORSALGIA, UNSPECIFIED: ICD-10-CM

## 2022-08-08 PROCEDURE — 3288F PR FALLS RISK ASSESSMENT DOCUMENTED: ICD-10-PCS | Mod: CPTII,S$GLB,, | Performed by: NURSE PRACTITIONER

## 2022-08-08 PROCEDURE — 3077F PR MOST RECENT SYSTOLIC BLOOD PRESSURE >= 140 MM HG: ICD-10-PCS | Mod: CPTII,S$GLB,, | Performed by: NURSE PRACTITIONER

## 2022-08-08 PROCEDURE — 1101F PR PT FALLS ASSESS DOC 0-1 FALLS W/OUT INJ PAST YR: ICD-10-PCS | Mod: CPTII,S$GLB,, | Performed by: NURSE PRACTITIONER

## 2022-08-08 PROCEDURE — 1125F AMNT PAIN NOTED PAIN PRSNT: CPT | Mod: CPTII,S$GLB,, | Performed by: NURSE PRACTITIONER

## 2022-08-08 PROCEDURE — 1160F RVW MEDS BY RX/DR IN RCRD: CPT | Mod: CPTII,S$GLB,, | Performed by: NURSE PRACTITIONER

## 2022-08-08 PROCEDURE — 99214 PR OFFICE/OUTPT VISIT, EST, LEVL IV, 30-39 MIN: ICD-10-PCS | Mod: S$GLB,,, | Performed by: NURSE PRACTITIONER

## 2022-08-08 PROCEDURE — 1125F PR PAIN SEVERITY QUANTIFIED, PAIN PRESENT: ICD-10-PCS | Mod: CPTII,S$GLB,, | Performed by: NURSE PRACTITIONER

## 2022-08-08 PROCEDURE — 1160F PR REVIEW ALL MEDS BY PRESCRIBER/CLIN PHARMACIST DOCUMENTED: ICD-10-PCS | Mod: CPTII,S$GLB,, | Performed by: NURSE PRACTITIONER

## 2022-08-08 PROCEDURE — 1159F PR MEDICATION LIST DOCUMENTED IN MEDICAL RECORD: ICD-10-PCS | Mod: CPTII,S$GLB,, | Performed by: NURSE PRACTITIONER

## 2022-08-08 PROCEDURE — 3288F FALL RISK ASSESSMENT DOCD: CPT | Mod: CPTII,S$GLB,, | Performed by: NURSE PRACTITIONER

## 2022-08-08 PROCEDURE — 3077F SYST BP >= 140 MM HG: CPT | Mod: CPTII,S$GLB,, | Performed by: NURSE PRACTITIONER

## 2022-08-08 PROCEDURE — 99499 RISK ADDL DX/OHS AUDIT: ICD-10-PCS | Mod: S$GLB,,, | Performed by: NURSE PRACTITIONER

## 2022-08-08 PROCEDURE — 99214 OFFICE O/P EST MOD 30 MIN: CPT | Mod: S$GLB,,, | Performed by: NURSE PRACTITIONER

## 2022-08-08 PROCEDURE — 99499 UNLISTED E&M SERVICE: CPT | Mod: S$GLB,,, | Performed by: NURSE PRACTITIONER

## 2022-08-08 PROCEDURE — 3080F DIAST BP >= 90 MM HG: CPT | Mod: CPTII,S$GLB,, | Performed by: NURSE PRACTITIONER

## 2022-08-08 PROCEDURE — 3080F PR MOST RECENT DIASTOLIC BLOOD PRESSURE >= 90 MM HG: ICD-10-PCS | Mod: CPTII,S$GLB,, | Performed by: NURSE PRACTITIONER

## 2022-08-08 PROCEDURE — 99999 PR PBB SHADOW E&M-EST. PATIENT-LVL IV: ICD-10-PCS | Mod: PBBFAC,,, | Performed by: NURSE PRACTITIONER

## 2022-08-08 PROCEDURE — 99999 PR PBB SHADOW E&M-EST. PATIENT-LVL IV: CPT | Mod: PBBFAC,,, | Performed by: NURSE PRACTITIONER

## 2022-08-08 PROCEDURE — 1159F MED LIST DOCD IN RCRD: CPT | Mod: CPTII,S$GLB,, | Performed by: NURSE PRACTITIONER

## 2022-08-08 PROCEDURE — 1101F PT FALLS ASSESS-DOCD LE1/YR: CPT | Mod: CPTII,S$GLB,, | Performed by: NURSE PRACTITIONER

## 2022-08-08 NOTE — PROGRESS NOTES
Chronic patient Established Note (Follow up visit)      SUBJECTIVE:      ZACHARIAH Kellogg presents to the clinic for the evaluation of right lower back buttock pain. The pain started 5-10 years ago following falling off scaffold at work. Since then, symptoms have been worsening.  The pain is located in the right buttock area and radiates to the down right leg to knee.  The pain is described as sharp and is rated as 4/10. The pain is rated with a score of  4/10 on the BEST day and a score of 9/10 on the WORST day.   Symptoms interfere with daily activity.   Has occasional paresthesias and numbness to RLE all the down the leg.   The pain is exacerbated by Standing, Walking and Morning.    The pain is mitigated by heat, laying down and rest.   He reports spending 2 hours per day reclining. The patient reports 4 hours of uninterrupted sleep per night.     Patient denies night fever/night sweats, urinary incontinence, bowel incontinence, significant weight loss, significant motor weakness and loss of sensations.     Interval History 05/14/2020:  74 y/o M with hx of CAD s/p PCI (2008 and 2017), mild Pulm HTN, Essential HTN, Parkinson's Disease and COPD( Former smoker x 40 years).  On ASA 81 mg for CAD. Formerly on Plavix for DAPT  On Medical Therapy with Gabapentin without any side effects.   Started Meloxican this week and acknowledges being effective.         Interval history 06/08/2020:  Patient presents for telephone follow-up of lower back pain and right lower leg pain.  Patient is status post right L4/5 and L5/S1 TFESI on 5/27/2020.  Patient states 100% resolution of lower back pain and right leg pain.  Patient denies any neurological changes, denies any health changes.  Patient denies any neurological emergency symptoms such as loss of bowel/bladder, and denies saddle paresthesias.  Patient rates pain 0/10.  Patient is no longer taking Mobic, takes Flexeril 5 mg g and gabapentin 400 mg  laci        Interval History 8/4/2020:  The patient presents for follow-up of lower back pain.  He has associated radiculopathy down lateral right leg stopping at ankle.  He has had prior TF ASHISH with good relief of the symptoms which lasted approx 4 weeks. He states now however Back has become more bothersome than leg pain. Denies any loss of b/b or saddle paresthesias. Continue to take Flexeril 5mg and gaapentin 400mg PRN which provide some relief without adverse side effects.      Interval History 8/21/2020:  The patient present for follow up of lower back pain. Pt states he had no relief from prior Right L4-5 & L5-S1 facet joint injection not even short term per patient. Pt continues to have focal right sided pain, states radiculopathy goes down anterior and posterior thigh which stops just below the knee. The patient denies myelopathic symptoms such as handwriting changes or difficulty with buttons/coins/keys. Denies perineal paresthesias, bowel/bladder dysfunction. He is taking Gabapentin which provided good relief.     Interval History 09/17/2020:  Hector Kellogg presents to the clinic for a follow-up appointment from 09/09/2020 -RIGHT TF ASIHSH L4/5 and L5/S1 . Since the last visit, Hector Kellogg states the pain has been improving. Current pain intensity is 4/10.  He reports 50% pain improvement.  Currently he is taking gabapentin 400mg BID (he was prescribed to take it TID).  He has not done any physical therapy.    Interval History 10/15/2020:  The patient presents for follow-up of lower back pain.  She he has approximately 1 month postprocedure.  He did initially endorse he had some benefit but denies any focal benefit.  He states back pain is worse than leg pain at this time.  He is currently taking gabapentin 800 mg Q a.m. and has recently started to do 800 mg q.h.s..  He is not taking t.i.d. which was prescribed.  The got discussed to do so as he finds it beneficial but does not last over  several hours.  He has questions regarding RFA says they have been beneficial to his family member.  It was discussed he had a facet injection and he states is unclear whether he had any short-term benefit.  He is unsure if he had any short-term benefit. Denies perineal paresthesias, bowel/bladder dysfunction.    Interval History 11/19/2020:  Patient presents for follow-up of lower back pain.  Patient has continued lower back pain and bilateral leg pains.  He has difficulty any walking and is in wheelchair for transportation.  He is status post medial branch block with no focal benefit, not even temporarily. He denies any loss of bowel, bladder or saddle paresthesias.     Interval History 9/16/2021:  The patient presents for follow-up evaluation of lower back pain.  Over interval he has had Laminectomy at L4/L5 and is doing significantly.  He is able to ambulate without a walker. He states straining R shoulder recently but improving and does not wish to have PT or intervention as it is easing. He continues to take gabapentin 800 mg t.i.d. side medication. He is hear specifically to refill Neurontin.       Interval History 7/11/2022:  Mr Kellogg presents for delayed FU for lower back pain and pain down primarily R leg pain. Continued leg weakness and pain which has not improved since surgery at this time. Denies any focal complaint concerning for cauda equina and no recent imaging. He continues to take Neurontin 800mg TID and Motrin PRN sparingly.     Interval History 8/8/2022:  Mr Kellogg presents for follow up of lower back pain more focal to right side and does not go below the knee. Mornings are worse and also standing and walking exacerbate pain while sitting and rest ease symptoms. No focal voicing of loss of b/b or saddle paresthesias concerning for cauda equina. He is currently taking Neurontin 800mg TID, and Motrin 200mg PRN.     Pain Medications:  Gabapentin 400 mg TID  Meloxican 15 mg  Opioid  Contract: no     report:  Not applicable    Pain Procedures:   5/27/2020 Right L4/5, and L5/S1 TFESI 100% relief for 4 weeks  8/5/2020   Right L4-5 & L5-S1 Facet joint injection with minimal relief.   09/09/2020 - RIGHT TF ASHISH L4/5 and L5/S1 - 50% improvement    Physical Therapy/Home Exercise: no    Imaging:     MRI LUMBAR SPINE WITHOUT CONTRAST 8/5/2022     CLINICAL HISTORY:  Low back pain, symptoms persist with > 6wks conservative treatment; Dorsalgia, unspecified     TECHNIQUE:  Multiplanar, multisequence MR images were acquired from the thoracolumbar junction to the sacrum without the administration of contrast.     COMPARISON:  07/11/2022 and MRI 03/12/2020     FINDINGS:  Alignment: Grade 1 anterolisthesis L4 on L5 and to a lesser degree L5-S1.     Vertebrae: 5 lumbar-type vertebral bodies. No aggressive marrow replacement process or fracture.     Discs: Vacuum phenomenon with disc space narrowing L4-L5 and L5-S1.  Diffuse desiccation at all levels.     Cord: Normal. Conus terminates at L1 .     Degenerative findings:     T12-L1: There is no focal disc herniation. No significant central canal narrowing . No significant neural foraminal narrowing.     L1-L2: There is no focal disc herniation. No significant central canal narrowing . No significant neural foraminal narrowing.     L2-L3: There is no focal disc herniation. Broad based mild diffuse bulging of disc material .  Mild hypertrophic changes of facets/ligamentum flavum hypertrophy. Mild central canal narrowing . Mild bilateral neural foraminal narrowing.     L3-L4: There is no focal disc herniation. Broad based mild diffuse bulging of disc material .  Mild hypertrophic changes of facets/ligamentum flavum hypertrophy. Mild central canal narrowing . Mild bilateral neural foraminal narrowing.     L4-L5: There is no focal disc herniation. Moderate hypertrophic changes of facets/ligamentum flavum hypertrophy.   Moderate-severe central canal narrowing .  Severe bilateral neural foraminal narrowing.     L5-S1: There is no focal disc herniation. Moderate central canal narrowing . Moderate bilateral neural foraminal narrowing.Bilateral spondylo lysis at L5 better seen on prior examination of 03/12/2020 not degraded by motion.     Paraspinal muscles & soft tissues: Large LEFT renal cyst incompletely visualized..     Impression:     Patient motion degrades image quality  limiting interpretation.     Grade 1 anterolisthesis of L4 on L5 with moderate-severe central canal narrowing and severe bilateral neural foraminal encroachment at L4-L5.  To a lesser degree, degenerative changes present L5-S1.      3/12/20 MRI Lumbar Spine  Narrative       EXAMINATION:  MRI LUMBAR SPINE WITHOUT CONTRAST    CLINICAL HISTORY:  Low back pain, >6wks conservative tx, persistent-progressive sx, surgical candidate; Spinal stenosis, lumbar region without neurogenic claudication    TECHNIQUE:  Multiplanar, multisequence MR images were acquired from the thoracolumbar junction to the sacrum without contrast.    COMPARISON:  None available.    FINDINGS:  Alignment: Grade 1 anterolisthesis of L4-L5 and L5-S1.  Alignment of the lumbar spine is otherwise anatomic.    Vertebrae: Normal marrow signal. No fracture.  Bilateral L5 spondylolysis.    Discs: Scattered mild to moderate disc height loss of the lumbar spine.  Note made of increased STIR signal within the L2-L3 disc space.    Cord: Normal.  Conus terminates at L1.    Degenerative findings:    T12-L1: No spinal canal stenosis or neural foraminal narrowing.    L1-L2: Circumferential disc bulge with mild bilateral facet arthropathy.  No spinal canal stenosis or neural foraminal narrowing.    L2-L3: Circumferential disc bulge with mild bilateral facet arthropathy resulting in mild bilateral neural foraminal narrowing.  No spinal canal stenosis    L3-L4: Circumferential disc bulge with moderate bilateral facet arthropathy and ligamentum flavum  hypertrophy results in mild spinal canal stenosis and mild bilateral neural foraminal narrowing.    L4-L5: Grade 1 anterolisthesis with diffuse disc bulge with severe bilateral facet arthropathy and ligamentum flavum hypertrophy results in moderate spinal canal stenosis and severe bilateral neural foraminal narrowing.    L5-S1: Circumferential disc bulge with moderate bilateral facet arthropathy resulting in moderate spinal canal stenosis and moderate bilateral neural foraminal narrowing.    Paraspinal muscles & soft tissues: Exophytic simple cysts in the left kidney.       Impression         1. Multilevel degenerative changes of the lumbar spine, most significant at the levels of L4-L5 and L5-S1.  At L4-L5 there is moderate spinal canal stenosis and severe bilateral neural foraminal narrowing.  L5-S1, there is moderate spinal canal stenosis and moderate bilateral neural foraminal narrowing.  2. Bilateral spondylolysis at L5 grade 1 anterolisthesis of L5-S1.  3. Increased STIR signal within the L2-L3 disc space.  While this is favored to be degenerative, similar findings may be seen with early discitis.  Clinical correlation with lab parameters advised.       Allergies:   Review of patient's allergies indicates:   Allergen Reactions    Influenza virus vaccines Other (See Comments)     Caused fever, chills, and made tremors worse       Current Medications:   Current Outpatient Medications   Medication Sig Dispense Refill    albuterol (PROVENTIL/VENTOLIN HFA) 90 mcg/actuation inhaler Inhale 2 puffs into the lungs every 6 (six) hours as needed for Wheezing or Shortness of Breath. Rescue 18 g 6    aspirin (ECOTRIN) 81 MG EC tablet Take 81 mg by mouth once daily.      BREZTRI AEROSPHERE 160-9-4.8 mcg/actuation HFAA Inhale into the lungs.      carbidopa-levodopa  mg (SINEMET)  mg per tablet TAKE 1 TABLET BY MOUTH FIVE (5) TIMES DAILY 450 tablet 3    citalopram (CELEXA) 20 MG tablet TAKE ONE TABLET BY  MOUTH ONCE DAILY 30 tablet 10    gabapentin (NEURONTIN) 400 MG capsule TAKE TWO CAPSULES BY MOUTH THREE TIMES DAILY 180 capsule 2    ibuprofen (ADVIL,MOTRIN) 800 MG tablet Take 800 mg by mouth every 6 (six) hours as needed.      loratadine (CLARITIN) 10 mg tablet Take 10 mg by mouth as needed for Allergies.      metoprolol succinate (TOPROL-XL) 50 MG 24 hr tablet TAKE 1 TABLET (50 MG TOTAL) BY MOUTH ONCE DAILY. 90 tablet 3    multivitamin capsule Take 1 capsule by mouth once daily.      rasagiline (AZILECT) 1 mg Tab Take 1 tablet (1 mg total) by mouth once daily. 30 tablet 11    rosuvastatin (CRESTOR) 40 MG Tab TAKE ONE TABLET BY MOUTH EVERY EVENING 90 tablet 0     No current facility-administered medications for this visit.     REVIEW OF SYSTEMS:    GENERAL:  No weight loss, malaise or fevers.  HEENT:  Negative for frequent or significant headaches. H/o cataracts.  NECK:  Negative for lumps, goiter, pain and significant neck swelling.  RESPIRATORY:  Negative for cough, wheezing or shortness of breath.  CARDIOVASCULAR:  Negative for chest pain, leg swelling or palpitations. HTN. HLD. CAD. H/o MI.  GI:  Negative for abdominal discomfort, blood in stools or black stools or change in bowel habits.  MUSCULOSKELETAL:  See HPI.  SKIN:  Negative for lesions, rash, and itching.  PSYCH:  Depression.  HEMATOLOGY/LYMPHOLOGY:  Negative for prolonged bleeding, bruising easily or swollen nodes.  NEURO:   Parkinson's disease.  All other reviewed and negative other than HPI.    Past Medical History:  Past Medical History:   Diagnosis Date    Cataract     Chronic back pain greater than 3 months duration     Coronary artery disease     3 stents    DDD (degenerative disc disease), lumbar 5/16/2020    Glaucoma suspect of both eyes     Hyperlipidemia     Hypertension     MI, old 2006    Parkinson disease     Renal disorder     Kidney stones       Past Surgical History:  Past Surgical History:   Procedure Laterality Date     CARDIAC CATHETERIZATION  2006    x 3    CARDIAC CATHETERIZATION  10/11/2015    x 2    CATARACT EXTRACTION W/  INTRAOCULAR LENS IMPLANT Right 03/06/2018    Dr. Liu    CORONARY ANGIOPLASTY  2006, 2015    3 stents    EXTRACORPOREAL SHOCK WAVE LITHOTRIPSY Left 4/20/2021    Procedure: LITHOTRIPSY-EXTRACORPOREAL SHOCK WAVE;  Surgeon: Ottoniel Obando MD;  Location: [facility] OR;  Service: Urology;  Laterality: Left;    EYE SURGERY      cataracts bilateral    HEMORRHOID SURGERY      INJECTION OF ANESTHETIC AGENT AROUND NERVE Bilateral 11/4/2020    Procedure: BLOCK, NERVE BILATERAL L2,3,4,5;  Surgeon: Ramiro Calvillo MD;  Location: [facility] PAIN MGT;  Service: Pain Management;  Laterality: Bilateral;  BLOCK, NERVE BILATERAL L2,3,4,5    INJECTION OF FACET JOINT Right 8/5/2020    Procedure: INJECTION, FACET JOINT, L4-L5 , L5-S1;  Surgeon: Ramiro Calvillo MD;  Location: [facility] PAIN MGT;  Service: Pain Management;  Laterality: Right;    LAMINOFORAMINOTOMY OF SPINE USING MINIMALLY INVASIVE TECHNIQUE N/A 2/1/2021    Procedure: MIS L4-5 Laminectomy;  Surgeon: Bernard Sheldon MD;  Location: [facility] OR;  Service: Neurosurgery;  Laterality: N/A;  Anesthesia: General  Nerve Monitoring: EMG/SEP  Position: Prone  Bed: Eliot Frame on andres  Headrest: Prone View  Radiology: C-arm  Misc: Microscope, microinstruments    TRANSFORAMINAL EPIDURAL INJECTION OF STEROID Right 5/27/2020    Procedure: INJECTION, STEROID, EPIDURAL, TRANSFORAMINAL APPROACH;  Surgeon: Ramiro Calvillo MD;  Location: [facility] PAIN MGT;  Service: Pain Management;  Laterality: Right;  Right TF ASHISH L4-L5, L5-S1    PTcannot come earlier than 12:30    TRANSFORAMINAL EPIDURAL INJECTION OF STEROID Right 9/9/2020    Procedure: INJECTION, STEROID, EPIDURAL, TRANSFORAMINAL APPROACH;  Surgeon: Ramiro Calvillo MD;  Location: [facility] PAIN MGT;  Service: Pain Management;  Laterality: Right;  RIGHT TF ASHISH L4/5 and L5/S1       Family History:  Family History   Problem  "Relation Age of Onset    Heart attack Father     Cataracts Father     Heart disease Maternal Uncle     Heart disease Paternal Uncle     Hypertension Neg Hx     Asthma Neg Hx     Emphysema Neg Hx     Amblyopia Neg Hx     Blindness Neg Hx     Macular degeneration Neg Hx     Retinal detachment Neg Hx     Strabismus Neg Hx        Social History:  Social History     Socioeconomic History    Marital status:    Tobacco Use    Smoking status: Former Smoker     Packs/day: 1.00     Years: 40.00     Pack years: 40.00     Quit date: 2014     Years since quittin.5    Smokeless tobacco: Never Used   Substance and Sexual Activity    Alcohol use: Yes     Comment: socially    Drug use: No       OBJECTIVE:    BP (!) 150/93 (BP Location: Left arm, Patient Position: Sitting, BP Method: Medium (Automatic))   Pulse 72   Resp 18   Ht 5' 4" (1.626 m)   Wt 66.7 kg (147 lb)   BMI 25.23 kg/m²     PHYSICAL EXAMINATION:  Voice normal.  Normal affect without evidence of distress.  Gait: shuffling gait  Musculoskeletal: diffuse but equal strength decrease to BLE  Lumbar: +facet loading to Bilateral but R>L lumbar  Musculoskeletal: no PSIS or GTB TTP.     ASSESSMENT: 77 y.o. year old male with low back pain, consistent with        1. Lumbar stenosis with neurogenic claudication  Ambulatory referral/consult to Neurosurgery   2. Dorsalgia, unspecified     3. Post laminectomy syndrome         PLAN:     - Prior records reviewed   - Updated MRI reviewed and discussed  - Could consider MBB/RFA as option to address facetogenic pain but will hold off at this time  - Also can consider SCS down the line if not surgical candidate   - Will have Pt be re-evaluated by Dr Sheldon prior to considering any further interventions at this time  - Continue Neoruntin 800mg TID  - Discussed limiting use of Motrin and can use Tylenol as needed as well   - I have stressed the importance of physical activity and a home exercise plan to " help with pain and improve health.  - Patient can continue with medications for now since they are providing benefits, using them appropriately, and without side effects.  - Counseled patient regarding the importance of activity modification.  - RTC after re-evaluation from NS    The above plan and management options were discussed at length with patient. Patient is in agreement with the above and verbalized understanding.    Marcelo Washington NP    08/08/2022    I spent a total of 30 minutes on the day of the visit.  This includes face to face time and non-face to face time preparing to see the patient by reviewing previous labs/imaging, obtaining and/or reviewing separately obtained history, documenting clinical information in the electronic or other health record, independently interpreting results and communicating results to the patient/family/caregiver.

## 2022-08-09 ENCOUNTER — TELEPHONE (OUTPATIENT)
Dept: NEUROSURGERY | Facility: CLINIC | Age: 77
End: 2022-08-09
Payer: MEDICARE

## 2022-08-09 NOTE — TELEPHONE ENCOUNTER
Attempted to reach pt to discuss scheduling an appt w/Sindy based on a referral for lumbar stenosis.  LVM requesting a return call.

## 2022-08-11 DIAGNOSIS — I10 ESSENTIAL HYPERTENSION: Primary | ICD-10-CM

## 2022-08-18 ENCOUNTER — OFFICE VISIT (OUTPATIENT)
Dept: OTOLARYNGOLOGY | Facility: CLINIC | Age: 77
End: 2022-08-18
Payer: MEDICARE

## 2022-08-18 VITALS
DIASTOLIC BLOOD PRESSURE: 92 MMHG | WEIGHT: 149.25 LBS | HEART RATE: 78 BPM | SYSTOLIC BLOOD PRESSURE: 152 MMHG | BODY MASS INDEX: 25.62 KG/M2

## 2022-08-18 DIAGNOSIS — E04.1 THYROID NODULE: ICD-10-CM

## 2022-08-18 PROCEDURE — 3077F SYST BP >= 140 MM HG: CPT | Mod: CPTII,S$GLB,, | Performed by: OTOLARYNGOLOGY

## 2022-08-18 PROCEDURE — 99203 OFFICE O/P NEW LOW 30 MIN: CPT | Mod: S$GLB,,, | Performed by: OTOLARYNGOLOGY

## 2022-08-18 PROCEDURE — 1160F RVW MEDS BY RX/DR IN RCRD: CPT | Mod: CPTII,S$GLB,, | Performed by: OTOLARYNGOLOGY

## 2022-08-18 PROCEDURE — 1101F PT FALLS ASSESS-DOCD LE1/YR: CPT | Mod: CPTII,S$GLB,, | Performed by: OTOLARYNGOLOGY

## 2022-08-18 PROCEDURE — 1159F MED LIST DOCD IN RCRD: CPT | Mod: CPTII,S$GLB,, | Performed by: OTOLARYNGOLOGY

## 2022-08-18 PROCEDURE — 3080F DIAST BP >= 90 MM HG: CPT | Mod: CPTII,S$GLB,, | Performed by: OTOLARYNGOLOGY

## 2022-08-18 PROCEDURE — 1126F PR PAIN SEVERITY QUANTIFIED, NO PAIN PRESENT: ICD-10-PCS | Mod: CPTII,S$GLB,, | Performed by: OTOLARYNGOLOGY

## 2022-08-18 PROCEDURE — 3080F PR MOST RECENT DIASTOLIC BLOOD PRESSURE >= 90 MM HG: ICD-10-PCS | Mod: CPTII,S$GLB,, | Performed by: OTOLARYNGOLOGY

## 2022-08-18 PROCEDURE — 99203 PR OFFICE/OUTPT VISIT, NEW, LEVL III, 30-44 MIN: ICD-10-PCS | Mod: S$GLB,,, | Performed by: OTOLARYNGOLOGY

## 2022-08-18 PROCEDURE — 3077F PR MOST RECENT SYSTOLIC BLOOD PRESSURE >= 140 MM HG: ICD-10-PCS | Mod: CPTII,S$GLB,, | Performed by: OTOLARYNGOLOGY

## 2022-08-18 PROCEDURE — 1126F AMNT PAIN NOTED NONE PRSNT: CPT | Mod: CPTII,S$GLB,, | Performed by: OTOLARYNGOLOGY

## 2022-08-18 PROCEDURE — 99999 PR PBB SHADOW E&M-EST. PATIENT-LVL III: CPT | Mod: PBBFAC,,, | Performed by: OTOLARYNGOLOGY

## 2022-08-18 PROCEDURE — 3288F FALL RISK ASSESSMENT DOCD: CPT | Mod: CPTII,S$GLB,, | Performed by: OTOLARYNGOLOGY

## 2022-08-18 PROCEDURE — 99999 PR PBB SHADOW E&M-EST. PATIENT-LVL III: ICD-10-PCS | Mod: PBBFAC,,, | Performed by: OTOLARYNGOLOGY

## 2022-08-18 PROCEDURE — 1160F PR REVIEW ALL MEDS BY PRESCRIBER/CLIN PHARMACIST DOCUMENTED: ICD-10-PCS | Mod: CPTII,S$GLB,, | Performed by: OTOLARYNGOLOGY

## 2022-08-18 PROCEDURE — 1101F PR PT FALLS ASSESS DOC 0-1 FALLS W/OUT INJ PAST YR: ICD-10-PCS | Mod: CPTII,S$GLB,, | Performed by: OTOLARYNGOLOGY

## 2022-08-18 PROCEDURE — 3288F PR FALLS RISK ASSESSMENT DOCUMENTED: ICD-10-PCS | Mod: CPTII,S$GLB,, | Performed by: OTOLARYNGOLOGY

## 2022-08-18 PROCEDURE — 1159F PR MEDICATION LIST DOCUMENTED IN MEDICAL RECORD: ICD-10-PCS | Mod: CPTII,S$GLB,, | Performed by: OTOLARYNGOLOGY

## 2022-08-21 PROBLEM — E04.1 THYROID NODULE: Status: ACTIVE | Noted: 2022-08-21

## 2022-08-21 NOTE — PROGRESS NOTES
Chief Complaint   Patient presents with    consult/thyroid nodule       HPI   77 y.o. male presents for evaluation of right-sided thyroid nodule.  It was noted to be enlarging on recent ultrasound met criteria for fine-needle aspiration.  He has no significant complaints.  He denies compressive symptoms.  He denies a family history of thyroid cancer or personal history of radiation exposure.    Review of Systems   Constitutional: Negative for fatigue and unexpected weight change.   HENT: Per HPI.  Eyes: Negative for visual disturbance.   Respiratory: Negative for shortness of breath, hemoptysis   Cardiovascular: Negative for chest pain and palpitations.   Musculoskeletal: Negative for decreased ROM, back pain.   Skin: Negative for rash, sunburn, itching.   Neurological: Negative for dizziness and seizures.   Hematological: Negative for adenopathy. Does not bruise/bleed easily.   Endocrine: Negative for rapid weight loss/weight gain, heat/cold intolerance.     Past Medical History   Patient Active Problem List   Diagnosis    Coronary artery disease    Hyperlipidemia    Essential hypertension    Statin intolerance    Neuropathic pain    Parkinson's disease    Anxiety    Dyspnea on exertion    Chronic low back pain    Pulmonary HTN    Centrilobular emphysema    Exercise hypoxemia    Glaucoma suspect of both eyes    NS (nuclear sclerosis), left    Refractive error    Senile nuclear sclerosis    Post-operative state    Claudication    Nephrolithiasis    Chronic obstructive pulmonary disease    Lumbosacral radiculopathy    DDD (degenerative disc disease), lumbar    Spondylosis without myelopathy    Chronic pain    Lung nodules    Osteoarthritis of spine    Spondylolisthesis of lumbar region    Gross hematuria    Sacroiliitis    Thyroid nodule           Past Surgical History   Past Surgical History:   Procedure Laterality Date    CARDIAC CATHETERIZATION  2006    x 3    CARDIAC  CATHETERIZATION  10/11/2015    x 2    CATARACT EXTRACTION W/  INTRAOCULAR LENS IMPLANT Right 03/06/2018    Dr. Liu    CORONARY ANGIOPLASTY  2006, 2015    3 stents    EXTRACORPOREAL SHOCK WAVE LITHOTRIPSY Left 4/20/2021    Procedure: LITHOTRIPSY-EXTRACORPOREAL SHOCK WAVE;  Surgeon: Ottoniel Obando MD;  Location: [facility] OR;  Service: Urology;  Laterality: Left;    EYE SURGERY      cataracts bilateral    HEMORRHOID SURGERY      INJECTION OF ANESTHETIC AGENT AROUND NERVE Bilateral 11/4/2020    Procedure: BLOCK, NERVE BILATERAL L2,3,4,5;  Surgeon: Ramiro Calvillo MD;  Location: [facility] PAIN MGT;  Service: Pain Management;  Laterality: Bilateral;  BLOCK, NERVE BILATERAL L2,3,4,5    INJECTION OF FACET JOINT Right 8/5/2020    Procedure: INJECTION, FACET JOINT, L4-L5 , L5-S1;  Surgeon: Ramiro Calvillo MD;  Location: [facility] PAIN [facility]GT;  Service: Pain Management;  Laterality: Right;    LAMINOFORAMINOTOMY OF SPINE USING MINIMALLY INVASIVE TECHNIQUE N/A 2/1/2021    Procedure: MIS L4-5 Laminectomy;  Surgeon: Bernard Sheldon MD;  Location: [facility] OR;  Service: Neurosurgery;  Laterality: N/A;  Anesthesia: General  Nerve Monitoring: EMG/SEP  Position: Prone  Bed: Eliot Frame on andres  Headrest: Prone View  Radiology: C-arm  Misc: Microscope, microinstruments    TRANSFORAMINAL EPIDURAL INJECTION OF STEROID Right 5/27/2020    Procedure: INJECTION, STEROID, EPIDURAL, TRANSFORAMINAL APPROACH;  Surgeon: Ramiro Calvillo MD;  Location: [facility] PAIN MGT;  Service: Pain Management;  Laterality: Right;  Right TF ASHISH L4-L5, L5-S1    PTcannot come earlier than 12:30    TRANSFORAMINAL EPIDURAL INJECTION OF STEROID Right 9/9/2020    Procedure: INJECTION, STEROID, EPIDURAL, TRANSFORAMINAL APPROACH;  Surgeon: Ramiro Calvillo MD;  Location: [facility] PAIN [facility]GT;  Service: Pain Management;  Laterality: Right;  RIGHT TF ASHISH L4/5 and L5/S1         Family History   Family History   Problem Relation Age of Onset    Heart attack Father      Cataracts Father     Heart disease Maternal Uncle     Heart disease Paternal Uncle     Hypertension Neg Hx     Asthma Neg Hx     Emphysema Neg Hx     Amblyopia Neg Hx     Blindness Neg Hx     Macular degeneration Neg Hx     Retinal detachment Neg Hx     Strabismus Neg Hx            Social History   .  Social History     Socioeconomic History    Marital status:    Tobacco Use    Smoking status: Former Smoker     Packs/day: 1.00     Years: 40.00     Pack years: 40.00     Quit date: 2014     Years since quittin.5    Smokeless tobacco: Never Used   Substance and Sexual Activity    Alcohol use: Yes     Comment: socially    Drug use: No         Allergies   Review of patient's allergies indicates:   Allergen Reactions    Influenza virus vaccines Other (See Comments)     Caused fever, chills, and made tremors worse           Physical Exam     Vitals:    22 1514   BP: (!) 152/92   Pulse: 78         Body mass index is 25.62 kg/m².      General: AOx3, NAD   Respiratory:  Symmetric chest rise, normal effort  Neck: No scars.  No cervical lymphadenopathy, thyromegaly or thyroid nodules.  Normal range of motion.    Face: House Brackmann I bilaterally.     Thyroid ultrasound reviewed.    Assessment/Plan  Problem List Items Addressed This Visit        Endocrine    Thyroid nodule     Will set up US FNA in IR.

## 2022-08-30 ENCOUNTER — HOSPITAL ENCOUNTER (OUTPATIENT)
Dept: RADIOLOGY | Facility: OTHER | Age: 77
Discharge: HOME | End: 2022-08-30
Attending: OTOLARYNGOLOGY
Payer: MEDICARE

## 2022-08-30 ENCOUNTER — TELEPHONE (OUTPATIENT)
Dept: NEUROSURGERY | Facility: CLINIC | Age: 77
End: 2022-08-30
Payer: MEDICARE

## 2022-08-30 DIAGNOSIS — E04.1 THYROID NODULE: ICD-10-CM

## 2022-08-30 PROCEDURE — 88172 CYTP DX EVAL FNA 1ST EA SITE: CPT | Performed by: PATHOLOGY

## 2022-08-30 PROCEDURE — 25000003 PHARM REV CODE 250: Performed by: RADIOLOGY

## 2022-08-30 PROCEDURE — 88177 CYTP FNA EVAL EA ADDL: CPT | Mod: 26,,, | Performed by: PATHOLOGY

## 2022-08-30 PROCEDURE — 88172 PR  EVALUATION OF FNA SMEAR TO DETERMINE ADEQUACY, FIRST EVAL: ICD-10-PCS | Mod: 26,,, | Performed by: PATHOLOGY

## 2022-08-30 PROCEDURE — 10005 FNA BX W/US GDN 1ST LES: CPT | Mod: ,,, | Performed by: RADIOLOGY

## 2022-08-30 PROCEDURE — 88173 PR  INTERPRETATION OF FNA SMEAR: ICD-10-PCS | Mod: 26,,, | Performed by: PATHOLOGY

## 2022-08-30 PROCEDURE — 10005 FNA BX W/US GDN 1ST LES: CPT

## 2022-08-30 PROCEDURE — 88177 PR  EVALUATION OF FNA SMEAR TO DETERMINE ADEQUACY, EA ADD EVAL: ICD-10-PCS | Mod: 26,,, | Performed by: PATHOLOGY

## 2022-08-30 PROCEDURE — 88172 CYTP DX EVAL FNA 1ST EA SITE: CPT | Mod: 26,,, | Performed by: PATHOLOGY

## 2022-08-30 PROCEDURE — 88177 CYTP FNA EVAL EA ADDL: CPT | Performed by: PATHOLOGY

## 2022-08-30 PROCEDURE — 10005 IR US FINE NEEDLE ASPIRATION BIOPSY, FIRST LESION: ICD-10-PCS | Mod: ,,, | Performed by: RADIOLOGY

## 2022-08-30 PROCEDURE — 88173 CYTOPATH EVAL FNA REPORT: CPT | Performed by: PATHOLOGY

## 2022-08-30 PROCEDURE — 88173 CYTOPATH EVAL FNA REPORT: CPT | Mod: 26,,, | Performed by: PATHOLOGY

## 2022-08-30 RX ORDER — LIDOCAINE HYDROCHLORIDE 10 MG/ML
5 INJECTION INFILTRATION; PERINEURAL ONCE
Status: COMPLETED | OUTPATIENT
Start: 2022-08-30 | End: 2022-08-30

## 2022-08-30 RX ADMIN — LIDOCAINE HYDROCHLORIDE 5 ML: 10 INJECTION, SOLUTION INFILTRATION; PERINEURAL at 01:08

## 2022-08-30 NOTE — TELEPHONE ENCOUNTER
Called pt to schedule appointment gilmer Callahan regarding referral for lumbar stenosis from rita Washington NP. Pt declined to schedule at this time.

## 2022-08-30 NOTE — PROCEDURES
Radiology Post-Procedure Note    Pre Op Diagnosis: R thyroid nodule  Post Op Diagnosis: Same    Procedure: FNA    Procedure performed by: Desean Traore MD    Written Informed Consent Obtained: Yes  Specimen Removed: YES 5 FNA specimens  Estimated Blood Loss: Minimal    Findings:   FNA of R thyroid nodule.    Patient tolerated procedure well.    @SIG@

## 2022-09-06 LAB
ADEQUACY: NORMAL
FINAL PATHOLOGIC DIAGNOSIS: NORMAL
Lab: NORMAL

## 2022-09-09 ENCOUNTER — OFFICE VISIT (OUTPATIENT)
Dept: OPTOMETRY | Facility: CLINIC | Age: 77
End: 2022-09-09
Payer: MEDICARE

## 2022-09-09 DIAGNOSIS — Z13.5 GLAUCOMA SCREENING: ICD-10-CM

## 2022-09-09 DIAGNOSIS — H26.493 CLOUDY POSTERIOR CAPSULE, BILATERAL: Primary | ICD-10-CM

## 2022-09-09 PROCEDURE — 3288F PR FALLS RISK ASSESSMENT DOCUMENTED: ICD-10-PCS | Mod: CPTII,S$GLB,, | Performed by: OPTOMETRIST

## 2022-09-09 PROCEDURE — 92004 COMPRE OPH EXAM NEW PT 1/>: CPT | Mod: S$GLB,,, | Performed by: OPTOMETRIST

## 2022-09-09 PROCEDURE — 99999 PR PBB SHADOW E&M-EST. PATIENT-LVL III: CPT | Mod: PBBFAC,,, | Performed by: OPTOMETRIST

## 2022-09-09 PROCEDURE — 92004 PR EYE EXAM, NEW PATIENT,COMPREHESV: ICD-10-PCS | Mod: S$GLB,,, | Performed by: OPTOMETRIST

## 2022-09-09 PROCEDURE — 1101F PT FALLS ASSESS-DOCD LE1/YR: CPT | Mod: CPTII,S$GLB,, | Performed by: OPTOMETRIST

## 2022-09-09 PROCEDURE — 99999 PR PBB SHADOW E&M-EST. PATIENT-LVL III: ICD-10-PCS | Mod: PBBFAC,,, | Performed by: OPTOMETRIST

## 2022-09-09 PROCEDURE — 1159F PR MEDICATION LIST DOCUMENTED IN MEDICAL RECORD: ICD-10-PCS | Mod: CPTII,S$GLB,, | Performed by: OPTOMETRIST

## 2022-09-09 PROCEDURE — 1126F PR PAIN SEVERITY QUANTIFIED, NO PAIN PRESENT: ICD-10-PCS | Mod: CPTII,S$GLB,, | Performed by: OPTOMETRIST

## 2022-09-09 PROCEDURE — 1101F PR PT FALLS ASSESS DOC 0-1 FALLS W/OUT INJ PAST YR: ICD-10-PCS | Mod: CPTII,S$GLB,, | Performed by: OPTOMETRIST

## 2022-09-09 PROCEDURE — 1126F AMNT PAIN NOTED NONE PRSNT: CPT | Mod: CPTII,S$GLB,, | Performed by: OPTOMETRIST

## 2022-09-09 PROCEDURE — 1160F RVW MEDS BY RX/DR IN RCRD: CPT | Mod: CPTII,S$GLB,, | Performed by: OPTOMETRIST

## 2022-09-09 PROCEDURE — 1160F PR REVIEW ALL MEDS BY PRESCRIBER/CLIN PHARMACIST DOCUMENTED: ICD-10-PCS | Mod: CPTII,S$GLB,, | Performed by: OPTOMETRIST

## 2022-09-09 PROCEDURE — 3288F FALL RISK ASSESSMENT DOCD: CPT | Mod: CPTII,S$GLB,, | Performed by: OPTOMETRIST

## 2022-09-09 PROCEDURE — 1159F MED LIST DOCD IN RCRD: CPT | Mod: CPTII,S$GLB,, | Performed by: OPTOMETRIST

## 2022-09-09 NOTE — PROGRESS NOTES
HPI    76 Y/o male is here for routine eye exam with C/o pt states his vision is   getting blurry both near and far   pt denies pain and discomfort  No f/f    Eye med: no gtt  Last edited by Lu Clemens MA on 9/9/2022 10:11 AM.        ROS    Positive for: Eyes (cat surgery Ou/glauc tx)  Negative for: Constitutional, Gastrointestinal, Neurological, Skin,   Genitourinary, Musculoskeletal, HENT, Endocrine, Cardiovascular,   Respiratory, Psychiatric, Allergic/Imm, Heme/Lymph  Last edited by Blue Jones, OD on 9/9/2022 10:20 AM.        Assessment /Plan     For exam results, see Encounter Report.    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  Now sp cat surgery iop wnl without meds.  Mod cupping.  Will monitor    PLAN:    Consult--Dr Diaz--YAG OD (OS?)

## 2022-09-20 NOTE — TELEPHONE ENCOUNTER
Spoke to patient appointment scheduled   Bcc Macronodular Histology Text: The dermis contains distinctive tumor cell masses of various shapes and sizes composed of cells with large oval or elongated nuclei with relatively little cytoplasm.  The nuclei are homogenous.  There is no pronounced variation in size or intensity of staining and no significant anaplastic appearance.  The cells at the outer aspect of the tumor masses show palisading of nuclei.  Tumor masses are surrounded by a connective tissue stroma and in some areas there is retraction artifact of the tumor nodule away from the surrounding stroma.  A macronodular histology is noted.

## 2022-09-21 ENCOUNTER — PATIENT MESSAGE (OUTPATIENT)
Dept: INTERNAL MEDICINE | Facility: CLINIC | Age: 77
End: 2022-09-21
Payer: MEDICARE

## 2022-09-21 DIAGNOSIS — I27.20 PULMONARY HTN: Primary | ICD-10-CM

## 2022-09-21 DIAGNOSIS — J44.9 CHRONIC OBSTRUCTIVE PULMONARY DISEASE, UNSPECIFIED COPD TYPE: ICD-10-CM

## 2022-10-04 ENCOUNTER — TELEPHONE (OUTPATIENT)
Dept: PULMONOLOGY | Facility: CLINIC | Age: 77
End: 2022-10-04
Payer: MEDICARE

## 2022-10-04 DIAGNOSIS — J44.9 CHRONIC OBSTRUCTIVE PULMONARY DISEASE, UNSPECIFIED COPD TYPE: Primary | ICD-10-CM

## 2022-10-13 ENCOUNTER — HOSPITAL ENCOUNTER (OUTPATIENT)
Dept: PULMONOLOGY | Facility: CLINIC | Age: 77
Discharge: HOME | End: 2022-10-13
Payer: MEDICARE

## 2022-10-13 ENCOUNTER — OFFICE VISIT (OUTPATIENT)
Dept: PULMONOLOGY | Facility: CLINIC | Age: 77
End: 2022-10-13
Payer: MEDICARE

## 2022-10-13 VITALS
HEIGHT: 64 IN | BODY MASS INDEX: 24.69 KG/M2 | WEIGHT: 144.63 LBS | OXYGEN SATURATION: 95 % | SYSTOLIC BLOOD PRESSURE: 138 MMHG | DIASTOLIC BLOOD PRESSURE: 78 MMHG | HEART RATE: 83 BPM

## 2022-10-13 DIAGNOSIS — J44.9 CHRONIC OBSTRUCTIVE PULMONARY DISEASE, UNSPECIFIED COPD TYPE: ICD-10-CM

## 2022-10-13 DIAGNOSIS — J96.11 CHRONIC RESPIRATORY FAILURE WITH HYPOXIA: Primary | ICD-10-CM

## 2022-10-13 DIAGNOSIS — I27.20 PULMONARY HTN: ICD-10-CM

## 2022-10-13 DIAGNOSIS — J43.2 CENTRILOBULAR EMPHYSEMA: ICD-10-CM

## 2022-10-13 LAB
DLCO SINGLE BREATH LLN: 12.98
DLCO SINGLE BREATH PRE REF: 38.8 %
DLCO SINGLE BREATH REF: 19.91
DLCOC SBVA LLN: 2.08
DLCOC SBVA REF: 3.49
DLCOC SINGLE BREATH LLN: 12.98
DLCOC SINGLE BREATH REF: 19.91
DLCOCSBVAULN: 4.9
DLCOCSINGLEBREATHULN: 26.83
DLCOSINGLEBREATHULN: 26.83
DLCOVA LLN: 2.08
DLCOVA PRE REF: 65 %
DLCOVA PRE: 2.27 ML/(MIN*MMHG*L) (ref 2.08–4.9)
DLCOVA REF: 3.49
DLCOVAULN: 4.9
FEF 25 75 LLN: 0.7
FEF 25 75 PRE REF: 24.5 %
FEF 25 75 REF: 1.77
FEV05 LLN: 0.73
FEV05 REF: 1.87
FEV1 FVC LLN: 62
FEV1 FVC PRE REF: 64.9 %
FEV1 FVC REF: 76
FEV1 LLN: 1.64
FEV1 PRE REF: 50.2 %
FEV1 REF: 2.34
FVC LLN: 2.24
FVC PRE REF: 77 %
FVC REF: 3.08
IVC PRE: 2.58 L (ref 2.24–3.94)
IVC SINGLE BREATH LLN: 2.24
IVC SINGLE BREATH PRE REF: 83.6 %
IVC SINGLE BREATH REF: 3.08
IVCSINGLEBREATHULN: 3.94
PEF LLN: 4.22
PEF PRE REF: 75 %
PEF REF: 6.1
PHYSICIAN COMMENT: ABNORMAL
PRE DLCO: 7.72 ML/(MIN*MMHG) (ref 12.98–26.83)
PRE FEF 25 75: 0.44 L/S (ref 0.7–3.35)
PRE FET 100: 7.69 SEC
PRE FEV05 REF: 43.2 %
PRE FEV1 FVC: 49.47 % (ref 61.55–89.43)
PRE FEV1: 1.17 L (ref 1.64–2.99)
PRE FEV5: 0.81 L (ref 0.73–3)
PRE FVC: 2.37 L (ref 2.24–3.94)
PRE PEF: 4.57 L/S (ref 4.22–7.98)
VA PRE: 3.41 L (ref 5.56–5.56)
VA SINGLE BREATH LLN: 5.56
VA SINGLE BREATH PRE REF: 61.3 %
VA SINGLE BREATH REF: 5.56
VASINGLEBREATHULN: 5.56

## 2022-10-13 PROCEDURE — 1160F PR REVIEW ALL MEDS BY PRESCRIBER/CLIN PHARMACIST DOCUMENTED: ICD-10-PCS | Mod: CPTII,S$GLB,, | Performed by: INTERNAL MEDICINE

## 2022-10-13 PROCEDURE — 1126F AMNT PAIN NOTED NONE PRSNT: CPT | Mod: CPTII,S$GLB,, | Performed by: INTERNAL MEDICINE

## 2022-10-13 PROCEDURE — 1101F PT FALLS ASSESS-DOCD LE1/YR: CPT | Mod: CPTII,S$GLB,, | Performed by: INTERNAL MEDICINE

## 2022-10-13 PROCEDURE — 1159F PR MEDICATION LIST DOCUMENTED IN MEDICAL RECORD: ICD-10-PCS | Mod: CPTII,S$GLB,, | Performed by: INTERNAL MEDICINE

## 2022-10-13 PROCEDURE — 1126F PR PAIN SEVERITY QUANTIFIED, NO PAIN PRESENT: ICD-10-PCS | Mod: CPTII,S$GLB,, | Performed by: INTERNAL MEDICINE

## 2022-10-13 PROCEDURE — 3288F PR FALLS RISK ASSESSMENT DOCUMENTED: ICD-10-PCS | Mod: CPTII,S$GLB,, | Performed by: INTERNAL MEDICINE

## 2022-10-13 PROCEDURE — 94729 PR C02/MEMBANE DIFFUSE CAPACITY: ICD-10-PCS | Mod: S$GLB,,, | Performed by: INTERNAL MEDICINE

## 2022-10-13 PROCEDURE — 99499 UNLISTED E&M SERVICE: CPT | Mod: S$GLB,,, | Performed by: INTERNAL MEDICINE

## 2022-10-13 PROCEDURE — 99999 PR PBB SHADOW E&M-EST. PATIENT-LVL III: CPT | Mod: PBBFAC,,, | Performed by: INTERNAL MEDICINE

## 2022-10-13 PROCEDURE — 1159F MED LIST DOCD IN RCRD: CPT | Mod: CPTII,S$GLB,, | Performed by: INTERNAL MEDICINE

## 2022-10-13 PROCEDURE — 99999 PR PBB SHADOW E&M-EST. PATIENT-LVL III: ICD-10-PCS | Mod: PBBFAC,,, | Performed by: INTERNAL MEDICINE

## 2022-10-13 PROCEDURE — 99214 OFFICE O/P EST MOD 30 MIN: CPT | Mod: S$GLB,,, | Performed by: INTERNAL MEDICINE

## 2022-10-13 PROCEDURE — 1160F RVW MEDS BY RX/DR IN RCRD: CPT | Mod: CPTII,S$GLB,, | Performed by: INTERNAL MEDICINE

## 2022-10-13 PROCEDURE — 3078F DIAST BP <80 MM HG: CPT | Mod: CPTII,S$GLB,, | Performed by: INTERNAL MEDICINE

## 2022-10-13 PROCEDURE — 94729 DIFFUSING CAPACITY: CPT | Mod: S$GLB,,, | Performed by: INTERNAL MEDICINE

## 2022-10-13 PROCEDURE — 3075F PR MOST RECENT SYSTOLIC BLOOD PRESS GE 130-139MM HG: ICD-10-PCS | Mod: CPTII,S$GLB,, | Performed by: INTERNAL MEDICINE

## 2022-10-13 PROCEDURE — 94010 BREATHING CAPACITY TEST: CPT | Mod: S$GLB,,, | Performed by: INTERNAL MEDICINE

## 2022-10-13 PROCEDURE — 94010 BREATHING CAPACITY TEST: ICD-10-PCS | Mod: S$GLB,,, | Performed by: INTERNAL MEDICINE

## 2022-10-13 PROCEDURE — 1101F PR PT FALLS ASSESS DOC 0-1 FALLS W/OUT INJ PAST YR: ICD-10-PCS | Mod: CPTII,S$GLB,, | Performed by: INTERNAL MEDICINE

## 2022-10-13 PROCEDURE — 3075F SYST BP GE 130 - 139MM HG: CPT | Mod: CPTII,S$GLB,, | Performed by: INTERNAL MEDICINE

## 2022-10-13 PROCEDURE — 99214 PR OFFICE/OUTPT VISIT, EST, LEVL IV, 30-39 MIN: ICD-10-PCS | Mod: S$GLB,,, | Performed by: INTERNAL MEDICINE

## 2022-10-13 PROCEDURE — 3288F FALL RISK ASSESSMENT DOCD: CPT | Mod: CPTII,S$GLB,, | Performed by: INTERNAL MEDICINE

## 2022-10-13 PROCEDURE — 3078F PR MOST RECENT DIASTOLIC BLOOD PRESSURE < 80 MM HG: ICD-10-PCS | Mod: CPTII,S$GLB,, | Performed by: INTERNAL MEDICINE

## 2022-10-13 RX ORDER — ALBUTEROL SULFATE 90 UG/1
2 AEROSOL, METERED RESPIRATORY (INHALATION) EVERY 6 HOURS PRN
Qty: 18 G | Refills: 6 | Status: SHIPPED | OUTPATIENT
Start: 2022-10-13 | End: 2023-02-12 | Stop reason: SDUPTHER

## 2022-10-13 RX ORDER — BUDESONIDE, GLYCOPYRROLATE, AND FORMOTEROL FUMARATE 160; 9; 4.8 UG/1; UG/1; UG/1
2 AEROSOL, METERED RESPIRATORY (INHALATION) 2 TIMES DAILY
Qty: 10.7 G | Refills: 11 | Status: SHIPPED | OUTPATIENT
Start: 2022-10-13 | End: 2022-11-21 | Stop reason: SDUPTHER

## 2022-10-13 NOTE — ASSESSMENT & PLAN NOTE
Patient with Hypoxic Respiratory failure which is Chronic.  he is on home oxygen at 2 LPM. Supplemental oxygen was provided and noted-  .   Signs/symptoms of respiratory failure include- tachypnea and increased work of breathing. Contributing diagnoses includes - COPD Labs and images were reviewed. Patient Has not had a recent ABG. Will treat underlying causes and adjust management of respiratory failure as follows- supplemental oxygen. Would benefit from smallest POC to fit in walker as patient has Parkinson's and utilizes a walker to ambulate.

## 2022-10-13 NOTE — PROGRESS NOTES
"Subjective:       Patient ID: Hector Kellogg is a 77 y.o. male.    Chief Complaint: COPD    77 year old with a history of parkinsons and COPD.  Previously seen by Lisa Silver, he is new to me.  Patient is currently complaining with difficulty performing ADLs due to dyspnea.  Struggles during a shower which improves if uses albuterol prior.  Denies chronic cough, dysphagia or reflux type symptoms.  Gait seems to be becoming more stable and now uses a walker for assistance.      Enjoys frying an egg for himself and is no longer able to do that for himself or family due to exhaustion.     Review of Systems    Objective:      Vitals:    10/13/22 1327   BP: 138/78   BP Location: Right arm   Patient Position: Sitting   Pulse: 83   SpO2: 95%   Weight: 65.6 kg (144 lb 10 oz)   Height: 5' 4" (1.626 m)      Physical Exam   Constitutional: He appears well-developed and well-nourished.   HENT:   Head: Normocephalic.   Cardiovascular: Regular rhythm.   Pulmonary/Chest: Normal expansion. He has no wheezes. He has no rales.   Musculoskeletal:      Cervical back: Normal range of motion and neck supple.   Psychiatric: He has a normal mood and affect.       Personal Diagnostic Review  Spirometry with obstruction 50% predicted.  40%         No flowsheet data found.      Assessment:       1. Chronic respiratory failure with hypoxia    2. Centrilobular emphysema    3. Chronic obstructive pulmonary disease, unspecified COPD type          Outpatient Encounter Medications as of 10/13/2022   Medication Sig Dispense Refill    aspirin (ECOTRIN) 81 MG EC tablet Take 81 mg by mouth once daily.      carbidopa-levodopa  mg (SINEMET)  mg per tablet TAKE 1 TABLET BY MOUTH FIVE (5) TIMES DAILY 450 tablet 3    citalopram (CELEXA) 20 MG tablet TAKE ONE TABLET BY MOUTH ONCE DAILY 30 tablet 10    gabapentin (NEURONTIN) 400 MG capsule TAKE TWO CAPSULES BY MOUTH THREE TIMES DAILY 180 capsule 2    ibuprofen (ADVIL,MOTRIN) 800 MG tablet " Take 800 mg by mouth every 6 (six) hours as needed.      loratadine (CLARITIN) 10 mg tablet Take 10 mg by mouth as needed for Allergies.      metoprolol succinate (TOPROL-XL) 50 MG 24 hr tablet TAKE 1 TABLET (50 MG TOTAL) BY MOUTH ONCE DAILY. 90 tablet 3    multivitamin capsule Take 1 capsule by mouth once daily.      rosuvastatin (CRESTOR) 40 MG Tab TAKE ONE TABLET BY MOUTH EVERY NIGHT AT BEDTIME 90 tablet 2    [DISCONTINUED] albuterol (PROVENTIL/VENTOLIN HFA) 90 mcg/actuation inhaler Inhale 2 puffs into the lungs every 6 (six) hours as needed for Wheezing or Shortness of Breath. Rescue 18 g 6    [DISCONTINUED] BREZTRI AEROSPHERE 160-9-4.8 mcg/actuation HFAA Inhale into the lungs.      albuterol (PROVENTIL/VENTOLIN HFA) 90 mcg/actuation inhaler Inhale 2 puffs into the lungs every 6 (six) hours as needed for Wheezing or Shortness of Breath. Rescue 18 g 6    BREZTRI AEROSPHERE 160-9-4.8 mcg/actuation HFAA Inhale 2 puffs into the lungs 2 (two) times a day. 10.7 g 11    rasagiline (AZILECT) 1 mg Tab Take 1 tablet (1 mg total) by mouth once daily. 30 tablet 11     No facility-administered encounter medications on file as of 10/13/2022.     Orders Placed This Encounter   Procedures    OXYGEN FOR HOME USE     Order Specific Question:   Liter Flow     Answer:   1     Order Specific Question:   Duration     Answer:   Continuous     Order Specific Question:   Qualifying Test Performed at:     Answer:   Activity     Order Specific Question:   Oxygen saturation at rest     Answer:   95     Order Specific Question:   Oxygen saturation with activity     Answer:   88     Order Specific Question:   Oxygen saturation with activity on oxygen     Answer:   95     Order Specific Question:   Portable mode:     Answer:   pulse dose acceptable     Order Specific Question:   Mode:     Answer:   Portable concentrator     Order Specific Question:   Route     Answer:   nasal cannula     Order Specific Question:   Device:     Answer:   home  "concentrator with portable concentrator     Comments:   smallest POC to fit in pocket of walker     Order Specific Question:   Length of need (in months):     Answer:   99 mos     Order Specific Question:   Patient condition with qualifying saturation     Answer:   COPD     Order Specific Question:   Height:     Answer:   5' 4" (1.626 m)     Order Specific Question:   Weight:     Answer:   65.6 kg (144 lb 10 oz)     Order Specific Question:   Alternative treatment measures have been tried or considered and deemed clinically ineffective.     Answer:   Yes       Plan:     Problem List Items Addressed This Visit       Centrilobular emphysema    Overview     Overall stable FEV1 1.17 (50% predicted)  Continue breztri twice daily for control  Albuterol for rescue and prevention.          Relevant Medications    albuterol (PROVENTIL/VENTOLIN HFA) 90 mcg/actuation inhaler    BREZTRI AEROSPHERE 160-9-4.8 mcg/actuation HFAA    Other Relevant Orders    OXYGEN FOR HOME USE    Chronic respiratory failure with hypoxia - Primary    Overview     Desaturates 88% on room air with activity  Improves to 95% on 2L/nc    See emphysema         Current Assessment & Plan     Patient with Hypoxic Respiratory failure which is Chronic.  he is on home oxygen at 2 LPM. Supplemental oxygen was provided and noted-  .   Signs/symptoms of respiratory failure include- tachypnea and increased work of breathing. Contributing diagnoses includes - COPD Labs and images were reviewed. Patient Has not had a recent ABG. Will treat underlying causes and adjust management of respiratory failure as follows- supplemental oxygen. Would benefit from smallest POC to fit in walker as patient has Parkinson's and utilizes a walker to ambulate.          Relevant Orders    OXYGEN FOR HOME USE     Other Visit Diagnoses       Chronic obstructive pulmonary disease, unspecified COPD type            Counseled patient and wife on trial of supplemental oxygen with activities.  " Would benefit from POC of choice.  Caution with oxygen tubing around the house.

## 2022-10-17 ENCOUNTER — PROCEDURE VISIT (OUTPATIENT)
Dept: OPHTHALMOLOGY | Facility: CLINIC | Age: 77
End: 2022-10-17
Payer: MEDICARE

## 2022-10-17 DIAGNOSIS — I10 ESSENTIAL HYPERTENSION: ICD-10-CM

## 2022-10-17 DIAGNOSIS — Z96.1 PSEUDOPHAKIA: ICD-10-CM

## 2022-10-17 DIAGNOSIS — H40.053 BILATERAL OCULAR HYPERTENSION: ICD-10-CM

## 2022-10-17 DIAGNOSIS — H26.493 PCO (POSTERIOR CAPSULAR OPACIFICATION), BILATERAL: Primary | ICD-10-CM

## 2022-10-17 PROCEDURE — 92004 PR EYE EXAM, NEW PATIENT,COMPREHESV: ICD-10-PCS | Mod: 57,S$GLB,, | Performed by: OPHTHALMOLOGY

## 2022-10-17 PROCEDURE — 66821 AFTER CATARACT LASER SURGERY: CPT | Mod: RT,S$GLB,, | Performed by: OPHTHALMOLOGY

## 2022-10-17 PROCEDURE — 66821 PR DISCISSION,2ND CATARACT,LASER: ICD-10-PCS | Mod: RT,S$GLB,, | Performed by: OPHTHALMOLOGY

## 2022-10-17 PROCEDURE — 92004 COMPRE OPH EXAM NEW PT 1/>: CPT | Mod: 57,S$GLB,, | Performed by: OPHTHALMOLOGY

## 2022-10-17 NOTE — PROGRESS NOTES
"Subjective:       Patient ID: Hector Kellogg is a 77 y.o. male.    Chief Complaint: Laser Treatment (Patient Hector Kellogg (Gus) is a 77 year old male.)    HPI     Laser Treatment     Additional comments: Patient Hector Kellogg (Gus) is a 77 year old   male.           Comments    Pt referred by Dr. Jones for YAG OD and possibly OS. Pt states continued   blurry VA OD>OS since MARLI. Pt states itchy, red,watery eyes due to   allergies. Pt denies any eye pain.    DLS: 09/09/2022 with Dr. Jones    Gtts: None    POHx:  1. Cloudy posterior capsule, bilateral  2. Glaucoma screening, Hx of OHT OU             Last edited by Meli Salmon on 10/17/2022  3:08 PM.             Assessment:       1. PCO (posterior capsular opacification), bilateral    2. Bilateral ocular hypertension    3. Essential hypertension    4. Pseudophakia          Plan:       Visually significant  PCO OD- Pt. Wants Laser.     Early PCO OS- Not Visually Significant.   H/o OHT OU-IOP is mildly elevated OS today but with healthy appearing ON's OU.  HTN-No retinopathy OU.      YAG CAP right eye (OD) today. TE=   107 mj, Avg. 0.7 mj, 153 pulses.   PF taper OD.  Control HTN.  RTC 2-3 wks.    YAG laser Capsulotomy Procedure Note:  Informed consent was obtained and correct eye was verified with patient.   One drop of topical Proparacaine 1% was instilled.  YAG laser applied to posterior capsule in cruciate pattern.  One drop of Apraclonidine 0.5% and 1 drop of Pred Acetate 1% applied to eye after laser.  Pt tolerated procedure well. No complications.  Follow up in 2-3 weeks.   "

## 2022-10-18 ENCOUNTER — PES CALL (OUTPATIENT)
Dept: ADMINISTRATIVE | Facility: CLINIC | Age: 77
End: 2022-10-18
Payer: MEDICARE

## 2022-10-19 ENCOUNTER — OFFICE VISIT (OUTPATIENT)
Dept: NEUROLOGY | Facility: CLINIC | Age: 77
End: 2022-10-19
Payer: MEDICARE

## 2022-10-19 ENCOUNTER — PATIENT MESSAGE (OUTPATIENT)
Dept: NEUROLOGY | Facility: CLINIC | Age: 77
End: 2022-10-19

## 2022-10-19 ENCOUNTER — PATIENT MESSAGE (OUTPATIENT)
Dept: PULMONOLOGY | Facility: CLINIC | Age: 77
End: 2022-10-19
Payer: MEDICARE

## 2022-10-19 VITALS
DIASTOLIC BLOOD PRESSURE: 77 MMHG | WEIGHT: 149.94 LBS | BODY MASS INDEX: 25.6 KG/M2 | HEIGHT: 64 IN | HEART RATE: 81 BPM | SYSTOLIC BLOOD PRESSURE: 123 MMHG

## 2022-10-19 DIAGNOSIS — G20.A1 PARKINSON DISEASE: Primary | ICD-10-CM

## 2022-10-19 PROCEDURE — 3288F FALL RISK ASSESSMENT DOCD: CPT | Mod: CPTII,S$GLB,, | Performed by: PSYCHIATRY & NEUROLOGY

## 2022-10-19 PROCEDURE — 1125F PR PAIN SEVERITY QUANTIFIED, PAIN PRESENT: ICD-10-PCS | Mod: CPTII,S$GLB,, | Performed by: PSYCHIATRY & NEUROLOGY

## 2022-10-19 PROCEDURE — 3288F PR FALLS RISK ASSESSMENT DOCUMENTED: ICD-10-PCS | Mod: CPTII,S$GLB,, | Performed by: PSYCHIATRY & NEUROLOGY

## 2022-10-19 PROCEDURE — 3078F PR MOST RECENT DIASTOLIC BLOOD PRESSURE < 80 MM HG: ICD-10-PCS | Mod: CPTII,S$GLB,, | Performed by: PSYCHIATRY & NEUROLOGY

## 2022-10-19 PROCEDURE — 3074F SYST BP LT 130 MM HG: CPT | Mod: CPTII,S$GLB,, | Performed by: PSYCHIATRY & NEUROLOGY

## 2022-10-19 PROCEDURE — 3074F PR MOST RECENT SYSTOLIC BLOOD PRESSURE < 130 MM HG: ICD-10-PCS | Mod: CPTII,S$GLB,, | Performed by: PSYCHIATRY & NEUROLOGY

## 2022-10-19 PROCEDURE — 3078F DIAST BP <80 MM HG: CPT | Mod: CPTII,S$GLB,, | Performed by: PSYCHIATRY & NEUROLOGY

## 2022-10-19 PROCEDURE — 1101F PT FALLS ASSESS-DOCD LE1/YR: CPT | Mod: CPTII,S$GLB,, | Performed by: PSYCHIATRY & NEUROLOGY

## 2022-10-19 PROCEDURE — 99214 OFFICE O/P EST MOD 30 MIN: CPT | Mod: S$GLB,,, | Performed by: PSYCHIATRY & NEUROLOGY

## 2022-10-19 PROCEDURE — 99999 PR PBB SHADOW E&M-EST. PATIENT-LVL III: ICD-10-PCS | Mod: PBBFAC,,, | Performed by: PSYCHIATRY & NEUROLOGY

## 2022-10-19 PROCEDURE — 1125F AMNT PAIN NOTED PAIN PRSNT: CPT | Mod: CPTII,S$GLB,, | Performed by: PSYCHIATRY & NEUROLOGY

## 2022-10-19 PROCEDURE — 1101F PR PT FALLS ASSESS DOC 0-1 FALLS W/OUT INJ PAST YR: ICD-10-PCS | Mod: CPTII,S$GLB,, | Performed by: PSYCHIATRY & NEUROLOGY

## 2022-10-19 PROCEDURE — 99214 PR OFFICE/OUTPT VISIT, EST, LEVL IV, 30-39 MIN: ICD-10-PCS | Mod: S$GLB,,, | Performed by: PSYCHIATRY & NEUROLOGY

## 2022-10-19 PROCEDURE — 99999 PR PBB SHADOW E&M-EST. PATIENT-LVL III: CPT | Mod: PBBFAC,,, | Performed by: PSYCHIATRY & NEUROLOGY

## 2022-10-19 RX ORDER — SERTRALINE HYDROCHLORIDE 25 MG/1
25 TABLET, FILM COATED ORAL DAILY
Qty: 30 TABLET | Refills: 11 | Status: SHIPPED | OUTPATIENT
Start: 2022-10-19 | End: 2022-12-07 | Stop reason: SINTOL

## 2022-10-19 RX ORDER — DONEPEZIL HYDROCHLORIDE 10 MG/1
10 TABLET, FILM COATED ORAL NIGHTLY
Qty: 30 TABLET | Refills: 11 | Status: SHIPPED | OUTPATIENT
Start: 2022-10-19 | End: 2022-12-07 | Stop reason: SINTOL

## 2022-10-19 NOTE — PROGRESS NOTES
Chief Complaint: PD, idiopathic / Lumbar radiculopathy      Interval History:   Hx:  - last seen 7/6/22 by Yvette  --- cd/ld  mg 1.5 tablet TID unchanged  --- gabapentin 400 mg TID, but doubtful of efficacy re: pain  --- offered rasagiline 1 mg, but ultimately declined d/t cost as of last contact  - pain worsening at last visit and negatively affecting gait, grade 1 anterolisesthesis of spine noted in imaging  --- unresponsive to cd/ld and attributable in main to the above    Name: Hector Kellogg  MRN: [id number]   CSN: [id number]      Date: 10/19/2022    Chief Complaint / Interval History:     Hx:  - last seen 7/6/22 by Yvette  --- cd/ld  mg 1.5 tablet TID unchanged  --- gabapentin 400 mg TID, but doubtful of efficacy re: pain  --- offered rasagiline 1 mg, but ultimately declined d/t cost as of last contact  - pain worsening at last visit and negatively affecting gait, grade 1 anterolisesthesis of spine noted in imaging  --- unresponsive to cd/ld and attributable in main to the above    From Jul 2022:  - last seen July 2020  - cd/ld 25/100 1.5 tabs TID -- 9 am, 2 pm and 7 pm    - tremors gets better but does not completely go away  - meds possibly wear off after about 3 hours   - biggest issue is pain, starts in the hip and radiates down his leg   - takes gabapentin 400 mg TID, does not feel that this helps   - festination of gait in the morning   - can't walk far   - had lumbar laminectomy in Feb 2021 has not followed up with pain management or nsgy  - did not feel that pain management helped   - pain is getting worse, it has been going on for about a year -- can't walk due to the pain   - does not feel that the pain improves with sinemet   - no new weight changes, tries to wear loose pants         From Sept 2019:  - feels like he cannot do things  - not apathy, wants to work or do things  - lung issue are limiting him from COPD  - still taking cd/ld 25/100 1 TID-5x/day  - memory is ok  - appetite  "is good  - no constipation now  - bladder is quick    From Oct 2018:  - still getting pinched nerve pain in the back or legs  - will take an extra gabapentin  - takes cd/ld every 3-4 hours  - gait is stable    From Summer 2017:  - a bit more off time between dosing  - peak is pretty good  - gait is frustrating  - memory holding up  - family pleased    From 12/06  1) A bit more off time in between dosing  2) More cramping when off, gabapentin helps at night  3) Would be willing to take meds four times per day    History of Present Illness (HPI):  70 yo with diagnosis of PD, symptoms began 1.5 years ago.  First noted tremor in the L hand, "all left side".  Drags his left foot, generally slower overall.  Dx 1 year ago, had [employer] as a confirmatory study.  Says he had an MRI of the low back as well, was feeling "pinched nerves" in the back and into the both legs.  Was prescribed "some drug that cost $400" and didn;t take. Also prescribed pramipexole per the notes but also said he "never took it."    Was in [place] for 2 years, now re-establishing care here.    Has questions about gabapentin - 800 now taking 1/2 tab at night only and has no significant pain.  Will ibuprofen for breakthrough.  Not taking Norco.    Retired , [employer] cara, left at age 65.    Nonmotor/Premotor ROS:  Hyposmia (HENT)?No per him  RBD/sleep issues (Constitutional)?Yes - acts out dreams  Depression/anxiety (Psychiatric)?No - better on Citalopram  Fatigue (Constitutional)?Yes  Constipation (GI)?Yes  - uses OTC stool softener  Urinary issues ()?Yes - urgency  Sexual dysfunction ()?Yes - some ED.    Orthostasis (Cardiovascular)?No  Leg swelling (Cardiovascular)? No  Falls (Musculoskeletal)?No  Cognitive impairment (Neurologic)?No  Psychoses (Psychiatric)?No  Pain/Paresthesia (Neurologic)?Yes  Visual changes (Eyes)?No  Moles / skin changes (Skin)?Yes - seborrhea, has a scaly mole on the R side of the head  Stridor / SOB (Pulm)?Yes - with " acticity  Bruising (Heme)?No    Past Medical History: The patient  has a past medical history of Cataract, Chronic back pain greater than 3 months duration, Coronary artery disease, DDD (degenerative disc disease), lumbar (5/16/2020), Glaucoma suspect of both eyes, Hyperlipidemia, Hypertension, MI, old (2006), Parkinson disease, and Renal disorder.    Social History: The patient  reports that he quit smoking about 8 years ago. His smoking use included cigarettes. He has a 40.00 pack-year smoking history. He has been exposed to tobacco smoke. He has never used smokeless tobacco. He reports current alcohol use. He reports that he does not use drugs.  Rare social drink.    Family History: Their family history includes Cataracts in his father; Heart attack in his father; Heart disease in his maternal uncle and paternal uncle.    Allergies: Influenza virus vaccines     Meds:   Current Outpatient Medications on File Prior to Visit   Medication Sig Dispense Refill    albuterol (PROVENTIL/VENTOLIN HFA) 90 mcg/actuation inhaler Inhale 2 puffs into the lungs every 6 (six) hours as needed for Wheezing or Shortness of Breath. Rescue 18 g 6    aspirin (ECOTRIN) 81 MG EC tablet Take 81 mg by mouth once daily.      BREZTRI AEROSPHERE 160-9-4.8 mcg/actuation HFAA Inhale 2 puffs into the lungs 2 (two) times a day. 10.7 g 11    carbidopa-levodopa  mg (SINEMET)  mg per tablet TAKE 1 TABLET BY MOUTH FIVE (5) TIMES DAILY 450 tablet 3    citalopram (CELEXA) 20 MG tablet TAKE ONE TABLET BY MOUTH ONCE DAILY 30 tablet 10    gabapentin (NEURONTIN) 400 MG capsule TAKE TWO CAPSULES BY MOUTH THREE TIMES DAILY 180 capsule 2    ibuprofen (ADVIL,MOTRIN) 800 MG tablet Take 800 mg by mouth every 6 (six) hours as needed.      loratadine (CLARITIN) 10 mg tablet Take 10 mg by mouth as needed for Allergies.      metoprolol succinate (TOPROL-XL) 50 MG 24 hr tablet TAKE 1 TABLET (50 MG TOTAL) BY MOUTH ONCE DAILY. 90 tablet 3    multivitamin  "capsule Take 1 capsule by mouth once daily.      rosuvastatin (CRESTOR) 40 MG Tab TAKE ONE TABLET BY MOUTH EVERY NIGHT AT BEDTIME 90 tablet 2     No current facility-administered medications on file prior to visit.     Exam:  /77   Pulse 81   Ht 5' 4" (1.626 m)   Wt 68 kg (149 lb 14.6 oz)   BMI 25.73 kg/m²     Constitutional  Well-developed, well-nourished, appears stated age   * Specialized movement exam  Mild hypophonic speech.    Mild facial masking.   Automatism of the left arm   L>R cogwheel rigidity - mild     L>R bradykinesia - mild.   bilateral resting tremor, L > R   dystonic posturing of R foot-- mild dyskinesia    No other dystonia, chorea, athetosis, myoclonus, or tics.   No motor impersistence.   Normal-based gait.   + mildly abnormal arm swing B, worse on the L  Good stride length, no freezing   No postural instability.      Laboratory/Radiological:  - Results:  Hospital Outpatient Visit on 10/13/2022   Component Date Value Ref Range Status    Physician Comment 10/13/2022    Final                    Value:Spirometry shows severe obstruction. DLCO is severely decreased.   Â   Notes:  Â   No recent hemoglobin value available. DLCO interpretation assumes a normal hemoglobin value.       Pre FVC 10/13/2022 2.37  2.24 - 3.94 L Final    PRE FEV5 10/13/2022 0.81  0.73 - 3.00 L Final    Pre FEV1 10/13/2022 1.17 (L)  1.64 - 2.99 L Final    Pre FEV1 FVC 10/13/2022 49.47 (L)  61.55 - 89.43 % Final    Pre FEF 25 75 10/13/2022 0.44 (L)  0.70 - 3.35 L/s Final    Pre PEF 10/13/2022 4.57  4.22 - 7.98 L/s Final    Pre  10/13/2022 7.69  sec Final    Pre DLCO 10/13/2022 7.72 (L)  12.98 - 26.83 ml/(min*mmHg) Final    DLCOVA PRE 10/13/2022 2.27  2.08 - 4.90 ml/(min*mmHg*L) Final    VA PRE 10/13/2022 3.41 (L)  5.56 - 5.56 L Final    IVC PRE 10/13/2022 2.58  2.24 - 3.94 L Final    FVC Ref 10/13/2022 3.08   Final    FVC LLN 10/13/2022 2.24   Final    FVC Pre Ref 10/13/2022 77.0  % Final    FEV05 REF " 10/13/2022 1.87   Final    FEV05 LLN 10/13/2022 0.73   Final    PRE FEV05 REF 10/13/2022 43.2  % Final    FEV1 Ref 10/13/2022 2.34   Final    FEV1 LLN 10/13/2022 1.64   Final    FEV1 Pre Ref 10/13/2022 50.2  % Final    FEV1 FVC Ref 10/13/2022 76   Final    FEV1 FVC LLN 10/13/2022 62   Final    FEV1 FVC Pre Ref 10/13/2022 64.9  % Final    FEF 25 75 Ref 10/13/2022 1.77   Final    FEF 25 75 LLN 10/13/2022 0.70   Final    FEF 25 75 Pre Ref 10/13/2022 24.5  % Final    PEF Ref 10/13/2022 6.10   Final    PEF LLN 10/13/2022 4.22   Final    PEF Pre Ref 10/13/2022 75.0  % Final    DLCO Single Breath Ref 10/13/2022 19.91   Final    DLCO Single Breath LLN 10/13/2022 12.98   Final    DLCO SINGLEBREATH ULN 10/13/2022 26.83   Final    DLCO Single Breath Pre Ref 10/13/2022 38.8  % Final    DLCOc Single Breath Ref 10/13/2022 19.91   Final    DLCOc Single Breath LLN 10/13/2022 12.98   Final    DLCOC SINGLEBREATH ULN 10/13/2022 26.83   Final    DLCOVA Ref 10/13/2022 3.49   Final    DLCOVA LLN 10/13/2022 2.08   Final    DLCOVA ULN 10/13/2022 4.90   Final    DLCOVA Pre Ref 10/13/2022 65.0  % Final    DLCOc SBVA Ref 10/13/2022 3.49   Final    DLCOc SBVA LLN 10/13/2022 2.08   Final    DLCOCSBVA ULN 10/13/2022 4.90   Final    VA Single Breath Ref 10/13/2022 5.56   Final    VA Single Breath LLN 10/13/2022 5.56   Final    VA SINGLEBREATH ULN 10/13/2022 5.56   Final    VA Single Breath Pre Ref 10/13/2022 61.3  % Final    IVC Single Breath Ref 10/13/2022 3.08   Final    IVC Single Breath LLN 10/13/2022 2.24   Final    IVC SINGLEBREATH ULN 10/13/2022 3.94   Final    IVC Single Breath Pre Ref 10/13/2022 83.6  % Final   Hospital Outpatient Visit on 08/30/2022   Component Date Value Ref Range Status    Final Pathologic Diagnosis 08/30/2022    Final                    Value:[facility] Thyroid Cytology Category: Benign  Consistent with a benign  follicular nodule.  Other findings and comments:  Benign follicular epithelial cells.  Colloid  present.  Macrophages.      Adequacy 2022    Final                    Value:Pass 1; inadequate  Pass 2; inadequate  Pass 3; inadequate  Pass 4; inadequate  Pass 5; adequate  Dr. Franz reported results to Dr. Traore at 1130 on 2022      Disclaimer 2022    Final                    Value:Screening was performed at [facility], [address], [place] [address].       - Independent review of images: none available.    Reviewed records of:  1) Grade 1 anterolisthesis of spine  2) Positive SHAHRZAD scan    Diagnoses:          1) Idiopathic PD, tremor dominant.  On CD/LD.  2) Low back pain --> now worse.  C/W LSS and sciatica    Medical Decision Makin) add rasagiline 1 mg   2) defer to pain management for lumbar radiculopathy   3) donepezil and sertraline on board now

## 2022-11-01 ENCOUNTER — PATIENT MESSAGE (OUTPATIENT)
Dept: PULMONOLOGY | Facility: CLINIC | Age: 77
End: 2022-11-01
Payer: MEDICARE

## 2022-11-14 ENCOUNTER — OFFICE VISIT (OUTPATIENT)
Dept: OPHTHALMOLOGY | Facility: CLINIC | Age: 77
End: 2022-11-14
Payer: MEDICARE

## 2022-11-14 DIAGNOSIS — H40.053 BILATERAL OCULAR HYPERTENSION: ICD-10-CM

## 2022-11-14 DIAGNOSIS — H26.492 PCO (POSTERIOR CAPSULAR OPACIFICATION), LEFT: ICD-10-CM

## 2022-11-14 DIAGNOSIS — Z98.890 POST-OPERATIVE STATE: Primary | ICD-10-CM

## 2022-11-14 DIAGNOSIS — H35.341 MACULAR HOLE OF RIGHT EYE: ICD-10-CM

## 2022-11-14 PROCEDURE — 99999 PR PBB SHADOW E&M-EST. PATIENT-LVL III: ICD-10-PCS | Mod: PBBFAC,,, | Performed by: OPHTHALMOLOGY

## 2022-11-14 PROCEDURE — 99024 PR POST-OP FOLLOW-UP VISIT: ICD-10-PCS | Mod: S$GLB,,, | Performed by: OPHTHALMOLOGY

## 2022-11-14 PROCEDURE — 99999 PR PBB SHADOW E&M-EST. PATIENT-LVL III: CPT | Mod: PBBFAC,,, | Performed by: OPHTHALMOLOGY

## 2022-11-14 PROCEDURE — 99024 POSTOP FOLLOW-UP VISIT: CPT | Mod: S$GLB,,, | Performed by: OPHTHALMOLOGY

## 2022-11-14 PROCEDURE — 3288F FALL RISK ASSESSMENT DOCD: CPT | Mod: CPTII,S$GLB,, | Performed by: OPHTHALMOLOGY

## 2022-11-14 PROCEDURE — 1159F PR MEDICATION LIST DOCUMENTED IN MEDICAL RECORD: ICD-10-PCS | Mod: CPTII,S$GLB,, | Performed by: OPHTHALMOLOGY

## 2022-11-14 PROCEDURE — 1160F PR REVIEW ALL MEDS BY PRESCRIBER/CLIN PHARMACIST DOCUMENTED: ICD-10-PCS | Mod: CPTII,S$GLB,, | Performed by: OPHTHALMOLOGY

## 2022-11-14 PROCEDURE — 1101F PR PT FALLS ASSESS DOC 0-1 FALLS W/OUT INJ PAST YR: ICD-10-PCS | Mod: CPTII,S$GLB,, | Performed by: OPHTHALMOLOGY

## 2022-11-14 PROCEDURE — 1101F PT FALLS ASSESS-DOCD LE1/YR: CPT | Mod: CPTII,S$GLB,, | Performed by: OPHTHALMOLOGY

## 2022-11-14 PROCEDURE — 1160F RVW MEDS BY RX/DR IN RCRD: CPT | Mod: CPTII,S$GLB,, | Performed by: OPHTHALMOLOGY

## 2022-11-14 PROCEDURE — 1159F MED LIST DOCD IN RCRD: CPT | Mod: CPTII,S$GLB,, | Performed by: OPHTHALMOLOGY

## 2022-11-14 PROCEDURE — 1126F PR PAIN SEVERITY QUANTIFIED, NO PAIN PRESENT: ICD-10-PCS | Mod: CPTII,S$GLB,, | Performed by: OPHTHALMOLOGY

## 2022-11-14 PROCEDURE — 3288F PR FALLS RISK ASSESSMENT DOCUMENTED: ICD-10-PCS | Mod: CPTII,S$GLB,, | Performed by: OPHTHALMOLOGY

## 2022-11-14 PROCEDURE — 1126F AMNT PAIN NOTED NONE PRSNT: CPT | Mod: CPTII,S$GLB,, | Performed by: OPHTHALMOLOGY

## 2022-11-14 NOTE — PROGRESS NOTES
Subjective:       Patient ID: Hector Kellogg is a 77 y.o. male.    Chief Complaint: Follow-up (Hector Kellogg is a 78 y/o male )    HPI     Follow-up     Additional comments: Hector Kellogg is a 78 y/o male            Comments    Pt here for 2-3 week YAG OD   Pt denies f/f. Pt state no complaints at this time.  Blurry OU  OTC readers +2.75    POHx:   1. Cloudy posterior  capsule, bilateral   2. Glaucoma screening, Hx of OHT OU                Last edited by Juan Brown on 11/14/2022  3:13 PM.             Assessment:       1. Post-operative state    2. PCO (posterior capsular opacification), left    3. Macular hole of right eye    4. Bilateral ocular hypertension          Plan:       S/p YAG CAP OD-Doing well.     PCO OS-NVS per Pt at this time.  Macular hole OD-Main source of poor Va OD. Pt to decide after holidays if he wants sx eval.  OHT OU-IOP's are acceptable OU today.      RTC Dr Jones in 6 mos.  Pt to call for referral to [facility] if he desires sx.

## 2022-12-01 ENCOUNTER — PATIENT MESSAGE (OUTPATIENT)
Dept: NEUROLOGY | Facility: CLINIC | Age: 77
End: 2022-12-01
Payer: MEDICARE

## 2022-12-02 ENCOUNTER — TELEPHONE (OUTPATIENT)
Dept: NEUROLOGY | Facility: CLINIC | Age: 77
End: 2022-12-02
Payer: MEDICARE

## 2022-12-02 NOTE — TELEPHONE ENCOUNTER
----- Message from Viviana Jimenez sent at 12/2/2022  4:40 PM CST -----  Regarding: pt advice  Contact: Eileen [phone]  CallerEileen pt wife is needing to be advised on Pt RX donepeziL (ARICEPT) 10 MG tablet. Caller states medication is not agreeing with Pt. Please call to discuss further.

## 2022-12-02 NOTE — TELEPHONE ENCOUNTER
----- Message from Talia Vidal sent at 12/2/2022 12:20 PM CST -----  Contact: [phone] Eileen  Hector Kellogg wife Eileen calling regarding Patient Advice (message) about donepeziL (ARICEPT) 10 MG tablet.  She stated that they believe this medication is given pt side effect,  which is tremors.  She want to know if pt should getoff this med or she he take steps toget off.  call back [phone]

## 2022-12-07 RX ORDER — CITALOPRAM 20 MG/1
20 TABLET, FILM COATED ORAL DAILY
COMMUNITY
End: 2023-03-01

## 2022-12-07 NOTE — TELEPHONE ENCOUNTER
Spoke with pt wife Eileen. Patient has already d/c donepezil. They checked with pharmacist and weaned him off. Tremors have subsided.  Has resumed Celexa and d/c zoloft.

## 2022-12-26 NOTE — PROGRESS NOTES
Patient arrives for sotrovimab infusion. Ambulatory. Pt AAox3. No distress noted. RR even and unlabored.     Symptoms and onset date:  1/10/22    Coughing, fever, fatigue, body aches    Tested COVID + on 1/12/22       20-Dec-2022 18:11

## 2023-01-10 ENCOUNTER — PATIENT MESSAGE (OUTPATIENT)
Dept: INTERNAL MEDICINE | Facility: CLINIC | Age: 78
End: 2023-01-10
Payer: MEDICARE

## 2023-01-11 ENCOUNTER — OFFICE VISIT (OUTPATIENT)
Dept: INTERNAL MEDICINE | Facility: CLINIC | Age: 78
End: 2023-01-11
Payer: MEDICARE

## 2023-01-11 ENCOUNTER — LAB VISIT (OUTPATIENT)
Dept: LAB | Facility: HOSPITAL | Age: 78
End: 2023-01-11
Attending: INTERNAL MEDICINE
Payer: MEDICARE

## 2023-01-11 VITALS
BODY MASS INDEX: 25.41 KG/M2 | DIASTOLIC BLOOD PRESSURE: 56 MMHG | OXYGEN SATURATION: 97 % | RESPIRATION RATE: 16 BRPM | SYSTOLIC BLOOD PRESSURE: 100 MMHG | WEIGHT: 148.81 LBS | TEMPERATURE: 98 F | HEART RATE: 77 BPM | HEIGHT: 64 IN

## 2023-01-11 DIAGNOSIS — M51.36 DDD (DEGENERATIVE DISC DISEASE), LUMBAR: ICD-10-CM

## 2023-01-11 DIAGNOSIS — I27.20 PULMONARY HTN: ICD-10-CM

## 2023-01-11 DIAGNOSIS — I10 ESSENTIAL HYPERTENSION: ICD-10-CM

## 2023-01-11 DIAGNOSIS — E78.2 MIXED HYPERLIPIDEMIA: ICD-10-CM

## 2023-01-11 DIAGNOSIS — J43.2 CENTRILOBULAR EMPHYSEMA: ICD-10-CM

## 2023-01-11 DIAGNOSIS — E04.1 THYROID NODULE: Primary | ICD-10-CM

## 2023-01-11 DIAGNOSIS — I25.10 CORONARY ARTERY DISEASE INVOLVING NATIVE CORONARY ARTERY OF NATIVE HEART WITHOUT ANGINA PECTORIS: ICD-10-CM

## 2023-01-11 DIAGNOSIS — N20.0 NEPHROLITHIASIS: ICD-10-CM

## 2023-01-11 DIAGNOSIS — F41.9 ANXIETY: ICD-10-CM

## 2023-01-11 DIAGNOSIS — G20.A1 PARKINSON'S DISEASE: ICD-10-CM

## 2023-01-11 LAB
BASOPHILS # BLD AUTO: 0.02 K/UL (ref 0–0.2)
BASOPHILS NFR BLD: 0.3 % (ref 0–1.9)
DIFFERENTIAL METHOD: ABNORMAL
EOSINOPHIL # BLD AUTO: 0.1 K/UL (ref 0–0.5)
EOSINOPHIL NFR BLD: 1.3 % (ref 0–8)
ERYTHROCYTE [DISTWIDTH] IN BLOOD BY AUTOMATED COUNT: 13.1 % (ref 11.5–14.5)
HCT VFR BLD AUTO: 44.6 % (ref 40–54)
HGB BLD-MCNC: 14 G/DL (ref 14–18)
IMM GRANULOCYTES # BLD AUTO: 0.03 K/UL (ref 0–0.04)
IMM GRANULOCYTES NFR BLD AUTO: 0.4 % (ref 0–0.5)
LYMPHOCYTES # BLD AUTO: 1.5 K/UL (ref 1–4.8)
LYMPHOCYTES NFR BLD: 21.6 % (ref 18–48)
MCH RBC QN AUTO: 32.3 PG (ref 27–31)
MCHC RBC AUTO-ENTMCNC: 31.4 G/DL (ref 32–36)
MCV RBC AUTO: 103 FL (ref 82–98)
MONOCYTES # BLD AUTO: 0.7 K/UL (ref 0.3–1)
MONOCYTES NFR BLD: 9.7 % (ref 4–15)
NEUTROPHILS # BLD AUTO: 4.8 K/UL (ref 1.8–7.7)
NEUTROPHILS NFR BLD: 66.7 % (ref 38–73)
NRBC BLD-RTO: 0 /100 WBC
PLATELET # BLD AUTO: 190 K/UL (ref 150–450)
PMV BLD AUTO: 10.2 FL (ref 9.2–12.9)
RBC # BLD AUTO: 4.34 M/UL (ref 4.6–6.2)
WBC # BLD AUTO: 7.12 K/UL (ref 3.9–12.7)

## 2023-01-11 PROCEDURE — 3078F DIAST BP <80 MM HG: CPT | Mod: CPTII,S$GLB,, | Performed by: INTERNAL MEDICINE

## 2023-01-11 PROCEDURE — 1100F PR PT FALLS ASSESS DOC 2+ FALLS/FALL W/INJURY/YR: ICD-10-PCS | Mod: CPTII,S$GLB,, | Performed by: INTERNAL MEDICINE

## 2023-01-11 PROCEDURE — 99214 OFFICE O/P EST MOD 30 MIN: CPT | Mod: S$GLB,,, | Performed by: INTERNAL MEDICINE

## 2023-01-11 PROCEDURE — 1125F AMNT PAIN NOTED PAIN PRSNT: CPT | Mod: CPTII,S$GLB,, | Performed by: INTERNAL MEDICINE

## 2023-01-11 PROCEDURE — 80053 COMPREHEN METABOLIC PANEL: CPT | Performed by: INTERNAL MEDICINE

## 2023-01-11 PROCEDURE — 85025 COMPLETE CBC W/AUTO DIFF WBC: CPT | Performed by: INTERNAL MEDICINE

## 2023-01-11 PROCEDURE — 99999 PR PBB SHADOW E&M-EST. PATIENT-LVL IV: ICD-10-PCS | Mod: PBBFAC,,, | Performed by: INTERNAL MEDICINE

## 2023-01-11 PROCEDURE — 99499 RISK ADDL DX/OHS AUDIT: ICD-10-PCS | Mod: S$GLB,,, | Performed by: INTERNAL MEDICINE

## 2023-01-11 PROCEDURE — 3288F FALL RISK ASSESSMENT DOCD: CPT | Mod: CPTII,S$GLB,, | Performed by: INTERNAL MEDICINE

## 2023-01-11 PROCEDURE — 3074F SYST BP LT 130 MM HG: CPT | Mod: CPTII,S$GLB,, | Performed by: INTERNAL MEDICINE

## 2023-01-11 PROCEDURE — 1125F PR PAIN SEVERITY QUANTIFIED, PAIN PRESENT: ICD-10-PCS | Mod: CPTII,S$GLB,, | Performed by: INTERNAL MEDICINE

## 2023-01-11 PROCEDURE — 99499 UNLISTED E&M SERVICE: CPT | Mod: S$GLB,,, | Performed by: INTERNAL MEDICINE

## 2023-01-11 PROCEDURE — 3074F PR MOST RECENT SYSTOLIC BLOOD PRESSURE < 130 MM HG: ICD-10-PCS | Mod: CPTII,S$GLB,, | Performed by: INTERNAL MEDICINE

## 2023-01-11 PROCEDURE — 36415 COLL VENOUS BLD VENIPUNCTURE: CPT | Mod: PO | Performed by: INTERNAL MEDICINE

## 2023-01-11 PROCEDURE — 1100F PTFALLS ASSESS-DOCD GE2>/YR: CPT | Mod: CPTII,S$GLB,, | Performed by: INTERNAL MEDICINE

## 2023-01-11 PROCEDURE — 3288F PR FALLS RISK ASSESSMENT DOCUMENTED: ICD-10-PCS | Mod: CPTII,S$GLB,, | Performed by: INTERNAL MEDICINE

## 2023-01-11 PROCEDURE — 99999 PR PBB SHADOW E&M-EST. PATIENT-LVL IV: CPT | Mod: PBBFAC,,, | Performed by: INTERNAL MEDICINE

## 2023-01-11 PROCEDURE — 3078F PR MOST RECENT DIASTOLIC BLOOD PRESSURE < 80 MM HG: ICD-10-PCS | Mod: CPTII,S$GLB,, | Performed by: INTERNAL MEDICINE

## 2023-01-11 PROCEDURE — 99214 PR OFFICE/OUTPT VISIT, EST, LEVL IV, 30-39 MIN: ICD-10-PCS | Mod: S$GLB,,, | Performed by: INTERNAL MEDICINE

## 2023-01-11 NOTE — PROGRESS NOTES
Subjective:       Patient ID: Hector Kellogg is a 77 y.o. male.    Chief Complaint: Follow-up (6 mo)    HPI  Pt with CAD S/P NSTEMI and PCI in 2006, HTN, HLD and Parkinson's disease here for 6 month f/u. Pt has had a few mechanical falls over the last month. No head trauma and he denies any acute pain.   Review of Systems   Constitutional:  Negative for activity change, appetite change, chills, diaphoresis, fatigue, fever and unexpected weight change.   HENT:  Negative for postnasal drip, rhinorrhea, sinus pressure/congestion, sneezing, sore throat, trouble swallowing and voice change.    Respiratory:  Negative for cough, shortness of breath and wheezing.    Cardiovascular:  Negative for chest pain, palpitations and leg swelling.   Gastrointestinal:  Negative for abdominal pain, blood in stool, constipation, diarrhea, nausea and vomiting.   Genitourinary:  Negative for dysuria.   Musculoskeletal:  Positive for arthralgias and back pain. Negative for myalgias.   Integumentary:  Negative for rash and wound.   Allergic/Immunologic: Negative for environmental allergies and food allergies.   Neurological:  Positive for weakness, coordination difficulties and coordination difficulties.   Hematological:  Negative for adenopathy. Does not bruise/bleed easily.   Psychiatric/Behavioral:  The patient is nervous/anxious.        Objective:      Physical Exam  Constitutional:       General: He is not in acute distress.     Appearance: Normal appearance. He is well-developed. He is not diaphoretic.   HENT:      Head: Normocephalic and atraumatic.      Right Ear: External ear normal.      Left Ear: External ear normal.      Nose: Nose normal.      Mouth/Throat:      Pharynx: No oropharyngeal exudate.   Eyes:      General: No scleral icterus.        Right eye: No discharge.         Left eye: No discharge.      Conjunctiva/sclera: Conjunctivae normal.      Pupils: Pupils are equal, round, and reactive to light.   Neck:       Vascular: No JVD.   Cardiovascular:      Rate and Rhythm: Normal rate and regular rhythm.      Pulses: Normal pulses.      Heart sounds: Normal heart sounds. No murmur heard.  Pulmonary:      Effort: Pulmonary effort is normal. No respiratory distress.      Breath sounds: Normal breath sounds. No wheezing or rales.   Abdominal:      General: Bowel sounds are normal.      Tenderness: There is no abdominal tenderness. There is no guarding or rebound.   Musculoskeletal:      Cervical back: Normal range of motion and neck supple.      Right lower leg: No edema.      Left lower leg: No edema.   Lymphadenopathy:      Cervical: No cervical adenopathy.   Skin:     General: Skin is warm and dry.      Capillary Refill: Capillary refill takes less than 2 seconds.      Coloration: Skin is not pale.      Findings: No rash.   Neurological:      Mental Status: He is alert and oriented to person, place, and time. Mental status is at baseline.      Cranial Nerves: No cranial nerve deficit.   Psychiatric:         Mood and Affect: Mood normal.         Behavior: Behavior normal.       Assessment:       Problem List Items Addressed This Visit          Neuro    Parkinson's disease    DDD (degenerative disc disease), lumbar       Psychiatric    Anxiety       Pulmonary    Pulmonary HTN    Centrilobular emphysema       Cardiac/Vascular    Hyperlipidemia    Essential hypertension    Coronary artery disease       Renal/    Nephrolithiasis       Endocrine    Thyroid nodule - Primary       Plan:    CAD- S/P NSTEMI and PCI in 2006, with repeat CATH(10/11/15) placing stents to RCA and OM1- stable       Continue BB/statin/ASA; pt has stopped the Imdur 2/2 to low BP      HTN- stable on Toprol XL 50 mg daily      HLD- controlled on Crestor      Parkinson's disease- stable on Sinemet, managed by Neuro      COPD- stable      Mild Pulmonary HTN- stable      Anxiety- stable on Celexa 20 mg daily      Thyroid nodule- followed by ENT      DJD Lumbar  spine- followed by      Nephrolithiasis- stable      F/u in 6 months for annual

## 2023-01-12 ENCOUNTER — TELEPHONE (OUTPATIENT)
Dept: INTERNAL MEDICINE | Facility: CLINIC | Age: 78
End: 2023-01-12
Payer: MEDICARE

## 2023-01-12 LAB
ALBUMIN SERPL BCP-MCNC: 3.4 G/DL (ref 3.5–5.2)
ALP SERPL-CCNC: 79 U/L (ref 55–135)
ALT SERPL W/O P-5'-P-CCNC: 20 U/L (ref 10–44)
ANION GAP SERPL CALC-SCNC: 8 MMOL/L (ref 8–16)
AST SERPL-CCNC: 23 U/L (ref 10–40)
BILIRUB SERPL-MCNC: 0.6 MG/DL (ref 0.1–1)
BUN SERPL-MCNC: 21 MG/DL (ref 8–23)
CALCIUM SERPL-MCNC: 10.4 MG/DL (ref 8.7–10.5)
CHLORIDE SERPL-SCNC: 108 MMOL/L (ref 95–110)
CO2 SERPL-SCNC: 28 MMOL/L (ref 23–29)
CREAT SERPL-MCNC: 1.1 MG/DL (ref 0.5–1.4)
EST. GFR  (NO RACE VARIABLE): >60 ML/MIN/1.73 M^2
GLUCOSE SERPL-MCNC: 97 MG/DL (ref 70–110)
POTASSIUM SERPL-SCNC: 4.7 MMOL/L (ref 3.5–5.1)
PROT SERPL-MCNC: 6.6 G/DL (ref 6–8.4)
SODIUM SERPL-SCNC: 144 MMOL/L (ref 136–145)

## 2023-01-16 PROBLEM — J96.11 CHRONIC RESPIRATORY FAILURE WITH HYPOXIA: Status: RESOLVED | Noted: 2022-10-13 | Resolved: 2023-01-16

## 2023-02-15 ENCOUNTER — OFFICE VISIT (OUTPATIENT)
Dept: NEUROLOGY | Facility: CLINIC | Age: 78
End: 2023-02-15
Payer: MEDICARE

## 2023-02-15 VITALS
WEIGHT: 150 LBS | HEIGHT: 64 IN | BODY MASS INDEX: 25.61 KG/M2 | HEART RATE: 72 BPM | DIASTOLIC BLOOD PRESSURE: 80 MMHG | SYSTOLIC BLOOD PRESSURE: 134 MMHG

## 2023-02-15 DIAGNOSIS — I73.9 CLAUDICATION: ICD-10-CM

## 2023-02-15 PROCEDURE — 99214 PR OFFICE/OUTPT VISIT, EST, LEVL IV, 30-39 MIN: ICD-10-PCS | Mod: S$GLB,,, | Performed by: PSYCHIATRY & NEUROLOGY

## 2023-02-15 PROCEDURE — 1100F PR PT FALLS ASSESS DOC 2+ FALLS/FALL W/INJURY/YR: ICD-10-PCS | Mod: CPTII,S$GLB,, | Performed by: PSYCHIATRY & NEUROLOGY

## 2023-02-15 PROCEDURE — 3075F SYST BP GE 130 - 139MM HG: CPT | Mod: CPTII,S$GLB,, | Performed by: PSYCHIATRY & NEUROLOGY

## 2023-02-15 PROCEDURE — 99214 OFFICE O/P EST MOD 30 MIN: CPT | Mod: S$GLB,,, | Performed by: PSYCHIATRY & NEUROLOGY

## 2023-02-15 PROCEDURE — 3288F FALL RISK ASSESSMENT DOCD: CPT | Mod: CPTII,S$GLB,, | Performed by: PSYCHIATRY & NEUROLOGY

## 2023-02-15 PROCEDURE — 1100F PTFALLS ASSESS-DOCD GE2>/YR: CPT | Mod: CPTII,S$GLB,, | Performed by: PSYCHIATRY & NEUROLOGY

## 2023-02-15 PROCEDURE — 99999 PR PBB SHADOW E&M-EST. PATIENT-LVL III: CPT | Mod: PBBFAC,,, | Performed by: PSYCHIATRY & NEUROLOGY

## 2023-02-15 PROCEDURE — 3079F DIAST BP 80-89 MM HG: CPT | Mod: CPTII,S$GLB,, | Performed by: PSYCHIATRY & NEUROLOGY

## 2023-02-15 PROCEDURE — 1126F PR PAIN SEVERITY QUANTIFIED, NO PAIN PRESENT: ICD-10-PCS | Mod: CPTII,S$GLB,, | Performed by: PSYCHIATRY & NEUROLOGY

## 2023-02-15 PROCEDURE — 1126F AMNT PAIN NOTED NONE PRSNT: CPT | Mod: CPTII,S$GLB,, | Performed by: PSYCHIATRY & NEUROLOGY

## 2023-02-15 PROCEDURE — 3075F PR MOST RECENT SYSTOLIC BLOOD PRESS GE 130-139MM HG: ICD-10-PCS | Mod: CPTII,S$GLB,, | Performed by: PSYCHIATRY & NEUROLOGY

## 2023-02-15 PROCEDURE — 99999 PR PBB SHADOW E&M-EST. PATIENT-LVL III: ICD-10-PCS | Mod: PBBFAC,,, | Performed by: PSYCHIATRY & NEUROLOGY

## 2023-02-15 PROCEDURE — 3079F PR MOST RECENT DIASTOLIC BLOOD PRESSURE 80-89 MM HG: ICD-10-PCS | Mod: CPTII,S$GLB,, | Performed by: PSYCHIATRY & NEUROLOGY

## 2023-02-15 PROCEDURE — 3288F PR FALLS RISK ASSESSMENT DOCUMENTED: ICD-10-PCS | Mod: CPTII,S$GLB,, | Performed by: PSYCHIATRY & NEUROLOGY

## 2023-02-15 NOTE — PROGRESS NOTES
Name: Hector Kellogg  MRN: [id number]   CSN: [id number]      Date: 02/15/2023    Chief Complaint / Interval History:   - more imbalance, more shuffling, 2 times to the ground, more near falls, no injuries  - showers, has seat for safety   - appetite is good, sleep is disrupted by dogs and cats, but sleeps pretty well, up twice to urinate  - more holding on and use of walker  - now on 1 tab CD/LD 5x/day, usually 8:30-11:30...every three hours  -      From 10-22:  Hx:  - last seen 7/6/22 by Yvette  --- cd/ld  mg 1.5 tablet TID unchanged  --- gabapentin 400 mg TID, but doubtful of efficacy re: pain  --- offered rasagiline 1 mg, but ultimately declined d/t cost as of last contact  - pain worsening at last visit and negatively affecting gait, grade 1 anterolisesthesis of spine noted in imaging  --- unresponsive to cd/ld and attributable in main to the above    From Jul 2022:  - last seen July 2020  - cd/ld 25/100 1.5 tabs TID -- 9 am, 2 pm and 7 pm    - tremors gets better but does not completely go away  - meds possibly wear off after about 3 hours   - biggest issue is pain, starts in the hip and radiates down his leg   - takes gabapentin 400 mg TID, does not feel that this helps   - festination of gait in the morning   - can't walk far   - had lumbar laminectomy in Feb 2021 has not followed up with pain management or nsgy  - did not feel that pain management helped   - pain is getting worse, it has been going on for about a year -- can't walk due to the pain   - does not feel that the pain improves with sinemet   - no new weight changes, tries to wear loose pants         From Sept 2019:  - feels like he cannot do things  - not apathy, wants to work or do things  - lung issue are limiting him from COPD  - still taking cd/ld 25/100 1 TID-5x/day  - memory is ok  - appetite is good  - no constipation now  - bladder is quick    From Oct 2018:  - still getting pinched nerve pain in the back or legs  - will take an  "extra gabapentin  - takes cd/ld every 3-4 hours  - gait is stable    From Summer 2017:  - a bit more off time between dosing  - peak is pretty good  - gait is frustrating  - memory holding up  - family pleased    From 12/06  1) A bit more off time in between dosing  2) More cramping when off, gabapentin helps at night  3) Would be willing to take meds four times per day    History of Present Illness (HPI):  68 yo with diagnosis of PD, symptoms began 1.5 years ago.  First noted tremor in the L hand, "all left side".  Drags his left foot, generally slower overall.  Dx 1 year ago, had DatSCAN as a confirmatory study.  Says he had an MRI of the low back as well, was feeling "pinched nerves" in the back and into the both legs.  Was prescribed "some drug that cost $400" and didn;t take. Also prescribed pramipexole per the notes but also said he "never took it."    Was in [place] for 2 years, now re-establishing care here.    Has questions about gabapentin - 800 now taking 1/2 tab at night only and has no significant pain.  Will ibuprofen for breakthrough.  Not taking Norco.    Retired , [employer], left at age 65.    Nonmotor/Premotor ROS:  Hyposmia (HENT)?No per him  RBD/sleep issues (Constitutional)?Yes - acts out dreams  Depression/anxiety (Psychiatric)?No - better on Citalopram  Fatigue (Constitutional)?Yes  Constipation (GI)?Yes  - uses OTC stool softener  Urinary issues ()?Yes - urgency  Sexual dysfunction ()?Yes - some ED.    Orthostasis (Cardiovascular)?No  Leg swelling (Cardiovascular)? No  Falls (Musculoskeletal)?No  Cognitive impairment (Neurologic)?No  Psychoses (Psychiatric)?No  Pain/Paresthesia (Neurologic)?Yes  Visual changes (Eyes)?No  Moles / skin changes (Skin)?Yes - seborrhea, has a scaly mole on the R side of the head  Stridor / SOB (Pulm)?Yes - with acticity  Bruising (Heme)?No    Past Medical History: The patient  has a past medical history of Cataract, Chronic back pain greater than 3 months " duration, Coronary artery disease, DDD (degenerative disc disease), lumbar (5/16/2020), Glaucoma suspect of both eyes, Hyperlipidemia, Hypertension, MI, old (2006), Parkinson disease, and Renal disorder.    Social History: The patient  reports that he quit smoking about 9 years ago. His smoking use included cigarettes. He has a 40.00 pack-year smoking history. He has been exposed to tobacco smoke. He has never used smokeless tobacco. He reports current alcohol use. He reports that he does not use drugs.  Rare social drink.    Family History: Their family history includes Cataracts in his father; Heart attack in his father; Heart disease in his maternal uncle and paternal uncle.    Allergies: Influenza virus vaccines     Meds:   Current Outpatient Medications on File Prior to Visit   Medication Sig Dispense Refill    albuterol (PROVENTIL/VENTOLIN HFA) 90 mcg/actuation inhaler Inhale 2 puffs into the lungs every 6 (six) hours as needed for Wheezing or Shortness of Breath. Rescue 18 g 6    aspirin (ECOTRIN) 81 MG EC tablet Take 81 mg by mouth once daily.      BREZTRI AEROSPHERE 160-9-4.8 mcg/actuation HFAA Inhale 2 puffs into the lungs 2 (two) times a day. 10.7 g 11    carbidopa-levodopa  mg (SINEMET)  mg per tablet TAKE 1 TABLET BY MOUTH FIVE (5) TIMES DAILY 450 tablet 3    citalopram (CELEXA) 20 MG tablet Take 20 mg by mouth once daily.      gabapentin (NEURONTIN) 400 MG capsule Take 2 capsules (800 mg total) by mouth 3 (three) times daily. 180 capsule 2    ibuprofen (ADVIL,MOTRIN) 800 MG tablet Take 800 mg by mouth every 6 (six) hours as needed.      loratadine (CLARITIN) 10 mg tablet Take 10 mg by mouth as needed for Allergies.      metoprolol succinate (TOPROL-XL) 50 MG 24 hr tablet TAKE 1 TABLET (50 MG TOTAL) BY MOUTH ONCE DAILY. 90 tablet 3    multivitamin capsule Take 1 capsule by mouth once daily.      rosuvastatin (CRESTOR) 40 MG Tab TAKE ONE TABLET BY MOUTH EVERY NIGHT AT BEDTIME 90 tablet 2  "    No current facility-administered medications on file prior to visit.     Exam:  /80 (BP Location: Right arm, Patient Position: Sitting, BP Method: Medium (Automatic))   Pulse 72   Ht 5' 4" (1.626 m)   Wt 68 kg (150 lb) Comment: reported  BMI 25.75 kg/m²     Constitutional  Well-developed, well-nourished, appears stated age   * Specialized movement exam  Mild hypophonic speech.    Mild facial masking.   Automatism of the left arm   L>R cogwheel rigidity - mild     L>R bradykinesia - mild.   bilateral resting tremor, L > R   dystonic posturing of R foot-- mild dyskinesia    No other dystonia, chorea, athetosis, myoclonus, or tics.   No motor impersistence.   Normal-based gait.   + mildly abnormal arm swing B, worse on the L  Good stride length, no freezing   No postural instability.      Laboratory/Radiological:  - Results:  Lab Visit on 01/11/2023   Component Date Value Ref Range Status    WBC 01/11/2023 7.12  3.90 - 12.70 K/uL Final    RBC 01/11/2023 4.34 (L)  4.60 - 6.20 M/uL Final    Hemoglobin 01/11/2023 14.0  14.0 - 18.0 g/dL Final    Hematocrit 01/11/2023 44.6  40.0 - 54.0 % Final    MCV 01/11/2023 103 (H)  82 - 98 fL Final    MCH 01/11/2023 32.3 (H)  27.0 - 31.0 pg Final    MCHC 01/11/2023 31.4 (L)  32.0 - 36.0 g/dL Final    RDW 01/11/2023 13.1  11.5 - 14.5 % Final    Platelets 01/11/2023 190  150 - 450 K/uL Final    MPV 01/11/2023 10.2  9.2 - 12.9 fL Final    Immature Granulocytes 01/11/2023 0.4  0.0 - 0.5 % Final    Gran # (ANC) 01/11/2023 4.8  1.8 - 7.7 K/uL Final    Immature Grans (Abs) 01/11/2023 0.03  0.00 - 0.04 K/uL Final    Lymph # 01/11/2023 1.5  1.0 - 4.8 K/uL Final    Mono # 01/11/2023 0.7  0.3 - 1.0 K/uL Final    Eos # 01/11/2023 0.1  0.0 - 0.5 K/uL Final    Baso # 01/11/2023 0.02  0.00 - 0.20 K/uL Final    nRBC 01/11/2023 0  0 /100 WBC Final    Gran % 01/11/2023 66.7  38.0 - 73.0 % Final    Lymph % 01/11/2023 21.6  18.0 - 48.0 % Final    Mono % 01/11/2023 9.7  4.0 - 15.0 % Final    " Eosinophil % 2023 1.3  0.0 - 8.0 % Final    Basophil % 2023 0.3  0.0 - 1.9 % Final    Differential Method 2023 Automated   Final    Sodium 2023 144  136 - 145 mmol/L Final    Potassium 2023 4.7  3.5 - 5.1 mmol/L Final    Chloride 2023 108  95 - 110 mmol/L Final    CO2 2023 28  23 - 29 mmol/L Final    Glucose 2023 97  70 - 110 mg/dL Final    BUN 2023 21  8 - 23 mg/dL Final    Creatinine 2023 1.1  0.5 - 1.4 mg/dL Final    Calcium 2023 10.4  8.7 - 10.5 mg/dL Final    Total Protein 2023 6.6  6.0 - 8.4 g/dL Final    Albumin 2023 3.4 (L)  3.5 - 5.2 g/dL Final    Total Bilirubin 2023 0.6  0.1 - 1.0 mg/dL Final    Alkaline Phosphatase 2023 79  55 - 135 U/L Final    AST 2023 23  10 - 40 U/L Final    ALT 2023 20  10 - 44 U/L Final    Anion Gap 2023 8  8 - 16 mmol/L Final    eGFR 2023 >60.0  >60 mL/min/1.73 m^2 Final     - Independent review of images: none available.    Reviewed records of:  1) Grade 1 anterolisthesis of spine  2) Positive SHAHRZAD scan    Diagnoses:          1) Idiopathic PD, tremor dominant.  On CD/LD.  2) Low back pain --> now worse.  C/W LSS and sciatica    Medical Decision Makin) declined rasagiline  2) focused on cd/ld  3) pt/ot  4) keep pain management on board

## 2023-03-31 ENCOUNTER — PATIENT MESSAGE (OUTPATIENT)
Dept: INTERNAL MEDICINE | Facility: CLINIC | Age: 78
End: 2023-03-31
Payer: MEDICARE

## 2023-03-31 DIAGNOSIS — G20.A1 PARKINSON DISEASE: ICD-10-CM

## 2023-03-31 DIAGNOSIS — R26.81 GAIT INSTABILITY: Primary | ICD-10-CM

## 2023-05-03 DIAGNOSIS — M54.9 DORSALGIA, UNSPECIFIED: ICD-10-CM

## 2023-05-03 DIAGNOSIS — M48.062 LUMBAR STENOSIS WITH NEUROGENIC CLAUDICATION: ICD-10-CM

## 2023-05-03 RX ORDER — GABAPENTIN 400 MG/1
CAPSULE ORAL
Qty: 180 CAPSULE | Refills: 0 | Status: SHIPPED | OUTPATIENT
Start: 2023-05-03 | End: 2023-07-18

## 2023-05-16 ENCOUNTER — TELEPHONE (OUTPATIENT)
Dept: NEUROLOGY | Facility: CLINIC | Age: 78
End: 2023-05-16
Payer: MEDICARE

## 2023-05-16 NOTE — TELEPHONE ENCOUNTER
Returned call to pt's wife and she states his walking and shuffling of the gait is getting worse. She would like to follow up with NATTY Crawford on thi.      She is wondering if the gabapentin was affecting any of his memory. And if he can get off and go back on Robaxin. She will follow up with pain management for these medication adjustment.

## 2023-05-16 NOTE — TELEPHONE ENCOUNTER
----- Message from Norah Aguilera sent at 5/15/2023  3:36 PM CDT -----  Contact: Brianna (Wife)  [phone]  Type: Appointment Request    Name of Caller: Brianna (Wife)   When is the first available appointment? Do not have access  Reason for Visit:  Parkinson's  Best Call Back Number: [phone]  Additional Information: Est patient, indicates she already sent a [employer] [employer] [facility] message to staff earlier today.

## 2023-05-18 ENCOUNTER — OFFICE VISIT (OUTPATIENT)
Dept: NEUROLOGY | Facility: CLINIC | Age: 78
End: 2023-05-18
Payer: MEDICARE

## 2023-05-18 VITALS
WEIGHT: 150 LBS | HEIGHT: 64 IN | DIASTOLIC BLOOD PRESSURE: 81 MMHG | HEART RATE: 139 BPM | SYSTOLIC BLOOD PRESSURE: 133 MMHG | BODY MASS INDEX: 25.61 KG/M2

## 2023-05-18 DIAGNOSIS — M54.17 LUMBOSACRAL RADICULOPATHY: ICD-10-CM

## 2023-05-18 DIAGNOSIS — M48.061 STENOSIS OF LATERAL RECESS OF LUMBAR SPINE: ICD-10-CM

## 2023-05-18 DIAGNOSIS — G20.A1 PARKINSON DISEASE: Primary | ICD-10-CM

## 2023-05-18 DIAGNOSIS — E78.2 MIXED HYPERLIPIDEMIA: ICD-10-CM

## 2023-05-18 DIAGNOSIS — I70.0 AORTIC ATHEROSCLEROSIS: ICD-10-CM

## 2023-05-18 DIAGNOSIS — I10 ESSENTIAL HYPERTENSION: ICD-10-CM

## 2023-05-18 DIAGNOSIS — R26.81 GAIT INSTABILITY: ICD-10-CM

## 2023-05-18 DIAGNOSIS — I25.10 CORONARY ARTERY DISEASE INVOLVING NATIVE CORONARY ARTERY OF NATIVE HEART WITHOUT ANGINA PECTORIS: ICD-10-CM

## 2023-05-18 PROCEDURE — 1100F PR PT FALLS ASSESS DOC 2+ FALLS/FALL W/INJURY/YR: ICD-10-PCS | Mod: CPTII,,, | Performed by: PHYSICIAN ASSISTANT

## 2023-05-18 PROCEDURE — 3288F FALL RISK ASSESSMENT DOCD: CPT | Mod: CPTII,,, | Performed by: PHYSICIAN ASSISTANT

## 2023-05-18 PROCEDURE — 99214 OFFICE O/P EST MOD 30 MIN: CPT | Mod: ,,, | Performed by: PHYSICIAN ASSISTANT

## 2023-05-18 PROCEDURE — 1126F AMNT PAIN NOTED NONE PRSNT: CPT | Mod: CPTII,,, | Performed by: PHYSICIAN ASSISTANT

## 2023-05-18 PROCEDURE — 3079F DIAST BP 80-89 MM HG: CPT | Mod: CPTII,,, | Performed by: PHYSICIAN ASSISTANT

## 2023-05-18 PROCEDURE — 1159F MED LIST DOCD IN RCRD: CPT | Mod: CPTII,,, | Performed by: PHYSICIAN ASSISTANT

## 2023-05-18 PROCEDURE — 1160F RVW MEDS BY RX/DR IN RCRD: CPT | Mod: CPTII,,, | Performed by: PHYSICIAN ASSISTANT

## 2023-05-18 PROCEDURE — 99214 PR OFFICE/OUTPT VISIT, EST, LEVL IV, 30-39 MIN: ICD-10-PCS | Mod: ,,, | Performed by: PHYSICIAN ASSISTANT

## 2023-05-18 PROCEDURE — 3079F PR MOST RECENT DIASTOLIC BLOOD PRESSURE 80-89 MM HG: ICD-10-PCS | Mod: CPTII,,, | Performed by: PHYSICIAN ASSISTANT

## 2023-05-18 PROCEDURE — 3288F PR FALLS RISK ASSESSMENT DOCUMENTED: ICD-10-PCS | Mod: CPTII,,, | Performed by: PHYSICIAN ASSISTANT

## 2023-05-18 PROCEDURE — 1126F PR PAIN SEVERITY QUANTIFIED, NO PAIN PRESENT: ICD-10-PCS | Mod: CPTII,,, | Performed by: PHYSICIAN ASSISTANT

## 2023-05-18 PROCEDURE — 3075F SYST BP GE 130 - 139MM HG: CPT | Mod: CPTII,,, | Performed by: PHYSICIAN ASSISTANT

## 2023-05-18 PROCEDURE — 99999 PR PBB SHADOW E&M-EST. PATIENT-LVL III: CPT | Mod: PBBFAC,,, | Performed by: PHYSICIAN ASSISTANT

## 2023-05-18 PROCEDURE — 1100F PTFALLS ASSESS-DOCD GE2>/YR: CPT | Mod: CPTII,,, | Performed by: PHYSICIAN ASSISTANT

## 2023-05-18 PROCEDURE — 1160F PR REVIEW ALL MEDS BY PRESCRIBER/CLIN PHARMACIST DOCUMENTED: ICD-10-PCS | Mod: CPTII,,, | Performed by: PHYSICIAN ASSISTANT

## 2023-05-18 PROCEDURE — 99999 PR PBB SHADOW E&M-EST. PATIENT-LVL III: ICD-10-PCS | Mod: PBBFAC,,, | Performed by: PHYSICIAN ASSISTANT

## 2023-05-18 PROCEDURE — 3075F PR MOST RECENT SYSTOLIC BLOOD PRESS GE 130-139MM HG: ICD-10-PCS | Mod: CPTII,,, | Performed by: PHYSICIAN ASSISTANT

## 2023-05-18 PROCEDURE — 1159F PR MEDICATION LIST DOCUMENTED IN MEDICAL RECORD: ICD-10-PCS | Mod: CPTII,,, | Performed by: PHYSICIAN ASSISTANT

## 2023-05-18 PROCEDURE — 99213 OFFICE O/P EST LOW 20 MIN: CPT | Performed by: PHYSICIAN ASSISTANT

## 2023-05-18 RX ORDER — CARBIDOPA AND LEVODOPA 25; 100 MG/1; MG/1
1.5 TABLET ORAL
Qty: 675 TABLET | Refills: 3 | Status: SHIPPED | OUTPATIENT
Start: 2023-05-18 | End: 2023-08-02 | Stop reason: SDUPTHER

## 2023-05-18 NOTE — PROGRESS NOTES
Name: Hector Kellogg  MRN: [id number]   CSN: [id number]      Date: 05/18/2023    Chief Complaint / Interval History:   - cd/ld 1.5 in the morning and 1.5 at night, other three doses are 1 tab   - accompanied by spouse   - asks about gabapentin and memory   - previously taking 800 mg TID, wife cut back to 400 mg TID   - he feels a bit better, more clear of mind on decrease   - he has had about 5 falls due to festination of gait and goes forward   - doesn't feel lightheaded or dizzy   - sometimes gets flashes or feeling like someone is in his peripheral vision   - last dose of ldopa was at 730 am   - urinary urgency, cannot get to the bathroom on time             From Feb 2023  - more imbalance, more shuffling, 2 times to the ground, more near falls, no injuries  - showers, has seat for safety   - appetite is good, sleep is disrupted by dogs and cats, but sleeps pretty well, up twice to urinate  - more holding on and use of walker  - now on 1 tab CD/LD 5x/day, usually 8:30-11:30...every three hours  -      From 10-22:  Hx:  - last seen 7/6/22 by Yvette  --- cd/ld  mg 1.5 tablet TID unchanged  --- gabapentin 400 mg TID, but doubtful of efficacy re: pain  --- offered rasagiline 1 mg, but ultimately declined d/t cost as of last contact  - pain worsening at last visit and negatively affecting gait, grade 1 anterolisesthesis of spine noted in imaging  --- unresponsive to cd/ld and attributable in main to the above    From Jul 2022:  - last seen July 2020  - cd/ld 25/100 1.5 tabs TID -- 9 am, 2 pm and 7 pm    - tremors gets better but does not completely go away  - meds possibly wear off after about 3 hours   - biggest issue is pain, starts in the hip and radiates down his leg   - takes gabapentin 400 mg TID, does not feel that this helps   - festination of gait in the morning   - can't walk far   - had lumbar laminectomy in Feb 2021 has not followed up with pain management or nsgy  - did not feel that pain  "management helped   - pain is getting worse, it has been going on for about a year -- can't walk due to the pain   - does not feel that the pain improves with sinemet   - no new weight changes, tries to wear loose pants         From Sept 2019:  - feels like he cannot do things  - not apathy, wants to work or do things  - lung issue are limiting him from COPD  - still taking cd/ld 25/100 1 TID-5x/day  - memory is ok  - appetite is good  - no constipation now  - bladder is quick    From Oct 2018:  - still getting pinched nerve pain in the back or legs  - will take an extra gabapentin  - takes cd/ld every 3-4 hours  - gait is stable    From Summer 2017:  - a bit more off time between dosing  - peak is pretty good  - gait is frustrating  - memory holding up  - family pleased    From 12/06  1) A bit more off time in between dosing  2) More cramping when off, gabapentin helps at night  3) Would be willing to take meds four times per day    History of Present Illness (HPI):  70 yo with diagnosis of PD, symptoms began 1.5 years ago.  First noted tremor in the L hand, "all left side".  Drags his left foot, generally slower overall.  Dx 1 year ago, had DatSCAN as a confirmatory study.  Says he had an MRI of the low back as well, was feeling "pinched nerves" in the back and into the both legs.  Was prescribed "some drug that cost $400" and didn;t take. Also prescribed pramipexole per the notes but also said he "never took it."    Was in [place] for 2 years, now re-establishing care here.    Has questions about gabapentin - 800 now taking 1/2 tab at night only and has no significant pain.  Will ibuprofen for breakthrough.  Not taking Norco.    Retired , [employer], left at age 65.    Nonmotor/Premotor ROS:  Hyposmia (HENT)?No per him  RBD/sleep issues (Constitutional)?Yes - acts out dreams  Depression/anxiety (Psychiatric)?No - better on Citalopram  Fatigue (Constitutional)?Yes  Constipation (GI)?Yes  - uses OTC stool " softener  Urinary issues ()?Yes - urgency  Sexual dysfunction ()?Yes - some ED.    Orthostasis (Cardiovascular)?No  Leg swelling (Cardiovascular)? No  Falls (Musculoskeletal)?No  Cognitive impairment (Neurologic)?No  Psychoses (Psychiatric)?No  Pain/Paresthesia (Neurologic)?Yes  Visual changes (Eyes)?No  Moles / skin changes (Skin)?Yes - seborrhea, has a scaly mole on the R side of the head  Stridor / SOB (Pulm)?Yes - with acticity  Bruising (Heme)?No    Past Medical History: The patient  has a past medical history of Cataract, Chronic back pain greater than 3 months duration, Coronary artery disease, DDD (degenerative disc disease), lumbar (5/16/2020), Glaucoma suspect of both eyes, Hyperlipidemia, Hypertension, MI, old (2006), Parkinson disease, and Renal disorder.    Social History: The patient  reports that he quit smoking about 9 years ago. His smoking use included cigarettes. He has a 40.00 pack-year smoking history. He has been exposed to tobacco smoke. He has never used smokeless tobacco. He reports current alcohol use. He reports that he does not use drugs.  Rare social drink.    Family History: Their family history includes Cataracts in his father; Heart attack in his father; Heart disease in his maternal uncle and paternal uncle.    Allergies: Influenza virus vaccines     Meds:   Current Outpatient Medications on File Prior to Visit   Medication Sig Dispense Refill    albuterol (PROVENTIL/VENTOLIN HFA) 90 mcg/actuation inhaler Inhale 2 puffs into the lungs every 6 (six) hours as needed for Wheezing or Shortness of Breath. Rescue 18 g 6    aspirin (ECOTRIN) 81 MG EC tablet Take 81 mg by mouth once daily.      BREZTRI AEROSPHERE 160-9-4.8 mcg/actuation HFAA Inhale 2 puffs into the lungs 2 (two) times a day. 10.7 g 11    citalopram (CELEXA) 20 MG tablet Take 1 tablet by mouth once daily 30 tablet 11    gabapentin (NEURONTIN) 400 MG capsule TAKE 2 CAPSULES BY MOUTH THREE TIMES DAILY 180 capsule 0     "ibuprofen (ADVIL,MOTRIN) 800 MG tablet Take 800 mg by mouth every 6 (six) hours as needed.      loratadine (CLARITIN) 10 mg tablet Take 10 mg by mouth as needed for Allergies.      metoprolol succinate (TOPROL-XL) 50 MG 24 hr tablet TAKE 1 TABLET (50 MG TOTAL) BY MOUTH ONCE DAILY. 90 tablet 3    multivitamin capsule Take 1 capsule by mouth once daily.      rosuvastatin (CRESTOR) 40 MG Tab TAKE ONE TABLET BY MOUTH EVERY NIGHT AT BEDTIME 90 tablet 2    [DISCONTINUED] carbidopa-levodopa  mg (SINEMET)  mg per tablet TAKE 1 TABLET BY MOUTH FIVE (5) TIMES DAILY 450 tablet 3     No current facility-administered medications on file prior to visit.     Exam:  /81   Pulse (!) 139   Ht 5' 4" (1.626 m)   Wt 68 kg (150 lb)   BMI 25.75 kg/m²     Constitutional  Well-developed, well-nourished, appears stated age   * Specialized movement exam  Mild hypophonic speech.    Mild facial masking.   Automatism of the left arm   L>R cogwheel rigidity - mild     L>R bradykinesia - mild.   bilateral resting tremor, L > R   dystonic posturing of R foot-- mild dyskinesia    No other dystonia, chorea, athetosis, myoclonus, or tics.   No motor impersistence.   Normal-based gait.   + mildly abnormal arm swing B, worse on the L  Good stride length, no freezing   No postural instability.      Laboratory/Radiological:  - Results:  No visits with results within 3 Month(s) from this visit.   Latest known visit with results is:   Lab Visit on 01/11/2023   Component Date Value Ref Range Status    WBC 01/11/2023 7.12  3.90 - 12.70 K/uL Final    RBC 01/11/2023 4.34 (L)  4.60 - 6.20 M/uL Final    Hemoglobin 01/11/2023 14.0  14.0 - 18.0 g/dL Final    Hematocrit 01/11/2023 44.6  40.0 - 54.0 % Final    MCV 01/11/2023 103 (H)  82 - 98 fL Final    MCH 01/11/2023 32.3 (H)  27.0 - 31.0 pg Final    MCHC 01/11/2023 31.4 (L)  32.0 - 36.0 g/dL Final    RDW 01/11/2023 13.1  11.5 - 14.5 % Final    Platelets 01/11/2023 190  150 - 450 K/uL Final    " MPV 2023 10.2  9.2 - 12.9 fL Final    Immature Granulocytes 2023 0.4  0.0 - 0.5 % Final    Gran # (ANC) 2023 4.8  1.8 - 7.7 K/uL Final    Immature Grans (Abs) 2023 0.03  0.00 - 0.04 K/uL Final    Lymph # 2023 1.5  1.0 - 4.8 K/uL Final    Mono # 2023 0.7  0.3 - 1.0 K/uL Final    Eos # 2023 0.1  0.0 - 0.5 K/uL Final    Baso # 2023 0.02  0.00 - 0.20 K/uL Final    nRBC 2023 0  0 /100 WBC Final    Gran % 2023 66.7  38.0 - 73.0 % Final    Lymph % 2023 21.6  18.0 - 48.0 % Final    Mono % 2023 9.7  4.0 - 15.0 % Final    Eosinophil % 2023 1.3  0.0 - 8.0 % Final    Basophil % 2023 0.3  0.0 - 1.9 % Final    Differential Method 2023 Automated   Final    Sodium 2023 144  136 - 145 mmol/L Final    Potassium 2023 4.7  3.5 - 5.1 mmol/L Final    Chloride 2023 108  95 - 110 mmol/L Final    CO2 2023 28  23 - 29 mmol/L Final    Glucose 2023 97  70 - 110 mg/dL Final    BUN 2023 21  8 - 23 mg/dL Final    Creatinine 2023 1.1  0.5 - 1.4 mg/dL Final    Calcium 2023 10.4  8.7 - 10.5 mg/dL Final    Total Protein 2023 6.6  6.0 - 8.4 g/dL Final    Albumin 2023 3.4 (L)  3.5 - 5.2 g/dL Final    Total Bilirubin 2023 0.6  0.1 - 1.0 mg/dL Final    Alkaline Phosphatase 2023 79  55 - 135 U/L Final    AST 2023 23  10 - 40 U/L Final    ALT 2023 20  10 - 44 U/L Final    Anion Gap 2023 8  8 - 16 mmol/L Final    eGFR 2023 >60.0  >60 mL/min/1.73 m^2 Final     - Independent review of images: none available.    Reviewed records of:  1) Grade 1 anterolisthesis of spine  2) Positive SHAHRZAD scan    Diagnoses:          1) Idiopathic PD, tremor dominant.  On CD/LD.  2) Low back pain --> now worse.  C/W LSS and sciatica  3) urinary urgency     Medical Decision Makin) currently taking cd/ld 1.5 at morning dose and last dose, other three doses are 1 tab, slowly titrate all doses up  to 1.5 tabs five times daily- - he reports unformed hallucinations in peripheral vision, will continue to monitor   2) defer to pain management for lumbar radiculopathy   3) continue celexa   4) rec'd schedule/timing urination, consider referral to urology   5) long discussion regarding importance of PT and physical activity. Patient initially reluctant to therapy but ultimately decided to proceed.       The patient has a documented history of CAD, hypertension, hyperlipidemia and/or hypercholesteremia with long-term complications such as cerebrovascular disease, peripheral vascular disease, and/or aortic atherosclerosis. Collectively these risk factors may contribute to cerebral atherosclerosis, and cerebral hypoperfusion compounded neurocognitive disorder. Rec'd maximizing cerebrovascular-related medical therapy, including but not limited to cholesterol medications and antiplatelet agents. Rec'd controlling vascular risk factors like hypertension, hyperlipidemia, and Diabetes SBP<130, LDL<100, and A1C<7.0. Rec'd optimizing lifestyle choices, including a heart-healthy diet (e.g., Mediterranean or DASH), increased cardiovascular exercise (goal 150 minutes of moderate-intensity per week), and staying cognitively and socially active.          F/u with [facility] in 3 months       Collaborating Physician, Dr. Klein, was available during today's encounter. Any change to plan along with cosign to appear in the EMR.       I spent 30 minutes with the patient, reviewing past encounters, labs and imaging.        Yvette Moore PA-C   [facility]  Department of Neurology  Movement Disorders

## 2023-05-22 ENCOUNTER — PATIENT MESSAGE (OUTPATIENT)
Dept: PAIN MEDICINE | Facility: CLINIC | Age: 78
End: 2023-05-22
Payer: MEDICARE

## 2023-05-29 NOTE — TELEPHONE ENCOUNTER
Care Due:                  Date            Visit Type   Department     Provider  --------------------------------------------------------------------------------                                EP -                              PRIMARY      MET INTERNAL  Last Visit: 01-      CARE ([facility])   MEDICINE       Blue Mcgill                              EP -                              PRIMARY      [facility] INTERNAL  Next Visit: 07-      CARE ([facility])   [facility]       Blue Mcgill                                                            Last  Test          Frequency    Reason                     Performed    Due Date  --------------------------------------------------------------------------------    Lipid Panel.  12 months..  rosuvastatin.............  06- 06-    [facility] Embedded Care Due Messages. Reference number: [id number].   5/29/2023 9:29:51 AM SEGUNT

## 2023-05-29 NOTE — TELEPHONE ENCOUNTER
Refill Routing Note   Medication(s) are not appropriate for processing by [facility] for the following reason(s):      Required vitals abnormal:     [facility] action(s):  Defer Care Due:  Labs due    Lipid panel due 6/17/23      Appointments  past 12m or future 3m with PCP    Date Provider   Last Visit   1/11/2023 Blue Mcgill, DO   Next Visit   7/18/2023 Blue Mcgill, DO   ED visits in past 90 days: 0        Note composed:5:12 PM 05/29/2023

## 2023-05-30 RX ORDER — METOPROLOL SUCCINATE 50 MG/1
TABLET, EXTENDED RELEASE ORAL
Qty: 90 TABLET | Refills: 3 | Status: SHIPPED | OUTPATIENT
Start: 2023-05-30

## 2023-07-09 NOTE — TELEPHONE ENCOUNTER
No care due was identified.  Health [facility] Embedded Care Due Messages. Reference number: [id number].   7/09/2023 4:15:04 PM CDT

## 2023-07-10 RX ORDER — ROSUVASTATIN CALCIUM 40 MG/1
TABLET, COATED ORAL
Qty: 90 TABLET | Refills: 3 | Status: SHIPPED | OUTPATIENT
Start: 2023-07-10

## 2023-07-10 NOTE — TELEPHONE ENCOUNTER
Refill Routing Note   Medication(s) are not appropriate for processing by [facility] for the following reason(s):      Required labs outdated    [facility] action(s):  Defer None identified          Appointments  past 12m or future 3m with PCP    Date Provider   Last Visit   1/11/2023 Blue cMgill,    Next Visit   7/18/2023 Blue Mcgill,    ED visits in past 90 days: 0        Note composed:11:02 AM 07/10/2023

## 2023-07-11 ENCOUNTER — TELEPHONE (OUTPATIENT)
Dept: INTERNAL MEDICINE | Facility: CLINIC | Age: 78
End: 2023-07-11
Payer: MEDICARE

## 2023-07-11 DIAGNOSIS — I10 ESSENTIAL HYPERTENSION: ICD-10-CM

## 2023-07-11 DIAGNOSIS — I27.20 PULMONARY HTN: Primary | ICD-10-CM

## 2023-07-11 DIAGNOSIS — E78.2 MIXED HYPERLIPIDEMIA: ICD-10-CM

## 2023-07-11 DIAGNOSIS — Z12.5 PROSTATE CANCER SCREENING: ICD-10-CM

## 2023-07-11 DIAGNOSIS — R79.9 ABNORMAL FINDING OF BLOOD CHEMISTRY, UNSPECIFIED: ICD-10-CM

## 2023-07-11 DIAGNOSIS — I25.10 CORONARY ARTERY DISEASE INVOLVING NATIVE CORONARY ARTERY OF NATIVE HEART WITHOUT ANGINA PECTORIS: ICD-10-CM

## 2023-07-18 ENCOUNTER — OFFICE VISIT (OUTPATIENT)
Dept: INTERNAL MEDICINE | Facility: CLINIC | Age: 78
End: 2023-07-18
Payer: MEDICARE

## 2023-07-18 VITALS
DIASTOLIC BLOOD PRESSURE: 72 MMHG | SYSTOLIC BLOOD PRESSURE: 126 MMHG | HEART RATE: 84 BPM | RESPIRATION RATE: 20 BRPM | BODY MASS INDEX: 23.58 KG/M2 | WEIGHT: 138.13 LBS | TEMPERATURE: 97 F | OXYGEN SATURATION: 95 % | HEIGHT: 64 IN

## 2023-07-18 DIAGNOSIS — I10 ESSENTIAL HYPERTENSION: ICD-10-CM

## 2023-07-18 DIAGNOSIS — I27.20 PULMONARY HTN: ICD-10-CM

## 2023-07-18 DIAGNOSIS — N20.0 NEPHROLITHIASIS: ICD-10-CM

## 2023-07-18 DIAGNOSIS — E04.1 THYROID NODULE: ICD-10-CM

## 2023-07-18 DIAGNOSIS — E78.2 MIXED HYPERLIPIDEMIA: ICD-10-CM

## 2023-07-18 DIAGNOSIS — Z78.9 STATIN INTOLERANCE: ICD-10-CM

## 2023-07-18 DIAGNOSIS — R97.20 ELEVATED PSA: ICD-10-CM

## 2023-07-18 DIAGNOSIS — I70.0 AORTIC ATHEROSCLEROSIS: ICD-10-CM

## 2023-07-18 DIAGNOSIS — J43.2 CENTRILOBULAR EMPHYSEMA: ICD-10-CM

## 2023-07-18 DIAGNOSIS — F41.9 ANXIETY: ICD-10-CM

## 2023-07-18 DIAGNOSIS — I25.10 CORONARY ARTERY DISEASE INVOLVING NATIVE CORONARY ARTERY OF NATIVE HEART WITHOUT ANGINA PECTORIS: ICD-10-CM

## 2023-07-18 DIAGNOSIS — G20.A1 PARKINSON DISEASE: Primary | ICD-10-CM

## 2023-07-18 DIAGNOSIS — Z00.00 ANNUAL PHYSICAL EXAM: ICD-10-CM

## 2023-07-18 PROCEDURE — G0009 PNEUMOCOCCAL CONJUGATE VACCINE 20-VALENT: ICD-10-PCS | Mod: S$GLB,,, | Performed by: INTERNAL MEDICINE

## 2023-07-18 PROCEDURE — 3078F DIAST BP <80 MM HG: CPT | Mod: CPTII,S$GLB,, | Performed by: INTERNAL MEDICINE

## 2023-07-18 PROCEDURE — 99215 PR OFFICE/OUTPT VISIT, EST, LEVL V, 40-54 MIN: ICD-10-PCS | Mod: S$GLB,,, | Performed by: INTERNAL MEDICINE

## 2023-07-18 PROCEDURE — 99999 PR PBB SHADOW E&M-EST. PATIENT-LVL V: ICD-10-PCS | Mod: PBBFAC,,, | Performed by: INTERNAL MEDICINE

## 2023-07-18 PROCEDURE — 1101F PR PT FALLS ASSESS DOC 0-1 FALLS W/OUT INJ PAST YR: ICD-10-PCS | Mod: CPTII,S$GLB,, | Performed by: INTERNAL MEDICINE

## 2023-07-18 PROCEDURE — 1159F PR MEDICATION LIST DOCUMENTED IN MEDICAL RECORD: ICD-10-PCS | Mod: CPTII,S$GLB,, | Performed by: INTERNAL MEDICINE

## 2023-07-18 PROCEDURE — 1159F MED LIST DOCD IN RCRD: CPT | Mod: CPTII,S$GLB,, | Performed by: INTERNAL MEDICINE

## 2023-07-18 PROCEDURE — 3074F SYST BP LT 130 MM HG: CPT | Mod: CPTII,S$GLB,, | Performed by: INTERNAL MEDICINE

## 2023-07-18 PROCEDURE — 3078F PR MOST RECENT DIASTOLIC BLOOD PRESSURE < 80 MM HG: ICD-10-PCS | Mod: CPTII,S$GLB,, | Performed by: INTERNAL MEDICINE

## 2023-07-18 PROCEDURE — G0009 ADMIN PNEUMOCOCCAL VACCINE: HCPCS | Mod: S$GLB,,, | Performed by: INTERNAL MEDICINE

## 2023-07-18 PROCEDURE — 90677 PCV20 VACCINE IM: CPT | Mod: S$GLB,,, | Performed by: INTERNAL MEDICINE

## 2023-07-18 PROCEDURE — 99215 OFFICE O/P EST HI 40 MIN: CPT | Mod: S$GLB,,, | Performed by: INTERNAL MEDICINE

## 2023-07-18 PROCEDURE — 3288F PR FALLS RISK ASSESSMENT DOCUMENTED: ICD-10-PCS | Mod: CPTII,S$GLB,, | Performed by: INTERNAL MEDICINE

## 2023-07-18 PROCEDURE — 90677 PNEUMOCOCCAL CONJUGATE VACCINE 20-VALENT: ICD-10-PCS | Mod: S$GLB,,, | Performed by: INTERNAL MEDICINE

## 2023-07-18 PROCEDURE — 3074F PR MOST RECENT SYSTOLIC BLOOD PRESSURE < 130 MM HG: ICD-10-PCS | Mod: CPTII,S$GLB,, | Performed by: INTERNAL MEDICINE

## 2023-07-18 PROCEDURE — 1125F AMNT PAIN NOTED PAIN PRSNT: CPT | Mod: CPTII,S$GLB,, | Performed by: INTERNAL MEDICINE

## 2023-07-18 PROCEDURE — 3288F FALL RISK ASSESSMENT DOCD: CPT | Mod: CPTII,S$GLB,, | Performed by: INTERNAL MEDICINE

## 2023-07-18 PROCEDURE — 1125F PR PAIN SEVERITY QUANTIFIED, PAIN PRESENT: ICD-10-PCS | Mod: CPTII,S$GLB,, | Performed by: INTERNAL MEDICINE

## 2023-07-18 PROCEDURE — 1101F PT FALLS ASSESS-DOCD LE1/YR: CPT | Mod: CPTII,S$GLB,, | Performed by: INTERNAL MEDICINE

## 2023-07-18 PROCEDURE — 99999 PR PBB SHADOW E&M-EST. PATIENT-LVL V: CPT | Mod: PBBFAC,,, | Performed by: INTERNAL MEDICINE

## 2023-07-18 NOTE — PROGRESS NOTES
Subjective     Patient ID: Hector Kellogg is a 78 y.o. male.    Chief Complaint: Annual Exam    HPI  78 y.o. Male here for annual exam.      Vaccines: Influenza (declined); Tetanus (declined); Pneumococcal 23 (2022); Shingrix (will consider)  Eye exam: declined  Colonoscopy: declined  DEXA: 7/22     Past Medical History:  No date: Cataract  No date: Chronic back pain greater than 3 months duration  No date: Coronary artery disease      Comment:  3 stents  5/16/2020: DDD (degenerative disc disease), lumbar  No date: Glaucoma suspect of both eyes  No date: Hyperlipidemia  No date: Hypertension  2006: MI, old  No date: Parkinson disease  No date: Renal disorder      Comment:  Kidney stones  Past Surgical History:  2006: CARDIAC CATHETERIZATION      Comment:  x 3  10/11/2015: CARDIAC CATHETERIZATION      Comment:  x 2  03/06/2018: CATARACT EXTRACTION W/  INTRAOCULAR LENS IMPLANT; Right      Comment:  Dr. Liu  2006, 2015: CORONARY ANGIOPLASTY      Comment:  3 stents  4/20/2021: EXTRACORPOREAL SHOCK WAVE LITHOTRIPSY; Left      Comment:  Procedure: LITHOTRIPSY-EXTRACORPOREAL SHOCK WAVE;                 Surgeon: Ottoniel Obando MD;  Location: [facility] OR;                 Service: Urology;  Laterality: Left;  No date: EYE SURGERY      Comment:  cataracts bilateral  No date: HEMORRHOID SURGERY  11/4/2020: INJECTION OF ANESTHETIC AGENT AROUND NERVE; Bilateral      Comment:  Procedure: BLOCK, NERVE BILATERAL L2,3,4,5;  Surgeon:                Ramiro Calvillo MD;  Location: [facility] PAIN MGT;                 Service: Pain Management;  Laterality: Bilateral;  BLOCK,               NERVE BILATERAL L2,3,4,5  8/5/2020: INJECTION OF FACET JOINT; Right      Comment:  Procedure: INJECTION, FACET JOINT, L4-L5 , L5-S1;                 Surgeon: Ramiro Calvillo MD;  Location: [facility] PAIN MGT;               Service: Pain Management;  Laterality: Right;  2/1/2021: LAMINOFORAMINOTOMY OF SPINE USING MINIMALLY INVASIVE   TECHNIQUE;  N/A      Comment:  Procedure: MIS L4-5 Laminectomy;  Surgeon: Bernard Sheldon MD;  Location: [facility] OR;  Service: Neurosurgery;                Laterality: N/A;  Anesthesia: GeneralNerve Monitoring:                EMG/SEPPosition: ProneBed: Eliot Frame on                jacksonHeadrest: Prone ViewRadiology: C-armMisc:                Microscope, microinstruments  2020: TRANSFORAMINAL EPIDURAL INJECTION OF STEROID; Right      Comment:  Procedure: INJECTION, STEROID, EPIDURAL, TRANSFORAMINAL                APPROACH;  Surgeon: Ramiro Calvillo MD;  Location:                [facility] PAIN MGT;  Service: Pain Management;  Laterality:                Right;  Right TF ASHISH L4-L5, [id number] come                earlier than 12:30  2020: TRANSFORAMINAL EPIDURAL INJECTION OF STEROID; Right      Comment:  Procedure: INJECTION, STEROID, EPIDURAL, TRANSFORAMINAL                APPROACH;  Surgeon: Ramiro Calvillo MD;  Location:                [facility] PAIN MGT;  Service: Pain Management;  Laterality:                Right;  RIGHT TF ASHISH L4/5 and L5/S1  Social History    Socioeconomic History      Marital status:     Tobacco Use      Smoking status: Former Smoker        Packs/day: 1.00        Years: 40.00        Pack years: 40        Quit date: 2014        Years since quittin.4      Smokeless tobacco: Never Used    Substance and Sexual Activity      Alcohol use: Yes        Comment: socially      Drug use: No     Review of patient's allergies indicates:   -- Influenza virus vaccines -- Other (See Comments)    --  Caused fever, chills, and made tremors worse  Review of Systems   Constitutional:  Negative for activity change, appetite change, chills, diaphoresis, fatigue, fever and unexpected weight change.   HENT:  Negative for nasal congestion, mouth sores, postnasal drip, rhinorrhea, sinus pressure/congestion, sneezing, sore throat, trouble swallowing and voice change.    Eyes:  Negative for  discharge, itching and visual disturbance.   Respiratory:  Negative for cough, chest tightness, shortness of breath and wheezing.    Cardiovascular:  Negative for chest pain, palpitations and leg swelling.   Gastrointestinal:  Negative for abdominal pain, blood in stool, constipation, diarrhea, nausea and vomiting.   Endocrine: Negative for cold intolerance and heat intolerance.   Genitourinary:  Negative for difficulty urinating, dysuria, flank pain, hematuria and urgency.   Musculoskeletal:  Negative for arthralgias, back pain, myalgias and neck pain.   Integumentary:  Negative for rash and wound.   Allergic/Immunologic: Negative for environmental allergies and food allergies.   Neurological:  Positive for tremors and weakness. Negative for dizziness, seizures, syncope and headaches.   Hematological:  Negative for adenopathy. Does not bruise/bleed easily.   Psychiatric/Behavioral:  Negative for confusion, sleep disturbance and suicidal ideas. The patient is not nervous/anxious.         Objective     Physical Exam  Constitutional:       General: He is not in acute distress.     Appearance: Normal appearance. He is well-developed. He is not ill-appearing, toxic-appearing or diaphoretic.   HENT:      Head: Normocephalic and atraumatic.      Right Ear: External ear normal.      Left Ear: External ear normal.      Nose: Nose normal.      Mouth/Throat:      Pharynx: No oropharyngeal exudate.   Eyes:      General: No scleral icterus.        Right eye: No discharge.         Left eye: No discharge.      Extraocular Movements: Extraocular movements intact.      Conjunctiva/sclera: Conjunctivae normal.      Pupils: Pupils are equal, round, and reactive to light.   Neck:      Thyroid: No thyromegaly.      Vascular: No JVD.   Cardiovascular:      Rate and Rhythm: Normal rate and regular rhythm.      Pulses: Normal pulses.      Heart sounds: Normal heart sounds. No murmur heard.  Pulmonary:      Effort: Pulmonary effort is  normal. No respiratory distress.      Breath sounds: Normal breath sounds. No wheezing or rales.   Abdominal:      General: Bowel sounds are normal. There is no distension.      Palpations: Abdomen is soft.      Tenderness: There is no abdominal tenderness. There is no right CVA tenderness, left CVA tenderness, guarding or rebound.   Musculoskeletal:      Cervical back: Normal range of motion and neck supple. No rigidity.      Right lower leg: No edema.      Left lower leg: No edema.   Lymphadenopathy:      Cervical: No cervical adenopathy.   Skin:     General: Skin is warm and dry.      Capillary Refill: Capillary refill takes less than 2 seconds.      Coloration: Skin is not pale.      Findings: No rash.   Neurological:      General: No focal deficit present.      Mental Status: He is alert and oriented to person, place, and time. Mental status is at baseline.      Cranial Nerves: No cranial nerve deficit.      Sensory: No sensory deficit.      Motor: No weakness.      Coordination: Coordination normal.      Gait: Gait normal.      Deep Tendon Reflexes: Reflexes normal.   Psychiatric:         Mood and Affect: Mood normal.         Behavior: Behavior normal.         Thought Content: Thought content normal.         Judgment: Judgment normal.          Assessment and Plan     1. Parkinson disease    2. Anxiety    3. Pulmonary HTN    4. Centrilobular emphysema  Overview:  Overall stable FEV1 1.17 (50% predicted)  Continue breztri twice daily for control  Albuterol for rescue and prevention.       5. Aortic atherosclerosis    6. Coronary artery disease involving native coronary artery of native heart without angina pectoris  Overview:  Stents x 3 2006    PCI of RCA and OM 10/15 with KARINA  LVEF 55% 11/15      7. Essential hypertension    8. Mixed hyperlipidemia    9. Statin intolerance    10. Nephrolithiasis    11. Thyroid nodule         CAD- S/P NSTEMI and PCI in 2006, with repeat CATH(10/11/15) placing stents to RCA and  OM1- stable       Continue BB/statin/ASA; pt has stopped the Imdur 2/2 to low BP      HTN- stable on Toprol XL 50 mg daily      HLD- controlled on Crestor      Parkinson's disease- stable on Sinemet, managed by Neuro      COPD- stable      Mild Pulmonary HTN- stable      Anxiety- stable on Celexa 20 mg daily      Thyroid nodule- followed by ENT      DJD Lumbar spine- stable, stopped the gabapentin 2/2 sedation       Nephrolithiasis- stable      Elevated PSA- referral to urology     F/u in 6 months     Over 1/2 of 40 minute visit spent reviewing pt's medical records, education/discussion of pt's medical conditions and medical management

## 2023-08-02 DIAGNOSIS — M48.061 STENOSIS OF LATERAL RECESS OF LUMBAR SPINE: ICD-10-CM

## 2023-08-02 DIAGNOSIS — G20.A1 PARKINSON DISEASE: ICD-10-CM

## 2023-08-02 RX ORDER — CARBIDOPA AND LEVODOPA 25; 100 MG/1; MG/1
1.5 TABLET ORAL
Qty: 675 TABLET | Refills: 3 | Status: SHIPPED | OUTPATIENT
Start: 2023-08-02

## 2023-08-06 ENCOUNTER — HOSPITAL ENCOUNTER (EMERGENCY)
Facility: HOSPITAL | Age: 78
Discharge: HOME | End: 2023-08-06
Attending: EMERGENCY MEDICINE
Payer: MEDICARE

## 2023-08-06 VITALS
SYSTOLIC BLOOD PRESSURE: 157 MMHG | OXYGEN SATURATION: 98 % | HEART RATE: 68 BPM | RESPIRATION RATE: 16 BRPM | HEIGHT: 64 IN | WEIGHT: 138 LBS | TEMPERATURE: 98 F | DIASTOLIC BLOOD PRESSURE: 76 MMHG | BODY MASS INDEX: 23.56 KG/M2

## 2023-08-06 DIAGNOSIS — R31.9 HEMATURIA, UNSPECIFIED TYPE: Primary | ICD-10-CM

## 2023-08-06 DIAGNOSIS — N30.01 ACUTE CYSTITIS WITH HEMATURIA: ICD-10-CM

## 2023-08-06 LAB
ALBUMIN SERPL BCP-MCNC: 3.7 G/DL (ref 3.5–5.2)
ALP SERPL-CCNC: 75 U/L (ref 55–135)
ALT SERPL W/O P-5'-P-CCNC: 9 U/L (ref 10–44)
ANION GAP SERPL CALC-SCNC: 13 MMOL/L (ref 8–16)
AST SERPL-CCNC: 22 U/L (ref 10–40)
BACTERIA #/AREA URNS AUTO: ABNORMAL /HPF
BASOPHILS # BLD AUTO: 0.03 K/UL (ref 0–0.2)
BASOPHILS NFR BLD: 0.5 % (ref 0–1.9)
BILIRUB SERPL-MCNC: 0.9 MG/DL (ref 0.1–1)
BILIRUB UR QL STRIP: NEGATIVE
BUN SERPL-MCNC: 17 MG/DL (ref 8–23)
CALCIUM SERPL-MCNC: 10 MG/DL (ref 8.7–10.5)
CHLORIDE SERPL-SCNC: 105 MMOL/L (ref 95–110)
CLARITY UR REFRACT.AUTO: ABNORMAL
CO2 SERPL-SCNC: 25 MMOL/L (ref 23–29)
COLOR UR AUTO: ABNORMAL
CREAT SERPL-MCNC: 0.9 MG/DL (ref 0.5–1.4)
DIFFERENTIAL METHOD: ABNORMAL
EOSINOPHIL # BLD AUTO: 0.1 K/UL (ref 0–0.5)
EOSINOPHIL NFR BLD: 1.4 % (ref 0–8)
ERYTHROCYTE [DISTWIDTH] IN BLOOD BY AUTOMATED COUNT: 12.4 % (ref 11.5–14.5)
EST. GFR  (NO RACE VARIABLE): >60 ML/MIN/1.73 M^2
GLUCOSE SERPL-MCNC: 96 MG/DL (ref 70–110)
GLUCOSE UR QL STRIP: NEGATIVE
HCT VFR BLD AUTO: 45.9 % (ref 40–54)
HGB BLD-MCNC: 14.9 G/DL (ref 14–18)
HGB UR QL STRIP: ABNORMAL
HYALINE CASTS UR QL AUTO: 0 /LPF
IMM GRANULOCYTES # BLD AUTO: 0.01 K/UL (ref 0–0.04)
IMM GRANULOCYTES NFR BLD AUTO: 0.2 % (ref 0–0.5)
KETONES UR QL STRIP: ABNORMAL
LEUKOCYTE ESTERASE UR QL STRIP: ABNORMAL
LYMPHOCYTES # BLD AUTO: 1.7 K/UL (ref 1–4.8)
LYMPHOCYTES NFR BLD: 26.4 % (ref 18–48)
MCH RBC QN AUTO: 31.8 PG (ref 27–31)
MCHC RBC AUTO-ENTMCNC: 32.5 G/DL (ref 32–36)
MCV RBC AUTO: 98 FL (ref 82–98)
MICROSCOPIC COMMENT: ABNORMAL
MONOCYTES # BLD AUTO: 0.7 K/UL (ref 0.3–1)
MONOCYTES NFR BLD: 10.2 % (ref 4–15)
NEUTROPHILS # BLD AUTO: 4 K/UL (ref 1.8–7.7)
NEUTROPHILS NFR BLD: 61.3 % (ref 38–73)
NITRITE UR QL STRIP: NEGATIVE
NRBC BLD-RTO: 0 /100 WBC
PH UR STRIP: 6 [PH] (ref 5–8)
PLATELET # BLD AUTO: 160 K/UL (ref 150–450)
PMV BLD AUTO: 10 FL (ref 9.2–12.9)
POTASSIUM SERPL-SCNC: 4.2 MMOL/L (ref 3.5–5.1)
PROT SERPL-MCNC: 7 G/DL (ref 6–8.4)
PROT UR QL STRIP: ABNORMAL
RBC # BLD AUTO: 4.68 M/UL (ref 4.6–6.2)
RBC #/AREA URNS AUTO: >100 /HPF (ref 0–4)
SODIUM SERPL-SCNC: 143 MMOL/L (ref 136–145)
SP GR UR STRIP: 1.02 (ref 1–1.03)
URN SPEC COLLECT METH UR: ABNORMAL
WBC # BLD AUTO: 6.44 K/UL (ref 3.9–12.7)
WBC #/AREA URNS AUTO: 54 /HPF (ref 0–5)

## 2023-08-06 PROCEDURE — 81001 URINALYSIS AUTO W/SCOPE: CPT | Performed by: PHYSICIAN ASSISTANT

## 2023-08-06 PROCEDURE — 25000003 PHARM REV CODE 250: Performed by: PHYSICIAN ASSISTANT

## 2023-08-06 PROCEDURE — 87086 URINE CULTURE/COLONY COUNT: CPT | Performed by: PHYSICIAN ASSISTANT

## 2023-08-06 PROCEDURE — 99284 EMERGENCY DEPT VISIT MOD MDM: CPT | Mod: 25

## 2023-08-06 PROCEDURE — 63600175 PHARM REV CODE 636 W HCPCS: Performed by: PHYSICIAN ASSISTANT

## 2023-08-06 PROCEDURE — 80053 COMPREHEN METABOLIC PANEL: CPT | Performed by: PHYSICIAN ASSISTANT

## 2023-08-06 PROCEDURE — 96365 THER/PROPH/DIAG IV INF INIT: CPT

## 2023-08-06 PROCEDURE — 85025 COMPLETE CBC W/AUTO DIFF WBC: CPT | Performed by: PHYSICIAN ASSISTANT

## 2023-08-06 RX ORDER — CEFPODOXIME PROXETIL 100 MG/1
100 TABLET, FILM COATED ORAL 2 TIMES DAILY
Qty: 20 TABLET | Refills: 0 | Status: SHIPPED | OUTPATIENT
Start: 2023-08-06 | End: 2023-08-16

## 2023-08-06 RX ADMIN — CEFTRIAXONE 1 G: 1 INJECTION, POWDER, FOR SOLUTION INTRAMUSCULAR; INTRAVENOUS at 02:08

## 2023-08-06 NOTE — DISCHARGE INSTRUCTIONS
Seen in the emergency department for blood in your urine.  Your UA was concerning for an infection and I have prescribed you antibiotics.  Please take this until finished.  I recommend staying hydrated and avoid holding your bladder if possible.  Please follow-up with urology.  Return to ED sooner if you develop any new or worsening symptoms.

## 2023-08-06 NOTE — ED NOTES
Patient identifiers verified and correct for Hector Kellogg  LOC: The patient is awake, alert and aware of environment with an appropriate affect, the patient is oriented x 3 and speaking appropriately.   APPEARANCE: Patient appears comfortable and in no acute distress, patient is clean and well groomed.  SKIN: The skin is warm and dry, color consistent with ethnicity, patient has normal skin turgor and moist mucus membranes, skin intact, no breakdown or bruising noted.   MUSCULOSKELETAL: Patient moving all extremities spontaneously, no swelling noted.  RESPIRATORY: Airway is open and patent, respirations are spontaneous, patient has a normal effort and rate, no accessory muscle use noted, O2 Sat 97% on room air.  CARDIAC: Patient has a normal rate and regular rhythm, no edema noted, capillary refill < 3 seconds.   GASTRO: Soft and non tender to palpation, no distention noted, Pt states bowel movements have been regular.  : Pt denies any pain or frequency with urination. Pt reports hematuria x1 week  NEURO: Pt opens eyes spontaneously, behavior appropriate to situation, follows commands, facial expression symmetrical, bilateral hand grasp equal and even, purposeful motor response noted, normal sensation in all extremities when touched with a finger.

## 2023-08-06 NOTE — ED PROVIDER NOTES
Encounter Date: 8/6/2023       History     Chief Complaint   Patient presents with    Hematuria     78-year-old male with past medical history of hypertension, hyperlipidemia, CAD, nephrolithiasis presents emergency department for hematuria.  Patient reports dark red urine for 1 week worsening.  Reports small clots.  He is not on any anticoagulation.  Denies any abdominal pain, flank pain, back pain, fevers/chills or nausea vomiting.        Review of patient's allergies indicates:   Allergen Reactions    Influenza virus vaccines Other (See Comments)     Caused fever, chills, and made tremors worse     Past Medical History:   Diagnosis Date    Cataract     Chronic back pain greater than 3 months duration     Coronary artery disease     3 stents    DDD (degenerative disc disease), lumbar 5/16/2020    Glaucoma suspect of both eyes     Hyperlipidemia     Hypertension     MI, old 2006    Parkinson disease     Renal disorder     Kidney stones     Past Surgical History:   Procedure Laterality Date    CARDIAC CATHETERIZATION  2006    x 3    CARDIAC CATHETERIZATION  10/11/2015    x 2    CATARACT EXTRACTION W/  INTRAOCULAR LENS IMPLANT Right 03/06/2018    Dr. Liu    CORONARY ANGIOPLASTY  2006, 2015    3 stents    EXTRACORPOREAL SHOCK WAVE LITHOTRIPSY Left 4/20/2021    Procedure: LITHOTRIPSY-EXTRACORPOREAL SHOCK WAVE;  Surgeon: Ottoniel Obando MD;  Location: [facility] OR;  Service: Urology;  Laterality: Left;    EYE SURGERY      cataracts bilateral    HEMORRHOID SURGERY      INJECTION OF ANESTHETIC AGENT AROUND NERVE Bilateral 11/4/2020    Procedure: BLOCK, NERVE BILATERAL L2,3,4,5;  Surgeon: Ramiro Calvillo MD;  Location: [facility] PAIN MGT;  Service: Pain Management;  Laterality: Bilateral;  BLOCK, NERVE BILATERAL L2,3,4,5    INJECTION OF FACET JOINT Right 8/5/2020    Procedure: INJECTION, FACET JOINT, L4-L5 , L5-S1;  Surgeon: Ramiro Calvilol MD;  Location: [facility] PAIN MGT;  Service: Pain Management;  Laterality: Right;     LAMINOFORAMINOTOMY OF SPINE USING MINIMALLY INVASIVE TECHNIQUE N/A 2021    Procedure: MIS L4-5 Laminectomy;  Surgeon: Bernard Sheldon MD;  Location: [facility] OR;  Service: Neurosurgery;  Laterality: N/A;  Anesthesia: General  Nerve Monitoring: EMG/SEP  Position: Prone  Bed: Eliot Frame on andres  Headrest: Prone View  Radiology: C-arm  Misc: Microscope, microinstruments    TRANSFORAMINAL EPIDURAL INJECTION OF STEROID Right 2020    Procedure: INJECTION, STEROID, EPIDURAL, TRANSFORAMINAL APPROACH;  Surgeon: Ramiro Calvillo MD;  Location: [facility] PAIN MGT;  Service: Pain Management;  Laterality: Right;  Right TF ASHISH L4-L5, L5-S1    PTcannot come earlier than 12:30    TRANSFORAMINAL EPIDURAL INJECTION OF STEROID Right 2020    Procedure: INJECTION, STEROID, EPIDURAL, TRANSFORAMINAL APPROACH;  Surgeon: Ramiro Calvillo MD;  Location: [facility] PAIN MGT;  Service: Pain Management;  Laterality: Right;  RIGHT TF ASHISH L4/5 and L5/S1     Family History   Problem Relation Age of Onset    Heart attack Father     Cataracts Father     Heart disease Maternal Uncle     Heart disease Paternal Uncle     Hypertension Neg Hx     Asthma Neg Hx     Emphysema Neg Hx     Amblyopia Neg Hx     Blindness Neg Hx     Macular degeneration Neg Hx     Retinal detachment Neg Hx     Strabismus Neg Hx      Social History     Tobacco Use    Smoking status: Former     Current packs/day: 0.00     Average packs/day: 1 pack/day for 40.0 years (40.0 ttl pk-yrs)     Types: Cigarettes     Start date: 1974     Quit date: 2014     Years since quittin.5     Passive exposure: Past    Smokeless tobacco: Never   Substance Use Topics    Alcohol use: Yes     Comment: socially    Drug use: No     Review of Systems   Constitutional:  Negative for chills and fever.   HENT:  Negative for sore throat.    Respiratory:  Negative for cough and shortness of breath.    Cardiovascular:  Negative for chest pain.   Gastrointestinal:  Negative for abdominal  pain.   Genitourinary:  Positive for hematuria. Negative for difficulty urinating and dysuria.   Musculoskeletal: Negative.    Skin: Negative.    Neurological:  Negative for weakness.   Psychiatric/Behavioral:  Negative for confusion.        Physical Exam     Initial Vitals [08/06/23 1123]   BP Pulse Resp Temp SpO2   132/72 81 16 98.4 °F (36.9 °C) 97 %      MAP       --         Physical Exam    Nursing note and vitals reviewed.  Constitutional: He appears well-developed and well-nourished.   Eyes: Conjunctivae are normal. Pupils are equal, round, and reactive to light.   Neck: Neck supple.   Normal range of motion.  Cardiovascular:  Normal rate, regular rhythm, normal heart sounds and intact distal pulses.           Pulmonary/Chest: Breath sounds normal.   Abdominal: Abdomen is soft. Bowel sounds are normal. There is no abdominal tenderness.   Musculoskeletal:         General: Normal range of motion.      Cervical back: Normal range of motion and neck supple.     Neurological: He is alert and oriented to person, place, and time. GCS score is 15. GCS eye subscore is 4. GCS verbal subscore is 5. GCS motor subscore is 6.   Skin: Skin is warm and dry. Capillary refill takes less than 2 seconds.   Psychiatric: He has a normal mood and affect.         ED Course   Procedures  Labs Reviewed   CBC W/ AUTO DIFFERENTIAL - Abnormal; Notable for the following components:       Result Value    MCH 31.8 (*)     All other components within normal limits   COMPREHENSIVE METABOLIC PANEL - Abnormal; Notable for the following components:    ALT 9 (*)     All other components within normal limits   URINALYSIS, REFLEX TO URINE CULTURE - Abnormal; Notable for the following components:    Color, UA Orange (*)     Protein, UA 1+ (*)     Ketones, UA Trace (*)     Occult Blood UA 3+ (*)     Leukocytes, UA 2+ (*)     All other components within normal limits    Narrative:     Specimen Source->Urine   URINALYSIS MICROSCOPIC - Abnormal; Notable  for the following components:    RBC, UA >100 (*)     WBC, UA 54 (*)     All other components within normal limits    Narrative:     Specimen Source->Urine   CULTURE, URINE          Imaging Results    None          Medications   cefTRIAXone (ROCEPHIN) 1 g in dextrose 5 % in water (D5W) 100 mL IVPB (MB+) (has no administration in time range)     Medical Decision Making:   History:   Old Medical Records: I decided to obtain old medical records.  Initial Assessment:   78-year-old male presents emergency department with hematuria  Differential Diagnosis:   Hematuria, anemia, UTI, ANIRUDH  Clinical Tests:   Lab Tests: Ordered and Reviewed  ED Management:  Gross hematuria x1 week.  UA positive for RBCs but concerning for UTI with 54 WBCs.  Urinating in his abdominal exam is benign.  Hemoglobin stable and no ANIRUDH on CMP.  Given a dose of Rocephin in the ED will discharge with a course of Vantin.  Discussed frequent emptying of the bladder to help avoid clotting.  Discussed return ED precautions for any new or worsening symptoms.  Will place referral for outpatient follow-up with Urology.                          Clinical Impression:   Final diagnoses:  [R31.9] Hematuria, unspecified type (Primary)  [N30.01] Acute cystitis with hematuria        ED Disposition Condition    Discharge Stable          ED Prescriptions       Medication Sig Dispense Start Date End Date Auth. Provider    cefpodoxime (VANTIN) 100 MG tablet Take 1 tablet (100 mg total) by mouth 2 (two) times daily. for 10 days 20 tablet 8/6/2023 8/16/2023 Julian Oden PA-C          Follow-up Information       Follow up With Specialties Details Why Contact Info Additional Information    [facility] - [facility] Schedule an appointment as soon as possible for a visit   [address]  [facility] [address] [phone] Main Building, 4th Floor Please park in [facility] and take [facility] elevator             Julian Oden PA-C  08/06/23 [id number]

## 2023-08-07 LAB — BACTERIA UR CULT: NO GROWTH

## 2023-08-21 ENCOUNTER — OFFICE VISIT (OUTPATIENT)
Dept: UROLOGY | Facility: CLINIC | Age: 78
End: 2023-08-21
Payer: MEDICARE

## 2023-08-21 VITALS
DIASTOLIC BLOOD PRESSURE: 82 MMHG | WEIGHT: 138.88 LBS | HEART RATE: 76 BPM | BODY MASS INDEX: 23.71 KG/M2 | HEIGHT: 64 IN | SYSTOLIC BLOOD PRESSURE: 136 MMHG

## 2023-08-21 DIAGNOSIS — N40.1 BPH WITH URINARY OBSTRUCTION: ICD-10-CM

## 2023-08-21 DIAGNOSIS — R97.20 ELEVATED PSA: ICD-10-CM

## 2023-08-21 DIAGNOSIS — N13.8 BPH WITH URINARY OBSTRUCTION: ICD-10-CM

## 2023-08-21 DIAGNOSIS — N39.0 RECURRENT UTI: Primary | ICD-10-CM

## 2023-08-21 DIAGNOSIS — N22 CALCULUS OF URINARY TRACT IN DISEASES CLASSIFIED ELSEWHERE: ICD-10-CM

## 2023-08-21 PROCEDURE — 99214 PR OFFICE/OUTPT VISIT, EST, LEVL IV, 30-39 MIN: ICD-10-PCS | Mod: S$GLB,,, | Performed by: UROLOGY

## 2023-08-21 PROCEDURE — 1159F PR MEDICATION LIST DOCUMENTED IN MEDICAL RECORD: ICD-10-PCS | Mod: CPTII,S$GLB,, | Performed by: UROLOGY

## 2023-08-21 PROCEDURE — 3075F PR MOST RECENT SYSTOLIC BLOOD PRESS GE 130-139MM HG: ICD-10-PCS | Mod: CPTII,S$GLB,, | Performed by: UROLOGY

## 2023-08-21 PROCEDURE — 1100F PR PT FALLS ASSESS DOC 2+ FALLS/FALL W/INJURY/YR: ICD-10-PCS | Mod: CPTII,S$GLB,, | Performed by: UROLOGY

## 2023-08-21 PROCEDURE — 3288F FALL RISK ASSESSMENT DOCD: CPT | Mod: CPTII,S$GLB,, | Performed by: UROLOGY

## 2023-08-21 PROCEDURE — 3288F PR FALLS RISK ASSESSMENT DOCUMENTED: ICD-10-PCS | Mod: CPTII,S$GLB,, | Performed by: UROLOGY

## 2023-08-21 PROCEDURE — 3079F DIAST BP 80-89 MM HG: CPT | Mod: CPTII,S$GLB,, | Performed by: UROLOGY

## 2023-08-21 PROCEDURE — 1126F AMNT PAIN NOTED NONE PRSNT: CPT | Mod: CPTII,S$GLB,, | Performed by: UROLOGY

## 2023-08-21 PROCEDURE — 1100F PTFALLS ASSESS-DOCD GE2>/YR: CPT | Mod: CPTII,S$GLB,, | Performed by: UROLOGY

## 2023-08-21 PROCEDURE — 87086 URINE CULTURE/COLONY COUNT: CPT | Performed by: UROLOGY

## 2023-08-21 PROCEDURE — 1126F PR PAIN SEVERITY QUANTIFIED, NO PAIN PRESENT: ICD-10-PCS | Mod: CPTII,S$GLB,, | Performed by: UROLOGY

## 2023-08-21 PROCEDURE — 1159F MED LIST DOCD IN RCRD: CPT | Mod: CPTII,S$GLB,, | Performed by: UROLOGY

## 2023-08-21 PROCEDURE — 99999 PR PBB SHADOW E&M-EST. PATIENT-LVL IV: ICD-10-PCS | Mod: PBBFAC,,, | Performed by: UROLOGY

## 2023-08-21 PROCEDURE — 3079F PR MOST RECENT DIASTOLIC BLOOD PRESSURE 80-89 MM HG: ICD-10-PCS | Mod: CPTII,S$GLB,, | Performed by: UROLOGY

## 2023-08-21 PROCEDURE — 99999 PR PBB SHADOW E&M-EST. PATIENT-LVL IV: CPT | Mod: PBBFAC,,, | Performed by: UROLOGY

## 2023-08-21 PROCEDURE — 3075F SYST BP GE 130 - 139MM HG: CPT | Mod: CPTII,S$GLB,, | Performed by: UROLOGY

## 2023-08-21 PROCEDURE — 99214 OFFICE O/P EST MOD 30 MIN: CPT | Mod: S$GLB,,, | Performed by: UROLOGY

## 2023-08-21 RX ORDER — LIDOCAINE HYDROCHLORIDE 20 MG/ML
JELLY TOPICAL ONCE
Status: CANCELLED | OUTPATIENT
Start: 2023-08-21 | End: 2023-08-21

## 2023-08-21 RX ORDER — TAMSULOSIN HYDROCHLORIDE 0.4 MG/1
0.4 CAPSULE ORAL NIGHTLY
Qty: 30 CAPSULE | Refills: 11 | Status: SHIPPED | OUTPATIENT
Start: 2023-08-21 | End: 2023-08-28 | Stop reason: SINTOL

## 2023-08-21 RX ORDER — DOXYCYCLINE HYCLATE 100 MG
100 TABLET ORAL ONCE
Status: CANCELLED | OUTPATIENT
Start: 2023-08-21 | End: 2023-08-21

## 2023-08-21 NOTE — PATIENT INSTRUCTIONS
Pay attention to urine collection ( clean catch urine) and recommend to get urine culture for any suspected UTI ( even possible cath urine for culture).    Treat chronic constipation.    Increase water and empty the bladder more regularly.  Probiotics (Align) daily  Cranberry Pills / Juice

## 2023-08-21 NOTE — PROGRESS NOTES
CC: elevated PSA 6.2, hematuria, recurrent UTI    Hector Kellogg is a 78 y.o. man who is here for the evaluation of elevated PSA.  A new pt referred by his PCP, Blue Mcgill, DO   His PSA increased to 6.2 from 3.9.  He had a recent episode of hematuria and was treated for UTI.  His elevated PSA might have been due to these problems.    He is here today with his wife and a daughter.    Has a hx of kidney stone disease and saw Dr. Obando in the past.    Urination symptoms: Positive for frequency, urgency and incomplete emptying.  Denies flank pain.      Non-smoker    Past Medical History:   Diagnosis Date    Cataract     Chronic back pain greater than 3 months duration     Coronary artery disease     3 stents    DDD (degenerative disc disease), lumbar 5/16/2020    Glaucoma suspect of both eyes     Hyperlipidemia     Hypertension     MI, old 2006    Parkinson disease     Renal disorder     Kidney stones     Past Surgical History:   Procedure Laterality Date    CARDIAC CATHETERIZATION  2006    x 3    CARDIAC CATHETERIZATION  10/11/2015    x 2    CATARACT EXTRACTION W/  INTRAOCULAR LENS IMPLANT Right 03/06/2018    Dr. Liu    CORONARY ANGIOPLASTY  2006, 2015    3 stents    EXTRACORPOREAL SHOCK WAVE LITHOTRIPSY Left 4/20/2021    Procedure: LITHOTRIPSY-EXTRACORPOREAL SHOCK WAVE;  Surgeon: Ottoniel Obando MD;  Location: [facility] OR;  Service: Urology;  Laterality: Left;    EYE SURGERY      cataracts bilateral    HEMORRHOID SURGERY      INJECTION OF ANESTHETIC AGENT AROUND NERVE Bilateral 11/4/2020    Procedure: BLOCK, NERVE BILATERAL L2,3,4,5;  Surgeon: Ramiro Calvillo MD;  Location: [facility] PAIN MGT;  Service: Pain Management;  Laterality: Bilateral;  BLOCK, NERVE BILATERAL L2,3,4,5    INJECTION OF FACET JOINT Right 8/5/2020    Procedure: INJECTION, FACET JOINT, L4-L5 , L5-S1;  Surgeon: Ramiro Calvillo MD;  Location: [facility] PAIN MGT;  Service: Pain Management;  Laterality: Right;    LAMINOFORAMINOTOMY  OF SPINE USING MINIMALLY INVASIVE TECHNIQUE N/A 2021    Procedure: MIS L4-5 Laminectomy;  Surgeon: Bernard Sheldon MD;  Location: [facility] OR;  Service: Neurosurgery;  Laterality: N/A;  Anesthesia: General  Nerve Monitoring: EMG/SEP  Position: Prone  Bed: Eliot Frame on andres  Headrest: Prone View  Radiology: C-arm  Misc: Microscope, microinstruments    TRANSFORAMINAL EPIDURAL INJECTION OF STEROID Right 2020    Procedure: INJECTION, STEROID, EPIDURAL, TRANSFORAMINAL APPROACH;  Surgeon: Ramiro Calvillo MD;  Location: [facility] PAIN MGT;  Service: Pain Management;  Laterality: Right;  Right TF ASHISH L4-L5, L5-S1    PTcannot come earlier than 12:30    TRANSFORAMINAL EPIDURAL INJECTION OF STEROID Right 2020    Procedure: INJECTION, STEROID, EPIDURAL, TRANSFORAMINAL APPROACH;  Surgeon: Ramiro Calvillo MD;  Location: [facility] PAIN MGT;  Service: Pain Management;  Laterality: Right;  RIGHT TF ASHISH L4/5 and L5/S1     Social History     Tobacco Use    Smoking status: Former     Current packs/day: 0.00     Average packs/day: 1 pack/day for 40.0 years (40.0 ttl pk-yrs)     Types: Cigarettes     Start date: 1974     Quit date: 2014     Years since quittin.5     Passive exposure: Past    Smokeless tobacco: Never   Substance Use Topics    Alcohol use: Yes     Comment: socially    Drug use: No     Family History   Problem Relation Age of Onset    Heart attack Father     Cataracts Father     Heart disease Maternal Uncle     Heart disease Paternal Uncle     Hypertension Neg Hx     Asthma Neg Hx     Emphysema Neg Hx     Amblyopia Neg Hx     Blindness Neg Hx     Macular degeneration Neg Hx     Retinal detachment Neg Hx     Strabismus Neg Hx      Allergy:  Review of patient's allergies indicates:   Allergen Reactions    Influenza virus vaccines Other (See Comments)     Caused fever, chills, and made tremors worse     Outpatient Encounter Medications as of 2023   Medication Sig Dispense Refill    albuterol  (PROVENTIL/VENTOLIN HFA) 90 mcg/actuation inhaler Inhale 2 puffs into the lungs every 6 (six) hours as needed for Wheezing or Shortness of Breath. Rescue 18 g 6    aspirin (ECOTRIN) 81 MG EC tablet Take 81 mg by mouth once daily.      BREZTRI AEROSPHERE 160-9-4.8 mcg/actuation HFAA Inhale 2 puffs into the lungs 2 (two) times a day. 10.7 g 11    carbidopa-levodopa  mg (SINEMET)  mg per tablet Take 1.5 tablets by mouth 5 (five) times daily. 675 tablet 3    citalopram (CELEXA) 20 MG tablet Take 1 tablet by mouth once daily 30 tablet 11    ibuprofen (ADVIL,MOTRIN) 800 MG tablet Take 800 mg by mouth every 6 (six) hours as needed.      loratadine (CLARITIN) 10 mg tablet Take 10 mg by mouth as needed for Allergies.      metoprolol succinate (TOPROL-XL) 50 MG 24 hr tablet Take 1 tablet by mouth once daily 90 tablet 3    multivitamin capsule Take 1 capsule by mouth once daily.      rosuvastatin (CRESTOR) 40 MG Tab TAKE 1 TABLET BY MOUTH NIGHTLY AT BEDTIME 90 tablet 3    [] cefpodoxime (VANTIN) 100 MG tablet Take 1 tablet (100 mg total) by mouth 2 (two) times daily. for 10 days 20 tablet 0    tamsulosin (FLOMAX) 0.4 mg Cap Take 1 capsule (0.4 mg total) by mouth every evening. 30 capsule 11    [DISCONTINUED] carbidopa-levodopa  mg (SINEMET)  mg per tablet Take 1.5 tablets by mouth 5 (five) times daily. 675 tablet 3     No facility-administered encounter medications on file as of 2023.     Review of Systems   ROS  Physical Exam     Vitals:    23 1341   BP: 136/82   Pulse: 76     Physical Exam  Constitutional:       General: He is not in acute distress.     Appearance: He is well-developed. He is not diaphoretic.   HENT:      Head: Normocephalic and atraumatic.      Right Ear: External ear normal.      Left Ear: External ear normal.      Nose: Nose normal.   Eyes:      Conjunctiva/sclera: Conjunctivae normal.      Pupils: Pupils are equal, round, and reactive to light.   Neck:       Thyroid: No thyromegaly.      Vascular: No JVD.      Trachea: No tracheal deviation.   Cardiovascular:      Rate and Rhythm: Normal rate and regular rhythm.      Heart sounds: Normal heart sounds. No murmur heard.     No friction rub. No gallop.   Pulmonary:      Effort: Pulmonary effort is normal. No respiratory distress.      Breath sounds: Normal breath sounds. No wheezing.   Chest:      Chest wall: No tenderness.   Abdominal:      General: Bowel sounds are normal. There is no distension.      Palpations: Abdomen is soft. There is no mass.      Tenderness: There is no abdominal tenderness. There is no guarding or rebound.   Genitourinary:     Penis: Normal. No tenderness.       Prostate: Normal.      Rectum: Normal.      Comments: Prostate: enlarged.  Musculoskeletal:         General: No tenderness or deformity. Normal range of motion.      Cervical back: Normal range of motion and neck supple.   Lymphadenopathy:      Cervical: No cervical adenopathy.   Skin:     General: Skin is warm and dry.   Neurological:      Mental Status: He is alert and oriented to person, place, and time.   Psychiatric:         Behavior: Behavior normal.         Thought Content: Thought content normal.         LABS:  Lab Results   Component Value Date    PSA 6.2 (H) 07/14/2023    PSA 3.9 06/22/2022     No results found for this or any previous visit.  Lab Results   Component Value Date    CREATININE 0.9 08/06/2023    CREATININE 1.1 07/14/2023    CREATININE 1.1 01/11/2023     No results found for this or any previous visit.  Urine Culture, Routine   Date Value Ref Range Status   08/06/2023 No growth  Final     Hemoglobin A1C   Date Value Ref Range Status   07/14/2023 5.7 (H) 4.0 - 5.6 % Final     Comment:     ADA Screening Guidelines:  5.7-6.4%  Consistent with prediabetes  >or=6.5%  Consistent with diabetes    High levels of fetal hemoglobin interfere with the HbA1C  assay. Heterozygous hemoglobin variants (HbS, HgC, etc)do  not  significantly interfere with this assay.   However, presence of multiple variants may affect accuracy.       UA today trace leukocyte, 250 blood, 2+ protein    Radiology:  CT urogram  3/2021  9 mm nonobstructing calculus at the left ureteropelvic junction.     Punctate nonobstructing right lower pole nephrolith.     Prostatomegaly.  Assessment and Plan:  Hector was seen today for elevated psa and follow-up.    Diagnoses and all orders for this visit:    Recurrent UTI  -     Urine culture  -     Cystoscopy; Future    Elevated PSA  -     Ambulatory referral/consult to Urology    BPH with urinary obstruction  -     tamsulosin (FLOMAX) 0.4 mg Cap; Take 1 capsule (0.4 mg total) by mouth every evening.    Calculus of urinary tract in diseases classified elsewhere  -     CT Urogram Abd Pelvis W WO; Future    Other orders  -     LIDOcaine HCl 2% urojet  -     doxycycline tablet 100 mg      I am concerned about his recurrent UTI and persistent hematuria.  These problems can contribute to his elevated PSA.  Will evaluate him as above.  Will follow up with serial PSA once his UTI and hematuria problems are resolved.    Start flomax to see how it improve his LUTS.  Urine culture today.  A full evaluation for hematuria as above.  All questions answered.    Follow-up:  Follow up cysto.

## 2023-08-22 LAB — BACTERIA UR CULT: NO GROWTH

## 2023-08-23 ENCOUNTER — PATIENT MESSAGE (OUTPATIENT)
Dept: PULMONOLOGY | Facility: CLINIC | Age: 78
End: 2023-08-23
Payer: MEDICARE

## 2023-08-23 ENCOUNTER — PATIENT MESSAGE (OUTPATIENT)
Dept: UROLOGY | Facility: CLINIC | Age: 78
End: 2023-08-23
Payer: MEDICARE

## 2023-08-24 ENCOUNTER — TELEPHONE (OUTPATIENT)
Dept: UROLOGY | Facility: CLINIC | Age: 78
End: 2023-08-24
Payer: MEDICARE

## 2023-08-24 ENCOUNTER — PATIENT MESSAGE (OUTPATIENT)
Dept: UROLOGY | Facility: CLINIC | Age: 78
End: 2023-08-24
Payer: MEDICARE

## 2023-08-24 DIAGNOSIS — N20.0 KIDNEY STONE ON LEFT SIDE: Primary | ICD-10-CM

## 2023-08-24 NOTE — TELEPHONE ENCOUNTER
CT urogram 8/24/23  Left renal pelvis stone ( 13 x 9 mm) near the UPJ with mild adjacent stranding and mild upstream hydronephrosis.     Additional nonobstructing bilateral renal calculi and renal cysts.     Hypodense area at the level of the right iliopsoas bursa with reference measurements above, possible bursitis.     Enlarged prostate, colonic diverticulosis, and additional findings as above.    Kidney stone on left side  -     X-Ray Abdomen AP 1 View; Future; Expected date: 08/24/2023        please have him come tomorrow with KUB.  He is scheduled for cysto next week for hematuria evaluation but I think I may need to take care of his left kidney stone first ( combined with cysto)

## 2023-08-25 ENCOUNTER — TELEPHONE (OUTPATIENT)
Dept: UROLOGY | Facility: CLINIC | Age: 78
End: 2023-08-25
Payer: MEDICARE

## 2023-08-25 ENCOUNTER — HOSPITAL ENCOUNTER (OUTPATIENT)
Dept: RADIOLOGY | Facility: HOSPITAL | Age: 78
Discharge: HOME | End: 2023-08-25
Attending: UROLOGY
Payer: MEDICARE

## 2023-08-25 ENCOUNTER — OFFICE VISIT (OUTPATIENT)
Dept: UROLOGY | Facility: CLINIC | Age: 78
End: 2023-08-25
Payer: MEDICARE

## 2023-08-25 VITALS
BODY MASS INDEX: 23.71 KG/M2 | DIASTOLIC BLOOD PRESSURE: 55 MMHG | HEIGHT: 64 IN | SYSTOLIC BLOOD PRESSURE: 100 MMHG | WEIGHT: 138.88 LBS | HEART RATE: 70 BPM

## 2023-08-25 DIAGNOSIS — R31.0 GROSS HEMATURIA: Primary | ICD-10-CM

## 2023-08-25 DIAGNOSIS — N20.0 KIDNEY STONE ON LEFT SIDE: ICD-10-CM

## 2023-08-25 DIAGNOSIS — N39.0 ACUTE UTI: Primary | ICD-10-CM

## 2023-08-25 DIAGNOSIS — N20.1 OBSTRUCTION OF LEFT URETEROPELVIC JUNCTION (UPJ) DUE TO STONE: ICD-10-CM

## 2023-08-25 PROCEDURE — 1159F PR MEDICATION LIST DOCUMENTED IN MEDICAL RECORD: ICD-10-PCS | Mod: CPTII,S$GLB,, | Performed by: UROLOGY

## 2023-08-25 PROCEDURE — 74018 XR ABDOMEN AP 1 VIEW: ICD-10-PCS | Mod: 26,,, | Performed by: RADIOLOGY

## 2023-08-25 PROCEDURE — 3078F DIAST BP <80 MM HG: CPT | Mod: CPTII,S$GLB,, | Performed by: UROLOGY

## 2023-08-25 PROCEDURE — 99215 OFFICE O/P EST HI 40 MIN: CPT | Mod: 57,S$GLB,, | Performed by: UROLOGY

## 2023-08-25 PROCEDURE — 74018 RADEX ABDOMEN 1 VIEW: CPT | Mod: TC

## 2023-08-25 PROCEDURE — 1159F MED LIST DOCD IN RCRD: CPT | Mod: CPTII,S$GLB,, | Performed by: UROLOGY

## 2023-08-25 PROCEDURE — 74018 RADEX ABDOMEN 1 VIEW: CPT | Mod: 26,,, | Performed by: RADIOLOGY

## 2023-08-25 PROCEDURE — 99999 PR PBB SHADOW E&M-EST. PATIENT-LVL IV: ICD-10-PCS | Mod: PBBFAC,,, | Performed by: UROLOGY

## 2023-08-25 PROCEDURE — 99215 PR OFFICE/OUTPT VISIT, EST, LEVL V, 40-54 MIN: ICD-10-PCS | Mod: 57,S$GLB,, | Performed by: UROLOGY

## 2023-08-25 PROCEDURE — 99999 PR PBB SHADOW E&M-EST. PATIENT-LVL IV: CPT | Mod: PBBFAC,,, | Performed by: UROLOGY

## 2023-08-25 PROCEDURE — 3078F PR MOST RECENT DIASTOLIC BLOOD PRESSURE < 80 MM HG: ICD-10-PCS | Mod: CPTII,S$GLB,, | Performed by: UROLOGY

## 2023-08-25 PROCEDURE — 3074F SYST BP LT 130 MM HG: CPT | Mod: CPTII,S$GLB,, | Performed by: UROLOGY

## 2023-08-25 PROCEDURE — 3074F PR MOST RECENT SYSTOLIC BLOOD PRESSURE < 130 MM HG: ICD-10-PCS | Mod: CPTII,S$GLB,, | Performed by: UROLOGY

## 2023-08-25 PROCEDURE — 87086 URINE CULTURE/COLONY COUNT: CPT | Performed by: UROLOGY

## 2023-08-25 NOTE — TELEPHONE ENCOUNTER
Spoke to the pt and family while in clinic to offer surgery date of 9/13, pt accepted. Conformed with the patient per Dr. Cullen the patient is to stop his ASA medication 1 week prior to surgery scheduled 9/13/23. Informed the patient I will call him the day before with arrival time and pre op instructions. Patient verbalized understanding.

## 2023-08-25 NOTE — H&P (VIEW-ONLY)
CC: left UPJ stone    Hector Kellogg is a 78 y.o. man who is here for the evaluation of elevated PSA.  A new pt referred by his PCP, Blue Mcgill, DO   His PSA increased to 6.2 from 3.9.  He had a recent episode of hematuria and was treated for UTI.  His elevated PSA might have been due to these problems.    He is here today with his wife and a daughter.    Has a hx of kidney stone disease and saw Dr. Obando in the past.    Urination symptoms: Positive for frequency, urgency and incomplete emptying.  Denies flank pain.      Non-smoker    Past Medical History:   Diagnosis Date    Cataract     Chronic back pain greater than 3 months duration     Coronary artery disease     3 stents    DDD (degenerative disc disease), lumbar 5/16/2020    Glaucoma suspect of both eyes     Hyperlipidemia     Hypertension     MI, old 2006    Parkinson disease     Renal disorder     Kidney stones     Past Surgical History:   Procedure Laterality Date    CARDIAC CATHETERIZATION  2006    x 3    CARDIAC CATHETERIZATION  10/11/2015    x 2    CATARACT EXTRACTION W/  INTRAOCULAR LENS IMPLANT Right 03/06/2018    Dr. Liu    CORONARY ANGIOPLASTY  2006, 2015    3 stents    EXTRACORPOREAL SHOCK WAVE LITHOTRIPSY Left 4/20/2021    Procedure: LITHOTRIPSY-EXTRACORPOREAL SHOCK WAVE;  Surgeon: Ottoniel Obando MD;  Location: [facility] OR;  Service: Urology;  Laterality: Left;    EYE SURGERY      cataracts bilateral    HEMORRHOID SURGERY      INJECTION OF ANESTHETIC AGENT AROUND NERVE Bilateral 11/4/2020    Procedure: BLOCK, NERVE BILATERAL L2,3,4,5;  Surgeon: Ramiro Calvillo MD;  Location: [facility] PAIN MGT;  Service: Pain Management;  Laterality: Bilateral;  BLOCK, NERVE BILATERAL L2,3,4,5    INJECTION OF FACET JOINT Right 8/5/2020    Procedure: INJECTION, FACET JOINT, L4-L5 , L5-S1;  Surgeon: Ramiro Calvillo MD;  Location: [facility] PAIN MGT;  Service: Pain Management;  Laterality: Right;    LAMINOFORAMINOTOMY OF SPINE USING MINIMALLY  INVASIVE TECHNIQUE N/A 2021    Procedure: MIS L4-5 Laminectomy;  Surgeon: Bernard Sheldon MD;  Location: [facility] OR;  Service: Neurosurgery;  Laterality: N/A;  Anesthesia: General  Nerve Monitoring: EMG/SEP  Position: Prone  Bed: Eliot Frame on andres  Headrest: Prone View  Radiology: C-arm  Misc: Microscope, microinstruments    TRANSFORAMINAL EPIDURAL INJECTION OF STEROID Right 2020    Procedure: INJECTION, STEROID, EPIDURAL, TRANSFORAMINAL APPROACH;  Surgeon: Ramiro Calvillo MD;  Location: [facility] PAIN MGT;  Service: Pain Management;  Laterality: Right;  Right TF ASHISH L4-L5, L5-S1    PTcannot come earlier than 12:30    TRANSFORAMINAL EPIDURAL INJECTION OF STEROID Right 2020    Procedure: INJECTION, STEROID, EPIDURAL, TRANSFORAMINAL APPROACH;  Surgeon: Ramiro Calvillo MD;  Location: [facility] PAIN MGT;  Service: Pain Management;  Laterality: Right;  RIGHT TF ASHISH L4/5 and L5/S1     Social History     Tobacco Use    Smoking status: Former     Current packs/day: 0.00     Average packs/day: 1 pack/day for 40.0 years (40.0 ttl pk-yrs)     Types: Cigarettes     Start date: 1974     Quit date: 2014     Years since quittin.5     Passive exposure: Past    Smokeless tobacco: Never   Substance Use Topics    Alcohol use: Yes     Comment: socially    Drug use: No     Family History   Problem Relation Age of Onset    Heart attack Father     Cataracts Father     Heart disease Maternal Uncle     Heart disease Paternal Uncle     Hypertension Neg Hx     Asthma Neg Hx     Emphysema Neg Hx     Amblyopia Neg Hx     Blindness Neg Hx     Macular degeneration Neg Hx     Retinal detachment Neg Hx     Strabismus Neg Hx      Allergy:  Review of patient's allergies indicates:   Allergen Reactions    Influenza virus vaccines Other (See Comments)     Caused fever, chills, and made tremors worse     Outpatient Encounter Medications as of 2023   Medication Sig Dispense Refill    albuterol (PROVENTIL/VENTOLIN HFA) 90  mcg/actuation inhaler Inhale 2 puffs into the lungs every 6 (six) hours as needed for Wheezing or Shortness of Breath. Rescue 18 g 6    aspirin (ECOTRIN) 81 MG EC tablet Take 81 mg by mouth once daily.      KEELYTRI AEROSPHERE 160-9-4.8 mcg/actuation HFAA Inhale 2 puffs into the lungs 2 (two) times a day. 10.7 g 11    carbidopa-levodopa  mg (SINEMET)  mg per tablet Take 1.5 tablets by mouth 5 (five) times daily. 675 tablet 3    citalopram (CELEXA) 20 MG tablet Take 1 tablet by mouth once daily 30 tablet 11    ibuprofen (ADVIL,MOTRIN) 800 MG tablet Take 800 mg by mouth every 6 (six) hours as needed.      loratadine (CLARITIN) 10 mg tablet Take 10 mg by mouth as needed for Allergies.      metoprolol succinate (TOPROL-XL) 50 MG 24 hr tablet Take 1 tablet by mouth once daily 90 tablet 3    multivitamin capsule Take 1 capsule by mouth once daily.      rosuvastatin (CRESTOR) 40 MG Tab TAKE 1 TABLET BY MOUTH NIGHTLY AT BEDTIME 90 tablet 3    tamsulosin (FLOMAX) 0.4 mg Cap Take 1 capsule (0.4 mg total) by mouth every evening. 30 capsule 11    [] iohexoL (OMNIPAQUE 350) injection 100 mL        No facility-administered encounter medications on file as of 2023.     Review of Systems   ROS  Physical Exam     Vitals:    23 1038   BP: (!) 100/55   Pulse: 70     Physical Exam  Constitutional:       General: He is not in acute distress.     Appearance: He is well-developed. He is not diaphoretic.   HENT:      Head: Normocephalic and atraumatic.      Right Ear: External ear normal.      Left Ear: External ear normal.      Nose: Nose normal.   Eyes:      Conjunctiva/sclera: Conjunctivae normal.      Pupils: Pupils are equal, round, and reactive to light.   Neck:      Thyroid: No thyromegaly.      Vascular: No JVD.      Trachea: No tracheal deviation.   Cardiovascular:      Rate and Rhythm: Normal rate and regular rhythm.      Heart sounds: Normal heart sounds. No murmur heard.     No friction rub. No  gallop.   Pulmonary:      Effort: Pulmonary effort is normal. No respiratory distress.      Breath sounds: Normal breath sounds. No wheezing.   Chest:      Chest wall: No tenderness.   Abdominal:      General: Bowel sounds are normal. There is no distension.      Palpations: Abdomen is soft. There is no mass.      Tenderness: There is no abdominal tenderness. There is no guarding or rebound.   Genitourinary:     Penis: Normal. No tenderness.       Prostate: Normal.      Rectum: Normal.      Comments: Prostate: enlarged.  Musculoskeletal:         General: No tenderness or deformity. Normal range of motion.      Cervical back: Normal range of motion and neck supple.   Lymphadenopathy:      Cervical: No cervical adenopathy.   Skin:     General: Skin is warm and dry.   Neurological:      Mental Status: He is alert and oriented to person, place, and time.   Psychiatric:         Behavior: Behavior normal.         Thought Content: Thought content normal.         LABS:  Lab Results   Component Value Date    PSA 6.2 (H) 07/14/2023    PSA 3.9 06/22/2022     No results found for this or any previous visit.  Lab Results   Component Value Date    CREATININE 0.9 08/06/2023    CREATININE 1.1 07/14/2023    CREATININE 1.1 01/11/2023     No results found for this or any previous visit.  Urine Culture, Routine   Date Value Ref Range Status   08/21/2023 No growth  Final     Hemoglobin A1C   Date Value Ref Range Status   07/14/2023 5.7 (H) 4.0 - 5.6 % Final     Comment:     ADA Screening Guidelines:  5.7-6.4%  Consistent with prediabetes  >or=6.5%  Consistent with diabetes    High levels of fetal hemoglobin interfere with the HbA1C  assay. Heterozygous hemoglobin variants (HbS, HgC, etc)do  not significantly interfere with this assay.   However, presence of multiple variants may affect accuracy.       UA today trace leukocyte, 250 blood, 2+ protein    Radiology:  CT urogram 8/24/23    Kidneys enhance symmetrically with cortical  hypodense lesions, the larger in keeping with cysts with some too small to characterize.  Incidental mild extrarenal pelvis on the right.  Punctate nonobstructing right renal stones (series 3, image 56 and 72).  There is left renal pelvis stone near the UPJ measuring 1.3 x 0.9 cm with mild adjacent stranding and mild upstream hydronephrosis.  Additional nonobstructing left renal stones.  There is incomplete contrast distension of the urinary bladder, otherwise unremarkable.  Prostate is enlarged with central gland calcification.    KUB today  Two overlapping images acquired.  Suboptimal visualization bilateral, right greater than left renal soft tissue contours due to superimposition of fecal matter and bowel gas.  Stable greater than 1 cm left urinary tract calculus, unchanged in position to that seen on the earlier exams and based on the CT position within the left renal pelvis.  Some other CT demonstrable bilateral tiny nonobstructing calculi not resolved on the KUB.  Pelvic densities are noted to represent phleboliths based on the earlier CT.  Some stable lumbar dextrocurvature, degenerative changes involving the spine, and degenerative changes about pubic symphysis with no acute bony abnormalities.  Nonobstructive bowel gas pattern and no free air.     Assessment and Plan:  Hector was seen today for other.    Diagnoses and all orders for this visit:    Acute UTI  -     Urine culture    Obstruction of left ureteropelvic junction (UPJ) due to stone  -     EKG 12-lead; Future  -     X-Ray Abdomen AP 1 View; Future        I reviewed his CT urogram revealing a larger left UPJ stone.  It is visible on KUB today.  Discussed the options of treatment.  Will plan cysto left ureteral stent placement and left EWSL.  If the stone is not respoding to ESWL, will consider left uretero-pyeloscopy with laser lithotripsy in the future.   The risks include but are not limited to bleeding in or around the kidney, infection, pain,  incomplete fragmentation of the stone requiring a repeat treatment or a different form of treatment, obstruction of the kidney by stone particles possibly requiring a ureteral stent or nephrostomy tube, injury to or loss of the kidney ,injury to the ureter or bladder, need for further procedures, hypertension (transient or permanent), recurrence of stones, and need for open surgery.  Alternative treatments were also discussed with the patient in detail to include ureteroscopy, percutaneous treatment of the stone, open surgery  and observation. All questions answered.  Pt and his family understand and agree to undergo the proposed surgery.    Continue flomax to see how it improve his LUTS.  Urine culture today.  I spent 40 minutes with the patient of which more than half was spent in direct consultation with the patient in regards to our treatment and plan.     Follow-up:  Follow up in about 19 days (around 9/13/2023), or cysto left ureteral stent, left ESWL.

## 2023-08-25 NOTE — TELEPHONE ENCOUNTER
Called and spoke to the wife of the patient to inform the patient will need an EKG prior to surgery. Mrs. Arellano asked me to get it scheduled at the [place] location. I called to get the patient scheduled and was informed the patient can walk in when he is ready. Called the wife back to inform and she stated she will get it done. I also informed the EKG needs to be done at least 1 week prior to surgery. Patients wife Mrs. Arellano verbalized understanding.

## 2023-08-25 NOTE — TELEPHONE ENCOUNTER
Gross hematuria  -     Case Request Operating Room: CYSTOSCOPY, WITH URETERAL STENT PLACEMENT, LITHOTRIPSY, ESWL    Obstruction of left ureteropelvic junction (UPJ) due to stone  -     Case Request Operating Room: CYSTOSCOPY, WITH URETERAL STENT PLACEMENT, LITHOTRIPSY, ESWL

## 2023-08-25 NOTE — PROGRESS NOTES
CC: left UPJ stone    Hector Kellogg is a 78 y.o. man who is here for the evaluation of elevated PSA.  A new pt referred by his PCP, Blue Mcgill, DO   His PSA increased to 6.2 from 3.9.  He had a recent episode of hematuria and was treated for UTI.  His elevated PSA might have been due to these problems.    He is here today with his wife and a daughter.    Has a hx of kidney stone disease and saw Dr. Obando in the past.    Urination symptoms: Positive for frequency, urgency and incomplete emptying.  Denies flank pain.      Non-smoker    Past Medical History:   Diagnosis Date    Cataract     Chronic back pain greater than 3 months duration     Coronary artery disease     3 stents    DDD (degenerative disc disease), lumbar 5/16/2020    Glaucoma suspect of both eyes     Hyperlipidemia     Hypertension     MI, old 2006    Parkinson disease     Renal disorder     Kidney stones     Past Surgical History:   Procedure Laterality Date    CARDIAC CATHETERIZATION  2006    x 3    CARDIAC CATHETERIZATION  10/11/2015    x 2    CATARACT EXTRACTION W/  INTRAOCULAR LENS IMPLANT Right 03/06/2018    Dr. Liu    CORONARY ANGIOPLASTY  2006, 2015    3 stents    EXTRACORPOREAL SHOCK WAVE LITHOTRIPSY Left 4/20/2021    Procedure: LITHOTRIPSY-EXTRACORPOREAL SHOCK WAVE;  Surgeon: Ottoniel Obando MD;  Location: [facility] OR;  Service: Urology;  Laterality: Left;    EYE SURGERY      cataracts bilateral    HEMORRHOID SURGERY      INJECTION OF ANESTHETIC AGENT AROUND NERVE Bilateral 11/4/2020    Procedure: BLOCK, NERVE BILATERAL L2,3,4,5;  Surgeon: Ramiro Calvillo MD;  Location: [facility] PAIN MGT;  Service: Pain Management;  Laterality: Bilateral;  BLOCK, NERVE BILATERAL L2,3,4,5    INJECTION OF FACET JOINT Right 8/5/2020    Procedure: INJECTION, FACET JOINT, L4-L5 , L5-S1;  Surgeon: Raimro Calvillo MD;  Location: [facility] PAIN MGT;  Service: Pain Management;  Laterality: Right;    LAMINOFORAMINOTOMY OF SPINE USING MINIMALLY  INVASIVE TECHNIQUE N/A 2021    Procedure: MIS L4-5 Laminectomy;  Surgeon: Bernard Sheldon MD;  Location: [facility] OR;  Service: Neurosurgery;  Laterality: N/A;  Anesthesia: General  Nerve Monitoring: EMG/SEP  Position: Prone  Bed: Eliot Frame on andres  Headrest: Prone View  Radiology: C-arm  Misc: Microscope, microinstruments    TRANSFORAMINAL EPIDURAL INJECTION OF STEROID Right 2020    Procedure: INJECTION, STEROID, EPIDURAL, TRANSFORAMINAL APPROACH;  Surgeon: Ramiro Calvillo MD;  Location: [facility] PAIN MGT;  Service: Pain Management;  Laterality: Right;  Right TF ASHISH L4-L5, L5-S1    PTcannot come earlier than 12:30    TRANSFORAMINAL EPIDURAL INJECTION OF STEROID Right 2020    Procedure: INJECTION, STEROID, EPIDURAL, TRANSFORAMINAL APPROACH;  Surgeon: Ramiro Calvillo MD;  Location: [facility] PAIN MGT;  Service: Pain Management;  Laterality: Right;  RIGHT TF ASHISH L4/5 and L5/S1     Social History     Tobacco Use    Smoking status: Former     Current packs/day: 0.00     Average packs/day: 1 pack/day for 40.0 years (40.0 ttl pk-yrs)     Types: Cigarettes     Start date: 1974     Quit date: 2014     Years since quittin.5     Passive exposure: Past    Smokeless tobacco: Never   Substance Use Topics    Alcohol use: Yes     Comment: socially    Drug use: No     Family History   Problem Relation Age of Onset    Heart attack Father     Cataracts Father     Heart disease Maternal Uncle     Heart disease Paternal Uncle     Hypertension Neg Hx     Asthma Neg Hx     Emphysema Neg Hx     Amblyopia Neg Hx     Blindness Neg Hx     Macular degeneration Neg Hx     Retinal detachment Neg Hx     Strabismus Neg Hx      Allergy:  Review of patient's allergies indicates:   Allergen Reactions    Influenza virus vaccines Other (See Comments)     Caused fever, chills, and made tremors worse     Outpatient Encounter Medications as of 2023   Medication Sig Dispense Refill    albuterol (PROVENTIL/VENTOLIN HFA) 90  mcg/actuation inhaler Inhale 2 puffs into the lungs every 6 (six) hours as needed for Wheezing or Shortness of Breath. Rescue 18 g 6    aspirin (ECOTRIN) 81 MG EC tablet Take 81 mg by mouth once daily.      KEELYTRI AEROSPHERE 160-9-4.8 mcg/actuation HFAA Inhale 2 puffs into the lungs 2 (two) times a day. 10.7 g 11    carbidopa-levodopa  mg (SINEMET)  mg per tablet Take 1.5 tablets by mouth 5 (five) times daily. 675 tablet 3    citalopram (CELEXA) 20 MG tablet Take 1 tablet by mouth once daily 30 tablet 11    ibuprofen (ADVIL,MOTRIN) 800 MG tablet Take 800 mg by mouth every 6 (six) hours as needed.      loratadine (CLARITIN) 10 mg tablet Take 10 mg by mouth as needed for Allergies.      metoprolol succinate (TOPROL-XL) 50 MG 24 hr tablet Take 1 tablet by mouth once daily 90 tablet 3    multivitamin capsule Take 1 capsule by mouth once daily.      rosuvastatin (CRESTOR) 40 MG Tab TAKE 1 TABLET BY MOUTH NIGHTLY AT BEDTIME 90 tablet 3    tamsulosin (FLOMAX) 0.4 mg Cap Take 1 capsule (0.4 mg total) by mouth every evening. 30 capsule 11    [] iohexoL (OMNIPAQUE 350) injection 100 mL        No facility-administered encounter medications on file as of 2023.     Review of Systems   ROS  Physical Exam     Vitals:    23 1038   BP: (!) 100/55   Pulse: 70     Physical Exam  Constitutional:       General: He is not in acute distress.     Appearance: He is well-developed. He is not diaphoretic.   HENT:      Head: Normocephalic and atraumatic.      Right Ear: External ear normal.      Left Ear: External ear normal.      Nose: Nose normal.   Eyes:      Conjunctiva/sclera: Conjunctivae normal.      Pupils: Pupils are equal, round, and reactive to light.   Neck:      Thyroid: No thyromegaly.      Vascular: No JVD.      Trachea: No tracheal deviation.   Cardiovascular:      Rate and Rhythm: Normal rate and regular rhythm.      Heart sounds: Normal heart sounds. No murmur heard.     No friction rub. No  gallop.   Pulmonary:      Effort: Pulmonary effort is normal. No respiratory distress.      Breath sounds: Normal breath sounds. No wheezing.   Chest:      Chest wall: No tenderness.   Abdominal:      General: Bowel sounds are normal. There is no distension.      Palpations: Abdomen is soft. There is no mass.      Tenderness: There is no abdominal tenderness. There is no guarding or rebound.   Genitourinary:     Penis: Normal. No tenderness.       Prostate: Normal.      Rectum: Normal.      Comments: Prostate: enlarged.  Musculoskeletal:         General: No tenderness or deformity. Normal range of motion.      Cervical back: Normal range of motion and neck supple.   Lymphadenopathy:      Cervical: No cervical adenopathy.   Skin:     General: Skin is warm and dry.   Neurological:      Mental Status: He is alert and oriented to person, place, and time.   Psychiatric:         Behavior: Behavior normal.         Thought Content: Thought content normal.         LABS:  Lab Results   Component Value Date    PSA 6.2 (H) 07/14/2023    PSA 3.9 06/22/2022     No results found for this or any previous visit.  Lab Results   Component Value Date    CREATININE 0.9 08/06/2023    CREATININE 1.1 07/14/2023    CREATININE 1.1 01/11/2023     No results found for this or any previous visit.  Urine Culture, Routine   Date Value Ref Range Status   08/21/2023 No growth  Final     Hemoglobin A1C   Date Value Ref Range Status   07/14/2023 5.7 (H) 4.0 - 5.6 % Final     Comment:     ADA Screening Guidelines:  5.7-6.4%  Consistent with prediabetes  >or=6.5%  Consistent with diabetes    High levels of fetal hemoglobin interfere with the HbA1C  assay. Heterozygous hemoglobin variants (HbS, HgC, etc)do  not significantly interfere with this assay.   However, presence of multiple variants may affect accuracy.       UA today trace leukocyte, 250 blood, 2+ protein    Radiology:  CT urogram 8/24/23    Kidneys enhance symmetrically with cortical  hypodense lesions, the larger in keeping with cysts with some too small to characterize.  Incidental mild extrarenal pelvis on the right.  Punctate nonobstructing right renal stones (series 3, image 56 and 72).  There is left renal pelvis stone near the UPJ measuring 1.3 x 0.9 cm with mild adjacent stranding and mild upstream hydronephrosis.  Additional nonobstructing left renal stones.  There is incomplete contrast distension of the urinary bladder, otherwise unremarkable.  Prostate is enlarged with central gland calcification.    KUB today  Two overlapping images acquired.  Suboptimal visualization bilateral, right greater than left renal soft tissue contours due to superimposition of fecal matter and bowel gas.  Stable greater than 1 cm left urinary tract calculus, unchanged in position to that seen on the earlier exams and based on the CT position within the left renal pelvis.  Some other CT demonstrable bilateral tiny nonobstructing calculi not resolved on the KUB.  Pelvic densities are noted to represent phleboliths based on the earlier CT.  Some stable lumbar dextrocurvature, degenerative changes involving the spine, and degenerative changes about pubic symphysis with no acute bony abnormalities.  Nonobstructive bowel gas pattern and no free air.     Assessment and Plan:  Hector was seen today for other.    Diagnoses and all orders for this visit:    Acute UTI  -     Urine culture    Obstruction of left ureteropelvic junction (UPJ) due to stone  -     EKG 12-lead; Future  -     X-Ray Abdomen AP 1 View; Future        I reviewed his CT urogram revealing a larger left UPJ stone.  It is visible on KUB today.  Discussed the options of treatment.  Will plan cysto left ureteral stent placement and left EWSL.  If the stone is not respoding to ESWL, will consider left uretero-pyeloscopy with laser lithotripsy in the future.   The risks include but are not limited to bleeding in or around the kidney, infection, pain,  incomplete fragmentation of the stone requiring a repeat treatment or a different form of treatment, obstruction of the kidney by stone particles possibly requiring a ureteral stent or nephrostomy tube, injury to or loss of the kidney ,injury to the ureter or bladder, need for further procedures, hypertension (transient or permanent), recurrence of stones, and need for open surgery.  Alternative treatments were also discussed with the patient in detail to include ureteroscopy, percutaneous treatment of the stone, open surgery  and observation. All questions answered.  Pt and his family understand and agree to undergo the proposed surgery.    Continue flomax to see how it improve his LUTS.  Urine culture today.  I spent 40 minutes with the patient of which more than half was spent in direct consultation with the patient in regards to our treatment and plan.     Follow-up:  Follow up in about 19 days (around 9/13/2023), or cysto left ureteral stent, left ESWL.

## 2023-08-26 LAB — BACTERIA UR CULT: NO GROWTH

## 2023-08-27 ENCOUNTER — PATIENT MESSAGE (OUTPATIENT)
Dept: CARDIOLOGY | Facility: CLINIC | Age: 78
End: 2023-08-27
Payer: MEDICARE

## 2023-08-27 ENCOUNTER — PATIENT MESSAGE (OUTPATIENT)
Dept: SURGERY | Facility: HOSPITAL | Age: 78
End: 2023-08-27
Payer: MEDICARE

## 2023-08-28 ENCOUNTER — TELEPHONE (OUTPATIENT)
Dept: UROLOGY | Facility: CLINIC | Age: 78
End: 2023-08-28
Payer: MEDICARE

## 2023-08-28 DIAGNOSIS — N40.1 BPH WITH URINARY OBSTRUCTION: Primary | ICD-10-CM

## 2023-08-28 DIAGNOSIS — N13.8 BPH WITH URINARY OBSTRUCTION: Primary | ICD-10-CM

## 2023-08-28 RX ORDER — FINASTERIDE 5 MG/1
5 TABLET, FILM COATED ORAL DAILY
Qty: 30 TABLET | Refills: 11 | Status: SHIPPED | OUTPATIENT
Start: 2023-08-28 | End: 2024-02-06

## 2023-08-28 NOTE — TELEPHONE ENCOUNTER
BPH with urinary obstruction  -     finasteride (PROSCAR) 5 mg tablet; Take 1 tablet (5 mg total) by mouth once daily.  Dispense: 30 tablet; Refill: 11     Stop flomax due to dizziness.  Start finasteride instead.

## 2023-08-31 ENCOUNTER — OFFICE VISIT (OUTPATIENT)
Dept: CARDIOLOGY | Facility: CLINIC | Age: 78
End: 2023-08-31
Payer: MEDICARE

## 2023-08-31 ENCOUNTER — TELEPHONE (OUTPATIENT)
Dept: CARDIOLOGY | Facility: CLINIC | Age: 78
End: 2023-08-31
Payer: MEDICARE

## 2023-08-31 VITALS
SYSTOLIC BLOOD PRESSURE: 140 MMHG | DIASTOLIC BLOOD PRESSURE: 80 MMHG | WEIGHT: 137.88 LBS | OXYGEN SATURATION: 99 % | HEIGHT: 65 IN | HEART RATE: 84 BPM | BODY MASS INDEX: 22.97 KG/M2

## 2023-08-31 DIAGNOSIS — I25.10 CORONARY ARTERY DISEASE INVOLVING NATIVE CORONARY ARTERY OF NATIVE HEART WITHOUT ANGINA PECTORIS: Primary | ICD-10-CM

## 2023-08-31 DIAGNOSIS — E78.00 PURE HYPERCHOLESTEROLEMIA: ICD-10-CM

## 2023-08-31 DIAGNOSIS — R00.2 PALPITATIONS: Primary | ICD-10-CM

## 2023-08-31 DIAGNOSIS — R00.2 PALPITATIONS: ICD-10-CM

## 2023-08-31 DIAGNOSIS — G20.A1 PARKINSON DISEASE: ICD-10-CM

## 2023-08-31 DIAGNOSIS — I27.20 PULMONARY HTN: ICD-10-CM

## 2023-08-31 DIAGNOSIS — Z98.61 POST PTCA: ICD-10-CM

## 2023-08-31 DIAGNOSIS — I10 ESSENTIAL HYPERTENSION: ICD-10-CM

## 2023-08-31 PROCEDURE — 1100F PTFALLS ASSESS-DOCD GE2>/YR: CPT | Mod: CPTII,S$GLB,, | Performed by: INTERNAL MEDICINE

## 2023-08-31 PROCEDURE — 1159F MED LIST DOCD IN RCRD: CPT | Mod: CPTII,S$GLB,, | Performed by: INTERNAL MEDICINE

## 2023-08-31 PROCEDURE — 99214 PR OFFICE/OUTPT VISIT, EST, LEVL IV, 30-39 MIN: ICD-10-PCS | Mod: S$GLB,,, | Performed by: INTERNAL MEDICINE

## 2023-08-31 PROCEDURE — 1100F PR PT FALLS ASSESS DOC 2+ FALLS/FALL W/INJURY/YR: ICD-10-PCS | Mod: CPTII,S$GLB,, | Performed by: INTERNAL MEDICINE

## 2023-08-31 PROCEDURE — 93005 EKG 12-LEAD: ICD-10-PCS | Mod: S$GLB,,, | Performed by: INTERNAL MEDICINE

## 2023-08-31 PROCEDURE — 93010 ELECTROCARDIOGRAM REPORT: CPT | Mod: S$GLB,,, | Performed by: INTERNAL MEDICINE

## 2023-08-31 PROCEDURE — 3288F PR FALLS RISK ASSESSMENT DOCUMENTED: ICD-10-PCS | Mod: CPTII,S$GLB,, | Performed by: INTERNAL MEDICINE

## 2023-08-31 PROCEDURE — 99999 PR PBB SHADOW E&M-EST. PATIENT-LVL IV: CPT | Mod: PBBFAC,,, | Performed by: INTERNAL MEDICINE

## 2023-08-31 PROCEDURE — 3077F SYST BP >= 140 MM HG: CPT | Mod: CPTII,S$GLB,, | Performed by: INTERNAL MEDICINE

## 2023-08-31 PROCEDURE — 3079F DIAST BP 80-89 MM HG: CPT | Mod: CPTII,S$GLB,, | Performed by: INTERNAL MEDICINE

## 2023-08-31 PROCEDURE — 3288F FALL RISK ASSESSMENT DOCD: CPT | Mod: CPTII,S$GLB,, | Performed by: INTERNAL MEDICINE

## 2023-08-31 PROCEDURE — 1125F AMNT PAIN NOTED PAIN PRSNT: CPT | Mod: CPTII,S$GLB,, | Performed by: INTERNAL MEDICINE

## 2023-08-31 PROCEDURE — 3079F PR MOST RECENT DIASTOLIC BLOOD PRESSURE 80-89 MM HG: ICD-10-PCS | Mod: CPTII,S$GLB,, | Performed by: INTERNAL MEDICINE

## 2023-08-31 PROCEDURE — 99214 OFFICE O/P EST MOD 30 MIN: CPT | Mod: S$GLB,,, | Performed by: INTERNAL MEDICINE

## 2023-08-31 PROCEDURE — 1125F PR PAIN SEVERITY QUANTIFIED, PAIN PRESENT: ICD-10-PCS | Mod: CPTII,S$GLB,, | Performed by: INTERNAL MEDICINE

## 2023-08-31 PROCEDURE — 3077F PR MOST RECENT SYSTOLIC BLOOD PRESSURE >= 140 MM HG: ICD-10-PCS | Mod: CPTII,S$GLB,, | Performed by: INTERNAL MEDICINE

## 2023-08-31 PROCEDURE — 93005 ELECTROCARDIOGRAM TRACING: CPT | Mod: S$GLB,,, | Performed by: INTERNAL MEDICINE

## 2023-08-31 PROCEDURE — 99999 PR PBB SHADOW E&M-EST. PATIENT-LVL IV: ICD-10-PCS | Mod: PBBFAC,,, | Performed by: INTERNAL MEDICINE

## 2023-08-31 PROCEDURE — 1159F PR MEDICATION LIST DOCUMENTED IN MEDICAL RECORD: ICD-10-PCS | Mod: CPTII,S$GLB,, | Performed by: INTERNAL MEDICINE

## 2023-08-31 PROCEDURE — 93010 EKG 12-LEAD: ICD-10-PCS | Mod: S$GLB,,, | Performed by: INTERNAL MEDICINE

## 2023-08-31 NOTE — PROGRESS NOTES
Subjective:    Patient ID:  Hector Kellogg is a 78 y.o. male who presents for CV evaluation for cystoscope and stent palcement    HPI  The patient is a 78 year old male last seen in cardiology by Dr Salinas 6/16/19. He has a history of CAD post NSTEMI in 2006 and post PCI to OM1 and RCA. He has a second PCI 2015. He has progressive parkinson's disease, hyperlipidemia , hypertension and COPD with use of supplement O2. He is follwed by Dr Cullen with ureterolithiasis and a cystoscope with ureteral stent placement is planned. He is not very mobile and has SOB related sandi COPD. He denies chest pain.EKG today reveals RBBB a changed for 2021  Lab Results   Component Value Date     08/06/2023    K 4.2 08/06/2023     08/06/2023    CO2 25 08/06/2023    BUN 17 08/06/2023    CREATININE 0.9 08/06/2023    GLU 96 08/06/2023    HGBA1C 5.7 (H) 07/14/2023    MG 2.1 12/31/2016    AST 22 08/06/2023    ALT 9 (L) 08/06/2023    ALBUMIN 3.7 08/06/2023    PROT 7.0 08/06/2023    BILITOT 0.9 08/06/2023    WBC 6.44 08/06/2023    HGB 14.9 08/06/2023    HCT 45.9 08/06/2023    MCV 98 08/06/2023     08/06/2023    INR 1.0 01/13/2021    PSA 6.2 (H) 07/14/2023    TSH 1.593 07/14/2023         Lab Results   Component Value Date    CHOL 161 07/14/2023    HDL 46 07/14/2023    TRIG 102 07/14/2023       Lab Results   Component Value Date    LDLCALC 94.6 07/14/2023       Past Medical History:   Diagnosis Date    Cataract     Chronic back pain greater than 3 months duration     Coronary artery disease     3 stents    DDD (degenerative disc disease), lumbar 5/16/2020    Glaucoma suspect of both eyes     Hyperlipidemia     Hypertension     MI, old 2006    Parkinson disease     Renal disorder     Kidney stones       Current Outpatient Medications:     albuterol (PROVENTIL/VENTOLIN HFA) 90 mcg/actuation inhaler, Inhale 2 puffs into the lungs every 6 (six) hours as needed for Wheezing or Shortness of Breath. Rescue, Disp: 18 g, Rfl: 6     BREZTRI AEROSPHERE 160-9-4.8 mcg/actuation HFAA, Inhale 2 puffs into the lungs 2 (two) times a day., Disp: 10.7 g, Rfl: 11    carbidopa-levodopa  mg (SINEMET)  mg per tablet, Take 1.5 tablets by mouth 5 (five) times daily., Disp: 675 tablet, Rfl: 3    citalopram (CELEXA) 20 MG tablet, Take 1 tablet by mouth once daily, Disp: 30 tablet, Rfl: 11    finasteride (PROSCAR) 5 mg tablet, Take 1 tablet (5 mg total) by mouth once daily., Disp: 30 tablet, Rfl: 11    ibuprofen (ADVIL,MOTRIN) 800 MG tablet, Take 800 mg by mouth every 6 (six) hours as needed., Disp: , Rfl:     loratadine (CLARITIN) 10 mg tablet, Take 10 mg by mouth as needed for Allergies., Disp: , Rfl:     metoprolol succinate (TOPROL-XL) 50 MG 24 hr tablet, Take 1 tablet by mouth once daily, Disp: 90 tablet, Rfl: 3    multivitamin capsule, Take 1 capsule by mouth once daily., Disp: , Rfl:     rosuvastatin (CRESTOR) 40 MG Tab, TAKE 1 TABLET BY MOUTH NIGHTLY AT BEDTIME, Disp: 90 tablet, Rfl: 3    aspirin (ECOTRIN) 81 MG EC tablet, Take 81 mg by mouth once daily., Disp: , Rfl:           Review of Systems   Constitutional: Negative for decreased appetite, diaphoresis, fever, malaise/fatigue, weight gain and weight loss.   HENT:  Negative for congestion, ear discharge, ear pain and nosebleeds.    Eyes:  Negative for blurred vision, double vision and visual disturbance.   Cardiovascular:  Positive for dyspnea on exertion. Negative for chest pain, claudication, cyanosis, irregular heartbeat, leg swelling, near-syncope, orthopnea, palpitations, paroxysmal nocturnal dyspnea and syncope.   Respiratory:  Positive for shortness of breath. Negative for cough, hemoptysis, sleep disturbances due to breathing, snoring, sputum production and wheezing.    Endocrine: Negative for polydipsia, polyphagia and polyuria.   Hematologic/Lymphatic: Negative for adenopathy and bleeding problem. Does not bruise/bleed easily.   Skin:  Negative for color change, nail changes,  "poor wound healing and rash.   Musculoskeletal:  Negative for muscle cramps and muscle weakness.   Gastrointestinal:  Negative for abdominal pain, anorexia, change in bowel habit, hematochezia, nausea and vomiting.   Genitourinary:  Negative for dysuria, frequency and hematuria.   Neurological:  Negative for brief paralysis, difficulty with concentration, excessive daytime sleepiness, dizziness, focal weakness, headaches, light-headedness, seizures, vertigo and weakness.   Psychiatric/Behavioral:  Negative for altered mental status and depression.    Allergic/Immunologic: Negative for persistent infections.        Objective:BP (!) 140/80   Pulse 84   Ht 5' 5" (1.651 m)   Wt 62.5 kg (137 lb 14.4 oz)   SpO2 99%   BMI 22.95 kg/m²             Physical Exam  Constitutional:       Appearance: He is well-developed. He is ill-appearing.   HENT:      Head: Normocephalic.      Right Ear: External ear normal.      Left Ear: External ear normal.      Nose: Nose normal.   Eyes:      General: No scleral icterus.     Pupils: Pupils are equal, round, and reactive to light.   Neck:      Thyroid: No thyromegaly.      Vascular: No JVD.      Trachea: No tracheal deviation.   Cardiovascular:      Rate and Rhythm: Normal rate and regular rhythm.      Pulses: Intact distal pulses.           Carotid pulses are 2+ on the right side.       Dorsalis pedis pulses are 0 on the right side and 0 on the left side.        Posterior tibial pulses are 1+ on the right side and 1+ on the left side.      Heart sounds: No murmur heard.     No friction rub. No gallop.   Pulmonary:      Effort: Pulmonary effort is normal.      Breath sounds: Normal breath sounds.   Abdominal:      General: Bowel sounds are normal. There is no distension.      Tenderness: There is no abdominal tenderness. There is no guarding.   Musculoskeletal:         General: No tenderness. Normal range of motion.      Cervical back: Normal range of motion and neck supple. "   Lymphadenopathy:      Comments: Palpation of neck and groin lymph nodes normal   Skin:     General: Skin is dry.      Comments: Palpation of skin normal   Neurological:      Mental Status: He is alert and oriented to person, place, and time.      Cranial Nerves: No cranial nerve deficit.      Motor: No abnormal muscle tone.      Coordination: Coordination normal.   Psychiatric:         Behavior: Behavior normal.         Thought Content: Thought content normal.         Judgment: Judgment normal.           Assessment:       1. Coronary artery disease involving native coronary artery of native heart without angina pectoris    2. Parkinson disease    3. Pure hypercholesterolemia    4. Essential hypertension    5. Pulmonary HTN    6. Post PTCA         Plan:       Hector was seen today for pre-op exam.    Diagnoses and all orders for this visit:    Coronary artery disease involving native coronary artery of native heart without angina pectoris    Parkinson disease    Pure hypercholesterolemia    Essential hypertension    Pulmonary HTN    Post PTCA    Acceptable CV risk for planned cystoscope and and ureteral  stent placement

## 2023-09-12 ENCOUNTER — TELEPHONE (OUTPATIENT)
Dept: UROLOGY | Facility: CLINIC | Age: 78
End: 2023-09-12
Payer: MEDICARE

## 2023-09-12 NOTE — TELEPHONE ENCOUNTER
Called pt to confirm arrival time of 12:15am for procedure on 9/13/23. Gave pt NPO instructions and gave pt opportunity to ask questions. Pt verbalized understanding.

## 2023-09-13 ENCOUNTER — ANESTHESIA EVENT (OUTPATIENT)
Dept: SURGERY | Facility: HOSPITAL | Age: 78
End: 2023-09-13
Payer: MEDICARE

## 2023-09-13 ENCOUNTER — ANESTHESIA (OUTPATIENT)
Dept: SURGERY | Facility: HOSPITAL | Age: 78
End: 2023-09-13
Payer: MEDICARE

## 2023-09-13 ENCOUNTER — HOSPITAL ENCOUNTER (OUTPATIENT)
Facility: HOSPITAL | Age: 78
Discharge: HOME | End: 2023-09-13
Attending: UROLOGY | Admitting: UROLOGY
Payer: MEDICARE

## 2023-09-13 VITALS
RESPIRATION RATE: 20 BRPM | TEMPERATURE: 98 F | BODY MASS INDEX: 23.56 KG/M2 | HEIGHT: 64 IN | OXYGEN SATURATION: 97 % | HEART RATE: 90 BPM | WEIGHT: 138 LBS | SYSTOLIC BLOOD PRESSURE: 139 MMHG | DIASTOLIC BLOOD PRESSURE: 75 MMHG

## 2023-09-13 DIAGNOSIS — N20.9 UROLITHIASIS: Primary | ICD-10-CM

## 2023-09-13 PROCEDURE — 36000706: Performed by: UROLOGY

## 2023-09-13 PROCEDURE — 52332 CYSTOSCOPY AND TREATMENT: CPT | Mod: 51,LT,, | Performed by: UROLOGY

## 2023-09-13 PROCEDURE — D9220A PRA ANESTHESIA: ICD-10-PCS | Mod: CRNA,,, | Performed by: NURSE ANESTHETIST, CERTIFIED REGISTERED

## 2023-09-13 PROCEDURE — 36000707: Performed by: UROLOGY

## 2023-09-13 PROCEDURE — 71000044 HC DOSC ROUTINE RECOVERY FIRST HOUR: Performed by: UROLOGY

## 2023-09-13 PROCEDURE — D9220A PRA ANESTHESIA: ICD-10-PCS | Mod: ANES,,, | Performed by: ANESTHESIOLOGY

## 2023-09-13 PROCEDURE — 50590 PR FRAGMENT KIDNEY STONE/ ESWL: ICD-10-PCS | Mod: LT,,, | Performed by: UROLOGY

## 2023-09-13 PROCEDURE — 63600175 PHARM REV CODE 636 W HCPCS: Performed by: STUDENT IN AN ORGANIZED HEALTH CARE EDUCATION/TRAINING PROGRAM

## 2023-09-13 PROCEDURE — D9220A PRA ANESTHESIA: Mod: CRNA,,, | Performed by: NURSE ANESTHETIST, CERTIFIED REGISTERED

## 2023-09-13 PROCEDURE — 50590 FRAGMENTING OF KIDNEY STONE: CPT | Mod: LT,,, | Performed by: UROLOGY

## 2023-09-13 PROCEDURE — C2617 STENT, NON-COR, TEM W/O DEL: HCPCS | Performed by: UROLOGY

## 2023-09-13 PROCEDURE — 37000008 HC ANESTHESIA 1ST 15 MINUTES: Performed by: UROLOGY

## 2023-09-13 PROCEDURE — 52332 PR CYSTOSCOPY,INSERT URETERAL STENT: ICD-10-PCS | Mod: 51,LT,, | Performed by: UROLOGY

## 2023-09-13 PROCEDURE — 71000015 HC POSTOP RECOV 1ST HR: Performed by: UROLOGY

## 2023-09-13 PROCEDURE — D9220A PRA ANESTHESIA: Mod: ANES,,, | Performed by: ANESTHESIOLOGY

## 2023-09-13 PROCEDURE — 25000003 PHARM REV CODE 250: Performed by: STUDENT IN AN ORGANIZED HEALTH CARE EDUCATION/TRAINING PROGRAM

## 2023-09-13 PROCEDURE — 71000016 HC POSTOP RECOV ADDL HR: Performed by: UROLOGY

## 2023-09-13 PROCEDURE — 37000009 HC ANESTHESIA EA ADD 15 MINS: Performed by: UROLOGY

## 2023-09-13 PROCEDURE — C1769 GUIDE WIRE: HCPCS | Performed by: UROLOGY

## 2023-09-13 DEVICE — STENT URETERAL UNIV 6FR 24CM: Type: IMPLANTABLE DEVICE | Site: URETER | Status: FUNCTIONAL

## 2023-09-13 RX ORDER — DEXAMETHASONE SODIUM PHOSPHATE 4 MG/ML
INJECTION, SOLUTION INTRA-ARTICULAR; INTRALESIONAL; INTRAMUSCULAR; INTRAVENOUS; SOFT TISSUE
Status: DISCONTINUED | OUTPATIENT
Start: 2023-09-13 | End: 2023-09-13

## 2023-09-13 RX ORDER — ONDANSETRON 2 MG/ML
INJECTION INTRAMUSCULAR; INTRAVENOUS
Status: DISCONTINUED | OUTPATIENT
Start: 2023-09-13 | End: 2023-09-13

## 2023-09-13 RX ORDER — CEFAZOLIN SODIUM 1 G/3ML
INJECTION, POWDER, FOR SOLUTION INTRAMUSCULAR; INTRAVENOUS
Status: DISCONTINUED | OUTPATIENT
Start: 2023-09-13 | End: 2023-09-13

## 2023-09-13 RX ORDER — SODIUM CHLORIDE 0.9 % (FLUSH) 0.9 %
3 SYRINGE (ML) INJECTION
Status: DISCONTINUED | OUTPATIENT
Start: 2023-09-13 | End: 2023-09-13 | Stop reason: HOSPADM

## 2023-09-13 RX ORDER — DOXYCYCLINE HYCLATE 100 MG
100 TABLET ORAL ONCE
Status: COMPLETED | OUTPATIENT
Start: 2023-09-13 | End: 2023-09-28

## 2023-09-13 RX ORDER — FENTANYL CITRATE 50 UG/ML
25 INJECTION, SOLUTION INTRAMUSCULAR; INTRAVENOUS EVERY 5 MIN PRN
Status: DISCONTINUED | OUTPATIENT
Start: 2023-09-13 | End: 2023-09-13 | Stop reason: HOSPADM

## 2023-09-13 RX ORDER — PROPOFOL 10 MG/ML
VIAL (ML) INTRAVENOUS
Status: DISCONTINUED | OUTPATIENT
Start: 2023-09-13 | End: 2023-09-13

## 2023-09-13 RX ORDER — ROCURONIUM BROMIDE 10 MG/ML
INJECTION, SOLUTION INTRAVENOUS
Status: DISCONTINUED | OUTPATIENT
Start: 2023-09-13 | End: 2023-09-13

## 2023-09-13 RX ORDER — LIDOCAINE HYDROCHLORIDE 20 MG/ML
JELLY TOPICAL ONCE
Status: COMPLETED | OUTPATIENT
Start: 2023-09-13 | End: 2023-09-28

## 2023-09-13 RX ORDER — TAMSULOSIN HYDROCHLORIDE 0.4 MG/1
0.4 CAPSULE ORAL NIGHTLY
Qty: 30 CAPSULE | Refills: 0 | Status: SHIPPED | OUTPATIENT
Start: 2023-09-13 | End: 2023-09-15

## 2023-09-13 RX ORDER — FENTANYL CITRATE 50 UG/ML
INJECTION, SOLUTION INTRAMUSCULAR; INTRAVENOUS
Status: DISCONTINUED | OUTPATIENT
Start: 2023-09-13 | End: 2023-09-13

## 2023-09-13 RX ORDER — OXYBUTYNIN CHLORIDE 5 MG/1
5 TABLET ORAL 3 TIMES DAILY PRN
Qty: 90 TABLET | Refills: 0 | Status: SHIPPED | OUTPATIENT
Start: 2023-09-13 | End: 2023-12-14

## 2023-09-13 RX ORDER — LIDOCAINE HYDROCHLORIDE 20 MG/ML
INJECTION, SOLUTION EPIDURAL; INFILTRATION; INTRACAUDAL; PERINEURAL
Status: DISCONTINUED | OUTPATIENT
Start: 2023-09-13 | End: 2023-09-13

## 2023-09-13 RX ORDER — PHENAZOPYRIDINE HYDROCHLORIDE 200 MG/1
200 TABLET, FILM COATED ORAL 3 TIMES DAILY PRN
Qty: 90 TABLET | Refills: 0 | Status: SHIPPED | OUTPATIENT
Start: 2023-09-13 | End: 2023-10-13

## 2023-09-13 RX ADMIN — ONDANSETRON 4 MG: 2 INJECTION INTRAMUSCULAR; INTRAVENOUS at 02:09

## 2023-09-13 RX ADMIN — PROPOFOL 170 MG: 10 INJECTION, EMULSION INTRAVENOUS at 02:09

## 2023-09-13 RX ADMIN — CEFAZOLIN 2 G: 330 INJECTION, POWDER, FOR SOLUTION INTRAMUSCULAR; INTRAVENOUS at 02:09

## 2023-09-13 RX ADMIN — SUGAMMADEX 200 MG: 100 INJECTION, SOLUTION INTRAVENOUS at 02:09

## 2023-09-13 RX ADMIN — ROCURONIUM BROMIDE 50 MG: 10 INJECTION, SOLUTION INTRAVENOUS at 02:09

## 2023-09-13 RX ADMIN — SODIUM CHLORIDE: 0.9 INJECTION, SOLUTION INTRAVENOUS at 01:09

## 2023-09-13 RX ADMIN — DEXAMETHASONE SODIUM PHOSPHATE 4 MG: 4 INJECTION, SOLUTION INTRAMUSCULAR; INTRAVENOUS at 02:09

## 2023-09-13 RX ADMIN — FENTANYL CITRATE 100 MCG: 50 INJECTION, SOLUTION INTRAMUSCULAR; INTRAVENOUS at 02:09

## 2023-09-13 RX ADMIN — LIDOCAINE HYDROCHLORIDE 100 MG: 20 INJECTION, SOLUTION EPIDURAL; INFILTRATION; INTRACAUDAL; PERINEURAL at 02:09

## 2023-09-13 NOTE — ANESTHESIA PROCEDURE NOTES
Intubation    Date/Time: 9/13/2023 2:03 PM    Performed by: Yecenia Park CRNA  Authorized by: Sharath Barbosa MD    Intubation:     Induction:  Intravenous    Intubated:  Postinduction    Mask Ventilation:  Easy mask    Attempts:  1    Attempted By:  CRNA    Method of Intubation:  Direct    Blade:  Taylor 2    Laryngeal View Grade: Grade I - full view of cords      Difficult Airway Encountered?: No      Complications:  None    Airway Device:  Oral endotracheal tube    Airway Device Size:  7.5    Style/Cuff Inflation:  Cuffed    Secured at:  The lips    Placement Verified By:  Capnometry    Complicating Factors:  None    Findings Post-Intubation:  BS equal bilateral and atraumatic/condition of teeth unchanged

## 2023-09-13 NOTE — TRANSFER OF CARE
"Anesthesia Transfer of Care Note    Patient: Hector Kellogg    Procedure(s) Performed: Procedure(s) (LRB):  CYSTOSCOPY, WITH URETERAL STENT PLACEMENT (Left)  LITHOTRIPSY, ESWL (Left)    Patient location: PACU    Anesthesia Type: general    Transport from OR: Transported from OR on 6-10 L/min O2 by face mask with adequate spontaneous ventilation    Post pain: adequate analgesia    Post assessment: no apparent anesthetic complications and tolerated procedure well    Post vital signs: stable    Level of consciousness: awake and alert    Nausea/Vomiting: no nausea/vomiting    Complications: none    Transfer of care protocol was followed      Last vitals:   Visit Vitals  /89   Pulse 86   Temp 36.6 °C (97.9 °F) (Temporal)   Resp 20   Ht 5' 4" (1.626 m)   Wt 62.6 kg (138 lb)   SpO2 100%   BMI 23.69 kg/m²     "

## 2023-09-13 NOTE — INTERVAL H&P NOTE
The patient has been examined and the H&P has been reviewed:    I concur with the findings and no changes have occurred since H&P was written.    Surgery risks, benefits and alternative options discussed and understood by patient/family.      Interval h&p reviewed and unchanged from previous encounter. Urine dipped: positive for protein .    The patient has stopped all blood thinners, including aspirin for 7 days.    Patient consented and would like to proceed with the procedure.      Active Hospital Problems    Diagnosis  POA    *Nephrolithiasis [N20.0]  Yes      Resolved Hospital Problems   No resolved problems to display.

## 2023-09-13 NOTE — OP NOTE
[facility]  Operative Note    Date: 09/13/2023    Pre-Op Diagnosis: Left renal stone    Post-Op Diagnosis: same    Procedure(s) Performed:   1.  Left ESWL    Specimen(s): none    Staff Surgeon: Jerel Cullen MD    Assistant Surgeon: Jorge Bhatt MD    Anesthesia: Monitored Local Anesthesia with Sedation    Indications: Hector Kellogg is a 78 y.o. male with a  left renal stone presenting for definitive stone management.  The stone is radio-opaque on KUB.      Findings:   Left ureteral stent 6x24cm without strings confirmed on KUB; Good position at end of case  Decrease in density of UPJ stone with 3000 shocks delivered    Estimated Blood Loss: none    Drains: 6 Fr x 24 cm left ureteral stent without strings    Procedure in Detail:  After risk, benefits, and possible complications of ESWL were discussed, the patient elected to undergo the procedure and informed consent was obtained. All questions were answered in the pre-operative area and the patient was transferred to the lithotripsy suite and placed on the lithotriptor table. SCDs were applied and working.  Time out was preformed, raven-procedural antibiotics were administered.    A 14 Fr flexible cystoscope was assembled and passed per urethra into the bladder. There were no strictures or masses seen. The bilateral Uos were seen in their normal anatomic position. A motion wire was passed via the working channel and into the left renal pelvis. This was confirmed on fluoroscopy.    A 6 Fr x 24 cm stent was inserted over the wire. A 5 Fr open-tipped ureteral catheter was used to advance the stent into the renal pelvis. The wire and 5 Fr ureteral catheter were removed. A 180 degree coil was seen in the kidney on fluoro. A 180 degree coil was seen within the bladder lumen via the cystoscope. The cystoscope was removed. A 16 Fr Corrales was inserted via standard sterile technique per urethra with return of clear yellow urine. This was removed at  the conclusion of the case, at which time it was draining light pink urine.    The patient's Left-sided stone was aligned on the X-Y- and Z axis.  MAC anesthesia was administered.  When the patient was adequately sedated, 3000 thousand shocks were administered at a rate of 1.5 shocks per second, beginning at 16 KV of power and advanced to 26 KV. Intermittent fluoroscopy was used to monitor fragmentation of the stone.    At the end of the procedure the stone was decreased significantly in density. The proximal ureteral coil remained within the renal pelvis.      The patient tolerated the procedure well and was transferred to the PACU in stable condition.      Plan: The patient will follow up with Dr. Cullen in 2 weeks with a KUB prior.  The patient was given prescriptions for flomax, ditropan and pyridium.  The patient will strain all urine and bring any fragments to the follow up appt for stone analysis.      Jorge Bhatt MD

## 2023-09-13 NOTE — ANESTHESIA PREPROCEDURE EVALUATION
09/13/2023  [facility]  Anesthesia Pre-Operative Evaluation         Patient Name: Hector Kellogg  YOB: 1945  MRN: [id number]    SUBJECTIVE:     Pre-operative evaluation for Procedure(s) (LRB):  CYSTOSCOPY, WITH URETERAL STENT PLACEMENT (Left)  LITHOTRIPSY, ESWL (Left)     09/13/2023    Hector Kellogg is a 78 y.o. male w/ a pertinent PMHx of CAD (PCI 2015? ASA 81 only), moderate COPD (Breztri, albuterol),  Parkinson's (took am does of carbidopa-levodopa but has missed the last two doses).     Full medical history listed below      LDA: None documented.    Prev airway: None documented.     GTTs: None documented.        Patient Active Problem List   Diagnosis    Coronary artery disease    Hyperlipidemia    Essential hypertension    Neuropathic pain    Parkinson disease    Anxiety    Chronic low back pain    Pulmonary HTN    Centrilobular emphysema    Exercise hypoxemia    Glaucoma suspect of both eyes    NS (nuclear sclerosis), left    Refractive error    Senile nuclear sclerosis    Post-operative state    Claudication    Nephrolithiasis    Lumbosacral radiculopathy    DDD (degenerative disc disease), lumbar    Spondylosis without myelopathy    Chronic pain    Lung nodules    Osteoarthritis of spine    Spondylolisthesis of lumbar region    Gross hematuria    Sacroiliitis    Thyroid nodule    Aortic atherosclerosis       Review of patient's allergies indicates:   Allergen Reactions    Influenza virus vaccines Other (See Comments)     Caused fever, chills, and made tremors worse       Current Inpatient Medications:      No current facility-administered medications on file prior to encounter.     Current Outpatient Medications on File Prior to Encounter   Medication Sig Dispense Refill    albuterol (PROVENTIL/VENTOLIN HFA) 90 mcg/actuation  inhaler Inhale 2 puffs into the lungs every 6 (six) hours as needed for Wheezing or Shortness of Breath. Rescue 18 g 6    aspirin (ECOTRIN) 81 MG EC tablet Take 81 mg by mouth once daily.      BREZTRI AEROSPHERE 160-9-4.8 mcg/actuation HFAA Inhale 2 puffs into the lungs 2 (two) times a day. 10.7 g 11    carbidopa-levodopa  mg (SINEMET)  mg per tablet Take 1.5 tablets by mouth 5 (five) times daily. 675 tablet 3    citalopram (CELEXA) 20 MG tablet Take 1 tablet by mouth once daily 30 tablet 11    loratadine (CLARITIN) 10 mg tablet Take 10 mg by mouth as needed for Allergies.      metoprolol succinate (TOPROL-XL) 50 MG 24 hr tablet Take 1 tablet by mouth once daily 90 tablet 3    rosuvastatin (CRESTOR) 40 MG Tab TAKE 1 TABLET BY MOUTH NIGHTLY AT BEDTIME 90 tablet 3    ibuprofen (ADVIL,MOTRIN) 800 MG tablet Take 800 mg by mouth every 6 (six) hours as needed.      multivitamin capsule Take 1 capsule by mouth once daily.         Past Surgical History:   Procedure Laterality Date    CARDIAC CATHETERIZATION  2006    x 3    CARDIAC CATHETERIZATION  10/11/2015    x 2    CATARACT EXTRACTION W/  INTRAOCULAR LENS IMPLANT Right 03/06/2018    Dr. Liu    CORONARY ANGIOPLASTY  2006, 2015    3 stents    EXTRACORPOREAL SHOCK WAVE LITHOTRIPSY Left 4/20/2021    Procedure: LITHOTRIPSY-EXTRACORPOREAL SHOCK WAVE;  Surgeon: Ottoniel Obando MD;  Location: [facility] OR;  Service: Urology;  Laterality: Left;    EYE SURGERY      cataracts bilateral    HEMORRHOID SURGERY      INJECTION OF ANESTHETIC AGENT AROUND NERVE Bilateral 11/4/2020    Procedure: BLOCK, NERVE BILATERAL L2,3,4,5;  Surgeon: Ramiro Calvillo MD;  Location: [facility] PAIN MGT;  Service: Pain Management;  Laterality: Bilateral;  BLOCK, NERVE BILATERAL L2,3,4,5    INJECTION OF FACET JOINT Right 8/5/2020    Procedure: INJECTION, FACET JOINT, L4-L5 , L5-S1;  Surgeon: Ramiro Calvillo MD;  Location: [facility] PAIN MGT;  Service: Pain Management;   "Laterality: Right;    LAMINOFORAMINOTOMY OF SPINE USING MINIMALLY INVASIVE TECHNIQUE N/A 2/1/2021    Procedure: MIS L4-5 Laminectomy;  Surgeon: Bernard Sheldon MD;  Location: [facility] OR;  Service: Neurosurgery;  Laterality: N/A;  Anesthesia: General  Nerve Monitoring: EMG/SEP  Position: Prone  Bed: Eliot Frame on andres  Headrest: Prone View  Radiology: C-arm  Misc: Microscope, microinstruments    TRANSFORAMINAL EPIDURAL INJECTION OF STEROID Right 5/27/2020    Procedure: INJECTION, STEROID, EPIDURAL, TRANSFORAMINAL APPROACH;  Surgeon: Ramiro Calvillo MD;  Location: [facility] PAIN MGT;  Service: Pain Management;  Laterality: Right;  Right TF ASHISH L4-L5, L5-S1    PTcannot come earlier than 12:30    TRANSFORAMINAL EPIDURAL INJECTION OF STEROID Right 9/9/2020    Procedure: INJECTION, STEROID, EPIDURAL, TRANSFORAMINAL APPROACH;  Surgeon: Ramiro Calvillo MD;  Location: [facility] PAIN MGT;  Service: Pain Management;  Laterality: Right;  RIGHT TF ASHISH L4/5 and L5/S1           OBJECTIVE:     Vital Signs Range (Last 24H):  Temp:  [37.2 °C (99 °F)]   Pulse:  [83]   Resp:  [18]   BP: (173)/(91)   SpO2:  [96 %]       CBC:   No results for input(s): "WBC", "RBC", "HGB", "HCT", "PLT", "MCV", "MCH", "MCHC" in the last 72 hours.    CMP: No results for input(s): "NA", "K", "CL", "CO2", "BUN", "CREATININE", "GLU", "MG", "PHOS", "CALCIUM", "ALBUMIN", "PROT", "ALKPHOS", "ALT", "AST", "BILITOT" in the last 72 hours.    INR:  No results for input(s): "PT", "INR", "PROTIME", "APTT" in the last 72 hours.    Diagnostic Studies: No relevant studies.    EKG:   Results for orders placed or performed in visit on 08/31/23   SCHEDULED EKG 12-LEAD (to Muse)    Collection Time: 08/31/23 11:10 AM    Narrative    Test Reason : R00.2,    Vent. Rate : 071 BPM     Atrial Rate : 071 BPM     P-R Int : 126 ms          QRS Dur : 122 ms      QT Int : 410 ms       P-R-T Axes : 064 049 051 degrees     QTc Int : 445 ms    Normal sinus rhythm  Right bundle branch " block  When compared with ECG of 13-JAN-2021 10:52,  Right bundle branch block is now Present  Confirmed by Alek Arthur MD ([id number]) on 9/5/2023 11:06:27 AM    Referred By: [email or web address]   SELF           Confirmed By:Alek Arthur MD        2D ECHO:   No results found for this or any previous visit.         ASSESSMENT/PLAN:           Pre-op Assessment    I have reviewed the Patient Summary Reports.    I have reviewed the NPO Status.   I have reviewed the Medications.     Review of Systems  Anesthesia Hx:  History of prior surgery of interest to airway management or planning: Denies Family Hx of Anesthesia complications.   Denies Personal Hx of Anesthesia complications.       Physical Exam  General: Well nourished, Cooperative, Alert and Oriented    Airway:  Mallampati: III   Mouth Opening: Normal  TM Distance: Normal  Tongue: Normal  Neck ROM: Normal ROM    Dental:  Intact    Chest/Lungs:  Normal Respiratory Rate  expiratory wheezes    Heart:  Rate: Normal  Rhythm: Regular Rhythm        Anesthesia Plan  Type of Anesthesia, risks & benefits discussed:    Anesthesia Type: Gen ETT  Intra-op Monitoring Plan: Standard ASA Monitors  Post Op Pain Control Plan: multimodal analgesia and IV/PO Opioids PRN  Induction:  IV  Airway Plan: Video  Informed Consent: Informed consent signed with the Patient and all parties understand the risks and agree with anesthesia plan.  All questions answered.   ASA Score: 3  Day of Surgery Review of History & Physical: H&P Update referred to the surgeon/provider.    Ready For Surgery From Anesthesia Perspective.     .

## 2023-09-13 NOTE — DISCHARGE SUMMARY
[facility] - [facility] (2nd Fl)  Discharge Note  Short Stay    Procedure(s) (LRB):  CYSTOSCOPY, WITH URETERAL STENT PLACEMENT (Left)  LITHOTRIPSY, ESWL (Left)      OUTCOME: Patient tolerated treatment/procedure well without complication and is now ready for discharge.    DISPOSITION: Home or Self Care    FINAL DIAGNOSIS:  Nephrolithiasis    FOLLOWUP: In clinic 2 weeks with KUB prior    DISCHARGE INSTRUCTIONS:    Discharge Procedure Orders   X-Ray KUB   Standing Status: Future Standing Exp. Date: 09/13/24     Order Specific Question Answer Comments   May the Radiologist modify the order per protocol to meet the clinical needs of the patient? No    Reason: Assess left renal stone s/p ESWL; Left ureteral stent    Release to patient Immediate      Notify your health care provider if you experience any of the following:  temperature >100.4     Notify your health care provider if you experience any of the following:  persistent nausea and vomiting or diarrhea     Notify your health care provider if you experience any of the following:  severe uncontrolled pain     Notify your health care provider if you experience any of the following:  redness, tenderness, or signs of infection (pain, swelling, redness, odor or green/yellow discharge around incision site)     Notify your health care provider if you experience any of the following:  worsening rash     Notify your health care provider if you experience any of the following:  persistent dizziness, light-headedness, or visual disturbances        TIME SPENT ON DISCHARGE: 10 minutes

## 2023-09-13 NOTE — PATIENT INSTRUCTIONS
ESWL post-op instructions:  You may see some blood in your urine while the stone fragments are passing and a few days afterward. Do not be alarmed, even if the urine was clear for a while. Push fluids and refrain from strenuous activity until clearing occurs. If you have difficulty passing clots or don't improve, call us. You can also try sitting in a warm tub of water to help to urinate if needed. Avoid medications such as Aspirin, Advil, Motrin, Plavix, or Coumadin, which thin the blood and cause bleeding.  If you have never had your stones analyzed, strain all urine and bring stone fragments with you to your next appointment.  Diet:  You may return to your normal diet immediately. Alcohol, spicy foods, acidy foods and drinks with caffeine may cause irritation or frequency and should be used in moderation. To keep your urine flowing freely and to avoid constipation, drink plenty of fluids during the day (8-10 glasses).  Activity:  While the kidney is healing do not engage in strenuous activity. If you are active, you may see more blood in the urine. We would suggest cutting down your activity under these circumstances until the bleeding has stopped, perhaps two weeks.  Bowels:  It is important to keep your bowels regular during the postoperative period. Straining with bowel movements can cause bleeding. A bowel movement every other day is reasonable. Use a mild over-the-counter laxative if needed, such as Milk of Magnesia 2-3 tablespoons, or 1-2 Dulcolax tablets. Call if you continue to have problems. Narcotics can worsen constipation; if you had been taking narcotics for pain, before, during or after your surgery, you may be constipated. Ditropan for bladder spasms may also cause constipation.  Problems you should report to us:  Fevers over 101.5 degrees Fahrenheit.   Inability to urinate.   Drug reactions (hives, rash, nausea, vomiting, diarrhea)   Severe burning or pain with urination that is not improving.    You will also have some burning with urination. This is normal after stone therapy and is also expected while the stent is in place. This burning should be mild or moderate and should improve over time. Severe burning, especially when it is not improving, can be a sign of infection.  Follow-up  After the stone has been fragmented you will likely need an x-ray prior to next clinic appointment    Bring stone fragments with you to your next appt.     In some select cases it is important to not leave the string on the stent.  The stent is usually removed 1-4 weeks after treatment.    Call Urology at [phone] with any problems

## 2023-09-14 ENCOUNTER — PATIENT MESSAGE (OUTPATIENT)
Dept: UROLOGY | Facility: CLINIC | Age: 78
End: 2023-09-14
Payer: MEDICARE

## 2023-09-14 NOTE — ANESTHESIA POSTPROCEDURE EVALUATION
Anesthesia Post Evaluation    Patient: Hector Kellogg    Procedure(s) Performed: Procedure(s) (LRB):  CYSTOSCOPY, WITH URETERAL STENT PLACEMENT (Left)  LITHOTRIPSY, ESWL (Left)    Final Anesthesia Type: general      Patient location during evaluation: PACU  Patient participation: Yes- Able to Participate  Level of consciousness: awake and alert and oriented  Post-procedure vital signs: reviewed and stable  Pain management: adequate  Airway patency: patent  PILI mitigation strategies: Intraoperative administration of CPAP, nasopharyngeal airway, or oral appliance during sedation, Multimodal analgesia, Extubation while patient is awake, Verification of full reversal of neuromuscular block and Extubation and recovery carried out in lateral, semiupright, or other nonsupine position  PONV status at discharge: No PONV  Anesthetic complications: no      Cardiovascular status: hemodynamically stable  Respiratory status: unassisted  Hydration status: euvolemic  Follow-up not needed.          Vitals Value Taken Time   /79 09/13/23 1700   Temp 36.6 °C (97.9 °F) 09/13/23 1515   Pulse 92 09/13/23 1700   Resp 23 09/13/23 1700   SpO2 96 % 09/13/23 1700         No case tracking events are documented in the log.      Pain/Lis Score: Lis Score: 10 (9/13/2023  3:45 PM)

## 2023-09-15 ENCOUNTER — HOSPITAL ENCOUNTER (EMERGENCY)
Facility: HOSPITAL | Age: 78
Discharge: HOME | End: 2023-09-15
Attending: EMERGENCY MEDICINE
Payer: MEDICARE

## 2023-09-15 ENCOUNTER — HOSPITAL ENCOUNTER (OUTPATIENT)
Dept: RADIOLOGY | Facility: HOSPITAL | Age: 78
Discharge: HOME | End: 2023-09-15
Attending: UROLOGY
Payer: MEDICARE

## 2023-09-15 ENCOUNTER — OFFICE VISIT (OUTPATIENT)
Dept: UROLOGY | Facility: CLINIC | Age: 78
End: 2023-09-15
Payer: MEDICARE

## 2023-09-15 ENCOUNTER — TELEPHONE (OUTPATIENT)
Dept: UROLOGY | Facility: CLINIC | Age: 78
End: 2023-09-15

## 2023-09-15 VITALS
SYSTOLIC BLOOD PRESSURE: 138 MMHG | DIASTOLIC BLOOD PRESSURE: 76 MMHG | BODY MASS INDEX: 22.99 KG/M2 | WEIGHT: 138 LBS | HEIGHT: 65 IN | OXYGEN SATURATION: 95 % | HEART RATE: 84 BPM | RESPIRATION RATE: 16 BRPM | TEMPERATURE: 99 F

## 2023-09-15 DIAGNOSIS — N20.0 KIDNEY STONE: ICD-10-CM

## 2023-09-15 DIAGNOSIS — N30.90 CYSTITIS: Primary | ICD-10-CM

## 2023-09-15 DIAGNOSIS — N30.01 ACUTE CYSTITIS WITH HEMATURIA: ICD-10-CM

## 2023-09-15 DIAGNOSIS — Z96.0 URETERAL STENT PRESENT: ICD-10-CM

## 2023-09-15 DIAGNOSIS — N20.0 KIDNEY STONE ON LEFT SIDE: ICD-10-CM

## 2023-09-15 DIAGNOSIS — R40.4 ALTERED AWARENESS, TRANSIENT: ICD-10-CM

## 2023-09-15 DIAGNOSIS — R10.30 LOWER ABDOMINAL PAIN: Primary | ICD-10-CM

## 2023-09-15 LAB
ALBUMIN SERPL BCP-MCNC: 3.7 G/DL (ref 3.5–5.2)
ALP SERPL-CCNC: 77 U/L (ref 55–135)
ALT SERPL W/O P-5'-P-CCNC: 5 U/L (ref 10–44)
ANION GAP SERPL CALC-SCNC: 11 MMOL/L (ref 8–16)
AST SERPL-CCNC: 26 U/L (ref 10–40)
BACTERIA #/AREA URNS AUTO: ABNORMAL /HPF
BASOPHILS # BLD AUTO: 0.02 K/UL (ref 0–0.2)
BASOPHILS NFR BLD: 0.2 % (ref 0–1.9)
BILIRUB SERPL-MCNC: 1 MG/DL (ref 0.1–1)
BILIRUB UR QL STRIP: NEGATIVE
BUN SERPL-MCNC: 24 MG/DL (ref 8–23)
CALCIUM SERPL-MCNC: 10.4 MG/DL (ref 8.7–10.5)
CHLORIDE SERPL-SCNC: 103 MMOL/L (ref 95–110)
CLARITY UR REFRACT.AUTO: CLEAR
CO2 SERPL-SCNC: 28 MMOL/L (ref 23–29)
COLOR UR AUTO: ABNORMAL
CREAT SERPL-MCNC: 2 MG/DL (ref 0.5–1.4)
DIFFERENTIAL METHOD: ABNORMAL
EOSINOPHIL # BLD AUTO: 0 K/UL (ref 0–0.5)
EOSINOPHIL NFR BLD: 0.2 % (ref 0–8)
ERYTHROCYTE [DISTWIDTH] IN BLOOD BY AUTOMATED COUNT: 13.1 % (ref 11.5–14.5)
EST. GFR  (NO RACE VARIABLE): 33.5 ML/MIN/1.73 M^2
GLUCOSE SERPL-MCNC: 114 MG/DL (ref 70–110)
GLUCOSE UR QL STRIP: NEGATIVE
HCT VFR BLD AUTO: 43.5 % (ref 40–54)
HGB BLD-MCNC: 14.7 G/DL (ref 14–18)
HGB UR QL STRIP: ABNORMAL
HYALINE CASTS UR QL AUTO: 0 /LPF
IMM GRANULOCYTES # BLD AUTO: 0.03 K/UL (ref 0–0.04)
IMM GRANULOCYTES NFR BLD AUTO: 0.3 % (ref 0–0.5)
KETONES UR QL STRIP: NEGATIVE
LEUKOCYTE ESTERASE UR QL STRIP: NEGATIVE
LYMPHOCYTES # BLD AUTO: 1.2 K/UL (ref 1–4.8)
LYMPHOCYTES NFR BLD: 11.7 % (ref 18–48)
MCH RBC QN AUTO: 31.7 PG (ref 27–31)
MCHC RBC AUTO-ENTMCNC: 33.8 G/DL (ref 32–36)
MCV RBC AUTO: 94 FL (ref 82–98)
MICROSCOPIC COMMENT: ABNORMAL
MONOCYTES # BLD AUTO: 1.2 K/UL (ref 0.3–1)
MONOCYTES NFR BLD: 11.5 % (ref 4–15)
NEUTROPHILS # BLD AUTO: 7.9 K/UL (ref 1.8–7.7)
NEUTROPHILS NFR BLD: 76.1 % (ref 38–73)
NITRITE UR QL STRIP: POSITIVE
NRBC BLD-RTO: 0 /100 WBC
PH UR STRIP: 5 [PH] (ref 5–8)
PLATELET # BLD AUTO: 158 K/UL (ref 150–450)
PMV BLD AUTO: 10 FL (ref 9.2–12.9)
POTASSIUM SERPL-SCNC: 4 MMOL/L (ref 3.5–5.1)
PROT SERPL-MCNC: 7 G/DL (ref 6–8.4)
PROT UR QL STRIP: ABNORMAL
RBC # BLD AUTO: 4.63 M/UL (ref 4.6–6.2)
RBC #/AREA URNS AUTO: >100 /HPF (ref 0–4)
SODIUM SERPL-SCNC: 142 MMOL/L (ref 136–145)
SP GR UR STRIP: 1.01 (ref 1–1.03)
URN SPEC COLLECT METH UR: ABNORMAL
WBC # BLD AUTO: 10.39 K/UL (ref 3.9–12.7)
WBC #/AREA URNS AUTO: 43 /HPF (ref 0–5)

## 2023-09-15 PROCEDURE — 51700 IRRIGATION OF BLADDER: CPT | Mod: 58,S$GLB,, | Performed by: UROLOGY

## 2023-09-15 PROCEDURE — 74018 RADEX ABDOMEN 1 VIEW: CPT | Mod: TC

## 2023-09-15 PROCEDURE — 99499 NO LOS: ICD-10-PCS | Mod: S$GLB,,, | Performed by: UROLOGY

## 2023-09-15 PROCEDURE — 80053 COMPREHEN METABOLIC PANEL: CPT | Performed by: EMERGENCY MEDICINE

## 2023-09-15 PROCEDURE — 81001 URINALYSIS AUTO W/SCOPE: CPT | Performed by: EMERGENCY MEDICINE

## 2023-09-15 PROCEDURE — 85025 COMPLETE CBC W/AUTO DIFF WBC: CPT | Performed by: EMERGENCY MEDICINE

## 2023-09-15 PROCEDURE — 74018 RADEX ABDOMEN 1 VIEW: CPT | Mod: 26,,, | Performed by: RADIOLOGY

## 2023-09-15 PROCEDURE — 99999 PR PBB SHADOW E&M-EST. PATIENT-LVL II: CPT | Mod: PBBFAC,,, | Performed by: UROLOGY

## 2023-09-15 PROCEDURE — 99999 PR PBB SHADOW E&M-EST. PATIENT-LVL II: ICD-10-PCS | Mod: PBBFAC,,, | Performed by: UROLOGY

## 2023-09-15 PROCEDURE — 25000003 PHARM REV CODE 250: Performed by: EMERGENCY MEDICINE

## 2023-09-15 PROCEDURE — 93010 EKG 12-LEAD: ICD-10-PCS | Mod: ,,, | Performed by: INTERNAL MEDICINE

## 2023-09-15 PROCEDURE — 93010 ELECTROCARDIOGRAM REPORT: CPT | Mod: ,,, | Performed by: INTERNAL MEDICINE

## 2023-09-15 PROCEDURE — 93005 ELECTROCARDIOGRAM TRACING: CPT

## 2023-09-15 PROCEDURE — 99499 UNLISTED E&M SERVICE: CPT | Mod: S$GLB,,, | Performed by: UROLOGY

## 2023-09-15 PROCEDURE — 51700 PR IRRIGATION, BLADDER: ICD-10-PCS | Mod: 58,S$GLB,, | Performed by: UROLOGY

## 2023-09-15 PROCEDURE — 99284 EMERGENCY DEPT VISIT MOD MDM: CPT

## 2023-09-15 PROCEDURE — 74018 XR ABDOMEN AP 1 VIEW: ICD-10-PCS | Mod: 26,,, | Performed by: RADIOLOGY

## 2023-09-15 PROCEDURE — 87086 URINE CULTURE/COLONY COUNT: CPT | Performed by: EMERGENCY MEDICINE

## 2023-09-15 RX ORDER — AMOXICILLIN AND CLAVULANATE POTASSIUM 875; 125 MG/1; MG/1
1 TABLET, FILM COATED ORAL 2 TIMES DAILY
Qty: 6 TABLET | Refills: 0 | OUTPATIENT
Start: 2023-09-15 | End: 2023-09-15

## 2023-09-15 RX ORDER — LIDOCAINE HYDROCHLORIDE 20 MG/ML
JELLY TOPICAL ONCE
Status: CANCELLED | OUTPATIENT
Start: 2023-09-15 | End: 2023-09-15

## 2023-09-15 RX ORDER — CEPHALEXIN 500 MG/1
500 CAPSULE ORAL
Status: COMPLETED | OUTPATIENT
Start: 2023-09-15 | End: 2023-09-15

## 2023-09-15 RX ORDER — CEPHALEXIN 500 MG/1
500 CAPSULE ORAL 4 TIMES DAILY
Qty: 28 CAPSULE | Refills: 0 | Status: SHIPPED | OUTPATIENT
Start: 2023-09-15 | End: 2023-09-22

## 2023-09-15 RX ORDER — DOXYCYCLINE HYCLATE 100 MG
100 TABLET ORAL ONCE
Status: CANCELLED | OUTPATIENT
Start: 2023-09-15 | End: 2023-09-15

## 2023-09-15 RX ADMIN — CEPHALEXIN 500 MG: 500 CAPSULE ORAL at 09:09

## 2023-09-15 NOTE — ED PROVIDER NOTES
Encounter Date: 9/15/2023       History     Chief Complaint   Patient presents with    Post-op Problem     Pt had recent lithotripsy with stent placement. Was d/c with mares catheter. Catheter was removed today, having bladder pressure. Per daughter, pt has been able to urinate, but is having hallucinations.      HPI  70-year-old male with a past medical history of COPD, coronary artery disease status post stents, hypertension and hyperlipidemia, Parkinson disease, chronic back pain, as well as chronic kidney stones who presents with bladder pressure after Mares removed after lithotripsy.  He was seen on 09/13 here by Dr. Neal with Urology for a left renal stone.  He had a left ureteral stent placed and lithotripsy done.  He was unable to void afterwards so he was discharged with a Mares catheter in place.  He was seen in clinic today in follow-up and Mares catheter was removed. Before it was removed he had the standard saline bolused into his bladder. When this was done he started having some lower abdominal pain which has continued all day. He has been able to urinate multiple times. Family reports it is dark brown/red however the patient has been having hematuria since the procedure (which actually is improving) and is on azo for symptoms.  He does not have any flank pain. Stent still in place per family and imaging.   Family concerned about ongoing abdominal pain though they report they gave tylenol prior to arrival and now pain has resovlved. He denies any current abdominal pain. When he had it, it was lower abdomen, nonradiating, nothing made it better or worse, not worse with urination. No fevesr or chills. No diarrhea or blood ins tool. Is having contipation but no distension or vomiting.  Has had a poor appetite today.    Daughter also reports some halluciantions today.  She reports that he has had hallucinations in the past secondary to his Parkinson's but more frequently.  Today he intermittently will see a   on a horse or say things like the skin on his leg is coming off.  In between episodes he is at his baseline and not confused.  He is not had any periods where he has been a danger to himself or harming himself.  No new weakness or numbness.  No falls or head injuries.  No headaches or vision changes.  No speech slurring or aphasia.        Review of patient's allergies indicates:   Allergen Reactions    Influenza virus vaccines Other (See Comments)     Caused fever, chills, and made tremors worse     Past Medical History:   Diagnosis Date    Cataract     Chronic back pain greater than 3 months duration     COPD (chronic obstructive pulmonary disease)     Coronary artery disease     3 stents    DDD (degenerative disc disease), lumbar 05/16/2020    Glaucoma suspect of both eyes     Hyperlipidemia     Hypertension     MI, old 2006    Parkinson disease     Renal disorder     Kidney stones     Past Surgical History:   Procedure Laterality Date    CARDIAC CATHETERIZATION  2006    x 3    CARDIAC CATHETERIZATION  10/11/2015    x 2    CATARACT EXTRACTION W/  INTRAOCULAR LENS IMPLANT Right 03/06/2018    Dr. Liu    CORONARY ANGIOPLASTY  2006, 2015    3 stents    CYSTOSCOPY W/ URETERAL STENT PLACEMENT Left 9/13/2023    Procedure: CYSTOSCOPY, WITH URETERAL STENT PLACEMENT;  Surgeon: Jerel Cullen MD;  Location: [address];  Service: Urology;  Laterality: Left;  1.5 HR    EXTRACORPOREAL SHOCK WAVE LITHOTRIPSY Left 4/20/2021    Procedure: LITHOTRIPSY-EXTRACORPOREAL SHOCK WAVE;  Surgeon: Ottoniel Obando MD;  Location: [facility];  Service: Urology;  Laterality: Left;    EXTRACORPOREAL SHOCK WAVE LITHOTRIPSY Left 9/13/2023    Procedure: LITHOTRIPSY, ESWL;  Surgeon: Jerel Cullen MD;  Location: [address];  Service: Urology;  Laterality: Left;    EYE SURGERY      cataracts bilateral    HEMORRHOID SURGERY      INJECTION OF ANESTHETIC AGENT AROUND NERVE Bilateral 11/4/2020    Procedure: BLOCK, NERVE  BILATERAL L2,3,4,5;  Surgeon: Ramiro Calvillo MD;  Location: [facility] PAIN MGT;  Service: Pain Management;  Laterality: Bilateral;  BLOCK, NERVE BILATERAL L2,3,4,5    INJECTION OF FACET JOINT Right 2020    Procedure: INJECTION, FACET JOINT, L4-L5 , L5-S1;  Surgeon: Ramiro Calvillo MD;  Location: [facility] PAIN MGT;  Service: Pain Management;  Laterality: Right;    LAMINOFORAMINOTOMY OF SPINE USING MINIMALLY INVASIVE TECHNIQUE N/A 2021    Procedure: MIS L4-5 Laminectomy;  Surgeon: Bernard Sheldon MD;  Location: [facility] OR;  Service: Neurosurgery;  Laterality: N/A;  Anesthesia: General  Nerve Monitoring: EMG/SEP  Position: Prone  Bed: [facility] on [address]  Headrest: Prone View  Radiology: C-arm  Misc: Microscope, microinstruments    TRANSFORAMINAL EPIDURAL INJECTION OF STEROID Right 2020    Procedure: INJECTION, STEROID, EPIDURAL, TRANSFORAMINAL APPROACH;  Surgeon: Ramiro Calvillo MD;  Location: [facility] PAIN MGT;  Service: Pain Management;  Laterality: Right;  Right TF ASHISH L4-L5, L5-S1    PTcannot come earlier than 12:30    TRANSFORAMINAL EPIDURAL INJECTION OF STEROID Right 2020    Procedure: INJECTION, STEROID, EPIDURAL, TRANSFORAMINAL APPROACH;  Surgeon: Ramiro Calvillo MD;  Location: [facility] PAIN MGT;  Service: Pain Management;  Laterality: Right;  RIGHT TF ASHISH L4/5 and L5/S1     Family History   Problem Relation Age of Onset    Heart attack Father     Cataracts Father     Heart disease Maternal Uncle     Heart disease Paternal Uncle     Hypertension Neg Hx     Asthma Neg Hx     Emphysema Neg Hx     Amblyopia Neg Hx     Blindness Neg Hx     Macular degeneration Neg Hx     Retinal detachment Neg Hx     Strabismus Neg Hx      Social History     Tobacco Use    Smoking status: Former     Current packs/day: 0.00     Average packs/day: 1 pack/day for 40.0 years (40.0 ttl pk-yrs)     Types: Cigarettes     Start date: 1974     Quit date: 2014     Years since quittin.6     Passive exposure: Past     Smokeless tobacco: Never   Substance Use Topics    Alcohol use: Yes     Comment: socially    Drug use: No     Review of Systems   Constitutional:  Negative for fever.   HENT:  Negative for sore throat.    Respiratory:  Negative for shortness of breath.    Cardiovascular:  Negative for chest pain.   Gastrointestinal:  Positive for abdominal pain. Negative for nausea.   Genitourinary:  Negative for dysuria.   Musculoskeletal:  Negative for back pain.   Skin:  Negative for rash.   Neurological:  Negative for weakness.   Hematological:  Does not bruise/bleed easily.   Psychiatric/Behavioral:  Positive for hallucinations.    All other systems reviewed and are negative.      Physical Exam     Initial Vitals [09/15/23 1646]   BP Pulse Resp Temp SpO2   (!) 149/70 85 18 99.5 °F (37.5 °C) (!) 94 %      MAP       --         Physical Exam    Nursing note and vitals reviewed.  Constitutional: He appears well-developed and well-nourished. He is not diaphoretic. No distress.   HENT:   Head: Normocephalic and atraumatic.   Nose: Nose normal.   Eyes: Conjunctivae and EOM are normal. Pupils are equal, round, and reactive to light.   Neck: Neck supple.   Normal range of motion.  Cardiovascular:  Normal rate, regular rhythm, normal heart sounds and intact distal pulses.     Exam reveals no gallop and no friction rub.       No murmur heard.  Pulmonary/Chest: Breath sounds normal. No respiratory distress. He has no wheezes. He has no rhonchi. He has no rales.   Abdominal: Abdomen is soft. He exhibits no distension. There is no abdominal tenderness. There is no rebound.   Musculoskeletal:         General: No tenderness. Normal range of motion.      Cervical back: Normal range of motion and neck supple.     Neurological: He is alert and oriented to person, place, and time. He has normal strength. No cranial nerve deficit or sensory deficit.   Skin: Skin is warm and dry. Capillary refill takes less than 2 seconds. No rash noted. No  erythema.   Psychiatric: He has a normal mood and affect.         ED Course   Procedures  Labs Reviewed   CBC W/ AUTO DIFFERENTIAL - Abnormal; Notable for the following components:       Result Value    MCH 31.7 (*)     Gran # (ANC) 7.9 (*)     Mono # 1.2 (*)     Gran % 76.1 (*)     Lymph % 11.7 (*)     All other components within normal limits   COMPREHENSIVE METABOLIC PANEL - Abnormal; Notable for the following components:    Glucose 114 (*)     BUN 24 (*)     Creatinine 2.0 (*)     ALT 5 (*)     eGFR 33.5 (*)     All other components within normal limits   URINALYSIS, REFLEX TO URINE CULTURE - Abnormal; Notable for the following components:    Protein, UA 1+ (*)     Occult Blood UA 3+ (*)     Nitrite, UA Positive (*)     All other components within normal limits    Narrative:     Specimen Source->Urine   URINALYSIS MICROSCOPIC - Abnormal; Notable for the following components:    RBC, UA >100 (*)     WBC, UA 43 (*)     Bacteria Few (*)     All other components within normal limits    Narrative:     Specimen Source->Urine   CULTURE, URINE     EKG Readings: (Independently Interpreted)   Sinus, regular, rate 80s, right bundle-branch block, otherwise normal intervals, no ST changes suggestive of ischemia       Imaging Results    None          Medications   cephALEXin capsule 500 mg (has no administration in time range)     Medical Decision Making  78-year-old male who presents with abdominal pain as well as some episodes of confusion/hallucinations.  He does have a history of Parkinson's so he is had these episodes of hallucinations in the past, just a little more frequently today.  As far as his abdominal pain goes, his history and exam overall reassuring.  It sounds like his pain started immediately after getting a saline injection for voiding trial.  He has actually passed his voiding trial in his urinating normally and was able to fully void here so I do not think his pain is from urinary retention.  He has  absolutely no tenderness now and his exam is not suggestive of appendicitis, diverticulitis, cholecystitis, pancreatitis, or any other more acute intra-abdominal pathology.  It is not suggestive of stent pain.  Again, his exam is benign here.  I think what likely happened was he had bladder spasms after saline was injected.  I would not image him at this point just with a completely reassuring exam.  We will check some basic labs.  I think his episodes of hallucinations or likely from the oxybutynin.  He has a normal mental status and normal exam here.  He is not altered and is not hallucinating.  No trauma.  I do not think he needs any head imaging.  We will advise him to hold on oxybutynin.    Urine is actually positive for nitrites.  He was just on 3 days of prophylactic Augmentin.  We will switch him to a course of Keflex.  No prior cultures here.  Culture sent.  Still looks well and is safe for discharge    Amount and/or Complexity of Data Reviewed  Labs: ordered.    Risk  Prescription drug management.                               Clinical Impression:   Final diagnoses:  [R40.4] Altered awareness, transient  [R10.30] Lower abdominal pain (Primary)  [N30.01] Acute cystitis with hematuria        ED Disposition Condition    Discharge Stable          ED Prescriptions    None       Follow-up Information    None          Argelia Joyner MD  09/15/23 2006       Argelia Joyner MD  09/15/23 2055

## 2023-09-15 NOTE — PROGRESS NOTES
CC: cysto left ureteral stent placement and left ESWL for left UPJ stone    Hector Kellogg is a 78 y.o. man who is here for a voiding trial following his recent surgery.    Hector Kellogg is a 78 y.o. male with a  left renal stone presenting for definitive stone management.  The stone is radio-opaque on KUB.    Date: 09/13/2023   Pre-Op Diagnosis: Left renal stone  Post-Op Diagnosis: same  Procedure(s) Performed:   1.  Left ESWL  Findings:   Left ureteral stent 6x24cm without strings confirmed on KUB; Good position at end of case  Decrease in density of UPJ stone with 3000 shocks delivered  Drains: 6 Fr x 24 cm left ureteral stent without strings  He developed acute urinary retention following his surgery.  A mares catheter was placed and he did well.  He is here for a voiding trial.  He is here today with his wife and a daughter.    His PCP, Blue Mcgill, DO   His PSA increased to 6.2 from 3.9.  He had a recent episode of hematuria and was treated for UTI.  His elevated PSA might have been due to these problems.  Urination symptoms: Positive for frequency, urgency and incomplete emptying.  Denies flank pain.      Non-smoker    Past Medical History:   Diagnosis Date    Cataract     Chronic back pain greater than 3 months duration     COPD (chronic obstructive pulmonary disease)     Coronary artery disease     3 stents    DDD (degenerative disc disease), lumbar 05/16/2020    Glaucoma suspect of both eyes     Hyperlipidemia     Hypertension     MI, old 2006    Parkinson disease     Renal disorder     Kidney stones     Past Surgical History:   Procedure Laterality Date    CARDIAC CATHETERIZATION  2006    x 3    CARDIAC CATHETERIZATION  10/11/2015    x 2    CATARACT EXTRACTION W/  INTRAOCULAR LENS IMPLANT Right 03/06/2018    Dr. Liu    CORONARY ANGIOPLASTY  2006, 2015    3 stents    CYSTOSCOPY W/ URETERAL STENT PLACEMENT Left 9/13/2023    Procedure: CYSTOSCOPY, WITH URETERAL STENT  PLACEMENT;  Surgeon: Jerel Cullen MD;  Location: [facility] OR 1ST FLR;  Service: Urology;  Laterality: Left;  1.5 HR    EXTRACORPOREAL SHOCK WAVE LITHOTRIPSY Left 4/20/2021    Procedure: LITHOTRIPSY-EXTRACORPOREAL SHOCK WAVE;  Surgeon: Ottoniel Obando MD;  Location: [facility] [place] OR;  Service: Urology;  Laterality: Left;    EXTRACORPOREAL SHOCK WAVE LITHOTRIPSY Left 9/13/2023    Procedure: LITHOTRIPSY, ESWL;  Surgeon: Jerel Cullen MD;  Location: [facility] OR 1ST FLR;  Service: Urology;  Laterality: Left;    EYE SURGERY      cataracts bilateral    HEMORRHOID SURGERY      INJECTION OF ANESTHETIC AGENT AROUND NERVE Bilateral 11/4/2020    Procedure: BLOCK, NERVE BILATERAL L2,3,4,5;  Surgeon: Ramiro Calvillo MD;  Location: [facility] [place] PAIN MGT;  Service: Pain Management;  Laterality: Bilateral;  BLOCK, NERVE BILATERAL L2,3,4,5    INJECTION OF FACET JOINT Right 8/5/2020    Procedure: INJECTION, FACET JOINT, L4-L5 , L5-S1;  Surgeon: Ramiro Calvillo MD;  Location: [facility] [place] PAIN MGT;  Service: Pain Management;  Laterality: Right;    LAMINOFORAMINOTOMY OF SPINE USING MINIMALLY INVASIVE TECHNIQUE N/A 2/1/2021    Procedure: MIS L4-5 Laminectomy;  Surgeon: Bernard Sheldon MD;  Location: [facility];  Service: Neurosurgery;  Laterality: N/A;  Anesthesia: General  Nerve Monitoring: EMG/SEP  Position: Prone  Bed: [facility] on [address]  Headrest: Prone View  Radiology: C-arm  Misc: Microscope, microinstruments    TRANSFORAMINAL EPIDURAL INJECTION OF STEROID Right 5/27/2020    Procedure: INJECTION, STEROID, EPIDURAL, TRANSFORAMINAL APPROACH;  Surgeon: Ramiro Calvillo MD;  Location: [facility] [place] PAIN MGT;  Service: Pain Management;  Laterality: Right;  Right TF ASHISH L4-L5, L5-S1    PTcannot come earlier than 12:30    TRANSFORAMINAL EPIDURAL INJECTION OF STEROID Right 9/9/2020    Procedure: INJECTION, STEROID, EPIDURAL, TRANSFORAMINAL APPROACH;  Surgeon: Ramiro Calvillo MD;  Location: [facility] [place] PAIN MGT;  Service: Pain Management;  Laterality: Right;  RIGHT TF  ASHISH L4/5 and L5/S1     Social History     Tobacco Use    Smoking status: Former     Current packs/day: 0.00     Average packs/day: 1 pack/day for 40.0 years (40.0 ttl pk-yrs)     Types: Cigarettes     Start date: 1974     Quit date: 2014     Years since quittin.6     Passive exposure: Past    Smokeless tobacco: Never   Substance Use Topics    Alcohol use: Yes     Comment: socially    Drug use: No     Family History   Problem Relation Age of Onset    Heart attack Father     Cataracts Father     Heart disease Maternal Uncle     Heart disease Paternal Uncle     Hypertension Neg Hx     Asthma Neg Hx     Emphysema Neg Hx     Amblyopia Neg Hx     Blindness Neg Hx     Macular degeneration Neg Hx     Retinal detachment Neg Hx     Strabismus Neg Hx      Allergy:  Review of patient's allergies indicates:   Allergen Reactions    Influenza virus vaccines Other (See Comments)     Caused fever, chills, and made tremors worse     Outpatient Encounter Medications as of 9/15/2023   Medication Sig Dispense Refill    albuterol (PROVENTIL/VENTOLIN HFA) 90 mcg/actuation inhaler Inhale 2 puffs into the lungs every 6 (six) hours as needed for Wheezing or Shortness of Breath. Rescue 18 g 6    amoxicillin-clavulanate 875-125mg (AUGMENTIN) 875-125 mg per tablet Take 1 tablet by mouth 2 (two) times daily. for 3 days 6 tablet 0    aspirin (ECOTRIN) 81 MG EC tablet Take 81 mg by mouth once daily.      BREZTRI AEROSPHERE 160-9-4.8 mcg/actuation HFAA Inhale 2 puffs into the lungs 2 (two) times a day. 10.7 g 11    carbidopa-levodopa  mg (SINEMET)  mg per tablet Take 1.5 tablets by mouth 5 (five) times daily. 675 tablet 3    citalopram (CELEXA) 20 MG tablet Take 1 tablet by mouth once daily 30 tablet 11    finasteride (PROSCAR) 5 mg tablet Take 1 tablet (5 mg total) by mouth once daily. 30 tablet 11    ibuprofen (ADVIL,MOTRIN) 800 MG tablet Take 800 mg by mouth every 6 (six) hours as needed.      loratadine (CLARITIN) 10 mg  tablet Take 10 mg by mouth as needed for Allergies.      metoprolol succinate (TOPROL-XL) 50 MG 24 hr tablet Take 1 tablet by mouth once daily 90 tablet 3    multivitamin capsule Take 1 capsule by mouth once daily.      oxybutynin (DITROPAN) 5 MG Tab Take 1 tablet (5 mg total) by mouth 3 (three) times daily as needed. 90 tablet 0    phenazopyridine (PYRIDIUM) 200 MG tablet Take 1 tablet (200 mg total) by mouth 3 (three) times daily as needed for Pain. 90 tablet 0    rosuvastatin (CRESTOR) 40 MG Tab TAKE 1 TABLET BY MOUTH NIGHTLY AT BEDTIME 90 tablet 3    [DISCONTINUED] tamsulosin (FLOMAX) 0.4 mg Cap Take 1 capsule (0.4 mg total) by mouth every evening. for 30 doses 30 capsule 0     Facility-Administered Encounter Medications as of 9/15/2023   Medication Dose Route Frequency Provider Last Rate Last Admin    doxycycline tablet 100 mg  100 mg Oral Once Jerel Cullen MD        LIDOcaine HCl 2% urojet   Urethral Once Jerel Cullen MD         Review of Systems   ROS  Physical Exam     There were no vitals filed for this visit.    Physical Exam  Constitutional:       General: He is not in acute distress.     Appearance: He is well-developed. He is not diaphoretic.   HENT:      Head: Normocephalic and atraumatic.      Right Ear: External ear normal.      Left Ear: External ear normal.      Nose: Nose normal.   Eyes:      Conjunctiva/sclera: Conjunctivae normal.      Pupils: Pupils are equal, round, and reactive to light.   Neck:      Thyroid: No thyromegaly.      Vascular: No JVD.      Trachea: No tracheal deviation.   Cardiovascular:      Rate and Rhythm: Normal rate and regular rhythm.      Heart sounds: Normal heart sounds. No murmur heard.     No friction rub. No gallop.   Pulmonary:      Effort: Pulmonary effort is normal. No respiratory distress.      Breath sounds: Normal breath sounds. No wheezing.   Chest:      Chest wall: No tenderness.   Abdominal:      General: Bowel sounds are normal. There is no  distension.      Palpations: Abdomen is soft. There is no mass.      Tenderness: There is no abdominal tenderness. There is no guarding or rebound.   Genitourinary:     Penis: Normal. No tenderness.       Prostate: Normal.      Rectum: Normal.      Comments: Prostate: enlarged.  Musculoskeletal:         General: No tenderness or deformity. Normal range of motion.      Cervical back: Normal range of motion and neck supple.   Lymphadenopathy:      Cervical: No cervical adenopathy.   Skin:     General: Skin is warm and dry.   Neurological:      Mental Status: He is alert and oriented to person, place, and time.   Psychiatric:         Behavior: Behavior normal.         Thought Content: Thought content normal.         LABS:  Lab Results   Component Value Date    PSA 6.2 (H) 07/14/2023    PSA 3.9 06/22/2022     No results found for this or any previous visit.  Lab Results   Component Value Date    CREATININE 0.9 08/06/2023    CREATININE 1.1 07/14/2023    CREATININE 1.1 01/11/2023     No results found for this or any previous visit.  Urine Culture, Routine   Date Value Ref Range Status   08/25/2023 No growth  Final     Hemoglobin A1C   Date Value Ref Range Status   07/14/2023 5.7 (H) 4.0 - 5.6 % Final     Comment:     ADA Screening Guidelines:  5.7-6.4%  Consistent with prediabetes  >or=6.5%  Consistent with diabetes    High levels of fetal hemoglobin interfere with the HbA1C  assay. Heterozygous hemoglobin variants (HbS, HgC, etc)do  not significantly interfere with this assay.   However, presence of multiple variants may affect accuracy.       Voiding Trial is done.  100 cc sterile water instilled in the bladder.  [place] catheter removed.  Patient was able to void spontaneously.     Radiology:  CT urogram 8/24/23    Kidneys enhance symmetrically with cortical hypodense lesions, the larger in keeping with cysts with some too small to characterize.  Incidental mild extrarenal pelvis on the right.  Punctate nonobstructing  right renal stones (series 3, image 56 and 72).  There is left renal pelvis stone near the UPJ measuring 1.3 x 0.9 cm with mild adjacent stranding and mild upstream hydronephrosis.  Additional nonobstructing left renal stones.  There is incomplete contrast distension of the urinary bladder, otherwise unremarkable.  Prostate is enlarged with central gland calcification.    KUB today  Two overlapping images acquired.  Suboptimal visualization bilateral, right greater than left renal soft tissue contours due to superimposition of fecal matter and bowel gas.  Stable greater than 1 cm left urinary tract calculus, unchanged in position to that seen on the earlier exams and based on the CT position within the left renal pelvis.  Some other CT demonstrable bilateral tiny nonobstructing calculi not resolved on the KUB.  Pelvic densities are noted to represent phleboliths based on the earlier CT.  Some stable lumbar dextrocurvature, degenerative changes involving the spine, and degenerative changes about pubic symphysis with no acute bony abnormalities.  Nonobstructive bowel gas pattern and no free air.     Assessment and Plan:  Hector was seen today for urinary retention.    Diagnoses and all orders for this visit:    Cystitis  -     amoxicillin-clavulanate 875-125mg (AUGMENTIN) 875-125 mg per tablet; Take 1 tablet by mouth 2 (two) times daily. for 3 days    Kidney stone  -     X-Ray Abdomen AP 1 View; Future    Ureteral stent present  -     Cystoscopy with Stent Removal; Future    Kidney stone on left side  -     Cystoscopy with Stent Removal; Future    Other orders  -     LIDOcaine HCl 2% urojet  -     doxycycline tablet 100 mg    Take Augmentin as prophylactic abx for 3 days.  Continue finasteride.  Use oxybutynin prn bladder spasm ( side effects explained) and Pyridium for bladder pain, dysuria or frequency or urgency.  Will get KUB today to determine how well he responded to ESWL.    I spent 25 minutes with the patient  of which more than half was spent in direct consultation with the patient in regards to our treatment and plan.     Follow-up:  Follow up in about 13 days (around 9/28/2023), or kub.    Addendum:  KUB today showed excellent response from left ESWL.  Left ureteral stent in good position.  Will change his follow up appt to cysto left ureteral stent removal.

## 2023-09-15 NOTE — ED TRIAGE NOTES
C/o abdominal pain. Had urinary catheter taken out today. Bladder scanner showing 123 ml urine. Voided 100 ml urine. Family states he has been hallucinating since undergoing anesthesia on Wednesday. Family states no BM for 4 days.

## 2023-09-15 NOTE — ED PROVIDER NOTES
Encounter Date: 9/15/2023       History     Chief Complaint   Patient presents with    Post-op Problem     Pt had recent lithotripsy with stent placement. Was d/c with mares catheter. Catheter was removed today, having bladder pressure. Per daughter, pt has been able to urinate, but is having hallucinations.      HPI    70-year-old male with a past medical history of COPD, coronary artery disease status post stents, hypertension and hyperlipidemia, Parkinson disease, chronic back pain, as well as chronic kidney stones who presents with bladder pressure after Mares removed after lithotripsy.  He was seen on 09/13 here by Dr. Neal with Urology for a left renal stone.  He had a left ureteral stent placed and lithotripsy done.  He was unable to void afterwards so he was discharged with a Mares catheter in place.  He was seen in clinic today in follow-up and Mares catheter was removed    Of note, he recently had an episode of hematuria and was started on Augmentin for prophylaxis for acute cystitis x3 days.  He is also been on oxybutynin and Pyridium for bladder pain and dysuria.      Review of patient's allergies indicates:   Allergen Reactions    Influenza virus vaccines Other (See Comments)     Caused fever, chills, and made tremors worse     Past Medical History:   Diagnosis Date    Cataract     Chronic back pain greater than 3 months duration     COPD (chronic obstructive pulmonary disease)     Coronary artery disease     3 stents    DDD (degenerative disc disease), lumbar 05/16/2020    Glaucoma suspect of both eyes     Hyperlipidemia     Hypertension     MI, old 2006    Parkinson disease     Renal disorder     Kidney stones     Past Surgical History:   Procedure Laterality Date    CARDIAC CATHETERIZATION  2006    x 3    CARDIAC CATHETERIZATION  10/11/2015    x 2    CATARACT EXTRACTION W/  INTRAOCULAR LENS IMPLANT Right 03/06/2018    Dr. Liu    CORONARY ANGIOPLASTY  2006, 2015    3 stents     CYSTOSCOPY W/ URETERAL STENT PLACEMENT Left 9/13/2023    Procedure: CYSTOSCOPY, WITH URETERAL STENT PLACEMENT;  Surgeon: Jerel Cullen MD;  [facility];  Service: Urology;  Laterality: Left;  1.5 HR    EXTRACORPOREAL SHOCK WAVE LITHOTRIPSY Left 4/20/2021    Procedure: LITHOTRIPSY-EXTRACORPOREAL SHOCK WAVE;  Surgeon: Ottoniel Obando MD;  Location: [facility] OR;  Service: Urology;  Laterality: Left;    EXTRACORPOREAL SHOCK WAVE LITHOTRIPSY Left 9/13/2023    Procedure: LITHOTRIPSY, ESWL;  Surgeon: Jerel Cullen MD;  Location: [facility] OR 1ST FLR;  Service: Urology;  Laterality: Left;    EYE SURGERY      cataracts bilateral    HEMORRHOID SURGERY      INJECTION OF ANESTHETIC AGENT AROUND NERVE Bilateral 11/4/2020    Procedure: BLOCK, NERVE BILATERAL L2,3,4,5;  Surgeon: Ramiro Calvillo MD;  Location: [facility] PAIN MGT;  Service: Pain Management;  Laterality: Bilateral;  BLOCK, NERVE BILATERAL L2,3,4,5    INJECTION OF FACET JOINT Right 8/5/2020    Procedure: INJECTION, FACET JOINT, L4-L5 , L5-S1;  Surgeon: Ramiro Calvillo MD;  Location: [facility] PAIN MGT;  Service: Pain Management;  Laterality: Right;    LAMINOFORAMINOTOMY OF SPINE USING MINIMALLY INVASIVE TECHNIQUE N/A 2/1/2021    Procedure: MIS L4-5 Laminectomy;  Surgeon: Bernard Sheldon MD;  Location: [facility];  Service: Neurosurgery;  Laterality: N/A;  Anesthesia: General  Nerve Monitoring: EMG/SEP  Position: Prone  Bed: [facility] on [address]  Headrest: Prone View  Radiology: C-arm  Misc: Microscope, microinstruments    TRANSFORAMINAL EPIDURAL INJECTION OF STEROID Right 5/27/2020    Procedure: INJECTION, STEROID, EPIDURAL, TRANSFORAMINAL APPROACH;  Surgeon: Ramiro Calvillo MD;  Location: [facility] PAIN MGT;  Service: Pain Management;  Laterality: Right;  Right TF ASHISH L4-L5, L5-S1    PTcannot come earlier than 12:30    TRANSFORAMINAL EPIDURAL INJECTION OF STEROID Right 9/9/2020    Procedure: INJECTION, STEROID, EPIDURAL, TRANSFORAMINAL APPROACH;   "Surgeon: Ramiro Calvillo MD;  [facility];  Service: Pain Management;  Laterality: Right;  RIGHT TF ASHISH L4/5 and L5/S1     Family History   Problem Relation Age of Onset    Heart attack Father     Cataracts Father     Heart disease Maternal Uncle     Heart disease Paternal Uncle     Hypertension Neg Hx     Asthma Neg Hx     Emphysema Neg Hx     Amblyopia Neg Hx     Blindness Neg Hx     Macular degeneration Neg Hx     Retinal detachment Neg Hx     Strabismus Neg Hx      Social History     Tobacco Use    Smoking status: Former     Current packs/day: 0.00     Average packs/day: 1 pack/day for 40.0 years (40.0 ttl pk-yrs)     Types: Cigarettes     Start date: 1974     Quit date: 2014     Years since quittin.6     Passive exposure: Past    Smokeless tobacco: Never   Substance Use Topics    Alcohol use: Yes     Comment: socially    Drug use: No     Review of Systems    Physical Exam     Initial Vitals [09/15/23 1646]   BP Pulse Resp Temp SpO2   (!) 149/70 85 18 99.5 °F (37.5 °C) (!) 94 %      MAP       --         Physical Exam    ED Course   Procedures  Labs Reviewed - No data to display       Imaging Results    None          Medications - No data to display  Medical Decision Making                             Clinical Impression:    ***Please document a Clinical Impression and click the "Refresh" button to refresh your note and automatically pull in before signing.***         "

## 2023-09-16 NOTE — DISCHARGE INSTRUCTIONS
Your abdominal pain is likely secondary to spasms from saline injected earlier. Because your pain is improved and your exam is reassuring, we did not image you (ie ct scan). If your symptoms return and you have worsening abdominal pain or if you develop vomiting, high fevers, or confusion please return to the emergency department.   Your episodes of hallucinations are likely from a combination of parkinson's as well as oxybutynin which can cause hallucinations. Out of abundance of caution, I would stop taking this. If you have bladder spasms or urinary incontinence, please talk to your urologist about starting a different medication.   Stop taking augmentin (amoxicillin-clavulonic acid) and start taking cephalexin 500mg four times daily for 7 days.

## 2023-09-17 LAB — BACTERIA UR CULT: NO GROWTH

## 2023-09-18 ENCOUNTER — PATIENT MESSAGE (OUTPATIENT)
Dept: UROLOGY | Facility: CLINIC | Age: 78
End: 2023-09-18
Payer: MEDICARE

## 2023-09-20 ENCOUNTER — TELEPHONE (OUTPATIENT)
Dept: NEUROLOGY | Facility: CLINIC | Age: 78
End: 2023-09-20
Payer: MEDICARE

## 2023-09-20 NOTE — TELEPHONE ENCOUNTER
----- Message from Melissa Hines sent at 9/20/2023  7:37 AM CDT -----  Type:   Appointment Request      Name of Caller:Portal Message   When is the first available appointment?N/A   Symptoms:Parkinson   Best Call Back Number:[phone]  Additional Information:         Message  Appointment Request From: Hector Kellogg  With Provider: Eliu Klein MD [[facility] - Neurology [facility]]  Preferred Date Range: Any  Preferred Times: Any Time  Reason for visit: Parkinson    Comments:  Memory and hallucinations

## 2023-09-21 NOTE — TELEPHONE ENCOUNTER
Spoke to pt's wife and she is upset they can not see Dr. Klein in person as he is completely booked. She express frustration with not being able be seen in person. Offer VV but decline.

## 2023-09-28 ENCOUNTER — HOSPITAL ENCOUNTER (OUTPATIENT)
Dept: RADIOLOGY | Facility: HOSPITAL | Age: 78
Discharge: HOME | End: 2023-09-28
Attending: STUDENT IN AN ORGANIZED HEALTH CARE EDUCATION/TRAINING PROGRAM
Payer: MEDICARE

## 2023-09-28 ENCOUNTER — PROCEDURE VISIT (OUTPATIENT)
Dept: UROLOGY | Facility: CLINIC | Age: 78
End: 2023-09-28
Payer: MEDICARE

## 2023-09-28 VITALS
DIASTOLIC BLOOD PRESSURE: 84 MMHG | HEIGHT: 65 IN | WEIGHT: 138.69 LBS | TEMPERATURE: 98 F | BODY MASS INDEX: 23.11 KG/M2 | HEART RATE: 74 BPM | RESPIRATION RATE: 16 BRPM | SYSTOLIC BLOOD PRESSURE: 144 MMHG

## 2023-09-28 DIAGNOSIS — Z96.0 URETERAL STENT PRESENT: ICD-10-CM

## 2023-09-28 DIAGNOSIS — N20.9 UROLITHIASIS: ICD-10-CM

## 2023-09-28 DIAGNOSIS — N20.0 KIDNEY STONE ON LEFT SIDE: ICD-10-CM

## 2023-09-28 PROCEDURE — 74018 RADEX ABDOMEN 1 VIEW: CPT | Mod: TC

## 2023-09-28 PROCEDURE — 74018 RADEX ABDOMEN 1 VIEW: CPT | Mod: 26,,, | Performed by: INTERNAL MEDICINE

## 2023-09-28 PROCEDURE — 74018 XR KUB: ICD-10-PCS | Mod: 26,,, | Performed by: INTERNAL MEDICINE

## 2023-09-28 RX ADMIN — LIDOCAINE HYDROCHLORIDE: 20 JELLY TOPICAL at 10:09

## 2023-09-28 RX ADMIN — Medication 100 MG: at 10:09

## 2023-09-28 NOTE — PATIENT INSTRUCTIONS
What to Expect After a Cystoscopy with Stent Removal  For the next 24-48 hours, you may feel a mild burning when you urinate. This burning is normal and expected. Drink plenty of water to dilute the urine to help relieve the burning sensation. You may also see a small amount of blood in your urine after the procedure.    Unless you are already taking antibiotics, you may be given an antibiotic after the test to prevent infection.    Signs and Symptoms to Report  Call the [facility] at [phone] if you develop any of the following:  Fever of 101 degrees or higher  Chills or persistent bleeding  Inability to urinate    After hours or on weekends, you may reach a urology resident on call at this number: [phone].   Negative

## 2023-09-28 NOTE — PROCEDURES
Cystoscopy with Stent Removal    Date/Time: 9/28/2023 10:45 AM    Performed by: Jerel Cullen MD  Authorized by: Jerel Cullen MD  Comments: Date of Procedure: 09/28/2023    Procedure:                                                                        Cystourethroscopy with Successful Removal of left Ureteral Stent(s)                                                                                    Pre-OP Diagnosis:                                                                Left ureteral stent                                 Post-OP Diagnosis:                                                                same                              Anesthesia:                                                                       Anesthesia Administered:                                                     Intraurethral instillation of 10 mL 2% lidocaine (Xylocaine) jelly.     Findings:                                                                         Double J stent in the ureteral orifice.                                - The stent is not encrusted.                                           Description of Procedure:                                                         Informed Consent:                                                            - Risks, benefits and alternatives of procedure discussed with               patient and informed consent obtained.                                       Patient Position:                                                            - Supine. Careful padding and pressure point care performed.                 Prep and Drape:                                                              - Patient prepped and draped in usual sterile fashion using povidone         iodine (Betadine).                                                           Instruments:                                                                 - Alligator forceps.                                                          - Flexible Cystoscope: With 0 degree lens 16 Fr.                             Procedure Details:                                                           - Cystoscope passed under vision into bladder.                               - Bladder and urethra examined in their entirety with findings as            above.                                                                       - Ureteral stent visualized in bladder, grasped and removed.            Complications:                                                                    No immediate complications.                                             Post-OP Plan:                                                                    1/11/24 follow up with [facility]

## 2023-10-23 ENCOUNTER — PATIENT MESSAGE (OUTPATIENT)
Dept: CARDIOLOGY | Facility: CLINIC | Age: 78
End: 2023-10-23
Payer: MEDICARE

## 2023-10-23 ENCOUNTER — OFFICE VISIT (OUTPATIENT)
Dept: PAIN MEDICINE | Facility: CLINIC | Age: 78
End: 2023-10-23
Payer: MEDICARE

## 2023-10-23 ENCOUNTER — TELEPHONE (OUTPATIENT)
Dept: NEUROLOGY | Facility: CLINIC | Age: 78
End: 2023-10-23
Payer: MEDICARE

## 2023-10-23 VITALS
DIASTOLIC BLOOD PRESSURE: 79 MMHG | TEMPERATURE: 98 F | BODY MASS INDEX: 21.93 KG/M2 | RESPIRATION RATE: 18 BRPM | HEIGHT: 65 IN | OXYGEN SATURATION: 100 % | WEIGHT: 131.63 LBS | SYSTOLIC BLOOD PRESSURE: 140 MMHG

## 2023-10-23 DIAGNOSIS — M47.816 LUMBAR SPONDYLOSIS: ICD-10-CM

## 2023-10-23 DIAGNOSIS — M54.16 LUMBAR RADICULOPATHY: ICD-10-CM

## 2023-10-23 DIAGNOSIS — M79.18 MYOFASCIAL PAIN SYNDROME: Primary | ICD-10-CM

## 2023-10-23 DIAGNOSIS — M51.36 DDD (DEGENERATIVE DISC DISEASE), LUMBAR: ICD-10-CM

## 2023-10-23 PROCEDURE — 1125F AMNT PAIN NOTED PAIN PRSNT: CPT | Mod: CPTII,S$GLB,, | Performed by: STUDENT IN AN ORGANIZED HEALTH CARE EDUCATION/TRAINING PROGRAM

## 2023-10-23 PROCEDURE — 1125F PR PAIN SEVERITY QUANTIFIED, PAIN PRESENT: ICD-10-PCS | Mod: CPTII,S$GLB,, | Performed by: STUDENT IN AN ORGANIZED HEALTH CARE EDUCATION/TRAINING PROGRAM

## 2023-10-23 PROCEDURE — 99214 PR OFFICE/OUTPT VISIT, EST, LEVL IV, 30-39 MIN: ICD-10-PCS | Mod: S$GLB,,, | Performed by: STUDENT IN AN ORGANIZED HEALTH CARE EDUCATION/TRAINING PROGRAM

## 2023-10-23 PROCEDURE — 1100F PR PT FALLS ASSESS DOC 2+ FALLS/FALL W/INJURY/YR: ICD-10-PCS | Mod: CPTII,S$GLB,, | Performed by: STUDENT IN AN ORGANIZED HEALTH CARE EDUCATION/TRAINING PROGRAM

## 2023-10-23 PROCEDURE — 3288F PR FALLS RISK ASSESSMENT DOCUMENTED: ICD-10-PCS | Mod: CPTII,S$GLB,, | Performed by: STUDENT IN AN ORGANIZED HEALTH CARE EDUCATION/TRAINING PROGRAM

## 2023-10-23 PROCEDURE — 99999 PR PBB SHADOW E&M-EST. PATIENT-LVL IV: CPT | Mod: PBBFAC,,, | Performed by: STUDENT IN AN ORGANIZED HEALTH CARE EDUCATION/TRAINING PROGRAM

## 2023-10-23 PROCEDURE — 3288F FALL RISK ASSESSMENT DOCD: CPT | Mod: CPTII,S$GLB,, | Performed by: STUDENT IN AN ORGANIZED HEALTH CARE EDUCATION/TRAINING PROGRAM

## 2023-10-23 PROCEDURE — 3077F PR MOST RECENT SYSTOLIC BLOOD PRESSURE >= 140 MM HG: ICD-10-PCS | Mod: CPTII,S$GLB,, | Performed by: STUDENT IN AN ORGANIZED HEALTH CARE EDUCATION/TRAINING PROGRAM

## 2023-10-23 PROCEDURE — 1160F RVW MEDS BY RX/DR IN RCRD: CPT | Mod: CPTII,S$GLB,, | Performed by: STUDENT IN AN ORGANIZED HEALTH CARE EDUCATION/TRAINING PROGRAM

## 2023-10-23 PROCEDURE — 1100F PTFALLS ASSESS-DOCD GE2>/YR: CPT | Mod: CPTII,S$GLB,, | Performed by: STUDENT IN AN ORGANIZED HEALTH CARE EDUCATION/TRAINING PROGRAM

## 2023-10-23 PROCEDURE — 99999 PR PBB SHADOW E&M-EST. PATIENT-LVL IV: ICD-10-PCS | Mod: PBBFAC,,, | Performed by: STUDENT IN AN ORGANIZED HEALTH CARE EDUCATION/TRAINING PROGRAM

## 2023-10-23 PROCEDURE — 3078F DIAST BP <80 MM HG: CPT | Mod: CPTII,S$GLB,, | Performed by: STUDENT IN AN ORGANIZED HEALTH CARE EDUCATION/TRAINING PROGRAM

## 2023-10-23 PROCEDURE — 1159F PR MEDICATION LIST DOCUMENTED IN MEDICAL RECORD: ICD-10-PCS | Mod: CPTII,S$GLB,, | Performed by: STUDENT IN AN ORGANIZED HEALTH CARE EDUCATION/TRAINING PROGRAM

## 2023-10-23 PROCEDURE — 3078F PR MOST RECENT DIASTOLIC BLOOD PRESSURE < 80 MM HG: ICD-10-PCS | Mod: CPTII,S$GLB,, | Performed by: STUDENT IN AN ORGANIZED HEALTH CARE EDUCATION/TRAINING PROGRAM

## 2023-10-23 PROCEDURE — 99214 OFFICE O/P EST MOD 30 MIN: CPT | Mod: S$GLB,,, | Performed by: STUDENT IN AN ORGANIZED HEALTH CARE EDUCATION/TRAINING PROGRAM

## 2023-10-23 PROCEDURE — 1160F PR REVIEW ALL MEDS BY PRESCRIBER/CLIN PHARMACIST DOCUMENTED: ICD-10-PCS | Mod: CPTII,S$GLB,, | Performed by: STUDENT IN AN ORGANIZED HEALTH CARE EDUCATION/TRAINING PROGRAM

## 2023-10-23 PROCEDURE — 3077F SYST BP >= 140 MM HG: CPT | Mod: CPTII,S$GLB,, | Performed by: STUDENT IN AN ORGANIZED HEALTH CARE EDUCATION/TRAINING PROGRAM

## 2023-10-23 PROCEDURE — 1159F MED LIST DOCD IN RCRD: CPT | Mod: CPTII,S$GLB,, | Performed by: STUDENT IN AN ORGANIZED HEALTH CARE EDUCATION/TRAINING PROGRAM

## 2023-10-23 RX ORDER — PREGABALIN 75 MG/1
75 CAPSULE ORAL 2 TIMES DAILY
Qty: 60 CAPSULE | Refills: 2 | Status: SHIPPED | OUTPATIENT
Start: 2023-10-23 | End: 2023-12-14 | Stop reason: ALTCHOICE

## 2023-10-23 NOTE — PROGRESS NOTES
Chronic patient Established Note (Follow up visit)      SUBJECTIVE:    Hector Kellogg presents to the clinic for a follow-up appointment for lower back pain. Since the last visit, Hector Kellogg states the pain has been worsening. Current pain intensity is 8/10.    Pain Disability Index Review:      10/23/2023    10:59 AM 8/8/2022     9:13 AM 7/11/2022     1:49 PM   Last 3 PDI Scores   Pain Disability Index (PDI) 27 45 40     HPI:  Mr. Kellogg presents with lower back pain. He was seen by urology (had lithotripsy recently). He reports that he has pain in the middle of his lower back that radiates down to both hips.  He states that he has had an ASHISH after his surgery (outside pain clinic) that didn't help with his pain.  Current pain level is 8/10 and is reported as aching in the lower back. The pain worsens with standing up and walking.    Pain Medications:   - advil   - robaxin (didn't help)   - tramadol (didn't help)  - gabapentin 800mg TID (stopped because it wasn't helping)    Opioid Contract: no     report:  Reviewed and consistent with medication use as prescribed.    Pain Procedures:  Injections by Dr. Calvillo in 2020: MBB, right TFESI (L4/5 and L5/S1)    Physical Therapy/Home Exercise: did physical therapy for lower back after surgery, but hasn't participated in this since that time.    Imaging:   CT RENAL STONE STUDY ABD PELVIS WO     CLINICAL HISTORY:  Flank pain, kidney stone suspected;r flank pain;     TECHNIQUE:  Low dose axial images, sagittal and coronal reformations were obtained from the lung bases to the pubic symphysis.  Contrast was not administered.     COMPARISON:  08/24/2023     FINDINGS:  Heart is not enlarged.  There are coronary artery calcifications.  There are advanced emphysematous changes.  Small pleural base nodules along the diaphragms unchanged.     Liver, gallbladder, stomach, spleen, pancreas and adrenal glands show no significant abnormalities on noncontrast  imaging.  There is a small hiatal hernia.     Stool throughout the colon could read to constipation.  There is a duodenal diverticulum.  There are few scattered colonic diverticuli without evidence of acute diverticulitis.  Small and large bowel show no evidence obstruction.  No free air or ascites.  No inflammatory changes in the right lower quadrant.     Kidneys are similar in size and attenuation.  There are left renal hypodensities likely simple cyst.  Multiple small right renal stones.  There are multiple left lower pole renal stones with increased stone burden in the lower pole when compared to prior.  The left renal pelvis stone noted on prior study is no longer visualized.  No ureteral stones or hydronephrosis.  Prostate gland is enlarged with dystrophic calcifications.  Urinary bladder is incompletely distended.     Abdominal aorta is normal in caliber with moderate atherosclerosis.  No abnormal retroperitoneal lymph nodes.  There is lumbar spondylosis with L5-S1 spondylolysis and L4-5 and L5-S1 spondylolisthesis.  Small hypodense area in the right iliopsoas muscle likely relating to iliopsoas bursitis.  Small fat containing inguinal and umbilicus hernias.     Impression:     Bilateral renal stones with increased stone burden in the left lower pole.  No hydronephrosis.     Emphysema.     Diverticulosis.     Prostatomegaly.        Electronically signed by: Melo Mckeon MD  Date:                                            10/20/2023  Time:                                           14:22    Allergies:   Review of patient's allergies indicates:   Allergen Reactions    Influenza virus vaccines Other (See Comments)     Caused fever, chills, and made tremors worse       Current Medications:   Current Outpatient Medications   Medication Sig Dispense Refill    albuterol (PROVENTIL/VENTOLIN HFA) 90 mcg/actuation inhaler Inhale 2 puffs into the lungs every 6 (six) hours as needed for Wheezing or Shortness of Breath.  Rescue 18 g 6    aspirin (ECOTRIN) 81 MG EC tablet Take 81 mg by mouth once daily.      BREZTRI AEROSPHERE 160-9-4.8 mcg/actuation HFAA Inhale 2 puffs into the lungs 2 (two) times a day. 10.7 g 11    carbidopa-levodopa  mg (SINEMET)  mg per tablet Take 1.5 tablets by mouth 5 (five) times daily. 675 tablet 3    citalopram (CELEXA) 20 MG tablet Take 1 tablet by mouth once daily 30 tablet 11    ibuprofen (ADVIL,MOTRIN) 800 MG tablet Take 800 mg by mouth every 6 (six) hours as needed.      levoFLOXacin (LEVAQUIN) 750 MG tablet Take 1 tablet (750 mg total) by mouth once daily. for 10 days 10 tablet 0    loratadine (CLARITIN) 10 mg tablet Take 10 mg by mouth as needed for Allergies.      metoprolol succinate (TOPROL-XL) 50 MG 24 hr tablet Take 1 tablet by mouth once daily 90 tablet 3    multivitamin capsule Take 1 capsule by mouth once daily.      rosuvastatin (CRESTOR) 40 MG Tab TAKE 1 TABLET BY MOUTH NIGHTLY AT BEDTIME 90 tablet 3    finasteride (PROSCAR) 5 mg tablet Take 1 tablet (5 mg total) by mouth once daily. (Patient not taking: Reported on 10/23/2023) 30 tablet 11    methocarbamoL (ROBAXIN) 750 MG Tab Take 2 tablets (1,500 mg total) by mouth 3 (three) times daily. for 5 days (Patient not taking: Reported on 10/23/2023) 30 tablet 0    oxybutynin (DITROPAN) 5 MG Tab Take 1 tablet (5 mg total) by mouth 3 (three) times daily as needed. (Patient not taking: Reported on 10/23/2023) 90 tablet 0    pregabalin (LYRICA) 75 MG capsule Take 1 capsule (75 mg total) by mouth 2 (two) times daily. 60 capsule 2    traMADoL (ULTRAM) 50 mg tablet Take 1 tablet (50 mg total) by mouth every 6 (six) hours as needed for Pain. (Patient not taking: Reported on 10/23/2023) 12 tablet 0     No current facility-administered medications for this visit.       REVIEW OF SYSTEMS:    GENERAL:  No weight loss, malaise or fevers.  HEENT:  Negative for frequent or significant headaches.  NECK:  Negative for lumps, goiter, pain and  significant neck swelling.  RESPIRATORY:  Negative for cough, wheezing or shortness of breath.  CARDIOVASCULAR:  Negative for chest pain, leg swelling or palpitations.  GI:  Negative for abdominal discomfort, blood in stools or black stools or change in bowel habits.  MUSCULOSKELETAL:  See HPI.  SKIN:  Negative for lesions, rash, and itching.  PSYCH:  Negative for sleep disturbance, mood disorder and recent psychosocial stressors.  HEMATOLOGY/LYMPHOLOGY:  Negative for prolonged bleeding, bruising easily or swollen nodes.  NEURO:   No history of headaches, syncope, paralysis, seizures or tremors.  All other reviewed and negative other than HPI.    Past Medical History:  Past Medical History:   Diagnosis Date    Cataract     Chronic back pain greater than 3 months duration     COPD (chronic obstructive pulmonary disease)     Coronary artery disease     3 stents    DDD (degenerative disc disease), lumbar 05/16/2020    Glaucoma suspect of both eyes     Hyperlipidemia     Hypertension     MI, old 2006    Parkinson disease     Renal disorder     Kidney stones       Past Surgical History:  Past Surgical History:   Procedure Laterality Date    CARDIAC CATHETERIZATION  2006    x 3    CARDIAC CATHETERIZATION  10/11/2015    x 2    CATARACT EXTRACTION W/  INTRAOCULAR LENS IMPLANT Right 03/06/2018    Dr. Liu    CORONARY ANGIOPLASTY  2006, 2015    3 stents    CYSTOSCOPY W/ URETERAL STENT PLACEMENT Left 9/13/2023    Procedure: CYSTOSCOPY, WITH URETERAL STENT PLACEMENT;  Surgeon: Jerel Cullen MD;  Location: [address];  Service: Urology;  Laterality: Left;  1.5 HR    EXTRACORPOREAL SHOCK WAVE LITHOTRIPSY Left 4/20/2021    Procedure: LITHOTRIPSY-EXTRACORPOREAL SHOCK WAVE;  Surgeon: Ottoniel Obando MD;  Location: [facility];  Service: Urology;  Laterality: Left;    EXTRACORPOREAL SHOCK WAVE LITHOTRIPSY Left 9/13/2023    Procedure: LITHOTRIPSY, ESWL;  Surgeon: Jerel Cullen MD;  Location: [address];  Service:  Urology;  Laterality: Left;    EYE SURGERY      cataracts bilateral    HEMORRHOID SURGERY      INJECTION OF ANESTHETIC AGENT AROUND NERVE Bilateral 11/4/2020    Procedure: BLOCK, NERVE BILATERAL L2,3,4,5;  Surgeon: Ramiro Calvillo MD;  Location: [facility];  Service: Pain Management;  Laterality: Bilateral;  BLOCK, NERVE BILATERAL L2,3,4,5    INJECTION OF FACET JOINT Right 8/5/2020    Procedure: INJECTION, FACET JOINT, L4-L5 , L5-S1;  Surgeon: Ramiro Calvillo MD;  Location: [facility];  Service: Pain Management;  Laterality: Right;    LAMINOFORAMINOTOMY OF SPINE USING MINIMALLY INVASIVE TECHNIQUE N/A 2/1/2021    Procedure: MIS L4-5 Laminectomy;  Surgeon: Bernard Sheldon MD;  Location: [facility];  Service: Neurosurgery;  Laterality: N/A;  Anesthesia: General  Nerve Monitoring: EMG/SEP  Position: Prone  Bed: Eliot Frame on andres  Headrest: Prone View  Radiology: C-arm  Misc: Microscope, microinstruments    TRANSFORAMINAL EPIDURAL INJECTION OF STEROID Right 5/27/2020    Procedure: INJECTION, STEROID, EPIDURAL, TRANSFORAMINAL APPROACH;  Surgeon: Ramiro Calvillo MD;  Location: [facility];  Service: Pain Management;  Laterality: Right;  Right TF ASHISH L4-L5, L5-S1    PTcannot come earlier than 12:30    TRANSFORAMINAL EPIDURAL INJECTION OF STEROID Right 9/9/2020    Procedure: INJECTION, STEROID, EPIDURAL, TRANSFORAMINAL APPROACH;  Surgeon: Ramiro Calvillo MD;  Location: [facility];  Service: Pain Management;  Laterality: Right;  RIGHT TF ASHISH L4/5 and L5/S1       Family History:  Family History   Problem Relation Age of Onset    Heart attack Father     Cataracts Father     Heart disease Maternal Uncle     Heart disease Paternal Uncle     Hypertension Neg Hx     Asthma Neg Hx     Emphysema Neg Hx     Amblyopia Neg Hx     Blindness Neg Hx     Macular degeneration Neg Hx     Retinal detachment Neg Hx     Strabismus Neg Hx        Social History:  Social History     Socioeconomic History    Marital status:     Tobacco Use    Smoking status: Former     Current packs/day: 0.00     Average packs/day: 1 pack/day for 40.0 years (40.0 ttl pk-yrs)     Types: Cigarettes     Start date: 1974     Quit date: 2014     Years since quittin.7     Passive exposure: Past    Smokeless tobacco: Never   Substance and Sexual Activity    Alcohol use: Yes     Comment: socially    Drug use: No     Social Determinants of Health     Financial Resource Strain: Low Risk  (2023)    Overall Financial Resource Strain (CARDIA)     Difficulty of Paying Living Expenses: Not hard at all   Food Insecurity: No Food Insecurity (2023)    Hunger Vital Sign     Worried About Running Out of Food in the Last Year: Never true     Ran Out of Food in the Last Year: Never true   Transportation Needs: No Transportation Needs (2023)    PRAPARE - Transportation     Lack of Transportation (Medical): No     Lack of Transportation (Non-Medical): No   Physical Activity: Unknown (2023)    Exercise Vital Sign     Days of Exercise per Week: 0 days   Stress: No Stress Concern Present (2023)    [employer] - Occupational Stress Questionnaire     Feeling of Stress : Not at all   Social Connections: Unknown (2023)    Social Connection and Isolation Panel [NHANES]     Frequency of Communication with Friends and Family: Once a week     Frequency of Social Gatherings with Friends and Family: Twice a week     Active Member of Clubs or Organizations: No     Attends Club or Organization Meetings: Never     Marital Status:    Housing Stability: Low Risk  (2023)    Housing Stability Vital Sign     Unable to Pay for Housing in the Last Year: No     Number of Places Lived in the Last Year: 1     Unstable Housing in the Last Year: No     CMP  Sodium   Date Value Ref Range Status   10/20/2023 142 136 - 145 mmol/L Final     Potassium   Date Value Ref Range Status   10/20/2023 3.6 3.5 - 5.1 mmol/L Final  "    Chloride   Date Value Ref Range Status   10/20/2023 106 95 - 110 mmol/L Final     CO2   Date Value Ref Range Status   10/20/2023 25 23 - 29 mmol/L Final     Glucose   Date Value Ref Range Status   10/20/2023 122 (H) 70 - 110 mg/dL Final     BUN   Date Value Ref Range Status   10/20/2023 20 8 - 23 mg/dL Final     Creatinine   Date Value Ref Range Status   10/20/2023 1.1 0.5 - 1.4 mg/dL Final     Calcium   Date Value Ref Range Status   10/20/2023 10.2 8.7 - 10.5 mg/dL Final     Total Protein   Date Value Ref Range Status   10/20/2023 6.6 6.0 - 8.4 g/dL Final     Albumin   Date Value Ref Range Status   10/20/2023 3.6 3.5 - 5.2 g/dL Final     Total Bilirubin   Date Value Ref Range Status   10/20/2023 0.7 0.1 - 1.0 mg/dL Final     Comment:     For infants and newborns, interpretation of results should be based  on gestational age, weight and in agreement with clinical  observations.    Premature Infant recommended reference ranges:  Up to 24 hours.............<8.0 mg/dL  Up to 48 hours............<12.0 mg/dL  3-5 days..................<15.0 mg/dL  6-29 days.................<15.0 mg/dL       Alkaline Phosphatase   Date Value Ref Range Status   10/20/2023 80 55 - 135 U/L Final     AST   Date Value Ref Range Status   10/20/2023 20 10 - 40 U/L Final     ALT   Date Value Ref Range Status   10/20/2023 8 (L) 10 - 44 U/L Final     Anion Gap   Date Value Ref Range Status   10/20/2023 11 8 - 16 mmol/L Final     eGFR   Date Value Ref Range Status   10/20/2023 >60.0 >60 mL/min/1.73 m^2 Final         OBJECTIVE:    BP (!) 140/79   Temp 98 °F (36.7 °C)   Resp 18   Ht 5' 5" (1.651 m)   Wt 59.7 kg (131 lb 9.8 oz)   SpO2 100%   BMI 21.90 kg/m²     PHYSICAL EXAMINATION:  General appearance: Well appearing, in no acute distress, alert and appropriately communicative.  Psych:  Mood and affect appropriate.  Skin: Skin color, texture, turgor normal, no rashes or lesions, in both upper and lower body.  Head/face:  Atraumatic, " normocephalic.  Cor: regular rate  Pulm: non-labored breathing  GI: Abdomen non-distended and non-tender.  Back: Straight leg raising in the sitting and supine positions is negative to radicular pain.  pain to palpation over the spine and/or paraspinal muscles. Pain with lumbar extension and facet loading.  Extremities: Peripheral joint ROM is full and pain free without obvious instability or laxity in all four extremities. No deformities, edema, or skin discoloration. Good capillary refill.  Musculoskeletal: hip, sacroiliac and knee provocative maneuvers are negative. Bilateral upper and lower extremity strength is normal and symmetric.  No atrophy or tone abnormalities are noted.  Neuro: Bilateral upper and lower extremity coordination and muscle stretch reflexes are physiologic and symmetric.  Negative Clonus. No loss of sensation is noted.  Gait: wheelchair.      ASSESSMENT: 78 y.o. year old male with lower back pain, consistent with:     1. Myofascial pain syndrome        2. Lumbar radiculopathy  Ambulatory referral/consult to Physical/Occupational Therapy    pregabalin (LYRICA) 75 MG capsule      3. DDD (degenerative disc disease), lumbar  Ambulatory referral/consult to Physical/Occupational Therapy    pregabalin (LYRICA) 75 MG capsule      4. Lumbar spondylosis            IMPRESSION: Mr. Kellogg presents today for chronic lower back pain.  He has had interventions in the past, as well as an L4/5 laminectomy with Dr. Sheldon in February of 2021.  He had physical therapy at that time, however he has not participated in physical therapy or home exercises since around the time of his surgery.  History and physical exam are consistent with facetogenic pain and myofascial pain syndrome.  Imaging is consistent with lumbar spondylosis, lumbar spondylolysis, and iliopsoas bursitis.  We will start with conservative measures, including physical therapy, for now.  We will consider interventions if he does not improve  with physical therapy and medication management.    PLAN:   - I have stressed the importance of physical activity and a home exercise plan to help with pain and improve health.  - Patient can continue with medications for now since they are providing benefits, using them appropriately, and without side effects.  - referral to physical therapy for lower back pain  - start pregabalin 25mg twice a day  - ok to take Advil if his cardiologist is ok with it  - RTC in 6-8 weeks to discuss repeat medial branch blocks, if indicated at that time.  - Counseled patient regarding the importance of activity modification and physical therapy.    The above plan and management options were discussed at length with patient. Patient is in agreement with the above and verbalized understanding.    Stacie Brice  10/23/2023

## 2023-10-23 NOTE — TELEPHONE ENCOUNTER
----- Message from Amilcar Cross MA sent at 10/23/2023  9:30 AM CDT -----    ----- Message -----  From: Letty Aguiar MA  Sent: 10/23/2023   9:29 AM CDT  To: DELORES Lew, Amilcar    Can you assist me in scheduling this patient to see a doctor that specializes in parkinson's disease.    Thank you,  Letty  ----- Message -----  From: Winston Trejo  Sent: 10/23/2023   9:07 AM CDT  To: [facility] Staff    Type:  Patient Requesting Referral    Who Called:pt's daughter Kelly   Referral to What Specialty:neurology   Reason for Referral:parkinson's diease   Does the patient want the referral with a specific physician?:Dr. Rivero  [facility]   Patient Requesting a Response?:yes   Would the patient rather a call back or a response via [facility]? Call   Best Call Back Number:   Additional Information:     Referred by daughter (edel yadav)  Books were closed

## 2023-10-24 ENCOUNTER — PATIENT MESSAGE (OUTPATIENT)
Dept: INTERNAL MEDICINE | Facility: CLINIC | Age: 78
End: 2023-10-24
Payer: MEDICARE

## 2023-10-24 DIAGNOSIS — I10 ESSENTIAL HYPERTENSION: Primary | ICD-10-CM

## 2023-10-24 DIAGNOSIS — R30.0 DYSURIA: ICD-10-CM

## 2023-10-24 PROBLEM — R29.898 WEAKNESS OF BOTH LOWER EXTREMITIES: Status: ACTIVE | Noted: 2023-10-24

## 2023-10-24 PROBLEM — M62.81 WEAKNESS OF TRUNK MUSCULATURE: Status: ACTIVE | Noted: 2023-10-24

## 2023-10-24 PROBLEM — Z74.09 IMPAIRED FUNCTIONAL MOBILITY, BALANCE, GAIT, AND ENDURANCE: Status: ACTIVE | Noted: 2023-10-24

## 2023-10-24 PROBLEM — M25.69 DECREASED RANGE OF MOTION OF TRUNK AND BACK: Status: ACTIVE | Noted: 2023-10-24

## 2023-10-27 NOTE — PATIENT INSTRUCTIONS
- Nephrology consulted  - HD per nephrology  - Continue home sevelamer     ADD MELATONIN 5-10 MG FOR SLEEP AT NIGHT        Eliu Klein MD, MPH  Division of Movement and Memory Disorders  [facility]

## 2023-10-31 NOTE — PROGRESS NOTES
Subjective:   Patient ID:  Hcetor Kellogg is a 78 y.o. male who presents for follow-up of Follow-up    PROBLEM LIST:  CAD s/p PCI OM1, RCA 2015  HTN  HLD  Parkinsons Disease      HPI: I last saw Mr. Kellogg in 2019.  He has been doing well from a cardiovascular standpoint but recently underwent lithotripsy for renal stone.  Has chronic back pain for which she is been taking ibuprofen 800 mg twice daily.  Was also recently started on Lyrica.  His blood pressure has been elevated since starting the ibuprofen.  He denies any chest pain, shortness of breath, palpitations, or syncope.    ECG 15-SEP-2023 19:40:34: Personally reviewed by me shows NSR with RBBB    ALFONSO 7/19:  Conclusion    Normal resting ALFONSO's.  Normal PVR's.    Echo 12/15:  CONCLUSIONS     1 - Normal left ventricular systolic function (EF 55-60%).     2 - Normal right ventricular systolic function .     3 - Normal left ventricular diastolic function.     4 - The estimated PA systolic pressure is 34 mmHg.     Past Medical History:   Diagnosis Date    Cataract     Chronic back pain greater than 3 months duration     COPD (chronic obstructive pulmonary disease)     Coronary artery disease     3 stents    DDD (degenerative disc disease), lumbar 05/16/2020    Glaucoma suspect of both eyes     Hyperlipidemia     Hypertension     MI, old 2006    Parkinson disease     Renal disorder     Kidney stones       Past Surgical History:   Procedure Laterality Date    CARDIAC CATHETERIZATION  2006    x 3    CARDIAC CATHETERIZATION  10/11/2015    x 2    CATARACT EXTRACTION W/  INTRAOCULAR LENS IMPLANT Right 03/06/2018    Dr. Liu    CORONARY ANGIOPLASTY  2006, 2015    3 stents    CYSTOSCOPY W/ URETERAL STENT PLACEMENT Left 9/13/2023    Procedure: CYSTOSCOPY, WITH URETERAL STENT PLACEMENT;  Surgeon: Jerel Cullen MD;  Location: [facility] OR [address] [place] [address];  Service: Urology;  Laterality: Left;  1.5 HR    EXTRACORPOREAL SHOCK WAVE LITHOTRIPSY Left 4/20/2021    Procedure:  LITHOTRIPSY-EXTRACORPOREAL SHOCK WAVE;  Surgeon: Ottoniel Obando MD;  Location: [facility] OR;  Service: Urology;  Laterality: Left;    EXTRACORPOREAL SHOCK WAVE LITHOTRIPSY Left 9/13/2023    Procedure: LITHOTRIPSY, ESWL;  Surgeon: Jerel Cullen MD;  [facility];  Service: Urology;  Laterality: Left;    EYE SURGERY      cataracts bilateral    HEMORRHOID SURGERY      INJECTION OF ANESTHETIC AGENT AROUND NERVE Bilateral 11/4/2020    Procedure: BLOCK, NERVE BILATERAL L2,3,4,5;  Surgeon: Ramiro Calvillo MD;  Location: [facility] PAIN MGT;  Service: Pain Management;  Laterality: Bilateral;  BLOCK, NERVE BILATERAL L2,3,4,5    INJECTION OF FACET JOINT Right 8/5/2020    Procedure: INJECTION, FACET JOINT, L4-L5 , L5-S1;  Surgeon: Ramiro Calvillo MD;  Location: [facility] PAIN [facility]GT;  Service: Pain Management;  Laterality: Right;    LAMINOFORAMINOTOMY OF SPINE USING MINIMALLY INVASIVE TECHNIQUE N/A 2/1/2021    Procedure: MIS L4-5 Laminectomy;  Surgeon: Bernard Sheldon MD;  Location: [facility] OR;  Service: Neurosurgery;  Laterality: N/A;  Anesthesia: General  Nerve Monitoring: EMG/SEP  Position: Prone  Bed: Eliot Frame on andres  Headrest: Prone View  Radiology: C-arm  Misc: Microscope, microinstruments    TRANSFORAMINAL EPIDURAL INJECTION OF STEROID Right 5/27/2020    Procedure: INJECTION, STEROID, EPIDURAL, TRANSFORAMINAL APPROACH;  Surgeon: Ramiro Calvillo MD;  Location: [facility]IN [facility]GT;  Service: Pain Management;  Laterality: Right;  Right TF ASHISH L4-L5, L5-S1    PTcannot come earlier than 12:30    TRANSFORAMINAL EPIDURAL INJECTION OF STEROID Right 9/9/2020    Procedure: INJECTION, STEROID, EPIDURAL, TRANSFORAMINAL APPROACH;  Surgeon: Ramiro Calvillo MD;  Location: [facility]IN [facility];  Service: Pain Management;  Laterality: Right;  RIGHT TF ASHISH L4/5 and L5/S1       Social History     Socioeconomic History    Marital status:    Tobacco Use    Smoking status: Former     Current packs/day: 0.00     Average packs/day: 1  pack/day for 40.0 years (40.0 ttl pk-yrs)     Types: Cigarettes     Start date: 1974     Quit date: 2014     Years since quittin.7     Passive exposure: Past    Smokeless tobacco: Never   Substance and Sexual Activity    Alcohol use: Yes     Comment: socially    Drug use: No     Social Determinants of Health     Financial Resource Strain: Low Risk  (2023)    Overall Financial Resource Strain (CARDIA)     Difficulty of Paying Living Expenses: Not hard at all   Food Insecurity: No Food Insecurity (2023)    Hunger Vital Sign     Worried About Running Out of Food in the Last Year: Never true     Ran Out of Food in the Last Year: Never true   Transportation Needs: No Transportation Needs (2023)    PRAPARE - Transportation     Lack of Transportation (Medical): No     Lack of Transportation (Non-Medical): No   Physical Activity: Unknown (2023)    Exercise Vital Sign     Days of Exercise per Week: 0 days   Stress: Stress Concern Present (2023)    [employer] [place] [employer] - Occupational Stress Questionnaire     Feeling of Stress : To some extent   Social Connections: Unknown (2023)    Social Connection and Isolation Panel [NHANES]     Frequency of Communication with Friends and Family: Twice a week     Frequency of Social Gatherings with Friends and Family: Three times a week     Active Member of Clubs or Organizations: No     Attends Club or Organization Meetings: Never     Marital Status:    Housing Stability: Low Risk  (2023)    Housing Stability Vital Sign     Unable to Pay for Housing in the Last Year: No     Number of Places Lived in the Last Year: 1     Unstable Housing in the Last Year: No       Family History   Problem Relation Age of Onset    Heart attack Father     Cataracts Father     Heart disease Maternal Uncle     Heart disease Paternal Uncle     Hypertension Neg Hx     Asthma Neg Hx     Emphysema Neg Hx     Amblyopia Neg Hx     Blindness  Neg Hx     Macular degeneration Neg Hx     Retinal detachment Neg Hx     Strabismus Neg Hx        Patient's Medications   New Prescriptions    No medications on file   Previous Medications    ALBUTEROL (PROVENTIL/VENTOLIN HFA) 90 MCG/ACTUATION INHALER    Inhale 2 puffs into the lungs every 6 (six) hours as needed for Wheezing or Shortness of Breath. Rescue    ASPIRIN (ECOTRIN) 81 MG EC TABLET    Take 81 mg by mouth once daily.    BREZTRI AEROSPHERE 160-9-4.8 MCG/ACTUATION HFAA    Inhale 2 puffs into the lungs 2 (two) times a day.    CARBIDOPA-LEVODOPA  MG (SINEMET)  MG PER TABLET    Take 1.5 tablets by mouth 5 (five) times daily.    CITALOPRAM (CELEXA) 20 MG TABLET    Take 1 tablet by mouth once daily    FINASTERIDE (PROSCAR) 5 MG TABLET    Take 1 tablet (5 mg total) by mouth once daily.    IBUPROFEN (ADVIL,MOTRIN) 800 MG TABLET    Take 800 mg by mouth every 6 (six) hours as needed.    LORATADINE (CLARITIN) 10 MG TABLET    Take 10 mg by mouth as needed for Allergies.    METOPROLOL SUCCINATE (TOPROL-XL) 50 MG 24 HR TABLET    Take 1 tablet by mouth once daily    MULTIVITAMIN CAPSULE    Take 1 capsule by mouth once daily.    OXYBUTYNIN (DITROPAN) 5 MG TAB    Take 1 tablet (5 mg total) by mouth 3 (three) times daily as needed.    PREGABALIN (LYRICA) 75 MG CAPSULE    Take 1 capsule (75 mg total) by mouth 2 (two) times daily.    ROSUVASTATIN (CRESTOR) 40 MG TAB    TAKE 1 TABLET BY MOUTH NIGHTLY AT BEDTIME    TRAMADOL (ULTRAM) 50 MG TABLET    Take 1 tablet (50 mg total) by mouth every 6 (six) hours as needed for Pain.   Modified Medications    No medications on file   Discontinued Medications    No medications on file       Review of Systems   Constitutional: Negative for malaise/fatigue and weight gain.   HENT:  Negative for hearing loss.    Eyes:  Negative for visual disturbance.   Cardiovascular:  Negative for chest pain, leg swelling, near-syncope, orthopnea, palpitations, paroxysmal nocturnal dyspnea and  "syncope.   Respiratory:  Negative for cough, shortness of breath, sleep disturbances due to breathing, snoring and wheezing.    Endocrine: Negative for cold intolerance, heat intolerance, polydipsia, polyphagia and polyuria.   Hematologic/Lymphatic: Negative for bleeding problem. Does not bruise/bleed easily.   Skin:  Negative for rash and suspicious lesions.   Musculoskeletal:  Positive for back pain. Negative for arthritis, falls, joint pain, muscle weakness and myalgias.   Gastrointestinal:  Negative for abdominal pain, change in bowel habit, constipation, diarrhea, heartburn, hematochezia, melena and nausea.   Genitourinary:  Negative for hematuria and nocturia.   Neurological:  Positive for loss of balance and tremors. Negative for excessive daytime sleepiness, dizziness, headaches, light-headedness and weakness.   Psychiatric/Behavioral:  Negative for depression. The patient is not nervous/anxious.    Allergic/Immunologic: Negative for environmental allergies.       BP (!) 150/78   Pulse 77   Ht 5' 5" (1.651 m)   Wt 62.1 kg (136 lb 14.5 oz)   SpO2 98%   BMI 22.78 kg/m²     Objective:   Physical Exam  Constitutional:       Appearance: He is well-developed.      Comments:      HENT:      Head: Normocephalic and atraumatic.   Eyes:      General: No scleral icterus.     Conjunctiva/sclera: Conjunctivae normal.      Pupils: Pupils are equal, round, and reactive to light.   Neck:      Thyroid: No thyromegaly.      Vascular: No hepatojugular reflux or JVD.      Trachea: No tracheal deviation.   Cardiovascular:      Rate and Rhythm: Normal rate and regular rhythm.      Chest Wall: PMI is not displaced.      Pulses: Intact distal pulses.           Carotid pulses are 2+ on the right side and 2+ on the left side.       Radial pulses are 2+ on the right side and 2+ on the left side.        Dorsalis pedis pulses are 2+ on the right side and 2+ on the left side.        Posterior tibial pulses are 2+ on the right side " and 2+ on the left side.      Heart sounds: Normal heart sounds.   Pulmonary:      Effort: Pulmonary effort is normal.      Breath sounds: Normal breath sounds.   Abdominal:      General: Bowel sounds are normal. There is no distension.      Palpations: Abdomen is soft. There is no mass.      Tenderness: There is no abdominal tenderness.   Musculoskeletal:         General: No tenderness.      Cervical back: Normal range of motion and neck supple.   Lymphadenopathy:      Cervical: No cervical adenopathy.   Skin:     General: Skin is warm and dry.      Findings: No erythema or rash.      Nails: There is no clubbing.   Neurological:      Mental Status: He is alert and oriented to person, place, and time.      Comments: Resting tremor present   Psychiatric:         Speech: Speech normal.         Behavior: Behavior normal.         Lab Results   Component Value Date     10/20/2023    K 3.6 10/20/2023     10/20/2023    CO2 25 10/20/2023    BUN 20 10/20/2023    CREATININE 1.1 10/20/2023     (H) 10/20/2023    HGBA1C 5.7 (H) 07/14/2023    MG 2.1 12/31/2016    AST 20 10/20/2023    ALT 8 (L) 10/20/2023    ALBUMIN 3.6 10/20/2023    PROT 6.6 10/20/2023    BILITOT 0.7 10/20/2023    WBC 6.11 10/20/2023    HGB 14.6 10/20/2023    HCT 44.4 10/20/2023     (H) 10/20/2023     10/20/2023    INR 1.0 01/13/2021    TSH 1.593 07/14/2023    CHOL 161 07/14/2023    HDL 46 07/14/2023    LDLCALC 94.6 07/14/2023    TRIG 102 07/14/2023    BNP 48 12/30/2016       Assessment:     1. Parkinson's disease :  He remains on Sinemet and follows with neurology.   2. Centrilobular emphysema :  He is on Breztri and follows with Pulmonary   3. Coronary artery disease involving native coronary artery of native heart without angina pectoris :  He is asymptomatic with preserved ejection fraction.  Continue aspirin and rosuvastatin.   4. Mixed hyperlipidemia :  His recent LDL was above goal of less than 70.  I have given him a  prescription to start Zetia in addition to his rosuvastatin with follow-up labs in 6 weeks time.   5. Essential hypertension :  His blood pressures have been elevated since starting ibuprofen.  We discussed trying tramadol in place of the ibuprofen and monitoring his blood pressure.  If he continues to be running greater than 130/80 he will notify me so we can start an additional antihypertensive agent.                   Plan:     Hector was seen today for follow-up.    Diagnoses and all orders for this visit:    Parkinson's disease with dyskinesia, unspecified whether manifestations fluctuate    Coronary artery disease involving native coronary artery of native heart without angina pectoris    Pure hypercholesterolemia    Essential hypertension        Thank you for allowing me to participate in this patient's care. Please do not hesitate to contact me with any questions or concerns.

## 2023-11-02 ENCOUNTER — PATIENT MESSAGE (OUTPATIENT)
Dept: INTERNAL MEDICINE | Facility: CLINIC | Age: 78
End: 2023-11-02
Payer: MEDICARE

## 2023-11-02 ENCOUNTER — OFFICE VISIT (OUTPATIENT)
Dept: CARDIOLOGY | Facility: CLINIC | Age: 78
End: 2023-11-02
Payer: MEDICARE

## 2023-11-02 VITALS
DIASTOLIC BLOOD PRESSURE: 78 MMHG | HEIGHT: 65 IN | BODY MASS INDEX: 22.81 KG/M2 | HEART RATE: 77 BPM | WEIGHT: 136.88 LBS | OXYGEN SATURATION: 98 % | SYSTOLIC BLOOD PRESSURE: 150 MMHG

## 2023-11-02 DIAGNOSIS — I25.10 CORONARY ARTERY DISEASE INVOLVING NATIVE CORONARY ARTERY OF NATIVE HEART WITHOUT ANGINA PECTORIS: ICD-10-CM

## 2023-11-02 DIAGNOSIS — N20.0 BILATERAL RENAL STONES: ICD-10-CM

## 2023-11-02 DIAGNOSIS — E78.00 PURE HYPERCHOLESTEROLEMIA: ICD-10-CM

## 2023-11-02 DIAGNOSIS — M54.50 ACUTE RIGHT-SIDED LOW BACK PAIN WITHOUT SCIATICA: ICD-10-CM

## 2023-11-02 DIAGNOSIS — G20.B1 PARKINSON'S DISEASE WITH DYSKINESIA, UNSPECIFIED WHETHER MANIFESTATIONS FLUCTUATE: Primary | ICD-10-CM

## 2023-11-02 DIAGNOSIS — I10 ESSENTIAL HYPERTENSION: ICD-10-CM

## 2023-11-02 PROCEDURE — 3288F FALL RISK ASSESSMENT DOCD: CPT | Mod: CPTII,S$GLB,, | Performed by: INTERNAL MEDICINE

## 2023-11-02 PROCEDURE — 3077F SYST BP >= 140 MM HG: CPT | Mod: CPTII,S$GLB,, | Performed by: INTERNAL MEDICINE

## 2023-11-02 PROCEDURE — 1126F PR PAIN SEVERITY QUANTIFIED, NO PAIN PRESENT: ICD-10-PCS | Mod: CPTII,S$GLB,, | Performed by: INTERNAL MEDICINE

## 2023-11-02 PROCEDURE — 1159F PR MEDICATION LIST DOCUMENTED IN MEDICAL RECORD: ICD-10-PCS | Mod: CPTII,S$GLB,, | Performed by: INTERNAL MEDICINE

## 2023-11-02 PROCEDURE — 3288F PR FALLS RISK ASSESSMENT DOCUMENTED: ICD-10-PCS | Mod: CPTII,S$GLB,, | Performed by: INTERNAL MEDICINE

## 2023-11-02 PROCEDURE — 3077F PR MOST RECENT SYSTOLIC BLOOD PRESSURE >= 140 MM HG: ICD-10-PCS | Mod: CPTII,S$GLB,, | Performed by: INTERNAL MEDICINE

## 2023-11-02 PROCEDURE — 1159F MED LIST DOCD IN RCRD: CPT | Mod: CPTII,S$GLB,, | Performed by: INTERNAL MEDICINE

## 2023-11-02 PROCEDURE — 99214 OFFICE O/P EST MOD 30 MIN: CPT | Mod: S$GLB,,, | Performed by: INTERNAL MEDICINE

## 2023-11-02 PROCEDURE — 1126F AMNT PAIN NOTED NONE PRSNT: CPT | Mod: CPTII,S$GLB,, | Performed by: INTERNAL MEDICINE

## 2023-11-02 PROCEDURE — 3078F DIAST BP <80 MM HG: CPT | Mod: CPTII,S$GLB,, | Performed by: INTERNAL MEDICINE

## 2023-11-02 PROCEDURE — 99999 PR PBB SHADOW E&M-EST. PATIENT-LVL IV: CPT | Mod: PBBFAC,,, | Performed by: INTERNAL MEDICINE

## 2023-11-02 PROCEDURE — 99214 PR OFFICE/OUTPT VISIT, EST, LEVL IV, 30-39 MIN: ICD-10-PCS | Mod: S$GLB,,, | Performed by: INTERNAL MEDICINE

## 2023-11-02 PROCEDURE — 1100F PTFALLS ASSESS-DOCD GE2>/YR: CPT | Mod: CPTII,S$GLB,, | Performed by: INTERNAL MEDICINE

## 2023-11-02 PROCEDURE — 3078F PR MOST RECENT DIASTOLIC BLOOD PRESSURE < 80 MM HG: ICD-10-PCS | Mod: CPTII,S$GLB,, | Performed by: INTERNAL MEDICINE

## 2023-11-02 PROCEDURE — 99999 PR PBB SHADOW E&M-EST. PATIENT-LVL IV: ICD-10-PCS | Mod: PBBFAC,,, | Performed by: INTERNAL MEDICINE

## 2023-11-02 PROCEDURE — 1100F PR PT FALLS ASSESS DOC 2+ FALLS/FALL W/INJURY/YR: ICD-10-PCS | Mod: CPTII,S$GLB,, | Performed by: INTERNAL MEDICINE

## 2023-11-02 RX ORDER — EZETIMIBE 10 MG/1
10 TABLET ORAL DAILY
Qty: 90 TABLET | Refills: 3 | Status: SHIPPED | OUTPATIENT
Start: 2023-11-02 | End: 2024-11-01

## 2023-11-02 RX ORDER — TRAMADOL HYDROCHLORIDE 50 MG/1
50 TABLET ORAL EVERY 6 HOURS PRN
Qty: 45 TABLET | Refills: 1 | Status: SHIPPED | OUTPATIENT
Start: 2023-11-02 | End: 2023-12-14

## 2023-11-06 ENCOUNTER — PATIENT MESSAGE (OUTPATIENT)
Dept: INTERNAL MEDICINE | Facility: CLINIC | Age: 78
End: 2023-11-06
Payer: MEDICARE

## 2023-11-06 ENCOUNTER — PATIENT MESSAGE (OUTPATIENT)
Dept: NEUROLOGY | Facility: CLINIC | Age: 78
End: 2023-11-06
Payer: MEDICARE

## 2023-11-06 ENCOUNTER — TELEPHONE (OUTPATIENT)
Dept: NEUROLOGY | Facility: CLINIC | Age: 78
End: 2023-11-06
Payer: MEDICARE

## 2023-11-06 ENCOUNTER — PATIENT MESSAGE (OUTPATIENT)
Dept: PAIN MEDICINE | Facility: CLINIC | Age: 78
End: 2023-11-06
Payer: MEDICARE

## 2023-11-06 DIAGNOSIS — M25.551 PAIN OF RIGHT HIP: Primary | ICD-10-CM
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
## 2023-11-06 NOTE — TELEPHONE ENCOUNTER
----- Message from Jasmyn Ram sent at 11/6/2023  8:07 AM CST -----  Regarding: earlier appt  Contact: [phone]  PATIENTCALL     Pt daughter Kelly is calling to speak with someone in provider office regarding her father she want to see if she can get him in sooner states that he has been having many falls and he is seeing things she is asking for a return call please call pt at  [phone] or call his wife number on chart

## 2023-11-07 ENCOUNTER — PATIENT MESSAGE (OUTPATIENT)
Dept: PAIN MEDICINE | Facility: CLINIC | Age: 78
End: 2023-11-07
Payer: MEDICARE

## 2023-11-07 ENCOUNTER — PATIENT MESSAGE (OUTPATIENT)
Dept: INTERNAL MEDICINE | Facility: CLINIC | Age: 78
End: 2023-11-07
Payer: MEDICARE

## 2023-11-08 ENCOUNTER — TELEPHONE (OUTPATIENT)
Dept: NEUROLOGY | Facility: CLINIC | Age: 78
End: 2023-11-08
Payer: MEDICARE

## 2023-11-08 NOTE — TELEPHONE ENCOUNTER
----- Message from Amanda Guillen sent at 11/8/2023  2:10 PM CST -----  Regarding: Pt advice  Contact: [phone]  Pt daughter is calling to confirm appt for tomorrow at 4. Please call and add it to the pt portal .

## 2023-11-09 ENCOUNTER — OFFICE VISIT (OUTPATIENT)
Dept: NEUROLOGY | Facility: CLINIC | Age: 78
End: 2023-11-09
Payer: MEDICARE

## 2023-11-09 VITALS
BODY MASS INDEX: 22.81 KG/M2 | WEIGHT: 136.88 LBS | HEIGHT: 65 IN | DIASTOLIC BLOOD PRESSURE: 95 MMHG | HEART RATE: 94 BPM | SYSTOLIC BLOOD PRESSURE: 164 MMHG

## 2023-11-09 DIAGNOSIS — G20.A2 PARKINSON'S DISEASE WITHOUT DYSKINESIA, WITH FLUCTUATING MANIFESTATIONS: Primary | ICD-10-CM

## 2023-11-09 DIAGNOSIS — F05 DELIRIUM SECONDARY TO MULTIPLE MEDICAL PROBLEMS: ICD-10-CM

## 2023-11-09 PROCEDURE — 3080F DIAST BP >= 90 MM HG: CPT | Mod: CPTII,S$GLB,, | Performed by: PSYCHIATRY & NEUROLOGY

## 2023-11-09 PROCEDURE — 1125F AMNT PAIN NOTED PAIN PRSNT: CPT | Mod: CPTII,S$GLB,, | Performed by: PSYCHIATRY & NEUROLOGY

## 2023-11-09 PROCEDURE — 99215 OFFICE O/P EST HI 40 MIN: CPT | Mod: S$GLB,,, | Performed by: PSYCHIATRY & NEUROLOGY

## 2023-11-09 PROCEDURE — 3077F SYST BP >= 140 MM HG: CPT | Mod: CPTII,S$GLB,, | Performed by: PSYCHIATRY & NEUROLOGY

## 2023-11-09 PROCEDURE — 3288F FALL RISK ASSESSMENT DOCD: CPT | Mod: CPTII,S$GLB,, | Performed by: PSYCHIATRY & NEUROLOGY

## 2023-11-09 PROCEDURE — 99999 PR PBB SHADOW E&M-EST. PATIENT-LVL III: CPT | Mod: PBBFAC,,, | Performed by: PSYCHIATRY & NEUROLOGY

## 2023-11-09 PROCEDURE — 1100F PTFALLS ASSESS-DOCD GE2>/YR: CPT | Mod: CPTII,S$GLB,, | Performed by: PSYCHIATRY & NEUROLOGY

## 2023-11-09 NOTE — PROGRESS NOTES
Name: Hector Kellogg  MRN: [id number]   CSN: [id number]      Date: 11/09/2023    Chief Complaint / Interval History:   - worsening gait instability and falls   - some stutter of voice and walking  - lower mood at times & feels less engaged ==> this change needs to be addressed, could be bladder infection  - appetite still OK, but some weight loss  -      From May 2023  - cd/ld 1.5 in the morning and 1.5 at night, other three doses are 1 tab   - accompanied by spouse   - asks about gabapentin and memory   - previously taking 800 mg TID, wife cut back to 400 mg TID   - he feels a bit better, more clear of mind on decrease   - he has had about 5 falls due to festination of gait and goes forward   - doesn't feel lightheaded or dizzy   - sometimes gets flashes or feeling like someone is in his peripheral vision   - last dose of ldopa was at 730 am   - urinary urgency, cannot get to the bathroom on time             From Feb 2023  - more imbalance, more shuffling, 2 times to the ground, more near falls, no injuries  - showers, has seat for safety   - appetite is good, sleep is disrupted by dogs and cats, but sleeps pretty well, up twice to urinate  - more holding on and use of walker  - now on 1 tab CD/LD 5x/day, usually 8:30-11:30...every three hours  -      From 10-22:  Hx:  - last seen 7/6/22 by Yvette  --- cd/ld  mg 1.5 tablet TID unchanged  --- gabapentin 400 mg TID, but doubtful of efficacy re: pain  --- offered rasagiline 1 mg, but ultimately declined d/t cost as of last contact  - pain worsening at last visit and negatively affecting gait, grade 1 anterolisesthesis of spine noted in imaging  --- unresponsive to cd/ld and attributable in main to the above    From Jul 2022:  - last seen July 2020  - cd/ld 25/100 1.5 tabs TID -- 9 am, 2 pm and 7 pm    - tremors gets better but does not completely go away  - meds possibly wear off after about 3 hours   - biggest issue is pain, starts in the hip and radiates down  "his leg   - takes gabapentin 400 mg TID, does not feel that this helps   - festination of gait in the morning   - can't walk far   - had lumbar laminectomy in Feb 2021 has not followed up with pain management or nsgy  - did not feel that pain management helped   - pain is getting worse, it has been going on for about a year -- can't walk due to the pain   - does not feel that the pain improves with sinemet   - no new weight changes, tries to wear loose pants         From Sept 2019:  - feels like he cannot do things  - not apathy, wants to work or do things  - lung issue are limiting him from COPD  - still taking cd/ld 25/100 1 TID-5x/day  - memory is ok  - appetite is good  - no constipation now  - bladder is quick    From Oct 2018:  - still getting pinched nerve pain in the back or legs  - will take an extra gabapentin  - takes cd/ld every 3-4 hours  - gait is stable    From Summer 2017:  - a bit more off time between dosing  - peak is pretty good  - gait is frustrating  - memory holding up  - family pleased    From 12/06  1) A bit more off time in between dosing  2) More cramping when off, gabapentin helps at night  3) Would be willing to take meds four times per day    History of Present Illness (HPI):  70 yo with diagnosis of PD, symptoms began 1.5 years ago.  First noted tremor in the L hand, "all left side".  Drags his left foot, generally slower overall.  Dx 1 year ago, had [employer] as a confirmatory study.  Says he had an MRI of the low back as well, was feeling "pinched nerves" in the back and into the both legs.  Was prescribed "some drug that cost $400" and didn;t take. Also prescribed pramipexole per the notes but also said he "never took it."    Was in [place] for 2 years, now re-establishing care here.    Has questions about gabapentin - 800 now taking 1/2 tab at night only and has no significant pain.  Will ibuprofen for breakthrough.  Not taking Norco.    Retired , [employer], left at age " 65.    Nonmotor/Premotor ROS:  Hyposmia (HENT)?No per him  RBD/sleep issues (Constitutional)?Yes - acts out dreams  Depression/anxiety (Psychiatric)?No - better on Citalopram  Fatigue (Constitutional)?Yes  Constipation (GI)?Yes  - uses OTC stool softener  Urinary issues ()?Yes - urgency  Sexual dysfunction ()?Yes - some ED.    Orthostasis (Cardiovascular)?No  Leg swelling (Cardiovascular)? No  Falls (Musculoskeletal)?No  Cognitive impairment (Neurologic)?No  Psychoses (Psychiatric)?No  Pain/Paresthesia (Neurologic)?Yes  Visual changes (Eyes)?No  Moles / skin changes (Skin)?Yes - seborrhea, has a scaly mole on the R side of the head  Stridor / SOB (Pulm)?Yes - with acticity  Bruising (Heme)?No    Past Medical History: The patient  has a past medical history of Cataract, Chronic back pain greater than 3 months duration, COPD (chronic obstructive pulmonary disease), Coronary artery disease, DDD (degenerative disc disease), lumbar (05/16/2020), Glaucoma suspect of both eyes, Hyperlipidemia, Hypertension, MI, old (2006), Parkinson disease, and Renal disorder.    Social History: The patient  reports that he quit smoking about 9 years ago. His smoking use included cigarettes. He started smoking about 49 years ago. He has a 40.0 pack-year smoking history. He has been exposed to tobacco smoke. He has never used smokeless tobacco. He reports current alcohol use. He reports that he does not use drugs.  Rare social drink.    Family History: Their family history includes Cataracts in his father; Heart attack in his father; Heart disease in his maternal uncle and paternal uncle.    Allergies: Influenza virus vaccines     Meds:   Current Outpatient Medications on File Prior to Visit   Medication Sig Dispense Refill    albuterol (PROVENTIL/VENTOLIN HFA) 90 mcg/actuation inhaler Inhale 2 puffs into the lungs every 6 (six) hours as needed for Wheezing or Shortness of Breath. Rescue 18 g 6    aspirin (ECOTRIN) 81 MG EC tablet Take  81 mg by mouth once daily.      BREZTRI AEROSPHERE 160-9-4.8 mcg/actuation HFAA Inhale 2 puffs into the lungs 2 (two) times a day. 10.7 g 11    carbidopa-levodopa  mg (SINEMET)  mg per tablet Take 1.5 tablets by mouth 5 (five) times daily. 675 tablet 3    citalopram (CELEXA) 20 MG tablet Take 1 tablet by mouth once daily 30 tablet 11    ezetimibe (ZETIA) 10 mg tablet Take 1 tablet (10 mg total) by mouth once daily. 90 tablet 3    finasteride (PROSCAR) 5 mg tablet Take 1 tablet (5 mg total) by mouth once daily. 30 tablet 11    ibuprofen (ADVIL,MOTRIN) 800 MG tablet Take 800 mg by mouth every 6 (six) hours as needed.      loratadine (CLARITIN) 10 mg tablet Take 10 mg by mouth as needed for Allergies.      metoprolol succinate (TOPROL-XL) 50 MG 24 hr tablet Take 1 tablet by mouth once daily 90 tablet 3    multivitamin capsule Take 1 capsule by mouth once daily.      oxybutynin (DITROPAN) 5 MG Tab Take 1 tablet (5 mg total) by mouth 3 (three) times daily as needed. (Patient not taking: Reported on 10/23/2023) 90 tablet 0    pregabalin (LYRICA) 75 MG capsule Take 1 capsule (75 mg total) by mouth 2 (two) times daily. 60 capsule 2    rosuvastatin (CRESTOR) 40 MG Tab TAKE 1 TABLET BY MOUTH NIGHTLY AT BEDTIME 90 tablet 3    traMADoL (ULTRAM) 50 mg tablet Take 1 tablet (50 mg total) by mouth every 6 (six) hours as needed for Pain. 45 tablet 1     No current facility-administered medications on file prior to visit.     Exam:  There were no vitals taken for this visit.    Constitutional  Well-developed, well-nourished, appears stated age   * Specialized movement exam  Mild hypophonic speech.    Mild facial masking.   Automatism of the left arm   L>R cogwheel rigidity - mild     L>R bradykinesia - mild.   bilateral resting tremor, L > R   dystonic posturing of R foot-- mild dyskinesia    No other dystonia, chorea, athetosis, myoclonus, or tics.   No motor impersistence.   Normal-based gait.   + mildly abnormal arm  swing B, worse on the L  Good stride length, no freezing   No postural instability.      Laboratory/Radiological:  - Results:  Lab Visit on 10/30/2023   Component Date Value Ref Range Status    Specimen UA 10/30/2023 Urine, Clean Catch   Final    Color, UA 10/30/2023 Yellow  Yellow, Straw, Yessenia Final    Appearance, UA 10/30/2023 Clear  Clear Final    pH, UA 10/30/2023 7.0  5.0 - 8.0 Final    Specific Gravity, UA 10/30/2023 1.015  1.005 - 1.030 Final    Protein, UA 10/30/2023 1+ (A)  Negative Final    Glucose, UA 10/30/2023 Negative  Negative Final    Ketones, UA 10/30/2023 Negative  Negative Final    Bilirubin (UA) 10/30/2023 Negative  Negative Final    Occult Blood UA 10/30/2023 1+ (A)  Negative Final    Nitrite, UA 10/30/2023 Negative  Negative Final    Urobilinogen, UA 10/30/2023 Negative  Negative EU/dL Final    Leukocytes, UA 10/30/2023 2+ (A)  Negative Final    RBC, UA 10/30/2023 9 (H)  0 - 4 /hpf Final    WBC, UA 10/30/2023 15 (H)  0 - 5 /hpf Final    Bacteria 10/30/2023 Rare  None-Occ /hpf Final    Squam Epithel, UA 10/30/2023 0  /hpf Final    Hyaline Casts, UA 10/30/2023 1  0-1/lpf /lpf Final    Microscopic Comment 10/30/2023 SEE COMMENT   Final    Urine Culture, Routine 10/30/2023 No growth   Final   Admission on 10/20/2023, Discharged on 10/20/2023   Component Date Value Ref Range Status    WBC 10/20/2023 6.11  3.90 - 12.70 K/uL Final    RBC 10/20/2023 4.41 (L)  4.60 - 6.20 M/uL Final    Hemoglobin 10/20/2023 14.6  14.0 - 18.0 g/dL Final    Hematocrit 10/20/2023 44.4  40.0 - 54.0 % Final    MCV 10/20/2023 101 (H)  82 - 98 fL Final    MCH 10/20/2023 33.1 (H)  27.0 - 31.0 pg Final    MCHC 10/20/2023 32.9  32.0 - 36.0 g/dL Final    RDW 10/20/2023 12.7  11.5 - 14.5 % Final    Platelets 10/20/2023 155  150 - 450 K/uL Final    MPV 10/20/2023 10.3  9.2 - 12.9 fL Final    Immature Granulocytes 10/20/2023 0.0  0.0 - 0.5 % Final    Gran # (ANC) 10/20/2023 3.7  1.8 - 7.7 K/uL Final    Immature Grans (Abs)  10/20/2023 0.00  0.00 - 0.04 K/uL Final    Lymph # 10/20/2023 1.7  1.0 - 4.8 K/uL Final    Mono # 10/20/2023 0.5  0.3 - 1.0 K/uL Final    Eos # 10/20/2023 0.2  0.0 - 0.5 K/uL Final    Baso # 10/20/2023 0.02  0.00 - 0.20 K/uL Final    nRBC 10/20/2023 0  0 /100 WBC Final    Gran % 10/20/2023 60.2  38.0 - 73.0 % Final    Lymph % 10/20/2023 28.2  18.0 - 48.0 % Final    Mono % 10/20/2023 8.8  4.0 - 15.0 % Final    Eosinophil % 10/20/2023 2.5  0.0 - 8.0 % Final    Basophil % 10/20/2023 0.3  0.0 - 1.9 % Final    Differential Method 10/20/2023 Automated   Final    Specimen UA 10/20/2023 Urine, Clean Catch   Final    Color, UA 10/20/2023 Yellow  Yellow, Straw, Yessenia Final    Appearance, UA 10/20/2023 Clear  Clear Final    pH, UA 10/20/2023 6.0  5.0 - 8.0 Final    Specific Gravity, UA 10/20/2023 1.020  1.005 - 1.030 Final    Protein, UA 10/20/2023 Trace (A)  Negative Final    Glucose, UA 10/20/2023 Negative  Negative Final    Ketones, UA 10/20/2023 Trace (A)  Negative Final    Bilirubin (UA) 10/20/2023 Negative  Negative Final    Occult Blood UA 10/20/2023 2+ (A)  Negative Final    Nitrite, UA 10/20/2023 Negative  Negative Final    Urobilinogen, UA 10/20/2023 Negative  Negative EU/dL Final    Leukocytes, UA 10/20/2023 2+ (A)  Negative Final    Sodium 10/20/2023 142  136 - 145 mmol/L Final    Potassium 10/20/2023 3.6  3.5 - 5.1 mmol/L Final    Chloride 10/20/2023 106  95 - 110 mmol/L Final    CO2 10/20/2023 25  23 - 29 mmol/L Final    Glucose 10/20/2023 122 (H)  70 - 110 mg/dL Final    BUN 10/20/2023 20  8 - 23 mg/dL Final    Creatinine 10/20/2023 1.1  0.5 - 1.4 mg/dL Final    Calcium 10/20/2023 10.2  8.7 - 10.5 mg/dL Final    Total Protein 10/20/2023 6.6  6.0 - 8.4 g/dL Final    Albumin 10/20/2023 3.6  3.5 - 5.2 g/dL Final    Total Bilirubin 10/20/2023 0.7  0.1 - 1.0 mg/dL Final    Alkaline Phosphatase 10/20/2023 80  55 - 135 U/L Final    AST 10/20/2023 20  10 - 40 U/L Final    ALT 10/20/2023 8 (L)  10 - 44 U/L Final    eGFR  10/20/2023 >60.0  >60 mL/min/1.73 m^2 Final    Anion Gap 10/20/2023 11  8 - 16 mmol/L Final    RBC, UA 10/20/2023 36 (H)  0 - 4 /hpf Final    WBC, UA 10/20/2023 23 (H)  0 - 5 /hpf Final    Bacteria 10/20/2023 Rare  None-Occ /hpf Final    Microscopic Comment 10/20/2023 SEE COMMENT   Final    Urine Culture, Routine 10/20/2023 No growth   Final   Admission on 09/15/2023, Discharged on 09/15/2023   Component Date Value Ref Range Status    WBC 09/15/2023 10.39  3.90 - 12.70 K/uL Final    RBC 09/15/2023 4.63  4.60 - 6.20 M/uL Final    Hemoglobin 09/15/2023 14.7  14.0 - 18.0 g/dL Final    Hematocrit 09/15/2023 43.5  40.0 - 54.0 % Final    MCV 09/15/2023 94  82 - 98 fL Final    MCH 09/15/2023 31.7 (H)  27.0 - 31.0 pg Final    MCHC 09/15/2023 33.8  32.0 - 36.0 g/dL Final    RDW 09/15/2023 13.1  11.5 - 14.5 % Final    Platelets 09/15/2023 158  150 - 450 K/uL Final    MPV 09/15/2023 10.0  9.2 - 12.9 fL Final    Immature Granulocytes 09/15/2023 0.3  0.0 - 0.5 % Final    Gran # (ANC) 09/15/2023 7.9 (H)  1.8 - 7.7 K/uL Final    Immature Grans (Abs) 09/15/2023 0.03  0.00 - 0.04 K/uL Final    Lymph # 09/15/2023 1.2  1.0 - 4.8 K/uL Final    Mono # 09/15/2023 1.2 (H)  0.3 - 1.0 K/uL Final    Eos # 09/15/2023 0.0  0.0 - 0.5 K/uL Final    Baso # 09/15/2023 0.02  0.00 - 0.20 K/uL Final    nRBC 09/15/2023 0  0 /100 WBC Final    Gran % 09/15/2023 76.1 (H)  38.0 - 73.0 % Final    Lymph % 09/15/2023 11.7 (L)  18.0 - 48.0 % Final    Mono % 09/15/2023 11.5  4.0 - 15.0 % Final    Eosinophil % 09/15/2023 0.2  0.0 - 8.0 % Final    Basophil % 09/15/2023 0.2  0.0 - 1.9 % Final    Differential Method 09/15/2023 Automated   Final    Sodium 09/15/2023 142  136 - 145 mmol/L Final    Potassium 09/15/2023 4.0  3.5 - 5.1 mmol/L Final    Chloride 09/15/2023 103  95 - 110 mmol/L Final    CO2 09/15/2023 28  23 - 29 mmol/L Final    Glucose 09/15/2023 114 (H)  70 - 110 mg/dL Final    BUN 09/15/2023 24 (H)  8 - 23 mg/dL Final    Creatinine 09/15/2023 2.0 (H)   0.5 - 1.4 mg/dL Final    Calcium 09/15/2023 10.4  8.7 - 10.5 mg/dL Final    Total Protein 09/15/2023 7.0  6.0 - 8.4 g/dL Final    Albumin 09/15/2023 3.7  3.5 - 5.2 g/dL Final    Total Bilirubin 09/15/2023 1.0  0.1 - 1.0 mg/dL Final    Alkaline Phosphatase 09/15/2023 77  55 - 135 U/L Final    AST 09/15/2023 26  10 - 40 U/L Final    ALT 09/15/2023 5 (L)  10 - 44 U/L Final    eGFR 09/15/2023 33.5 (A)  >60 mL/min/1.73 m^2 Final    Anion Gap 09/15/2023 11  8 - 16 mmol/L Final    Specimen UA 09/15/2023 Urine, Clean Catch   Final    Color, UA 09/15/2023 Yessenia  Yellow, Straw, Yessenia Final    Appearance, UA 09/15/2023 Clear  Clear Final    pH, UA 09/15/2023 5.0  5.0 - 8.0 Final    Specific Gravity, UA 09/15/2023 1.015  1.005 - 1.030 Final    Protein, UA 09/15/2023 1+ (A)  Negative Final    Glucose, UA 09/15/2023 Negative  Negative Final    Ketones, UA 09/15/2023 Negative  Negative Final    Bilirubin (UA) 09/15/2023 Negative  Negative Final    Occult Blood UA 09/15/2023 3+ (A)  Negative Final    Nitrite, UA 09/15/2023 Positive (A)  Negative Final    Leukocytes, UA 09/15/2023 Negative  Negative Final    RBC, UA 09/15/2023 >100 (H)  0 - 4 /hpf Final    WBC, UA 09/15/2023 43 (H)  0 - 5 /hpf Final    Bacteria 09/15/2023 Few (A)  None-Occ /hpf Final    Hyaline Casts, UA 09/15/2023 0  0-1/lpf /lpf Final    Microscopic Comment 09/15/2023 SEE COMMENT   Final    Urine Culture, Routine 09/15/2023 No growth   Final   Office Visit on 08/25/2023   Component Date Value Ref Range Status    Urine Culture, Routine 08/25/2023 No growth   Final   Office Visit on 08/21/2023   Component Date Value Ref Range Status    Urine Culture, Routine 08/21/2023 No growth   Final     - Independent review of images: none available.    Reviewed records of:  1) Grade 1 anterolisthesis of spine  2) Positive SHAHRZAD scan    Diagnoses:          1) Idiopathic PD, tremor dominant.  On CD/LD.  2) Low back pain --> now worse.  C/W LSS and sciatica  3) urinary urgency      Medical Decision Making:  - check ua and culture now  - order PT again for stability  - keep ldopa support the same, watch psychosis          Eliu Klein MD, MPH  Division of Movement and Memory Disorders  [facility]    This is a LEVEL 5 Billed Office Visit.    The patient has 1 or more chronic illnesses with SEVERE exacerbation, progression, or side effects of treatment   I independently ordered UTI evaluation to try ot treat aggressively at home if needed and avoid hospitalization if we can  I continue to intensely monitor drug therapy for signs of toxicity, including ldopa and his tendency to hallucinate

## 2023-11-10 ENCOUNTER — TELEPHONE (OUTPATIENT)
Dept: NEUROLOGY | Facility: CLINIC | Age: 78
End: 2023-11-10
Payer: MEDICARE

## 2023-11-10 DIAGNOSIS — G20.A2 PARKINSON'S DISEASE WITHOUT DYSKINESIA, WITH FLUCTUATING MANIFESTATIONS: Primary | ICD-10-CM

## 2023-11-10 NOTE — TELEPHONE ENCOUNTER
----- Message from Pattie Simmons sent at 11/10/2023  8:35 AM CST -----  Regarding: Urine Orders  Good Morning,    Patient above dropped off an Urinalysis at [facility] location. However, the order in the system I am unable to use for processing. Could someone put in a new order for the patient above for Urinalysis?    Thank you,

## 2023-11-15 ENCOUNTER — OFFICE VISIT (OUTPATIENT)
Dept: INTERNAL MEDICINE | Facility: CLINIC | Age: 78
End: 2023-11-15
Payer: MEDICARE

## 2023-11-15 VITALS
HEIGHT: 65 IN | WEIGHT: 133.38 LBS | OXYGEN SATURATION: 97 % | RESPIRATION RATE: 20 BRPM | HEART RATE: 81 BPM | SYSTOLIC BLOOD PRESSURE: 166 MMHG | TEMPERATURE: 98 F | DIASTOLIC BLOOD PRESSURE: 92 MMHG | BODY MASS INDEX: 22.22 KG/M2

## 2023-11-15 DIAGNOSIS — M70.61 TROCHANTERIC BURSITIS OF RIGHT HIP: ICD-10-CM

## 2023-11-15 DIAGNOSIS — K59.00 CONSTIPATION, UNSPECIFIED CONSTIPATION TYPE: ICD-10-CM

## 2023-11-15 DIAGNOSIS — R63.0 DECREASED APPETITE: ICD-10-CM

## 2023-11-15 DIAGNOSIS — G20.B1 PARKINSON'S DISEASE WITH DYSKINESIA, UNSPECIFIED WHETHER MANIFESTATIONS FLUCTUATE: ICD-10-CM

## 2023-11-15 DIAGNOSIS — I10 ESSENTIAL HYPERTENSION: ICD-10-CM

## 2023-11-15 DIAGNOSIS — M25.551 PAIN OF RIGHT HIP: Primary | ICD-10-CM

## 2023-11-15 PROBLEM — I27.20 PULMONARY HTN: Chronic | Status: ACTIVE | Noted: 2017-01-03

## 2023-11-15 PROCEDURE — 1100F PR PT FALLS ASSESS DOC 2+ FALLS/FALL W/INJURY/YR: ICD-10-PCS | Mod: CPTII,S$GLB,, | Performed by: NURSE PRACTITIONER

## 2023-11-15 PROCEDURE — 3077F PR MOST RECENT SYSTOLIC BLOOD PRESSURE >= 140 MM HG: ICD-10-PCS | Mod: CPTII,S$GLB,, | Performed by: NURSE PRACTITIONER

## 2023-11-15 PROCEDURE — 1159F MED LIST DOCD IN RCRD: CPT | Mod: CPTII,S$GLB,, | Performed by: NURSE PRACTITIONER

## 2023-11-15 PROCEDURE — 99214 PR OFFICE/OUTPT VISIT, EST, LEVL IV, 30-39 MIN: ICD-10-PCS | Mod: S$GLB,,, | Performed by: NURSE PRACTITIONER

## 2023-11-15 PROCEDURE — 3080F PR MOST RECENT DIASTOLIC BLOOD PRESSURE >= 90 MM HG: ICD-10-PCS | Mod: CPTII,S$GLB,, | Performed by: NURSE PRACTITIONER

## 2023-11-15 PROCEDURE — 3288F FALL RISK ASSESSMENT DOCD: CPT | Mod: CPTII,S$GLB,, | Performed by: NURSE PRACTITIONER

## 2023-11-15 PROCEDURE — 3080F DIAST BP >= 90 MM HG: CPT | Mod: CPTII,S$GLB,, | Performed by: NURSE PRACTITIONER

## 2023-11-15 PROCEDURE — 1160F PR REVIEW ALL MEDS BY PRESCRIBER/CLIN PHARMACIST DOCUMENTED: ICD-10-PCS | Mod: CPTII,S$GLB,, | Performed by: NURSE PRACTITIONER

## 2023-11-15 PROCEDURE — 99999 PR PBB SHADOW E&M-EST. PATIENT-LVL IV: CPT | Mod: PBBFAC,,, | Performed by: NURSE PRACTITIONER

## 2023-11-15 PROCEDURE — 99999 PR PBB SHADOW E&M-EST. PATIENT-LVL IV: ICD-10-PCS | Mod: PBBFAC,,, | Performed by: NURSE PRACTITIONER

## 2023-11-15 PROCEDURE — 3077F SYST BP >= 140 MM HG: CPT | Mod: CPTII,S$GLB,, | Performed by: NURSE PRACTITIONER

## 2023-11-15 PROCEDURE — 1159F PR MEDICATION LIST DOCUMENTED IN MEDICAL RECORD: ICD-10-PCS | Mod: CPTII,S$GLB,, | Performed by: NURSE PRACTITIONER

## 2023-11-15 PROCEDURE — 1125F PR PAIN SEVERITY QUANTIFIED, PAIN PRESENT: ICD-10-PCS | Mod: CPTII,S$GLB,, | Performed by: NURSE PRACTITIONER

## 2023-11-15 PROCEDURE — 3288F PR FALLS RISK ASSESSMENT DOCUMENTED: ICD-10-PCS | Mod: CPTII,S$GLB,, | Performed by: NURSE PRACTITIONER

## 2023-11-15 PROCEDURE — 1100F PTFALLS ASSESS-DOCD GE2>/YR: CPT | Mod: CPTII,S$GLB,, | Performed by: NURSE PRACTITIONER

## 2023-11-15 PROCEDURE — 99214 OFFICE O/P EST MOD 30 MIN: CPT | Mod: S$GLB,,, | Performed by: NURSE PRACTITIONER

## 2023-11-15 PROCEDURE — 1125F AMNT PAIN NOTED PAIN PRSNT: CPT | Mod: CPTII,S$GLB,, | Performed by: NURSE PRACTITIONER

## 2023-11-15 PROCEDURE — 1160F RVW MEDS BY RX/DR IN RCRD: CPT | Mod: CPTII,S$GLB,, | Performed by: NURSE PRACTITIONER

## 2023-11-15 RX ORDER — IBUPROFEN 800 MG/1
800 TABLET ORAL
Qty: 30 TABLET | Refills: 2 | Status: SHIPPED | OUTPATIENT
Start: 2023-11-15 | End: 2023-12-14 | Stop reason: ALTCHOICE

## 2023-11-15 NOTE — ASSESSMENT & PLAN NOTE
Ok to continue with daily stool softener.   Start Afia lax 17 g daily and if stool becomes loose ok to decrease to M, W, Fr.   Increase intake of water and foods high in fiber.

## 2023-11-15 NOTE — PROGRESS NOTES
Subjective     Patient ID: Hector Kellogg is a 78 y.o. male.    Chief Complaint: Hip Pain and Anorexia    Patient in office accompanied by his wife and his daughter for evaluation and management of right hip pain.  He is in a wheelchair due to dyskinesia associated with Parkinson's on ongoing falls  Recent x-ray of the right hip showed mild degenerative joint disease without any fracture.  Patient gets good relief with ibuprofen use however use of ibuprofen causes his blood pressures to increase.  He is also under the care of pain management who recently prescribed patient to participate in some physical therapy but due to recent pain patient has not been attending.      Patient is under the care of cardiologist for hypertension management.  Last note indicates that hypertension medication regimen will be augmented if patient continues to use ibuprofen for hip pain.  Today patient's blood pressure is elevated.  According to daughter when patient is not on ibuprofen blood pressures run in the 110s/70s to 80s mmHg.     Wife also complains that patient's appetite has dramatically decreased with a 2 lb difference since his last office visit.  Patient does eat but small amounts.  He denies any nausea, vomiting or abdominal pain.  He reports early satiety.  His recent episode of constipation was treated with a suppository and an enema with good results.  He suffers from constipation from time to time.  They recently started him on a stool softener.      Review of Systems   Constitutional:  Positive for activity change.   HENT: Negative.     Respiratory: Negative.     Cardiovascular: Negative.    Gastrointestinal: Negative.    Endocrine: Negative.    Genitourinary: Negative.    Musculoskeletal:  Positive for gait problem (Dyskinesia).   Integumentary:  Negative.          Objective     Physical Exam  Vitals reviewed.   Constitutional:       Appearance: Normal appearance.   HENT:      Head: Normocephalic.      Nose:  Nose normal.   Eyes:      Pupils: Pupils are equal, round, and reactive to light.   Cardiovascular:      Rate and Rhythm: Normal rate and regular rhythm.   Pulmonary:      Effort: Pulmonary effort is normal.      Breath sounds: Normal breath sounds.   Abdominal:      General: Bowel sounds are normal.      Palpations: Abdomen is soft.   Musculoskeletal:         General: Tenderness (R Trochanteric hip tenderness) present.      Cervical back: Normal range of motion.   Skin:     General: Skin is warm and dry.      Findings: Bruising present.   Neurological:      Mental Status: He is alert.      Comments: Tremor            Assessment and Plan     1. Pain of right hip  Assessment & Plan:  Pain localized to trochanter. Recent hip X-ray was unremarkable with only mild DJD.   He could continue with IBU however, it causes increased BP.   Pain consistent bursitis.   Daughter indicates that she will contact his ortho provider to discuss trochanteric injection.   If successful, this will eliminate the need for IBU and an additional HTN medication.   While waiting to get in to ortho recommend continuing with IBU and please contact cardiology for augmenting BP meds.     Orders:  -     ibuprofen (ADVIL,MOTRIN) 800 MG tablet; Take 1 tablet (800 mg total) by mouth after meals as needed for Pain.  Dispense: 30 tablet; Refill: 2    2. Trochanteric bursitis of right hip  Assessment & Plan:  See above.   Take IBU with food to help avoid GI symptoms.     Orders:  -     ibuprofen (ADVIL,MOTRIN) 800 MG tablet; Take 1 tablet (800 mg total) by mouth after meals as needed for Pain.  Dispense: 30 tablet; Refill: 2    3. Essential hypertension  Assessment & Plan:  BP elevated in office likely due to ongoing pain for R hip. IBU used with good relief but causes increased BP  Per last cardiology note, pt and family recommended to contact cards  (Dr Salinas) to discuss adding to current HTN regimen as he only gets relief with IBU at this time. ACEI  could be considered.       4. Parkinson's disease with dyskinesia, unspecified whether manifestations fluctuate  Assessment & Plan:  Stable. Please continue with current regimen and keep upcoming appointments with Dr Klein.       5. Decreased appetite  Assessment & Plan:  Encouraged to make foods that patient likes.  Also suggested meal replacement such as ensure especially as patient has appetite continues to decrease.    Could consider maybe in the future especially patient continues to lose weight.  One thing to consider is possible constipation causing early satiety.  We will manage constipation with MiraLax.      6. Constipation, unspecified constipation type  Assessment & Plan:  Ok to continue with daily stool softener.   Start Afia lax 17 g daily and if stool becomes loose ok to decrease to M, W, Fr.   Increase intake of water and foods high in fiber.                     fair balance

## 2023-11-15 NOTE — ASSESSMENT & PLAN NOTE
BP elevated in office likely due to ongoing pain for R hip. IBU used with good relief but causes increased BP  Per last cardiology note, pt and family recommended to contact cards  (Dr Salinas) to discuss adding to current HTN regimen as he only gets relief with IBU at this time. ACEI could be considered.

## 2023-11-15 NOTE — ASSESSMENT & PLAN NOTE
Encouraged to make foods that patient likes.  Also suggested meal replacement such as ensure especially as patient has appetite continues to decrease.    Could consider maybe in the future especially patient continues to lose weight.  One thing to consider is possible constipation causing early satiety.  We will manage constipation with MiraLax.

## 2023-11-15 NOTE — ASSESSMENT & PLAN NOTE
Pain localized to trochanter. Recent hip X-ray was unremarkable with only mild DJD.   He could continue with IBU however, it causes increased BP.   Pain consistent bursitis.   Daughter indicates that she will contact his ortho provider to discuss trochanteric injection.   If successful, this will eliminate the need for IBU and an additional HTN medication.   While waiting to get in to ortho recommend continuing with IBU and please contact cardiology for augmenting BP meds.

## 2023-11-16 ENCOUNTER — PATIENT MESSAGE (OUTPATIENT)
Dept: NEUROLOGY | Facility: CLINIC | Age: 78
End: 2023-11-16
Payer: MEDICARE

## 2023-11-16 ENCOUNTER — PATIENT MESSAGE (OUTPATIENT)
Dept: CARDIOLOGY | Facility: CLINIC | Age: 78
End: 2023-11-16
Payer: MEDICARE

## 2023-11-17 ENCOUNTER — TELEPHONE (OUTPATIENT)
Dept: CARDIOLOGY | Facility: CLINIC | Age: 78
End: 2023-11-17
Payer: MEDICARE

## 2023-11-17 RX ORDER — AMLODIPINE BESYLATE 5 MG/1
5 TABLET ORAL DAILY
Qty: 30 TABLET | Refills: 11 | Status: SHIPPED | OUTPATIENT
Start: 2023-11-17 | End: 2024-11-16

## 2023-11-17 NOTE — TELEPHONE ENCOUNTER
BP high at home. Taking Ibuprofen for pain. Will start amlodipine 5 mg daily. Has upcoming appt with Pain Management.

## 2023-11-20 ENCOUNTER — PATIENT MESSAGE (OUTPATIENT)
Dept: PULMONOLOGY | Facility: CLINIC | Age: 78
End: 2023-11-20
Payer: MEDICARE

## 2023-12-06 ENCOUNTER — OFFICE VISIT (OUTPATIENT)
Dept: NEUROLOGY | Facility: CLINIC | Age: 78
End: 2023-12-06
Payer: MEDICARE

## 2023-12-06 VITALS
HEART RATE: 73 BPM | SYSTOLIC BLOOD PRESSURE: 93 MMHG | HEIGHT: 65 IN | DIASTOLIC BLOOD PRESSURE: 55 MMHG | WEIGHT: 132 LBS | BODY MASS INDEX: 21.99 KG/M2

## 2023-12-06 DIAGNOSIS — M54.17 LUMBOSACRAL RADICULOPATHY: ICD-10-CM

## 2023-12-06 DIAGNOSIS — E78.2 MIXED HYPERLIPIDEMIA: ICD-10-CM

## 2023-12-06 DIAGNOSIS — G20.A2 PARKINSON'S DISEASE WITHOUT DYSKINESIA, WITH FLUCTUATING MANIFESTATIONS: Primary | ICD-10-CM

## 2023-12-06 DIAGNOSIS — M48.061 STENOSIS OF LATERAL RECESS OF LUMBAR SPINE: ICD-10-CM

## 2023-12-06 DIAGNOSIS — I10 ESSENTIAL HYPERTENSION: ICD-10-CM

## 2023-12-06 DIAGNOSIS — Z71.89 COUNSELING REGARDING GOALS OF CARE: ICD-10-CM

## 2023-12-06 DIAGNOSIS — R26.81 GAIT INSTABILITY: ICD-10-CM

## 2023-12-06 DIAGNOSIS — I70.0 AORTIC ATHEROSCLEROSIS: ICD-10-CM

## 2023-12-06 DIAGNOSIS — F05 DELIRIUM SECONDARY TO MULTIPLE MEDICAL PROBLEMS: ICD-10-CM

## 2023-12-06 DIAGNOSIS — I25.10 CORONARY ARTERY DISEASE INVOLVING NATIVE CORONARY ARTERY OF NATIVE HEART WITHOUT ANGINA PECTORIS: ICD-10-CM

## 2023-12-06 DIAGNOSIS — I73.9 CLAUDICATION: ICD-10-CM

## 2023-12-06 DIAGNOSIS — G89.4 CHRONIC PAIN SYNDROME: ICD-10-CM

## 2023-12-06 DIAGNOSIS — R31.0 GROSS HEMATURIA: ICD-10-CM

## 2023-12-06 PROCEDURE — 1100F PR PT FALLS ASSESS DOC 2+ FALLS/FALL W/INJURY/YR: ICD-10-PCS | Mod: CPTII,S$GLB,, | Performed by: PHYSICIAN ASSISTANT

## 2023-12-06 PROCEDURE — 3288F FALL RISK ASSESSMENT DOCD: CPT | Mod: CPTII,S$GLB,, | Performed by: PHYSICIAN ASSISTANT

## 2023-12-06 PROCEDURE — 99999 PR PBB SHADOW E&M-EST. PATIENT-LVL IV: CPT | Mod: PBBFAC,,, | Performed by: PHYSICIAN ASSISTANT

## 2023-12-06 PROCEDURE — 1160F RVW MEDS BY RX/DR IN RCRD: CPT | Mod: CPTII,S$GLB,, | Performed by: PHYSICIAN ASSISTANT

## 2023-12-06 PROCEDURE — 1159F MED LIST DOCD IN RCRD: CPT | Mod: CPTII,S$GLB,, | Performed by: PHYSICIAN ASSISTANT

## 2023-12-06 PROCEDURE — 3074F SYST BP LT 130 MM HG: CPT | Mod: CPTII,S$GLB,, | Performed by: PHYSICIAN ASSISTANT

## 2023-12-06 PROCEDURE — 1159F PR MEDICATION LIST DOCUMENTED IN MEDICAL RECORD: ICD-10-PCS | Mod: CPTII,S$GLB,, | Performed by: PHYSICIAN ASSISTANT

## 2023-12-06 PROCEDURE — 99999 PR PBB SHADOW E&M-EST. PATIENT-LVL IV: ICD-10-PCS | Mod: PBBFAC,,, | Performed by: PHYSICIAN ASSISTANT

## 2023-12-06 PROCEDURE — 1100F PTFALLS ASSESS-DOCD GE2>/YR: CPT | Mod: CPTII,S$GLB,, | Performed by: PHYSICIAN ASSISTANT

## 2023-12-06 PROCEDURE — 1126F PR PAIN SEVERITY QUANTIFIED, NO PAIN PRESENT: ICD-10-PCS | Mod: CPTII,S$GLB,, | Performed by: PHYSICIAN ASSISTANT

## 2023-12-06 PROCEDURE — 3078F DIAST BP <80 MM HG: CPT | Mod: CPTII,S$GLB,, | Performed by: PHYSICIAN ASSISTANT

## 2023-12-06 PROCEDURE — 99215 OFFICE O/P EST HI 40 MIN: CPT | Mod: S$GLB,,, | Performed by: PHYSICIAN ASSISTANT

## 2023-12-06 PROCEDURE — 3288F PR FALLS RISK ASSESSMENT DOCUMENTED: ICD-10-PCS | Mod: CPTII,S$GLB,, | Performed by: PHYSICIAN ASSISTANT

## 2023-12-06 PROCEDURE — 1160F PR REVIEW ALL MEDS BY PRESCRIBER/CLIN PHARMACIST DOCUMENTED: ICD-10-PCS | Mod: CPTII,S$GLB,, | Performed by: PHYSICIAN ASSISTANT

## 2023-12-06 PROCEDURE — 99215 PR OFFICE/OUTPT VISIT, EST, LEVL V, 40-54 MIN: ICD-10-PCS | Mod: S$GLB,,, | Performed by: PHYSICIAN ASSISTANT

## 2023-12-06 PROCEDURE — 1126F AMNT PAIN NOTED NONE PRSNT: CPT | Mod: CPTII,S$GLB,, | Performed by: PHYSICIAN ASSISTANT

## 2023-12-06 PROCEDURE — 3074F PR MOST RECENT SYSTOLIC BLOOD PRESSURE < 130 MM HG: ICD-10-PCS | Mod: CPTII,S$GLB,, | Performed by: PHYSICIAN ASSISTANT

## 2023-12-06 PROCEDURE — 3078F PR MOST RECENT DIASTOLIC BLOOD PRESSURE < 80 MM HG: ICD-10-PCS | Mod: CPTII,S$GLB,, | Performed by: PHYSICIAN ASSISTANT

## 2023-12-06 NOTE — PROGRESS NOTES
Name: Hector Kellogg  MRN: [id number]   CSN: [id number]      Date: 12/06/2023    Chief Complaint / Interval History:   - [facility] saw one month ago  - ibuprofen was making BP go up   - cardiology added amlodipine and now running lower   - here with spouse and daughter   - doing PT, twice a week    - has been helpful with balance   - no longer taking lyrica, did not help  - no hallucinations   - they have not heard from palliative care   - scheduled to see Dr. Cullen in February   - pain is the most bothersome to him   - cd/ld 25/100 1.5 tabs five times daily           From May 2023  - cd/ld 1.5 in the morning and 1.5 at night, other three doses are 1 tab   - accompanied by spouse   - asks about gabapentin and memory   - previously taking 800 mg TID, wife cut back to 400 mg TID   - he feels a bit better, more clear of mind on decrease   - he has had about 5 falls due to festination of gait and goes forward   - doesn't feel lightheaded or dizzy   - sometimes gets flashes or feeling like someone is in his peripheral vision   - last dose of ldopa was at 730 am   - urinary urgency, cannot get to the bathroom on time             From Feb 2023  - more imbalance, more shuffling, 2 times to the ground, more near falls, no injuries  - showers, has seat for safety   - appetite is good, sleep is disrupted by dogs and cats, but sleeps pretty well, up twice to urinate  - more holding on and use of walker  - now on 1 tab CD/LD 5x/day, usually 8:30-11:30...every three hours  -      From 10-22:  Hx:  - last seen 7/6/22 by Yvette  --- cd/ld  mg 1.5 tablet TID unchanged  --- gabapentin 400 mg TID, but doubtful of efficacy re: pain  --- offered rasagiline 1 mg, but ultimately declined d/t cost as of last contact  - pain worsening at last visit and negatively affecting gait, grade 1 anterolisesthesis of spine noted in imaging  --- unresponsive to cd/ld and attributable in main to the above    From Jul 2022:  - last seen July 2020  -  "cd/ld 25/100 1.5 tabs TID -- 9 am, 2 pm and 7 pm    - tremors gets better but does not completely go away  - meds possibly wear off after about 3 hours   - biggest issue is pain, starts in the hip and radiates down his leg   - takes gabapentin 400 mg TID, does not feel that this helps   - festination of gait in the morning   - can't walk far   - had lumbar laminectomy in Feb 2021 has not followed up with pain management or nsgy  - did not feel that pain management helped   - pain is getting worse, it has been going on for about a year -- can't walk due to the pain   - does not feel that the pain improves with sinemet   - no new weight changes, tries to wear loose pants         From Sept 2019:  - feels like he cannot do things  - not apathy, wants to work or do things  - lung issue are limiting him from COPD  - still taking cd/ld 25/100 1 TID-5x/day  - memory is ok  - appetite is good  - no constipation now  - bladder is quick    From Oct 2018:  - still getting pinched nerve pain in the back or legs  - will take an extra gabapentin  - takes cd/ld every 3-4 hours  - gait is stable    From Summer 2017:  - a bit more off time between dosing  - peak is pretty good  - gait is frustrating  - memory holding up  - family pleased    From 12/06  1) A bit more off time in between dosing  2) More cramping when off, gabapentin helps at night  3) Would be willing to take meds four times per day    History of Present Illness (HPI):  68 yo with diagnosis of PD, symptoms began 1.5 years ago.  First noted tremor in the L hand, "all left side".  Drags his left foot, generally slower overall.  Dx 1 year ago, had [employer] as a confirmatory study.  Says he had an MRI of the low back as well, was feeling "pinched nerves" in the back and into the both legs.  Was prescribed "some drug that cost $400" and didn;t take. Also prescribed pramipexole per the notes but also said he "never took it."    Was in [place] for 2 years, now re-establishing " care here.    Has questions about gabapentin - 800 now taking 1/2 tab at night only and has no significant pain.  Will ibuprofen for breakthrough.  Not taking Norco.    Retired , [employer], left at age 65.    Nonmotor/Premotor ROS:  Hyposmia (HENT)?No per him  RBD/sleep issues (Constitutional)?Yes - acts out dreams  Depression/anxiety (Psychiatric)?No - better on Citalopram  Fatigue (Constitutional)?Yes  Constipation (GI)?Yes  - uses OTC stool softener  Urinary issues ()?Yes - urgency  Sexual dysfunction ()?Yes - some ED.    Orthostasis (Cardiovascular)?No  Leg swelling (Cardiovascular)? No  Falls (Musculoskeletal)?No  Cognitive impairment (Neurologic)?No  Psychoses (Psychiatric)?No  Pain/Paresthesia (Neurologic)?Yes  Visual changes (Eyes)?No  Moles / skin changes (Skin)?Yes - seborrhea, has a scaly mole on the R side of the head  Stridor / SOB (Pulm)?Yes - with acticity  Bruising (Heme)?No    Past Medical History: The patient  has a past medical history of Cataract, Chronic back pain greater than 3 months duration, COPD (chronic obstructive pulmonary disease), Coronary artery disease, DDD (degenerative disc disease), lumbar (05/16/2020), Glaucoma suspect of both eyes, Hyperlipidemia, Hypertension, MI, old (2006), Parkinson disease, and Renal disorder.    Social History: The patient  reports that he quit smoking about 9 years ago. His smoking use included cigarettes. He started smoking about 49 years ago. He has a 40.0 pack-year smoking history. He has been exposed to tobacco smoke. He has never used smokeless tobacco. He reports current alcohol use. He reports that he does not use drugs.  Rare social drink.    Family History: Their family history includes Cataracts in his father; Heart attack in his father; Heart disease in his maternal uncle and paternal uncle.    Allergies: Influenza virus vaccines     Meds:   Current Outpatient Medications on File Prior to Visit   Medication Sig Dispense Refill     "albuterol (PROVENTIL/VENTOLIN HFA) 90 mcg/actuation inhaler Inhale 2 puffs into the lungs every 6 (six) hours as needed for Wheezing or Shortness of Breath. Rescue 18 g 6    amLODIPine (NORVASC) 5 MG tablet Take 1 tablet (5 mg total) by mouth once daily. 30 tablet 11    aspirin (ECOTRIN) 81 MG EC tablet Take 81 mg by mouth once daily.      BREZTRI AEROSPHERE 160-9-4.8 mcg/actuation HFAA Inhale 2 puffs into the lungs 2 (two) times a day. 10.7 g 11    carbidopa-levodopa  mg (SINEMET)  mg per tablet Take 1.5 tablets by mouth 5 (five) times daily. 675 tablet 3    citalopram (CELEXA) 20 MG tablet Take 1 tablet by mouth once daily 30 tablet 11    ezetimibe (ZETIA) 10 mg tablet Take 1 tablet (10 mg total) by mouth once daily. 90 tablet 3    finasteride (PROSCAR) 5 mg tablet Take 1 tablet (5 mg total) by mouth once daily. 30 tablet 11    ibuprofen (ADVIL,MOTRIN) 800 MG tablet Take 1 tablet (800 mg total) by mouth after meals as needed for Pain. 30 tablet 2    loratadine (CLARITIN) 10 mg tablet Take 10 mg by mouth as needed for Allergies.      metoprolol succinate (TOPROL-XL) 50 MG 24 hr tablet Take 1 tablet by mouth once daily 90 tablet 3    multivitamin capsule Take 1 capsule by mouth once daily.      rosuvastatin (CRESTOR) 40 MG Tab TAKE 1 TABLET BY MOUTH NIGHTLY AT BEDTIME 90 tablet 3    oxybutynin (DITROPAN) 5 MG Tab Take 1 tablet (5 mg total) by mouth 3 (three) times daily as needed. (Patient not taking: Reported on 10/23/2023) 90 tablet 0    pregabalin (LYRICA) 75 MG capsule Take 1 capsule (75 mg total) by mouth 2 (two) times daily. (Patient not taking: Reported on 11/15/2023) 60 capsule 2    traMADoL (ULTRAM) 50 mg tablet Take 1 tablet (50 mg total) by mouth every 6 (six) hours as needed for Pain. (Patient not taking: Reported on 11/15/2023) 45 tablet 1     No current facility-administered medications on file prior to visit.     Exam:  BP (!) 93/55   Pulse 73   Ht 5' 5" (1.651 m)   Wt 59.9 kg (132 lb)  "  BMI 21.97 kg/m²     Constitutional  Well-developed, well-nourished, appears stated age   * Specialized movement exam  Mild hypophonic speech.    Mild facial masking.   Automatism of the left arm   L>R cogwheel rigidity - mild     L>R bradykinesia - mild.   bilateral resting tremor, L > R   dystonic posturing of R foot-- mild dyskinesia    No other dystonia, chorea, athetosis, myoclonus, or tics.   No motor impersistence.   Normal-based gait.   + mildly abnormal arm swing B, worse on the L  Good stride length, no freezing   No postural instability.      Laboratory/Radiological:  - Results:  Lab Visit on 11/10/2023   Component Date Value Ref Range Status    Specimen UA 11/10/2023 Urine, Clean Catch   Final    Color, UA 11/10/2023 Yellow  Yellow, Straw, Yessenia Final    Appearance, UA 11/10/2023 Clear  Clear Final    pH, UA 11/10/2023 6.0  5.0 - 8.0 Final    Specific Gravity, UA 11/10/2023 1.025  1.005 - 1.030 Final    Protein, UA 11/10/2023 1+ (A)  Negative Final    Glucose, UA 11/10/2023 Negative  Negative Final    Ketones, UA 11/10/2023 2+ (A)  Negative Final    Bilirubin (UA) 11/10/2023 Negative  Negative Final    Occult Blood UA 11/10/2023 3+ (A)  Negative Final    Nitrite, UA 11/10/2023 Negative  Negative Final    Urobilinogen, UA 11/10/2023 Negative  Negative EU/dL Final    Leukocytes, UA 11/10/2023 Negative  Negative Final    Urine Culture, Routine 11/10/2023 No growth   Final    RBC, UA 11/10/2023 >100 (H)  0 - 4 /hpf Final    WBC, UA 11/10/2023 13 (H)  0 - 5 /hpf Final    Bacteria 11/10/2023 None  None-Occ /hpf Final    Squam Epithel, UA 11/10/2023 0  /hpf Final    Hyaline Casts, UA 11/10/2023 0  0-1/lpf /lpf Final    Microscopic Comment 11/10/2023 SEE COMMENT   Final   Lab Visit on 10/30/2023   Component Date Value Ref Range Status    Specimen UA 10/30/2023 Urine, Clean Catch   Final    Color, UA 10/30/2023 Yellow  Yellow, Straw, Yessenia Final    Appearance, UA 10/30/2023 Clear  Clear Final    pH, UA  10/30/2023 7.0  5.0 - 8.0 Final    Specific Gravity, UA 10/30/2023 1.015  1.005 - 1.030 Final    Protein, UA 10/30/2023 1+ (A)  Negative Final    Glucose, UA 10/30/2023 Negative  Negative Final    Ketones, UA 10/30/2023 Negative  Negative Final    Bilirubin (UA) 10/30/2023 Negative  Negative Final    Occult Blood UA 10/30/2023 1+ (A)  Negative Final    Nitrite, UA 10/30/2023 Negative  Negative Final    Urobilinogen, UA 10/30/2023 Negative  Negative EU/dL Final    Leukocytes, UA 10/30/2023 2+ (A)  Negative Final    RBC, UA 10/30/2023 9 (H)  0 - 4 /hpf Final    WBC, UA 10/30/2023 15 (H)  0 - 5 /hpf Final    Bacteria 10/30/2023 Rare  None-Occ /hpf Final    Squam Epithel, UA 10/30/2023 0  /hpf Final    Hyaline Casts, UA 10/30/2023 1  0-1/lpf /lpf Final    Microscopic Comment 10/30/2023 SEE COMMENT   Final    Urine Culture, Routine 10/30/2023 No growth   Final   Admission on 10/20/2023, Discharged on 10/20/2023   Component Date Value Ref Range Status    WBC 10/20/2023 6.11  3.90 - 12.70 K/uL Final    RBC 10/20/2023 4.41 (L)  4.60 - 6.20 M/uL Final    Hemoglobin 10/20/2023 14.6  14.0 - 18.0 g/dL Final    Hematocrit 10/20/2023 44.4  40.0 - 54.0 % Final    MCV 10/20/2023 101 (H)  82 - 98 fL Final    MCH 10/20/2023 33.1 (H)  27.0 - 31.0 pg Final    MCHC 10/20/2023 32.9  32.0 - 36.0 g/dL Final    RDW 10/20/2023 12.7  11.5 - 14.5 % Final    Platelets 10/20/2023 155  150 - 450 K/uL Final    MPV 10/20/2023 10.3  9.2 - 12.9 fL Final    Immature Granulocytes 10/20/2023 0.0  0.0 - 0.5 % Final    Gran # (ANC) 10/20/2023 3.7  1.8 - 7.7 K/uL Final    Immature Grans (Abs) 10/20/2023 0.00  0.00 - 0.04 K/uL Final    Lymph # 10/20/2023 1.7  1.0 - 4.8 K/uL Final    Mono # 10/20/2023 0.5  0.3 - 1.0 K/uL Final    Eos # 10/20/2023 0.2  0.0 - 0.5 K/uL Final    Baso # 10/20/2023 0.02  0.00 - 0.20 K/uL Final    nRBC 10/20/2023 0  0 /100 WBC Final    Gran % 10/20/2023 60.2  38.0 - 73.0 % Final    Lymph % 10/20/2023 28.2  18.0 - 48.0 % Final     Mono % 10/20/2023 8.8  4.0 - 15.0 % Final    Eosinophil % 10/20/2023 2.5  0.0 - 8.0 % Final    Basophil % 10/20/2023 0.3  0.0 - 1.9 % Final    Differential Method 10/20/2023 Automated   Final    Specimen UA 10/20/2023 Urine, Clean Catch   Final    Color, UA 10/20/2023 Yellow  Yellow, Straw, Yessenia Final    Appearance, UA 10/20/2023 Clear  Clear Final    pH, UA 10/20/2023 6.0  5.0 - 8.0 Final    Specific Gravity, UA 10/20/2023 1.020  1.005 - 1.030 Final    Protein, UA 10/20/2023 Trace (A)  Negative Final    Glucose, UA 10/20/2023 Negative  Negative Final    Ketones, UA 10/20/2023 Trace (A)  Negative Final    Bilirubin (UA) 10/20/2023 Negative  Negative Final    Occult Blood UA 10/20/2023 2+ (A)  Negative Final    Nitrite, UA 10/20/2023 Negative  Negative Final    Urobilinogen, UA 10/20/2023 Negative  Negative EU/dL Final    Leukocytes, UA 10/20/2023 2+ (A)  Negative Final    Sodium 10/20/2023 142  136 - 145 mmol/L Final    Potassium 10/20/2023 3.6  3.5 - 5.1 mmol/L Final    Chloride 10/20/2023 106  95 - 110 mmol/L Final    CO2 10/20/2023 25  23 - 29 mmol/L Final    Glucose 10/20/2023 122 (H)  70 - 110 mg/dL Final    BUN 10/20/2023 20  8 - 23 mg/dL Final    Creatinine 10/20/2023 1.1  0.5 - 1.4 mg/dL Final    Calcium 10/20/2023 10.2  8.7 - 10.5 mg/dL Final    Total Protein 10/20/2023 6.6  6.0 - 8.4 g/dL Final    Albumin 10/20/2023 3.6  3.5 - 5.2 g/dL Final    Total Bilirubin 10/20/2023 0.7  0.1 - 1.0 mg/dL Final    Alkaline Phosphatase 10/20/2023 80  55 - 135 U/L Final    AST 10/20/2023 20  10 - 40 U/L Final    ALT 10/20/2023 8 (L)  10 - 44 U/L Final    eGFR 10/20/2023 >60.0  >60 mL/min/1.73 m^2 Final    Anion Gap 10/20/2023 11  8 - 16 mmol/L Final    RBC, UA 10/20/2023 36 (H)  0 - 4 /hpf Final    WBC, UA 10/20/2023 23 (H)  0 - 5 /hpf Final    Bacteria 10/20/2023 Rare  None-Occ /hpf Final    Microscopic Comment 10/20/2023 SEE COMMENT   Final    Urine Culture, Routine 10/20/2023 No growth   Final   Admission on  09/15/2023, Discharged on 09/15/2023   Component Date Value Ref Range Status    WBC 09/15/2023 10.39  3.90 - 12.70 K/uL Final    RBC 09/15/2023 4.63  4.60 - 6.20 M/uL Final    Hemoglobin 09/15/2023 14.7  14.0 - 18.0 g/dL Final    Hematocrit 09/15/2023 43.5  40.0 - 54.0 % Final    MCV 09/15/2023 94  82 - 98 fL Final    MCH 09/15/2023 31.7 (H)  27.0 - 31.0 pg Final    MCHC 09/15/2023 33.8  32.0 - 36.0 g/dL Final    RDW 09/15/2023 13.1  11.5 - 14.5 % Final    Platelets 09/15/2023 158  150 - 450 K/uL Final    MPV 09/15/2023 10.0  9.2 - 12.9 fL Final    Immature Granulocytes 09/15/2023 0.3  0.0 - 0.5 % Final    Gran # (ANC) 09/15/2023 7.9 (H)  1.8 - 7.7 K/uL Final    Immature Grans (Abs) 09/15/2023 0.03  0.00 - 0.04 K/uL Final    Lymph # 09/15/2023 1.2  1.0 - 4.8 K/uL Final    Mono # 09/15/2023 1.2 (H)  0.3 - 1.0 K/uL Final    Eos # 09/15/2023 0.0  0.0 - 0.5 K/uL Final    Baso # 09/15/2023 0.02  0.00 - 0.20 K/uL Final    nRBC 09/15/2023 0  0 /100 WBC Final    Gran % 09/15/2023 76.1 (H)  38.0 - 73.0 % Final    Lymph % 09/15/2023 11.7 (L)  18.0 - 48.0 % Final    Mono % 09/15/2023 11.5  4.0 - 15.0 % Final    Eosinophil % 09/15/2023 0.2  0.0 - 8.0 % Final    Basophil % 09/15/2023 0.2  0.0 - 1.9 % Final    Differential Method 09/15/2023 Automated   Final    Sodium 09/15/2023 142  136 - 145 mmol/L Final    Potassium 09/15/2023 4.0  3.5 - 5.1 mmol/L Final    Chloride 09/15/2023 103  95 - 110 mmol/L Final    CO2 09/15/2023 28  23 - 29 mmol/L Final    Glucose 09/15/2023 114 (H)  70 - 110 mg/dL Final    BUN 09/15/2023 24 (H)  8 - 23 mg/dL Final    Creatinine 09/15/2023 2.0 (H)  0.5 - 1.4 mg/dL Final    Calcium 09/15/2023 10.4  8.7 - 10.5 mg/dL Final    Total Protein 09/15/2023 7.0  6.0 - 8.4 g/dL Final    Albumin 09/15/2023 3.7  3.5 - 5.2 g/dL Final    Total Bilirubin 09/15/2023 1.0  0.1 - 1.0 mg/dL Final    Alkaline Phosphatase 09/15/2023 77  55 - 135 U/L Final    AST 09/15/2023 26  10 - 40 U/L Final    ALT 09/15/2023 5 (L)  10 -  44 U/L Final    eGFR 09/15/2023 33.5 (A)  >60 mL/min/1.73 m^2 Final    Anion Gap 09/15/2023 11  8 - 16 mmol/L Final    Specimen UA 09/15/2023 Urine, Clean Catch   Final    Color, UA 09/15/2023 Yessenia  Yellow, Straw, Yessenia Final    Appearance, UA 09/15/2023 Clear  Clear Final    pH, UA 09/15/2023 5.0  5.0 - 8.0 Final    Specific Gravity, UA 09/15/2023 1.015  1.005 - 1.030 Final    Protein, UA 09/15/2023 1+ (A)  Negative Final    Glucose, UA 09/15/2023 Negative  Negative Final    Ketones, UA 09/15/2023 Negative  Negative Final    Bilirubin (UA) 09/15/2023 Negative  Negative Final    Occult Blood UA 09/15/2023 3+ (A)  Negative Final    Nitrite, UA 09/15/2023 Positive (A)  Negative Final    Leukocytes, UA 09/15/2023 Negative  Negative Final    RBC, UA 09/15/2023 >100 (H)  0 - 4 /hpf Final    WBC, UA 09/15/2023 43 (H)  0 - 5 /hpf Final    Bacteria 09/15/2023 Few (A)  None-Occ /hpf Final    Hyaline Casts, UA 09/15/2023 0  0-1/lpf /lpf Final    Microscopic Comment 09/15/2023 SEE COMMENT   Final    Urine Culture, Routine 09/15/2023 No growth   Final     - Independent review of images: none available.    Reviewed records of:  1) Grade 1 anterolisthesis of spine  2) Positive SHAHRZAD scan    Diagnoses:          1) Idiopathic PD, tremor dominant.  On CD/LD.  2) Low back pain --> now worse.  C/W LSS and sciatica  3) urinary urgency   4) hypotension   5) hematuria     Medical Decision Makin) currently taking cd/ld 1.5 five times daily   2) defer to pain management for lumbar radiculopathy  -- message sent to pain management   3) scheduled to see urology in feb   4) palliative care consult   5) follows with cardiology, recently added amlodipine but having more orthostatic hypotension  - rec'd follow up regarding amlodipine -- maybe take prn? For higher BP vs cutting dose in half, defer to cards    The patient has a documented history of CAD, hypertension, hyperlipidemia and/or hypercholesteremia with long-term complications such  as cerebrovascular disease, peripheral vascular disease, and/or aortic atherosclerosis. Collectively these risk factors may contribute to cerebral atherosclerosis, and cerebral hypoperfusion compounded neurocognitive disorder. Rec'd maximizing cerebrovascular-related medical therapy, including but not limited to cholesterol medications and antiplatelet agents. Rec'd controlling vascular risk factors like hypertension, hyperlipidemia, and Diabetes SBP<130, LDL<100, and A1C<7.0. Rec'd optimizing lifestyle choices, including a heart-healthy diet (e.g., Mediterranean or DASH), increased cardiovascular exercise (goal 150 minutes of moderate-intensity per week), and staying cognitively and socially active.      Orders Placed This Encounter   Procedures    Ambulatory referral/consult to [facility]         F/u with [facility] in 3-4 months       Collaborating Physician, Dr. Klein, was available during today's encounter. Any change to plan along with cosign to appear in the EMR.       I spent 43 minutes with the patient, reviewing past encounters, labs and imaging.        Yvette Moore PA-C   [facility]  Department of Neurology  Movement Disorders

## 2023-12-08 ENCOUNTER — PATIENT MESSAGE (OUTPATIENT)
Dept: CARDIOLOGY | Facility: CLINIC | Age: 78
End: 2023-12-08
Payer: MEDICARE

## 2023-12-14 ENCOUNTER — OFFICE VISIT (OUTPATIENT)
Dept: PALLIATIVE MEDICINE | Facility: CLINIC | Age: 78
End: 2023-12-14
Payer: MEDICARE

## 2023-12-14 VITALS
BODY MASS INDEX: 21.5 KG/M2 | HEART RATE: 73 BPM | SYSTOLIC BLOOD PRESSURE: 119 MMHG | OXYGEN SATURATION: 98 % | DIASTOLIC BLOOD PRESSURE: 56 MMHG | WEIGHT: 129.19 LBS

## 2023-12-14 DIAGNOSIS — M54.17 LUMBOSACRAL RADICULOPATHY: Primary | ICD-10-CM

## 2023-12-14 DIAGNOSIS — G20.A2 PARKINSON'S DISEASE WITHOUT DYSKINESIA, WITH FLUCTUATING MANIFESTATIONS: ICD-10-CM

## 2023-12-14 DIAGNOSIS — G89.4 CHRONIC PAIN SYNDROME: ICD-10-CM

## 2023-12-14 DIAGNOSIS — K59.04 CHRONIC IDIOPATHIC CONSTIPATION: ICD-10-CM

## 2023-12-14 PROCEDURE — 3074F PR MOST RECENT SYSTOLIC BLOOD PRESSURE < 130 MM HG: ICD-10-PCS | Mod: CPTII,S$GLB,, | Performed by: INTERNAL MEDICINE

## 2023-12-14 PROCEDURE — 3074F SYST BP LT 130 MM HG: CPT | Mod: CPTII,S$GLB,, | Performed by: INTERNAL MEDICINE

## 2023-12-14 PROCEDURE — 99205 OFFICE O/P NEW HI 60 MIN: CPT | Mod: S$GLB,,, | Performed by: INTERNAL MEDICINE

## 2023-12-14 PROCEDURE — 1159F MED LIST DOCD IN RCRD: CPT | Mod: CPTII,S$GLB,, | Performed by: INTERNAL MEDICINE

## 2023-12-14 PROCEDURE — 3078F PR MOST RECENT DIASTOLIC BLOOD PRESSURE < 80 MM HG: ICD-10-PCS | Mod: CPTII,S$GLB,, | Performed by: INTERNAL MEDICINE

## 2023-12-14 PROCEDURE — 99497 ADVNCD CARE PLAN 30 MIN: CPT | Mod: S$GLB,,, | Performed by: INTERNAL MEDICINE

## 2023-12-14 PROCEDURE — 3078F DIAST BP <80 MM HG: CPT | Mod: CPTII,S$GLB,, | Performed by: INTERNAL MEDICINE

## 2023-12-14 PROCEDURE — 1159F PR MEDICATION LIST DOCUMENTED IN MEDICAL RECORD: ICD-10-PCS | Mod: CPTII,S$GLB,, | Performed by: INTERNAL MEDICINE

## 2023-12-14 PROCEDURE — 1123F ACP DISCUSS/DSCN MKR DOCD: CPT | Mod: CPTII,S$GLB,, | Performed by: INTERNAL MEDICINE

## 2023-12-14 PROCEDURE — 99499 UNLISTED E&M SERVICE: CPT | Mod: S$GLB,,, | Performed by: INTERNAL MEDICINE

## 2023-12-14 PROCEDURE — 1160F RVW MEDS BY RX/DR IN RCRD: CPT | Mod: CPTII,S$GLB,, | Performed by: INTERNAL MEDICINE

## 2023-12-14 PROCEDURE — 99205 PR OFFICE/OUTPT VISIT, NEW, LEVL V, 60-74 MIN: ICD-10-PCS | Mod: S$GLB,,, | Performed by: INTERNAL MEDICINE

## 2023-12-14 PROCEDURE — 99499 NO LOS: ICD-10-PCS | Mod: S$GLB,,, | Performed by: INTERNAL MEDICINE

## 2023-12-14 PROCEDURE — 99999 PR PBB SHADOW E&M-EST. PATIENT-LVL III: CPT | Mod: PBBFAC,,, | Performed by: INTERNAL MEDICINE

## 2023-12-14 PROCEDURE — 1123F PR ADV CARE PLAN DISCUSSED, PLAN OR SURROGATE DOCUMENTED: ICD-10-PCS | Mod: CPTII,S$GLB,, | Performed by: INTERNAL MEDICINE

## 2023-12-14 PROCEDURE — 99999 PR PBB SHADOW E&M-EST. PATIENT-LVL III: ICD-10-PCS | Mod: PBBFAC,,, | Performed by: INTERNAL MEDICINE

## 2023-12-14 PROCEDURE — 1160F PR REVIEW ALL MEDS BY PRESCRIBER/CLIN PHARMACIST DOCUMENTED: ICD-10-PCS | Mod: CPTII,S$GLB,, | Performed by: INTERNAL MEDICINE

## 2023-12-14 PROCEDURE — 99497 PR ADVNCD CARE PLAN 30 MIN: ICD-10-PCS | Mod: S$GLB,,, | Performed by: INTERNAL MEDICINE

## 2023-12-14 RX ORDER — MELOXICAM 7.5 MG/1
7.5 TABLET ORAL DAILY
Qty: 30 TABLET | Refills: 3 | Status: SHIPPED | OUTPATIENT
Start: 2023-12-14

## 2023-12-14 RX ORDER — OXYCODONE HYDROCHLORIDE 5 MG/1
2.5 TABLET ORAL EVERY 4 HOURS PRN
Qty: 30 TABLET | Refills: 0 | Status: SHIPPED | OUTPATIENT
Start: 2023-12-14 | End: 2024-01-10 | Stop reason: SDUPTHER

## 2023-12-14 NOTE — PROGRESS NOTES
Consult Note  Palliative Care      Consult Requested By: Yvette Moore  Reason for Consult: Lumbar radiculopathy and falls from Parkinson's disease and gait instability.      ASSESSMENT/PLAN:     Plan/Recommendations:  Diagnoses and all orders for this visit:    Lumbosacral radiculopathy  -     meloxicam (MOBIC) 7.5 MG tablet; Take 1 tablet (7.5 mg total) by mouth once daily.  -     oxyCODONE (ROXICODONE) 5 MG immediate release tablet; Take 0.5 tablets (2.5 mg total) by mouth every 4 (four) hours as needed for Pain.  Trial of meloxicam to see if his pain improves with a different nonsteroidal without elevating his blood pressure.  Have cautioned the patient his wife and daughter about the use of oxycodone at the lowest dose possible.    Constipation  He has also been cautioned about constipation and recommendations for constipation have been given to him including the use of MiraLax once a day and Dulcolax her senna if needed to help push.  Parkinson's disease without dyskinesia, with fluctuating manifestations  -     Ambulatory referral/consult to [facility]  -     meloxicam (MOBIC) 7.5 MG tablet; Take 1 tablet (7.5 mg total) by mouth once daily.    Continue current plan of care by Dr. Klein and movement disorders team.  This medication will not alter any of those medications.    Understanding of illness/Prognosis: Patient knows that PD is a progressive neurological disease.   Goals of care:   Do well with minimal symptoms    Follow up:  4 weeks by video.    Patient's encounter and above plan of care discussed with patient's wife and daughter.    SUBJECTIVE:     History of Present Illness:  Patient is a 78 y.o. year old male presenting with Parkinson's disease on medications 5 times a day with gait instability.  He also has significant lumbar sacral radiculopathy with pain in both legs right greater than left.  He is currently going to physical therapy and has limited the ability to participate  because of the pain in his back and legs.  He describes the pain in his legs being worse 1st thing in the morning as is his Parkinson's disease.  He gets up several times during the night to go to the bathroom.  Some of his falls have happened at that time.  Physical therapy is not indicated whether they would recommend a weighted walker for him.  He gets around mostly in a transport chair with his family pushing him.  He does have a 3 wheeled walker which is unstable in the daughter does not like for him to use it.    For his back pain he is taking gabapentin and Lyrica in the past.  Lyrica caused hallucinations and did not help his back pain.  It was stopped after 2 weeks.  He takes ibuprofen which really helps his back pain but it causes elevated blood pressure.  He was then given amlodipine which lowered his blood pressure.  Now he has not taking ibuprofen or amlodipine but his back pain is worse.      RONAL DILLON reviewed and summarized:      Past Medical History:   Diagnosis Date    Cataract     Chronic back pain greater than 3 months duration     COPD (chronic obstructive pulmonary disease)     Coronary artery disease     3 stents    DDD (degenerative disc disease), lumbar 05/16/2020    Glaucoma suspect of both eyes     Hyperlipidemia     Hypertension     MI, old 2006    Parkinson disease     Renal disorder     Kidney stones     Past Surgical History:   Procedure Laterality Date    CARDIAC CATHETERIZATION  2006    x 3    CARDIAC CATHETERIZATION  10/11/2015    x 2    CATARACT EXTRACTION W/  INTRAOCULAR LENS IMPLANT Right 03/06/2018    Dr. Liu    CORONARY ANGIOPLASTY  2006, 2015    3 stents    CYSTOSCOPY W/ URETERAL STENT PLACEMENT Left 9/13/2023    Procedure: CYSTOSCOPY, WITH URETERAL STENT PLACEMENT;  Surgeon: Jerel Cullen MD;  Location: [facility] OR [address] [place] [address];  Service: Urology;  Laterality: Left;  1.5 HR    EXTRACORPOREAL SHOCK WAVE LITHOTRIPSY Left 4/20/2021    Procedure: LITHOTRIPSY-EXTRACORPOREAL SHOCK  WAVE;  Surgeon: Ottoniel Obando MD;  Location: [facility] OR;  Service: Urology;  Laterality: Left;    EXTRACORPOREAL SHOCK WAVE LITHOTRIPSY Left 9/13/2023    Procedure: LITHOTRIPSY, ESWL;  Surgeon: Jerel Cullen MD;  [facility];  Service: Urology;  Laterality: Left;    EYE SURGERY      cataracts bilateral    HEMORRHOID SURGERY      INJECTION OF ANESTHETIC AGENT AROUND NERVE Bilateral 11/4/2020    Procedure: BLOCK, NERVE BILATERAL L2,3,4,5;  Surgeon: Ramiro Calvillo MD;  Location: [facility] PAIN MGT;  Service: Pain Management;  Laterality: Bilateral;  BLOCK, NERVE BILATERAL L2,3,4,5    INJECTION OF FACET JOINT Right 8/5/2020    Procedure: INJECTION, FACET JOINT, L4-L5 , L5-S1;  Surgeon: Ramiro Calvillo MD;  Location: [facility] PAIN MGT;  Service: Pain Management;  Laterality: Right;    LAMINOFORAMINOTOMY OF SPINE USING MINIMALLY INVASIVE TECHNIQUE N/A 2/1/2021    Procedure: MIS L4-5 Laminectomy;  Surgeon: Bernard Sheldon MD;  Location: [facility] OR;  Service: Neurosurgery;  Laterality: N/A;  Anesthesia: General  Nerve Monitoring: EMG/SEP  Position: Prone  Bed: [place] Frame on andres  Headrest: Prone View  Radiology: C-arm  Misc: Microscope, microinstruments    TRANSFORAMINAL EPIDURAL INJECTION OF STEROID Right 5/27/2020    Procedure: INJECTION, STEROID, EPIDURAL, TRANSFORAMINAL APPROACH;  Surgeon: Ramiro Calvillo MD;  Location: [facility] PAIN MGT;  Service: Pain Management;  Laterality: Right;  Right TF ASHISH L4-L5, L5-S1    PTcannot come earlier than 12:30    TRANSFORAMINAL EPIDURAL INJECTION OF STEROID Right 9/9/2020    Procedure: INJECTION, STEROID, EPIDURAL, TRANSFORAMINAL APPROACH;  Surgeon: Ramiro Calvillo MD;  Location: [facility] PAIN MGT;  Service: Pain Management;  Laterality: Right;  RIGHT TF ASHISH L4/5 and L5/S1     Family History   Problem Relation Age of Onset    Heart attack Father     Cataracts Father     Heart disease Maternal Uncle     Heart disease Paternal Uncle     Hypertension Neg Hx     Asthma Neg  Hx     Emphysema Neg Hx     Amblyopia Neg Hx     Blindness Neg Hx     Macular degeneration Neg Hx     Retinal detachment Neg Hx     Strabismus Neg Hx      Review of patient's allergies indicates:   Allergen Reactions    Influenza virus vaccines Other (See Comments)     Caused fever, chills, and made tremors worse       Medications:    Current Outpatient Medications:     albuterol (PROVENTIL/VENTOLIN HFA) 90 mcg/actuation inhaler, Inhale 2 puffs into the lungs every 6 (six) hours as needed for Wheezing or Shortness of Breath. Rescue, Disp: 18 g, Rfl: 6    amLODIPine (NORVASC) 5 MG tablet, Take 1 tablet (5 mg total) by mouth once daily., Disp: 30 tablet, Rfl: 11    aspirin (ECOTRIN) 81 MG EC tablet, Take 81 mg by mouth once daily., Disp: , Rfl:     BREZTRI AEROSPHERE 160-9-4.8 mcg/actuation HFAA, Inhale 2 puffs into the lungs 2 (two) times a day., Disp: 10.7 g, Rfl: 11    carbidopa-levodopa  mg (SINEMET)  mg per tablet, Take 1.5 tablets by mouth 5 (five) times daily., Disp: 675 tablet, Rfl: 3    citalopram (CELEXA) 20 MG tablet, Take 1 tablet by mouth once daily, Disp: 30 tablet, Rfl: 11    ezetimibe (ZETIA) 10 mg tablet, Take 1 tablet (10 mg total) by mouth once daily., Disp: 90 tablet, Rfl: 3    finasteride (PROSCAR) 5 mg tablet, Take 1 tablet (5 mg total) by mouth once daily., Disp: 30 tablet, Rfl: 11    loratadine (CLARITIN) 10 mg tablet, Take 10 mg by mouth as needed for Allergies., Disp: , Rfl:     meloxicam (MOBIC) 7.5 MG tablet, Take 1 tablet (7.5 mg total) by mouth once daily., Disp: 30 tablet, Rfl: 3    metoprolol succinate (TOPROL-XL) 50 MG 24 hr tablet, Take 1 tablet by mouth once daily, Disp: 90 tablet, Rfl: 3    multivitamin capsule, Take 1 capsule by mouth once daily., Disp: , Rfl:     oxyCODONE (ROXICODONE) 5 MG immediate release tablet, Take 0.5 tablets (2.5 mg total) by mouth every 4 (four) hours as needed for Pain., Disp: 30 tablet, Rfl: 0    rosuvastatin (CRESTOR) 40 MG Tab, TAKE 1  TABLET BY MOUTH NIGHTLY AT BEDTIME, Disp: 90 tablet, Rfl: 3    OBJECTIVE:       ROS:  Review of Systems   Constitutional:  Positive for activity change, fatigue and unexpected weight change.   Gastrointestinal:  Positive for constipation.   Musculoskeletal:  Positive for back pain.   Psychiatric/Behavioral:  Negative for confusion and hallucinations.        Review of Symptoms      Symptom Assessment (ESAS 0-10 Scale)  Pain:  8  Dyspnea:  0  Anxiety:  0  Nausea:  0  Depression:  0  Anorexia:  0  Fatigue:  3  Insomnia:  0  Restlessness:  0  Agitation:  0     CAM / Delirium:  Negative  Constipation:  Positive  Diarrhea:  Negative    Constipation:  Constipation    Bowel Management Plan (BMP):  Yes      Pain Assessment:  OME in 24 hours:  0  Location(s): back    Back       Location: bilateral        Quality: Pressure-like        Quantity: 8/10 in intensity        Chronicity: Onset 2 year(s) ago, gradually worsening since Pain is improved with ibuprofen however it elevates his blood pressure.  Trial meloxicam is being instituted.        Aggravating Factors: Standing and walking        Alleviating Factors: NSAIDs and recumbency       Associated Symptoms: None    Performance Status:  70    Living Arrangements:  Lives with family    Psychosocial/Cultural:   See Palliative Psychosocial Note: No    **Primary  to Follow**  Palliative Care  Consult: No    Spiritual:  F - Patricia and Belief:  Baptism  I - Importance:  Yes  C - Community:  Yes  A - Address in Care:  Yes      Advance Care Planning   Advance Directives:   Living Will: No    [place]POST: No    Do Not Resuscitate Status: No    Medical Power of : Yes    Agent's Name:  Eileen Kellogg   Agent's Contact Number:  [phone]    Decision Making:  Patient answered questions and Family answered questions  Goals of Care: The patient and family endorses that what is most important right now is to focus on remaining as independent as  possible, symptom/pain control, and quality of life, even if it means sacrificing a little time    Accordingly, we have decided that the best plan to meet the patient's goals includes continuing with treatment.              Physical Exam:  Vitals: Pulse: 73 (12/14/23 0953)  BP: (!) 119/56 (12/14/23 0953)  SpO2: 98 % (12/14/23 0953)  Physical Exam  Vitals reviewed.   Constitutional:       Appearance: Normal appearance.   Cardiovascular:      Rate and Rhythm: Regular rhythm.   Pulmonary:      Effort: Pulmonary effort is normal.   Abdominal:      Palpations: Abdomen is soft.   Musculoskeletal:         General: Tenderness present. Normal range of motion.      Cervical back: Neck rigidity: 60.      Right lower leg: No edema.      Left lower leg: No edema.   Skin:     General: Skin is warm.   Neurological:      Mental Status: He is alert. Mental status is at baseline.      Comments: Has obvious resting tremor of his hands.  He also has a shuffling gait and does not  his feet. Unsteady.   Psychiatric:         Mood and Affect: Mood normal.         Behavior: Behavior normal.         Labs:  CBC:   WBC   Date Value Ref Range Status   10/20/2023 6.11 3.90 - 12.70 K/uL Final     Hemoglobin   Date Value Ref Range Status   10/20/2023 14.6 14.0 - 18.0 g/dL Final     Hematocrit   Date Value Ref Range Status   10/20/2023 44.4 40.0 - 54.0 % Final     MCV   Date Value Ref Range Status   10/20/2023 101 (H) 82 - 98 fL Final     Platelets   Date Value Ref Range Status   10/20/2023 155 150 - 450 K/uL Final       LFT:   Lab Results   Component Value Date    AST 23 12/14/2023    ALKPHOS 70 12/14/2023    BILITOT 1.0 12/14/2023       Albumin:   Albumin   Date Value Ref Range Status   12/14/2023 3.5 3.5 - 5.2 g/dL Final     Protein:   Total Protein   Date Value Ref Range Status   12/14/2023 6.5 6.0 - 8.4 g/dL Final       Radiology:I have reviewed all pertinent imaging results/findings within the past 24 hours.  Significant disease at  L4-5 S1    I spent a total of 76 minutes on the day of the visit.This includes face to face time in discussion of goals of care, symptom assessment, coordination of care and emotional support.  This also includes non-face to face time preparing to see the patient (eg, review of tests/imaging), obtaining and/or reviewing separately obtained history, documenting clinical information in the electronic or other health record, independently interpreting results and communicating results to the patient/family/caregiver, or care coordinator.       16 minutes spent in discussing ACP    Signature: Keila Jones MD

## 2023-12-20 DIAGNOSIS — J43.2 CENTRILOBULAR EMPHYSEMA: ICD-10-CM

## 2023-12-21 RX ORDER — BUDESONIDE, GLYCOPYRROLATE, AND FORMOTEROL FUMARATE 160; 9; 4.8 UG/1; UG/1; UG/1
2 AEROSOL, METERED RESPIRATORY (INHALATION) 2 TIMES DAILY
Qty: 10.7 G | Refills: 11 | Status: SHIPPED | OUTPATIENT
Start: 2023-12-21

## 2024-01-09 PROBLEM — R29.898 WEAKNESS OF BOTH LOWER EXTREMITIES: Status: RESOLVED | Noted: 2023-10-24 | Resolved: 2024-01-09

## 2024-01-09 PROBLEM — M25.69 DECREASED RANGE OF MOTION OF TRUNK AND BACK: Status: RESOLVED | Noted: 2023-10-24 | Resolved: 2024-01-09

## 2024-01-09 PROBLEM — M62.81 WEAKNESS OF TRUNK MUSCULATURE: Status: RESOLVED | Noted: 2023-10-24 | Resolved: 2024-01-09

## 2024-01-09 PROBLEM — Z74.09 IMPAIRED FUNCTIONAL MOBILITY, BALANCE, GAIT, AND ENDURANCE: Status: RESOLVED | Noted: 2023-10-24 | Resolved: 2024-01-09

## 2024-01-10 DIAGNOSIS — M54.17 LUMBOSACRAL RADICULOPATHY: ICD-10-CM

## 2024-01-10 RX ORDER — OXYCODONE HYDROCHLORIDE 5 MG/1
2.5 TABLET ORAL EVERY 4 HOURS PRN
Qty: 30 TABLET | Refills: 0 | Status: SHIPPED | OUTPATIENT
Start: 2024-01-10 | End: 2024-02-07 | Stop reason: SDUPTHER

## 2024-01-18 ENCOUNTER — OFFICE VISIT (OUTPATIENT)
Dept: INTERNAL MEDICINE | Facility: CLINIC | Age: 79
End: 2024-01-18
Payer: MEDICARE

## 2024-01-18 VITALS
TEMPERATURE: 96 F | WEIGHT: 126.88 LBS | BODY MASS INDEX: 21.14 KG/M2 | SYSTOLIC BLOOD PRESSURE: 120 MMHG | DIASTOLIC BLOOD PRESSURE: 70 MMHG | HEIGHT: 65 IN

## 2024-01-18 DIAGNOSIS — E04.1 THYROID NODULE: ICD-10-CM

## 2024-01-18 DIAGNOSIS — I25.10 CORONARY ARTERY DISEASE INVOLVING NATIVE CORONARY ARTERY OF NATIVE HEART WITHOUT ANGINA PECTORIS: ICD-10-CM

## 2024-01-18 DIAGNOSIS — E78.00 PURE HYPERCHOLESTEROLEMIA: ICD-10-CM

## 2024-01-18 DIAGNOSIS — G20.B1 PARKINSON'S DISEASE WITH DYSKINESIA, UNSPECIFIED WHETHER MANIFESTATIONS FLUCTUATE: Primary | ICD-10-CM

## 2024-01-18 DIAGNOSIS — N20.0 NEPHROLITHIASIS: ICD-10-CM

## 2024-01-18 DIAGNOSIS — F41.9 ANXIETY: ICD-10-CM

## 2024-01-18 DIAGNOSIS — M51.36 DDD (DEGENERATIVE DISC DISEASE), LUMBAR: ICD-10-CM

## 2024-01-18 DIAGNOSIS — I10 ESSENTIAL HYPERTENSION: ICD-10-CM

## 2024-01-18 DIAGNOSIS — I27.20 PULMONARY HTN: Chronic | ICD-10-CM

## 2024-01-18 DIAGNOSIS — J43.2 CENTRILOBULAR EMPHYSEMA: ICD-10-CM

## 2024-01-18 PROCEDURE — 99214 OFFICE O/P EST MOD 30 MIN: CPT | Mod: S$GLB,,, | Performed by: INTERNAL MEDICINE

## 2024-01-18 PROCEDURE — 99999 PR PBB SHADOW E&M-EST. PATIENT-LVL III: CPT | Mod: PBBFAC,,, | Performed by: INTERNAL MEDICINE

## 2024-01-18 NOTE — PROGRESS NOTES
Subjective     Patient ID: Hector Kellogg is a 78 y.o. male.    Chief Complaint: Follow-up    HPI  Pt with CAD S/P NSTEMI and PCI in 2006, HTN, HLD and Parkinson's disease here for 6 month f/u.   Review of Systems   Constitutional:  Negative for activity change, appetite change, chills, diaphoresis, fatigue, fever and unexpected weight change.   HENT:  Negative for postnasal drip, rhinorrhea, sinus pressure/congestion, sneezing, sore throat, trouble swallowing and voice change.    Respiratory:  Negative for cough, shortness of breath and wheezing.    Cardiovascular:  Negative for chest pain, palpitations and leg swelling.   Gastrointestinal:  Negative for abdominal pain, blood in stool, constipation, diarrhea, nausea and vomiting.   Genitourinary:  Negative for dysuria.   Musculoskeletal:  Negative for arthralgias and myalgias.   Integumentary:  Negative for rash and wound.   Allergic/Immunologic: Negative for environmental allergies and food allergies.   Neurological:  Positive for tremors and memory loss.   Hematological:  Negative for adenopathy. Does not bruise/bleed easily.   Psychiatric/Behavioral:  The patient is nervous/anxious.           Objective     Physical Exam  Constitutional:       General: He is not in acute distress.     Appearance: Normal appearance. He is well-developed. He is not diaphoretic.   HENT:      Head: Normocephalic and atraumatic.      Right Ear: External ear normal.      Left Ear: External ear normal.      Nose: Nose normal.      Mouth/Throat:      Pharynx: No oropharyngeal exudate.   Eyes:      General: No scleral icterus.        Right eye: No discharge.         Left eye: No discharge.      Conjunctiva/sclera: Conjunctivae normal.      Pupils: Pupils are equal, round, and reactive to light.   Neck:      Vascular: No JVD.   Cardiovascular:      Rate and Rhythm: Normal rate and regular rhythm.      Pulses: Normal pulses.      Heart sounds: Normal heart sounds. No murmur  heard.  Pulmonary:      Effort: Pulmonary effort is normal. No respiratory distress.      Breath sounds: Normal breath sounds. No wheezing or rales.   Abdominal:      General: Bowel sounds are normal.      Tenderness: There is no abdominal tenderness. There is no guarding or rebound.   Musculoskeletal:      Cervical back: Normal range of motion and neck supple.      Right lower leg: No edema.      Left lower leg: No edema.   Lymphadenopathy:      Cervical: No cervical adenopathy.   Skin:     General: Skin is warm and dry.      Capillary Refill: Capillary refill takes less than 2 seconds.      Coloration: Skin is not pale.      Findings: No rash.   Neurological:      Mental Status: He is alert and oriented to person, place, and time. Mental status is at baseline.      Cranial Nerves: No cranial nerve deficit.   Psychiatric:         Mood and Affect: Mood normal.         Behavior: Behavior normal.            Assessment and Plan     1. Parkinson's disease with dyskinesia, unspecified whether manifestations fluctuate    2. DDD (degenerative disc disease), lumbar    3. Anxiety    4. Centrilobular emphysema  Overview:  FEV1 0.99 (43%) predicted  Continue breztri twice daily for control  Albuterol for rescue and prevention.     88% on room air at rest.  Benefits from POC of choice.       5. Coronary artery disease involving native coronary artery of native heart without angina pectoris  Overview:  Stents x 3 2006    PCI of RCA and OM 10/15 with [facility]INA  LVEF 55% 11/15      6. Pure hypercholesterolemia    7. Essential hypertension    8. Pulmonary HTN    9. Nephrolithiasis    10. Thyroid nodule           CAD- S/P NSTEMI and PCI in 2006, with repeat CATH(10/11/15) placing stents to RCA and OM1- stable       Continue BB/statin/ASA; pt has stopped the Imdur 2/2 to low BP      HTN- stable on Toprol XL 50 mg daily      HLD- controlled on Crestor      Parkinson's disease- stable on Sinemet, managed by Neuro      COPD- stable, followed  by Pulm      Mild Pulmonary HTN- stable      Anxiety- stable on Celexa 20 mg daily      Thyroid nodule- followed by ENT      DJD Lumbar spine- stable, stopped the gabapentin 2/2 sedation       Nephrolithiasis- stable      Elevated PSA- followed by urology      F/u in 6 months for annual exam   wheelchair

## 2024-01-19 ENCOUNTER — PATIENT MESSAGE (OUTPATIENT)
Dept: UROLOGY | Facility: CLINIC | Age: 79
End: 2024-01-19
Payer: MEDICARE

## 2024-01-31 ENCOUNTER — TELEPHONE (OUTPATIENT)
Dept: PULMONOLOGY | Facility: CLINIC | Age: 79
End: 2024-01-31
Payer: MEDICARE

## 2024-01-31 DIAGNOSIS — R06.02 SOB (SHORTNESS OF BREATH): Primary | ICD-10-CM

## 2024-02-06 ENCOUNTER — OFFICE VISIT (OUTPATIENT)
Dept: UROLOGY | Facility: CLINIC | Age: 79
End: 2024-02-06
Payer: MEDICARE

## 2024-02-06 VITALS
WEIGHT: 126 LBS | DIASTOLIC BLOOD PRESSURE: 70 MMHG | HEIGHT: 65 IN | HEART RATE: 96 BPM | SYSTOLIC BLOOD PRESSURE: 126 MMHG | BODY MASS INDEX: 20.99 KG/M2

## 2024-02-06 DIAGNOSIS — N30.90 CYSTITIS: Primary | ICD-10-CM

## 2024-02-06 DIAGNOSIS — N13.8 BPH WITH URINARY OBSTRUCTION: ICD-10-CM

## 2024-02-06 DIAGNOSIS — J43.2 CENTRILOBULAR EMPHYSEMA: ICD-10-CM

## 2024-02-06 DIAGNOSIS — N20.0 KIDNEY STONE ON LEFT SIDE: ICD-10-CM

## 2024-02-06 DIAGNOSIS — N40.1 BPH WITH URINARY OBSTRUCTION: ICD-10-CM

## 2024-02-06 PROCEDURE — 99999 PR PBB SHADOW E&M-EST. PATIENT-LVL IV: CPT | Mod: PBBFAC,,, | Performed by: UROLOGY

## 2024-02-06 PROCEDURE — 87086 URINE CULTURE/COLONY COUNT: CPT | Performed by: UROLOGY

## 2024-02-06 PROCEDURE — 99215 OFFICE O/P EST HI 40 MIN: CPT | Mod: S$GLB,,, | Performed by: UROLOGY

## 2024-02-06 RX ORDER — FINASTERIDE 5 MG/1
5 TABLET, FILM COATED ORAL DAILY
Qty: 90 TABLET | Refills: 3 | Status: SHIPPED | OUTPATIENT
Start: 2024-02-06 | End: 2024-05-07

## 2024-02-06 RX ORDER — IBUPROFEN 800 MG/1
800 TABLET ORAL
COMMUNITY
Start: 2024-01-29

## 2024-02-06 RX ORDER — AMOXICILLIN AND CLAVULANATE POTASSIUM 875; 125 MG/1; MG/1
1 TABLET, FILM COATED ORAL 2 TIMES DAILY
Qty: 14 TABLET | Refills: 0 | Status: SHIPPED | OUTPATIENT
Start: 2024-02-06 | End: 2024-02-13

## 2024-02-06 RX ORDER — POTASSIUM CITRATE 10 MEQ/1
10 TABLET, EXTENDED RELEASE ORAL 2 TIMES DAILY WITH MEALS
Qty: 180 TABLET | Refills: 3 | Status: SHIPPED | OUTPATIENT
Start: 2024-02-06 | End: 2024-05-07 | Stop reason: SDUPTHER

## 2024-02-06 NOTE — PROGRESS NOTES
CC: left kidney stone, recurrent UTI, hx of urinary retention    Hector Kellogg is a 78 y.o. man who is here for a follow up of left kidney stone.  He had greater than 3 cm stone burden in the left kidney.  He is here with his daughter today.  Has a problem with chronic constipation.    Hector Kellogg is a 78 y.o. male with a  left renal stone presenting for definitive stone management.  The stone is radio-opaque on KUB.    Date: 09/13/2023   Pre-Op Diagnosis: Left renal stone  Post-Op Diagnosis: same  Procedure(s) Performed:   1.  Left ESWL  Findings:   Left ureteral stent 6x24cm without strings confirmed on KUB; Good position at end of case  Decrease in density of UPJ stone with 3000 shocks delivered  Drains: 6 Fr x 24 cm left ureteral stent without strings  He developed acute urinary retention following his surgery.  A mares catheter was placed and he did well.    The left ureteral stent removed via cysto on 9/28/23.    He is here today because he is concerned that he may have UTI.    His PCP, Blue Mcgill, DO   His PSA increased to 6.2 from 3.9.  Has a hx of recurrent UTI.  His elevated PSA might have been due to these problems.  Urination symptoms: Positive for frequency, urgency and incomplete emptying.  Denies flank pain.      Non-smoker    Past Medical History:   Diagnosis Date    Cataract     Chronic back pain greater than 3 months duration     COPD (chronic obstructive pulmonary disease)     Coronary artery disease     3 stents    DDD (degenerative disc disease), lumbar 05/16/2020    Glaucoma suspect of both eyes     Hyperlipidemia     Hypertension     MI, old 2006    Parkinson disease     Renal disorder     Kidney stones     Past Surgical History:   Procedure Laterality Date    CARDIAC CATHETERIZATION  2006    x 3    CARDIAC CATHETERIZATION  10/11/2015    x 2    CATARACT EXTRACTION W/  INTRAOCULAR LENS IMPLANT Right 03/06/2018    Dr. Liu    CORONARY ANGIOPLASTY  2006, 2015     3 stents    CYSTOSCOPY W/ URETERAL STENT PLACEMENT Left 9/13/2023    Procedure: CYSTOSCOPY, WITH URETERAL STENT PLACEMENT;  Surgeon: Jerel Cullen MD;  Location: [facility] OR 1ST FLR;  Service: Urology;  Laterality: Left;  1.5 HR    EXTRACORPOREAL SHOCK WAVE LITHOTRIPSY Left 4/20/2021    Procedure: LITHOTRIPSY-EXTRACORPOREAL SHOCK WAVE;  Surgeon: Ottoniel Obando MD;  Location: [facility] OR;  Service: Urology;  Laterality: Left;    EXTRACORPOREAL SHOCK WAVE LITHOTRIPSY Left 9/13/2023    Procedure: LITHOTRIPSY, ESWL;  Surgeon: Jerel Cullen MD;  Location: [facility] OR 1ST FLR;  Service: Urology;  Laterality: Left;    EYE SURGERY      cataracts bilateral    HEMORRHOID SURGERY      INJECTION OF ANESTHETIC AGENT AROUND NERVE Bilateral 11/4/2020    Procedure: BLOCK, NERVE BILATERAL L2,3,4,5;  Surgeon: Ramiro Calvillo MD;  Location: [facility] PAIN MGT;  Service: Pain Management;  Laterality: Bilateral;  BLOCK, NERVE BILATERAL L2,3,4,5    INJECTION OF FACET JOINT Right 8/5/2020    Procedure: INJECTION, FACET JOINT, L4-L5 , L5-S1;  Surgeon: Ramiro Calvillo MD;  Location: [facility] PAIN MGT;  Service: Pain Management;  Laterality: Right;    LAMINOFORAMINOTOMY OF SPINE USING MINIMALLY INVASIVE TECHNIQUE N/A 2/1/2021    Procedure: MIS L4-5 Laminectomy;  Surgeon: Bernard Sheldon MD;  Location: [facility];  Service: Neurosurgery;  Laterality: N/A;  Anesthesia: General  Nerve Monitoring: EMG/SEP  Position: Prone  Bed: [facility] on [place]  Headrest: Prone View  Radiology: C-arm  Misc: Microscope, microinstruments    TRANSFORAMINAL EPIDURAL INJECTION OF STEROID Right 5/27/2020    Procedure: INJECTION, STEROID, EPIDURAL, TRANSFORAMINAL APPROACH;  Surgeon: Ramiro Calvillo MD;  Location: [facility] PAIN MGT;  Service: Pain Management;  Laterality: Right;  Right TF ASHISH L4-L5, L5-S1    PTcannot come earlier than 12:30    TRANSFORAMINAL EPIDURAL INJECTION OF STEROID Right 9/9/2020    Procedure: INJECTION, STEROID, EPIDURAL, TRANSFORAMINAL APPROACH;   Surgeon: Ramiro Calvillo MD;  Location: [facility] PAIN MGT;  Service: Pain Management;  Laterality: Right;  RIGHT TF ASHISH L4/5 and L5/S1     Social History     Tobacco Use    Smoking status: Former     Current packs/day: 0.00     Average packs/day: 1 pack/day for 40.0 years (40.0 ttl pk-yrs)     Types: Cigarettes     Start date: 2/1/1974     Quit date: 2/1/2014     Years since quitting: 10.0     Passive exposure: Past    Smokeless tobacco: Never   Substance Use Topics    Alcohol use: Yes     Comment: socially    Drug use: No     Family History   Problem Relation Age of Onset    Heart attack Father     Cataracts Father     Heart disease Maternal Uncle     Heart disease Paternal Uncle     Hypertension Neg Hx     Asthma Neg Hx     Emphysema Neg Hx     Amblyopia Neg Hx     Blindness Neg Hx     Macular degeneration Neg Hx     Retinal detachment Neg Hx     Strabismus Neg Hx      Allergy:  Review of patient's allergies indicates:   Allergen Reactions    Influenza virus vaccines Other (See Comments)     Caused fever, chills, and made tremors worse     Outpatient Encounter Medications as of 2/6/2024   Medication Sig Dispense Refill    albuterol (PROVENTIL/VENTOLIN HFA) 90 mcg/actuation inhaler Inhale 2 puffs into the lungs every 6 (six) hours as needed for Wheezing or Shortness of Breath. Rescue 18 g 6    amLODIPine (NORVASC) 5 MG tablet Take 1 tablet (5 mg total) by mouth once daily. 30 tablet 11    aspirin (ECOTRIN) 81 MG EC tablet Take 81 mg by mouth once daily.      BREZTRI AEROSPHERE 160-9-4.8 mcg/actuation HFAA Inhale 2 puffs into the lungs 2 (two) times a day. 10.7 g 11    carbidopa-levodopa  mg (SINEMET)  mg per tablet Take 1.5 tablets by mouth 5 (five) times daily. 675 tablet 3    citalopram (CELEXA) 20 MG tablet Take 1 tablet by mouth once daily 30 tablet 11    ezetimibe (ZETIA) 10 mg tablet Take 1 tablet (10 mg total) by mouth once daily. 90 tablet 3    ibuprofen (ADVIL,MOTRIN) 800 MG tablet Take 800 mg  by mouth as needed.      loratadine (CLARITIN) 10 mg tablet Take 10 mg by mouth as needed for Allergies.      meloxicam (MOBIC) 7.5 MG tablet Take 1 tablet (7.5 mg total) by mouth once daily. 30 tablet 3    metoprolol succinate (TOPROL-XL) 50 MG 24 hr tablet Take 1 tablet by mouth once daily 90 tablet 3    multivitamin capsule Take 1 capsule by mouth once daily.      rosuvastatin (CRESTOR) 40 MG Tab TAKE 1 TABLET BY MOUTH NIGHTLY AT BEDTIME 90 tablet 3    [DISCONTINUED] finasteride (PROSCAR) 5 mg tablet Take 1 tablet (5 mg total) by mouth once daily. 30 tablet 11    amoxicillin-clavulanate 875-125mg (AUGMENTIN) 875-125 mg per tablet Take 1 tablet by mouth 2 (two) times daily. for 7 days 14 tablet 0    finasteride (PROSCAR) 5 mg tablet Take 1 tablet (5 mg total) by mouth once daily. 90 tablet 3    oxyCODONE (ROXICODONE) 5 MG immediate release tablet Take ½ tablet (2.5 mg total) by mouth every 4 (four) hours as needed for Pain. (Patient not taking: Reported on 2/6/2024) 30 tablet 0    potassium citrate (UROCIT-K 10) 10 mEq (1,080 mg) TbSR Take 1 tablet (10 mEq total) by mouth 2 (two) times daily with meals. 180 tablet 3    [DISCONTINUED] oxyCODONE (ROXICODONE) 5 MG immediate release tablet Take 0.5 tablets (2.5 mg total) by mouth every 4 (four) hours as needed for Pain. 30 tablet 0     No facility-administered encounter medications on file as of 2/6/2024.     Review of Systems   ROS  Physical Exam     Vitals:    02/06/24 1321   BP: 126/70   Pulse: 96       Physical Exam  Constitutional:       General: He is not in acute distress.     Appearance: He is well-developed. He is not diaphoretic.   HENT:      Head: Normocephalic and atraumatic.      Right Ear: External ear normal.      Left Ear: External ear normal.      Nose: Nose normal.   Eyes:      Conjunctiva/sclera: Conjunctivae normal.      Pupils: Pupils are equal, round, and reactive to light.   Neck:      Thyroid: No thyromegaly.      Vascular: No JVD.       Trachea: No tracheal deviation.   Cardiovascular:      Rate and Rhythm: Normal rate and regular rhythm.      Heart sounds: Normal heart sounds. No murmur heard.     No friction rub. No gallop.   Pulmonary:      Effort: Pulmonary effort is normal. No respiratory distress.      Breath sounds: Normal breath sounds. No wheezing.   Chest:      Chest wall: No tenderness.   Abdominal:      General: Bowel sounds are normal. There is no distension.      Palpations: Abdomen is soft. There is no mass.      Tenderness: There is no abdominal tenderness. There is no guarding or rebound.   Genitourinary:     Penis: Normal. No tenderness.       Prostate: Normal.      Rectum: Normal.      Comments: Prostate: enlarged.  Musculoskeletal:         General: No tenderness or deformity. Normal range of motion.      Cervical back: Normal range of motion and neck supple.   Lymphadenopathy:      Cervical: No cervical adenopathy.   Skin:     General: Skin is warm and dry.   Neurological:      Mental Status: He is alert and oriented to person, place, and time.   Psychiatric:         Behavior: Behavior normal.         Thought Content: Thought content normal.         LABS:  Lab Results   Component Value Date    PSA 6.2 (H) 07/14/2023    PSA 3.9 06/22/2022     No results found for this or any previous visit.  Lab Results   Component Value Date    CREATININE 1.1 10/20/2023    CREATININE 2.0 (H) 09/15/2023    CREATININE 0.9 08/06/2023     No results found for this or any previous visit.  Urine Culture, Routine   Date Value Ref Range Status   11/10/2023 No growth  Final     Hemoglobin A1C   Date Value Ref Range Status   07/14/2023 5.7 (H) 4.0 - 5.6 % Final     Comment:     ADA Screening Guidelines:  5.7-6.4%  Consistent with prediabetes  >or=6.5%  Consistent with diabetes    High levels of fetal hemoglobin interfere with the HbA1C  assay. Heterozygous hemoglobin variants (HbS, HgC, etc)do  not significantly interfere with this assay.   However,  presence of multiple variants may affect accuracy.         Radiology:  CT urogram 8/24/23    Kidneys enhance symmetrically with cortical hypodense lesions, the larger in keeping with cysts with some too small to characterize.  Incidental mild extrarenal pelvis on the right.  Punctate nonobstructing right renal stones (series 3, image 56 and 72).  There is left renal pelvis stone near the UPJ measuring 1.3 x 0.9 cm with mild adjacent stranding and mild upstream hydronephrosis.  Additional nonobstructing left renal stones.  There is incomplete contrast distension of the urinary bladder, otherwise unremarkable.  Prostate is enlarged with central gland calcification.    KUB today  few grouped attenuating foci projecting at the left renal shadow measuring in total 1.4 cm, suggesting stones.      Assessment and Plan:  Hector was seen today for follow-up.    Diagnoses and all orders for this visit:    Cystitis  -     Urine culture  -     amoxicillin-clavulanate 875-125mg (AUGMENTIN) 875-125 mg per tablet; Take 1 tablet by mouth 2 (two) times daily. for 7 days    BPH with urinary obstruction  -     finasteride (PROSCAR) 5 mg tablet; Take 1 tablet (5 mg total) by mouth once daily.    Kidney stone on left side  -     CT Renal Stone Study ABD Pelvis WO; Future  -     potassium citrate (UROCIT-K 10) 10 mEq (1,080 mg) TbSR; Take 1 tablet (10 mEq total) by mouth 2 (two) times daily with meals.  -     X-Ray Abdomen AP 1 View; Future  -     Basic Metabolic Panel; Future      First his large stone burden is definitely reduced following his left ESWL.  Will follow up with CT RSS and KUB in 3 months.  He does not drink much of water.  Recommend to drink more water or lemonade, at least 6 glasses per day.  Start urocit K BID.  Address chronic constipation.    Will do urine culture today and start him on augmentin.  Continue finasteride.    I spent 40 minutes with the patient of which more than half was spent in direct consultation with  the patient in regards to our treatment and plan.     Follow-up:  Follow up in about 3 months (around 5/6/2024) for KUB, CT RSS.

## 2024-02-06 NOTE — PATIENT INSTRUCTIONS
Pay attention to urine collection ( clean catch urine) and recommend to get urine culture for any suspected UTI ( even possible cath urine for culture).    Treat chronic constipation.    Increase water and empty the bladder more regularly.  Probiotics (Align) daily  D-Mannose 1 gram twice a day

## 2024-02-07 ENCOUNTER — PATIENT MESSAGE (OUTPATIENT)
Dept: RESEARCH | Facility: HOSPITAL | Age: 79
End: 2024-02-07
Payer: MEDICARE

## 2024-02-07 DIAGNOSIS — M54.17 LUMBOSACRAL RADICULOPATHY: ICD-10-CM

## 2024-02-07 LAB — BACTERIA UR CULT: NORMAL

## 2024-02-07 RX ORDER — OXYCODONE HYDROCHLORIDE 5 MG/1
2.5 TABLET ORAL EVERY 4 HOURS PRN
Qty: 30 TABLET | Refills: 0 | Status: SHIPPED | OUTPATIENT
Start: 2024-02-07 | End: 2024-03-04 | Stop reason: SDUPTHER

## 2024-02-07 RX ORDER — ALBUTEROL SULFATE 90 UG/1
AEROSOL, METERED RESPIRATORY (INHALATION)
Qty: 9 G | Refills: 0 | Status: SHIPPED | OUTPATIENT
Start: 2024-02-07 | End: 2024-04-05

## 2024-02-08 ENCOUNTER — HOSPITAL ENCOUNTER (OUTPATIENT)
Dept: PULMONOLOGY | Facility: CLINIC | Age: 79
Discharge: HOME | End: 2024-02-08
Payer: MEDICARE

## 2024-02-08 ENCOUNTER — OFFICE VISIT (OUTPATIENT)
Dept: PULMONOLOGY | Facility: CLINIC | Age: 79
End: 2024-02-08
Payer: MEDICARE

## 2024-02-08 ENCOUNTER — TELEPHONE (OUTPATIENT)
Dept: UROLOGY | Facility: CLINIC | Age: 79
End: 2024-02-08
Payer: MEDICARE

## 2024-02-08 VITALS
BODY MASS INDEX: 21.85 KG/M2 | HEIGHT: 64 IN | WEIGHT: 128 LBS | HEART RATE: 61 BPM | SYSTOLIC BLOOD PRESSURE: 136 MMHG | DIASTOLIC BLOOD PRESSURE: 76 MMHG | OXYGEN SATURATION: 88 %

## 2024-02-08 DIAGNOSIS — R06.02 SOB (SHORTNESS OF BREATH): ICD-10-CM

## 2024-02-08 DIAGNOSIS — J43.2 CENTRILOBULAR EMPHYSEMA: Primary | ICD-10-CM

## 2024-02-08 DIAGNOSIS — J96.11 CHRONIC RESPIRATORY FAILURE WITH HYPOXIA: ICD-10-CM

## 2024-02-08 DIAGNOSIS — R91.8 LUNG NODULES: ICD-10-CM

## 2024-02-08 PROCEDURE — 94727 GAS DIL/WSHOT DETER LNG VOL: CPT | Mod: S$GLB,,, | Performed by: INTERNAL MEDICINE

## 2024-02-08 PROCEDURE — 94729 DIFFUSING CAPACITY: CPT | Mod: S$GLB,,, | Performed by: INTERNAL MEDICINE

## 2024-02-08 PROCEDURE — 99214 OFFICE O/P EST MOD 30 MIN: CPT | Mod: 25,S$GLB,, | Performed by: INTERNAL MEDICINE

## 2024-02-08 PROCEDURE — 94010 BREATHING CAPACITY TEST: CPT | Mod: S$GLB,,, | Performed by: INTERNAL MEDICINE

## 2024-02-08 PROCEDURE — 99999 PR PBB SHADOW E&M-EST. PATIENT-LVL III: CPT | Mod: PBBFAC,,, | Performed by: INTERNAL MEDICINE

## 2024-02-08 NOTE — TELEPHONE ENCOUNTER
----- Message from Jane Napier LPN sent at 2/7/2024 12:40 PM CST -----  Regarding: FW: RX request  Contact: [phone]  Please review and advise.  ----- Message -----  From: Flavio Iraheta  Sent: 2/6/2024   4:47 PM CST  To: Subhash JENSEN Staff  Subject: RX request                                       Pt daughter is calling in regards to the potassium citrate (UROCIT-K 10) 10 mEq (1,080 mg) TbSR not being called into the pharmacy. Pt picked up finasteride (PROSCAR) 5 mg tablet in which the pt tried before. Please call pt daughter to confirm or send RX to       [address] [place]  [address] [facility]  [address] [facility]  [place] [address]  Phone: [phone] Fax: [phone]         Talked to the daughter and confirmed that pt now got Urocit K as given before.

## 2024-02-08 NOTE — PROGRESS NOTES
"Subjective:       Patient ID: Hector Kellogg is a 78 y.o. male.    Chief Complaint: COPD (Emphysema, oxygen recertification)    78 year old with severe emphysema, chronic hypoxic respiratory failure and Parkinson's.  Here today because needs oxygen re certification.  ADLs becoming more challenging.  Uses albuterol inhaler fro prevention with minimal activities.  He is accomponied by his daughter today.  Lives with his wife a couple of doors down.  Lives very sedentary life.  Uses breztri 2 puffs twice a day and albuterol for rescue and prevention.  Denies chronic cough.           Review of Systems    Objective:      Vitals:    02/08/24 1312   BP: 136/76   Pulse: 61   SpO2: (!) 88%   Weight: 58.1 kg (128 lb)   Height: 5' 4" (1.626 m)      Physical Exam   Constitutional: He is oriented to person, place, and time.   Frail appearing    Pulmonary/Chest: Symmetric chest wall expansion, effort normal and breath sounds normal.   Neurological: He is alert and oriented to person, place, and time.   Skin: Skin is warm and dry.   Psychiatric: He has a normal mood and affect.     Personal Diagnostic Review  Last LDCT with perifissural nodule 5 mm 1/20/2020 and follow up CT of abdomen.  Stable nodules 10/2023        2/8/2024     1:12 PM 2/6/2024     1:21 PM 1/18/2024    10:28 AM 12/14/2023     9:53 AM 12/6/2023     1:05 PM 11/15/2023    10:23 AM 11/9/2023     4:06 PM   Pulmonary Function Tests   SpO2 88 %   98 %  97 %    Height 5' 4" (1.626 m) 5' 5" (1.651 m) 5' 5" (1.651 m)  5' 5" (1.651 m) 5' 5" (1.651 m) 5' 5" (1.651 m)   Weight 58.1 kg (128 lb) 57.2 kg (126 lb) 57.6 kg (126 lb 14 oz) 58.6 kg (129 lb 3 oz) 59.9 kg (132 lb) 60.5 kg (133 lb 6.1 oz) 62.1 kg (136 lb 14.5 oz)   BMI (Calculated) 22 21 21.1  22 22.2 22.8         Assessment:       1. Centrilobular emphysema    2. Chronic respiratory failure with hypoxia    3. Lung nodules        Outpatient Encounter Medications as of 2/8/2024   Medication Sig Dispense Refill    " albuterol (PROVENTIL/VENTOLIN HFA) 90 mcg/actuation inhaler INHALE 2 PUFFS BY MOUTH EVERY 6 HOURS AS NEEDED FOR WHEEZING FOR SHORTNESS OF BREATH 9 g 0    amLODIPine (NORVASC) 5 MG tablet Take 1 tablet (5 mg total) by mouth once daily. 30 tablet 11    amoxicillin-clavulanate 875-125mg (AUGMENTIN) 875-125 mg per tablet Take 1 tablet by mouth 2 (two) times daily. for 7 days 14 tablet 0    aspirin (ECOTRIN) 81 MG EC tablet Take 81 mg by mouth once daily.      BREZTRI AEROSPHERE 160-9-4.8 mcg/actuation HFAA Inhale 2 puffs into the lungs 2 (two) times a day. 10.7 g 11    carbidopa-levodopa  mg (SINEMET)  mg per tablet Take 1.5 tablets by mouth 5 (five) times daily. 675 tablet 3    citalopram (CELEXA) 20 MG tablet Take 1 tablet by mouth once daily 30 tablet 11    ezetimibe (ZETIA) 10 mg tablet Take 1 tablet (10 mg total) by mouth once daily. 90 tablet 3    finasteride (PROSCAR) 5 mg tablet Take 1 tablet (5 mg total) by mouth once daily. 90 tablet 3    ibuprofen (ADVIL,MOTRIN) 800 MG tablet Take 800 mg by mouth as needed.      loratadine (CLARITIN) 10 mg tablet Take 10 mg by mouth as needed for Allergies.      meloxicam (MOBIC) 7.5 MG tablet Take 1 tablet (7.5 mg total) by mouth once daily. 30 tablet 3    metoprolol succinate (TOPROL-XL) 50 MG 24 hr tablet Take 1 tablet by mouth once daily 90 tablet 3    multivitamin capsule Take 1 capsule by mouth once daily.      oxyCODONE (ROXICODONE) 5 MG immediate release tablet Take ½ tablet (2.5 mg total) by mouth every 4 (four) hours as needed for Pain. 30 tablet 0    potassium citrate (UROCIT-K 10) 10 mEq (1,080 mg) TbSR Take 1 tablet (10 mEq total) by mouth 2 (two) times daily with meals. 180 tablet 3    rosuvastatin (CRESTOR) 40 MG Tab TAKE 1 TABLET BY MOUTH NIGHTLY AT BEDTIME 90 tablet 3    [DISCONTINUED] albuterol (PROVENTIL/VENTOLIN HFA) 90 mcg/actuation inhaler Inhale 2 puffs into the lungs every 6 (six) hours as needed for Wheezing or Shortness of Breath. Rescue  "18 g 6    [DISCONTINUED] finasteride (PROSCAR) 5 mg tablet Take 1 tablet (5 mg total) by mouth once daily. 30 tablet 11    [DISCONTINUED] oxyCODONE (ROXICODONE) 5 MG immediate release tablet Take ½ tablet (2.5 mg total) by mouth every 4 (four) hours as needed for Pain. (Patient not taking: Reported on 2/6/2024) 30 tablet 0     No facility-administered encounter medications on file as of 2/8/2024.     Orders Placed This Encounter   Procedures    OXYGEN FOR HOME USE     Order Specific Question:   Liter Flow     Answer:   2     Order Specific Question:   Duration     Answer:   Continuous     Order Specific Question:   Qualifying Test Performed at:     Answer:   Rest     Order Specific Question:   Oxygen saturation:     Answer:   88     Order Specific Question:   Portable mode:     Answer:   pulse dose acceptable     Order Specific Question:   Mode:     Answer:   Portable concentrator     Order Specific Question:   Route     Answer:   nasal cannula     Order Specific Question:   Device:     Answer:   home concentrator with portable concentrator     Order Specific Question:   Length of need (in months):     Answer:   99 mos     Order Specific Question:   Patient condition with qualifying saturation     Answer:   COPD     Order Specific Question:   Height:     Answer:   5' 4" (1.626 m)     Order Specific Question:   Weight:     Answer:   58.1 kg (128 lb)     Order Specific Question:   Alternative treatment measures have been tried or considered and deemed clinically ineffective.     Answer:   Yes     Plan:     Problem List Items Addressed This Visit       Centrilobular emphysema - Primary    Overview     FEV1 0.99 (43%) predicted  Continue breztri twice daily for control  Albuterol for rescue and prevention.     88% on room air at rest.  Benefits from POC of choice.          Relevant Orders    OXYGEN FOR HOME USE    Lung nodules    Overview     CT of chest 1/10/2020  Lungs/Pleura: Lungs are expanded.  Panlobular " emphysematous changes.  No mass, pneumothorax, or pleural fluid.  0.6 cm solid soft tissue nodule in the inferior lingular segment the left lower lobe (axial series 4, 350).  Additional scattered pulmonary micro nodules measuring between 0.1-0.3 cm.    Appear stable on CT of abdomen 10/2023 indicating benign etiology.    Due to severe emphysema,  chronic respiratory failure, no longer qualifies for LDCT as would not tolerate invasive lung procedure.           Other Visit Diagnoses       Chronic respiratory failure with hypoxia        Relevant Orders    OXYGEN FOR HOME USE        May return to pulmonary clinic as needed.   Symptoms of COPD have remained stable.

## 2024-02-19 RX ORDER — CITALOPRAM 20 MG/1
TABLET, FILM COATED ORAL
Qty: 90 TABLET | Refills: 3 | Status: SHIPPED | OUTPATIENT
Start: 2024-02-19

## 2024-03-04 DIAGNOSIS — M54.17 LUMBOSACRAL RADICULOPATHY: ICD-10-CM

## 2024-03-04 RX ORDER — OXYCODONE HYDROCHLORIDE 5 MG/1
2.5 TABLET ORAL EVERY 4 HOURS PRN
Qty: 30 TABLET | Refills: 0 | Status: SHIPPED | OUTPATIENT
Start: 2024-03-04 | End: 2024-04-04 | Stop reason: SDUPTHER

## 2024-03-04 NOTE — TELEPHONE ENCOUNTER
Spoke with daughter re refill request. States patient take 1/2 oxycodone tab every am and smetimes takes another half if needed in the evening. Fu scheduled with Dr. Jones.

## 2024-03-11 ENCOUNTER — OFFICE VISIT (OUTPATIENT)
Dept: NEUROLOGY | Facility: CLINIC | Age: 79
End: 2024-03-11
Payer: MEDICARE

## 2024-03-11 ENCOUNTER — LAB VISIT (OUTPATIENT)
Dept: LAB | Facility: HOSPITAL | Age: 79
End: 2024-03-11
Attending: PSYCHIATRY & NEUROLOGY
Payer: MEDICARE

## 2024-03-11 VITALS — HEART RATE: 64 BPM | SYSTOLIC BLOOD PRESSURE: 104 MMHG | DIASTOLIC BLOOD PRESSURE: 58 MMHG

## 2024-03-11 DIAGNOSIS — M54.42 CHRONIC BILATERAL LOW BACK PAIN WITH BILATERAL SCIATICA: ICD-10-CM

## 2024-03-11 DIAGNOSIS — G20.B2 PARKINSON'S DISEASE WITH DYSKINESIA AND FLUCTUATING MANIFESTATIONS: ICD-10-CM

## 2024-03-11 DIAGNOSIS — I27.20 PULMONARY HTN: Chronic | ICD-10-CM

## 2024-03-11 DIAGNOSIS — R30.0 DYSURIA: ICD-10-CM

## 2024-03-11 DIAGNOSIS — G89.29 CHRONIC BILATERAL LOW BACK PAIN WITH BILATERAL SCIATICA: ICD-10-CM

## 2024-03-11 DIAGNOSIS — M54.41 CHRONIC BILATERAL LOW BACK PAIN WITH BILATERAL SCIATICA: ICD-10-CM

## 2024-03-11 DIAGNOSIS — R30.0 DYSURIA: Primary | ICD-10-CM

## 2024-03-11 LAB
BILIRUB UR QL STRIP: NEGATIVE
CAOX CRY UR QL COMP ASSIST: ABNORMAL
CLARITY UR REFRACT.AUTO: ABNORMAL
COLOR UR AUTO: YELLOW
GLUCOSE UR QL STRIP: NEGATIVE
HGB UR QL STRIP: NEGATIVE
HYALINE CASTS UR QL AUTO: 16 /LPF
KETONES UR QL STRIP: ABNORMAL
LEUKOCYTE ESTERASE UR QL STRIP: ABNORMAL
MICROSCOPIC COMMENT: ABNORMAL
NITRITE UR QL STRIP: NEGATIVE
PH UR STRIP: 5 [PH] (ref 5–8)
PROT UR QL STRIP: NEGATIVE
RBC #/AREA URNS AUTO: 9 /HPF (ref 0–4)
SP GR UR STRIP: 1.02 (ref 1–1.03)
SQUAMOUS #/AREA URNS AUTO: 0 /HPF
URN SPEC COLLECT METH UR: ABNORMAL
WBC #/AREA URNS AUTO: 24 /HPF (ref 0–5)

## 2024-03-11 PROCEDURE — 99999 PR PBB SHADOW E&M-EST. PATIENT-LVL III: CPT | Mod: PBBFAC,,, | Performed by: PSYCHIATRY & NEUROLOGY

## 2024-03-11 PROCEDURE — 81001 URINALYSIS AUTO W/SCOPE: CPT | Performed by: PSYCHIATRY & NEUROLOGY

## 2024-03-11 PROCEDURE — 99214 OFFICE O/P EST MOD 30 MIN: CPT | Mod: S$GLB,,, | Performed by: PSYCHIATRY & NEUROLOGY

## 2024-03-11 NOTE — PROGRESS NOTES
Name: Hector Kellogg  MRN: [id number]   CSN: [id number]      Date: 03/13/2024    Chief Complaint / Interval History:   - he and family agree he is a bit better  - palliative care, goes to them, has VV  - taking mobic and oxycodone  - more comfortable and less parkinsonian  - burning with peeing, will check  - more forgetfulness, mostly short term and dates - will fluctuate      From Dec 2023  - DJ saw one month ago  - ibuprofen was making BP go up   - cardiology added amlodipine and now running lower   - here with spouse and daughter   - doing PT, twice a week    - has been helpful with balance   - no longer taking lyrica, did not help  - no hallucinations   - they have not heard from palliative care   - scheduled to see Dr. Cullen in February   - pain is the most bothersome to him   - cd/ld 25/100 1.5 tabs five times daily           From May 2023  - cd/ld 1.5 in the morning and 1.5 at night, other three doses are 1 tab   - accompanied by spouse   - asks about gabapentin and memory   - previously taking 800 mg TID, wife cut back to 400 mg TID   - he feels a bit better, more clear of mind on decrease   - he has had about 5 falls due to festination of gait and goes forward   - doesn't feel lightheaded or dizzy   - sometimes gets flashes or feeling like someone is in his peripheral vision   - last dose of ldopa was at 730 am   - urinary urgency, cannot get to the bathroom on time             From Feb 2023  - more imbalance, more shuffling, 2 times to the ground, more near falls, no injuries  - showers, has seat for safety   - appetite is good, sleep is disrupted by dogs and cats, but sleeps pretty well, up twice to urinate  - more holding on and use of walker  - now on 1 tab CD/LD 5x/day, usually 8:30-11:30...every three hours  -      From 10-22:  Hx:  - last seen 7/6/22 by Yvette  --- cd/ld  mg 1.5 tablet TID unchanged  --- gabapentin 400 mg TID, but doubtful of efficacy re: pain  --- offered rasagiline 1 mg, but  "ultimately declined d/t cost as of last contact  - pain worsening at last visit and negatively affecting gait, grade 1 anterolisesthesis of spine noted in imaging  --- unresponsive to cd/ld and attributable in main to the above    From Jul 2022:  - last seen July 2020  - cd/ld 25/100 1.5 tabs TID -- 9 am, 2 pm and 7 pm    - tremors gets better but does not completely go away  - meds possibly wear off after about 3 hours   - biggest issue is pain, starts in the hip and radiates down his leg   - takes gabapentin 400 mg TID, does not feel that this helps   - festination of gait in the morning   - can't walk far   - had lumbar laminectomy in Feb 2021 has not followed up with pain management or nsgy  - did not feel that pain management helped   - pain is getting worse, it has been going on for about a year -- can't walk due to the pain   - does not feel that the pain improves with sinemet   - no new weight changes, tries to wear loose pants         From Sept 2019:  - feels like he cannot do things  - not apathy, wants to work or do things  - lung issue are limiting him from COPD  - still taking cd/ld 25/100 1 TID-5x/day  - memory is ok  - appetite is good  - no constipation now  - bladder is quick    From Oct 2018:  - still getting pinched nerve pain in the back or legs  - will take an extra gabapentin  - takes cd/ld every 3-4 hours  - gait is stable    From Summer 2017:  - a bit more off time between dosing  - peak is pretty good  - gait is frustrating  - memory holding up  - family pleased    From 12/06  1) A bit more off time in between dosing  2) More cramping when off, gabapentin helps at night  3) Would be willing to take meds four times per day    History of Present Illness (HPI):  70 yo with diagnosis of PD, symptoms began 1.5 years ago.  First noted tremor in the L hand, "all left side".  Drags his left foot, generally slower overall.  Dx 1 year ago, had [employer] as a confirmatory study.  Says he had an MRI of " "the low back as well, was feeling "pinched nerves" in the back and into the both legs.  Was prescribed "some drug that cost $400" and didn;t take. Also prescribed pramipexole per the notes but also said he "never took it."    Was in [place] for 2 years, now re-establishing care here.    Has questions about gabapentin - 800 now taking 1/2 tab at night only and has no significant pain.  Will ibuprofen for breakthrough.  Not taking Norco.    Retired , [employer], left at age 65.    Nonmotor/Premotor ROS:  Hyposmia (HENT)?No per him  RBD/sleep issues (Constitutional)?Yes - acts out dreams  Depression/anxiety (Psychiatric)?No - better on Citalopram  Fatigue (Constitutional)?Yes  Constipation (GI)?Yes  - uses OTC stool softener  Urinary issues ()?Yes - urgency  Sexual dysfunction ()?Yes - some ED.    Orthostasis (Cardiovascular)?No  Leg swelling (Cardiovascular)? No  Falls (Musculoskeletal)?No  Cognitive impairment (Neurologic)?No  Psychoses (Psychiatric)?No  Pain/Paresthesia (Neurologic)?Yes  Visual changes (Eyes)?No  Moles / skin changes (Skin)?Yes - seborrhea, has a scaly mole on the R side of the head  Stridor / SOB (Pulm)?Yes - with acticity  Bruising (Heme)?No    Past Medical History: The patient  has a past medical history of Cataract, Chronic back pain greater than 3 months duration, COPD (chronic obstructive pulmonary disease), Coronary artery disease, DDD (degenerative disc disease), lumbar (05/16/2020), Glaucoma suspect of both eyes, Hyperlipidemia, Hypertension, MI, old (2006), Parkinson disease, and Renal disorder.    Social History: The patient  reports that he quit smoking about 10 years ago. His smoking use included cigarettes. He started smoking about 50 years ago. He has a 40.0 pack-year smoking history. He has been exposed to tobacco smoke. He has never used smokeless tobacco. He reports current alcohol use. He reports that he does not use drugs.  Rare social drink.    Family History: Their " family history includes Cataracts in his father; Heart attack in his father; Heart disease in his maternal uncle and paternal uncle.    Allergies: Influenza virus vaccines     Meds:   Current Outpatient Medications on File Prior to Visit   Medication Sig Dispense Refill    albuterol (PROVENTIL/VENTOLIN HFA) 90 mcg/actuation inhaler INHALE 2 PUFFS BY MOUTH EVERY 6 HOURS AS NEEDED FOR WHEEZING FOR SHORTNESS OF BREATH 9 g 0    amLODIPine (NORVASC) 5 MG tablet Take 1 tablet (5 mg total) by mouth once daily. 30 tablet 11    aspirin (ECOTRIN) 81 MG EC tablet Take 81 mg by mouth once daily.      BREZTRI AEROSPHERE 160-9-4.8 mcg/actuation HFAA Inhale 2 puffs into the lungs 2 (two) times a day. 10.7 g 11    carbidopa-levodopa  mg (SINEMET)  mg per tablet Take 1.5 tablets by mouth 5 (five) times daily. 675 tablet 3    citalopram (CELEXA) 20 MG tablet Take 1 tablet by mouth once daily 90 tablet 3    ezetimibe (ZETIA) 10 mg tablet Take 1 tablet (10 mg total) by mouth once daily. 90 tablet 3    finasteride (PROSCAR) 5 mg tablet Take 1 tablet (5 mg total) by mouth once daily. 90 tablet 3    loratadine (CLARITIN) 10 mg tablet Take 10 mg by mouth as needed for Allergies.      meloxicam (MOBIC) 7.5 MG tablet Take 1 tablet (7.5 mg total) by mouth once daily. 30 tablet 3    metoprolol succinate (TOPROL-XL) 50 MG 24 hr tablet Take 1 tablet by mouth once daily 90 tablet 3    multivitamin capsule Take 1 capsule by mouth once daily.      oxyCODONE (ROXICODONE) 5 MG immediate release tablet Take ½ tablet (2.5 mg total) by mouth every 4 (four) hours as needed for Pain. 30 tablet 0    potassium citrate (UROCIT-K 10) 10 mEq (1,080 mg) TbSR Take 1 tablet (10 mEq total) by mouth 2 (two) times daily with meals. 180 tablet 3    rosuvastatin (CRESTOR) 40 MG Tab TAKE 1 TABLET BY MOUTH NIGHTLY AT BEDTIME 90 tablet 3    ibuprofen (ADVIL,MOTRIN) 800 MG tablet Take 800 mg by mouth as needed.       No current facility-administered  medications on file prior to visit.     Exam:  BP (!) 104/58 (BP Location: Right arm, Patient Position: Sitting, BP Method: Medium (Automatic))   Pulse 64     Constitutional  Well-developed, well-nourished, appears stated age   * Specialized movement exam  Mild hypophonic speech.    Mild facial masking.   Automatism of the left arm   L>R cogwheel rigidity - mild     L>R bradykinesia - mild.   bilateral resting tremor, L > R   dystonic posturing of R foot-- mild dyskinesia    No other dystonia, chorea, athetosis, myoclonus, or tics.   No motor impersistence.   WC today     Laboratory/Radiological:  - Results:  Lab Visit on 03/11/2024   Component Date Value Ref Range Status    TSH 03/11/2024 1.030  0.400 - 4.000 uIU/mL Final    Sodium 03/11/2024 141  136 - 145 mmol/L Final    Potassium 03/11/2024 4.2  3.5 - 5.1 mmol/L Final    Chloride 03/11/2024 103  95 - 110 mmol/L Final    CO2 03/11/2024 28  23 - 29 mmol/L Final    Glucose 03/11/2024 95  70 - 110 mg/dL Final    BUN 03/11/2024 22  8 - 23 mg/dL Final    Creatinine 03/11/2024 0.9  0.5 - 1.4 mg/dL Final    Calcium 03/11/2024 10.3  8.7 - 10.5 mg/dL Final    Total Protein 03/11/2024 7.3  6.0 - 8.4 g/dL Final    Albumin 03/11/2024 3.8  3.5 - 5.2 g/dL Final    Total Bilirubin 03/11/2024 0.8  0.1 - 1.0 mg/dL Final    Alkaline Phosphatase 03/11/2024 86  55 - 135 U/L Final    AST 03/11/2024 29  10 - 40 U/L Final    ALT 03/11/2024 9 (L)  10 - 44 U/L Final    eGFR 03/11/2024 >60.0  >60 mL/min/1.73 m^2 Final    Anion Gap 03/11/2024 10  8 - 16 mmol/L Final    WBC 03/11/2024 7.13  3.90 - 12.70 K/uL Final    RBC 03/11/2024 4.48 (L)  4.60 - 6.20 M/uL Final    Hemoglobin 03/11/2024 14.0  14.0 - 18.0 g/dL Final    Hematocrit 03/11/2024 44.4  40.0 - 54.0 % Final    MCV 03/11/2024 99 (H)  82 - 98 fL Final    MCH 03/11/2024 31.3 (H)  27.0 - 31.0 pg Final    MCHC 03/11/2024 31.5 (L)  32.0 - 36.0 g/dL Final    RDW 03/11/2024 12.9  11.5 - 14.5 % Final    Platelets 03/11/2024 185  150 - 450  K/uL Final    MPV 03/11/2024 10.3  9.2 - 12.9 fL Final    Immature Granulocytes 03/11/2024 0.1  0.0 - 0.5 % Final    Gran # (ANC) 03/11/2024 4.9  1.8 - 7.7 K/uL Final    Immature Grans (Abs) 03/11/2024 0.01  0.00 - 0.04 K/uL Final    Lymph # 03/11/2024 1.4  1.0 - 4.8 K/uL Final    Mono # 03/11/2024 0.7  0.3 - 1.0 K/uL Final    Eos # 03/11/2024 0.1  0.0 - 0.5 K/uL Final    Baso # 03/11/2024 0.02  0.00 - 0.20 K/uL Final    nRBC 03/11/2024 0  0 /100 WBC Final    Gran % 03/11/2024 68.6  38.0 - 73.0 % Final    Lymph % 03/11/2024 20.2  18.0 - 48.0 % Final    Mono % 03/11/2024 9.4  4.0 - 15.0 % Final    Eosinophil % 03/11/2024 1.4  0.0 - 8.0 % Final    Basophil % 03/11/2024 0.3  0.0 - 1.9 % Final    Differential Method 03/11/2024 Automated   Final   Lab Visit on 03/11/2024   Component Date Value Ref Range Status    Specimen UA 03/11/2024 Urine, Clean Catch   Final    Color, UA 03/11/2024 Yellow  Yellow, Straw, Yessenia Final    Appearance, UA 03/11/2024 Hazy (A)  Clear Final    pH, UA 03/11/2024 5.0  5.0 - 8.0 Final    Specific Gravity, UA 03/11/2024 1.020  1.005 - 1.030 Final    Protein, UA 03/11/2024 Negative  Negative Final    Glucose, UA 03/11/2024 Negative  Negative Final    Ketones, UA 03/11/2024 Trace (A)  Negative Final    Bilirubin (UA) 03/11/2024 Negative  Negative Final    Occult Blood UA 03/11/2024 Negative  Negative Final    Nitrite, UA 03/11/2024 Negative  Negative Final    Leukocytes, UA 03/11/2024 1+ (A)  Negative Final    RBC, UA 03/11/2024 9 (H)  0 - 4 /hpf Final    WBC, UA 03/11/2024 24 (H)  0 - 5 /hpf Final    Squam Epithel, UA 03/11/2024 0  /hpf Final    Hyaline Casts, UA 03/11/2024 16 (A)  0-1/lpf /lpf Final    Ca Oxalate Bess, UA 03/11/2024 Few  None-Moderate Final    Microscopic Comment 03/11/2024 SEE COMMENT   Final   Office Visit on 02/06/2024   Component Date Value Ref Range Status    Urine Culture, Routine 02/06/2024 No significant growth   Final   Lab Visit on 12/14/2023   Component Date Value Ref  Range Status    Cholesterol 2023 126  120 - 199 mg/dL Final    Triglycerides 2023 72  30 - 150 mg/dL Final    HDL 2023 45  40 - 75 mg/dL Final    LDL Cholesterol 2023 66.6  63.0 - 159.0 mg/dL Final    HDL/Cholesterol Ratio 2023 35.7  20.0 - 50.0 % Final    Total Cholesterol/HDL Ratio 2023 2.8  2.0 - 5.0 Final    Non-HDL Cholesterol 2023 81  mg/dL Final    Total Protein 2023 6.5  6.0 - 8.4 g/dL Final    Albumin 2023 3.5  3.5 - 5.2 g/dL Final    Total Bilirubin 2023 1.0  0.1 - 1.0 mg/dL Final    Bilirubin, Direct 2023 0.4 (H)  0.1 - 0.3 mg/dL Final    AST 2023 23  10 - 40 U/L Final    ALT 2023 6 (L)  10 - 44 U/L Final    Alkaline Phosphatase 2023 70  55 - 135 U/L Final     - Independent review of images: none available.    Reviewed records of:  1) Grade 1 anterolisthesis of spine  2) Positive SHAHRZAD scan    Diagnoses:          1) Idiopathic PD, tremor dominant.  On CD/LD.  2) Low back pain --> now worse.  C/W LSS and sciatica  3) urinary urgency   4) hypotension   5) hematuria     Medical Decision Makin) currently taking cd/ld 1.5 five times daily - good response  2) palliative care  3) labs today, U/A for delirium        Eliu Klein MD, MPH  Division of Movement and Memory Disorders  [facility]

## 2024-03-13 DIAGNOSIS — N30.90 CYSTITIS: Primary | ICD-10-CM

## 2024-03-13 DIAGNOSIS — R41.0 ACUTE DELIRIUM: ICD-10-CM

## 2024-03-13 NOTE — PROGRESS NOTES
Cystitis  -     Urine culture; Future; Expected date: 03/13/2024    Acute delirium  -     Urine culture; Future; Expected date: 03/13/2024     Please ask the pt to give his urine for culture today to rule out UTI.

## 2024-03-14 ENCOUNTER — OFFICE VISIT (OUTPATIENT)
Dept: PALLIATIVE MEDICINE | Facility: CLINIC | Age: 79
End: 2024-03-14
Payer: MEDICARE

## 2024-03-14 DIAGNOSIS — G20.B1 PARKINSON'S DISEASE WITH DYSKINESIA, UNSPECIFIED WHETHER MANIFESTATIONS FLUCTUATE: Primary | ICD-10-CM

## 2024-03-14 DIAGNOSIS — M54.50 CHRONIC LOW BACK PAIN WITHOUT SCIATICA, UNSPECIFIED BACK PAIN LATERALITY: ICD-10-CM

## 2024-03-14 DIAGNOSIS — M51.36 DDD (DEGENERATIVE DISC DISEASE), LUMBAR: ICD-10-CM

## 2024-03-14 DIAGNOSIS — G89.29 CHRONIC LOW BACK PAIN WITHOUT SCIATICA, UNSPECIFIED BACK PAIN LATERALITY: ICD-10-CM

## 2024-03-14 DIAGNOSIS — G89.4 CHRONIC PAIN SYNDROME: ICD-10-CM

## 2024-03-14 PROCEDURE — 99213 OFFICE O/P EST LOW 20 MIN: CPT | Mod: 95,,, | Performed by: INTERNAL MEDICINE

## 2024-03-14 NOTE — PROGRESS NOTES
The patient location is: home  The chief complaint leading to consultation is: follow up on pain medication and overall well being.    Visit type: audiovisual    Face to Face time with patient: 10  25 minutes of total time spent on the encounter, which includes face to face time and non-face to face time preparing to see the patient (eg, review of tests), Obtaining and/or reviewing separately obtained history, Documenting clinical information in the electronic or other health record, Independently interpreting results (not separately reported) and communicating results to the patient/family/caregiver, or Care coordination (not separately reported).       Each patient provided with medical services by telemedicine is:  (1) informed of the relationship between the physician and patient and the respective role of any other health care provider with respect to management of the patient; and (2) notified that he or she may decline to receive medical services by telemedicine and may withdraw from such care at any time.        ASSESSMENT/PLAN:     Plan/Recommendations:  Diagnoses and all orders for this visit:    Parkinson's disease with dyskinesia, unspecified whether manifestations fluctuate  Recently seen by [facility].  No real change in his medication.  B vitamin levels are pending.  B12 is normal.  He has had no falls  Chronic pain syndrome   Doing much better with Mobic and low-dose oxycodone.  He takes oxycodone 2.5 mg in the morning and if needed a dose again in the evening.  He is not having any problems with constipation although is taking MiraLax daily.  I have encouraged them to take MiraLax more in the daytime rather than at night so that he does not have to get up to urinate.  He and his family are happy that he is so much better.  DDD (degenerative disc disease), lumbar  Doing much better with Mobic and low-dose oxycodone.  He takes oxycodone 2.5 mg in the morning and if needed a dose again  in the evening.  He is not having any problems with constipation although is taking MiraLax daily.  I have encouraged them to take MiraLax more in the daytime rather than at night so that he does not have to get up to urinate.  He and his family are happy that he is so much better.  Chronic low back pain without sciatica, unspecified back pain laterality    Doing much better with Mobic and low-dose oxycodone.  He takes oxycodone 2.5 mg in the morning and if needed a dose again in the evening.  He is not having any problems with constipation although is taking MiraLax daily.  I have encouraged them to take MiraLax more in the daytime rather than at night so that he does not have to get up to urinate.  He and his family are happy that he is so much better.    Understanding of illness/Prognosis:   Doing well.    Goals of care:  Continue to provide goal directed care.    Follow up:  Three months by video    Patient's encounter and above plan of care discussed with patient's    Patient's wife and daughter  SUBJECTIVE:     History of Present Illness:  Patient is a 78 y.o. year old male presenting with  low back pain.  He is ambulatory and seems much brighter.  His family is happy with the current medication regimen.     LA  reviewed and summarized:  Yes    Past Medical History:   Diagnosis Date    Cataract     Chronic back pain greater than 3 months duration     COPD (chronic obstructive pulmonary disease)     Coronary artery disease     3 stents    DDD (degenerative disc disease), lumbar 05/16/2020    Glaucoma suspect of both eyes     Hyperlipidemia     Hypertension     MI, old 2006    Parkinson disease     Renal disorder     Kidney stones     Past Surgical History:   Procedure Laterality Date    CARDIAC CATHETERIZATION  2006    x 3    CARDIAC CATHETERIZATION  10/11/2015    x 2    CATARACT EXTRACTION W/  INTRAOCULAR LENS IMPLANT Right 03/06/2018    Dr. Liu    CORONARY ANGIOPLASTY  2006, 2015    3 stents     CYSTOSCOPY W/ URETERAL STENT PLACEMENT Left 9/13/2023    Procedure: CYSTOSCOPY, WITH URETERAL STENT PLACEMENT;  Surgeon: Jerel Cullen MD;  Location: [facility] OR 1ST FLR;  Service: Urology;  Laterality: Left;  1.5 HR    EXTRACORPOREAL SHOCK WAVE LITHOTRIPSY Left 4/20/2021    Procedure: LITHOTRIPSY-EXTRACORPOREAL SHOCK WAVE;  Surgeon: Ottoniel Obando MD;  Location: [facility] OR;  Service: Urology;  Laterality: Left;    EXTRACORPOREAL SHOCK WAVE LITHOTRIPSY Left 9/13/2023    Procedure: LITHOTRIPSY, ESWL;  Surgeon: Jerel Cullen MD;  Location: [facility] OR 1ST FLR;  Service: Urology;  Laterality: Left;    EYE SURGERY      cataracts bilateral    HEMORRHOID SURGERY      INJECTION OF ANESTHETIC AGENT AROUND NERVE Bilateral 11/4/2020    Procedure: BLOCK, NERVE BILATERAL L2,3,4,5;  Surgeon: Ramiro Calvillo MD;  Location: [facility] PAIN MGT;  Service: Pain Management;  Laterality: Bilateral;  BLOCK, NERVE BILATERAL L2,3,4,5    INJECTION OF FACET JOINT Right 8/5/2020    Procedure: INJECTION, FACET JOINT, L4-L5 , L5-S1;  Surgeon: Ramiro Calvillo MD;  Location: [facility] PAIN MGT;  Service: Pain Management;  Laterality: Right;    LAMINOFORAMINOTOMY OF SPINE USING MINIMALLY INVASIVE TECHNIQUE N/A 2/1/2021    Procedure: MIS L4-5 Laminectomy;  Surgeon: Bernard Sheldon MD;  Location: [facility];  Service: Neurosurgery;  Laterality: N/A;  Anesthesia: General  Nerve Monitoring: EMG/SEP  Position: Prone  Bed: [facility] [address]  Headrest: Prone View  Radiology: C-arm  Misc: Microscope, microinstruments    TRANSFORAMINAL EPIDURAL INJECTION OF STEROID Right 5/27/2020    Procedure: INJECTION, STEROID, EPIDURAL, TRANSFORAMINAL APPROACH;  Surgeon: Ramiro Calvillo MD;  Location: [facility] PAIN MGT;  Service: Pain Management;  Laterality: Right;  Right TF ASHISH L4-L5, L5-S1    PTcannot come earlier than 12:30    TRANSFORAMINAL EPIDURAL INJECTION OF STEROID Right 9/9/2020    Procedure: INJECTION, STEROID, EPIDURAL, TRANSFORAMINAL APPROACH;  Surgeon: Ramiro  MESSI Calvillo MD;  Location: [facility] PAIN MGT;  Service: Pain Management;  Laterality: Right;  RIGHT TF ASHISH L4/5 and L5/S1     Family History   Problem Relation Age of Onset    Heart attack Father     Cataracts Father     Heart disease Maternal Uncle     Heart disease Paternal Uncle     Hypertension Neg Hx     Asthma Neg Hx     Emphysema Neg Hx     Amblyopia Neg Hx     Blindness Neg Hx     Macular degeneration Neg Hx     Retinal detachment Neg Hx     Strabismus Neg Hx      Review of patient's allergies indicates:   Allergen Reactions    Influenza virus vaccines Other (See Comments)     Caused fever, chills, and made tremors worse       Medications:    Current Outpatient Medications:     albuterol (PROVENTIL/VENTOLIN HFA) 90 mcg/actuation inhaler, INHALE 2 PUFFS BY MOUTH EVERY 6 HOURS AS NEEDED FOR WHEEZING FOR SHORTNESS OF BREATH, Disp: 9 g, Rfl: 0    amLODIPine (NORVASC) 5 MG tablet, Take 1 tablet (5 mg total) by mouth once daily., Disp: 30 tablet, Rfl: 11    aspirin (ECOTRIN) 81 MG EC tablet, Take 81 mg by mouth once daily., Disp: , Rfl:     BREZTRI AEROSPHERE 160-9-4.8 mcg/actuation HFAA, Inhale 2 puffs into the lungs 2 (two) times a day., Disp: 10.7 g, Rfl: 11    carbidopa-levodopa  mg (SINEMET)  mg per tablet, Take 1.5 tablets by mouth 5 (five) times daily., Disp: 675 tablet, Rfl: 3    citalopram (CELEXA) 20 MG tablet, Take 1 tablet by mouth once daily, Disp: 90 tablet, Rfl: 3    ezetimibe (ZETIA) 10 mg tablet, Take 1 tablet (10 mg total) by mouth once daily., Disp: 90 tablet, Rfl: 3    finasteride (PROSCAR) 5 mg tablet, Take 1 tablet (5 mg total) by mouth once daily., Disp: 90 tablet, Rfl: 3    ibuprofen (ADVIL,MOTRIN) 800 MG tablet, Take 800 mg by mouth as needed., Disp: , Rfl:     loratadine (CLARITIN) 10 mg tablet, Take 10 mg by mouth as needed for Allergies., Disp: , Rfl:     meloxicam (MOBIC) 7.5 MG tablet, Take 1 tablet (7.5 mg total) by mouth once daily., Disp: 30 tablet, Rfl: 3    metoprolol  succinate (TOPROL-XL) 50 MG 24 hr tablet, Take 1 tablet by mouth once daily, Disp: 90 tablet, Rfl: 3    multivitamin capsule, Take 1 capsule by mouth once daily., Disp: , Rfl:     oxyCODONE (ROXICODONE) 5 MG immediate release tablet, Take ½ tablet (2.5 mg total) by mouth every 4 (four) hours as needed for Pain., Disp: 30 tablet, Rfl: 0    potassium citrate (UROCIT-K 10) 10 mEq (1,080 mg) TbSR, Take 1 tablet (10 mEq total) by mouth 2 (two) times daily with meals., Disp: 180 tablet, Rfl: 3    rosuvastatin (CRESTOR) 40 MG Tab, TAKE 1 TABLET BY MOUTH NIGHTLY AT BEDTIME, Disp: 90 tablet, Rfl: 3    OBJECTIVE:       ROS:  Review of Systems   Musculoskeletal:          Back pain is better   Neurological:          Weakness is better       Review of Symptoms      Symptom Assessment (ESAS 0-10 Scale)  Pain:  3  Dyspnea:  3  Anxiety:  0  Nausea:  0  Depression:  0  Anorexia:  0  Fatigue:  0  Insomnia:  3  Restlessness:  0  Agitation:  0     CAM / Delirium:  Negative  Constipation:  Negative  Diarrhea:  Negative      Bowel Management Plan (BMP):  Yes      Pain Assessment:  OME in 24 hours:  2.5 mg  oxycodone  Location(s): back    Back       Location: bilateral        Quality: Aching        Quantity: 3/10 in intensity        Chronicity: Onset 3 year(s) ago, rapidly improving        Aggravating Factors: Activity        Alleviating Factors: Opiates and NSAIDs       Associated Symptoms: Constipation    Modified [place] Scale:  0    Performance Status:  70    Living Arrangements:  Lives with family and Lives in home    Psychosocial/Cultural:   See Palliative Psychosocial Note: No    daughter involved in care retired  **Primary  to Follow**  Palliative Care  Consult: No    Spiritual:  F - Patricia and Belief:   Mormonism  I - Importance:   yes  C - Community:   yes  A - Address in Care:   yes      Advance Care Planning   Advance Directives:   Living Will: No    [place]POST: No    Do Not Resuscitate Status: No     Medical Power of : Yes    Agent's Name:  Eileen Kellogg   Agent's Contact Number:  [phone]    Decision Making:  Family answered questions  Goals of Care: What is most important right now is to focus on remaining as independent as possible, symptom/pain control, quality of life, even if it means sacrificing a little time. Accordingly, we have decided that the best plan to meet the patient's goals includes continuing with treatment.              Physical Exam:  Vitals:    Physical Exam  Constitutional:       Appearance: Normal appearance.      Comments:  Smiling and laughing         Labs:  CBC:   WBC   Date Value Ref Range Status   03/11/2024 7.13 3.90 - 12.70 K/uL Final     Hemoglobin   Date Value Ref Range Status   03/11/2024 14.0 14.0 - 18.0 g/dL Final     Hematocrit   Date Value Ref Range Status   03/11/2024 44.4 40.0 - 54.0 % Final     MCV   Date Value Ref Range Status   03/11/2024 99 (H) 82 - 98 fL Final     Platelets   Date Value Ref Range Status   03/11/2024 185 150 - 450 K/uL Final       LFT:   Lab Results   Component Value Date    AST 29 03/11/2024    ALKPHOS 86 03/11/2024    BILITOT 0.8 03/11/2024       Albumin:   Albumin   Date Value Ref Range Status   03/11/2024 3.8 3.5 - 5.2 g/dL Final     Protein:   Total Protein   Date Value Ref Range Status   03/11/2024 7.3 6.0 - 8.4 g/dL Final       Radiology:None      I spent a total of 25 minutes on the day of the visit.This includes face to face time in discussion of goals of care, symptom assessment, coordination of care and emotional support.  This also includes non-face to face time preparing to see the patient (eg, review of tests/imaging), obtaining and/or reviewing separately obtained history, documenting clinical information in the electronic or other health record, independently interpreting results and communicating results to the patient/family/caregiver, or care coordinator.           Signature: Keila Jones MD

## 2024-04-02 ENCOUNTER — OFFICE VISIT (OUTPATIENT)
Dept: PRIMARY CARE CLINIC | Facility: CLINIC | Age: 79
End: 2024-04-02
Payer: MEDICARE

## 2024-04-02 ENCOUNTER — HOSPITAL ENCOUNTER (OUTPATIENT)
Dept: RADIOLOGY | Facility: HOSPITAL | Age: 79
Discharge: HOME | End: 2024-04-02
Attending: STUDENT IN AN ORGANIZED HEALTH CARE EDUCATION/TRAINING PROGRAM
Payer: MEDICARE

## 2024-04-02 VITALS
OXYGEN SATURATION: 96 % | DIASTOLIC BLOOD PRESSURE: 60 MMHG | TEMPERATURE: 98 F | HEIGHT: 64 IN | SYSTOLIC BLOOD PRESSURE: 104 MMHG | BODY MASS INDEX: 20.97 KG/M2 | HEART RATE: 83 BPM | WEIGHT: 122.81 LBS

## 2024-04-02 DIAGNOSIS — R50.9 FEVER, UNSPECIFIED FEVER CAUSE: ICD-10-CM

## 2024-04-02 DIAGNOSIS — R50.9 FEVER, UNSPECIFIED FEVER CAUSE: Primary | ICD-10-CM

## 2024-04-02 LAB
BACTERIA #/AREA URNS AUTO: ABNORMAL /HPF
BILIRUB UR QL STRIP: NEGATIVE
CAOX CRY UR QL COMP ASSIST: ABNORMAL
CLARITY UR REFRACT.AUTO: ABNORMAL
COLOR UR AUTO: YELLOW
GLUCOSE UR QL STRIP: NEGATIVE
HGB UR QL STRIP: ABNORMAL
HYALINE CASTS UR QL AUTO: 0 /LPF
KETONES UR QL STRIP: ABNORMAL
LEUKOCYTE ESTERASE UR QL STRIP: ABNORMAL
MICROSCOPIC COMMENT: ABNORMAL
NITRITE UR QL STRIP: NEGATIVE
PH UR STRIP: 6 [PH] (ref 5–8)
PROT UR QL STRIP: ABNORMAL
RBC #/AREA URNS AUTO: 20 /HPF (ref 0–4)
SP GR UR STRIP: 1.03 (ref 1–1.03)
SQUAMOUS #/AREA URNS AUTO: 0 /HPF
URN SPEC COLLECT METH UR: ABNORMAL
WBC #/AREA URNS AUTO: 49 /HPF (ref 0–5)

## 2024-04-02 PROCEDURE — 1101F PT FALLS ASSESS-DOCD LE1/YR: CPT | Mod: CPTII,S$GLB,, | Performed by: STUDENT IN AN ORGANIZED HEALTH CARE EDUCATION/TRAINING PROGRAM

## 2024-04-02 PROCEDURE — 3074F SYST BP LT 130 MM HG: CPT | Mod: CPTII,S$GLB,, | Performed by: STUDENT IN AN ORGANIZED HEALTH CARE EDUCATION/TRAINING PROGRAM

## 2024-04-02 PROCEDURE — 71046 X-RAY EXAM CHEST 2 VIEWS: CPT | Mod: TC,PN

## 2024-04-02 PROCEDURE — 1159F MED LIST DOCD IN RCRD: CPT | Mod: CPTII,S$GLB,, | Performed by: STUDENT IN AN ORGANIZED HEALTH CARE EDUCATION/TRAINING PROGRAM

## 2024-04-02 PROCEDURE — 3288F FALL RISK ASSESSMENT DOCD: CPT | Mod: CPTII,S$GLB,, | Performed by: STUDENT IN AN ORGANIZED HEALTH CARE EDUCATION/TRAINING PROGRAM

## 2024-04-02 PROCEDURE — 81001 URINALYSIS AUTO W/SCOPE: CPT | Performed by: STUDENT IN AN ORGANIZED HEALTH CARE EDUCATION/TRAINING PROGRAM

## 2024-04-02 PROCEDURE — 1125F AMNT PAIN NOTED PAIN PRSNT: CPT | Mod: CPTII,S$GLB,, | Performed by: STUDENT IN AN ORGANIZED HEALTH CARE EDUCATION/TRAINING PROGRAM

## 2024-04-02 PROCEDURE — 71046 X-RAY EXAM CHEST 2 VIEWS: CPT | Mod: 26,,, | Performed by: RADIOLOGY

## 2024-04-02 PROCEDURE — 99999 PR PBB SHADOW E&M-EST. PATIENT-LVL IV: CPT | Mod: PBBFAC,,, | Performed by: STUDENT IN AN ORGANIZED HEALTH CARE EDUCATION/TRAINING PROGRAM

## 2024-04-02 PROCEDURE — 99214 OFFICE O/P EST MOD 30 MIN: CPT | Mod: S$GLB,,, | Performed by: STUDENT IN AN ORGANIZED HEALTH CARE EDUCATION/TRAINING PROGRAM

## 2024-04-02 PROCEDURE — 87086 URINE CULTURE/COLONY COUNT: CPT | Performed by: STUDENT IN AN ORGANIZED HEALTH CARE EDUCATION/TRAINING PROGRAM

## 2024-04-02 PROCEDURE — 3078F DIAST BP <80 MM HG: CPT | Mod: CPTII,S$GLB,, | Performed by: STUDENT IN AN ORGANIZED HEALTH CARE EDUCATION/TRAINING PROGRAM

## 2024-04-02 RX ORDER — NITROFURANTOIN 25; 75 MG/1; MG/1
100 CAPSULE ORAL 2 TIMES DAILY
Qty: 14 CAPSULE | Refills: 0 | Status: SHIPPED | OUTPATIENT
Start: 2024-04-02 | End: 2024-04-09

## 2024-04-02 NOTE — PROGRESS NOTES
Primary Care  Office Visit - In Person  4/2/2024      HPI    Patient is a 79 y.o.   Hetcor Kellogg  has a past medical history of Cataract, Chronic back pain greater than 3 months duration, COPD (chronic obstructive pulmonary disease), Coronary artery disease, DDD (degenerative disc disease), lumbar (05/16/2020), Glaucoma suspect of both eyes, Hyperlipidemia, Hypertension, MI, old (2006), Parkinson disease, and Renal disorder.    Patient presents with one day history of fever T 101.8  at home and disorientation   Patient poor historian. Family reports that this typically occurs when he has a UTI   They also report hx of cough   ROS otherwise negative     Active Medications:  Current Outpatient Medications   Medication Instructions    albuterol (PROVENTIL/VENTOLIN HFA) 90 mcg/actuation inhaler INHALE 2 PUFFS BY MOUTH EVERY 6 HOURS AS NEEDED FOR WHEEZING FOR SHORTNESS OF BREATH    amLODIPine (NORVASC) 5 mg, Oral, Daily    aspirin (ECOTRIN) 81 mg, Oral, Daily    BREZTRI AEROSPHERE 160-9-4.8 mcg/actuation HFAA 2 puffs, Inhalation, 2 times daily    carbidopa-levodopa  mg (SINEMET)  mg per tablet 1.5 tablets, Oral, 5 times daily    citalopram (CELEXA) 20 MG tablet Take 1 tablet by mouth once daily    ezetimibe (ZETIA) 10 mg, Oral, Daily    finasteride (PROSCAR) 5 mg, Oral, Daily    ibuprofen (ADVIL,MOTRIN) 800 mg, Oral, As needed (PRN)    loratadine (CLARITIN) 10 mg, Oral, As needed (PRN)    meloxicam (MOBIC) 7.5 mg, Oral, Daily    metoprolol succinate (TOPROL-XL) 50 MG 24 hr tablet Take 1 tablet by mouth once daily    multivitamin capsule 1 capsule, Oral, Daily    oxyCODONE (ROXICODONE) 5 MG immediate release tablet Take ½ tablet (2.5 mg total) by mouth every 4 (four) hours as needed for Pain.    potassium citrate (UROCIT-K 10) 10 mEq (1,080 mg) TbSR 10 mEq, Oral, 2 times daily with meals    rosuvastatin (CRESTOR) 40 MG Tab TAKE 1 TABLET BY MOUTH NIGHTLY AT BEDTIME       Vitals:    04/02/24 1148   BP:  "104/60   BP Location: Right arm   Pulse: 83   Temp: 98.2 °F (36.8 °C)   SpO2: 96%   Weight: 55.7 kg (122 lb 12.7 oz)   Height: 5' 4" (1.626 m)       Physical Exam  Vitals reviewed.   Constitutional:       General: He is not in acute distress.     Appearance: He is not ill-appearing.   Cardiovascular:      Rate and Rhythm: Normal rate and regular rhythm.   Pulmonary:      Breath sounds: Decreased air movement present.   Musculoskeletal:      Right lower leg: No edema.      Left lower leg: No edema.          Assessment and Plan     1. Fever, unspecified fever cause  -     X-Ray Chest PA And Lateral; Future; Expected date: 04/02/2024  -     Urinalysis, Reflex to Urine Culture Urine, Clean Catch        Previous labs, records, and notes reviewed and considered for their impact on clinical decision making today.                Upcoming Scheduled Appointments and Follow Up:    Future Appointments   Date Time Provider Department Center   4/2/2024  1:00 PM [facility] XR1 [facility] XRAY [facility]   4/3/2024  8:20 AM Blue Mcgill DO [facility] IM [place]   5/6/2024 12:20 PM LAB, [facility] LAB [facility]   5/6/2024 12:30 PM [facility] CT1 [facility] CTSCAN [facility]   5/6/2024  1:00 PM [facility] XR1 [facility] XRAY [facility]   5/7/2024  1:40 PM Jerel Cullen MD [facility] UROLOGY [facility]   7/19/2024 10:40 AM Blue Mcgill DO [facility] IM [place]       Follow Up [facility] (with who? when?): No follow-ups on file.      Extended Emergency Contact Information  Primary Emergency Contact: Eileen Kellogg  Address: [address] [place] [address] [place]  Home Phone: [phone]  Mobile Phone: [phone]  Relation: Spouse  Secondary Emergency Contact: AbbyKelly  Mobile Phone: [phone]  Relation: Daughter      Normaalexandra MD [facility]   Internal Medicine  4/2/2024 - 11:52 AM    I spent a total of 20 minutes on the day of the visit.This includes face to face time and non-face to face time preparing to " see the patient (eg, review of tests), obtaining and/or reviewing separately obtained history, documenting clinical information in the electronic or other health record, independently interpreting results and communicating results to the patient/family/caregiver, or care coordinator.    While patients have the right to access their medical record, it is essential to recognize that progress notes primarily serve as a means of communication among healthcare professionals.

## 2024-04-03 DIAGNOSIS — G20.A2 PARKINSON'S DISEASE WITHOUT DYSKINESIA, WITH FLUCTUATING MANIFESTATIONS: ICD-10-CM

## 2024-04-03 DIAGNOSIS — M54.17 LUMBOSACRAL RADICULOPATHY: ICD-10-CM

## 2024-04-04 ENCOUNTER — PATIENT MESSAGE (OUTPATIENT)
Dept: PALLIATIVE MEDICINE | Facility: CLINIC | Age: 79
End: 2024-04-04
Payer: MEDICARE

## 2024-04-04 DIAGNOSIS — M54.17 LUMBOSACRAL RADICULOPATHY: ICD-10-CM

## 2024-04-04 LAB — BACTERIA UR CULT: NORMAL

## 2024-04-04 RX ORDER — MELOXICAM 7.5 MG/1
7.5 TABLET ORAL DAILY
Qty: 30 TABLET | Refills: 3 | Status: SHIPPED | OUTPATIENT
Start: 2024-04-04

## 2024-04-04 RX ORDER — OXYCODONE HYDROCHLORIDE 5 MG/1
2.5 TABLET ORAL EVERY 4 HOURS PRN
Qty: 30 TABLET | Refills: 0 | Status: SHIPPED | OUTPATIENT
Start: 2024-04-04 | End: 2024-05-03 | Stop reason: SDUPTHER

## 2024-04-09 ENCOUNTER — OFFICE VISIT (OUTPATIENT)
Dept: UROLOGY | Facility: CLINIC | Age: 79
End: 2024-04-09
Payer: MEDICARE

## 2024-04-09 ENCOUNTER — TELEPHONE (OUTPATIENT)
Dept: INTERNAL MEDICINE | Facility: CLINIC | Age: 79
End: 2024-04-09
Payer: MEDICARE

## 2024-04-09 VITALS
BODY MASS INDEX: 20.7 KG/M2 | DIASTOLIC BLOOD PRESSURE: 85 MMHG | SYSTOLIC BLOOD PRESSURE: 120 MMHG | WEIGHT: 121.25 LBS | HEIGHT: 64 IN | HEART RATE: 65 BPM

## 2024-04-09 DIAGNOSIS — I10 ESSENTIAL HYPERTENSION: Primary | ICD-10-CM

## 2024-04-09 DIAGNOSIS — N30.90 RECURRENT CYSTITIS: Primary | ICD-10-CM

## 2024-04-09 DIAGNOSIS — E78.2 MIXED HYPERLIPIDEMIA: ICD-10-CM

## 2024-04-09 PROCEDURE — 3074F SYST BP LT 130 MM HG: CPT | Mod: CPTII,S$GLB,, | Performed by: UROLOGY

## 2024-04-09 PROCEDURE — 3288F FALL RISK ASSESSMENT DOCD: CPT | Mod: CPTII,S$GLB,, | Performed by: UROLOGY

## 2024-04-09 PROCEDURE — 99999 PR PBB SHADOW E&M-EST. PATIENT-LVL III: CPT | Mod: PBBFAC,,, | Performed by: UROLOGY

## 2024-04-09 PROCEDURE — 1101F PT FALLS ASSESS-DOCD LE1/YR: CPT | Mod: CPTII,S$GLB,, | Performed by: UROLOGY

## 2024-04-09 PROCEDURE — 3079F DIAST BP 80-89 MM HG: CPT | Mod: CPTII,S$GLB,, | Performed by: UROLOGY

## 2024-04-09 PROCEDURE — 1126F AMNT PAIN NOTED NONE PRSNT: CPT | Mod: CPTII,S$GLB,, | Performed by: UROLOGY

## 2024-04-09 PROCEDURE — 1159F MED LIST DOCD IN RCRD: CPT | Mod: CPTII,S$GLB,, | Performed by: UROLOGY

## 2024-04-09 PROCEDURE — 99215 OFFICE O/P EST HI 40 MIN: CPT | Mod: S$GLB,,, | Performed by: UROLOGY

## 2024-04-09 NOTE — PROGRESS NOTES
CC: left kidney stone, recurrent UTI, hx of urinary retention    Hector Kellogg is a 79 y.o. man who is here for a follow up of left kidney stone.  He had greater than 3 cm stone burden in the left kidney.  He is here with his daughter today.  Has a problem with chronic constipation.    Hector Kellogg is a 78 y.o. male with a  left renal stone presenting for definitive stone management.  The stone is radio-opaque on KUB.    Date: 09/13/2023   Pre-Op Diagnosis: Left renal stone  Post-Op Diagnosis: same  Procedure(s) Performed:   1.  Left ESWL  Findings:   Left ureteral stent 6x24cm without strings confirmed on KUB; Good position at end of case  Decrease in density of UPJ stone with 3000 shocks delivered  Drains: 6 Fr x 24 cm left ureteral stent without strings  He developed acute urinary retention following his surgery.  A mares catheter was placed and he did well.    The left ureteral stent removed via cysto on 9/28/23.    He is here today because he is concerned that he may have UTI. He had episode of altered mental status with concomitant upper respiratory symptoms and dysuria. Urine culture with no growth. Prescribed Nitrofurantoin by PCP with improvement in mental status. He denies dysuria today. No fevers or chills. No N/V or flank pain.     His PCP, Blue Mcgill, DO   His PSA increased to 6.2 from 3.9.  Has a hx of recurrent UTI.  His elevated PSA might have been due to these problems.  Urination symptoms: Positive for frequency, urgency and incomplete emptying.  Denies flank pain.      Non-smoker    Past Medical History:   Diagnosis Date    Cataract     Chronic back pain greater than 3 months duration     COPD (chronic obstructive pulmonary disease)     Coronary artery disease     3 stents    DDD (degenerative disc disease), lumbar 05/16/2020    Glaucoma suspect of both eyes     Hyperlipidemia     Hypertension     MI, old 2006    Parkinson disease     Renal disorder     Kidney stones      Past Surgical History:   Procedure Laterality Date    CARDIAC CATHETERIZATION  2006    x 3    CARDIAC CATHETERIZATION  10/11/2015    x 2    CATARACT EXTRACTION W/  INTRAOCULAR LENS IMPLANT Right 03/06/2018    Dr. Liu    CORONARY ANGIOPLASTY  2006, 2015    3 stents    CYSTOSCOPY W/ URETERAL STENT PLACEMENT Left 9/13/2023    Procedure: CYSTOSCOPY, WITH URETERAL STENT PLACEMENT;  Surgeon: Jerel Cullen MD;  [address]  Service: Urology;  Laterality: Left;  1.5 HR    EXTRACORPOREAL SHOCK WAVE LITHOTRIPSY Left 4/20/2021    Procedure: LITHOTRIPSY-EXTRACORPOREAL SHOCK WAVE;  Surgeon: Ottoniel Obando MD;  Location: [facility] OR;  Service: Urology;  Laterality: Left;    EXTRACORPOREAL SHOCK WAVE LITHOTRIPSY Left 9/13/2023    Procedure: LITHOTRIPSY, ESWL;  Surgeon: Jerel Cullen MD;  [address];  Service: Urology;  Laterality: Left;    EYE SURGERY      cataracts bilateral    HEMORRHOID SURGERY      INJECTION OF ANESTHETIC AGENT AROUND NERVE Bilateral 11/4/2020    Procedure: BLOCK, NERVE BILATERAL L2,3,4,5;  Surgeon: Ramiro Calvillo MD;  Location: [facility] PAIN MGT;  Service: Pain Management;  Laterality: Bilateral;  BLOCK, NERVE BILATERAL L2,3,4,5    INJECTION OF FACET JOINT Right 8/5/2020    Procedure: INJECTION, FACET JOINT, L4-L5 , L5-S1;  Surgeon: Ramiro Calvillo MD;  Location: [facility] PAIN MGT;  Service: Pain Management;  Laterality: Right;    LAMINOFORAMINOTOMY OF SPINE USING MINIMALLY INVASIVE TECHNIQUE N/A 2/1/2021    Procedure: MIS L4-5 Laminectomy;  Surgeon: Bernard Sheldon MD;  Location: [facility];  Service: Neurosurgery;  Laterality: N/A;  Anesthesia: General  Nerve Monitoring: EMG/SEP  Position: Prone  Bed: [facility] on [address]  Headrest: Prone View  Radiology: C-arm  Misc: Microscope, microinstruments    TRANSFORAMINAL EPIDURAL INJECTION OF STEROID Right 5/27/2020    Procedure: INJECTION, STEROID, EPIDURAL, TRANSFORAMINAL APPROACH;  Surgeon: Ramiro Calvillo MD;   Location: [facility] PAIN MGT;  Service: Pain Management;  Laterality: Right;  Right TF ASHISH L4-L5, L5-S1    PTcannot come earlier than 12:30    TRANSFORAMINAL EPIDURAL INJECTION OF STEROID Right 9/9/2020    Procedure: INJECTION, STEROID, EPIDURAL, TRANSFORAMINAL APPROACH;  Surgeon: Ramiro Calvillo MD;  Location: [facility] PAIN [facility]GT;  Service: Pain Management;  Laterality: Right;  RIGHT TF ASHISH L4/5 and L5/S1     Social History     Tobacco Use    Smoking status: Former     Current packs/day: 0.00     Average packs/day: 1 pack/day for 40.0 years (40.0 ttl pk-yrs)     Types: Cigarettes     Start date: 2/1/1974     Quit date: 2/1/2014     Years since quitting: 10.1     Passive exposure: Past    Smokeless tobacco: Never   Substance Use Topics    Alcohol use: Yes     Comment: socially    Drug use: No     Family History   Problem Relation Age of Onset    Heart attack Father     Cataracts Father     Heart disease Maternal Uncle     Heart disease Paternal Uncle     Hypertension Neg Hx     Asthma Neg Hx     Emphysema Neg Hx     Amblyopia Neg Hx     Blindness Neg Hx     Macular degeneration Neg Hx     Retinal detachment Neg Hx     Strabismus Neg Hx      Allergy:  Review of patient's allergies indicates:   Allergen Reactions    Influenza virus vaccines Other (See Comments)     Caused fever, chills, and made tremors worse     Outpatient Encounter Medications as of 4/9/2024   Medication Sig Dispense Refill    albuterol (PROVENTIL/VENTOLIN HFA) 90 mcg/actuation inhaler INHALE 2 PUFFS BY MOUTH EVERY 6 HOURS AS NEEDED FOR WHEEZING FOR SHORTNESS OF BREATH 9 g 11    amLODIPine (NORVASC) 5 MG tablet Take 1 tablet (5 mg total) by mouth once daily. 30 tablet 11    aspirin (ECOTRIN) 81 MG EC tablet Take 81 mg by mouth once daily.      BREZTRI AEROSPHERE 160-9-4.8 mcg/actuation HFAA Inhale 2 puffs into the lungs 2 (two) times a day. 10.7 g 11    carbidopa-levodopa  mg (SINEMET)  mg per tablet Take 1.5 tablets by mouth 5 (five) times  daily. 675 tablet 3    citalopram (CELEXA) 20 MG tablet Take 1 tablet by mouth once daily 90 tablet 3    ezetimibe (ZETIA) 10 mg tablet Take 1 tablet (10 mg total) by mouth once daily. 90 tablet 3    finasteride (PROSCAR) 5 mg tablet Take 1 tablet (5 mg total) by mouth once daily. 90 tablet 3    ibuprofen (ADVIL,MOTRIN) 800 MG tablet Take 800 mg by mouth as needed.      loratadine (CLARITIN) 10 mg tablet Take 10 mg by mouth as needed for Allergies.      meloxicam (MOBIC) 7.5 MG tablet Take 1 tablet (7.5 mg total) by mouth once daily. 30 tablet 3    metoprolol succinate (TOPROL-XL) 50 MG 24 hr tablet Take 1 tablet by mouth once daily 90 tablet 3    multivitamin capsule Take 1 capsule by mouth once daily.      nitrofurantoin, macrocrystal-monohydrate, (MACROBID) 100 MG capsule Take 1 capsule (100 mg total) by mouth 2 (two) times daily. for 7 days 14 capsule 0    oxyCODONE (ROXICODONE) 5 MG immediate release tablet Take ½ tablet (2.5 mg total) by mouth every 4 (four) hours as needed for Pain. 30 tablet 0    potassium citrate (UROCIT-K 10) 10 mEq (1,080 mg) TbSR Take 1 tablet (10 mEq total) by mouth 2 (two) times daily with meals. 180 tablet 3    rosuvastatin (CRESTOR) 40 MG Tab TAKE 1 TABLET BY MOUTH NIGHTLY AT BEDTIME 90 tablet 3     No facility-administered encounter medications on file as of 4/9/2024.     Review of Systems   ROS  Physical Exam     Vitals:    04/09/24 1053   BP: 120/85   Pulse: 65       Physical Exam  Constitutional:       General: He is not in acute distress.     Appearance: He is well-developed. He is not diaphoretic.   HENT:      Head: Normocephalic and atraumatic.      Right Ear: External ear normal.      Left Ear: External ear normal.      Nose: Nose normal.   Eyes:      Conjunctiva/sclera: Conjunctivae normal.      Pupils: Pupils are equal, round, and reactive to light.   Neck:      Thyroid: No thyromegaly.      Vascular: No JVD.      Trachea: No tracheal deviation.   Cardiovascular:      Rate  and Rhythm: Normal rate and regular rhythm.      Heart sounds: Normal heart sounds. No murmur heard.     No friction rub. No gallop.   Pulmonary:      Effort: Pulmonary effort is normal. No respiratory distress.      Breath sounds: Normal breath sounds. No wheezing.   Chest:      Chest wall: No tenderness.   Abdominal:      General: Bowel sounds are normal. There is no distension.      Palpations: Abdomen is soft. There is no mass.      Tenderness: There is no abdominal tenderness. There is no guarding or rebound.   Genitourinary:     Penis: Normal. No tenderness.       Prostate: Normal.      Rectum: Normal.      Comments: Prostate: enlarged.  Musculoskeletal:         General: No tenderness or deformity. Normal range of motion.      Cervical back: Normal range of motion and neck supple.   Lymphadenopathy:      Cervical: No cervical adenopathy.   Skin:     General: Skin is warm and dry.   Neurological:      Mental Status: He is alert and oriented to person, place, and time.   Psychiatric:         Behavior: Behavior normal.         Thought Content: Thought content normal.         LABS:  Lab Results   Component Value Date    PSA 6.2 (H) 07/14/2023    PSA 3.9 06/22/2022     No results found for this or any previous visit.  Lab Results   Component Value Date    CREATININE 0.9 03/11/2024    CREATININE 1.1 10/20/2023    CREATININE 2.0 (H) 09/15/2023     No results found for this or any previous visit.  Urine Culture, Routine   Date Value Ref Range Status   04/02/2024 No significant growth  Final     Hemoglobin A1C   Date Value Ref Range Status   07/14/2023 5.7 (H) 4.0 - 5.6 % Final     Comment:     ADA Screening Guidelines:  5.7-6.4%  Consistent with prediabetes  >or=6.5%  Consistent with diabetes    High levels of fetal hemoglobin interfere with the HbA1C  assay. Heterozygous hemoglobin variants (HbS, HgC, etc)do  not significantly interfere with this assay.   However, presence of multiple variants may affect accuracy.          Radiology:  CT urogram 8/24/23    Kidneys enhance symmetrically with cortical hypodense lesions, the larger in keeping with cysts with some too small to characterize.  Incidental mild extrarenal pelvis on the right.  Punctate nonobstructing right renal stones (series 3, image 56 and 72).  There is left renal pelvis stone near the UPJ measuring 1.3 x 0.9 cm with mild adjacent stranding and mild upstream hydronephrosis.  Additional nonobstructing left renal stones.  There is incomplete contrast distension of the urinary bladder, otherwise unremarkable.  Prostate is enlarged with central gland calcification.    KUB today  few grouped attenuating foci projecting at the left renal shadow measuring in total 1.4 cm, suggesting stones.      Assessment and Plan:  Hector was seen today for urinary tract infection.    Diagnoses and all orders for this visit:    Recurrent cystitis        First his large stone burden is definitely reduced following his left ESWL.  Will follow up with CT RSS and KUB in 3 months.  He does not drink much of water.  Recommend to drink more water or lemonade, at least 6 glasses per day.  Start urocit K BID.  Address chronic constipation.    Urine culture with no growth. Recommend drinking healthy amount of water, 500mg daily vitamin C.   Continue probiotics and D- mannose.    His recent episode of fever and confusion were not related to cystitis or UTI based on negative urine culture.  Rather I think that it might have been due to URI.  Do not recommend abx treatment at this time especially having negative urine culture .  Today's UA was also negative for infection.  He really needs to drink more water.    I spent 40 minutes with the patient of which more than half was spent in direct consultation with the patient in regards to our treatment and plan.     Follow-up:  No follow-ups on file.

## 2024-04-16 ENCOUNTER — PATIENT MESSAGE (OUTPATIENT)
Dept: NEUROLOGY | Facility: CLINIC | Age: 79
End: 2024-04-16
Payer: MEDICARE

## 2024-05-03 DIAGNOSIS — M54.17 LUMBOSACRAL RADICULOPATHY: ICD-10-CM

## 2024-05-03 RX ORDER — OXYCODONE HYDROCHLORIDE 5 MG/1
2.5 TABLET ORAL EVERY 4 HOURS PRN
Qty: 30 TABLET | Refills: 0 | Status: SHIPPED | OUTPATIENT
Start: 2024-05-03 | End: 2024-05-28 | Stop reason: SDUPTHER

## 2024-05-06 ENCOUNTER — PATIENT MESSAGE (OUTPATIENT)
Dept: NEUROLOGY | Facility: CLINIC | Age: 79
End: 2024-05-06
Payer: MEDICARE

## 2024-05-07 ENCOUNTER — OFFICE VISIT (OUTPATIENT)
Dept: UROLOGY | Facility: CLINIC | Age: 79
End: 2024-05-07
Payer: MEDICARE

## 2024-05-07 VITALS
SYSTOLIC BLOOD PRESSURE: 102 MMHG | HEIGHT: 64 IN | DIASTOLIC BLOOD PRESSURE: 51 MMHG | BODY MASS INDEX: 20.7 KG/M2 | HEART RATE: 73 BPM | WEIGHT: 121.25 LBS

## 2024-05-07 DIAGNOSIS — N22 CALCULUS OF URINARY TRACT IN DISEASES CLASSIFIED ELSEWHERE: Primary | ICD-10-CM

## 2024-05-07 DIAGNOSIS — N40.1 BPH WITH URINARY OBSTRUCTION: ICD-10-CM

## 2024-05-07 DIAGNOSIS — N13.8 BPH WITH URINARY OBSTRUCTION: ICD-10-CM

## 2024-05-07 DIAGNOSIS — N20.0 KIDNEY STONE ON LEFT SIDE: ICD-10-CM

## 2024-05-07 PROCEDURE — 3074F SYST BP LT 130 MM HG: CPT | Mod: CPTII,S$GLB,, | Performed by: UROLOGY

## 2024-05-07 PROCEDURE — 1126F AMNT PAIN NOTED NONE PRSNT: CPT | Mod: CPTII,S$GLB,, | Performed by: UROLOGY

## 2024-05-07 PROCEDURE — 3288F FALL RISK ASSESSMENT DOCD: CPT | Mod: CPTII,S$GLB,, | Performed by: UROLOGY

## 2024-05-07 PROCEDURE — 1101F PT FALLS ASSESS-DOCD LE1/YR: CPT | Mod: CPTII,S$GLB,, | Performed by: UROLOGY

## 2024-05-07 PROCEDURE — 99214 OFFICE O/P EST MOD 30 MIN: CPT | Mod: S$GLB,,, | Performed by: UROLOGY

## 2024-05-07 PROCEDURE — 1159F MED LIST DOCD IN RCRD: CPT | Mod: CPTII,S$GLB,, | Performed by: UROLOGY

## 2024-05-07 PROCEDURE — 3078F DIAST BP <80 MM HG: CPT | Mod: CPTII,S$GLB,, | Performed by: UROLOGY

## 2024-05-07 PROCEDURE — 99999 PR PBB SHADOW E&M-EST. PATIENT-LVL III: CPT | Mod: PBBFAC,,, | Performed by: UROLOGY

## 2024-05-07 RX ORDER — POTASSIUM CITRATE 10 MEQ/1
10 TABLET, EXTENDED RELEASE ORAL 2 TIMES DAILY WITH MEALS
Qty: 180 TABLET | Refills: 3 | Status: SHIPPED | OUTPATIENT
Start: 2024-05-07 | End: 2025-05-07

## 2024-05-07 RX ORDER — POTASSIUM CITRATE 10 MEQ/1
10 TABLET, EXTENDED RELEASE ORAL 2 TIMES DAILY WITH MEALS
Qty: 180 TABLET | Refills: 3 | Status: SHIPPED | OUTPATIENT
Start: 2024-05-07 | End: 2024-05-07

## 2024-05-07 NOTE — PROGRESS NOTES
CC: s/p left ESWL for left kidney stone, recurrent UTI, hx of urinary retention    Hector Kellogg is a 79 y.o. man who is here for a follow up of left kidney stone.  He had greater than 3 cm stone burden in the left kidney.  He is here with his daughter today.  Has a problem with chronic constipation.    Hector Kellogg is a 78 y.o. male with a  left renal stone presenting for definitive stone management.  The stone is radio-opaque on KUB.    Date: 09/13/2023   Pre-Op Diagnosis: Left renal stone  Post-Op Diagnosis: same  Procedure(s) Performed:   1.  Left ESWL  Findings:   Left ureteral stent 6x24cm without strings confirmed on KUB; Good position at end of case  Decrease in density of UPJ stone with 3000 shocks delivered  Drains: 6 Fr x 24 cm left ureteral stent without strings  He developed acute urinary retention following his surgery.  A mares catheter was placed and he did well.    The left ureteral stent removed via cysto on 9/28/23.    He is here today because he is concerned that he may have UTI. He had episode of altered mental status with concomitant upper respiratory symptoms and dysuria. Urine culture with no growth. Prescribed Nitrofurantoin by PCP with improvement in mental status. He denies dysuria today. No fevers or chills. No N/V or flank pain.     His PCP, Blue Mcgill, DO   His PSA increased to 6.2 from 3.9.  Has a hx of recurrent UTI.  His elevated PSA might have been due to these problems.  Urination symptoms: Positive for frequency, urgency and incomplete emptying.  Denies flank pain.      Non-smoker    Past Medical History:   Diagnosis Date    Cataract     Chronic back pain greater than 3 months duration     COPD (chronic obstructive pulmonary disease)     Coronary artery disease     3 stents    DDD (degenerative disc disease), lumbar 05/16/2020    Glaucoma suspect of both eyes     Hyperlipidemia     Hypertension     MI, old 2006    Parkinson disease     Renal  disorder     Kidney stones     Past Surgical History:   Procedure Laterality Date    CARDIAC CATHETERIZATION  2006    x 3    CARDIAC CATHETERIZATION  10/11/2015    x 2    CATARACT EXTRACTION W/  INTRAOCULAR LENS IMPLANT Right 03/06/2018    Dr. Liu    CORONARY ANGIOPLASTY  2006, 2015    3 stents    CYSTOSCOPY W/ URETERAL STENT PLACEMENT Left 9/13/2023    Procedure: CYSTOSCOPY, WITH URETERAL STENT PLACEMENT;  Surgeon: Jerel Cullen MD;  [facility];  Service: Urology;  Laterality: Left;  1.5 HR    EXTRACORPOREAL SHOCK WAVE LITHOTRIPSY Left 4/20/2021    Procedure: LITHOTRIPSY-EXTRACORPOREAL SHOCK WAVE;  Surgeon: Ottoniel Obando MD;  Location: [facility] OR;  Service: Urology;  Laterality: Left;    EXTRACORPOREAL SHOCK WAVE LITHOTRIPSY Left 9/13/2023    Procedure: LITHOTRIPSY, ESWL;  Surgeon: Jerel Cullen MD;  [facility];  Service: Urology;  Laterality: Left;    EYE SURGERY      cataracts bilateral    HEMORRHOID SURGERY      INJECTION OF ANESTHETIC AGENT AROUND NERVE Bilateral 11/4/2020    Procedure: BLOCK, NERVE BILATERAL L2,3,4,5;  Surgeon: Ramiro Calvillo MD;  Location: [facility] PAIN MGT;  Service: Pain Management;  Laterality: Bilateral;  BLOCK, NERVE BILATERAL L2,3,4,5    INJECTION OF FACET JOINT Right 8/5/2020    Procedure: INJECTION, FACET JOINT, L4-L5 , L5-S1;  Surgeon: Ramiro Calvillo MD;  Location: [facility] PAIN MGT;  Service: Pain Management;  Laterality: Right;    LAMINOFORAMINOTOMY OF SPINE USING MINIMALLY INVASIVE TECHNIQUE N/A 2/1/2021    Procedure: MIS L4-5 Laminectomy;  Surgeon: Bernard Sheldon MD;  Location: [facility];  Service: Neurosurgery;  Laterality: N/A;  Anesthesia: General  Nerve Monitoring: EMG/SEP  Position: Prone  Bed: [facility] on [address]  Headrest: Prone View  Radiology: C-arm  Misc: Microscope, microinstruments    TRANSFORAMINAL EPIDURAL INJECTION OF STEROID Right 5/27/2020    Procedure: INJECTION, STEROID, EPIDURAL, TRANSFORAMINAL APPROACH;  Surgeon:  Ramiro Calvillo MD;  Location: [facility];  Service: Pain Management;  Laterality: Right;  Right TF ASHISH L4-L5, L5-S1    PTcannot come earlier than 12:30    TRANSFORAMINAL EPIDURAL INJECTION OF STEROID Right 9/9/2020    Procedure: INJECTION, STEROID, EPIDURAL, TRANSFORAMINAL APPROACH;  Surgeon: Ramiro Calvillo MD;  Location: [facility] PAIN MGT;  Service: Pain Management;  Laterality: Right;  RIGHT TF ASHISH L4/5 and L5/S1     Social History     Tobacco Use    Smoking status: Former     Current packs/day: 0.00     Average packs/day: 1 pack/day for 40.0 years (40.0 ttl pk-yrs)     Types: Cigarettes     Start date: 2/1/1974     Quit date: 2/1/2014     Years since quitting: 10.2     Passive exposure: Past    Smokeless tobacco: Never   Substance Use Topics    Alcohol use: Yes     Comment: socially    Drug use: No     Family History   Problem Relation Name Age of Onset    Heart attack Father 76     Cataracts Father 76     Heart disease Maternal Uncle      Heart disease Paternal Uncle      Hypertension Neg Hx      Asthma Neg Hx      Emphysema Neg Hx      Amblyopia Neg Hx      Blindness Neg Hx      Macular degeneration Neg Hx      Retinal detachment Neg Hx      Strabismus Neg Hx       Allergy:  Review of patient's allergies indicates:   Allergen Reactions    Influenza virus vaccines Other (See Comments)     Caused fever, chills, and made tremors worse     Outpatient Encounter Medications as of 5/7/2024   Medication Sig Dispense Refill    albuterol (PROVENTIL/VENTOLIN HFA) 90 mcg/actuation inhaler INHALE 2 PUFFS BY MOUTH EVERY 6 HOURS AS NEEDED FOR WHEEZING FOR SHORTNESS OF BREATH 9 g 11    amLODIPine (NORVASC) 5 MG tablet Take 1 tablet (5 mg total) by mouth once daily. 30 tablet 11    aspirin (ECOTRIN) 81 MG EC tablet Take 81 mg by mouth once daily.      BREZTRI AEROSPHERE 160-9-4.8 mcg/actuation HFAA Inhale 2 puffs into the lungs 2 (two) times a day. 10.7 g 11    carbidopa-levodopa  mg (SINEMET)  mg per tablet  Take 1.5 tablets by mouth 5 (five) times daily. 675 tablet 3    citalopram (CELEXA) 20 MG tablet Take 1 tablet by mouth once daily 90 tablet 3    ezetimibe (ZETIA) 10 mg tablet Take 1 tablet (10 mg total) by mouth once daily. 90 tablet 3    ibuprofen (ADVIL,MOTRIN) 800 MG tablet Take 800 mg by mouth as needed.      loratadine (CLARITIN) 10 mg tablet Take 10 mg by mouth as needed for Allergies.      meloxicam (MOBIC) 7.5 MG tablet Take 1 tablet (7.5 mg total) by mouth once daily. 30 tablet 3    metoprolol succinate (TOPROL-XL) 50 MG 24 hr tablet Take 1 tablet by mouth once daily 90 tablet 3    multivitamin capsule Take 1 capsule by mouth once daily.      oxyCODONE (ROXICODONE) 5 MG immediate release tablet Take ½ tablet (2.5 mg total) by mouth every 4 (four) hours as needed for Pain. 30 tablet 0    rosuvastatin (CRESTOR) 40 MG Tab TAKE 1 TABLET BY MOUTH NIGHTLY AT BEDTIME 90 tablet 3    [DISCONTINUED] finasteride (PROSCAR) 5 mg tablet Take 1 tablet (5 mg total) by mouth once daily. 90 tablet 3    [DISCONTINUED] potassium citrate (UROCIT-K 10) 10 mEq (1,080 mg) TbSR Take 1 tablet (10 mEq total) by mouth 2 (two) times daily with meals. 180 tablet 3    [] nitrofurantoin, macrocrystal-monohydrate, (MACROBID) 100 MG capsule Take 1 capsule (100 mg total) by mouth 2 (two) times daily. for 7 days 14 capsule 0    potassium citrate (UROCIT-K 10) 10 mEq (1,080 mg) TbSR Take 1 tablet (10 mEq total) by mouth 2 (two) times daily with meals. 180 tablet 3    [DISCONTINUED] oxyCODONE (ROXICODONE) 5 MG immediate release tablet Take ½ tablet (2.5 mg total) by mouth every 4 (four) hours as needed for Pain. 30 tablet 0     No facility-administered encounter medications on file as of 2024.     Review of Systems   ROS  Physical Exam     Vitals:    24 1350   BP: (!) 102/51   Pulse: 73       Physical Exam  Constitutional:       General: He is not in acute distress.     Appearance: He is well-developed. He is not diaphoretic.    HENT:      Head: Normocephalic and atraumatic.      Right Ear: External ear normal.      Left Ear: External ear normal.      Nose: Nose normal.   Eyes:      Conjunctiva/sclera: Conjunctivae normal.      Pupils: Pupils are equal, round, and reactive to light.   Neck:      Thyroid: No thyromegaly.      Vascular: No JVD.      Trachea: No tracheal deviation.   Cardiovascular:      Rate and Rhythm: Normal rate and regular rhythm.      Heart sounds: Normal heart sounds. No murmur heard.     No friction rub. No gallop.   Pulmonary:      Effort: Pulmonary effort is normal. No respiratory distress.      Breath sounds: Normal breath sounds. No wheezing.   Chest:      Chest wall: No tenderness.   Abdominal:      General: Bowel sounds are normal. There is no distension.      Palpations: Abdomen is soft. There is no mass.      Tenderness: There is no abdominal tenderness. There is no guarding or rebound.   Genitourinary:     Penis: Normal. No tenderness.       Prostate: Normal.      Rectum: Normal.      Comments: Prostate: enlarged.  Musculoskeletal:         General: No tenderness or deformity. Normal range of motion.      Cervical back: Normal range of motion and neck supple.   Lymphadenopathy:      Cervical: No cervical adenopathy.   Skin:     General: Skin is warm and dry.   Neurological:      Mental Status: He is alert and oriented to person, place, and time.   Psychiatric:         Behavior: Behavior normal.         Thought Content: Thought content normal.         LABS:  Lab Results   Component Value Date    PSA 6.2 (H) 07/14/2023    PSA 3.9 06/22/2022     No results found for this or any previous visit.  Lab Results   Component Value Date    CREATININE 1.1 05/06/2024    CREATININE 0.9 03/11/2024    CREATININE 1.1 10/20/2023     No results found for this or any previous visit.  Urine Culture, Routine   Date Value Ref Range Status   04/02/2024 No significant growth  Final     Hemoglobin A1C   Date Value Ref Range Status  "  07/14/2023 5.7 (H) 4.0 - 5.6 % Final     Comment:     ADA Screening Guidelines:  5.7-6.4%  Consistent with prediabetes  >or=6.5%  Consistent with diabetes    High levels of fetal hemoglobin interfere with the HbA1C  assay. Heterozygous hemoglobin variants (HbS, HgC, etc)do  not significantly interfere with this assay.   However, presence of multiple variants may affect accuracy.         Radiology:  CT RSS 5/6/24  Bilateral renal stones with a similar stone burden when compared to prior.  No hydronephrosis.  Emphysema.  Prostatomegaly with prostate calcifications.  Bladder wall thickening likely a combination of incomplete distension and chronic outlet obstruction.  Diverticulosis.    CT urogram 8/24/23    Kidneys enhance symmetrically with cortical hypodense lesions, the larger in keeping with cysts with some too small to characterize.  Incidental mild extrarenal pelvis on the right.  Punctate nonobstructing right renal stones (series 3, image 56 and 72).  There is left renal pelvis stone near the UPJ measuring 1.3 x 0.9 cm with mild adjacent stranding and mild upstream hydronephrosis.  Additional nonobstructing left renal stones.  There is incomplete contrast distension of the urinary bladder, otherwise unremarkable.  Prostate is enlarged with central gland calcification.    KUB today  few grouped attenuating foci projecting at the left renal shadow measuring in total 1.4 cm, suggesting stones.      Assessment and Plan:  Hector Fountain" was seen today for follow-up.    Diagnoses and all orders for this visit:    Calculus of urinary tract in diseases classified elsewhere  -     CT Renal Stone Study ABD Pelvis WO; Future    Kidney stone on left side  -     potassium citrate (UROCIT-K 10) 10 mEq (1,080 mg) TbSR; Take 1 tablet (10 mEq total) by mouth 2 (two) times daily with meals.  -     CT Renal Stone Study ABD Pelvis WO; Future  -     Basic Metabolic Panel; Future    BPH with urinary obstruction      I reviewed his " CT RSS.    Following left ESLW, the large left renal pelvic stone was broken up but settled in the lower pole of the left kidney.  No hydronephrosis noted.  No detrimental change noted.  Pt's daughter informed me that after general anesthesia, pt has experienced worsening cognitive function.  So will avoid additional stone surgery for now.  continue urocit K BID.    He needs to drink more water.    Will see him in 1 year with CT RSS and BMP.    I spent 25 minutes with the patient of which more than half was spent in direct consultation with the patient in regards to our treatment and plan.     Follow-up:  Follow up in about 1 year (around 5/7/2025), or CT RSS.

## 2024-05-20 NOTE — TELEPHONE ENCOUNTER
No care due was identified.  [facility] Embedded Care Due Messages. Reference number: [id number].   5/20/2024 5:05:45 PM CDT

## 2024-05-21 RX ORDER — METOPROLOL SUCCINATE 50 MG/1
TABLET, EXTENDED RELEASE ORAL
Qty: 90 TABLET | Refills: 2 | Status: SHIPPED | OUTPATIENT
Start: 2024-05-21

## 2024-05-21 NOTE — TELEPHONE ENCOUNTER
Refill Decision Note   Hector Kellogg  is requesting a refill authorization.  Brief Assessment and Rationale for Refill:  Approve     Medication Therapy Plan:        Pharmacist review requested: Yes   Extended chart review required: Yes   Comments:     Note composed:11:54 AM 05/21/2024

## 2024-05-21 NOTE — TELEPHONE ENCOUNTER
Refill Routing Note   Medication(s) are not appropriate for processing by [facility] for the following reason(s):        Drug-disease interaction    [facility] action(s):  Defer      Medication Therapy Plan: Claudication    Pharmacist review requested: Yes     Appointments  past 12m or future 3m with PCP    Date Provider   Last Visit   1/18/2024 Blue Mcgill, DO   Next Visit   7/19/2024 Blue Mcgill, DO   ED visits in past 90 days: 0        Note composed:7:49 AM 05/21/2024

## 2024-05-22 ENCOUNTER — PATIENT MESSAGE (OUTPATIENT)
Dept: NEUROLOGY | Facility: CLINIC | Age: 79
End: 2024-05-22
Payer: MEDICARE

## 2024-05-22 ENCOUNTER — OFFICE VISIT (OUTPATIENT)
Dept: OPTOMETRY | Facility: CLINIC | Age: 79
End: 2024-05-22
Payer: MEDICARE

## 2024-05-22 DIAGNOSIS — H35.341 MACULAR HOLE, RIGHT: ICD-10-CM

## 2024-05-22 DIAGNOSIS — H26.492 CLOUDY POSTERIOR CAPSULE, LEFT: Primary | ICD-10-CM

## 2024-05-22 DIAGNOSIS — H52.4 PRESBYOPIA: ICD-10-CM

## 2024-05-22 DIAGNOSIS — Z13.5 GLAUCOMA SCREENING: ICD-10-CM

## 2024-05-22 PROCEDURE — 99999 PR PBB SHADOW E&M-EST. PATIENT-LVL III: CPT | Mod: PBBFAC,,, | Performed by: OPTOMETRIST

## 2024-05-22 PROCEDURE — 3288F FALL RISK ASSESSMENT DOCD: CPT | Mod: CPTII,S$GLB,, | Performed by: OPTOMETRIST

## 2024-05-22 PROCEDURE — 1159F MED LIST DOCD IN RCRD: CPT | Mod: CPTII,S$GLB,, | Performed by: OPTOMETRIST

## 2024-05-22 PROCEDURE — 1101F PT FALLS ASSESS-DOCD LE1/YR: CPT | Mod: CPTII,S$GLB,, | Performed by: OPTOMETRIST

## 2024-05-22 PROCEDURE — 92015 DETERMINE REFRACTIVE STATE: CPT | Mod: S$GLB,,, | Performed by: OPTOMETRIST

## 2024-05-22 PROCEDURE — 1160F RVW MEDS BY RX/DR IN RCRD: CPT | Mod: CPTII,S$GLB,, | Performed by: OPTOMETRIST

## 2024-05-22 PROCEDURE — 92014 COMPRE OPH EXAM EST PT 1/>: CPT | Mod: S$GLB,,, | Performed by: OPTOMETRIST

## 2024-05-22 NOTE — PROGRESS NOTES
HPI    78 Y/o male is here for routine eye exam with C/o pt states his vision is   good.  pt denies pain and discomfort. Suffers with allergies in the eyes but not   bad enough for gtts  No f/f    Eye med: no gtt  Last edited by Cristy Bejarano on 5/22/2024  8:01 AM.            Assessment /Plan     For exam results, see Encounter Report.    Cloudy posterior capsule, left    Macular hole, right    Glaucoma screening    Presbyopia        Cloudy capsule OS sp pciol OU/YAG OD  Hx reduced VA OD 2 to mac hole.  Dr Diaz had discussed repair w pt, but he wishes to wait  Hx OHT OU prior to cat surgery iop 24 OU without meds.  Now sp cat surgery iop wnl without meds.  Mod cupping.  Will monitor    PLAN:    Consult--Dr Diaz--YAG OS

## 2024-05-28 DIAGNOSIS — M54.17 LUMBOSACRAL RADICULOPATHY: ICD-10-CM

## 2024-05-29 RX ORDER — OXYCODONE HYDROCHLORIDE 5 MG/1
2.5 TABLET ORAL EVERY 4 HOURS PRN
Qty: 30 TABLET | Refills: 0 | Status: SHIPPED | OUTPATIENT
Start: 2024-05-29

## 2024-06-10 ENCOUNTER — PATIENT MESSAGE (OUTPATIENT)
Dept: NEUROLOGY | Facility: CLINIC | Age: 79
End: 2024-06-10
Payer: MEDICARE

## 2024-06-13 ENCOUNTER — PATIENT MESSAGE (OUTPATIENT)
Dept: INTERNAL MEDICINE | Facility: CLINIC | Age: 79
End: 2024-06-13
Payer: MEDICARE

## 2024-06-13 DIAGNOSIS — R30.0 DYSURIA: Primary | ICD-10-CM

## 2024-06-14 ENCOUNTER — TELEPHONE (OUTPATIENT)
Dept: ADMINISTRATIVE | Facility: OTHER | Age: 79
End: 2024-06-14
Payer: MEDICARE

## 2024-06-14 RX ORDER — DONEPEZIL HYDROCHLORIDE 10 MG/1
TABLET, FILM COATED ORAL
Qty: 30 TABLET | Refills: 11 | Status: CANCELLED | OUTPATIENT
Start: 2024-06-14

## 2024-06-14 NOTE — TELEPHONE ENCOUNTER
See other note.  Intended to write donepezil, but has medium qtc and also on Celexa.    Will switch to Namenda - sending note to family.

## 2024-06-14 NOTE — TELEPHONE ENCOUNTER
Reached out to speak with Mr. Hector Kellogg regarding recent complaints and concerns. Spoke with Mr. Kellogg's daughter, Kelly Mora. Ms. Mora states that her father had labs drawn in March and they have been reaching out to receive feedback from Dr. Klein. Ms. Mora inquired if her father's medications would need adjusting. Ms Mora states that her father's short term memory is getting worse and he's having intermittent confusion. Ms. Mora reports that he had a recent urine culture to rule out a UTI. Ms. Mora is requesting to hear from her father's physician regarding his plan of care. Will relay message to MD.

## 2024-06-18 RX ORDER — MEMANTINE HYDROCHLORIDE 5 MG/1
5 TABLET ORAL 2 TIMES DAILY
Qty: 60 TABLET | Refills: 11 | Status: SHIPPED | OUTPATIENT
Start: 2024-06-18 | End: 2025-06-18

## 2024-06-22 RX ORDER — ROSUVASTATIN CALCIUM 40 MG/1
TABLET, COATED ORAL
Qty: 90 TABLET | Refills: 1 | Status: SHIPPED | OUTPATIENT
Start: 2024-06-22

## 2024-06-22 NOTE — TELEPHONE ENCOUNTER
Refill Decision Note   Hectorazael Hardinone  is requesting a refill authorization.  Brief Assessment and Rationale for Refill:  Approve     Medication Therapy Plan:         Comments:     Note composed:11:04 AM 06/22/2024

## 2024-06-22 NOTE — TELEPHONE ENCOUNTER
No care due was identified.  [facility] Embedded Care Due Messages. Reference number: [id number].   6/22/2024 6:55:59 AM CDT

## 2024-07-01 ENCOUNTER — TELEPHONE (OUTPATIENT)
Dept: PALLIATIVE MEDICINE | Facility: CLINIC | Age: 79
End: 2024-07-01
Payer: MEDICARE

## 2024-07-01 DIAGNOSIS — M54.17 LUMBOSACRAL RADICULOPATHY: ICD-10-CM

## 2024-07-01 RX ORDER — OXYCODONE HYDROCHLORIDE 5 MG/1
2.5 TABLET ORAL EVERY 4 HOURS PRN
Qty: 30 TABLET | Refills: 0 | Status: SHIPPED | OUTPATIENT
Start: 2024-07-01

## 2024-07-12 ENCOUNTER — PATIENT MESSAGE (OUTPATIENT)
Dept: NEUROLOGY | Facility: CLINIC | Age: 79
End: 2024-07-12
Payer: MEDICARE

## 2024-07-19 ENCOUNTER — OFFICE VISIT (OUTPATIENT)
Dept: INTERNAL MEDICINE | Facility: CLINIC | Age: 79
End: 2024-07-19
Payer: MEDICARE

## 2024-07-19 VITALS
HEART RATE: 62 BPM | SYSTOLIC BLOOD PRESSURE: 108 MMHG | OXYGEN SATURATION: 96 % | TEMPERATURE: 98 F | DIASTOLIC BLOOD PRESSURE: 64 MMHG | HEIGHT: 64 IN | BODY MASS INDEX: 21.04 KG/M2 | WEIGHT: 123.25 LBS | RESPIRATION RATE: 17 BRPM

## 2024-07-19 DIAGNOSIS — I27.20 PULMONARY HTN: Chronic | ICD-10-CM

## 2024-07-19 DIAGNOSIS — E04.1 THYROID NODULE: ICD-10-CM

## 2024-07-19 DIAGNOSIS — J43.2 CENTRILOBULAR EMPHYSEMA: ICD-10-CM

## 2024-07-19 DIAGNOSIS — G20.B1 PARKINSON'S DISEASE WITH DYSKINESIA, UNSPECIFIED WHETHER MANIFESTATIONS FLUCTUATE: Primary | ICD-10-CM

## 2024-07-19 DIAGNOSIS — I10 ESSENTIAL HYPERTENSION: ICD-10-CM

## 2024-07-19 DIAGNOSIS — M51.36 DDD (DEGENERATIVE DISC DISEASE), LUMBAR: ICD-10-CM

## 2024-07-19 DIAGNOSIS — F41.9 ANXIETY: ICD-10-CM

## 2024-07-19 DIAGNOSIS — I73.9 CLAUDICATION: ICD-10-CM

## 2024-07-19 DIAGNOSIS — N20.0 NEPHROLITHIASIS: ICD-10-CM

## 2024-07-19 DIAGNOSIS — E78.00 PURE HYPERCHOLESTEROLEMIA: ICD-10-CM

## 2024-07-19 DIAGNOSIS — I25.10 CORONARY ARTERY DISEASE INVOLVING NATIVE CORONARY ARTERY OF NATIVE HEART WITHOUT ANGINA PECTORIS: ICD-10-CM

## 2024-07-19 DIAGNOSIS — M46.1 SACROILIITIS: ICD-10-CM

## 2024-07-19 PROCEDURE — 99215 OFFICE O/P EST HI 40 MIN: CPT | Mod: S$GLB,,, | Performed by: INTERNAL MEDICINE

## 2024-07-19 PROCEDURE — 3078F DIAST BP <80 MM HG: CPT | Mod: CPTII,S$GLB,, | Performed by: INTERNAL MEDICINE

## 2024-07-19 PROCEDURE — 3074F SYST BP LT 130 MM HG: CPT | Mod: CPTII,S$GLB,, | Performed by: INTERNAL MEDICINE

## 2024-07-19 PROCEDURE — 1125F AMNT PAIN NOTED PAIN PRSNT: CPT | Mod: CPTII,S$GLB,, | Performed by: INTERNAL MEDICINE

## 2024-07-19 PROCEDURE — 99999 PR PBB SHADOW E&M-EST. PATIENT-LVL IV: CPT | Mod: PBBFAC,,, | Performed by: INTERNAL MEDICINE

## 2024-07-19 PROCEDURE — G2211 COMPLEX E/M VISIT ADD ON: HCPCS | Mod: S$GLB,,, | Performed by: INTERNAL MEDICINE

## 2024-07-19 PROCEDURE — 1100F PTFALLS ASSESS-DOCD GE2>/YR: CPT | Mod: CPTII,S$GLB,, | Performed by: INTERNAL MEDICINE

## 2024-07-19 PROCEDURE — 3288F FALL RISK ASSESSMENT DOCD: CPT | Mod: CPTII,S$GLB,, | Performed by: INTERNAL MEDICINE

## 2024-07-19 NOTE — PROGRESS NOTES
Subjective     Patient ID: Hector Kellogg is a 79 y.o. male.    Chief Complaint: Annual Exam    HPI  79 y.o. Male here for annual exam.      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 Liu  2006, 2015: CORONARY ANGIOPLASTY      Comment:  3 stents  4/20/2021: EXTRACORPOREAL SHOCK WAVE LITHOTRIPSY; Left      Comment:  Procedure: LITHOTRIPSY-EXTRACORPOREAL SHOCK WAVE;                 Surgeon: Ottoniel Obando MD;  Location: [facility] OR;                 Service: Urology;  Laterality: Left;  No date: EYE SURGERY      Comment:  cataracts bilateral  No date: HEMORRHOID SURGERY  11/4/2020: INJECTION OF ANESTHETIC AGENT AROUND NERVE; Bilateral      Comment:  Procedure: BLOCK, NERVE BILATERAL L2,3,4,5;  Surgeon:                Ramiro Calvillo MD;  Location: [facility] PAIN MGT;                 Service: Pain Management;  Laterality: Bilateral;  BLOCK,               NERVE BILATERAL L2,3,4,5  8/5/2020: INJECTION OF FACET JOINT; Right      Comment:  Procedure: INJECTION, FACET JOINT, L4-L5 , L5-S1;                 Surgeon: Ramiro Calvillo MD;  Location: [facility] PAIN MGT;               Service: Pain Management;  Laterality: Right;  2/1/2021: LAMINOFORAMINOTOMY OF SPINE USING MINIMALLY  INVASIVE   TECHNIQUE; N/A      Comment:  Procedure: MIS L4-5 Laminectomy;  Surgeon: Bernard Sheldon MD;  Location: [facility] OR;  Service: Neurosurgery;                Laterality: N/A;  Anesthesia: GeneralNerve Monitoring:                EMG/SEPPosition: ProneBed: Eliot Frame on                jacksonHeadrest: Prone ViewRadiology: C-armMisc:                Microscope, microinstruments  2020: TRANSFORAMINAL EPIDURAL INJECTION OF STEROID; Right      Comment:  Procedure: INJECTION, STEROID, EPIDURAL, TRANSFORAMINAL                APPROACH;  Surgeon: Ramiro Calvillo MD;  Location:                [facility] PAIN MGT;  Service: Pain Management;  Laterality:                Right;  Right TF ASHISH L4-L5, L5-X4PVgzmlvy come                earlier than 12:30  2020: TRANSFORAMINAL EPIDURAL INJECTION OF STEROID; Right      Comment:  Procedure: INJECTION, STEROID, EPIDURAL, TRANSFORAMINAL                APPROACH;  Surgeon: Ramiro Calvillo MD;  Location:                [facility] PAIN MGT;  Service: Pain Management;  Laterality:                Right;  RIGHT TF ASHISH L4/5 and L5/S1  Social History    Socioeconomic History      Marital status:     Tobacco Use      Smoking status: Former Smoker        Packs/day: 1.00        Years: 40.00        Pack years: 40        Quit date: 2014        Years since quittin.4      Smokeless tobacco: Never Used    Substance and Sexual Activity      Alcohol use: Yes        Comment: socially      Drug use: No     Review of patient's allergies indicates:   -- Influenza virus vaccines -- Other (See Comments)    --  Caused fever, chills, and made tremors worse  Review of Systems   Constitutional:  Negative for activity change, appetite change, chills, diaphoresis, fatigue, fever and unexpected weight change.   HENT:  Negative for postnasal drip, rhinorrhea, sinus pressure/congestion, sneezing, sore throat, trouble swallowing and voice change.    Respiratory:  Negative for cough,  shortness of breath and wheezing.    Cardiovascular:  Negative for chest pain, palpitations and leg swelling.   Gastrointestinal:  Negative for abdominal pain, blood in stool, constipation, diarrhea, nausea and vomiting.   Genitourinary:  Negative for dysuria.   Musculoskeletal:  Positive for arthralgias and back pain. Negative for myalgias.   Integumentary:  Negative for rash and wound.   Allergic/Immunologic: Negative for environmental allergies and food allergies.   Neurological:  Positive for tremors.   Hematological:  Negative for adenopathy. Does not bruise/bleed easily.   Psychiatric/Behavioral:  Negative for self-injury and suicidal ideas. The patient is nervous/anxious.           Objective     Physical Exam  Constitutional:       General: He is not in acute distress.     Appearance: Normal appearance. He is well-developed. He is not ill-appearing, toxic-appearing or diaphoretic.   HENT:      Head: Normocephalic and atraumatic.      Right Ear: External ear normal.      Left Ear: External ear normal.      Nose: Nose normal.      Mouth/Throat:      Pharynx: No oropharyngeal exudate.   Eyes:      General: No scleral icterus.        Right eye: No discharge.         Left eye: No discharge.      Extraocular Movements: Extraocular movements intact.      Conjunctiva/sclera: Conjunctivae normal.      Pupils: Pupils are equal, round, and reactive to light.   Neck:      Thyroid: No thyromegaly.      Vascular: No JVD.   Cardiovascular:      Rate and Rhythm: Normal rate and regular rhythm.      Pulses: Normal pulses.      Heart sounds: Normal heart sounds. No murmur heard.  Pulmonary:      Effort: Pulmonary effort is normal. No respiratory distress.      Breath sounds: Normal breath sounds. No wheezing or rales.   Abdominal:      General: Bowel sounds are normal. There is no distension.      Palpations: Abdomen is soft.      Tenderness: There is no abdominal tenderness. There is no right CVA tenderness, left CVA  tenderness, guarding or rebound.   Musculoskeletal:      Cervical back: Normal range of motion and neck supple. No rigidity.      Right lower leg: No edema.      Left lower leg: No edema.   Lymphadenopathy:      Cervical: No cervical adenopathy.   Skin:     General: Skin is warm and dry.      Capillary Refill: Capillary refill takes less than 2 seconds.      Coloration: Skin is not pale.      Findings: No rash.   Neurological:      General: No focal deficit present.      Mental Status: He is alert and oriented to person, place, and time. Mental status is at baseline.      Cranial Nerves: No cranial nerve deficit.      Sensory: No sensory deficit.      Motor: No weakness.      Coordination: Coordination normal.      Gait: Gait normal.      Deep Tendon Reflexes: Reflexes normal.   Psychiatric:         Mood and Affect: Mood normal.         Behavior: Behavior normal.         Thought Content: Thought content normal.         Judgment: Judgment normal.            Assessment and Plan     1. Parkinson's disease with dyskinesia, unspecified whether manifestations fluctuate    2. DDD (degenerative disc disease), lumbar    3. Anxiety    4. Centrilobular emphysema  Overview:  FEV1 0.99 (43%) predicted  Continue breztri twice daily for control  Albuterol for rescue and prevention.     88% on room air at rest.  Benefits from POC of choice.       5. Coronary artery disease involving native coronary artery of native heart without angina pectoris  Overview:  Stents x 3 2006    PCI of RCA and OM 10/15 with KARINA  LVEF 55% 11/15      6. Essential hypertension    7. Pure hypercholesterolemia    8. Pulmonary HTN    9. Nephrolithiasis    10. Thyroid nodule    11. Sacroiliitis    12. Claudication         Blood work ordered    CAD- S/P NSTEMI and PCI in 2006, with repeat CATH(10/11/15) placing stents to RCA and OM1- stable       Continue BB/statin/ASA; pt has stopped the Imdur 2/2 to low BP      HTN- stable on Toprol XL 50 mg daily      HLD-  controlled on Crestor      Parkinson's disease- stable on Sinemet, managed by Neuro      COPD- stable, followed by Pulm      Mild Pulmonary HTN- stable      Anxiety- stable on Celexa 20 mg daily      Thyroid nodule- followed by ENT      DJD Lumbar spine- stable, stopped the gabapentin 2/2 sedation       Nephrolithiasis- stable     Sacroiliitis- stable    Claudication- stable      Elevated PSA- followed by urology     F/u in 6 months    Over 1/2 of 40 minute visit spent reviewing pt's medical records, education/discussion of pt's medical conditions and medical management       F/u in 6 months

## 2024-07-29 ENCOUNTER — PROCEDURE VISIT (OUTPATIENT)
Dept: OPHTHALMOLOGY | Facility: CLINIC | Age: 79
End: 2024-07-29
Payer: MEDICARE

## 2024-07-29 DIAGNOSIS — Z96.1 PSEUDOPHAKIA: ICD-10-CM

## 2024-07-29 DIAGNOSIS — H26.492 PCO (POSTERIOR CAPSULAR OPACIFICATION), LEFT: Primary | ICD-10-CM

## 2024-07-29 DIAGNOSIS — H35.341 MACULAR HOLE OF RIGHT EYE: ICD-10-CM

## 2024-07-29 DIAGNOSIS — H40.053 BILATERAL OCULAR HYPERTENSION: ICD-10-CM

## 2024-07-29 PROCEDURE — 66821 AFTER CATARACT LASER SURGERY: CPT | Mod: LT,S$GLB,, | Performed by: OPHTHALMOLOGY

## 2024-07-29 PROCEDURE — 99214 OFFICE O/P EST MOD 30 MIN: CPT | Mod: 57,S$GLB,, | Performed by: OPHTHALMOLOGY

## 2024-07-30 NOTE — PROGRESS NOTES
Subjective:       Patient ID: Hectro Kellogg is a 79 y.o. male.    Chief Complaint: Procedure    HPI    Here for yag OD, per Dr Jones     DLS:5/19/24 Dr Jones     Eye meds: None    79 year old male states vision is blurry in the right eye. Denies flashes,   floaters and diplopia. Denies itchy or scratchy eyes. Denies ocular pain.   Last edited by Viki Esparza on 7/29/2024  3:53 PM.             Assessment:       1. PCO (posterior capsular opacification), left    2. Macular hole of right eye    3. Bilateral ocular hypertension    4. Pseudophakia        Plan:       Visually significant  PCO OS- Pt. Wants Laser.  Macular hole OD-Stable.  OHT OU-IOP's are acceptable OU.      YAG CAP left eye (OS) today. TE=   68 mj, Avg. 2.0 mj, 34 pulses.    PF taper OS.  RTC 2-3 wks.    YAG laser Capsulotomy Procedure Note:  Informed consent was obtained and correct eye was verified with patient.   One drop of topical Proparacaine 1% was instilled.  YAG laser applied to posterior capsule in cruciate pattern.  One drop of Apraclonidine 0.5% and 1 drop of Pred Acetate 1% applied to eye after laser.  Pt tolerated procedure well. No complications.  Follow up in 2-3 weeks.

## 2024-08-12 ENCOUNTER — OFFICE VISIT (OUTPATIENT)
Dept: OPHTHALMOLOGY | Facility: CLINIC | Age: 79
End: 2024-08-12
Payer: MEDICARE

## 2024-08-12 DIAGNOSIS — H52.7 REFRACTIVE ERROR: ICD-10-CM

## 2024-08-12 DIAGNOSIS — Z98.890 POST-OPERATIVE STATE: Primary | ICD-10-CM

## 2024-08-12 DIAGNOSIS — Z96.1 PSEUDOPHAKIA: ICD-10-CM

## 2024-08-12 DIAGNOSIS — H40.053 BILATERAL OCULAR HYPERTENSION: ICD-10-CM

## 2024-08-12 PROCEDURE — 1159F MED LIST DOCD IN RCRD: CPT | Mod: CPTII,S$GLB,, | Performed by: OPHTHALMOLOGY

## 2024-08-12 PROCEDURE — 3288F FALL RISK ASSESSMENT DOCD: CPT | Mod: CPTII,S$GLB,, | Performed by: OPHTHALMOLOGY

## 2024-08-12 PROCEDURE — 1126F AMNT PAIN NOTED NONE PRSNT: CPT | Mod: CPTII,S$GLB,, | Performed by: OPHTHALMOLOGY

## 2024-08-12 PROCEDURE — 1160F RVW MEDS BY RX/DR IN RCRD: CPT | Mod: CPTII,S$GLB,, | Performed by: OPHTHALMOLOGY

## 2024-08-12 PROCEDURE — 99999 PR PBB SHADOW E&M-EST. PATIENT-LVL III: CPT | Mod: PBBFAC,,, | Performed by: OPHTHALMOLOGY

## 2024-08-12 PROCEDURE — 1100F PTFALLS ASSESS-DOCD GE2>/YR: CPT | Mod: CPTII,S$GLB,, | Performed by: OPHTHALMOLOGY

## 2024-08-12 PROCEDURE — 99024 POSTOP FOLLOW-UP VISIT: CPT | Mod: S$GLB,,, | Performed by: OPHTHALMOLOGY

## 2024-08-12 NOTE — PROGRESS NOTES
"Subjective:       Patient ID: Hector Kellogg is a 79 y.o. male.    Chief Complaint: Post-op Evaluation (Patient Hector Kellogg (Gus) is a 79 year old male./S/p YAG laser OS: 07/29/2024)    HPI     Post-op Evaluation     Additional comments: Patient Hector Kellogg (Gus) is a 79 year old   male.  S/p YAG laser OS: 07/29/2024           Comments    Pt here for 2-3 week YAG OS post-op visit. Pt states that VA is the same   since YAG laser and denies any eye pain.    DLS: 07/29/2024 with Dr. Liu    Meds: None          Last edited by Meli Salmon on 8/12/2024  9:57 AM.             Assessment:       1. Post-operative state    2. Bilateral ocular hypertension    3. Refractive error    4. Pseudophakia        Plan:       S/p YAG CAP OS- Doing well.  OHT OU-IOP's are mildly elevated OU.  RE-Pt does not want MRx.      Readers.  RTC Dr Jones in 6 mos.      "

## 2024-08-15 ENCOUNTER — OFFICE VISIT (OUTPATIENT)
Dept: PALLIATIVE MEDICINE | Facility: CLINIC | Age: 79
End: 2024-08-15
Payer: MEDICARE

## 2024-08-15 VITALS
WEIGHT: 124.56 LBS | HEART RATE: 66 BPM | DIASTOLIC BLOOD PRESSURE: 59 MMHG | SYSTOLIC BLOOD PRESSURE: 120 MMHG | BODY MASS INDEX: 21.38 KG/M2

## 2024-08-15 DIAGNOSIS — G20.B1 PARKINSON'S DISEASE WITH DYSKINESIA, UNSPECIFIED WHETHER MANIFESTATIONS FLUCTUATE: Primary | ICD-10-CM

## 2024-08-15 DIAGNOSIS — K59.04 CHRONIC IDIOPATHIC CONSTIPATION: ICD-10-CM

## 2024-08-15 DIAGNOSIS — M54.17 LUMBOSACRAL RADICULOPATHY: ICD-10-CM

## 2024-08-15 PROCEDURE — 99999 PR PBB SHADOW E&M-EST. PATIENT-LVL III: CPT | Mod: PBBFAC,,, | Performed by: INTERNAL MEDICINE

## 2024-08-15 RX ORDER — OXYCODONE HYDROCHLORIDE 5 MG/1
5 TABLET ORAL EVERY 4 HOURS PRN
Qty: 30 TABLET | Refills: 0 | Status: SHIPPED | OUTPATIENT
Start: 2024-08-15

## 2024-08-15 NOTE — PROGRESS NOTES
"Palliative Medicine Clinic Note        [facility]      Reason for Visit: Pain and constipation as well as PD.    Chief Complaint:   Chief Complaint   Patient presents with    Pain        ASSESSMENT/PLAN:      Plan/Recommendations:    Hector Fountain" was seen today for pain.    Diagnoses and all orders for this visit:    Parkinson's disease with dyskinesia, unspecified whether manifestations fluctuate  doing well. High risk for falls  Lumbosacral radiculopathy  -     oxyCODONE (ROXICODONE) 5 MG immediate release tablet; Take 1 tablet (5 mg total) by mouth every 4 (four) hours as needed for Pain.  Patient has had more sciatica symptoms and has been taking a whole tablet instead of 1/2. Will provide increased dose with refills.  Chronic idiopathic constipation  Encourage use of Miralax and senna to soften and push stool out.      Advance Care Planning   Advance Directives:   Medical Power of : Yes    Agent's Name:  Eileen Kellogg   Agent's Contact Number:  [phone]    Decision Making:  Patient answered questions and Family answered questions  Goals of Care: What is most important right now is to focus on remaining as independent as possible, symptom/pain control, quality of life, even if it means sacrificing a little time. Accordingly, we have decided that the best plan to meet the patient's goals includes continuing with treatment.         Follow up: 3 months     Plan discussed with: wife and daughter.    SUBJECTIVE:      History of Present Illness / Interval History:  Hector Kellogg is 79 y.o. male with PD and chronic back pain.  Presents to Palliative Care Clinic for physical symptoms and additional support. Please see 03/14/2024 note for more details on history and plan    8/15/24  History obtained from: wife/daughter and patient.    ROS:  Review of Systems   Constitutional:  Positive for fatigue.   Musculoskeletal:  Positive for back pain.       Review of " Symptoms      Symptom Assessment (ESAS 0-10 Scale)  Pain:  7  Dyspnea:  0  Anxiety:  0  Nausea:  0  Depression:  0  Anorexia:  0  Fatigue:  6  Insomnia:  0  Restlessness:  0  Agitation:  0     CAM / Delirium:  Negative  Constipation:  Positive  Diarrhea:  Negative      Bowel Management Plan (BMP):  Yes      Pain Assessment:  OME in 24 hours:  10  Location(s): back    Back       Location: left        Quality: Burning        Quantity: 7/10 in intensity        Chronicity: Onset 2 year(s) ago, gradually worsening since Pain better with oxycodone 5 mg twice daily up from 2.5 mg twice daily.        Aggravating Factors: Standing        Alleviating Factors: Opiates       Associated Symptoms: None    Modified [place] Scale:  0    Performance Status:  70    Living Arrangements:  Lives with family    Psychosocial/Cultural:   See Palliative Psychosocial Note: No  Lives with wife/daughter nearby.  Retired.  **Primary  to Follow**  Palliative Care  Consult: No    Spiritual:  F - Patricia and Belief:  Hoahaoism  I - Importance:  Yes  C - Community:  Yes  A - Address in Care:  Yes        Medications:    Current Outpatient Medications:     albuterol (PROVENTIL/VENTOLIN HFA) 90 mcg/actuation inhaler, INHALE 2 PUFFS BY MOUTH EVERY 6 HOURS AS NEEDED FOR WHEEZING FOR SHORTNESS OF BREATH, Disp: 9 g, Rfl: 11    amLODIPine (NORVASC) 5 MG tablet, Take 1 tablet (5 mg total) by mouth once daily., Disp: 30 tablet, Rfl: 11    aspirin (ECOTRIN) 81 MG EC tablet, Take 81 mg by mouth once daily., Disp: , Rfl:     BREZTRI AEROSPHERE 160-9-4.8 mcg/actuation HFAA, Inhale 2 puffs into the lungs 2 (two) times a day., Disp: 10.7 g, Rfl: 11    carbidopa-levodopa  mg (SINEMET)  mg per tablet, Take 1.5 tablets by mouth 5 (five) times daily., Disp: 675 tablet, Rfl: 3    citalopram (CELEXA) 20 MG tablet, Take 1 tablet by mouth once daily, Disp: 90 tablet, Rfl: 3    ezetimibe (ZETIA) 10 mg tablet, Take 1 tablet (10 mg total) by  mouth once daily., Disp: 90 tablet, Rfl: 3    ibuprofen (ADVIL,MOTRIN) 800 MG tablet, Take 800 mg by mouth as needed., Disp: , Rfl:     loratadine (CLARITIN) 10 mg tablet, Take 10 mg by mouth as needed for Allergies., Disp: , Rfl:     meloxicam (MOBIC) 7.5 MG tablet, Take 1 tablet (7.5 mg total) by mouth once daily., Disp: 30 tablet, Rfl: 3    metoprolol succinate (TOPROL-XL) 50 MG 24 hr tablet, Take 1 tablet by mouth once daily, Disp: 90 tablet, Rfl: 2    multivitamin capsule, Take 1 capsule by mouth once daily., Disp: , Rfl:     potassium citrate (UROCIT-K 10) 10 mEq (1,080 mg) TbSR, Take 1 tablet (10 mEq total) by mouth 2 (two) times daily with meals., Disp: 180 tablet, Rfl: 3    rosuvastatin (CRESTOR) 40 MG Tab, TAKE 1 TABLET BY MOUTH NIGHTLY AT BEDTIME, Disp: 90 tablet, Rfl: 1    oxyCODONE (ROXICODONE) 5 MG immediate release tablet, Take 1 tablet (5 mg total) by mouth every 4 (four) hours as needed for Pain., Disp: 30 tablet, Rfl: 0    External  database queried on 08/15/2024  by Keila VILLEDA :    Review of patient's allergies indicates:   Allergen Reactions    Influenza virus vaccines Other (See Comments)     Caused fever, chills, and made tremors worse        OBJECTIVE:      Physical Exam:  Vitals: Pulse: 66 (08/15/24 0902)  BP: (!) 120/59 (08/15/24 0902)  SpO2: (P) 98 % (08/15/24 0902)    Physical Exam  Cardiovascular:      Rate and Rhythm: Normal rate.   Neurological:      General: No focal deficit present.      Mental Status: He is alert.         Labs:none    Imaging: none     I spent a total of 60 minutes on the day of the visit. This includes face to face time in discussion of goals of care, symptom assessment, coordination of care and emotional support.  This also includes non-face to face time preparing to see the patient (eg, review of tests/imaging), obtaining and/or reviewing separately obtained history, documenting clinical information in the electronic or other health record,  independently interpreting results and communicating results to the patient/family/caregiver, or care coordinator.         Keila Jones MD

## 2024-09-04 ENCOUNTER — LAB VISIT (OUTPATIENT)
Dept: LAB | Facility: HOSPITAL | Age: 79
End: 2024-09-04
Payer: MEDICARE

## 2024-09-04 ENCOUNTER — OFFICE VISIT (OUTPATIENT)
Dept: INTERNAL MEDICINE | Facility: CLINIC | Age: 79
End: 2024-09-04
Payer: MEDICARE

## 2024-09-04 VITALS
RESPIRATION RATE: 16 BRPM | HEIGHT: 64 IN | HEART RATE: 59 BPM | BODY MASS INDEX: 21.07 KG/M2 | TEMPERATURE: 98 F | OXYGEN SATURATION: 95 % | WEIGHT: 123.44 LBS

## 2024-09-04 DIAGNOSIS — N30.00 ACUTE CYSTITIS WITHOUT HEMATURIA: Primary | ICD-10-CM

## 2024-09-04 DIAGNOSIS — N30.00 ACUTE CYSTITIS WITHOUT HEMATURIA: ICD-10-CM

## 2024-09-04 DIAGNOSIS — M70.21 OLECRANON BURSITIS OF RIGHT ELBOW: ICD-10-CM

## 2024-09-04 DIAGNOSIS — R35.1 NOCTURIA: ICD-10-CM

## 2024-09-04 LAB
BACTERIA #/AREA URNS AUTO: ABNORMAL /HPF
BILIRUB UR QL STRIP: NEGATIVE
CLARITY UR REFRACT.AUTO: ABNORMAL
COLOR UR AUTO: YELLOW
GLUCOSE UR QL STRIP: NEGATIVE
HGB UR QL STRIP: ABNORMAL
HYALINE CASTS UR QL AUTO: 21 /LPF
KETONES UR QL STRIP: ABNORMAL
LEUKOCYTE ESTERASE UR QL STRIP: ABNORMAL
MICROSCOPIC COMMENT: ABNORMAL
NITRITE UR QL STRIP: NEGATIVE
PH UR STRIP: 6 [PH] (ref 5–8)
PROT UR QL STRIP: ABNORMAL
RBC #/AREA URNS AUTO: 26 /HPF (ref 0–4)
SP GR UR STRIP: 1.02 (ref 1–1.03)
SQUAMOUS #/AREA URNS AUTO: 1 /HPF
URN SPEC COLLECT METH UR: ABNORMAL
WBC #/AREA URNS AUTO: 37 /HPF (ref 0–5)

## 2024-09-04 PROCEDURE — 1126F AMNT PAIN NOTED NONE PRSNT: CPT | Mod: CPTII,S$GLB,, | Performed by: NURSE PRACTITIONER

## 2024-09-04 PROCEDURE — 81001 URINALYSIS AUTO W/SCOPE: CPT | Performed by: NURSE PRACTITIONER

## 2024-09-04 PROCEDURE — 99999 PR PBB SHADOW E&M-EST. PATIENT-LVL IV: CPT | Mod: PBBFAC,,, | Performed by: NURSE PRACTITIONER

## 2024-09-04 PROCEDURE — 1100F PTFALLS ASSESS-DOCD GE2>/YR: CPT | Mod: CPTII,S$GLB,, | Performed by: NURSE PRACTITIONER

## 2024-09-04 PROCEDURE — 1160F RVW MEDS BY RX/DR IN RCRD: CPT | Mod: CPTII,S$GLB,, | Performed by: NURSE PRACTITIONER

## 2024-09-04 PROCEDURE — 1159F MED LIST DOCD IN RCRD: CPT | Mod: CPTII,S$GLB,, | Performed by: NURSE PRACTITIONER

## 2024-09-04 PROCEDURE — 99214 OFFICE O/P EST MOD 30 MIN: CPT | Mod: S$GLB,,, | Performed by: NURSE PRACTITIONER

## 2024-09-04 PROCEDURE — 87086 URINE CULTURE/COLONY COUNT: CPT | Performed by: NURSE PRACTITIONER

## 2024-09-04 PROCEDURE — 3288F FALL RISK ASSESSMENT DOCD: CPT | Mod: CPTII,S$GLB,, | Performed by: NURSE PRACTITIONER

## 2024-09-04 NOTE — PROGRESS NOTES
Subjective     Patient ID: Hector Kellogg is a 79 y.o. male.    Chief Complaint: Urinary Tract Infection (Possible recheck), Follow-up, Elbow Problem (Right elbow. Fluid buildup, 2x week), and Medication Problem    Pt with dx of Parkinson's, hypertension, hyperlipidemia that presents today with his daughter for UTI follow up. Pt was seen at the ER for worsening confusion on 8/26. Urine suggested UTI. He was subsequently discharged home on Cipro which he completed day before yesterday. Of noted, UC did not show any significant growth. Daughter states that pt's mentation is now back to baseline however, he continues to have ongoing nocturia. Hx of BPH.    His daughter also noticed a lump on the R elbow a few days ago but she is uncertain how long it's been present. Pt also cannot confirm. He denies any pain or redness to site.     Urinary Tract Infection     Follow-up    Review of Systems   All other systems reviewed and are negative.     Objective   Physical Exam  Vitals reviewed.   Constitutional:       Appearance: Normal appearance.   HENT:      Head: Normocephalic and atraumatic.      Nose: Nose normal.      Mouth/Throat:      Mouth: Mucous membranes are moist.   Eyes:      Pupils: Pupils are equal, round, and reactive to light.   Cardiovascular:      Rate and Rhythm: Normal rate and regular rhythm.      Heart sounds: Normal heart sounds.   Pulmonary:      Effort: Pulmonary effort is normal.      Breath sounds: Normal breath sounds.   Abdominal:      General: Abdomen is flat. Bowel sounds are normal.      Palpations: Abdomen is soft.   Musculoskeletal:         General: Normal range of motion.        Arms:       Cervical back: Normal range of motion.   Skin:     General: Skin is warm and dry.   Neurological:      General: No focal deficit present.      Mental Status: He is alert and oriented to person, place, and time.   Psychiatric:         Mood and Affect: Mood normal.         Behavior: Behavior normal.         Assessment and Plan   1. Acute cystitis without hematuria  Assessment & Plan:  -Last urine was negative for any bacterial growth  -Advised that we usually do not test for cure for UTIs but will proceed due to request  -They will be contacted with result.     Orders:  -     Urinalysis; Future; Expected date: 09/04/2024  -     Urine culture; Future; Expected date: 09/04/2024    2. Olecranon bursitis of right elbow  Assessment & Plan:  -Discussed pathophysiology   -Recommend warm compression and compression  -Offered other referral but daughter deferred for now. She states she has friends who will be able to help her.   -Discussed that it can become infected which would require immediate intervention      3. Nocturia  Assessment & Plan:  Recommend f/u with urology for further evaluation and management.       RTC PRN

## 2024-09-04 NOTE — ASSESSMENT & PLAN NOTE
-Discussed pathophysiology   Treatment includes:  Rest: Avoid activities that put pressure on the elbow, such as leaning on it.    Ice: Apply an ice pack or bag of frozen peas wrapped in a towel to the area for about 10 minutes, every few hours.    Medication: Take over-the-counter pain relievers like acetaminophen (Tylenol), ibuprofen (Advil, Motrin), or naproxen (Aleve).  (on Meloxicam, which he should continue)  Bandages or splint: Wear a splint or brace to immobilize the elbow and help it heal.      -Offered other referral to ortho but daughter deferred for now. She states she has friends who will be able to help.  -Discussed that it can become infected which would require immediate intervention

## 2024-09-04 NOTE — ASSESSMENT & PLAN NOTE
-Last urine was negative for any bacterial growth  -Advised that we usually do not test for cure for UTIs but will proceed due to request  -They will be contacted with result.

## 2024-09-05 DIAGNOSIS — G20.A1 PARKINSON DISEASE: ICD-10-CM

## 2024-09-05 DIAGNOSIS — M48.061 STENOSIS OF LATERAL RECESS OF LUMBAR SPINE: ICD-10-CM

## 2024-09-05 LAB — BACTERIA UR CULT: NO GROWTH

## 2024-09-06 RX ORDER — CARBIDOPA AND LEVODOPA 25; 100 MG/1; MG/1
TABLET ORAL
Qty: 675 TABLET | Refills: 3 | Status: SHIPPED | OUTPATIENT
Start: 2024-09-06

## 2024-09-11 ENCOUNTER — TELEPHONE (OUTPATIENT)
Dept: UROLOGY | Facility: CLINIC | Age: 79
End: 2024-09-11
Payer: MEDICARE

## 2024-09-11 NOTE — TELEPHONE ENCOUNTER
Appointment scheduled.  ----- Message from Alexsandra Washington sent at 9/10/2024  4:39 PM CDT -----  Regarding: Appt Access  Contact: [phone]  Kelly  calling to request appt for patient with reoccurring UTI. This is a patient of Dr. Cullen, looking to change providers due to location . Pls call

## 2024-09-17 NOTE — PROGRESS NOTES
"Subjective:       Hector Kellogg is a 79 y.o. male who is a new patient who was seen  for evaluation of stones/UTI.      He is a pt of Dr Cullen, last seen 5/2024. Prior treatment of large >3cm stone burden in L kidney. He has had multiple stone procedures. Last procedure was L ESWL for large L renal pelvic stone. Due to cognitive issues after anesthesia, further stone procedures were held. On Urocit-K. Last CT 5/2024 with significant LLP stone burden.  Planned for repeat CT in 5/2025.     Previously treated for UTI due to AMS with dysuria. UCx negative at time. UCx this month again negative. Multiple neg UCx this year.     ++nocturia. Weaker stream. Previously on Flomax and Proscar - noted hallucinations (suspected from Proscar) and medication stopped. Does not drink much during day. No LE edema. No snoring/PILI.     Also with elevating PSA - 3.9 to 6.2, last checked >1 year ago.      Family assists with much of history today.    PVR (bladder scan) today - 22cc      CT 5/2024    PSA:  6/22 - 3.9  7/23 - 6.2 (nl < 6.5)      UCx:  11/23, 2/24, 4/24 - neg  6/24 - mixed  8/24, 9/24 - neg    The following portions of the patient's history were reviewed and updated as appropriate: allergies, current medications, past family history, past medical history, past social history, past surgical history and problem list.    Review of Systems  Twelve point review of systems completed. Pertinent positive and negatives listed in HPI.      Objective:    Vitals: BP 95/60 (BP Location: Left arm, Patient Position: Sitting, BP Method: Small (Automatic))   Pulse 73   Ht 5' 4" (1.626 m)   Wt 56 kg (123 lb 7.3 oz)   SpO2 97%   BMI 21.19 kg/m²     Physical Exam   General: well developed, well nourished in no acute distress  Head: normocephalic, atraumatic  Neck: no obvious enlargement of thyroid  HEENT: EOMI, mucus membranes moist, sclera anicteric, no hearing impairment  Lungs: symmetric expansion, non-labored breathing  Neuro: " "alert  Psych: normal judgment and insight, normal mood/affect and non-anxious  Genitourinary:   deferred   CLINTON: deferred      Lab Review   Urine analysis today in clinic shows +nitrites, +protein, +blood     Lab Results   Component Value Date    WBC 6.81 08/26/2024    HGB 13.5 (L) 08/26/2024    HCT 41.8 08/26/2024    MCV 97 08/26/2024     08/26/2024     Lab Results   Component Value Date    CREATININE 0.9 08/26/2024    BUN 21 08/26/2024     Lab Results   Component Value Date    PSA 6.2 (H) 07/14/2023     No results found for: "PSADIAG"    Imaging  CT reviewed  Reports and images were personally reviewed by me and discussed with the patient today         Assessment/Plan:      1. Recurrent UTI    - Recent UCx all negative. UA with LE and RBCs that lead to UTI treatment though subsequently negative UCx.   - Agree with D-mannose   - Hydrate   - Avoid constipation   - Prior cystoscopy 2023 (Dr Cullen)     2. Microhematuria    - Prior evaluation   - Known large LLP stones     3. Nephrolithiasis    - Known LLP stones. Prior treatment attempts with difficulty post-op due to anesthesia.   - Hoping to avoid further procedures.   - Currently without obstruction   - On Urocit-K, okay to continue     4. Nocturia    - Reduce PM fluids   - Trial Uroxatral. Discussed SE.     5. Elevated PSA    - Increasing PSA. Consider repeat check.    - High normal     6. BPH with urinary obstruction     - Trial Uroxatral       Follow up in 3 months    Visit today included increased complexity associated with the care of the episodic problem stones, nocturia addressed and managing the longitudinal care of the patient due to the serious and/or complex managed problem(s) stones,nocturia.         "

## 2024-09-18 ENCOUNTER — OFFICE VISIT (OUTPATIENT)
Dept: UROLOGY | Facility: CLINIC | Age: 79
End: 2024-09-18
Attending: UROLOGY
Payer: MEDICARE

## 2024-09-18 VITALS
HEIGHT: 64 IN | SYSTOLIC BLOOD PRESSURE: 95 MMHG | DIASTOLIC BLOOD PRESSURE: 60 MMHG | BODY MASS INDEX: 21.07 KG/M2 | HEART RATE: 73 BPM | WEIGHT: 123.44 LBS | OXYGEN SATURATION: 97 %

## 2024-09-18 DIAGNOSIS — R35.1 NOCTURIA: ICD-10-CM

## 2024-09-18 DIAGNOSIS — R97.20 ELEVATED PSA: ICD-10-CM

## 2024-09-18 DIAGNOSIS — N20.0 NEPHROLITHIASIS: ICD-10-CM

## 2024-09-18 DIAGNOSIS — N39.0 RECURRENT UTI: Primary | ICD-10-CM

## 2024-09-18 DIAGNOSIS — N40.1 BPH WITH URINARY OBSTRUCTION: ICD-10-CM

## 2024-09-18 DIAGNOSIS — R31.29 MICROHEMATURIA: ICD-10-CM

## 2024-09-18 DIAGNOSIS — N13.8 BPH WITH URINARY OBSTRUCTION: ICD-10-CM

## 2024-09-18 LAB
BILIRUB SERPL-MCNC: NORMAL MG/DL
BLOOD URINE, POC: 50
CLARITY, POC UA: CLEAR
COLOR, POC UA: NORMAL
GLUCOSE UR QL STRIP: NORMAL
KETONES UR QL STRIP: NORMAL
LEUKOCYTE ESTERASE URINE, POC: NORMAL
NITRITE, POC UA: NORMAL
PH, POC UA: 6
POC RESIDUAL URINE VOLUME: 22 ML (ref 0–100)
PROTEIN, POC: NORMAL
SPECIFIC GRAVITY, POC UA: 1.02
UROBILINOGEN, POC UA: NORMAL

## 2024-09-18 PROCEDURE — 1126F AMNT PAIN NOTED NONE PRSNT: CPT | Mod: CPTII,S$GLB,, | Performed by: UROLOGY

## 2024-09-18 PROCEDURE — 3074F SYST BP LT 130 MM HG: CPT | Mod: CPTII,S$GLB,, | Performed by: UROLOGY

## 2024-09-18 PROCEDURE — G2211 COMPLEX E/M VISIT ADD ON: HCPCS | Mod: S$GLB,,, | Performed by: UROLOGY

## 2024-09-18 PROCEDURE — 51798 US URINE CAPACITY MEASURE: CPT | Mod: S$GLB,,, | Performed by: UROLOGY

## 2024-09-18 PROCEDURE — 81002 URINALYSIS NONAUTO W/O SCOPE: CPT | Mod: S$GLB,,, | Performed by: UROLOGY

## 2024-09-18 PROCEDURE — 1101F PT FALLS ASSESS-DOCD LE1/YR: CPT | Mod: CPTII,S$GLB,, | Performed by: UROLOGY

## 2024-09-18 PROCEDURE — 1159F MED LIST DOCD IN RCRD: CPT | Mod: CPTII,S$GLB,, | Performed by: UROLOGY

## 2024-09-18 PROCEDURE — 3078F DIAST BP <80 MM HG: CPT | Mod: CPTII,S$GLB,, | Performed by: UROLOGY

## 2024-09-18 PROCEDURE — 99214 OFFICE O/P EST MOD 30 MIN: CPT | Mod: S$GLB,,, | Performed by: UROLOGY

## 2024-09-18 PROCEDURE — 3288F FALL RISK ASSESSMENT DOCD: CPT | Mod: CPTII,S$GLB,, | Performed by: UROLOGY

## 2024-09-18 PROCEDURE — 1160F RVW MEDS BY RX/DR IN RCRD: CPT | Mod: CPTII,S$GLB,, | Performed by: UROLOGY

## 2024-09-18 RX ORDER — ALFUZOSIN HYDROCHLORIDE 10 MG/1
10 TABLET, EXTENDED RELEASE ORAL
Qty: 30 TABLET | Refills: 11 | Status: SHIPPED | OUTPATIENT
Start: 2024-09-18 | End: 2025-09-18

## 2024-09-29 ENCOUNTER — PATIENT MESSAGE (OUTPATIENT)
Dept: PALLIATIVE MEDICINE | Facility: CLINIC | Age: 79
End: 2024-09-29
Payer: MEDICARE

## 2024-10-01 ENCOUNTER — PATIENT MESSAGE (OUTPATIENT)
Dept: INTERNAL MEDICINE | Facility: CLINIC | Age: 79
End: 2024-10-01
Payer: MEDICARE

## 2024-10-01 DIAGNOSIS — M54.17 LUMBOSACRAL RADICULOPATHY: ICD-10-CM

## 2024-10-01 RX ORDER — OXYCODONE HYDROCHLORIDE 5 MG/1
5 TABLET ORAL EVERY 4 HOURS PRN
Qty: 30 TABLET | Refills: 0 | Status: SHIPPED | OUTPATIENT
Start: 2024-10-01

## 2024-10-02 ENCOUNTER — PATIENT MESSAGE (OUTPATIENT)
Dept: PALLIATIVE MEDICINE | Facility: CLINIC | Age: 79
End: 2024-10-02
Payer: MEDICARE

## 2024-10-06 ENCOUNTER — PATIENT MESSAGE (OUTPATIENT)
Dept: PALLIATIVE MEDICINE | Facility: CLINIC | Age: 79
End: 2024-10-06
Payer: MEDICARE

## 2024-10-08 ENCOUNTER — TELEPHONE (OUTPATIENT)
Dept: PALLIATIVE MEDICINE | Facility: CLINIC | Age: 79
End: 2024-10-08
Payer: MEDICARE

## 2024-10-08 NOTE — TELEPHONE ENCOUNTER
Placed call to patient's daughter Kelly to reply to [employer] message to Dr. Jones regarding future care for patient. Kelly is wondering if patient is experiencing dementia as he has episodes of forgetfulness and wandering. Kelly stated patient seems better today. Kelly wants to know if there is testing that can be done to determine if patient has dementia or another diagnosis. Kelly also wants to go over patient's medications to ensure they are all effective. Informed Kelly if patient is experiencing dementia there will be good days and bad days. This  will inform Dr. Jones of Kelly's concerns.

## 2024-10-09 ENCOUNTER — PATIENT MESSAGE (OUTPATIENT)
Dept: PALLIATIVE MEDICINE | Facility: CLINIC | Age: 79
End: 2024-10-09
Payer: MEDICARE

## 2024-10-15 ENCOUNTER — PATIENT MESSAGE (OUTPATIENT)
Dept: UROLOGY | Facility: CLINIC | Age: 79
End: 2024-10-15
Payer: MEDICARE

## 2024-10-26 DIAGNOSIS — E78.00 PURE HYPERCHOLESTEROLEMIA: ICD-10-CM

## 2024-10-27 ENCOUNTER — PATIENT MESSAGE (OUTPATIENT)
Dept: PALLIATIVE MEDICINE | Facility: CLINIC | Age: 79
End: 2024-10-27
Payer: MEDICARE

## 2024-10-28 DIAGNOSIS — M54.17 LUMBOSACRAL RADICULOPATHY: ICD-10-CM

## 2024-10-28 RX ORDER — EZETIMIBE 10 MG/1
10 TABLET ORAL
Qty: 90 TABLET | Refills: 0 | Status: SHIPPED | OUTPATIENT
Start: 2024-10-28

## 2024-10-28 RX ORDER — OXYCODONE HYDROCHLORIDE 5 MG/1
5 TABLET ORAL EVERY 4 HOURS PRN
Qty: 120 TABLET | Refills: 0 | Status: SHIPPED | OUTPATIENT
Start: 2024-10-28

## 2024-11-06 NOTE — PROGRESS NOTES
Name: Hector Kellogg  MRN: [id number]   CSN: [id number]      Date: 11/06/2024    Chief Complaint / Interval History:   - cd/ld 1.5 tabs five times daily    - here with family   - in wheelchair more   - several falls since last visit   - stumbling more, using wheelchair more    - gets pain in his legs, feels like muscles get really tight    - not with offs or ons   - not necessarily bothered by dyskinesia   - dr. Jones is managing pain, not sure if it is helping   - has not tried a muscle relaxer   - no longer taking namenda, maybe felt like he was more confused   - still has hallucinations  - worsening irritability   - citalopram  - family helps with all adls   - last dose of cd/ld was at 1030 am           March 2024  - he and family agree he is a bit better  - palliative care, goes to them, has VV  - taking mobic and oxycodone  - more comfortable and less parkinsonian  - burning with peeing, will check  - more forgetfulness, mostly short term and dates - will fluctuate      From Dec 2023  - [facility] one month ago  - ibuprofen was making BP go up   - cardiology added amlodipine and now running lower   - here with spouse and daughter   - doing PT, twice a week    - has been helpful with balance   - no longer taking lyrica, did not help  - no hallucinations   - they have not heard from palliative care   - scheduled to see Dr. Cullen in February   - pain is the most bothersome to him   - cd/ld 25/100 1.5 tabs five times daily           From May 2023  - cd/ld 1.5 in the morning and 1.5 at night, other three doses are 1 tab   - accompanied by spouse   - asks about gabapentin and memory   - previously taking 800 mg TID, wife cut back to 400 mg TID   - he feels a bit better, more clear of mind on decrease   - he has had about 5 falls due to festination of gait and goes forward   - doesn't feel lightheaded or dizzy   - sometimes gets flashes or feeling like someone is in his peripheral vision   - last dose of ldopa was at 730  am   - urinary urgency, cannot get to the bathroom on time             From Feb 2023  - more imbalance, more shuffling, 2 times to the ground, more near falls, no injuries  - showers, has seat for safety   - appetite is good, sleep is disrupted by dogs and cats, but sleeps pretty well, up twice to urinate  - more holding on and use of walker  - now on 1 tab CD/LD 5x/day, usually 8:30-11:30...every three hours  -      From 10-22:  Hx:  - last seen 7/6/22 by Yvette  --- cd/ld  mg 1.5 tablet TID unchanged  --- gabapentin 400 mg TID, but doubtful of efficacy re: pain  --- offered rasagiline 1 mg, but ultimately declined d/t cost as of last contact  - pain worsening at last visit and negatively affecting gait, grade 1 anterolisesthesis of spine noted in imaging  --- unresponsive to cd/ld and attributable in main to the above    From Jul 2022:  - last seen July 2020  - cd/ld 25/100 1.5 tabs TID -- 9 am, 2 pm and 7 pm    - tremors gets better but does not completely go away  - meds possibly wear off after about 3 hours   - biggest issue is pain, starts in the hip and radiates down his leg   - takes gabapentin 400 mg TID, does not feel that this helps   - festination of gait in the morning   - can't walk far   - had lumbar laminectomy in Feb 2021 has not followed up with pain management or nsgy  - did not feel that pain management helped   - pain is getting worse, it has been going on for about a year -- can't walk due to the pain   - does not feel that the pain improves with sinemet   - no new weight changes, tries to wear loose pants         From Sept 2019:  - feels like he cannot do things  - not apathy, wants to work or do things  - lung issue are limiting him from COPD  - still taking cd/ld 25/100 1 TID-5x/day  - memory is ok  - appetite is good  - no constipation now  - bladder is quick    From Oct 2018:  - still getting pinched nerve pain in the back or legs  - will take an extra gabapentin  - takes cd/ld every  "3-4 hours  - gait is stable    From Summer 2017:  - a bit more off time between dosing  - peak is pretty good  - gait is frustrating  - memory holding up  - family pleased    From 12/06  1) A bit more off time in between dosing  2) More cramping when off, gabapentin helps at night  3) Would be willing to take meds four times per day    History of Present Illness (HPI):  70 yo with diagnosis of PD, symptoms began 1.5 years ago.  First noted tremor in the L hand, "all left side".  Drags his left foot, generally slower overall.  Dx 1 year ago, had [employer] as a confirmatory study.  Says he had an MRI of the low back as well, was feeling "pinched nerves" in the back and into the both legs.  Was prescribed "some drug that cost $400" and didn;t take. Also prescribed pramipexole per the notes but also said he "never took it."    Was in [place] for 2 years, now re-establishing care here.    Has questions about gabapentin - 800 now taking 1/2 tab at night only and has no significant pain.  Will ibuprofen for breakthrough.  Not taking Norco.    Retired , "[employer] cara, left at age 65.    Nonmotor/Premotor ROS:  Hyposmia (HENT)?No per him  RBD/sleep issues (Constitutional)?Yes - acts out dreams  Depression/anxiety (Psychiatric)?No - better on Citalopram  Fatigue (Constitutional)?Yes  Constipation (GI)?Yes  - uses OTC stool softener  Urinary issues ()?Yes - urgency  Sexual dysfunction ()?Yes - some ED.    Orthostasis (Cardiovascular)?No  Leg swelling (Cardiovascular)? No  Falls (Musculoskeletal)?No  Cognitive impairment (Neurologic)?No  Psychoses (Psychiatric)?No  Pain/Paresthesia (Neurologic)?Yes  Visual changes (Eyes)?No  Moles / skin changes (Skin)?Yes - seborrhea, has a scaly mole on the R side of the head  Stridor / SOB (Pulm)?Yes - with acticity  Bruising (Heme)?No    Past Medical History: The patient  has a past medical history of Cataract, Chronic back pain greater than 3 months duration, COPD (chronic obstructive " pulmonary disease), Coronary artery disease, DDD (degenerative disc disease), lumbar (05/16/2020), Glaucoma suspect of both eyes, Hyperlipidemia, Hypertension, MI, old (2006), Parkinson disease, and Renal disorder.    Social History: The patient  reports that he quit smoking about 10 years ago. His smoking use included cigarettes. He started smoking about 50 years ago. He has a 40 pack-year smoking history. He has been exposed to tobacco smoke. He has never used smokeless tobacco. He reports current alcohol use. He reports that he does not use drugs.  Rare social drink.    Family History: Their family history includes Cataracts in his father; Heart attack in his father; Heart disease in his maternal uncle and paternal uncle.    Allergies: Influenza virus vaccines     Meds:   Current Outpatient Medications on File Prior to Visit   Medication Sig Dispense Refill    albuterol (PROVENTIL/VENTOLIN HFA) 90 mcg/actuation inhaler INHALE 2 PUFFS BY MOUTH EVERY 6 HOURS AS NEEDED FOR WHEEZING FOR SHORTNESS OF BREATH 9 g 11    alfuzosin (UROXATRAL) 10 mg Tb24 Take 1 tablet (10 mg total) by mouth daily with breakfast. 30 tablet 11    amLODIPine (NORVASC) 5 MG tablet Take 1 tablet (5 mg total) by mouth once daily. 30 tablet 11    aspirin (ECOTRIN) 81 MG EC tablet Take 81 mg by mouth once daily.      BREZTRI AEROSPHERE 160-9-4.8 mcg/actuation HFAA Inhale 2 puffs into the lungs 2 (two) times a day. 10.7 g 11    carbidopa-levodopa  mg (SINEMET)  mg per tablet TAKE 1 & 1/2 (ONE & ONE-HALF) TABLETS BY MOUTH FIVE TIMES DAILY 675 tablet 3    citalopram (CELEXA) 20 MG tablet Take 1 tablet by mouth once daily 90 tablet 3    ezetimibe (ZETIA) 10 mg tablet Take 1 tablet by mouth once daily 90 tablet 0    ibuprofen (ADVIL,MOTRIN) 800 MG tablet Take 800 mg by mouth as needed.      loratadine (CLARITIN) 10 mg tablet Take 10 mg by mouth as needed for Allergies.      meloxicam (MOBIC) 7.5 MG tablet Take 1 tablet (7.5 mg total) by  mouth once daily. 30 tablet 3    metoprolol succinate (TOPROL-XL) 50 MG 24 hr tablet Take 1 tablet by mouth once daily 90 tablet 2    multivitamin capsule Take 1 capsule by mouth once daily.      oxyCODONE (ROXICODONE) 5 MG immediate release tablet Take 1 tablet (5 mg total) by mouth every 4 (four) hours as needed for Pain. 120 tablet 0    potassium citrate (UROCIT-K 10) 10 mEq (1,080 mg) TbSR Take 1 tablet (10 mEq total) by mouth 2 (two) times daily with meals. 180 tablet 3    rosuvastatin (CRESTOR) 40 MG Tab TAKE 1 TABLET BY MOUTH NIGHTLY AT BEDTIME 90 tablet 1     No current facility-administered medications on file prior to visit.     Exam:  There were no vitals taken for this visit.    Constitutional  Well-developed, well-nourished, appears stated age   * Specialized movement exam  Mild hypophonic speech.    Mild facial masking.   Automatism of the left arm   L>R cogwheel rigidity - mild     L>R bradykinesia - mild.   bilateral resting tremor, L > R   dystonic posturing of R foot-- mild dyskinesia    No other dystonia, chorea, athetosis, myoclonus, or tics.   No motor impersistence.   WC today     Laboratory/Radiological:  - Results:  Office Visit on 09/18/2024   Component Date Value Ref Range Status    Glucose, UA 09/18/2024 normal   Final    Bilirubin, POC 09/18/2024 neg   Final    Ketones, UA 09/18/2024 neg   Final    Spec Grav UA 09/18/2024 1.020   Final    Blood, UA 09/18/2024 50   Final    pH, UA 09/18/2024 6   Final    Protein, POC 09/18/2024 ++   Final    Urobilinogen, UA 09/18/2024 neg   Final    Nitrite, UA 09/18/2024 pos   Final    WBC, UA 09/18/2024 neg   Final    Color, UA 09/18/2024 Dark Yellow   Final    Clarity, UA 09/18/2024 Clear   Final    POC Residual Urine Volume 09/18/2024 22  0 - 100 mL Final   Lab Visit on 09/04/2024   Component Date Value Ref Range Status    Specimen UA 09/04/2024 Urine, Clean Catch   Final    Color, UA 09/04/2024 Yellow  Yellow, Straw, Yessenia Final    Appearance, UA  09/04/2024 Hazy (A)  Clear Final    pH, UA 09/04/2024 6.0  5.0 - 8.0 Final    Specific Gravity, UA 09/04/2024 1.020  1.005 - 1.030 Final    Protein, UA 09/04/2024 2+ (A)  Negative Final    Glucose, UA 09/04/2024 Negative  Negative Final    Ketones, UA 09/04/2024 1+ (A)  Negative Final    Bilirubin (UA) 09/04/2024 Negative  Negative Final    Occult Blood UA 09/04/2024 1+ (A)  Negative Final    Nitrite, UA 09/04/2024 Negative  Negative Final    Leukocytes, UA 09/04/2024 1+ (A)  Negative Final    Urine Culture, Routine 09/04/2024 No growth   Final    RBC, UA 09/04/2024 26 (H)  0 - 4 /hpf Final    WBC, UA 09/04/2024 37 (H)  0 - 5 /hpf Final    Bacteria 09/04/2024 Occasional  None-Occ /hpf Final    Squam Epithel, UA 09/04/2024 1  /hpf Final    Hyaline Casts, UA 09/04/2024 21 (A)  0-1/lpf /lpf Final    Microscopic Comment 09/04/2024 SEE COMMENT   Final   Admission on 08/26/2024, Discharged on 08/26/2024   Component Date Value Ref Range Status    WBC 08/26/2024 6.81  3.90 - 12.70 K/uL Final    RBC 08/26/2024 4.31 (L)  4.60 - 6.20 M/uL Final    Hemoglobin 08/26/2024 13.5 (L)  14.0 - 18.0 g/dL Final    Hematocrit 08/26/2024 41.8  40.0 - 54.0 % Final    MCV 08/26/2024 97  82 - 98 fL Final    MCH 08/26/2024 31.3 (H)  27.0 - 31.0 pg Final    MCHC 08/26/2024 32.3  32.0 - 36.0 g/dL Final    RDW 08/26/2024 13.2  11.5 - 14.5 % Final    Platelets 08/26/2024 167  150 - 450 K/uL Final    MPV 08/26/2024 10.1  9.2 - 12.9 fL Final    Immature Granulocytes 08/26/2024 0.3  0.0 - 0.5 % Final    Gran # (ANC) 08/26/2024 4.2  1.8 - 7.7 K/uL Final    Immature Grans (Abs) 08/26/2024 0.02  0.00 - 0.04 K/uL Final    Lymph # 08/26/2024 1.8  1.0 - 4.8 K/uL Final    Mono # 08/26/2024 0.7  0.3 - 1.0 K/uL Final    Eos # 08/26/2024 0.1  0.0 - 0.5 K/uL Final    Baso # 08/26/2024 0.02  0.00 - 0.20 K/uL Final    nRBC 08/26/2024 0  0 /100 WBC Final    Gran % 08/26/2024 61.3  38.0 - 73.0 % Final    Lymph % 08/26/2024 26.4  18.0 - 48.0 % Final    Mono %  08/26/2024 10.4  4.0 - 15.0 % Final    Eosinophil % 08/26/2024 1.3  0.0 - 8.0 % Final    Basophil % 08/26/2024 0.3  0.0 - 1.9 % Final    Differential Method 08/26/2024 Automated   Final    Specimen UA 08/26/2024 Urine, Clean Catch   Final    Color, UA 08/26/2024 Yellow  Yellow, Straw, Yessenia Final    Appearance, UA 08/26/2024 Clear  Clear Final    pH, UA 08/26/2024 7.0  5.0 - 8.0 Final    Specific Gravity, UA 08/26/2024 1.020  1.005 - 1.030 Final    Protein, UA 08/26/2024 1+ (A)  Negative Final    Glucose, UA 08/26/2024 Negative  Negative Final    Ketones, UA 08/26/2024 Trace (A)  Negative Final    Bilirubin (UA) 08/26/2024 Negative  Negative Final    Occult Blood UA 08/26/2024 Negative  Negative Final    Nitrite, UA 08/26/2024 Negative  Negative Final    Urobilinogen, UA 08/26/2024 2.0-3.0 (A)  Negative EU/dL Final    Leukocytes, UA 08/26/2024 1+ (A)  Negative Final    QRS Duration 08/26/2024 120  ms Final    [facility] QTC Calculation 08/26/2024 444  ms Final    Sodium 08/26/2024 139  136 - 145 mmol/L Final    Potassium 08/26/2024 4.4  3.5 - 5.1 mmol/L Final    Chloride 08/26/2024 106  95 - 110 mmol/L Final    CO2 08/26/2024 24  23 - 29 mmol/L Final    Glucose 08/26/2024 86  70 - 110 mg/dL Final    BUN 08/26/2024 21  8 - 23 mg/dL Final    Creatinine 08/26/2024 0.9  0.5 - 1.4 mg/dL Final    Calcium 08/26/2024 9.7  8.7 - 10.5 mg/dL Final    Total Protein 08/26/2024 6.7  6.0 - 8.4 g/dL Final    Albumin 08/26/2024 3.4 (L)  3.5 - 5.2 g/dL Final    Total Bilirubin 08/26/2024 0.6  0.1 - 1.0 mg/dL Final    Alkaline Phosphatase 08/26/2024 81  55 - 135 U/L Final    AST 08/26/2024 25  10 - 40 U/L Final    ALT 08/26/2024 8 (L)  10 - 44 U/L Final    eGFR 08/26/2024 >60.0  >60 mL/min/1.73 m^2 Final    Anion Gap 08/26/2024 9  8 - 16 mmol/L Final    CPK 08/26/2024 91  20 - 200 U/L Final    Troponin I 08/26/2024 0.010  0.000 - 0.026 ng/mL Final    Magnesium 08/26/2024 2.1  1.6 - 2.6 mg/dL Final    RBC, UA 08/26/2024 13 (H)  0 - 4 /hpf  Final    WBC, UA 2024 21 (H)  0 - 5 /hpf Final    Bacteria 2024 Rare  None-Occ /hpf Final    Hyaline Casts, UA 2024 4 (A)  0-1/lpf /lpf Final    Microscopic Comment 2024 SEE COMMENT   Final    Urine Culture, Routine 2024 No significant growth   Final     - Independent review of images: none available.    Reviewed records of:  1) Grade 1 anterolisthesis of spine  2) Positive SHAHRZAD scan    Diagnoses:          1) Idiopathic PD, tremor dominant.  On CD/LD.  2) Low back pain --> now worse.  C/W LSS and sciatica  3) urinary urgency   4) hypotension   5) hematuria     Medical Decision Makin) currently taking cd/ld 1.5 five times daily - more irritability, aggression, hallucinations  - decrease to 1 tab five times daily   - consider re-addition of namenda next   2) palliative care      This is a patient with a serious and complex neurologic diagnosis whose overall, ongoing care is being managed and monitored by me and our Neurology clinic.   As such, since ,  is the appropriate add-on code to accompany the other E/M billing for this visit.        Collaborating Physician, Dr. Klein, was available during today's encounter. Any change to plan along with cosign to appear in the EMR.       I spent 33 minutes with the patient, reviewing past encounters, labs and imaging.        Yvette Moore PA-C   [facility]  Department of Neurology  Movement Disorders

## 2024-11-07 ENCOUNTER — OFFICE VISIT (OUTPATIENT)
Dept: NEUROLOGY | Facility: CLINIC | Age: 79
End: 2024-11-07
Payer: MEDICARE

## 2024-11-07 VITALS
DIASTOLIC BLOOD PRESSURE: 58 MMHG | HEIGHT: 64 IN | BODY MASS INDEX: 21 KG/M2 | WEIGHT: 123 LBS | HEART RATE: 61 BPM | SYSTOLIC BLOOD PRESSURE: 118 MMHG

## 2024-11-07 DIAGNOSIS — Z71.89 COUNSELING REGARDING GOALS OF CARE: ICD-10-CM

## 2024-11-07 DIAGNOSIS — F06.8 PSYCHOSIS DUE TO PARKINSON'S DISEASE: ICD-10-CM

## 2024-11-07 DIAGNOSIS — G20.B2 PARKINSON'S DISEASE WITH DYSKINESIA AND FLUCTUATING MANIFESTATIONS: Primary | ICD-10-CM

## 2024-11-07 DIAGNOSIS — G20.A1 PSYCHOSIS DUE TO PARKINSON'S DISEASE: ICD-10-CM

## 2024-11-07 DIAGNOSIS — G89.4 CHRONIC PAIN SYNDROME: ICD-10-CM

## 2024-11-07 PROCEDURE — G2211 COMPLEX E/M VISIT ADD ON: HCPCS | Mod: S$GLB,,, | Performed by: PHYSICIAN ASSISTANT

## 2024-11-07 PROCEDURE — 1126F AMNT PAIN NOTED NONE PRSNT: CPT | Mod: CPTII,S$GLB,, | Performed by: PHYSICIAN ASSISTANT

## 2024-11-07 PROCEDURE — 1159F MED LIST DOCD IN RCRD: CPT | Mod: CPTII,S$GLB,, | Performed by: PHYSICIAN ASSISTANT

## 2024-11-07 PROCEDURE — 1160F RVW MEDS BY RX/DR IN RCRD: CPT | Mod: CPTII,S$GLB,, | Performed by: PHYSICIAN ASSISTANT

## 2024-11-07 PROCEDURE — 3078F DIAST BP <80 MM HG: CPT | Mod: CPTII,S$GLB,, | Performed by: PHYSICIAN ASSISTANT

## 2024-11-07 PROCEDURE — 3074F SYST BP LT 130 MM HG: CPT | Mod: CPTII,S$GLB,, | Performed by: PHYSICIAN ASSISTANT

## 2024-11-07 PROCEDURE — 99214 OFFICE O/P EST MOD 30 MIN: CPT | Mod: S$GLB,,, | Performed by: PHYSICIAN ASSISTANT

## 2024-11-07 PROCEDURE — 99999 PR PBB SHADOW E&M-EST. PATIENT-LVL III: CPT | Mod: PBBFAC,,, | Performed by: PHYSICIAN ASSISTANT

## 2024-11-21 ENCOUNTER — OFFICE VISIT (OUTPATIENT)
Dept: PALLIATIVE MEDICINE | Facility: CLINIC | Age: 79
End: 2024-11-21
Payer: MEDICARE

## 2024-11-21 VITALS
DIASTOLIC BLOOD PRESSURE: 55 MMHG | SYSTOLIC BLOOD PRESSURE: 108 MMHG | WEIGHT: 122.81 LBS | HEART RATE: 60 BPM | BODY MASS INDEX: 21.08 KG/M2

## 2024-11-21 DIAGNOSIS — J43.2 CENTRILOBULAR EMPHYSEMA: ICD-10-CM

## 2024-11-21 DIAGNOSIS — M54.17 LUMBOSACRAL RADICULOPATHY: ICD-10-CM

## 2024-11-21 DIAGNOSIS — F06.8 PSYCHOSIS DUE TO PARKINSON'S DISEASE: ICD-10-CM

## 2024-11-21 DIAGNOSIS — Z51.5 PALLIATIVE CARE BY SPECIALIST: ICD-10-CM

## 2024-11-21 DIAGNOSIS — R26.81 GAIT INSTABILITY: ICD-10-CM

## 2024-11-21 DIAGNOSIS — G89.29 CHRONIC BILATERAL LOW BACK PAIN WITHOUT SCIATICA: ICD-10-CM

## 2024-11-21 DIAGNOSIS — M54.50 CHRONIC BILATERAL LOW BACK PAIN WITHOUT SCIATICA: ICD-10-CM

## 2024-11-21 DIAGNOSIS — M62.838 MUSCLE SPASM OF BOTH LOWER LEGS: ICD-10-CM

## 2024-11-21 DIAGNOSIS — G20.B1 PARKINSON'S DISEASE WITH DYSKINESIA, UNSPECIFIED WHETHER MANIFESTATIONS FLUCTUATE: Primary | ICD-10-CM

## 2024-11-21 DIAGNOSIS — G20.A2 PARKINSON'S DISEASE WITHOUT DYSKINESIA, WITH FLUCTUATING MANIFESTATIONS: ICD-10-CM

## 2024-11-21 DIAGNOSIS — G20.A1 PSYCHOSIS DUE TO PARKINSON'S DISEASE: ICD-10-CM

## 2024-11-21 PROCEDURE — 99999 PR PBB SHADOW E&M-EST. PATIENT-LVL III: CPT | Mod: PBBFAC,,, | Performed by: INTERNAL MEDICINE

## 2024-11-21 RX ORDER — OXYCODONE HYDROCHLORIDE 5 MG/1
5 TABLET ORAL EVERY 4 HOURS PRN
Qty: 120 TABLET | Refills: 0 | Status: SHIPPED | OUTPATIENT
Start: 2024-11-21

## 2024-11-21 RX ORDER — MELOXICAM 7.5 MG/1
7.5 TABLET ORAL DAILY
Qty: 30 TABLET | Refills: 3 | Status: SHIPPED | OUTPATIENT
Start: 2024-11-21

## 2024-11-21 RX ORDER — METHOCARBAMOL 500 MG/1
500 TABLET, FILM COATED ORAL 4 TIMES DAILY
Qty: 40 TABLET | Refills: 0 | Status: SHIPPED | OUTPATIENT
Start: 2024-11-21 | End: 2024-12-01

## 2024-11-21 NOTE — PROGRESS NOTES
Palliative Medicine Clinic Note               Chief Complaint: Difficulty with function and thinking which is now better.       ASSESSMENT/PLAN:      Plan/Recommendations:    Diagnoses and all orders for this visit:    Parkinson's disease with dyskinesia, unspecified whether manifestations fluctuate  -     WALKER FOR HOME USE  Patient and his wife have recently moved back into their home.  It is smaller than they are daughter's home and he is able to furniture surf to get around.  He sometimes let us the Rollator he has get away from him.  I think it would be appropriate for him to get a U step and I think that will improve his walking stability.  Order has been sent to DME.  Psychosis due to Parkinson's disease  Greatly improved since there has been a reduction in his Parkinson's medicines.  They are grateful for this change.  He is sleeping better.  Functioning better.  Thinking better.  No hallucinations.  Emphysema  Followed by Dr. Briggs.  After discussing with the patient his wife and daughter it is unclear whether he is inhaling his MDI  Breztri and albuterol.  His granddaughter has and AeroChamber at home and will be getting it for patient to use.  Chronic bilateral low back pain without sciatica  His muscle spasm seems to be a bigger problem in his low back pain.  His daughter and wife are going to evaluate the use muscle relaxant for the muscle spasm in his legs versus the oxycodone.  They are to let me know.  I cautioned them that the muscle relaxant may make his mentation worse or his sleepiness!  Palliative care by specialist  Patient has improved.  Family wishes him to be as good as he can be for as long as he can be.  Patient expresses the same.  Muscle spasm of both lower legs  -     methocarbamoL (ROBAXIN) 500 MG Tab; Take 1 tablet (500 mg total) by mouth 4 (four) times daily. for 10 days  Trial muscle relaxants with or without oxycodone.  Daughter and I had a long discussion.  Parkinson's  disease without dyskinesia, with fluctuating manifestations  Doing better with dose adjustment Yvette Teresa  Lumbosacral radiculopathy  -     meloxicam (MOBIC) 7.5 MG tablet; Take 1 tablet (7.5 mg total) by mouth once daily.  -     oxyCODONE (ROXICODONE) 5 MG immediate release tablet; Take 1 tablet (5 mg total) by mouth every 4 (four) hours as needed for Pain.  Continue to monitor and provide the medication which improves his function without too many side effects.  Gait instability  -     WALKER FOR HOME USE  Mr. Artur Kellogg has Parkinson's disease with a severe walking problem that places her at heightened risk of morbidity or mortality without a walking aid such as a U Step.     A cane or crutch is not sufficient in preventing her from falling and injuring herself.     A standard walker is insufficient for preventing her from falling and injuring herself     I am prescribing the U-Step Neuro Walker ([facility] Code , produced by [employer]), because Mr. Kellogg has a severe neurological condition and requires this product to safely ambulate and prevent serious injury due to risk of falling;     His mobility deficit be sufficiently resolved by using a U-Step Neuro Walker.      Advance Care Planning   Advance Directives:   Living Will: No    LaPOST: No    Do Not Resuscitate Status: No    Medical Power of : Yes    Agent's Name:  Eileen Kellogg (Spouse) [phone]    Decision Making:  Patient answered questions and Family answered questions  Goals of Care: What is most important right now is to focus on remaining as independent as possible, symptom/pain control, quality of life, even if it means sacrificing a little time. Accordingly, we have decided that the best plan to meet the patient's goals includes continuing with treatment.    Documents in the chart.       Follow up:  Three months by video visit     Plan discussed with:  Patient daughter granddaughter wife    SUBJECTIVE:       History of Present Illness / Interval History:  Hector Kellogg is 79 y.o. male with improvement in his cognition with decrease of his Parkinson's medicines.  Still having muscle spasm in his leg which is not relieved with oxycodone.  Presents to Palliative Care Clinic for physical symptoms and additional support. Please see 12/14/2023 note for more details on all of his medical problems.    11/21/24  History obtained from:  Patient, wife, daughter.  Patient is much improved since his Parkinson's medicine have been decreased in dose.  His biggest problem at this point is muscle cramps in his thighs which is not relieved by oxycodone.  Does not report that it is coming from his lower back.  We had a long talk about muscle relaxants causing increased drowsiness and other side effects.  They are willing to try.  In addition we talked about his shortness of breath.  It is unclear to me and to the daughter whether he is actually inhaling his inhalers.  Talked about the use of an AeroChamber.  Granddaughter has 1 of those at home and he is going to try using that.  We also talked about his gait.  He walks with a Rollator at home or furniture surfs but the Rollator sometimes gets away from him.  I think he would do better with a U step.  I am writing a prescription for that.  ROS:  Review of Systems   Constitutional:  Positive for activity change and fatigue.   Psychiatric/Behavioral:  Negative for confusion.        Review of Symptoms      Symptom Assessment (ESAS 0-10 Scale)  Pain:  5  Dyspnea:  5  Anxiety:  0  Nausea:  0  Depression:  0  Anorexia:  0  Fatigue:  7  Insomnia:  7  Restlessness:  0  Agitation:  0     CAM / Delirium:  Negative  Constipation:  Negative  Diarrhea:  Negative      Bowel Management Plan (BMP):  Yes      Pain Assessment:  OME in 24 hours:  10  Location(s): back    Back       Location: left        Quality: Cramping        Quantity: 2/10 in intensity        Chronicity: Onset 6 month(s) ago,  gradually worsening since Trial of oxycodone has not helped much.  Now to try methocarbamol        Aggravating Factors: Activity        Alleviating Factors: None       Associated Symptoms: None    Modified [place] Scale:  0.5    Living Arrangements:  Lives with spouse    Psychosocial/Cultural:   See Palliative Psychosocial Note: No   lives with wife.  Children close  **Primary  to Follow**  Palliative Care  Consult: No    Spiritual:  F - Patricia and Belief:  [employer]  I - Importance:  Yes  C - Community:  Yes  A - Address in Care:  Yes        Medications:    Current Outpatient Medications:     albuterol (PROVENTIL/VENTOLIN HFA) 90 mcg/actuation inhaler, INHALE 2 PUFFS BY MOUTH EVERY 6 HOURS AS NEEDED FOR WHEEZING FOR SHORTNESS OF BREATH, Disp: 9 g, Rfl: 11    alfuzosin (UROXATRAL) 10 mg Tb24, Take 1 tablet (10 mg total) by mouth daily with breakfast., Disp: 30 tablet, Rfl: 11    aspirin (ECOTRIN) 81 MG EC tablet, Take 81 mg by mouth once daily., Disp: , Rfl:     BREZTRI AEROSPHERE 160-9-4.8 mcg/actuation HFAA, Inhale 2 puffs into the lungs 2 (two) times a day., Disp: 10.7 g, Rfl: 11    carbidopa-levodopa  mg (SINEMET)  mg per tablet, TAKE 1 & 1/2 (ONE & ONE-HALF) TABLETS BY MOUTH FIVE TIMES DAILY, Disp: 675 tablet, Rfl: 3    citalopram (CELEXA) 20 MG tablet, Take 1 tablet by mouth once daily, Disp: 90 tablet, Rfl: 3    ezetimibe (ZETIA) 10 mg tablet, Take 1 tablet by mouth once daily, Disp: 90 tablet, Rfl: 0    ibuprofen (ADVIL,MOTRIN) 800 MG tablet, Take 800 mg by mouth as needed., Disp: , Rfl:     loratadine (CLARITIN) 10 mg tablet, Take 10 mg by mouth as needed for Allergies., Disp: , Rfl:     metoprolol succinate (TOPROL-XL) 50 MG 24 hr tablet, Take 1 tablet by mouth once daily, Disp: 90 tablet, Rfl: 2    multivitamin capsule, Take 1 capsule by mouth once daily., Disp: , Rfl:     potassium citrate (UROCIT-K 10) 10 mEq (1,080 mg) TbSR, Take 1 tablet (10 mEq total) by mouth  2 (two) times daily with meals., Disp: 180 tablet, Rfl: 3    rosuvastatin (CRESTOR) 40 MG Tab, TAKE 1 TABLET BY MOUTH NIGHTLY AT BEDTIME, Disp: 90 tablet, Rfl: 1    amLODIPine (NORVASC) 5 MG tablet, Take 1 tablet (5 mg total) by mouth once daily., Disp: 30 tablet, Rfl: 11    meloxicam (MOBIC) 7.5 MG tablet, Take 1 tablet (7.5 mg total) by mouth once daily., Disp: 30 tablet, Rfl: 3    methocarbamoL (ROBAXIN) 500 MG Tab, Take 1 tablet (500 mg total) by mouth 4 (four) times daily. for 10 days, Disp: 40 tablet, Rfl: 0    oxyCODONE (ROXICODONE) 5 MG immediate release tablet, Take 1 tablet (5 mg total) by mouth every 4 (four) hours as needed for Pain., Disp: 120 tablet, Rfl: 0    External  database queried on 11/21/2024  by Keila VILLEDA :    Review of patient's allergies indicates:   Allergen Reactions    Influenza virus vaccines Other (See Comments)     Caused fever, chills, and made tremors worse        OBJECTIVE:      Physical Exam:  Vitals: Pulse: 60 (11/21/24 1001)  BP: (!) 108/55 (11/21/24 1001)  SpO2: (P) 98 % (11/21/24 1001)    Physical Exam  Constitutional:       Appearance: He is ill-appearing.   Cardiovascular:      Rate and Rhythm: Normal rate.   Pulmonary:      Effort: Pulmonary effort is normal.   Skin:     General: Skin is warm.   Neurological:      Mental Status: Mental status is at baseline.         Labs:Reviewed from August 24    Imaging: no new imaging.     I spent a total of 60 minutes on the day of the visit. This includes face to face time in discussion of goals of care, symptom assessment, coordination of care and emotional support.  This also includes non-face to face time preparing to see the patient (eg, review of tests/imaging), obtaining and/or reviewing separately obtained history, documenting clinical information in the electronic or other health record, independently interpreting results and communicating results to the patient/family/caregiver, or care coordinator.          Keila Jones MD

## 2024-11-27 ENCOUNTER — TELEPHONE (OUTPATIENT)
Dept: INTERNAL MEDICINE | Facility: CLINIC | Age: 79
End: 2024-11-27
Payer: MEDICARE

## 2024-11-27 DIAGNOSIS — E78.2 MIXED HYPERLIPIDEMIA: ICD-10-CM

## 2024-11-27 DIAGNOSIS — I10 ESSENTIAL HYPERTENSION: Primary | ICD-10-CM

## 2024-12-03 ENCOUNTER — PATIENT MESSAGE (OUTPATIENT)
Dept: PALLIATIVE MEDICINE | Facility: CLINIC | Age: 79
End: 2024-12-03
Payer: MEDICARE

## 2024-12-03 DIAGNOSIS — M62.838 MUSCLE SPASM OF BOTH LOWER LEGS: Primary | ICD-10-CM

## 2024-12-03 RX ORDER — LORAZEPAM 0.5 MG/1
0.5 TABLET ORAL NIGHTLY PRN
Qty: 30 TABLET | Refills: 0 | Status: SHIPPED | OUTPATIENT
Start: 2024-12-03 | End: 2025-01-02

## 2024-12-04 ENCOUNTER — OFFICE VISIT (OUTPATIENT)
Dept: UROLOGY | Facility: CLINIC | Age: 79
End: 2024-12-04
Attending: UROLOGY
Payer: MEDICARE

## 2024-12-04 VITALS
OXYGEN SATURATION: 100 % | WEIGHT: 122.81 LBS | DIASTOLIC BLOOD PRESSURE: 70 MMHG | TEMPERATURE: 98 F | BODY MASS INDEX: 20.97 KG/M2 | RESPIRATION RATE: 18 BRPM | HEIGHT: 64 IN | SYSTOLIC BLOOD PRESSURE: 134 MMHG | HEART RATE: 69 BPM

## 2024-12-04 DIAGNOSIS — N39.0 RECURRENT UTI: ICD-10-CM

## 2024-12-04 DIAGNOSIS — R35.1 NOCTURIA: Primary | ICD-10-CM

## 2024-12-04 LAB
BILIRUB SERPL-MCNC: NORMAL MG/DL
BLOOD URINE, POC: NORMAL
CLARITY, POC UA: CLEAR
COLOR, POC UA: NORMAL
GLUCOSE UR QL STRIP: NORMAL
KETONES UR QL STRIP: NORMAL
LEUKOCYTE ESTERASE URINE, POC: NORMAL
NITRITE, POC UA: NORMAL
PH, POC UA: 6
PROTEIN, POC: NORMAL
SPECIFIC GRAVITY, POC UA: 1.01
UROBILINOGEN, POC UA: NORMAL

## 2024-12-04 PROCEDURE — G2211 COMPLEX E/M VISIT ADD ON: HCPCS | Mod: S$GLB,,, | Performed by: UROLOGY

## 2024-12-04 PROCEDURE — 81002 URINALYSIS NONAUTO W/O SCOPE: CPT | Mod: S$GLB,,, | Performed by: UROLOGY

## 2024-12-04 PROCEDURE — 1160F RVW MEDS BY RX/DR IN RCRD: CPT | Mod: CPTII,S$GLB,, | Performed by: UROLOGY

## 2024-12-04 PROCEDURE — 3078F DIAST BP <80 MM HG: CPT | Mod: CPTII,S$GLB,, | Performed by: UROLOGY

## 2024-12-04 PROCEDURE — 3075F SYST BP GE 130 - 139MM HG: CPT | Mod: CPTII,S$GLB,, | Performed by: UROLOGY

## 2024-12-04 PROCEDURE — 1159F MED LIST DOCD IN RCRD: CPT | Mod: CPTII,S$GLB,, | Performed by: UROLOGY

## 2024-12-04 PROCEDURE — 1101F PT FALLS ASSESS-DOCD LE1/YR: CPT | Mod: CPTII,S$GLB,, | Performed by: UROLOGY

## 2024-12-04 PROCEDURE — 3288F FALL RISK ASSESSMENT DOCD: CPT | Mod: CPTII,S$GLB,, | Performed by: UROLOGY

## 2024-12-04 PROCEDURE — 99213 OFFICE O/P EST LOW 20 MIN: CPT | Mod: S$GLB,,, | Performed by: UROLOGY

## 2024-12-04 PROCEDURE — 1125F AMNT PAIN NOTED PAIN PRSNT: CPT | Mod: CPTII,S$GLB,, | Performed by: UROLOGY

## 2024-12-04 NOTE — PROGRESS NOTES
"Subjective:       Hector Kellogg is a 79 y.o. male who is a new patient who was seen  for evaluation of stones/UTI.      He is a pt of Dr Cullen, last seen 5/2024. Prior treatment of large >3cm stone burden in L kidney. He has had multiple stone procedures. Last procedure was L ESWL for large L renal pelvic stone. Due to cognitive issues after anesthesia, further stone procedures were held. On Urocit-K. Last CT 5/2024 with significant LLP stone burden.  Planned for repeat CT in 5/2025.     Previously treated for UTI due to AMS with dysuria. UCx negative at time. UCx this month again negative. Multiple neg UCx this year.     ++nocturia. Weaker stream. Previously on Flomax and Proscar - noted hallucinations (suspected from Proscar) and medication stopped. Does not drink much during day. No LE edema. No snoring/PILI.     Also with elevating PSA - 3.9 to 6.2, last checked >1 year ago.      Family assists with much of history today.    PVR (bladder scan) today - 22cc    CT 5/2024 12/4/2024  Returns now with continued nocturia q2h. Did not note change with Uroxatral. They are attempting other meds (Ativan) to help with sleep. They have held UrocitK in efforts to reduce nocturia.       PSA:  6/22 - 3.9  7/23 - 6.2 (nl < 6.5)      UCx:  11/23, 2/24, 4/24 - neg  6/24 - mixed  8/24, 9/24 - neg    The following portions of the patient's history were reviewed and updated as appropriate: allergies, current medications, past family history, past medical history, past social history, past surgical history and problem list.    Review of Systems  Twelve point review of systems completed. Pertinent positive and negatives listed in HPI.      Objective:    Vitals: /70 (BP Location: Right arm, Patient Position: Sitting)   Pulse 69   Temp 98 °F (36.7 °C) (Oral)   Resp 18   Ht 5' 4" (1.626 m)   Wt 55.7 kg (122 lb 12.7 oz)   SpO2 100%   BMI 21.08 kg/m²     Physical Exam   General: well developed, well nourished in no " "acute distress  Head: normocephalic, atraumatic  Neck: no obvious enlargement of thyroid  HEENT: EOMI, mucus membranes moist, sclera anicteric, no hearing impairment  Lungs: symmetric expansion, non-labored breathing  Neuro: alert  Psych: normal judgment and insight, normal mood/affect and non-anxious  Genitourinary: deferred   CLINTON: deferred      Lab Review   Urine analysis today in clinic shows - trace LE, 5-10 RBCs    Lab Results   Component Value Date    WBC 6.81 08/26/2024    HGB 13.5 (L) 08/26/2024    HCT 41.8 08/26/2024    MCV 97 08/26/2024     08/26/2024     Lab Results   Component Value Date    CREATININE 0.9 08/26/2024    BUN 21 08/26/2024     Lab Results   Component Value Date    PSA 6.2 (H) 07/14/2023     No results found for: "PSADIAG"    Imaging  CT reviewed  Reports and images were personally reviewed by me and discussed with the patient today         Assessment/Plan:      1. Recurrent UTI    - Recent UCx all negative. UA with LE and RBCs that lead to UTI treatment though subsequently negative UCx.   - Agree with D-mannose   - Hydrate   - Avoid constipation   - Prior cystoscopy 2023 (Dr Cullen)   - UA without UTI today     2. Microhematuria    - Prior evaluation   - Known large LLP stones     3. Nephrolithiasis    - Known LLP stones. Prior treatment attempts with difficulty post-op due to anesthesia.   - Hoping to avoid further procedures.   - Currently without obstruction   - On Urocit-K, okay to continue     4. Nocturia    - Reduce PM fluids   - Uroxatral - not helpful, stop. Discussed SE.   - D-mannose, UrocitK will not contribute to nocturia - okay to continue   - Given handheld urinal - may help prevent getting up at night and reduce risk for falls   - Discussed possible OAB med (Mybetriq) - will consider, hold for now     5. Elevated PSA    - Increasing PSA. Consider repeat check.    - High normal     6. BPH with urinary obstruction     - Uroxatral - no help, okay to stop       Follow up in 6 " months    Visit today included increased complexity associated with the care of the episodic problem stones, nocturia addressed and managing the longitudinal care of the patient due to the serious and/or complex managed problem(s) stones,nocturia.

## 2024-12-11 ENCOUNTER — PATIENT MESSAGE (OUTPATIENT)
Dept: PALLIATIVE MEDICINE | Facility: CLINIC | Age: 79
End: 2024-12-11
Payer: MEDICARE

## 2024-12-12 DIAGNOSIS — M54.17 LUMBOSACRAL RADICULOPATHY: ICD-10-CM

## 2024-12-12 RX ORDER — AMLODIPINE BESYLATE 5 MG/1
5 TABLET ORAL
Qty: 30 TABLET | Refills: 0 | Status: SHIPPED | OUTPATIENT
Start: 2024-12-12

## 2024-12-12 RX ORDER — OXYCODONE HYDROCHLORIDE 5 MG/1
5 TABLET ORAL EVERY 4 HOURS PRN
Qty: 120 TABLET | Refills: 0 | Status: SHIPPED | OUTPATIENT
Start: 2024-12-12

## 2024-12-15 NOTE — TELEPHONE ENCOUNTER
No care due was identified.  Health [facility] Embedded Care Due Messages. Reference number: [id number].   12/15/2024 7:57:43 AM CST

## 2024-12-16 RX ORDER — ROSUVASTATIN CALCIUM 40 MG/1
TABLET, COATED ORAL
Qty: 90 TABLET | Refills: 3 | Status: SHIPPED | OUTPATIENT
Start: 2024-12-16

## 2024-12-18 ENCOUNTER — TELEPHONE (OUTPATIENT)
Dept: ALLERGY | Facility: CLINIC | Age: 79
End: 2024-12-18
Payer: MEDICARE

## 2024-12-18 ENCOUNTER — PATIENT MESSAGE (OUTPATIENT)
Dept: UROLOGY | Facility: CLINIC | Age: 79
End: 2024-12-18
Payer: MEDICARE

## 2024-12-18 ENCOUNTER — PATIENT MESSAGE (OUTPATIENT)
Dept: PULMONOLOGY | Facility: CLINIC | Age: 79
End: 2024-12-18
Payer: MEDICARE

## 2024-12-18 NOTE — TELEPHONE ENCOUNTER
I spoke to Ms. Kellogg's wife about the notice they received from his insurance company.  I suggested that they look into the cost smoothing plan and determine what they want to do in January.  Encouraged them to reach back out to me if any problems arise.    Sebastian Torres, JorjeD

## 2025-01-06 ENCOUNTER — OFFICE VISIT (OUTPATIENT)
Facility: CLINIC | Age: 80
End: 2025-01-06
Payer: MEDICARE

## 2025-01-06 VITALS
SYSTOLIC BLOOD PRESSURE: 143 MMHG | DIASTOLIC BLOOD PRESSURE: 79 MMHG | BODY MASS INDEX: 20.7 KG/M2 | HEIGHT: 64 IN | WEIGHT: 121.25 LBS | HEART RATE: 66 BPM

## 2025-01-06 DIAGNOSIS — F06.8 PSYCHOSIS DUE TO PARKINSON'S DISEASE: ICD-10-CM

## 2025-01-06 DIAGNOSIS — G20.A1 PSYCHOSIS DUE TO PARKINSON'S DISEASE: ICD-10-CM

## 2025-01-06 PROCEDURE — 1100F PTFALLS ASSESS-DOCD GE2>/YR: CPT | Mod: CPTII,S$GLB,, | Performed by: PSYCHIATRY & NEUROLOGY

## 2025-01-06 PROCEDURE — 99999 PR PBB SHADOW E&M-EST. PATIENT-LVL III: CPT | Mod: PBBFAC,,, | Performed by: PSYCHIATRY & NEUROLOGY

## 2025-01-06 PROCEDURE — 99215 OFFICE O/P EST HI 40 MIN: CPT | Mod: S$GLB,,, | Performed by: PSYCHIATRY & NEUROLOGY

## 2025-01-06 PROCEDURE — 3077F SYST BP >= 140 MM HG: CPT | Mod: CPTII,S$GLB,, | Performed by: PSYCHIATRY & NEUROLOGY

## 2025-01-06 PROCEDURE — 3288F FALL RISK ASSESSMENT DOCD: CPT | Mod: CPTII,S$GLB,, | Performed by: PSYCHIATRY & NEUROLOGY

## 2025-01-06 PROCEDURE — 1125F AMNT PAIN NOTED PAIN PRSNT: CPT | Mod: CPTII,S$GLB,, | Performed by: PSYCHIATRY & NEUROLOGY

## 2025-01-06 PROCEDURE — 1159F MED LIST DOCD IN RCRD: CPT | Mod: CPTII,S$GLB,, | Performed by: PSYCHIATRY & NEUROLOGY

## 2025-01-06 PROCEDURE — G2211 COMPLEX E/M VISIT ADD ON: HCPCS | Mod: S$GLB,,, | Performed by: PSYCHIATRY & NEUROLOGY

## 2025-01-06 PROCEDURE — 3078F DIAST BP <80 MM HG: CPT | Mod: CPTII,S$GLB,, | Performed by: PSYCHIATRY & NEUROLOGY

## 2025-01-06 RX ORDER — TRAZODONE HYDROCHLORIDE 50 MG/1
50 TABLET ORAL NIGHTLY
Qty: 30 TABLET | Refills: 11 | Status: SHIPPED | OUTPATIENT
Start: 2025-01-06 | End: 2025-01-08

## 2025-01-06 RX ORDER — GABAPENTIN 300 MG/1
300 CAPSULE ORAL 3 TIMES DAILY
Qty: 90 CAPSULE | Refills: 11 | Status: SHIPPED | OUTPATIENT
Start: 2025-01-06 | End: 2026-01-06

## 2025-01-06 NOTE — PROGRESS NOTES
How many falls have you had in the last 90 days? 6-10  Of those falls, how many have been in the past 30 days? 5  How many hospitalizations have you had in the last 90 days? 0  Of those hospitalizations, how many have been in the last 30 days? 0

## 2025-01-07 RX ORDER — AMLODIPINE BESYLATE 5 MG/1
5 TABLET ORAL
Qty: 30 TABLET | Refills: 11 | Status: SHIPPED | OUTPATIENT
Start: 2025-01-07

## 2025-01-08 NOTE — PROGRESS NOTES
Name: Hector Kellogg  MRN: [id number]   CSN: [id number]      Date: 01/08/2025    Chief Complaint / Interval History:   :  Patient presents today for follow-up regarding Parkinson's disease management.    PARKINSON'S DISEASE AND MOBILITY:  He reports increased falling and stumbling, leading to increased wheelchair use. He experiences increased pain with prolonged sitting, particularly in the piriformis region. He demonstrates pain-related behaviors during episodes of discomfort.    NEUROPSYCHIATRIC SYMPTOMS:  He continues to experience both visual and auditory hallucinations, though irritability has improved following medication adjustments.    MEDICATIONS:  Carbidopa-Levodopa has been reduced from 1.5 tablets to 1 tablet per dose. Melatonin was ineffective for sleep. Gabapentin has been reintroduced for pain and sleep management. He uses daily stool softeners to counter the effects of Oxycodone on bowel movements.    SLEEP:  He reports disrupted sleep-wake cycles with days and nights mixed up.    NUTRITION AND HYDRATION:  His weight has stabilized following previous weight loss and appetite remains good. He is currently somewhat dehydrated.         From 11/7/2024:  - cd/ld 1.5 tabs five times daily    - here with family   - in wheelchair more   - several falls since last visit   - stumbling more, using wheelchair more    - gets pain in his legs, feels like muscles get really tight    - not with offs or ons   - not necessarily bothered by dyskinesia   - dr. Jones is managing pain, not sure if it is helping   - has not tried a muscle relaxer   - no longer taking namenda, maybe felt like he was more confused   - still has hallucinations  - worsening irritability   - citalopram  - family helps with all adls   - last dose of cd/ld was at 1030 am           March 2024  - he and family agree he is a bit better  - palliative care, goes to them, has VV  - taking mobic and oxycodone  - more comfortable and less  parkinsonian  - burning with peeing, will check  - more forgetfulness, mostly short term and dates - will fluctuate      From Dec 2023  - [facility] saw one month ago  - ibuprofen was making BP go up   - cardiology added amlodipine and now running lower   - here with spouse and daughter   - doing PT, twice a week    - has been helpful with balance   - no longer taking lyrica, did not help  - no hallucinations   - they have not heard from palliative care   - scheduled to see Dr. Cullen in February   - pain is the most bothersome to him   - cd/ld 25/100 1.5 tabs five times daily           From May 2023  - cd/ld 1.5 in the morning and 1.5 at night, other three doses are 1 tab   - accompanied by spouse   - asks about gabapentin and memory   - previously taking 800 mg TID, wife cut back to 400 mg TID   - he feels a bit better, more clear of mind on decrease   - he has had about 5 falls due to festination of gait and goes forward   - doesn't feel lightheaded or dizzy   - sometimes gets flashes or feeling like someone is in his peripheral vision   - last dose of ldopa was at 730 am   - urinary urgency, cannot get to the bathroom on time             From Feb 2023  - more imbalance, more shuffling, 2 times to the ground, more near falls, no injuries  - showers, has seat for safety   - appetite is good, sleep is disrupted by dogs and cats, but sleeps pretty well, up twice to urinate  - more holding on and use of walker  - now on 1 tab CD/LD 5x/day, usually 8:30-11:30...every three hours  -      From 10-22:  Hx:  - last seen 7/6/22 by Yvette  --- cd/ld  mg 1.5 tablet TID unchanged  --- gabapentin 400 mg TID, but doubtful of efficacy re: pain  --- offered rasagiline 1 mg, but ultimately declined d/t cost as of last contact  - pain worsening at last visit and negatively affecting gait, grade 1 anterolisesthesis of spine noted in imaging  --- unresponsive to cd/ld and attributable in main to the above    From Jul 2022:  - last seen  "July 2020  - cd/ld 25/100 1.5 tabs TID -- 9 am, 2 pm and 7 pm    - tremors gets better but does not completely go away  - meds possibly wear off after about 3 hours   - biggest issue is pain, starts in the hip and radiates down his leg   - takes gabapentin 400 mg TID, does not feel that this helps   - festination of gait in the morning   - can't walk far   - had lumbar laminectomy in Feb 2021 has not followed up with pain management or nsgy  - did not feel that pain management helped   - pain is getting worse, it has been going on for about a year -- can't walk due to the pain   - does not feel that the pain improves with sinemet   - no new weight changes, tries to wear loose pants         From Sept 2019:  - feels like he cannot do things  - not apathy, wants to work or do things  - lung issue are limiting him from COPD  - still taking cd/ld 25/100 1 TID-5x/day  - memory is ok  - appetite is good  - no constipation now  - bladder is quick    From Oct 2018:  - still getting pinched nerve pain in the back or legs  - will take an extra gabapentin  - takes cd/ld every 3-4 hours  - gait is stable    From Summer 2017:  - a bit more off time between dosing  - peak is pretty good  - gait is frustrating  - memory holding up  - family pleased    From 12/06  1) A bit more off time in between dosing  2) More cramping when off, gabapentin helps at night  3) Would be willing to take meds four times per day    History of Present Illness (HPI):  70 yo with diagnosis of PD, symptoms began 1.5 years ago.  First noted tremor in the L hand, "all left side".  Drags his left foot, generally slower overall.  Dx 1 year ago, had [employer] as a confirmatory study.  Says he had an MRI of the low back as well, was feeling "pinched nerves" in the back and into the both legs.  Was prescribed "some drug that cost $400" and didn;t take. Also prescribed pramipexole per the notes but also said he "never took it."    Was in [place] for 2 years, now " re-establishing care here.    Has questions about gabapentin - 800 now taking 1/2 tab at night only and has no significant pain.  Will ibuprofen for breakthrough.  Not taking Norco.    Retired , [employer], left at age 65.    Nonmotor/Premotor ROS:  Hyposmia (HENT)?No per him  RBD/sleep issues (Constitutional)?Yes - acts out dreams  Depression/anxiety (Psychiatric)?No - better on Citalopram  Fatigue (Constitutional)?Yes  Constipation (GI)?Yes  - uses OTC stool softener  Urinary issues ()?Yes - urgency  Sexual dysfunction ()?Yes - some ED.    Orthostasis (Cardiovascular)?No  Leg swelling (Cardiovascular)? No  Falls (Musculoskeletal)?No  Cognitive impairment (Neurologic)?No  Psychoses (Psychiatric)?No  Pain/Paresthesia (Neurologic)?Yes  Visual changes (Eyes)?No  Moles / skin changes (Skin)?Yes - seborrhea, has a scaly mole on the R side of the head  Stridor / SOB (Pulm)?Yes - with acticity  Bruising (Heme)?No    Past Medical History: The patient  has a past medical history of Cataract, Chronic back pain greater than 3 months duration, COPD (chronic obstructive pulmonary disease), Coronary artery disease, DDD (degenerative disc disease), lumbar (05/16/2020), Glaucoma suspect of both eyes, Hyperlipidemia, Hypertension, MI, old (2006), Parkinson disease, and Renal disorder.    Social History: The patient  reports that he quit smoking about 10 years ago. His smoking use included cigarettes. He started smoking about 50 years ago. He has a 40 pack-year smoking history. He has been exposed to tobacco smoke. He has never used smokeless tobacco. He reports current alcohol use. He reports that he does not use drugs.  Rare social drink.    Family History: Their family history includes Cataracts in his father; Heart attack in his father; Heart disease in his maternal uncle and paternal uncle.    Allergies: Influenza virus vaccines     Meds:   Current Outpatient Medications on File Prior to Visit   Medication Sig  "Dispense Refill    albuterol (PROVENTIL/VENTOLIN HFA) 90 mcg/actuation inhaler INHALE 2 PUFFS BY MOUTH EVERY 6 HOURS AS NEEDED FOR WHEEZING FOR SHORTNESS OF BREATH 9 g 11    aspirin (ECOTRIN) 81 MG EC tablet Take 81 mg by mouth once daily.      BREZTRI AEROSPHERE 160-9-4.8 mcg/actuation HFAA Inhale 2 puffs into the lungs 2 (two) times a day. 10.7 g 11    carbidopa-levodopa  mg (SINEMET)  mg per tablet TAKE 1 & 1/2 (ONE & ONE-HALF) TABLETS BY MOUTH FIVE TIMES DAILY 675 tablet 3    citalopram (CELEXA) 20 MG tablet Take 1 tablet by mouth once daily 90 tablet 3    ezetimibe (ZETIA) 10 mg tablet Take 1 tablet by mouth once daily 90 tablet 0    ibuprofen (ADVIL,MOTRIN) 800 MG tablet Take 800 mg by mouth as needed.      loratadine (CLARITIN) 10 mg tablet Take 10 mg by mouth as needed for Allergies.      meloxicam (MOBIC) 7.5 MG tablet Take 1 tablet (7.5 mg total) by mouth once daily. 30 tablet 3    metoprolol succinate (TOPROL-XL) 50 MG 24 hr tablet Take 1 tablet by mouth once daily 90 tablet 2    multivitamin capsule Take 1 capsule by mouth once daily.      oxyCODONE (ROXICODONE) 5 MG immediate release tablet Take 1 tablet (5 mg total) by mouth every 4 (four) hours as needed for Pain. 120 tablet 0    potassium citrate (UROCIT-K 10) 10 mEq (1,080 mg) TbSR Take 1 tablet (10 mEq total) by mouth 2 (two) times daily with meals. 180 tablet 3    rosuvastatin (CRESTOR) 40 MG Tab TAKE 1 TABLET BY MOUTH NIGHTLY AT BEDTIME 90 tablet 3    LORazepam (ATIVAN) 0.5 MG tablet Take 1 tablet (0.5 mg total) by mouth nightly as needed for Anxiety. 30 tablet 0     No current facility-administered medications on file prior to visit.     Exam:  BP (!) 143/79 (BP Location: Left arm, Patient Position: Sitting)   Pulse 66   Ht 5' 4" (1.626 m)   Wt 55 kg (121 lb 4.1 oz)   BMI 20.81 kg/m²     Constitutional  Well-developed, well-nourished, appears stated age   * Specialized movement exam  Mild hypophonic speech.    Mild facial " masking.   Automatism of the left arm   L>R cogwheel rigidity - mild     L>R bradykinesia - mild.   bilateral resting tremor, L > R   dystonic posturing of R foot-- mild dyskinesia    No other dystonia, chorea, athetosis, myoclonus, or tics.   No motor impersistence.   WC today, same os previous     Laboratory/Radiological:  - Results:  Office Visit on 12/04/2024   Component Date Value Ref Range Status    Glucose, UA 12/04/2024 normal   Final    Bilirubin, POC 12/04/2024 neg   Final    Ketones, UA 12/04/2024 +small   Final    Spec Grav UA 12/04/2024 1.015   Final    Blood, UA 12/04/2024 5-10   Final    pH, UA 12/04/2024 6   Final    Protein, POC 12/04/2024 trace   Final    Urobilinogen, UA 12/04/2024 normal   Final    Nitrite, UA 12/04/2024 neg   Final    WBC, UA 12/04/2024 trace   Final    Color, UA 12/04/2024 Yessenia   Final    Clarity, UA 12/04/2024 Clear   Final     - Independent review of images: none available.    Reviewed records of:  1) Grade 1 anterolisthesis of spine  2) Positive SHAHRZAD scan        PARKINSON'S DISEASE:  - Assessed patient's response to recent carbidopa-levodopa dose reduction, noting improved irritability without significant functional decline  - Continued carbidopa-levodopa 1 tablet every 4-5 hours while awake (current dose).    SCIATICA AND MUSCLE SPASM:  - Identified likely piriformis syndrome contributing to leg pain  - Explained piriformis syndrome, its relation to Parkinson's disease, and its impact on sciatic nerve pain.  - Recommend performing piriformis stretches when experiencing pain.  - Started gabapentin 300 mg at bedtime, with instructions to increase up to 3 times daily as needed.  - Educated on the potential benefits of gabapentin for nerve pain and sleep. -Chose gabapentin over trazodone due to better compatibility with current medications and renal clearance    PAIN MANAGEMENT:  - Continued oxycodone.    CONSTIPATION:  - Continued using stool softeners to manage  constipation.  - Continued stool softener every morning.    MOBILITY AND FALL PREVENTION:  - Patient to reduce prolonged sitting and increase walking as tolerated.    HYDRATION AND GENERAL HEALTH:  - Discussed the importance of hydration for overall health.  - Patient to increase water intake to 60 fluid ounces daily, focusing on morning consumption.    SLEEP MANAGEMENT:  - Discontinued melatonin.  Gabapentin as above.    FOLLOW-UP:  - Follow up in 3 months to reassess response to gabapentin and overall symptom management.         This note was generated with the assistance of ambient listening technology. Verbal consent was obtained by the patient and accompanying visitor(s) for the recording of patient appointment to facilitate this note. I attest to having reviewed and edited the generated note for accuracy, though some syntax or spelling errors may persist. Please contact the author of this note for any clarification.      This is a patient with a serious and complex neurologic diagnosis whose overall, ongoing care is being managed and monitored by me and our Neurology clinic.   As such, since 2024,  is the appropriate add-on code to accompany the other E/M billing for this visit.            Eliu Klein MD, MPH  Division of Movement and Memory Disorders  [facility]

## 2025-01-10 ENCOUNTER — OFFICE VISIT (OUTPATIENT)
Dept: INTERNAL MEDICINE | Facility: CLINIC | Age: 80
End: 2025-01-10
Payer: MEDICARE

## 2025-01-10 ENCOUNTER — LAB VISIT (OUTPATIENT)
Dept: LAB | Facility: HOSPITAL | Age: 80
End: 2025-01-10
Attending: INTERNAL MEDICINE
Payer: MEDICARE

## 2025-01-10 VITALS
DIASTOLIC BLOOD PRESSURE: 82 MMHG | TEMPERATURE: 98 F | WEIGHT: 120.06 LBS | HEART RATE: 61 BPM | OXYGEN SATURATION: 99 % | HEIGHT: 64 IN | SYSTOLIC BLOOD PRESSURE: 134 MMHG | BODY MASS INDEX: 20.5 KG/M2 | RESPIRATION RATE: 12 BRPM

## 2025-01-10 DIAGNOSIS — I10 ESSENTIAL HYPERTENSION: ICD-10-CM

## 2025-01-10 DIAGNOSIS — F41.9 ANXIETY: ICD-10-CM

## 2025-01-10 DIAGNOSIS — G20.A1 PSYCHOSIS DUE TO PARKINSON'S DISEASE: ICD-10-CM

## 2025-01-10 DIAGNOSIS — E78.2 MIXED HYPERLIPIDEMIA: ICD-10-CM

## 2025-01-10 DIAGNOSIS — M51.369 DEGENERATION OF INTERVERTEBRAL DISC OF LUMBAR REGION, UNSPECIFIED WHETHER PAIN PRESENT: Primary | ICD-10-CM

## 2025-01-10 DIAGNOSIS — J43.2 CENTRILOBULAR EMPHYSEMA: ICD-10-CM

## 2025-01-10 DIAGNOSIS — G89.29 CHRONIC LOW BACK PAIN WITHOUT SCIATICA, UNSPECIFIED BACK PAIN LATERALITY: ICD-10-CM

## 2025-01-10 DIAGNOSIS — E78.00 PURE HYPERCHOLESTEROLEMIA: ICD-10-CM

## 2025-01-10 DIAGNOSIS — F06.8 PSYCHOSIS DUE TO PARKINSON'S DISEASE: ICD-10-CM

## 2025-01-10 DIAGNOSIS — I27.20 PULMONARY HTN: Chronic | ICD-10-CM

## 2025-01-10 DIAGNOSIS — M54.50 CHRONIC LOW BACK PAIN WITHOUT SCIATICA, UNSPECIFIED BACK PAIN LATERALITY: ICD-10-CM

## 2025-01-10 DIAGNOSIS — I70.0 AORTIC ATHEROSCLEROSIS: ICD-10-CM

## 2025-01-10 DIAGNOSIS — E04.1 THYROID NODULE: ICD-10-CM

## 2025-01-10 DIAGNOSIS — G20.B1 PARKINSON'S DISEASE WITH DYSKINESIA, UNSPECIFIED WHETHER MANIFESTATIONS FLUCTUATE: ICD-10-CM

## 2025-01-10 DIAGNOSIS — I25.10 CORONARY ARTERY DISEASE INVOLVING NATIVE CORONARY ARTERY OF NATIVE HEART WITHOUT ANGINA PECTORIS: ICD-10-CM

## 2025-01-10 DIAGNOSIS — N20.0 NEPHROLITHIASIS: ICD-10-CM

## 2025-01-10 PROBLEM — N30.00 ACUTE CYSTITIS WITHOUT HEMATURIA: Status: RESOLVED | Noted: 2024-09-04 | Resolved: 2025-01-10

## 2025-01-10 LAB
ALBUMIN SERPL BCP-MCNC: 3.6 G/DL (ref 3.5–5.2)
ALP SERPL-CCNC: 85 U/L (ref 40–150)
ALT SERPL W/O P-5'-P-CCNC: 19 U/L (ref 10–44)
ANION GAP SERPL CALC-SCNC: 10 MMOL/L (ref 8–16)
AST SERPL-CCNC: 32 U/L (ref 10–40)
BASOPHILS # BLD AUTO: 0.04 K/UL (ref 0–0.2)
BASOPHILS NFR BLD: 0.7 % (ref 0–1.9)
BILIRUB SERPL-MCNC: 0.5 MG/DL (ref 0.1–1)
BUN SERPL-MCNC: 22 MG/DL (ref 8–23)
CALCIUM SERPL-MCNC: 9.7 MG/DL (ref 8.7–10.5)
CHLORIDE SERPL-SCNC: 110 MMOL/L (ref 95–110)
CO2 SERPL-SCNC: 22 MMOL/L (ref 23–29)
CREAT SERPL-MCNC: 0.8 MG/DL (ref 0.5–1.4)
DIFFERENTIAL METHOD BLD: ABNORMAL
EOSINOPHIL # BLD AUTO: 0.1 K/UL (ref 0–0.5)
EOSINOPHIL NFR BLD: 2.4 % (ref 0–8)
ERYTHROCYTE [DISTWIDTH] IN BLOOD BY AUTOMATED COUNT: 13.3 % (ref 11.5–14.5)
EST. GFR  (NO RACE VARIABLE): >60 ML/MIN/1.73 M^2
GLUCOSE SERPL-MCNC: 93 MG/DL (ref 70–110)
HCT VFR BLD AUTO: 43.4 % (ref 40–54)
HGB BLD-MCNC: 13.7 G/DL (ref 14–18)
IMM GRANULOCYTES # BLD AUTO: 0.01 K/UL (ref 0–0.04)
IMM GRANULOCYTES NFR BLD AUTO: 0.2 % (ref 0–0.5)
LYMPHOCYTES # BLD AUTO: 1.9 K/UL (ref 1–4.8)
LYMPHOCYTES NFR BLD: 31.8 % (ref 18–48)
MCH RBC QN AUTO: 32.2 PG (ref 27–31)
MCHC RBC AUTO-ENTMCNC: 31.6 G/DL (ref 32–36)
MCV RBC AUTO: 102 FL (ref 82–98)
MONOCYTES # BLD AUTO: 0.6 K/UL (ref 0.3–1)
MONOCYTES NFR BLD: 9.9 % (ref 4–15)
NEUTROPHILS # BLD AUTO: 3.3 K/UL (ref 1.8–7.7)
NEUTROPHILS NFR BLD: 55 % (ref 38–73)
NRBC BLD-RTO: 0 /100 WBC
PLATELET # BLD AUTO: 146 K/UL (ref 150–450)
PMV BLD AUTO: 10.5 FL (ref 9.2–12.9)
POTASSIUM SERPL-SCNC: 4.8 MMOL/L (ref 3.5–5.1)
PROT SERPL-MCNC: 6.8 G/DL (ref 6–8.4)
RBC # BLD AUTO: 4.26 M/UL (ref 4.6–6.2)
SODIUM SERPL-SCNC: 142 MMOL/L (ref 136–145)
WBC # BLD AUTO: 5.95 K/UL (ref 3.9–12.7)

## 2025-01-10 PROCEDURE — 36415 COLL VENOUS BLD VENIPUNCTURE: CPT | Mod: PO | Performed by: INTERNAL MEDICINE

## 2025-01-10 PROCEDURE — 85025 COMPLETE CBC W/AUTO DIFF WBC: CPT | Performed by: INTERNAL MEDICINE

## 2025-01-10 PROCEDURE — 80053 COMPREHEN METABOLIC PANEL: CPT | Performed by: INTERNAL MEDICINE

## 2025-01-10 PROCEDURE — 99999 PR PBB SHADOW E&M-EST. PATIENT-LVL IV: CPT | Mod: PBBFAC,,, | Performed by: INTERNAL MEDICINE

## 2025-01-10 NOTE — PROGRESS NOTES
Patient ID: Hector Kellogg is a 79 y.o. male.    Chief Complaint: Follow-up    History of Present Illness    CHIEF COMPLAINT:  Hector presents today for follow-up regarding multiple chronic conditions.    PARKINSON'S DISEASE:  He experiences visual hallucinations without auditory components or associated agitation 2/2 Parkinson's disease is stable on carbidopa-levodopa therapy.    PAIN:  He reports pain in the back of the throat and right hip, with symptoms resembling sciatica.    SLEEP:  He experiences severe insomnia with disrupted sleep-wake cycles.    RESPIRATORY:  His COPD is stable with use of inhaler as needed.    MEDICATIONS:  He started Gabapentin two days ago, taking one capsule at night. Lorazepam was discontinued due to causing agitation. Current medications include: Crestor 40 mg daily for hyperlipidemia, aspirin, BB and statin for coronary artery disease, Citalopram for anxiety, Amlodipine 5 mg daily for hypertension, and stool softeners twice daily.         Physical Exam    General: No acute distress. Well-developed. Well-nourished.  Eyes: EOMI. Sclerae anicteric.  HENT: Normocephalic. Atraumatic. Nares patent. Moist oral mucosa.  Ears: Bilateral TMs clear. Bilateral EACs clear.  Cardiovascular: Regular rate. Regular rhythm. No murmurs. No rubs. No gallops. Normal S1, S2.  Respiratory: Normal respiratory effort. Clear to auscultation bilaterally. No rales. No rhonchi. No wheezing.  Abdomen: Soft. Non-tender. Non-distended. Normoactive bowel sounds.  Musculoskeletal: No  obvious deformity. Leg weakness.  Extremities: No lower extremity edema.  Neurological: Alert & oriented x3. No slurred speech. Normal gait. No significant tremors.  Psychiatric: Normal mood. Flat affect. Good insight. Good judgment.  Skin: Warm. Dry. No rash.         Assessment & Plan    IMPRESSION:  - Assessed Parkinson's disease: stable on carbidopa/levodopa with visual hallucinations but no agitation; decided to monitor  without adding antipsychotics to avoid over-sedation  - Evaluated COPD: stable on current inhaler regimen with infrequent albuterol use indicating good control  - Considered sleep issues: discontinued Ativan due to adverse effects; increased gabapentin dosage for pain management and potential sleep improvement  - Reviewed hyperlipidemia and coronary artery disease: stable on current statin and aspirin regimen  - Assessed hypertension: stable on current medication regimen including amlodipine.    CHRONIC RESPIRATORY FAILURE WITH HYPOXIA:  - Assessed patient's breathing, which is stable and well-controlled with inhalers.  - Physical exam revealed clear lungs.  - Continued current inhalers for COPD management.    PULMONARY HTN:  - Noted pulmonary hypertension in the patient's medical history.  - Performed cardiac exam, which was normal.    CENTRILOBULAR EMPHYSEMA:  - Condition is being managed in conjunction with chronic respiratory failure.  - Current treatment plan is effective and will be maintained.    PARKINSON'S DISEASE:  - Continue carbidopa/levodopa treatment at current dosage, which was recently lowered to reduce agitation.  - Physical exam revealed flat affect, leg weakness, and minimal tremors, mostly in little finger when medication is wearing off.  - Hector's long-term stability with Parkinson's disease acknowledged.  - Discontinued lorazepam (Ativan).    VISUAL HALLUCINATIONS:  - Hector experiences visual hallucinations (seeing a bear in bed, trucks in front of the house, and cheerleaders) without agitation.  - Monitor condition without changing medication at this point.  - Considered potential use of quetiapine for sleep and hallucinations management, but opted to continue monitoring without changes for now.    COGNITIVE IMPAIRMENT:  - Observed significant forgetfulness and confusion in patient.    SCIATICA:  - Hector experiences pain in the hip area, possibly related to sciatica.  - Inactivity may be  contributing to the pain.  - Increased gabapentin to 600mg at bedtime (two 300mg capsules) for pain management and sleep.  - Explained potential sedation from gabapentin and need for caution when getting up at night.    COPD:  - Minimal use of albuterol inhaler reported.  - Continue current inhalers for management.    CORONARY ARTERY DISEASE:  - Continue aspirin, statin, and beta-blocker for management.    HYPERTENSION:  - Continue amlodipine 5mg daily and doxazosin.  - Hypertension is stable on current medication regimen.    HYPERLIPIDEMIA:  - Continue rosuvastatin (Crestor) 40mg daily.  - Hyperlipidemia is stable on current medication.    ALLERGY:  - Noted patient's allergy to flu vaccine, which may indicate a penicillin allergy.  - Advised caution with penicillin-based medications.    SLEEP MANAGEMENT:  - Discussed importance of maintaining a regular sleep-wake cycle and avoiding daytime napping to improve nighttime sleep.    FALL PREVENTION:  - Use bed alarm to alert caregiver when patient gets up at night.  - Consider using a bedside commode to reduce fall risk during nighttime bathroom visits.    MEDICATIONS/SUPPLEMENTS:  - Continue citalopram and stool softeners twice daily in the morning.    LABS:  - CBC and CMP ordered to be completed today.  - Complete labs prior to next visit.    FOLLOW UP:  - Follow up in 6 months for annual exam.              This note was generated with the assistance of ambient listening technology. Verbal consent was obtained by the patient and accompanying visitor(s) for the recording of patient appointment to facilitate this note. I attest to having reviewed and edited the generated note for accuracy, though some syntax or spelling errors may persist. Please contact the author of this note for any clarification.

## 2025-01-13 ENCOUNTER — TELEPHONE (OUTPATIENT)
Dept: INTERNAL MEDICINE | Facility: CLINIC | Age: 80
End: 2025-01-13
Payer: MEDICARE

## 2025-01-13 DIAGNOSIS — J43.2 CENTRILOBULAR EMPHYSEMA: ICD-10-CM

## 2025-01-13 DIAGNOSIS — I73.9 CLAUDICATION: ICD-10-CM

## 2025-01-13 DIAGNOSIS — I10 HTN, GOAL BELOW 130/80: ICD-10-CM

## 2025-01-13 DIAGNOSIS — E78.2 MIXED HYPERLIPIDEMIA: ICD-10-CM

## 2025-01-13 DIAGNOSIS — D53.9 MACROCYTIC ANEMIA: ICD-10-CM

## 2025-01-13 DIAGNOSIS — G20.A1 PSYCHOSIS DUE TO PARKINSON'S DISEASE: ICD-10-CM

## 2025-01-13 DIAGNOSIS — E78.5 HYPERLIPIDEMIA, UNSPECIFIED HYPERLIPIDEMIA TYPE: ICD-10-CM

## 2025-01-13 DIAGNOSIS — R73.9 ELEVATED BLOOD SUGAR: Primary | ICD-10-CM

## 2025-01-13 DIAGNOSIS — R35.1 NOCTURIA: ICD-10-CM

## 2025-01-13 DIAGNOSIS — F06.8 PSYCHOSIS DUE TO PARKINSON'S DISEASE: ICD-10-CM

## 2025-01-14 RX ORDER — BUDESONIDE, GLYCOPYRROLATE, AND FORMOTEROL FUMARATE 160; 9; 4.8 UG/1; UG/1; UG/1
2 AEROSOL, METERED RESPIRATORY (INHALATION) 2 TIMES DAILY
Qty: 11 G | Refills: 0 | Status: SHIPPED | OUTPATIENT
Start: 2025-01-14

## 2025-01-20 ENCOUNTER — PATIENT MESSAGE (OUTPATIENT)
Dept: PALLIATIVE MEDICINE | Facility: CLINIC | Age: 80
End: 2025-01-20
Payer: MEDICARE

## 2025-01-24 ENCOUNTER — TELEPHONE (OUTPATIENT)
Dept: PULMONOLOGY | Facility: CLINIC | Age: 80
End: 2025-01-24
Payer: MEDICARE

## 2025-01-24 NOTE — TELEPHONE ENCOUNTER
I spoke with patient's daughter, Kelly to let her know she will have to call her father's insurance to see what if on the formulary or a lower tier. Once she calls she will call back to let me know so I can send a message to Dr Cornelius. Kelly verbalized understanding.

## 2025-01-24 NOTE — TELEPHONE ENCOUNTER
----- Message from [employer] sent at 1/24/2025 12:36 PM CST -----  Regarding: Pt advice  Contact: [phone]  Kelly/daughter calling regarding medication BREZTRI AEROSPHERE 160-9-4.8 mcg/actuation HFAA. States cost has increased. Would like to see about a different medication. Please call [phone]

## 2025-01-27 ENCOUNTER — PATIENT MESSAGE (OUTPATIENT)
Dept: PALLIATIVE MEDICINE | Facility: CLINIC | Age: 80
End: 2025-01-27
Payer: MEDICARE

## 2025-01-27 DIAGNOSIS — E78.00 PURE HYPERCHOLESTEROLEMIA: ICD-10-CM

## 2025-01-27 DIAGNOSIS — M54.17 LUMBOSACRAL RADICULOPATHY: ICD-10-CM

## 2025-01-27 RX ORDER — EZETIMIBE 10 MG/1
10 TABLET ORAL
Qty: 90 TABLET | Refills: 0 | Status: SHIPPED | OUTPATIENT
Start: 2025-01-27

## 2025-01-27 RX ORDER — OXYCODONE HYDROCHLORIDE 5 MG/1
5 TABLET ORAL EVERY 4 HOURS PRN
Qty: 120 TABLET | Refills: 0 | Status: SHIPPED | OUTPATIENT
Start: 2025-01-27

## 2025-01-31 ENCOUNTER — PATIENT MESSAGE (OUTPATIENT)
Dept: INTERNAL MEDICINE | Facility: CLINIC | Age: 80
End: 2025-01-31
Payer: MEDICARE

## 2025-01-31 DIAGNOSIS — R30.0 DYSURIA: ICD-10-CM

## 2025-01-31 DIAGNOSIS — R41.0 CONFUSION: Primary | ICD-10-CM

## 2025-02-03 NOTE — TELEPHONE ENCOUNTER
Family messaged over the weekend c/o pt experiencing confusion and pain in side when urinating. They were requesting a UA order. Pt was encouraged to go to [facility] for an eval, however [facility] referred him to ED and he wouldn't go. Pt is scheduled for an appt with PCP Wed but requesting an order for a UA now. Pended if you feel appropriate.

## 2025-02-03 NOTE — TELEPHONE ENCOUNTER
UA/Urine cx ordered   May cancel appt on Wed  ED for acute mental status changes, fevers/chills, hematuria, worseing abdominal pain, etc

## 2025-02-10 NOTE — TELEPHONE ENCOUNTER
No care due was identified.  [facility] Embedded Care Due Messages. Reference number: [id number].   2/10/2025 2:19:51 PM CST

## 2025-02-11 RX ORDER — METOPROLOL SUCCINATE 50 MG/1
TABLET, EXTENDED RELEASE ORAL
Qty: 90 TABLET | Refills: 3 | Status: SHIPPED | OUTPATIENT
Start: 2025-02-11

## 2025-02-12 RX ORDER — CITALOPRAM 20 MG/1
TABLET, FILM COATED ORAL
Qty: 90 TABLET | Refills: 3 | Status: SHIPPED | OUTPATIENT
Start: 2025-02-12

## 2025-02-12 NOTE — TELEPHONE ENCOUNTER
Refill Decision Note   Hector Kellogg  is requesting a refill authorization.  Brief Assessment and Rationale for Refill:  Approve     Medication Therapy Plan:         Pharmacist review requested: Yes   Extended chart review required: Yes   Comments:     Note composed:6:54 PM 02/11/2025

## 2025-02-12 NOTE — TELEPHONE ENCOUNTER
Refill Routing Note   Medication(s) are not appropriate for processing by [facility] for the following reason(s):     [facility] not previously overridden by current provider--after initial override, the Refill Center will be able to continue overrides      Drug-disease interaction: metoprolol succinate and Claudication     [facility] action(s):  Defer           Pharmacist review requested: Yes     Appointments  past 12m or future 3m with PCP    Date Provider   Last Visit   1/10/2025 Blue Mcgill, DO   Next Visit   7/10/2025 Blue Mcgill, DO   ED visits in past 90 days: 0        Note composed:6:10 PM 02/11/2025

## 2025-02-20 ENCOUNTER — PATIENT MESSAGE (OUTPATIENT)
Dept: PALLIATIVE MEDICINE | Facility: CLINIC | Age: 80
End: 2025-02-20
Payer: MEDICARE

## 2025-02-24 ENCOUNTER — TELEPHONE (OUTPATIENT)
Dept: HEPATOLOGY | Facility: HOSPITAL | Age: 80
End: 2025-02-24
Payer: MEDICARE

## 2025-02-24 NOTE — TELEPHONE ENCOUNTER
Called daughter Natalia.  Patient has been having cramping in his legs.  Daughter is found that the oxycodone really has not helped for the cramping in his legs.  She has been weaning down the oxycodone.  He had several bad days which is when she was alarmed.  He is now doing better and she is going to keep doing what she has been.  He is sleeping better.  His pain is better.  She uses a heating pad on his legs when he has the cramping.  He is to follow up with me as regularly scheduled.  She will call me if she has any further problems.

## 2025-03-24 DIAGNOSIS — Z00.00 ENCOUNTER FOR MEDICARE ANNUAL WELLNESS EXAM: ICD-10-CM

## 2025-03-31 DIAGNOSIS — J43.2 CENTRILOBULAR EMPHYSEMA: ICD-10-CM

## 2025-03-31 RX ORDER — BUDESONIDE, GLYCOPYRROLATE, AND FORMOTEROL FUMARATE 160; 9; 4.8 UG/1; UG/1; UG/1
2 AEROSOL, METERED RESPIRATORY (INHALATION) 2 TIMES DAILY
Qty: 11 G | Refills: 0 | Status: SHIPPED | OUTPATIENT
Start: 2025-03-31

## 2025-04-07 ENCOUNTER — NURSE TRIAGE (OUTPATIENT)
Dept: ADMINISTRATIVE | Facility: CLINIC | Age: 80
End: 2025-04-07
Payer: MEDICARE

## 2025-04-07 ENCOUNTER — OCHSNER VIRTUAL EMERGENCY DEPARTMENT (OUTPATIENT)
Facility: CLINIC | Age: 80
End: 2025-04-07
Payer: MEDICARE

## 2025-04-07 ENCOUNTER — TELEPHONE (OUTPATIENT)
Dept: PALLIATIVE MEDICINE | Facility: CLINIC | Age: 80
End: 2025-04-07
Payer: MEDICARE

## 2025-04-07 DIAGNOSIS — R19.7 DIARRHEA, UNSPECIFIED TYPE: Primary | ICD-10-CM

## 2025-04-07 NOTE — PLAN OF CARE-OVED
[facility] Plan of Care Note  Referral Source: Nurse On-Call                               Chief Complaint   Patient presents with    Diarrhea     81yo with multiple medical issues established with [facility] and family called because they want to know if ok to use imodium. Patient was constipated over the weekend and he took miralax on Saturday. He then developed diarrhea. The patient has Parkinson's and he uses depends, but he has had diarrhea overnight. Denies any abdominal pain or fever. He is drinking fluids, voiding as usual and tolerating PO    Recommendation: Treat in place                            Encounter Diagnosis   Name Primary?    Diarrhea, unspecified type Yes      one imodium and see how he does. If he develops any pain or fevers, then needs to go to ER

## 2025-04-07 NOTE — TELEPHONE ENCOUNTER
"Patient was constipated over the weekend and took miralax on Saturday but now has been having diarrhea. Denies any pain or fever, still making urine. Saturday night started with multiple episodes of diarrhea. His daughter is calling in to ask if he can take imodium. Disposition provided - see hcp (or pcp triage) within 4 hours. Sent secure chat to [facility] and chart reviewed by Dr. Nicholson. He advised...." she can give him one imodium and see how he does. If he develops any pain or fevers, then needs to go to ER " Updated patient's daughter. Instructed to call back with additional questions or worsening of symptoms. Patient's daughter verbalized understanding.     Reason for Disposition   [1] SEVERE diarrhea (e.g., 7 or more times / day more than normal) AND [2] age > 60 years    Additional Information   Negative: Shock suspected (e.g., cold/pale/clammy skin, too weak to stand, low BP, rapid pulse)   Negative: Difficult to awaken or acting confused (e.g., disoriented, slurred speech)   Negative: Sounds like a life-threatening emergency to the triager   Negative: [1] SEVERE abdominal pain (e.g., excruciating) AND [2] present > 1 hour   Negative: [1] SEVERE abdominal pain AND [2] age > 60 years   Negative: [1] Blood in the stool AND [2] moderate or large amount of blood   Negative: Black or tarry bowel movements  (Exception: Chronic-unchanged black-grey BMs AND is taking iron pills or Pepto-Bismol.)   Negative: [1] Drinking very little AND [2] dehydration suspected (e.g., no urine > 12 hours, very dry mouth, very lightheaded)   Negative: Patient sounds very sick or weak to the triager    Protocols used: Diarrhea-A-AH    "

## 2025-04-08 ENCOUNTER — TELEPHONE (OUTPATIENT)
Dept: PALLIATIVE MEDICINE | Facility: CLINIC | Age: 80
End: 2025-04-08
Payer: MEDICARE

## 2025-04-09 ENCOUNTER — OFFICE VISIT (OUTPATIENT)
Dept: PALLIATIVE MEDICINE | Facility: CLINIC | Age: 80
End: 2025-04-09
Payer: MEDICARE

## 2025-04-09 ENCOUNTER — TELEPHONE (OUTPATIENT)
Dept: PALLIATIVE MEDICINE | Facility: CLINIC | Age: 80
End: 2025-04-09
Payer: MEDICARE

## 2025-04-09 DIAGNOSIS — M62.838 MUSCLE SPASM OF BOTH LOWER LEGS: Primary | ICD-10-CM

## 2025-04-09 DIAGNOSIS — R25.2 CRAMPS, MUSCLE, GENERAL: ICD-10-CM

## 2025-04-09 DIAGNOSIS — G89.4 CHRONIC PAIN SYNDROME: ICD-10-CM

## 2025-04-09 DIAGNOSIS — Z51.5 PALLIATIVE CARE BY SPECIALIST: ICD-10-CM

## 2025-04-09 DIAGNOSIS — J43.2 CENTRILOBULAR EMPHYSEMA: ICD-10-CM

## 2025-04-09 RX ORDER — BACLOFEN 5 MG/1
5 TABLET ORAL 3 TIMES DAILY PRN
Qty: 90 TABLET | Refills: 11 | Status: SHIPPED | OUTPATIENT
Start: 2025-04-09 | End: 2026-04-09

## 2025-04-09 NOTE — PROGRESS NOTES
"Progress Note  Palliative Care    Reason for visit: leg pain  The patient location is: home  The chief complaint leading to consultation is: leg cramps    Visit type: audiovisual    Face to Face time with patient: 15  30 minutes of total time spent on the encounter, which includes face to face time and non-face to face time preparing to see the patient (eg, review of tests), Obtaining and/or reviewing separately obtained history, Documenting clinical information in the electronic or other health record, Independently interpreting results (not separately reported) and communicating results to the patient/family/caregiver, or Care coordination (not separately reported).         Each patient to whom he or she provides medical services by telemedicine is:  (1) informed of the relationship between the physician and patient and the respective role of any other health care provider with respect to management of the patient; and (2) notified that he or she may decline to receive medical services by telemedicine and may withdraw from such care at any time.    Notes:      ASSESSMENT/PLAN:     Plan/Recommendations:  Hector Fountain" was seen today for pain and fatigue.    Diagnoses and all orders for this visit:    Muscle spasm of both lower legs  -     baclofen (LIORESAL) 5 mg Tab tablet; Take 1 tablet (5 mg total) by mouth 3 (three) times daily as needed (leg cramps).  Trial this daily in am and then if needed can take three times daily. May make sleepy.  Chronic pain syndrome  Takes oxycodone only prn; worry about constipation.  Centrilobular emphysema  Using MDI; encouraged use of spacer to help with dose administration.  Palliative care by specialist  Seems to be doing well. Care provided by wife/daughter.  Cramps, muscle, general  -     baclofen (LIORESAL) 5 mg Tab tablet; Take 1 tablet (5 mg total) by mouth 3 (three) times daily as needed (leg cramps).  Trial this daily in am and then if needed can take three times daily. " May make sleepy.    Understanding of illness/Prognosis: yes     Goals of care: Being as good as he can be.    Follow up: 3 months    Patient's encounter and above plan of care discussed with patient's wife/daughter.    SUBJECTIVE:     History of Present Illness:  Patient is a 80 y.o. year old male presenting with leg cramps when he walks in the am. Toes curl up and gets spasm in calf and thigh. Sleeping ok. Constipated with oxycodone; does not like miralax. Use Senna 2 tabs at bedtime when take a dose?     [place]  reviewed and summarized:    Past Medical History:   Diagnosis Date    Cataract     Chronic back pain greater than 3 months duration     COPD (chronic obstructive pulmonary disease)     Coronary artery disease     3 stents    DDD (degenerative disc disease), lumbar 05/16/2020    Glaucoma suspect of both eyes     Hyperlipidemia     Hypertension     MI, old 2006    Parkinson disease     Renal disorder     Kidney stones     Past Surgical History:   Procedure Laterality Date    CARDIAC CATHETERIZATION  2006    x 3    CARDIAC CATHETERIZATION  10/11/2015    x 2    CATARACT EXTRACTION W/  INTRAOCULAR LENS IMPLANT Right 03/06/2018    Dr. Liu    CORONARY ANGIOPLASTY  2006, 2015    3 stents    CYSTOSCOPY W/ URETERAL STENT PLACEMENT Left 9/13/2023    Procedure: CYSTOSCOPY, WITH URETERAL STENT PLACEMENT;  Surgeon: Jerel Cullen MD;  Location: [address];  Service: Urology;  Laterality: Left;  1.5 HR    EXTRACORPOREAL SHOCK WAVE LITHOTRIPSY Left 4/20/2021    Procedure: LITHOTRIPSY-EXTRACORPOREAL SHOCK WAVE;  Surgeon: Ottoniel Obando MD;  Location: [facility];  Service: Urology;  Laterality: Left;    EXTRACORPOREAL SHOCK WAVE LITHOTRIPSY Left 9/13/2023    Procedure: LITHOTRIPSY, ESWL;  Surgeon: Jerel Cullen MD;  Location: [address];  Service: Urology;  Laterality: Left;    EYE SURGERY      cataracts bilateral    HEMORRHOID SURGERY      INJECTION OF ANESTHETIC AGENT AROUND NERVE Bilateral 11/4/2020     Procedure: BLOCK, NERVE BILATERAL L2,3,4,5;  Surgeon: Ramiro Calvillo MD;  Location: [facility];  Service: Pain Management;  Laterality: Bilateral;  BLOCK, NERVE BILATERAL L2,3,4,5    INJECTION OF FACET JOINT Right 8/5/2020    Procedure: INJECTION, FACET JOINT, L4-L5 , L5-S1;  Surgeon: Ramiro Calvillo MD;  Location: [facility];  Service: Pain Management;  Laterality: Right;    LAMINOFORAMINOTOMY OF SPINE USING MINIMALLY INVASIVE TECHNIQUE N/A 2/1/2021    Procedure: MIS L4-5 Laminectomy;  Surgeon: Bernard Sheldon MD;  Location: [facility];  Service: Neurosurgery;  Laterality: N/A;  Anesthesia: General  Nerve Monitoring: EMG/SEP  Position: Prone  Bed: Eliot Frame on andres  Headrest: Prone View  Radiology: C-arm  Misc: Microscope, microinstruments    TRANSFORAMINAL EPIDURAL INJECTION OF STEROID Right 5/27/2020    Procedure: INJECTION, STEROID, EPIDURAL, TRANSFORAMINAL APPROACH;  Surgeon: Ramiro Calvillo MD;  Location: [facility];  Service: Pain Management;  Laterality: Right;  Right TF ASHISH L4-L5, L5-S1    PTcannot come earlier than 12:30    TRANSFORAMINAL EPIDURAL INJECTION OF STEROID Right 9/9/2020    Procedure: INJECTION, STEROID, EPIDURAL, TRANSFORAMINAL APPROACH;  Surgeon: Ramiro Calvillo MD;  Location: [facility];  Service: Pain Management;  Laterality: Right;  RIGHT TF ASHISH L4/5 and L5/S1     Family History   Problem Relation Name Age of Onset    Heart attack Father 76     Cataracts Father 76     Heart disease Maternal Uncle      Heart disease Paternal Uncle      Hypertension Neg Hx      Asthma Neg Hx      Emphysema Neg Hx      Amblyopia Neg Hx      Blindness Neg Hx      Macular degeneration Neg Hx      Retinal detachment Neg Hx      Strabismus Neg Hx       Review of patient's allergies indicates:   Allergen Reactions    Influenza virus vaccines Other (See Comments)     Caused fever, chills, and made tremors worse       Medications:  Current Medications[1]    OBJECTIVE:       ROS:  Review  of Systems   Constitutional:  Positive for fatigue.   Respiratory:  Negative for shortness of breath.    Musculoskeletal:  Positive for back pain and myalgias.       Review of Symptoms      Symptom Assessment (ESAS 0-10 Scale)  Pain:  0  Dyspnea:  0  Anxiety:  0  Nausea:  0  Depression:  0  Anorexia:  0  Fatigue:  8  Insomnia:  0  Restlessness:  0  Agitation:  0     CAM / Delirium:  Negative  Constipation:  Negative  Diarrhea:  Negative      Bowel Management Plan (BMP):  Yes      Pain Assessment:  OME in 24 hours:  0  Location(s):      Modified [place] Scale:  0    Performance Status:  60    Living Arrangements:  Lives with family    Psychosocial/Cultural:   See Palliative Psychosocial Note: No  ; lives with wife.  Daughter helps daily.  **Primary  to Follow**  Palliative Care  Consult: No    Spiritual:  F - Patricia and Belief:  Christianity  I - Importance:  Yes  C - Community:  Yes  A - Address in Care:  Yes      Advance Care Planning   Advance Directives:   Living Will: No    [place]POST: No    Do Not Resuscitate Status: No    Medical Power of : Yes    Agent's Name:  Eileen Kellogg (Spouse) [phone]    Decision Making:  Patient answered questions and Family answered questions  Goals of Care: The patient and family endorses that what is most important right now is to focus on remaining as independent as possible, symptom/pain control, and improvement in condition but with limits to invasive therapies    Accordingly, we have decided that the best plan to meet the patient's goals includes continuing with treatment              Physical Exam:  Vitals:    Physical Exam  Constitutional:       Appearance: Normal appearance.   Pulmonary:      Effort: Pulmonary effort is normal.   Neurological:      Mental Status: He is alert. Mental status is at baseline.   Psychiatric:         Mood and Affect: Mood normal.         Behavior: Behavior normal.         Thought Content: Thought content  normal.         Judgment: Judgment normal.         Labs:  CBC:   WBC   Date Value Ref Range Status   01/10/2025 5.95 3.90 - 12.70 K/uL Final     Hemoglobin   Date Value Ref Range Status   01/10/2025 13.7 (L) 14.0 - 18.0 g/dL Final     Hematocrit   Date Value Ref Range Status   01/10/2025 43.4 40.0 - 54.0 % Final     MCV   Date Value Ref Range Status   01/10/2025 102 (H) 82 - 98 fL Final     Platelets   Date Value Ref Range Status   01/10/2025 146 (L) 150 - 450 K/uL Final       LFT:   Lab Results   Component Value Date    AST 32 01/10/2025    ALKPHOS 85 01/10/2025    BILITOT 0.5 01/10/2025       Albumin:   Albumin   Date Value Ref Range Status   01/10/2025 3.6 3.5 - 5.2 g/dL Final     Protein:   Total Protein   Date Value Ref Range Status   01/10/2025 6.8 6.0 - 8.4 g/dL Final       Radiology:I have reviewed all pertinent imaging results/findings within the past 24 hours.      I spent a total of 30 minutes on the day of the visit.This includes face to face time in discussion of goals of care, symptom assessment, coordination of care and emotional support.  This also includes non-face to face time preparing to see the patient (eg, review of tests/imaging), obtaining and/or reviewing separately obtained history, documenting clinical information in the electronic or other health record, independently interpreting results and communicating results to the patient/family/caregiver, or care coordinator.           Signature: Keila Jones MD         [1]   Current Outpatient Medications:     albuterol (PROVENTIL/VENTOLIN HFA) 90 mcg/actuation inhaler, INHALE 2 PUFFS BY MOUTH EVERY 6 HOURS AS NEEDED FOR WHEEZING FOR SHORTNESS OF BREATH, Disp: 9 g, Rfl: 11    amLODIPine (NORVASC) 5 MG tablet, Take 1 tablet by mouth once daily, Disp: 30 tablet, Rfl: 11    aspirin (ECOTRIN) 81 MG EC tablet, Take 81 mg by mouth once daily., Disp: , Rfl:     BREZTRI AEROSPHERE 160-9-4.8 mcg/actuation HFAA, Inhale 2 puffs by mouth twice daily,  Disp: 11 g, Rfl: 0    carbidopa-levodopa  mg (SINEMET)  mg per tablet, TAKE 1 & 1/2 (ONE & ONE-HALF) TABLETS BY MOUTH FIVE TIMES DAILY, Disp: 675 tablet, Rfl: 3    citalopram (CELEXA) 20 MG tablet, Take 1 tablet by mouth once daily, Disp: 90 tablet, Rfl: 3    ezetimibe (ZETIA) 10 mg tablet, Take 1 tablet by mouth once daily, Disp: 90 tablet, Rfl: 0    ibuprofen (ADVIL,MOTRIN) 800 MG tablet, Take 800 mg by mouth as needed., Disp: , Rfl:     loratadine (CLARITIN) 10 mg tablet, Take 10 mg by mouth as needed for Allergies., Disp: , Rfl:     meloxicam (MOBIC) 7.5 MG tablet, Take 1 tablet (7.5 mg total) by mouth once daily., Disp: 30 tablet, Rfl: 3    metoprolol succinate (TOPROL-XL) 50 MG 24 hr tablet, Take 1 tablet by mouth once daily, Disp: 90 tablet, Rfl: 3    multivitamin capsule, Take 1 capsule by mouth once daily., Disp: , Rfl:     oxyCODONE (ROXICODONE) 5 MG immediate release tablet, Take 1 tablet (5 mg total) by mouth every 4 (four) hours as needed for Pain., Disp: 120 tablet, Rfl: 0    potassium citrate (UROCIT-K 10) 10 mEq (1,080 mg) TbSR, Take 1 tablet (10 mEq total) by mouth 2 (two) times daily with meals., Disp: 180 tablet, Rfl: 3    rosuvastatin (CRESTOR) 40 MG Tab, TAKE 1 TABLET BY MOUTH NIGHTLY AT BEDTIME, Disp: 90 tablet, Rfl: 3    baclofen (LIORESAL) 5 mg Tab tablet, Take 1 tablet (5 mg total) by mouth 3 (three) times daily as needed (leg cramps)., Disp: 90 tablet, Rfl: 11

## 2025-04-12 DIAGNOSIS — G20.A2 PARKINSON'S DISEASE WITHOUT DYSKINESIA, WITH FLUCTUATING MANIFESTATIONS: ICD-10-CM

## 2025-04-12 DIAGNOSIS — M54.17 LUMBOSACRAL RADICULOPATHY: ICD-10-CM

## 2025-04-14 RX ORDER — MELOXICAM 7.5 MG/1
7.5 TABLET ORAL
Qty: 30 TABLET | Refills: 0 | Status: SHIPPED | OUTPATIENT
Start: 2025-04-14

## 2025-04-21 ENCOUNTER — PATIENT MESSAGE (OUTPATIENT)
Dept: PALLIATIVE MEDICINE | Facility: CLINIC | Age: 80
End: 2025-04-21
Payer: MEDICARE

## 2025-04-26 DIAGNOSIS — E78.00 PURE HYPERCHOLESTEROLEMIA: ICD-10-CM

## 2025-04-26 NOTE — TELEPHONE ENCOUNTER
Refill Routing Note   Medication(s) are not appropriate for processing by [facility] for the following reason(s):        Patient not seen by provider within 15 months    [facility] action(s):  Defer             Appointments  past 12m or future 3m with PCP    Date Provider   Last Visit   Visit date not found Dhaval Lucas MD   Next Visit   Visit date not found Dhaval Lucas MD   ED visits in past 90 days: 0        Note composed:5:43 PM 04/26/2025

## 2025-04-28 RX ORDER — EZETIMIBE 10 MG/1
10 TABLET ORAL
Qty: 90 TABLET | Refills: 0 | Status: SHIPPED | OUTPATIENT
Start: 2025-04-28

## 2025-04-29 ENCOUNTER — TELEPHONE (OUTPATIENT)
Facility: CLINIC | Age: 80
End: 2025-04-29
Payer: MEDICARE

## 2025-04-29 NOTE — TELEPHONE ENCOUNTER
----- Message from Olamide sent at 4/23/2025  4:18 PM CDT -----  Pt spouse calling to schedule follow up hal for July Confirmed patient's contact info below:Contact Name: Hector Cruz Number: [phone]

## 2025-05-02 ENCOUNTER — TELEPHONE (OUTPATIENT)
Facility: CLINIC | Age: 80
End: 2025-05-02
Payer: MEDICARE

## 2025-05-02 NOTE — TELEPHONE ENCOUNTER
----- Message from Kat sent at 5/2/2025  1:59 PM CDT -----  Regarding: returning missed call  Contact: Pt @[phone]  Pt is returning a missed call from someone in the office and is asking for a return call back soon. Thanks. Reason for call: to schedule appt

## 2025-05-15 DIAGNOSIS — G20.A2 PARKINSON'S DISEASE WITHOUT DYSKINESIA, WITH FLUCTUATING MANIFESTATIONS: ICD-10-CM

## 2025-05-15 DIAGNOSIS — M54.17 LUMBOSACRAL RADICULOPATHY: ICD-10-CM

## 2025-05-15 DIAGNOSIS — J43.2 CENTRILOBULAR EMPHYSEMA: ICD-10-CM

## 2025-05-16 ENCOUNTER — PATIENT MESSAGE (OUTPATIENT)
Facility: CLINIC | Age: 80
End: 2025-05-16
Payer: MEDICARE

## 2025-05-16 RX ORDER — MELOXICAM 7.5 MG/1
7.5 TABLET ORAL
Qty: 30 TABLET | Refills: 0 | Status: SHIPPED | OUTPATIENT
Start: 2025-05-16

## 2025-05-16 RX ORDER — BUDESONIDE, GLYCOPYRROLATE, AND FORMOTEROL FUMARATE 160; 9; 4.8 UG/1; UG/1; UG/1
2 AEROSOL, METERED RESPIRATORY (INHALATION) 2 TIMES DAILY
Qty: 11 G | Refills: 0 | Status: SHIPPED | OUTPATIENT
Start: 2025-05-16

## 2025-06-05 ENCOUNTER — OFFICE VISIT (OUTPATIENT)
Facility: CLINIC | Age: 80
End: 2025-06-05
Payer: MEDICARE

## 2025-06-05 ENCOUNTER — TELEPHONE (OUTPATIENT)
Dept: UROLOGY | Facility: CLINIC | Age: 80
End: 2025-06-05
Payer: MEDICARE

## 2025-06-05 DIAGNOSIS — G20.A1 PSYCHOSIS DUE TO PARKINSON'S DISEASE: Primary | ICD-10-CM

## 2025-06-05 DIAGNOSIS — F06.8 PSYCHOSIS DUE TO PARKINSON'S DISEASE: Primary | ICD-10-CM

## 2025-06-05 DIAGNOSIS — G20.B2 PARKINSON'S DISEASE WITH DYSKINESIA AND FLUCTUATING MANIFESTATIONS: ICD-10-CM

## 2025-06-10 NOTE — PROGRESS NOTES
Subjective:       Hector Kellogg is a 80 y.o. male who is a new patient who was seen  for evaluation of stones/UTI.      He is a pt of Dr Cullen, last seen 5/2024. Prior treatment of large >3cm stone burden in L kidney. He has had multiple stone procedures. Last procedure was L ESWL for large L renal pelvic stone. Due to cognitive issues after anesthesia, further stone procedures were held. On Urocit-K. Last CT 5/2024 with significant LLP stone burden.  Planned for repeat CT in 5/2025.     Previously treated for UTI due to AMS with dysuria. UCx negative at time. UCx this month again negative. Multiple neg UCx this year.     ++nocturia. Weaker stream. Previously on Flomax and Proscar - noted hallucinations (suspected from Proscar) and medication stopped. Does not drink much during day. No LE edema. No snoring/PILI.     Also with elevating PSA - 3.9 to 6.2, last checked >1 year ago.      Family assists with much of history today.    PVR (bladder scan) today - 22cc    CT 5/2024 12/4/2024  Returns now with continued nocturia q2h. Did not note change with Uroxatral. They are attempting other meds (Ativan) to help with sleep. They have held UrocitK in efforts to reduce nocturia.     6/11/2025  Still with nocturia. Using handheld urinal. Family considering condom catheter for nighttime use. They note some worsening of Parkinson's.       PSA:  6/22 - 3.9  7/23 - 6.2 (nl < 6.5)      UCx:  11/23, 2/24, 4/24 - neg  6/24 - mixed  8/24, 9/24, 2/25 - neg    The following portions of the patient's history were reviewed and updated as appropriate: allergies, current medications, past family history, past medical history, past social history, past surgical history and problem list.    Review of Systems  Twelve point review of systems completed. Pertinent positive and negatives listed in HPI.      Objective:    Vitals: /63 (BP Location: Left arm, Patient Position: Sitting)   Pulse 76   Temp 97.7 °F (36.5 °C)  "(Temporal)   Ht 5' 4" (1.626 m)   Wt 54.5 kg (120 lb 2.4 oz)   SpO2 99%   BMI 20.62 kg/m²     Physical Exam   General: well developed, well nourished in no acute distress  Head: normocephalic, atraumatic  Neck: no obvious enlargement of thyroid  HEENT: EOMI, mucus membranes moist, sclera anicteric, no hearing impairment  Lungs: symmetric expansion, non-labored breathing  Neuro: alert  Psych: normal mood/affect and non-anxious  Genitourinary: deferred   CLINTON: deferred      Lab Review   Urine analysis today in clinic shows - +LE, 5-10 RBCs, trace protein    Lab Results   Component Value Date    WBC 5.95 01/10/2025    HGB 13.7 (L) 01/10/2025    HCT 43.4 01/10/2025     (H) 01/10/2025     (L) 01/10/2025     Lab Results   Component Value Date    CREATININE 0.8 01/10/2025    BUN 22 01/10/2025     Lab Results   Component Value Date    PSA 6.2 (H) 07/14/2023     No results found for: "PSADIAG"    Imaging  CT reviewed  Reports and images were personally reviewed by me and discussed with the patient today         Assessment/Plan:      1. Recurrent UTI    - Recent UCx all negative. UA with LE and RBCs that lead to UTI treatment though subsequently negative UCx.   - Agree with D-mannose   - Hydrate   - Avoid constipation   - Prior cystoscopy 2023 (Dr Cullen)   - UA without UTI today     2. Microhematuria    - Prior evaluation   - Known large LLP stones     3. Nephrolithiasis    - Known LLP stones. Prior treatment attempts with difficulty post-op due to anesthesia.   - Hoping to avoid further procedures.   - Currently without obstruction   - On Urocit-K, okay to continue     4. Nocturia    - Reduce PM fluids   - Uroxatral - not helpful, stop. Discussed SE.   - D-mannose, UrocitK will not contribute to nocturia - okay to continue   - Given handheld urinal - may help prevent getting up at night and reduce risk for falls   - Discussed possible OAB med (Mybetriq) - will consider, hold for now   - Condom catheter at " night. Will message/call if rx needed.      5. Elevated PSA    - Increasing PSA. Consider repeat check.    - High normal     6. BPH with urinary obstruction     - Uroxatral - no help, okay to stop       Follow up in 12 months    Visit today included increased complexity associated with the care of the episodic problem stones, nocturia addressed and managing the longitudinal care of the patient due to the serious and/or complex managed problem(s) stones,nocturia.

## 2025-06-11 ENCOUNTER — OFFICE VISIT (OUTPATIENT)
Dept: UROLOGY | Facility: CLINIC | Age: 80
End: 2025-06-11
Attending: UROLOGY
Payer: MEDICARE

## 2025-06-11 VITALS
HEART RATE: 76 BPM | HEIGHT: 64 IN | BODY MASS INDEX: 20.51 KG/M2 | OXYGEN SATURATION: 99 % | WEIGHT: 120.13 LBS | SYSTOLIC BLOOD PRESSURE: 135 MMHG | DIASTOLIC BLOOD PRESSURE: 63 MMHG | TEMPERATURE: 98 F

## 2025-06-11 DIAGNOSIS — R31.29 MICROHEMATURIA: ICD-10-CM

## 2025-06-11 DIAGNOSIS — R97.20 ELEVATED PSA: ICD-10-CM

## 2025-06-11 DIAGNOSIS — N20.0 NEPHROLITHIASIS: ICD-10-CM

## 2025-06-11 DIAGNOSIS — R35.1 NOCTURIA: Primary | ICD-10-CM

## 2025-06-11 DIAGNOSIS — N39.0 RECURRENT UTI: ICD-10-CM

## 2025-06-11 LAB
BILIRUB SERPL-MCNC: NORMAL MG/DL
BLOOD URINE, POC: NORMAL
CLARITY, POC UA: CLEAR
COLOR, POC UA: NORMAL
GLUCOSE UR QL STRIP: NORMAL
KETONES UR QL STRIP: NORMAL
LEUKOCYTE ESTERASE URINE, POC: NORMAL
NITRITE, POC UA: NORMAL
PH, POC UA: 5
PROTEIN, POC: NORMAL
SPECIFIC GRAVITY, POC UA: 1.02
UROBILINOGEN, POC UA: NORMAL

## 2025-06-11 PROCEDURE — 1160F RVW MEDS BY RX/DR IN RCRD: CPT | Mod: CPTII,S$GLB,, | Performed by: UROLOGY

## 2025-06-11 PROCEDURE — 1101F PT FALLS ASSESS-DOCD LE1/YR: CPT | Mod: CPTII,S$GLB,, | Performed by: UROLOGY

## 2025-06-11 PROCEDURE — 3078F DIAST BP <80 MM HG: CPT | Mod: CPTII,S$GLB,, | Performed by: UROLOGY

## 2025-06-11 PROCEDURE — 3288F FALL RISK ASSESSMENT DOCD: CPT | Mod: CPTII,S$GLB,, | Performed by: UROLOGY

## 2025-06-11 PROCEDURE — G2211 COMPLEX E/M VISIT ADD ON: HCPCS | Mod: S$GLB,,, | Performed by: UROLOGY

## 2025-06-11 PROCEDURE — 1159F MED LIST DOCD IN RCRD: CPT | Mod: CPTII,S$GLB,, | Performed by: UROLOGY

## 2025-06-11 PROCEDURE — 81002 URINALYSIS NONAUTO W/O SCOPE: CPT | Mod: S$GLB,,, | Performed by: UROLOGY

## 2025-06-11 PROCEDURE — 99213 OFFICE O/P EST LOW 20 MIN: CPT | Mod: S$GLB,,, | Performed by: UROLOGY

## 2025-06-11 PROCEDURE — 3075F SYST BP GE 130 - 139MM HG: CPT | Mod: CPTII,S$GLB,, | Performed by: UROLOGY

## 2025-06-11 PROCEDURE — 1125F AMNT PAIN NOTED PAIN PRSNT: CPT | Mod: CPTII,S$GLB,, | Performed by: UROLOGY

## 2025-06-12 DIAGNOSIS — G20.A2 PARKINSON'S DISEASE WITHOUT DYSKINESIA, WITH FLUCTUATING MANIFESTATIONS: ICD-10-CM

## 2025-06-12 DIAGNOSIS — M54.17 LUMBOSACRAL RADICULOPATHY: ICD-10-CM

## 2025-06-12 RX ORDER — MELOXICAM 7.5 MG/1
7.5 TABLET ORAL
Qty: 30 TABLET | Refills: 0 | Status: SHIPPED | OUTPATIENT
Start: 2025-06-12

## 2025-06-27 ENCOUNTER — PATIENT MESSAGE (OUTPATIENT)
Facility: CLINIC | Age: 80
End: 2025-06-27
Payer: MEDICARE

## 2025-06-27 ENCOUNTER — PATIENT MESSAGE (OUTPATIENT)
Dept: PALLIATIVE MEDICINE | Facility: CLINIC | Age: 80
End: 2025-06-27
Payer: MEDICARE

## 2025-06-30 NOTE — TELEPHONE ENCOUNTER
[place] message noted, responded to patient and routed to provider.              One example of a cream for patient with OMD/TD:  Pharmacist recommended this compounded cream:  Ketoprofen 5%  Amitriptyline 2%  Cyclobenzaprine 2%  Baclofen 2%  Gabapentin 5%        It had to be called in: Phone: [phone]

## 2025-06-30 NOTE — TELEPHONE ENCOUNTER
Placed outgoing call to [facility].   .   Ordered compounded topical pain relief cream from [facility] to be applied 3-4 times daily as needed for right hip/thigh pain before considering more invasive interventions like pain management injections.   60G tube. This will be around $100.

## 2025-07-05 DIAGNOSIS — J43.2 CENTRILOBULAR EMPHYSEMA: ICD-10-CM

## 2025-07-07 RX ORDER — BUDESONIDE, GLYCOPYRROLATE, AND FORMOTEROL FUMARATE 160; 9; 4.8 UG/1; UG/1; UG/1
AEROSOL, METERED RESPIRATORY (INHALATION)
Qty: 11 G | Refills: 11 | Status: SHIPPED | OUTPATIENT
Start: 2025-07-07

## 2025-07-10 ENCOUNTER — OFFICE VISIT (OUTPATIENT)
Dept: PALLIATIVE MEDICINE | Facility: CLINIC | Age: 80
End: 2025-07-10
Payer: MEDICARE

## 2025-07-10 ENCOUNTER — PATIENT MESSAGE (OUTPATIENT)
Dept: PALLIATIVE MEDICINE | Facility: CLINIC | Age: 80
End: 2025-07-10

## 2025-07-10 ENCOUNTER — OFFICE VISIT (OUTPATIENT)
Dept: INTERNAL MEDICINE | Facility: CLINIC | Age: 80
End: 2025-07-10
Payer: MEDICARE

## 2025-07-10 VITALS
BODY MASS INDEX: 20.14 KG/M2 | OXYGEN SATURATION: 98 % | WEIGHT: 117.94 LBS | HEART RATE: 68 BPM | SYSTOLIC BLOOD PRESSURE: 124 MMHG | RESPIRATION RATE: 18 BRPM | HEIGHT: 64 IN | DIASTOLIC BLOOD PRESSURE: 74 MMHG | TEMPERATURE: 98 F

## 2025-07-10 DIAGNOSIS — G20.B1 PARKINSON'S DISEASE WITH DYSKINESIA, UNSPECIFIED WHETHER MANIFESTATIONS FLUCTUATE: ICD-10-CM

## 2025-07-10 DIAGNOSIS — E78.00 PURE HYPERCHOLESTEROLEMIA: ICD-10-CM

## 2025-07-10 DIAGNOSIS — E04.1 THYROID NODULE: ICD-10-CM

## 2025-07-10 DIAGNOSIS — G20.A2 PARKINSON'S DISEASE WITHOUT DYSKINESIA, WITH FLUCTUATING MANIFESTATIONS: Primary | ICD-10-CM

## 2025-07-10 DIAGNOSIS — I25.10 CORONARY ARTERY DISEASE INVOLVING NATIVE CORONARY ARTERY OF NATIVE HEART WITHOUT ANGINA PECTORIS: ICD-10-CM

## 2025-07-10 DIAGNOSIS — G20.A1 PSYCHOSIS DUE TO PARKINSON'S DISEASE: ICD-10-CM

## 2025-07-10 DIAGNOSIS — F41.9 ANXIETY: ICD-10-CM

## 2025-07-10 DIAGNOSIS — I70.0 AORTIC ATHEROSCLEROSIS: ICD-10-CM

## 2025-07-10 DIAGNOSIS — N20.0 NEPHROLITHIASIS: ICD-10-CM

## 2025-07-10 DIAGNOSIS — I10 ESSENTIAL HYPERTENSION: ICD-10-CM

## 2025-07-10 DIAGNOSIS — I73.9 CLAUDICATION: ICD-10-CM

## 2025-07-10 DIAGNOSIS — I27.20 PULMONARY HTN: Chronic | ICD-10-CM

## 2025-07-10 DIAGNOSIS — M47.817 SPONDYLOSIS OF LUMBOSACRAL REGION, UNSPECIFIED SPINAL OSTEOARTHRITIS COMPLICATION STATUS: ICD-10-CM

## 2025-07-10 DIAGNOSIS — Z00.00 ANNUAL PHYSICAL EXAM: Primary | ICD-10-CM

## 2025-07-10 DIAGNOSIS — R73.9 ELEVATED BLOOD SUGAR: ICD-10-CM

## 2025-07-10 DIAGNOSIS — J43.2 CENTRILOBULAR EMPHYSEMA: ICD-10-CM

## 2025-07-10 DIAGNOSIS — F06.8 PSYCHOSIS DUE TO PARKINSON'S DISEASE: ICD-10-CM

## 2025-07-10 DIAGNOSIS — R25.2 CRAMPS, MUSCLE, GENERAL: ICD-10-CM

## 2025-07-10 PROCEDURE — 99999 PR PBB SHADOW E&M-EST. PATIENT-LVL IV: CPT | Mod: PBBFAC,,, | Performed by: INTERNAL MEDICINE

## 2025-07-10 RX ORDER — BACLOFEN 10 MG/1
10 TABLET ORAL 3 TIMES DAILY
Qty: 270 TABLET | Refills: 1 | Status: SHIPPED | OUTPATIENT
Start: 2025-07-10

## 2025-07-10 NOTE — PROGRESS NOTES
Audio Only Telehealth Visit     The patient location is: [place]  The chief complaint leading to consultation is: pain in leg  Visit type: Virtual visit with audio only (telephone)  Total time spent in medical discussion with patient: 11 minutes  Total time spent on date of the encounter:20 minutes       The reason for the audio only service rather than synchronous audio and video virtual visit was related to technical difficulties or patient preference/necessity.       Each patient to whom I provide medical services by telemedicine is:  (1) informed of the relationship between the physician and patient and the respective role of any other health care provider with respect to management of the patient; and (2) notified that they may decline to receive medical services by telemedicine and may withdraw from such care at any time. Patient verbally consented to receive this service via voice-only telephone call.       HPI:  Elderly man with multiple medical problems including Parkinson's disease and dementia.  He has significant pain in his thighs with Charley horse.  He no longer takes oxycodone causes daughter did not feel like it helped.  He is on baclofen 5 mg twice a day which he really feels helps.  Daughter wonders if physical therapy can help with getting the not in the muscle out of his thigh which may help, and then teach the family how to relieve this.  Otherwise he is doing well.  He has continued confusion.  He is taking his medications well.  The daughter is very involved in the care.     Active Ambulatory Problems     Diagnosis Date Noted    Coronary artery disease 08/20/2014    Hyperlipidemia 08/20/2014    Essential hypertension 08/20/2014    Neuropathic pain 08/20/2014    Parkinson disease 08/20/2014    Anxiety 10/19/2015    Chronic low back pain 01/03/2017    Pulmonary HTN 01/03/2017    Centrilobular emphysema 12/13/2017    Exercise hypoxemia 12/13/2017    Glaucoma suspect of both eyes 02/12/2018     NS (nuclear sclerosis), left 02/12/2018    Refractive error 02/12/2018    Senile nuclear sclerosis 03/06/2018    Post-operative state 03/07/2018    Claudication 06/06/2019    Nephrolithiasis 08/01/2019    Lumbosacral radiculopathy 05/16/2020    DDD (degenerative disc disease), lumbar 05/16/2020    Spondylosis without myelopathy 05/16/2020    Chronic pain 05/27/2020    Lung nodules 06/10/2020    Osteoarthritis of spine 08/05/2020    Spondylolisthesis of lumbar region 02/01/2021    Gross hematuria 04/20/2021    Sacroiliitis 07/14/2022    Thyroid nodule 08/21/2022    Aortic atherosclerosis 05/18/2023    Pain of right hip 11/15/2023    Trochanteric bursitis of right hip 11/15/2023    Decreased appetite 11/15/2023    Constipation 11/15/2023    BPH with urinary obstruction 02/06/2024    Recurrent cystitis 04/09/2024    Olecranon bursitis of right elbow 09/04/2024    Nocturia 09/04/2024    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    Palliative care by specialist 11/21/2024    Cramps, muscle, general 04/09/2025     Resolved Ambulatory Problems     Diagnosis Date Noted    Statin intolerance 08/20/2014    Chest pain at rest 10/09/2015    SOB (shortness of breath) 12/21/2016    Fever 12/31/2016    Acute hypoxemic respiratory failure 01/01/2017    Chronic obstructive pulmonary disease     Screening for cancer 01/11/2020    Weakness of both lower extremities 12/15/2020    Decreased ROM of lumbar spine 12/15/2020    Impaired functional mobility, balance, gait, and endurance 12/15/2020    Chronic respiratory failure with hypoxia 10/13/2022    Weakness of both lower extremities 10/24/2023    Decreased range of motion of trunk and back 10/24/2023    Weakness of trunk musculature 10/24/2023    Impaired functional mobility, balance, gait, and endurance 10/24/2023    Acute cystitis without hematuria 09/04/2024     Past Medical History:   Diagnosis Date    Cataract     Chronic back pain greater than 3 months duration     COPD  (chronic obstructive pulmonary disease)     Hypertension     MI, old 2006    Renal disorder      Current Outpatient Medications   Medication Instructions    albuterol (PROVENTIL/VENTOLIN HFA) 90 mcg/actuation inhaler INHALE 2 PUFFS BY MOUTH EVERY 6 HOURS AS NEEDED FOR WHEEZING FOR SHORTNESS OF BREATH    amLODIPine (NORVASC) 5 mg, Oral    aspirin (ECOTRIN) 81 mg, Daily    baclofen (LIORESAL) 5 mg, Oral, 3 times daily PRN    BREZTRI AEROSPHERE 160-9-4.8 mcg/actuation HFAA INHALE 2 PUFFS INTO LUNGS TWICE DAILY    carbidopa-levodopa  mg (SINEMET)  mg per tablet TAKE 1 & 1/2 (ONE & ONE-HALF) TABLETS BY MOUTH FIVE TIMES DAILY    citalopram (CELEXA) 20 MG tablet Take 1 tablet by mouth once daily    ezetimibe (ZETIA) 10 mg, Oral    ibuprofen (ADVIL,MOTRIN) 800 mg, As needed (PRN)    loratadine (CLARITIN) 10 mg, As needed (PRN)    meloxicam (MOBIC) 7.5 mg, Oral    metoprolol succinate (TOPROL-XL) 50 MG 24 hr tablet Take 1 tablet by mouth once daily    multivitamin capsule 1 capsule, Daily    potassium citrate (UROCIT-K 10) 10 mEq (1,080 mg) TbSR 10 mEq, Oral, 2 times daily with meals    rosuvastatin (CRESTOR) 40 MG Tab TAKE 1 TABLET BY MOUTH NIGHTLY AT BEDTIME      Review of patient's allergies indicates:   Allergen Reactions    Influenza virus vaccines Other (See Comments)     Caused fever, chills, and made tremors worse        Diagnoses and all orders for this visit:    Parkinson's disease without dyskinesia, with fluctuating manifestations  Continue current meds as outlined by Dr. Grider and the movement disorder team  Cramps, muscle, general  Referral to home health and physical therapy to assist with cramps.  Because I did not do a face-to-face visit, his primary care doctor we will need to do this as he is on his way to the primary care doctor's office at this moment.             This service was not originating from a related E/M service provided within the previous 7 days nor will  to an E/M  service or procedure within the next 24 hours or my soonest available appointment.  Prevailing standard of care was able to be met in this audio-only visit.      {

## 2025-07-10 NOTE — PROGRESS NOTES
Subjective     Patient ID: Hector Kellogg is a 80 y.o. male.    Chief Complaint: Annual Exam    HPI  80 y.o. Male here for annual exam.      Vaccines: Influenza (declined); Tetanus (declined); Pneumococcal 23 (2022); Prevnar 20 (2023); Shingrix (will consider)  Eye exam: declined  Colonoscopy: declined  DEXA: 7/22     Past Medical History:  No date: Cataract  No date: Chronic back pain greater than 3 months duration  No date: COPD (chronic obstructive pulmonary disease)  No date: Coronary artery disease      Comment:  3 stents  05/16/2020: DDD (degenerative disc disease), lumbar  No date: Glaucoma suspect of both eyes  No date: Hyperlipidemia  No date: Hypertension  2006: MI, old  No date: Parkinson disease  No date: Renal disorder      Comment:  Kidney stones  Past Surgical History:  2006: CARDIAC CATHETERIZATION      Comment:  x 3  10/11/2015: CARDIAC CATHETERIZATION      Comment:  x 2  03/06/2018: CATARACT EXTRACTION W/  INTRAOCULAR LENS IMPLANT; Right      Comment:  Dr. Liu  2006, 2015: CORONARY ANGIOPLASTY      Comment:  3 stents  9/13/2023: CYSTOSCOPY W/ URETERAL STENT PLACEMENT; Left      Comment:  Procedure: CYSTOSCOPY, WITH URETERAL STENT PLACEMENT;                 Surgeon: Jerel Cullen MD;  Location: [facility] OR [address] [place] [address];                Service: Urology;  Laterality: Left;  1.5 HR  4/20/2021: EXTRACORPOREAL SHOCK WAVE LITHOTRIPSY; Left      Comment:  Procedure: LITHOTRIPSY-EXTRACORPOREAL SHOCK WAVE;                 Surgeon: Ottoniel Obando MD;  Location: [facility];                 Service: Urology;  Laterality: Left;  9/13/2023: EXTRACORPOREAL SHOCK WAVE LITHOTRIPSY; Left      Comment:  Procedure: LITHOTRIPSY, ESWL;  Surgeon: Jerel Cullen MD;  Location: [address];  Service: Urology;                 Laterality: Left;  No date: EYE SURGERY      Comment:  cataracts bilateral  No date: HEMORRHOID SURGERY  11/4/2020: INJECTION OF ANESTHETIC AGENT AROUND NERVE; Bilateral       Comment:  Procedure: BLOCK, NERVE BILATERAL L2,3,4,5;  Surgeon:                Ramiro Calvillo MD;  Location: [facility];                 Service: Pain Management;  Laterality: Bilateral;  BLOCK,               NERVE BILATERAL L2,3,4,5  2020: INJECTION OF FACET JOINT; Right      Comment:  Procedure: INJECTION, FACET JOINT, L4-L5 , L5-S1;                 Surgeon: Ramiro Calvillo MD;  Location: [facility];               Service: Pain Management;  Laterality: Right;  2021: LAMINOFORAMINOTOMY OF SPINE USING MINIMALLY INVASIVE   TECHNIQUE; N/A      Comment:  Procedure: MIS L4-5 Laminectomy;  Surgeon: Bernard Sheldon MD;  Location: [facility] OR;  Service: Neurosurgery;                Laterality: N/A;  Anesthesia: GeneralNerve Monitoring:                EMG/SEPPosition: ProneBed: Eliot Frame on                jacksonHeadrest: Prone ViewRadiology: C-armMisc:                Microscope, microinstruments  2020: TRANSFORAMINAL EPIDURAL INJECTION OF STEROID; Right      Comment:  Procedure: INJECTION, STEROID, EPIDURAL, TRANSFORAMINAL                APPROACH;  Surgeon: Ramiro Calvillo MD;  Location:                [facility];  Service: Pain Management;  Laterality:                Right;  Right TF ASHISH L4-L5, L5-T0RRpevuco come                earlier than 12:30  2020: TRANSFORAMINAL EPIDURAL INJECTION OF STEROID; Right      Comment:  Procedure: INJECTION, STEROID, EPIDURAL, TRANSFORAMINAL                APPROACH;  Surgeon: Ramiro Calvillo MD;  Location:                [facility];  Service: Pain Management;  Laterality:                Right;  RIGHT TF ASHISH L4/5 and L5/S1  Social History    Socioeconomic History      Marital status:     Tobacco Use      Smoking status: Former        Packs/day: 0.00        Years: 1 pack/day for 40.0 years (40.0 ttl pk-yrs)        Types: Cigarettes        Start date: 1974        Quit date: 2014        Years since quittin.4         Passive exposure: Past      Smokeless tobacco: Never    Substance and Sexual Activity      Alcohol use: Yes        Comment: socially      Drug use: No    Social Drivers of Health  Financial Resource Strain: Low Risk  (4/9/2025)      Overall Financial Resource Strain (CARDIA)          Difficulty of Paying Living Expenses: Not hard at all  Food Insecurity: No Food Insecurity (4/9/2025)      Hunger Vital Sign          Worried About Running Out of Food in the Last Year: Never true          Ran Out of Food in the Last Year: Never true  Transportation Needs: No Transportation Needs (4/9/2025)      PRAPARE - Transportation          Lack of Transportation (Medical): No          Lack of Transportation (Non-Medical): No  Physical Activity: Inactive (4/9/2025)      Exercise Vital Sign          Days of Exercise per Week: 0 days          Minutes of Exercise per Session: 0 min  Stress: Stress Concern Present (4/9/2025)      [employer] - Occupational Stress Questionnaire          Feeling of Stress : Rather much  Housing Stability: Low Risk  (4/9/2025)      Housing Stability Vital Sign          Unable to Pay for Housing in the Last Year: No          Number of Times Moved in the Last Year: 0          Homeless in the Last Year: No  Review of patient's allergies indicates:   -- Influenza virus vaccines -- Other (See Comments)    --  Caused fever, chills, and made tremors worse  Artur KHOI Kellogg had no medications administered during this visit.  Review of Systems   Constitutional:  Negative for activity change, appetite change, chills, diaphoresis, fatigue, fever and unexpected weight change.   HENT:  Negative for nasal congestion, mouth sores, postnasal drip, rhinorrhea, sinus pressure/congestion, sneezing, sore throat, trouble swallowing and voice change.    Eyes:  Negative for discharge, itching and visual disturbance.   Respiratory:  Negative for cough, chest tightness, shortness of breath and  wheezing.    Cardiovascular:  Negative for chest pain, palpitations and leg swelling.   Gastrointestinal:  Negative for abdominal pain, blood in stool, constipation, diarrhea, nausea and vomiting.   Endocrine: Negative for cold intolerance and heat intolerance.   Genitourinary:  Negative for difficulty urinating, dysuria, flank pain, hematuria and urgency.   Musculoskeletal:  Positive for arthralgias and back pain. Negative for myalgias and neck pain.   Integumentary:  Negative for rash and wound.   Allergic/Immunologic: Negative for environmental allergies and food allergies.   Neurological:  Positive for tremors. Negative for dizziness, seizures, syncope, weakness and headaches.   Hematological:  Negative for adenopathy. Does not bruise/bleed easily.   Psychiatric/Behavioral:  Positive for agitation (mild), confusion and hallucinations. Negative for self-injury, sleep disturbance and suicidal ideas. The patient is nervous/anxious.           Objective     Physical Exam  Constitutional:       General: He is not in acute distress.     Appearance: Normal appearance. He is well-developed. He is not ill-appearing, toxic-appearing or diaphoretic.   HENT:      Head: Normocephalic and atraumatic.      Right Ear: External ear normal.      Left Ear: External ear normal.      Nose: Nose normal.      Mouth/Throat:      Pharynx: No oropharyngeal exudate.   Eyes:      General: No scleral icterus.        Right eye: No discharge.         Left eye: No discharge.      Extraocular Movements: Extraocular movements intact.      Conjunctiva/sclera: Conjunctivae normal.      Pupils: Pupils are equal, round, and reactive to light.   Neck:      Thyroid: No thyromegaly.      Vascular: No JVD.   Cardiovascular:      Rate and Rhythm: Normal rate and regular rhythm.      Pulses: Normal pulses.      Heart sounds: Normal heart sounds. No murmur heard.  Pulmonary:      Effort: Pulmonary effort is normal. No respiratory distress.      Breath  sounds: Normal breath sounds. No wheezing or rales.   Abdominal:      General: Bowel sounds are normal. There is no distension.      Palpations: Abdomen is soft.      Tenderness: There is no abdominal tenderness. There is no right CVA tenderness, left CVA tenderness, guarding or rebound.   Musculoskeletal:      Cervical back: Normal range of motion and neck supple. No rigidity.      Right lower leg: No edema.      Left lower leg: No edema.   Lymphadenopathy:      Cervical: No cervical adenopathy.   Skin:     General: Skin is warm and dry.      Capillary Refill: Capillary refill takes less than 2 seconds.      Coloration: Skin is not pale.      Findings: No rash.   Neurological:      Mental Status: He is alert. Mental status is at baseline.      Cranial Nerves: No cranial nerve deficit.      Sensory: No sensory deficit.      Motor: Weakness present.      Coordination: Coordination abnormal.      Gait: Gait abnormal.      Deep Tendon Reflexes: Reflexes abnormal.   Psychiatric:         Attention and Perception: He perceives visual hallucinations.            Assessment and Plan     1. Annual physical exam    2. Anxiety    3. Spondylosis of lumbosacral region, unspecified spinal osteoarthritis complication status    4. Centrilobular emphysema  Overview:  FEV1 0.99 (43%) predicted  Continue breztri twice daily for control  Albuterol for rescue and prevention.     88% on room air at rest.  Benefits from POC of choice.       5. Coronary artery disease involving native coronary artery of native heart without angina pectoris  Overview:  Stents x 3 2006    PCI of RCA and OM 10/15 with KARINA  LVEF 55% 11/15      6. Pure hypercholesterolemia    7. Essential hypertension  -     CBC Auto Differential; Future; Expected date: 01/10/2026  -     Comprehensive Metabolic Panel; Future; Expected date: 01/10/2026    8. Claudication    9. Pulmonary HTN    10. Aortic atherosclerosis    11. Nephrolithiasis    12. Thyroid nodule    13.  Psychosis due to Parkinson's disease    14. Parkinson's disease with dyskinesia, unspecified whether manifestations fluctuate  -     baclofen (LIORESAL) 10 MG tablet; Take 1 tablet (10 mg total) by mouth 3 (three) times daily.  Dispense: 270 tablet; Refill: 1    15. Elevated blood sugar  -     Hemoglobin A1C; Future; Expected date: 01/10/2026         Blood work reviewed with pt      CAD- S/P NSTEMI and PCI in 2006, with repeat CATH(10/11/15) placing stents to RCA and OM1- stable       Continue BB/statin/ASA; pt has stopped the Imdur 2/2 to low BP      HTN- stable on Toprol XL 50 mg daily      HLD- controlled on Crestor      Parkinson's disease- stable on Sinemet, managed by Neuro   -increase Baclofen to 10 mg TID for RLE spasms(no relief with 5 mg dose)      COPD- stable, followed by Pulm      Mild Pulmonary HTN- stable      Anxiety- stable on Celexa 20 mg daily      Thyroid nodule- followed by ENT      DJD Lumbar spine- stable, stopped the gabapentin 2/2 sedation       Nephrolithiasis- stable      Sacroiliitis- stable      Claudication- stable      Elevated PSA- followed by urology      F/u in 6 months     Over 1/2 of 40 minute visit spent reviewing pt's medical records, education/discussion of pt's medical conditions and medical management

## 2025-07-11 DIAGNOSIS — J43.2 CENTRILOBULAR EMPHYSEMA: ICD-10-CM

## 2025-07-14 RX ORDER — ALBUTEROL SULFATE 90 UG/1
INHALANT RESPIRATORY (INHALATION)
Qty: 9 G | Refills: 3 | Status: SHIPPED | OUTPATIENT
Start: 2025-07-14

## 2025-07-15 ENCOUNTER — HOSPITAL ENCOUNTER (INPATIENT)
Facility: HOSPITAL | Age: 80
LOS: 3 days | Discharge: HOME HEALTH | DRG: 660 | End: 2025-07-19
Attending: EMERGENCY MEDICINE
Payer: MEDICARE
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
## 2025-07-15 DIAGNOSIS — R50.9 FEVER, UNSPECIFIED FEVER CAUSE: ICD-10-CM

## 2025-07-15 DIAGNOSIS — R07.9 CHEST PAIN: ICD-10-CM

## 2025-07-15 DIAGNOSIS — M54.17 LUMBOSACRAL RADICULOPATHY: ICD-10-CM

## 2025-07-15 DIAGNOSIS — N20.1 LEFT URETERAL CALCULUS: Primary | ICD-10-CM

## 2025-07-15 DIAGNOSIS — G20.A2 PARKINSON'S DISEASE WITHOUT DYSKINESIA, WITH FLUCTUATING MANIFESTATIONS: ICD-10-CM

## 2025-07-15 DIAGNOSIS — G93.40 ENCEPHALOPATHY: ICD-10-CM

## 2025-07-15 LAB
ABSOLUTE EOSINOPHIL (OHS): 0.05 K/UL
ABSOLUTE MONOCYTE (OHS): 1.17 K/UL (ref 0.3–1)
ABSOLUTE NEUTROPHIL COUNT (OHS): 10.3 K/UL (ref 1.8–7.7)
ALBUMIN SERPL BCP-MCNC: 3.3 G/DL (ref 3.5–5.2)
ALP SERPL-CCNC: 76 UNIT/L (ref 40–150)
ALT SERPL W/O P-5'-P-CCNC: 7 UNIT/L (ref 10–44)
ANION GAP (OHS): 8 MMOL/L (ref 8–16)
AST SERPL-CCNC: 25 UNIT/L (ref 11–45)
BACTERIA #/AREA URNS AUTO: ABNORMAL /HPF
BACTERIA #/AREA URNS AUTO: ABNORMAL /HPF
BASOPHILS # BLD AUTO: 0.02 K/UL
BASOPHILS NFR BLD AUTO: 0.2 %
BILIRUB SERPL-MCNC: 0.9 MG/DL (ref 0.1–1)
BILIRUB UR QL STRIP.AUTO: NEGATIVE
BILIRUB UR QL STRIP.AUTO: NEGATIVE
BIPAP: 0
BNP SERPL-MCNC: 55 PG/ML (ref 0–99)
BUN SERPL-MCNC: 24 MG/DL (ref 8–23)
CALCIUM SERPL-MCNC: 8.6 MG/DL (ref 8.7–10.5)
CHLORIDE SERPL-SCNC: 112 MMOL/L (ref 95–110)
CLARITY UR: CLEAR
CLARITY UR: CLEAR
CO2 SERPL-SCNC: 22 MMOL/L (ref 23–29)
COLOR UR AUTO: YELLOW
COLOR UR AUTO: YELLOW
CREAT SERPL-MCNC: 0.8 MG/DL (ref 0.5–1.4)
ERYTHROCYTE [DISTWIDTH] IN BLOOD BY AUTOMATED COUNT: 12.7 % (ref 11.5–14.5)
FIO2: 21 %
FLUAV AG UPPER RESP QL IA.RAPID: NEGATIVE
FLUBV AG UPPER RESP QL IA.RAPID: NEGATIVE
GFR SERPLBLD CREATININE-BSD FMLA CKD-EPI: >60 ML/MIN/1.73/M2
GLUCOSE SERPL-MCNC: 92 MG/DL (ref 70–110)
GLUCOSE UR QL STRIP: NEGATIVE
GLUCOSE UR QL STRIP: NEGATIVE
HCT VFR BLD AUTO: 40.4 % (ref 40–54)
HCV AB SERPL QL IA: NORMAL
HGB BLD-MCNC: 13.1 GM/DL (ref 14–18)
HGB UR QL STRIP: ABNORMAL
HGB UR QL STRIP: ABNORMAL
HIV 1+2 AB+HIV1 P24 AG SERPL QL IA: NORMAL
HYALINE CASTS UR QL AUTO: 0 /LPF (ref 0–1)
HYALINE CASTS UR QL AUTO: 4 /LPF (ref 0–1)
IMM GRANULOCYTES # BLD AUTO: 0.05 K/UL (ref 0–0.04)
IMM GRANULOCYTES NFR BLD AUTO: 0.4 % (ref 0–0.5)
KETONES UR QL STRIP: ABNORMAL
KETONES UR QL STRIP: ABNORMAL
LDH SERPL L TO P-CCNC: 1.2 MMOL/L
LEUKOCYTE ESTERASE UR QL STRIP: ABNORMAL
LEUKOCYTE ESTERASE UR QL STRIP: ABNORMAL
LYMPHOCYTES # BLD AUTO: 1.09 K/UL (ref 1–4.8)
MAGNESIUM SERPL-MCNC: 2 MG/DL (ref 1.6–2.6)
MCH RBC QN AUTO: 31.9 PG (ref 27–31)
MCHC RBC AUTO-ENTMCNC: 32.4 G/DL (ref 32–36)
MCV RBC AUTO: 98 FL (ref 82–98)
MICROSCOPIC COMMENT: ABNORMAL
MICROSCOPIC COMMENT: ABNORMAL
NITRITE UR QL STRIP: NEGATIVE
NITRITE UR QL STRIP: NEGATIVE
NUCLEATED RBC (/100WBC) (OHS): 0 /100 WBC
PH UR STRIP: 6 [PH]
PH UR STRIP: 6 [PH]
PLATELET # BLD AUTO: 165 K/UL (ref 150–450)
PMV BLD AUTO: 9.9 FL (ref 9.2–12.9)
POC PERFORMED BY: NORMAL
POC TEMPERATURE: 37 C
POTASSIUM SERPL-SCNC: 4 MMOL/L (ref 3.5–5.1)
PROT SERPL-MCNC: 6 GM/DL (ref 6–8.4)
PROT UR QL STRIP: NEGATIVE
PROT UR QL STRIP: NEGATIVE
RBC # BLD AUTO: 4.11 M/UL (ref 4.6–6.2)
RBC #/AREA URNS AUTO: >100 /HPF (ref 0–4)
RBC #/AREA URNS AUTO: >100 /HPF (ref 0–4)
RELATIVE EOSINOPHIL (OHS): 0.4 %
RELATIVE LYMPHOCYTE (OHS): 8.6 % (ref 18–48)
RELATIVE MONOCYTE (OHS): 9.2 % (ref 4–15)
RELATIVE NEUTROPHIL (OHS): 81.2 % (ref 38–73)
RSV A 5' UTR RNA NPH QL NAA+PROBE: NEGATIVE
SARS-COV-2 RNA RESP QL NAA+PROBE: NEGATIVE
SODIUM SERPL-SCNC: 142 MMOL/L (ref 136–145)
SP GR UR STRIP: 1.02
SP GR UR STRIP: 1.02
SPECIMEN SOURCE: NORMAL
SQUAMOUS #/AREA URNS AUTO: <1 /HPF
SQUAMOUS #/AREA URNS AUTO: <1 /HPF
TROPONIN I SERPL HS-MCNC: 4 NG/L
TSH SERPL-ACNC: 1.11 UIU/ML (ref 0.4–4)
UROBILINOGEN UR STRIP-ACNC: NEGATIVE EU/DL
UROBILINOGEN UR STRIP-ACNC: NEGATIVE EU/DL
WBC # BLD AUTO: 12.68 K/UL (ref 3.9–12.7)
WBC #/AREA URNS AUTO: 13 /HPF (ref 0–5)
WBC #/AREA URNS AUTO: 13 /HPF (ref 0–5)
YEAST UR QL AUTO: ABNORMAL /HPF

## 2025-07-15 PROCEDURE — 96365 THER/PROPH/DIAG IV INF INIT: CPT

## 2025-07-15 PROCEDURE — 84443 ASSAY THYROID STIM HORMONE: CPT | Performed by: EMERGENCY MEDICINE

## 2025-07-15 PROCEDURE — 84484 ASSAY OF TROPONIN QUANT: CPT | Performed by: EMERGENCY MEDICINE

## 2025-07-15 PROCEDURE — 83735 ASSAY OF MAGNESIUM: CPT | Performed by: EMERGENCY MEDICINE

## 2025-07-15 PROCEDURE — 93010 ELECTROCARDIOGRAM REPORT: CPT | Mod: ,,, | Performed by: INTERNAL MEDICINE

## 2025-07-15 PROCEDURE — 94761 N-INVAS EAR/PLS OXIMETRY MLT: CPT | Mod: XB

## 2025-07-15 PROCEDURE — 83880 ASSAY OF NATRIURETIC PEPTIDE: CPT | Performed by: EMERGENCY MEDICINE

## 2025-07-15 PROCEDURE — 99285 EMERGENCY DEPT VISIT HI MDM: CPT | Mod: 25

## 2025-07-15 PROCEDURE — 87389 HIV-1 AG W/HIV-1&-2 AB AG IA: CPT | Performed by: EMERGENCY MEDICINE

## 2025-07-15 PROCEDURE — 81001 URINALYSIS AUTO W/SCOPE: CPT | Performed by: EMERGENCY MEDICINE

## 2025-07-15 PROCEDURE — 85025 COMPLETE CBC W/AUTO DIFF WBC: CPT | Performed by: EMERGENCY MEDICINE

## 2025-07-15 PROCEDURE — 86803 HEPATITIS C AB TEST: CPT | Performed by: EMERGENCY MEDICINE

## 2025-07-15 PROCEDURE — 87040 BLOOD CULTURE FOR BACTERIA: CPT | Performed by: EMERGENCY MEDICINE

## 2025-07-15 PROCEDURE — 93005 ELECTROCARDIOGRAM TRACING: CPT

## 2025-07-15 PROCEDURE — 82803 BLOOD GASES ANY COMBINATION: CPT

## 2025-07-15 PROCEDURE — 87637 SARSCOV2&INF A&B&RSV AMP PRB: CPT | Performed by: EMERGENCY MEDICINE

## 2025-07-15 PROCEDURE — 82746 ASSAY OF FOLIC ACID SERUM: CPT

## 2025-07-15 PROCEDURE — 87086 URINE CULTURE/COLONY COUNT: CPT | Performed by: EMERGENCY MEDICINE

## 2025-07-15 PROCEDURE — 83605 ASSAY OF LACTIC ACID: CPT

## 2025-07-15 PROCEDURE — 25000003 PHARM REV CODE 250: Performed by: EMERGENCY MEDICINE

## 2025-07-15 PROCEDURE — 82607 VITAMIN B-12: CPT

## 2025-07-15 PROCEDURE — 80053 COMPREHEN METABOLIC PANEL: CPT | Performed by: EMERGENCY MEDICINE

## 2025-07-15 PROCEDURE — 99900035 HC TECH TIME PER 15 MIN (STAT)

## 2025-07-15 RX ORDER — ACETAMINOPHEN 500 MG
1000 TABLET ORAL
Status: COMPLETED | OUTPATIENT
Start: 2025-07-15 | End: 2025-07-15

## 2025-07-15 RX ORDER — MELOXICAM 7.5 MG/1
7.5 TABLET ORAL DAILY
Qty: 30 TABLET | Refills: 0 | Status: SHIPPED | OUTPATIENT
Start: 2025-07-15

## 2025-07-15 RX ADMIN — IOHEXOL 100 ML: 350 INJECTION, SOLUTION INTRAVENOUS at 11:07

## 2025-07-15 RX ADMIN — ACETAMINOPHEN 1000 MG: 500 TABLET ORAL at 08:07

## 2025-07-15 NOTE — FIRST PROVIDER EVALUATION
"Medical screening examination initiated.  I have conducted a focused provider triage encounter, findings are as follows:    Brief history of present illness:  80-year-old male with a history of hyperlipidemia, hypertension, Parkinson's seen, renal disorder, COPD, coronary artery disease with stents presenting via EMS for altered mental status.  Family present at bedside.  They report he has feeling increasing weakness that has generalized with increased confusion and disorientation.  Reports falling with no loss of consciousness and no evidence of head trauma.  His baseline neuro is alert and oriented x3.  No reported blood thinners, patient currently on aspirin.  Per EMS blood sugar stable, 12 lead ECG without ischemia and hemodynamically stable.    Vitals:    07/15/25 1749   BP: (!) 141/88   Pulse: 86   Resp: 18   Temp: 98.6 °F (37 °C)   TempSrc: Oral   SpO2: 100%   Weight: 52.2 kg (115 lb)   Height: 5' 4" (1.626 m)       Pertinent physical exam:  Altered, confused, lethargic and disoriented with no focal deficits    Brief workup plan:  Labs, ECG, UA, CT head and chest x-ray    Preliminary workup initiated; this workup will be continued and followed by the physician or advanced practice provider that is assigned to the patient when roomed.    Shay Rosa DO, FAAEM, FACEP  Senior Emergency Staff Physician   Dept of Emergency Medicine   [facility]          Disclaimer: This note has been generated using voice-recognition software. There may be typographical errors that have been missed during proof-reading.    " DiscussedHPI:    Patient ID: Cyndi Wilde is a 61year old female. 63-year-old female presents with recurrent pain associated with her toenails. Patient reports relief by previous debridement.   All options of care and she elects to consider the oral

## 2025-07-16 ENCOUNTER — ANESTHESIA EVENT (OUTPATIENT)
Dept: SURGERY | Facility: HOSPITAL | Age: 80
End: 2025-07-16
Payer: MEDICARE

## 2025-07-16 ENCOUNTER — ANESTHESIA (OUTPATIENT)
Dept: SURGERY | Facility: HOSPITAL | Age: 80
End: 2025-07-16
Payer: MEDICARE

## 2025-07-16 PROBLEM — E46 HYPOALBUMINEMIA DUE TO PROTEIN-CALORIE MALNUTRITION: Status: ACTIVE | Noted: 2025-07-16

## 2025-07-16 PROBLEM — K52.89 STERCORAL COLITIS: Status: ACTIVE | Noted: 2025-07-16

## 2025-07-16 PROBLEM — G93.40 ENCEPHALOPATHY: Status: ACTIVE | Noted: 2025-07-16

## 2025-07-16 PROBLEM — E88.09 HYPOALBUMINEMIA DUE TO PROTEIN-CALORIE MALNUTRITION: Status: ACTIVE | Noted: 2025-07-16

## 2025-07-16 PROBLEM — D64.9 NORMOCYTIC ANEMIA: Status: ACTIVE | Noted: 2025-07-16

## 2025-07-16 PROBLEM — N30.01 ACUTE CYSTITIS WITH HEMATURIA: Status: ACTIVE | Noted: 2024-09-04

## 2025-07-16 PROBLEM — N20.1 LEFT URETERAL CALCULUS: Status: ACTIVE | Noted: 2025-07-16

## 2025-07-16 LAB
AMMONIA PLAS-SCNC: 41 UMOL/L (ref 10–50)
FOLATE SERPL-MCNC: 15.8 NG/ML (ref 4–24)
HOLD SPECIMEN: NORMAL
HOLD SPECIMEN: NORMAL
LACTATE SERPL-SCNC: 1.3 MMOL/L (ref 0.5–2.2)
OHS QRS DURATION: 112 MS
OHS QTC CALCULATION: 454 MS
VIT B12 SERPL-MCNC: 886 PG/ML (ref 210–950)

## 2025-07-16 PROCEDURE — 71000015 HC POSTOP RECOV 1ST HR: Performed by: UROLOGY

## 2025-07-16 PROCEDURE — 0T778DZ DILATION OF LEFT URETER WITH INTRALUMINAL DEVICE, VIA NATURAL OR ARTIFICIAL OPENING ENDOSCOPIC: ICD-10-PCS | Performed by: UROLOGY

## 2025-07-16 PROCEDURE — 63600175 PHARM REV CODE 636 W HCPCS: Performed by: ANESTHESIOLOGY

## 2025-07-16 PROCEDURE — 96365 THER/PROPH/DIAG IV INF INIT: CPT | Mod: 59

## 2025-07-16 PROCEDURE — C1758 CATHETER, URETERAL: HCPCS | Performed by: UROLOGY

## 2025-07-16 PROCEDURE — 25000003 PHARM REV CODE 250

## 2025-07-16 PROCEDURE — 95813 EEG EXTND MNTR 61-119 MIN: CPT

## 2025-07-16 PROCEDURE — 96376 TX/PRO/DX INJ SAME DRUG ADON: CPT

## 2025-07-16 PROCEDURE — 95813 EEG EXTND MNTR 61-119 MIN: CPT | Mod: 26,,, | Performed by: STUDENT IN AN ORGANIZED HEALTH CARE EDUCATION/TRAINING PROGRAM

## 2025-07-16 PROCEDURE — 21400001 HC TELEMETRY ROOM

## 2025-07-16 PROCEDURE — 25000242 PHARM REV CODE 250 ALT 637 W/ HCPCS

## 2025-07-16 PROCEDURE — 71000033 HC RECOVERY, INTIAL HOUR: Performed by: UROLOGY

## 2025-07-16 PROCEDURE — 96375 TX/PRO/DX INJ NEW DRUG ADDON: CPT

## 2025-07-16 PROCEDURE — 37000009 HC ANESTHESIA EA ADD 15 MINS: Performed by: UROLOGY

## 2025-07-16 PROCEDURE — C1769 GUIDE WIRE: HCPCS | Performed by: UROLOGY

## 2025-07-16 PROCEDURE — 63600175 PHARM REV CODE 636 W HCPCS

## 2025-07-16 PROCEDURE — 71000039 HC RECOVERY, EACH ADD'L HOUR: Performed by: UROLOGY

## 2025-07-16 PROCEDURE — 63600175 PHARM REV CODE 636 W HCPCS: Performed by: EMERGENCY MEDICINE

## 2025-07-16 PROCEDURE — 36000707: Performed by: UROLOGY

## 2025-07-16 PROCEDURE — 99222 1ST HOSP IP/OBS MODERATE 55: CPT | Mod: 25,GC,ICN, | Performed by: UROLOGY

## 2025-07-16 PROCEDURE — 25000003 PHARM REV CODE 250: Performed by: STUDENT IN AN ORGANIZED HEALTH CARE EDUCATION/TRAINING PROGRAM

## 2025-07-16 PROCEDURE — 83605 ASSAY OF LACTIC ACID: CPT

## 2025-07-16 PROCEDURE — 94761 N-INVAS EAR/PLS OXIMETRY MLT: CPT

## 2025-07-16 PROCEDURE — 97112 NEUROMUSCULAR REEDUCATION: CPT

## 2025-07-16 PROCEDURE — 25000003 PHARM REV CODE 250: Performed by: EMERGENCY MEDICINE

## 2025-07-16 PROCEDURE — 52332 CYSTOSCOPY AND TREATMENT: CPT | Mod: LT,,, | Performed by: UROLOGY

## 2025-07-16 PROCEDURE — 97162 PT EVAL MOD COMPLEX 30 MIN: CPT

## 2025-07-16 PROCEDURE — 71000016 HC POSTOP RECOV ADDL HR: Performed by: UROLOGY

## 2025-07-16 PROCEDURE — 87086 URINE CULTURE/COLONY COUNT: CPT | Performed by: UROLOGY

## 2025-07-16 PROCEDURE — 74420 UROGRAPHY RTRGR +-KUB: CPT | Mod: 26,,, | Performed by: UROLOGY

## 2025-07-16 PROCEDURE — 97530 THERAPEUTIC ACTIVITIES: CPT

## 2025-07-16 PROCEDURE — C2617 STENT, NON-COR, TEM W/O DEL: HCPCS | Performed by: UROLOGY

## 2025-07-16 PROCEDURE — 25500020 PHARM REV CODE 255: Performed by: EMERGENCY MEDICINE

## 2025-07-16 PROCEDURE — BT141ZZ FLUOROSCOPY OF KIDNEYS, URETERS AND BLADDER USING LOW OSMOLAR CONTRAST: ICD-10-PCS | Performed by: UROLOGY

## 2025-07-16 PROCEDURE — 96361 HYDRATE IV INFUSION ADD-ON: CPT

## 2025-07-16 PROCEDURE — 97166 OT EVAL MOD COMPLEX 45 MIN: CPT

## 2025-07-16 PROCEDURE — 36415 COLL VENOUS BLD VENIPUNCTURE: CPT

## 2025-07-16 PROCEDURE — 82140 ASSAY OF AMMONIA: CPT

## 2025-07-16 PROCEDURE — 36000706: Performed by: UROLOGY

## 2025-07-16 PROCEDURE — 37000008 HC ANESTHESIA 1ST 15 MINUTES: Performed by: UROLOGY

## 2025-07-16 PROCEDURE — 63600175 PHARM REV CODE 636 W HCPCS: Performed by: STUDENT IN AN ORGANIZED HEALTH CARE EDUCATION/TRAINING PROGRAM

## 2025-07-16 DEVICE — STENT URETERAL UNIV 6FR 22CM: Type: IMPLANTABLE DEVICE | Site: URETER | Status: FUNCTIONAL

## 2025-07-16 RX ORDER — FLUTICASONE FUROATE AND VILANTEROL 200; 25 UG/1; UG/1
1 POWDER RESPIRATORY (INHALATION) DAILY
Status: DISCONTINUED | OUTPATIENT
Start: 2025-07-16 | End: 2025-07-19 | Stop reason: HOSPADM

## 2025-07-16 RX ORDER — CITALOPRAM 20 MG/1
20 TABLET ORAL DAILY
Status: DISCONTINUED | OUTPATIENT
Start: 2025-07-16 | End: 2025-07-19 | Stop reason: HOSPADM

## 2025-07-16 RX ORDER — SODIUM CHLORIDE 0.9 % (FLUSH) 0.9 %
10 SYRINGE (ML) INJECTION EVERY 12 HOURS PRN
Status: DISCONTINUED | OUTPATIENT
Start: 2025-07-16 | End: 2025-07-19 | Stop reason: HOSPADM

## 2025-07-16 RX ORDER — ASPIRIN 81 MG/1
81 TABLET ORAL DAILY
Status: DISCONTINUED | OUTPATIENT
Start: 2025-07-16 | End: 2025-07-19 | Stop reason: HOSPADM

## 2025-07-16 RX ORDER — FENTANYL CITRATE 50 UG/ML
25 INJECTION, SOLUTION INTRAMUSCULAR; INTRAVENOUS EVERY 5 MIN PRN
Status: DISCONTINUED | OUTPATIENT
Start: 2025-07-16 | End: 2025-07-16 | Stop reason: HOSPADM

## 2025-07-16 RX ORDER — ATORVASTATIN CALCIUM 40 MG/1
80 TABLET, FILM COATED ORAL NIGHTLY
Status: DISCONTINUED | OUTPATIENT
Start: 2025-07-16 | End: 2025-07-19 | Stop reason: HOSPADM

## 2025-07-16 RX ORDER — PROPOFOL 10 MG/ML
VIAL (ML) INTRAVENOUS CONTINUOUS PRN
Status: DISCONTINUED | OUTPATIENT
Start: 2025-07-16 | End: 2025-07-16

## 2025-07-16 RX ORDER — ONDANSETRON 8 MG/1
8 TABLET, ORALLY DISINTEGRATING ORAL EVERY 8 HOURS PRN
Status: DISCONTINUED | OUTPATIENT
Start: 2025-07-16 | End: 2025-07-19 | Stop reason: HOSPADM

## 2025-07-16 RX ORDER — SYRING-NEEDL,DISP,INSUL,0.3 ML 29 G X1/2"
296 SYRINGE, EMPTY DISPOSABLE MISCELLANEOUS
Status: DISCONTINUED | OUTPATIENT
Start: 2025-07-16 | End: 2025-07-16

## 2025-07-16 RX ORDER — IBUPROFEN 200 MG
16 TABLET ORAL
Status: DISCONTINUED | OUTPATIENT
Start: 2025-07-16 | End: 2025-07-19 | Stop reason: HOSPADM

## 2025-07-16 RX ORDER — PSEUDOEPHEDRINE/ACETAMINOPHEN 30MG-500MG
100 TABLET ORAL
Status: DISCONTINUED | OUTPATIENT
Start: 2025-07-16 | End: 2025-07-16

## 2025-07-16 RX ORDER — NALOXONE HCL 0.4 MG/ML
0.02 VIAL (ML) INJECTION
Status: DISCONTINUED | OUTPATIENT
Start: 2025-07-16 | End: 2025-07-19 | Stop reason: HOSPADM

## 2025-07-16 RX ORDER — ALBUTEROL SULFATE 90 UG/1
2 INHALANT RESPIRATORY (INHALATION) EVERY 6 HOURS PRN
Status: DISCONTINUED | OUTPATIENT
Start: 2025-07-16 | End: 2025-07-19 | Stop reason: HOSPADM

## 2025-07-16 RX ORDER — ONDANSETRON HYDROCHLORIDE 2 MG/ML
4 INJECTION, SOLUTION INTRAVENOUS DAILY PRN
Status: DISCONTINUED | OUTPATIENT
Start: 2025-07-16 | End: 2025-07-16 | Stop reason: HOSPADM

## 2025-07-16 RX ORDER — METOPROLOL SUCCINATE 50 MG/1
50 TABLET, EXTENDED RELEASE ORAL DAILY
Status: DISCONTINUED | OUTPATIENT
Start: 2025-07-16 | End: 2025-07-19 | Stop reason: HOSPADM

## 2025-07-16 RX ORDER — ACETAMINOPHEN 325 MG/1
650 TABLET ORAL EVERY 8 HOURS PRN
Status: DISCONTINUED | OUTPATIENT
Start: 2025-07-16 | End: 2025-07-19 | Stop reason: HOSPADM

## 2025-07-16 RX ORDER — ALUMINUM HYDROXIDE, MAGNESIUM HYDROXIDE, AND SIMETHICONE 1200; 120; 1200 MG/30ML; MG/30ML; MG/30ML
30 SUSPENSION ORAL 4 TIMES DAILY PRN
Status: DISCONTINUED | OUTPATIENT
Start: 2025-07-16 | End: 2025-07-19 | Stop reason: HOSPADM

## 2025-07-16 RX ORDER — LIDOCAINE HYDROCHLORIDE 20 MG/ML
INJECTION INTRAVENOUS
Status: DISCONTINUED | OUTPATIENT
Start: 2025-07-16 | End: 2025-07-16

## 2025-07-16 RX ORDER — ONDANSETRON HYDROCHLORIDE 2 MG/ML
INJECTION, SOLUTION INTRAVENOUS
Status: DISCONTINUED | OUTPATIENT
Start: 2025-07-16 | End: 2025-07-16

## 2025-07-16 RX ORDER — PROPOFOL 10 MG/ML
VIAL (ML) INTRAVENOUS
Status: DISCONTINUED | OUTPATIENT
Start: 2025-07-16 | End: 2025-07-16

## 2025-07-16 RX ORDER — CEFTRIAXONE 1 G/1
1 INJECTION, POWDER, FOR SOLUTION INTRAMUSCULAR; INTRAVENOUS
Status: DISCONTINUED | OUTPATIENT
Start: 2025-07-16 | End: 2025-07-18

## 2025-07-16 RX ORDER — SENNOSIDES 8.6 MG/1
8.6 TABLET ORAL 2 TIMES DAILY
Status: DISCONTINUED | OUTPATIENT
Start: 2025-07-16 | End: 2025-07-19 | Stop reason: HOSPADM

## 2025-07-16 RX ORDER — EZETIMIBE 10 MG/1
10 TABLET ORAL NIGHTLY
Status: DISCONTINUED | OUTPATIENT
Start: 2025-07-16 | End: 2025-07-19 | Stop reason: HOSPADM

## 2025-07-16 RX ORDER — MELOXICAM 7.5 MG/1
7.5 TABLET ORAL DAILY
Status: DISCONTINUED | OUTPATIENT
Start: 2025-07-16 | End: 2025-07-16

## 2025-07-16 RX ORDER — AMLODIPINE BESYLATE 5 MG/1
5 TABLET ORAL DAILY
Status: DISCONTINUED | OUTPATIENT
Start: 2025-07-16 | End: 2025-07-19 | Stop reason: HOSPADM

## 2025-07-16 RX ORDER — SODIUM CHLORIDE 0.9 G/100ML
500 IRRIGANT IRRIGATION
Status: DISCONTINUED | OUTPATIENT
Start: 2025-07-16 | End: 2025-07-16

## 2025-07-16 RX ORDER — ACETAMINOPHEN 325 MG/1
650 TABLET ORAL EVERY 4 HOURS PRN
Status: DISCONTINUED | OUTPATIENT
Start: 2025-07-16 | End: 2025-07-19 | Stop reason: HOSPADM

## 2025-07-16 RX ORDER — IPRATROPIUM BROMIDE AND ALBUTEROL SULFATE 2.5; .5 MG/3ML; MG/3ML
3 SOLUTION RESPIRATORY (INHALATION) EVERY 6 HOURS PRN
Status: DISCONTINUED | OUTPATIENT
Start: 2025-07-16 | End: 2025-07-19 | Stop reason: HOSPADM

## 2025-07-16 RX ORDER — POLYETHYLENE GLYCOL 3350 17 G/17G
17 POWDER, FOR SOLUTION ORAL 2 TIMES DAILY
Status: DISCONTINUED | OUTPATIENT
Start: 2025-07-16 | End: 2025-07-19 | Stop reason: HOSPADM

## 2025-07-16 RX ORDER — IBUPROFEN 200 MG
24 TABLET ORAL
Status: DISCONTINUED | OUTPATIENT
Start: 2025-07-16 | End: 2025-07-19 | Stop reason: HOSPADM

## 2025-07-16 RX ORDER — ENOXAPARIN SODIUM 100 MG/ML
40 INJECTION SUBCUTANEOUS EVERY 24 HOURS
Status: DISCONTINUED | OUTPATIENT
Start: 2025-07-16 | End: 2025-07-19 | Stop reason: HOSPADM

## 2025-07-16 RX ORDER — GLUCAGON 1 MG
1 KIT INJECTION
Status: DISCONTINUED | OUTPATIENT
Start: 2025-07-16 | End: 2025-07-16 | Stop reason: HOSPADM

## 2025-07-16 RX ORDER — SODIUM CHLORIDE, SODIUM LACTATE, POTASSIUM CHLORIDE, CALCIUM CHLORIDE 600; 310; 30; 20 MG/100ML; MG/100ML; MG/100ML; MG/100ML
INJECTION, SOLUTION INTRAVENOUS CONTINUOUS
Status: ACTIVE | OUTPATIENT
Start: 2025-07-16 | End: 2025-07-16

## 2025-07-16 RX ORDER — CARBIDOPA AND LEVODOPA 25; 100 MG/1; MG/1
1 TABLET ORAL
Status: DISCONTINUED | OUTPATIENT
Start: 2025-07-16 | End: 2025-07-17

## 2025-07-16 RX ORDER — GLUCAGON 1 MG
1 KIT INJECTION
Status: DISCONTINUED | OUTPATIENT
Start: 2025-07-16 | End: 2025-07-19 | Stop reason: HOSPADM

## 2025-07-16 RX ORDER — TALC
6 POWDER (GRAM) TOPICAL NIGHTLY PRN
Status: DISCONTINUED | OUTPATIENT
Start: 2025-07-16 | End: 2025-07-19 | Stop reason: HOSPADM

## 2025-07-16 RX ADMIN — SODIUM CHLORIDE, POTASSIUM CHLORIDE, SODIUM LACTATE AND CALCIUM CHLORIDE: 600; 310; 30; 20 INJECTION, SOLUTION INTRAVENOUS at 06:07

## 2025-07-16 RX ADMIN — ATORVASTATIN CALCIUM 80 MG: 40 TABLET, FILM COATED ORAL at 09:07

## 2025-07-16 RX ADMIN — FENTANYL CITRATE 25 MCG: 50 INJECTION, SOLUTION INTRAMUSCULAR; INTRAVENOUS at 05:07

## 2025-07-16 RX ADMIN — CARBIDOPA AND LEVODOPA 1 TABLET: 25; 100 TABLET ORAL at 06:07

## 2025-07-16 RX ADMIN — SENNOSIDES 8.6 MG: 8.6 TABLET, FILM COATED ORAL at 11:07

## 2025-07-16 RX ADMIN — EZETIMIBE 10 MG: 10 TABLET ORAL at 09:07

## 2025-07-16 RX ADMIN — CARBIDOPA AND LEVODOPA 1 SPLIT TABLET: 25; 100 TABLET ORAL at 11:07

## 2025-07-16 RX ADMIN — SODIUM CHLORIDE: 0.9 INJECTION, SOLUTION INTRAVENOUS at 02:07

## 2025-07-16 RX ADMIN — ASPIRIN 81 MG: 81 TABLET, COATED ORAL at 11:07

## 2025-07-16 RX ADMIN — CARBIDOPA AND LEVODOPA 1 TABLET: 25; 100 TABLET ORAL at 05:07

## 2025-07-16 RX ADMIN — CARBIDOPA AND LEVODOPA 1 SPLIT TABLET: 25; 100 TABLET ORAL at 05:07

## 2025-07-16 RX ADMIN — POLYETHYLENE GLYCOL 3350 17 G: 17 POWDER, FOR SOLUTION ORAL at 09:07

## 2025-07-16 RX ADMIN — POLYETHYLENE GLYCOL 3350 17 G: 17 POWDER, FOR SOLUTION ORAL at 11:07

## 2025-07-16 RX ADMIN — PROPOFOL 10 MG: 10 INJECTION, EMULSION INTRAVENOUS at 02:07

## 2025-07-16 RX ADMIN — CARBIDOPA AND LEVODOPA 1 SPLIT TABLET: 25; 100 TABLET ORAL at 06:07

## 2025-07-16 RX ADMIN — LIDOCAINE HYDROCHLORIDE 40 MG: 20 INJECTION INTRAVENOUS at 02:07

## 2025-07-16 RX ADMIN — PIPERACILLIN SODIUM AND TAZOBACTAM SODIUM 4.5 G: 4; .5 INJECTION, POWDER, FOR SOLUTION INTRAVENOUS at 12:07

## 2025-07-16 RX ADMIN — CEFTRIAXONE SODIUM 1 G: 1 INJECTION, POWDER, FOR SOLUTION INTRAMUSCULAR; INTRAVENOUS at 03:07

## 2025-07-16 RX ADMIN — CARBIDOPA AND LEVODOPA 1 TABLET: 25; 100 TABLET ORAL at 09:07

## 2025-07-16 RX ADMIN — SENNOSIDES 8.6 MG: 8.6 TABLET, FILM COATED ORAL at 09:07

## 2025-07-16 RX ADMIN — CARBIDOPA AND LEVODOPA 1 SPLIT TABLET: 25; 100 TABLET ORAL at 09:07

## 2025-07-16 RX ADMIN — METOPROLOL SUCCINATE 50 MG: 50 TABLET, EXTENDED RELEASE ORAL at 11:07

## 2025-07-16 RX ADMIN — PROPOFOL 125 MCG/KG/MIN: 10 INJECTION, EMULSION INTRAVENOUS at 02:07

## 2025-07-16 RX ADMIN — VANCOMYCIN HYDROCHLORIDE 1000 MG: 1 INJECTION, POWDER, LYOPHILIZED, FOR SOLUTION INTRAVENOUS at 12:07

## 2025-07-16 RX ADMIN — CEFTRIAXONE SODIUM 1 G: 1 INJECTION, POWDER, FOR SOLUTION INTRAMUSCULAR; INTRAVENOUS at 02:07

## 2025-07-16 RX ADMIN — AMLODIPINE BESYLATE 5 MG: 5 TABLET ORAL at 11:07

## 2025-07-16 RX ADMIN — ONDANSETRON 4 MG: 2 INJECTION INTRAMUSCULAR; INTRAVENOUS at 02:07

## 2025-07-16 RX ADMIN — FLUTICASONE FUROATE AND VILANTEROL TRIFENATATE 1 PUFF: 200; 25 POWDER RESPIRATORY (INHALATION) at 11:07

## 2025-07-16 RX ADMIN — CITALOPRAM HYDROBROMIDE 20 MG: 20 TABLET ORAL at 11:07

## 2025-07-16 RX ADMIN — CARBIDOPA AND LEVODOPA 1 TABLET: 25; 100 TABLET ORAL at 11:07

## 2025-07-16 RX ADMIN — TIOTROPIUM BROMIDE INHALATION SPRAY 2 PUFF: 3.12 SPRAY, METERED RESPIRATORY (INHALATION) at 11:07

## 2025-07-16 NOTE — H&P
[facility] [address] [place] [address] Medicine  History & Physical    Patient Name: Hector Kellogg  MRN: [id number]  Patient Class: OP- Observation  Admission Date: 7/15/2025  Attending Physician: Kodi Alegria DO   Primary Care Provider: Blue Mcgill DO    Patient information was obtained from patient, past medical records, and ER records.     Subjective:     Principal Problem:Acute encephalopathy    Chief Complaint:   Chief Complaint   Patient presents with    AMS     Per family, pt fell this morning, woke up weak and altered. - LOC, - head trauma, - blood thinners. Baseline neuro AxO x3        HPI: Hector Kellogg is a 80 y.o. male with Parkinson's disease, hypertension, hyperlipidemia, CAD, anxiety, emphysema and CAD being admitted to hospital medicine for management of infected renal stone. Patient disoriented and unable to provide history, details given per wife at bedside. She reports patient woke up yesterday morning with generalized weakness, inability to ambulate even with assistance. Endorses associated increased confusion, decreased alertness and complaints of left flank pain. He did fall when attempting to stand out of bed yesterday but did not hit his head/sustain any injuries. She reports recent medication changes, states his baclofen dosing was increased from 5 mg BID to 10 mg TID to better control his muscle pain within the past week. She denies fever, chills, diaphoresis, complaints of dysuria, cough, congestion, nausea or vomiting. Denies constipation, states he has been passing soft/loose stools but did note hard stool in the rectum when wiping.        In ED: Tmax 100.3 F, mildly hypertensive, remaining vitals wnl. CBC with WBC 12.68, mild anemia. CMP grossly unremarkable. BNP and troponin wnl. UA with 13 WBC, >100 RBC. CT AP revealed  2.7 x 5.6 mm distal left ureteral calculus without evidence for hydroureteronephrosis or obstructive uropathy. Also notable for mild to  moderate distention of the rectum with stool, mild rectal wall thickening with perirectal haziness. CXR without consolidation. CT head without acute process. Given IV vanc and zosyn in the ED. Urology consulted. Admitted to medicine for further management.     Past Medical History:   Diagnosis Date    Cataract     Chronic back pain greater than 3 months duration     COPD (chronic obstructive pulmonary disease)     Coronary artery disease     3 stents    DDD (degenerative disc disease), lumbar 05/16/2020    Glaucoma suspect of both eyes     Hyperlipidemia     Hypertension     MI, old 2006    Parkinson disease     Renal disorder     Kidney stones       Past Surgical History:   Procedure Laterality Date    CARDIAC CATHETERIZATION  2006    x 3    CARDIAC CATHETERIZATION  10/11/2015    x 2    CATARACT EXTRACTION W/  INTRAOCULAR LENS IMPLANT Right 03/06/2018    Dr. Liu    CORONARY ANGIOPLASTY  2006, 2015    3 stents    CYSTOSCOPY W/ URETERAL STENT PLACEMENT Left 9/13/2023    Procedure: CYSTOSCOPY, WITH URETERAL STENT PLACEMENT;  Surgeon: Jerel Cullen MD;  Location: [facility] OR [address] [place] [address];  Service: Urology;  Laterality: Left;  1.5 HR    EXTRACORPOREAL SHOCK WAVE LITHOTRIPSY Left 4/20/2021    Procedure: LITHOTRIPSY-EXTRACORPOREAL SHOCK WAVE;  Surgeon: Ottoniel Obando MD;  Location: [facility];  Service: Urology;  Laterality: Left;    EXTRACORPOREAL SHOCK WAVE LITHOTRIPSY Left 9/13/2023    Procedure: LITHOTRIPSY, ESWL;  Surgeon: Jerel Cullen MD;  Location: [address];  Service: Urology;  Laterality: Left;    EYE SURGERY      cataracts bilateral    HEMORRHOID SURGERY      INJECTION OF ANESTHETIC AGENT AROUND NERVE Bilateral 11/4/2020    Procedure: BLOCK, NERVE BILATERAL L2,3,4,5;  Surgeon: Ramiro Calvillo MD;  Location: [facility] PAIN MGT;  Service: Pain Management;  Laterality: Bilateral;  BLOCK, NERVE BILATERAL L2,3,4,5    INJECTION OF FACET JOINT Right 8/5/2020    Procedure: INJECTION, FACET JOINT, L4-L5 , L5-S1;   Surgeon: Ramiro Calvillo MD;  Location: [facility] MGT;  Service: Pain Management;  Laterality: Right;    LAMINOFORAMINOTOMY OF SPINE USING MINIMALLY INVASIVE TECHNIQUE N/A 2/1/2021    Procedure: MIS L4-5 Laminectomy;  Surgeon: Bernard Sheldon MD;  Location: [facility] OR;  Service: Neurosurgery;  Laterality: N/A;  Anesthesia: General  Nerve Monitoring: EMG/SEP  Position: Prone  Bed: Eliot Frame on andres  Headrest: Prone View  Radiology: C-arm  Misc: Microscope, microinstruments    TRANSFORAMINAL EPIDURAL INJECTION OF STEROID Right 5/27/2020    Procedure: INJECTION, STEROID, EPIDURAL, TRANSFORAMINAL APPROACH;  Surgeon: Ramiro Calvillo MD;  Location: [facility] PAIN MGT;  Service: Pain Management;  Laterality: Right;  Right TF ASHISH L4-L5, L5-S1    PTcannot come earlier than 12:30    TRANSFORAMINAL EPIDURAL INJECTION OF STEROID Right 9/9/2020    Procedure: INJECTION, STEROID, EPIDURAL, TRANSFORAMINAL APPROACH;  Surgeon: Ramiro Calvillo MD;  Location: [facility] PAIN MGT;  Service: Pain Management;  Laterality: Right;  RIGHT TF ASHISH L4/5 and L5/S1       Review of patient's allergies indicates:   Allergen Reactions    Influenza virus vaccines Other (See Comments)     Caused fever, chills, and made tremors worse       No current facility-administered medications on file prior to encounter.     Current Outpatient Medications on File Prior to Encounter   Medication Sig    albuterol (PROVENTIL/VENTOLIN HFA) 90 mcg/actuation inhaler INHALE 2 PUFFS BY MOUTH EVERY 6 HOURS AS NEEDED FOR WHEEZING FOR SHORTNESS OF BREATH    amLODIPine (NORVASC) 5 MG tablet Take 1 tablet by mouth once daily    aspirin (ECOTRIN) 81 MG EC tablet Take 81 mg by mouth once daily.    baclofen (LIORESAL) 10 MG tablet Take 1 tablet (10 mg total) by mouth 3 (three) times daily.    BREZTRI AEROSPHERE 160-9-4.8 mcg/actuation HFAA INHALE 2 PUFFS INTO LUNGS TWICE DAILY    carbidopa-levodopa  mg (SINEMET)  mg per tablet TAKE 1 & 1/2 (ONE & ONE-HALF) TABLETS  BY MOUTH FIVE TIMES DAILY    citalopram (CELEXA) 20 MG tablet Take 1 tablet by mouth once daily    ezetimibe (ZETIA) 10 mg tablet Take 1 tablet by mouth once daily    ibuprofen (ADVIL,MOTRIN) 800 MG tablet Take 800 mg by mouth as needed.    loratadine (CLARITIN) 10 mg tablet Take 10 mg by mouth as needed for Allergies.    meloxicam (MOBIC) 7.5 MG tablet Take 1 tablet (7.5 mg total) by mouth once daily.    metoprolol succinate (TOPROL-XL) 50 MG 24 hr tablet Take 1 tablet by mouth once daily    multivitamin capsule Take 1 capsule by mouth once daily.    potassium citrate (UROCIT-K 10) 10 mEq (1,080 mg) TbSR Take 1 tablet (10 mEq total) by mouth 2 (two) times daily with meals.    rosuvastatin (CRESTOR) 40 MG Tab TAKE 1 TABLET BY MOUTH NIGHTLY AT BEDTIME     Family History       Problem Relation (Age of Onset)    Cataracts Father    Heart attack Father    Heart disease Maternal Uncle, Paternal Uncle          Tobacco Use    Smoking status: Former     Current packs/day: 0.00     Average packs/day: 1 pack/day for 40.0 years (40.0 ttl pk-yrs)     Types: Cigarettes     Start date: 1974     Quit date: 2014     Years since quittin.4     Passive exposure: Past    Smokeless tobacco: Never   Substance and Sexual Activity    Alcohol use: Yes     Comment: socially    Drug use: No    Sexual activity: Not on file     Review of Systems   Unable to perform ROS: Mental status change     Objective:     Vital Signs (Most Recent):  Temp: 98.7 °F (37.1 °C) (25 0400)  Pulse: 79 (25 0400)  Resp: 20 (25 0400)  BP: 135/69 (25 0400)  SpO2: 97 % (25 0400) Vital Signs (24h Range):  Temp:  [98.1 °F (36.7 °C)-100.3 °F (37.9 °C)] 98.7 °F (37.1 °C)  Pulse:  [77-87] 79  Resp:  [16-20] 20  SpO2:  [94 %-100 %] 97 %  BP: (110-151)/(64-88) 135/69     Weight: 55.5 kg (122 lb 5.7 oz)  Body mass index is 21 kg/m².     Physical Exam  Vitals and nursing note reviewed.   Constitutional:       General: He is not in acute  distress.     Appearance: He is well-developed. He is ill-appearing (chronically ill appearing). He is not toxic-appearing or diaphoretic.   HENT:      Head: Normocephalic and atraumatic.   Eyes:      Extraocular Movements: Extraocular movements intact.   Cardiovascular:      Rate and Rhythm: Normal rate and regular rhythm.      Heart sounds: Normal heart sounds.   Pulmonary:      Effort: Pulmonary effort is normal. No respiratory distress.      Breath sounds: No wheezing.      Comments: On 2L NC  Abdominal:      General: There is distension.      Tenderness: There is no abdominal tenderness.      Comments: Hypoactive bowel sounds    Musculoskeletal:         General: No tenderness. Normal range of motion.      Right lower leg: No edema.      Left lower leg: No edema.   Skin:     General: Skin is warm and dry.      Findings: No rash.   Neurological:      Mental Status: He is alert. He is disoriented.      Comments: Only oriented to person and place (hospital but unsure which one) - wife reports his mental status waxes and wanes at baseline but he is more confused than normal.        Significant Labs: All pertinent labs within the past 24 hours have been reviewed.  CBC:   Recent Labs   Lab 07/15/25  1947   WBC 12.68   HGB 13.1*   HCT 40.4        CMP:   Recent Labs   Lab 07/15/25  1957      K 4.0   *   CO2 22*   GLU 92   BUN 24*   CREATININE 0.8   CALCIUM 8.6*   PROT 6.0   ALBUMIN 3.3*   BILITOT 0.9   ALKPHOS 76   AST 25   ALT 7*   ANIONGAP 8     Urine Studies:   Recent Labs   Lab 07/15/25  2117   COLORU Yellow  Yellow   APPEARANCEUA Clear  Clear   PHUR 6.0  6.0   SPECGRAV 1.020  1.020   PROTEINUA Negative  Negative   GLUCUA Negative  Negative   BILIRUBINUA Negative  Negative   OCCULTUA 2+*  3+*   NITRITE Negative  Negative   UROBILINOGEN Negative  Negative   LEUKOCYTESUR Trace*  1+*   RBCUA >100*  >100*   WBCUA 13*  13*   BACTERIA Rare  None   HYALINECASTS 4*  0       Significant  Imaging: I have reviewed all pertinent imaging results/findings within the past 24 hours.  Imaging Results               CT Abdomen Pelvis With IV Contrast NO Oral Contrast (Final result)  Result time 07/16/25 00:56:41      Final result by Dangelo Murdock MD (07/16/25 00:56:41)                   Impression:      There is a distal left ureteral calculus noted, measuring approximately 2.7 x 5.6 mm on axial imaging, as discussed above, however without evidence for hydroureteronephrosis or obstructive uropathy.    Bilateral nonobstructing nephrolithiasis is also noted.    Urinary bladder wall thickening may relate to components of lack of distention and chronic change however clinical correlation is otherwise needed.    Enlarged heterogeneous prostate.    Mild to moderate distention of the rectum with stool, mild rectal wall thickening with perirectal haziness, correlation for rectal colitis to include stercoral colitis.    Additional findings as above.    This report was flagged in Epic as abnormal.      Electronically signed by: Dangelo Murdock  Date:    07/16/2025  Time:    00:56               Narrative:    EXAMINATION:  CT ABDOMEN PELVIS WITH IV CONTRAST    CLINICAL HISTORY:  Flank pain, kidney stone suspected;    TECHNIQUE:  Low dose axial images, sagittal and coronal reformations were obtained from the lung bases to the pubic symphysis following the IV administration of 100 mL of Omnipaque 350 oral contrast was not utilized.  Single phase postcontrast CT examination of the abdomen and pelvis is submitted peer    COMPARISON:  CT examination of the abdomen and pelvis renal stone study May 6, 2024, CT examination abdomen and pelvis August 24, 2023    FINDINGS:  There is artifact including motion artifact present, this diminishes image quality and diminishes the sensitivity of the exam.  The lung bases demonstrate motion artifact, chronic and atelectatic appearing change.  There is appearance of a small hiatal  hernia.  The stomach is decompressed, wall and fold thickening may relate to lack of distention.    There is mild to moderate gallbladder distention, there is no pericholecystic or peripancreatic inflammatory change and there is no abnormal pancreatic or biliary ductal dilatation, when accounting for limitations of the exam there is no evidence for acute process of the liver, gallbladder, pancreas, spleen or adrenal glands.    Renal hypodensities are noted, those of which are of adequate size to measure, on the left are consistent with cysts, the most prominent at the midpole measuring approximately 6 cm on axial imaging, 13.3 Hounsfield units.  Nonobstructing nephrolithiasis of the right kidney is noted.  There is no evidence for right-sided ureteral calculus or obstructive uropathy.  Caliceal calculi at the lower pole of the left kidney appears similar to the prior study.    As seen on axial image 126 there is a calculus along the distal 3rd of the left ureter just proximal to the left ureterovesicular junction measuring approximately 2.7 x 5.6 mm on axial imaging, with the measurement of 5.6 mm along the axis of the distal ureter.  There is no evidence for hydroureter or obstructive uropathy.  There is no evidence for significant periureteral or perinephric inflammatory change.    The abdominal aorta demonstrates atherosclerotic change, including prominent irregular eccentric mural thrombus, along the infrarenal abdominal aorta, with appearance of ulcerative plaque, this appears similar to the prior CT examination of August 24, 2023.    The prostate appears enlarged measuring approximately 4.8 x 5.7 cm and appears heterogeneous.  Calcifications/mineralization associated with the prostate also noted.  Urinary bladder wall thickening may relate to components of lack of distention as well as needed    Hypodensity associated with the right iliopsoas muscle is again noted as best seen on axial image 134 measuring 2.4  "cm, stable appearance.    Evaluation of the bowel is limited due to artifact, however there is no evidence for small bowel obstructive process.  The appendix is not identified.  Mild air and stool noted throughout the colon.  Diverticuli of the sigmoid colon are noted, there is no evidence for focal inflamed diverticulum to suggest acute diverticulitis.    There is mild-to-moderate distention of the rectum with stool, mild rectal wall thickening with perirectal haziness, correlation for rectal colitis to include stercoral colitis is needed.    There is no evidence for free intraperitoneal air.    The visualized osseous structures demonstrate chronic change, multilevel chronic change of the spine is noted, including mild grade 1 retrolisthesis of L2 with respect L3, grade 1/2 anterolisthesis of L4 with respect L5, mild grade 1 anterolisthesis of L5 with respect S1, appearance of bilateral pars defects at L5.                                       X-Ray Chest AP Portable (Final result)  Result time 07/15/25 20:26:19      Final result by Charlie Garcia MD (07/15/25 20:26:19)                   Impression:      No focal consolidation identified on this single view.    Suspected pulmonary emphysema.      Electronically signed by: Charlie Garcia MD  Date:    07/15/2025  Time:    20:26               Narrative:    EXAMINATION:  XR CHEST AP PORTABLE    CLINICAL HISTORY:  Provided history is "ams;  ".    TECHNIQUE:  One view of the chest.    COMPARISON:  04/02/2025.    FINDINGS:  Patient is rotated.  Cardiomediastinal silhouette is stable and not significantly enlarged.  Atherosclerotic calcifications overlie the aortic arch.  There are coarse interstitial lung markings but no large focal area of consolidation.  Probable pulmonary emphysema.  No sizable pleural effusion.  No pneumothorax.                                       CT Head Without Contrast (Final result)  Result time 07/15/25 19:23:35      Final result by " Marcelo Dixon MD (07/15/25 19:23:35)                   Impression:      1. Allowing for motion artifact, no convincing acute intracranial abnormalities noting sequela of chronic microvascular ischemic change and senescent change.  2. Focus of induration overlies the left frontal calvarium, correlation with any history of recent injury to the region.      Electronically signed by: Marcelo Dixon MD  Date:    07/15/2025  Time:    19:23               Narrative:    EXAMINATION:  CT HEAD WITHOUT CONTRAST    CLINICAL HISTORY:  Mental status change, unknown cause;    TECHNIQUE:  Low dose axial images were obtained through the head.  Coronal and sagittal reformations were also performed. Contrast was not administered.    COMPARISON:  None.    FINDINGS:  There is generalized cerebral volume loss.  There is hypoattenuation in a periventricular fashion, likely sequela of chronic microvascular ischemic change.  There is no evidence of acute major vascular territory infarct, hemorrhage, or mass.  There is no hydrocephalus.  There are no abnormal extra-axial fluid collections.  The paranasal sinuses and mastoid air cells are clear, and there is no evidence of calvarial fracture.  The visualized soft tissues are remarkable for a small focus of induration overlying the left frontal calvarium..                                    Assessment/Plan:     Assessment & Plan  Acute encephalopathy  Constipation  Acute cystitis with hematuria  Stercoral colitis  Left ureteral calculus  Likely multifactorial (UTI vs stercoral colitis vs baclofen toxicity)  - Tmax 100.3 F on admission, remaining vitals stable   - CBC with WBC of 12.68  - CMP grossly unremarkable   - BNP and trop wnl  - UA with 13 WBC, >100 RBC  - UCX and BCX pending  - CXR without consolidation  - CT AP with left ureteral calculus; no obstruction or hydronephrosis. Also notes distention of the rectum with stool, mild rectal wall thickening   - CT head without acute  abnormality   - given vanc and zosyn in the ED, previous urine cultures with no growth. De-escalate to ceftriaxone   - urology consulted 2/2 concern for infected stone   - brown bomb ordered on admission followed by oral bowel regimen  - neuro checks q4h  - tele   Coronary artery disease  Hyperlipidemia  Patient with known CAD s/p stent placement, which is controlled   - Will continue MTP, zetia, ASA and Statin and monitor for S/Sx of angina/ACS.   - Continue to monitor on telemetry.   Essential hypertension  - chronic, controlled  - continue home amlodipine and MTP  - vitals q4h   Parkinson disease  - followed by neurology outpatient   - continue home sinemet five times daily   - delirium precautions   Centrilobular emphysema  - chronic, controlled  - sub breztri for formulary alternative  - albuterol prn for rescue   - prn oxygen   Cramps, muscle, general  - continue home mobic   Hypoalbuminemia  Lab Results   Component Value Date    ALBUMIN 3.3 (L) 07/15/2025   - noted   Normocytic anemia  Most recent hemoglobin and hematocrit are listed below.  Recent Labs     07/15/25  1947   HGB 13.1*   HCT 40.4     Plan  - Monitor serial CBC: Daily  - Transfuse PRBC if patient becomes hemodynamically unstable, symptomatic or H/H drops below 7/21.  - Patient has not received any PRBC transfusions to date  - Patient's anemia is currently stable  VTE Risk Mitigation (From admission, onward)           Ordered     enoxaparin injection 40 mg  Daily         07/16/25 0435     IP VTE HIGH RISK PATIENT  Once         07/16/25 0435     Place sequential compression device  Until discontinued         07/16/25 0435                On 07/16/2025, patient should be placed in hospital observation services under my care in collaboration with Erick Brady MD.           Nikki Lutz PA-C  Department of Hospital Medicine  [facility] 11H

## 2025-07-16 NOTE — PLAN OF CARE
PT Evaluation completed and PT POC established.    Problem: Physical Therapy  Goal: Physical Therapy Goal  Description: Goals to be met by: 2025     Patient will increase functional independence with mobility by performin. Supine to sit with Minimal assistance  2. Sit to supine with Minimal assistance  3. Sit to stand transfer with Supervision  4. Bed to chair transfer with Supervision using LRAD  5. Gait  x 100 feet with Minimal Assistance using LRAD.   6. Ascend/descend 1 stair with no Handrails Minimal Assistance using LRAD.       Outcome: Progressing

## 2025-07-16 NOTE — PLAN OF CARE
Acute encephalopathy  Acute cystitis with hematuria  Stercoral colitis  Left ureteral calculus  History of Parkinson's    Likely multifactorial -- UTI v possible baclofen toxicity  Baclofen was recently increased from 5mg TID to 10mg TID -- he has only been on baclofen for about 4 weeks per family  Constipation resolved -- had large bowel movement during admission    Tmax 100.3 F on admission, remaining vitals stable  WBC 12  CMP grossly unremarkable  BNP and trop wnl  UA with 13 WBC, >100 RBC  CXR without consolidation  CT AP with left ureteral calculus; no obstruction or hydronephrosis. Also notes distention of the rectum with stool, mild rectal wall thickening  CT head without acute abnormality  EEG showed generalized slowing, no epileptiform events    UCX and BCX pending  S/p vanc and zosyn in the ED  Now on rocephin 1g IV q24h  Urology consulted, appreciate recommendations  Urology taking for ureteral stent placement 7.16.25  Neuro checks q4h  Tele  Consider neuro consult if mentation does not improve with IV abx and holding baclofen    See plan in H&P from same date for management of chronic medical conditions

## 2025-07-16 NOTE — TRANSFER OF CARE
"Anesthesia Transfer of Care Note    Patient: Hector Kellogg    Procedure(s) Performed: Procedure(s) (LRB):  CYSTOSCOPY, WITH URETERAL STENT INSERTION (Left)  PYELOGRAM, RETROGRADE (Bilateral)    Patient location: PACU    Anesthesia Type: general    Transport from OR: Transported from OR on 6-10 L/min O2 by face mask with adequate spontaneous ventilation    Post pain: adequate analgesia    Post assessment: no apparent anesthetic complications and tolerated procedure well    Post vital signs: stable    Level of consciousness: responds to stimulation    Nausea/Vomiting: no nausea/vomiting    Complications: none    Transfer of care protocol was followed      Last vitals: Visit Vitals  BP (!) 113/96 (BP Location: Left arm, Patient Position: Lying)   Pulse 76   Temp 36.8 °C (98.2 °F) (Temporal)   Resp 18   Ht 5' 4" (1.626 m)   Wt 55.5 kg (122 lb 5.7 oz)   SpO2 98%   BMI 21.00 kg/m²     "

## 2025-07-16 NOTE — ASSESSMENT & PLAN NOTE
80-year-old male with a history of COPD, Parkinson's, kidney stones, hypertension and hyperlipidemia who presents with altered mental status and fatigue. CT performed in the ED showed a  5 mm left distal ureteral stone without hydronephrosis. Urology consulted for ureteral stone.    -- Due to frailty of patient will add on for left ureteral stent placement this afternoon  -- NPO   -- Will consent  -- Continue Rocephin  -- F/u cultures  -- Will arrange outpatient follow up with Dr. Christie for definitive stone management

## 2025-07-16 NOTE — ASSESSMENT & PLAN NOTE
- chronic, controlled  - sub breztri for formulary alternative  - albuterol prn for rescue   - prn oxygen

## 2025-07-16 NOTE — ED TRIAGE NOTES
Pt came in via EMS due to increase confusion after fall this afternoon pt denies hitting head. Denies chest pain or shortness of breath

## 2025-07-16 NOTE — PROVIDER PROGRESS NOTES - EMERGENCY DEPT.
Imaging Results               CT Abdomen Pelvis With IV Contrast NO Oral Contrast (Final result)  Result time 07/16/25 00:56:41      Final result by Dangelo Murdock MD (07/16/25 00:56:41)                   Impression:      There is a distal left ureteral calculus noted, measuring approximately 2.7 x 5.6 mm on axial imaging, as discussed above, however without evidence for hydroureteronephrosis or obstructive uropathy.    Bilateral nonobstructing nephrolithiasis is also noted.    Urinary bladder wall thickening may relate to components of lack of distention and chronic change however clinical correlation is otherwise needed.    Enlarged heterogeneous prostate.    Mild to moderate distention of the rectum with stool, mild rectal wall thickening with perirectal haziness, correlation for rectal colitis to include stercoral colitis.    Additional findings as above.    This report was flagged in Epic as abnormal.      Electronically signed by: Dangelo Murdock  Date:    07/16/2025  Time:    00:56               Narrative:    EXAMINATION:  CT ABDOMEN PELVIS WITH IV CONTRAST    CLINICAL HISTORY:  Flank pain, kidney stone suspected;    TECHNIQUE:  Low dose axial images, sagittal and coronal reformations were obtained from the lung bases to the pubic symphysis following the IV administration of 100 mL of Omnipaque 350 oral contrast was not utilized.  Single phase postcontrast CT examination of the abdomen and pelvis is submitted peer    COMPARISON:  CT examination of the abdomen and pelvis renal stone study May 6, 2024, CT examination abdomen and pelvis August 24, 2023    FINDINGS:  There is artifact including motion artifact present, this diminishes image quality and diminishes the sensitivity of the exam.  The lung bases demonstrate motion artifact, chronic and atelectatic appearing change.  There is appearance of a small hiatal hernia.  The stomach is decompressed, wall and fold thickening may relate to lack of  distention.    There is mild to moderate gallbladder distention, there is no pericholecystic or peripancreatic inflammatory change and there is no abnormal pancreatic or biliary ductal dilatation, when accounting for limitations of the exam there is no evidence for acute process of the liver, gallbladder, pancreas, spleen or adrenal glands.    Renal hypodensities are noted, those of which are of adequate size to measure, on the left are consistent with cysts, the most prominent at the midpole measuring approximately 6 cm on axial imaging, 13.3 Hounsfield units.  Nonobstructing nephrolithiasis of the right kidney is noted.  There is no evidence for right-sided ureteral calculus or obstructive uropathy.  Caliceal calculi at the lower pole of the left kidney appears similar to the prior study.    As seen on axial image 126 there is a calculus along the distal 3rd of the left ureter just proximal to the left ureterovesicular junction measuring approximately 2.7 x 5.6 mm on axial imaging, with the measurement of 5.6 mm along the axis of the distal ureter.  There is no evidence for hydroureter or obstructive uropathy.  There is no evidence for significant periureteral or perinephric inflammatory change.    The abdominal aorta demonstrates atherosclerotic change, including prominent irregular eccentric mural thrombus, along the infrarenal abdominal aorta, with appearance of ulcerative plaque, this appears similar to the prior CT examination of August 24, 2023.    The prostate appears enlarged measuring approximately 4.8 x 5.7 cm and appears heterogeneous.  Calcifications/mineralization associated with the prostate also noted.  Urinary bladder wall thickening may relate to components of lack of distention as well as needed    Hypodensity associated with the right iliopsoas muscle is again noted as best seen on axial image 134 measuring 2.4 cm, stable appearance.    Evaluation of the bowel is limited due to artifact, however  "there is no evidence for small bowel obstructive process.  The appendix is not identified.  Mild air and stool noted throughout the colon.  Diverticuli of the sigmoid colon are noted, there is no evidence for focal inflamed diverticulum to suggest acute diverticulitis.    There is mild-to-moderate distention of the rectum with stool, mild rectal wall thickening with perirectal haziness, correlation for rectal colitis to include stercoral colitis is needed.    There is no evidence for free intraperitoneal air.    The visualized osseous structures demonstrate chronic change, multilevel chronic change of the spine is noted, including mild grade 1 retrolisthesis of L2 with respect L3, grade 1/2 anterolisthesis of L4 with respect L5, mild grade 1 anterolisthesis of L5 with respect S1, appearance of bilateral pars defects at L5.                                       X-Ray Chest AP Portable (Final result)  Result time 07/15/25 20:26:19      Final result by Charlie Garcia MD (07/15/25 20:26:19)                   Impression:      No focal consolidation identified on this single view.    Suspected pulmonary emphysema.      Electronically signed by: Charlie Garcia MD  Date:    07/15/2025  Time:    20:26               Narrative:    EXAMINATION:  XR CHEST AP PORTABLE    CLINICAL HISTORY:  Provided history is "ams;  ".    TECHNIQUE:  One view of the chest.    COMPARISON:  04/02/2025.    FINDINGS:  Patient is rotated.  Cardiomediastinal silhouette is stable and not significantly enlarged.  Atherosclerotic calcifications overlie the aortic arch.  There are coarse interstitial lung markings but no large focal area of consolidation.  Probable pulmonary emphysema.  No sizable pleural effusion.  No pneumothorax.                                       CT Head Without Contrast (Final result)  Result time 07/15/25 19:23:35      Final result by Marcelo Dixon MD (07/15/25 19:23:35)                   Impression:      1. Allowing " for motion artifact, no convincing acute intracranial abnormalities noting sequela of chronic microvascular ischemic change and senescent change.  2. Focus of induration overlies the left frontal calvarium, correlation with any history of recent injury to the region.      Electronically signed by: Marcelo Dixon MD  Date:    07/15/2025  Time:    19:23               Narrative:    EXAMINATION:  CT HEAD WITHOUT CONTRAST    CLINICAL HISTORY:  Mental status change, unknown cause;    TECHNIQUE:  Low dose axial images were obtained through the head.  Coronal and sagittal reformations were also performed. Contrast was not administered.    COMPARISON:  None.    FINDINGS:  There is generalized cerebral volume loss.  There is hypoattenuation in a periventricular fashion, likely sequela of chronic microvascular ischemic change.  There is no evidence of acute major vascular territory infarct, hemorrhage, or mass.  There is no hydrocephalus.  There are no abnormal extra-axial fluid collections.  The paranasal sinuses and mastoid air cells are clear, and there is no evidence of calvarial fracture.  The visualized soft tissues are remarkable for a small focus of induration overlying the left frontal calvarium..                                     Patient is signed out pending CT abdomen pelvis.  This is concerning for a distal ureteral stone.  In the setting of sepsis and possible mild UTI, concern for infected so.  Urology consulted, antibiotics have been given, patient admitted to Hospital Medicine.

## 2025-07-16 NOTE — NURSING TRANSFER
Nursing Transfer Note      7/16/2025   6:09 PM    Nurse giving handoff:ANDER Bonilla PACU   Nurse receiving handoff:ANDER Villatoro OBS    Reason patient is being transferred: post anesthesia    Transfer To: [id number]    Transfer via stretcher    Transfer with  to O2, cardiac monitoring    Transported by PCT    Transfer Vital Signs:  Blood Pressure:135/82  Heart Rate:89  O2:100%-- 2LNC  Temperature:  Respirations:21    Telemetry: Box Number [id number]  Order for Tele Monitor? Yes    Additional Lines: Oxygen    Medicines sent: n/a    Any special needs or follow-up needed: n/a    Patient belongings transferred with patient: No    Chart send with patient: Yes    Notified: spouse, daughter    Patient reassessed at: 7/16/25@ 1800

## 2025-07-16 NOTE — SUBJECTIVE & OBJECTIVE
Past Medical History:   Diagnosis Date    Cataract     Chronic back pain greater than 3 months duration     COPD (chronic obstructive pulmonary disease)     Coronary artery disease     3 stents    DDD (degenerative disc disease), lumbar 05/16/2020    Glaucoma suspect of both eyes     Hyperlipidemia     Hypertension     MI, old 2006    Parkinson disease     Renal disorder     Kidney stones       Past Surgical History:   Procedure Laterality Date    CARDIAC CATHETERIZATION  2006    x 3    CARDIAC CATHETERIZATION  10/11/2015    x 2    CATARACT EXTRACTION W/  INTRAOCULAR LENS IMPLANT Right 03/06/2018    Dr. Liu    CORONARY ANGIOPLASTY  2006, 2015    3 stents    CYSTOSCOPY W/ URETERAL STENT PLACEMENT Left 9/13/2023    Procedure: CYSTOSCOPY, WITH URETERAL STENT PLACEMENT;  Surgeon: Jerel Cullen MD;  Location: [facility] OR [address] [place] [address];  Service: Urology;  Laterality: Left;  1.5 HR    EXTRACORPOREAL SHOCK WAVE LITHOTRIPSY Left 4/20/2021    Procedure: LITHOTRIPSY-EXTRACORPOREAL SHOCK WAVE;  Surgeon: Ottoniel Obando MD;  Location: [facility] OR;  Service: Urology;  Laterality: Left;    EXTRACORPOREAL SHOCK WAVE LITHOTRIPSY Left 9/13/2023    Procedure: LITHOTRIPSY, ESWL;  Surgeon: Jerel Cullen MD;  Location: [address];  Service: Urology;  Laterality: Left;    EYE SURGERY      cataracts bilateral    HEMORRHOID SURGERY      INJECTION OF ANESTHETIC AGENT AROUND NERVE Bilateral 11/4/2020    Procedure: BLOCK, NERVE BILATERAL L2,3,4,5;  Surgeon: Ramiro Calvillo MD;  Location: [facility] PAIN MGT;  Service: Pain Management;  Laterality: Bilateral;  BLOCK, NERVE BILATERAL L2,3,4,5    INJECTION OF FACET JOINT Right 8/5/2020    Procedure: INJECTION, FACET JOINT, L4-L5 , L5-S1;  Surgeon: Ramiro Calvillo MD;  Location: [facility] PAIN MGT;  Service: Pain Management;  Laterality: Right;    LAMINOFORAMINOTOMY OF SPINE USING MINIMALLY INVASIVE TECHNIQUE N/A 2/1/2021    Procedure: MIS L4-5 Laminectomy;  Surgeon: Bernard Sheldon MD;  Location:  [facility] OR;  Service: Neurosurgery;  Laterality: N/A;  Anesthesia: General  Nerve Monitoring: EMG/SEP  Position: Prone  Bed: Eliot Frame on andres  Headrest: Prone View  Radiology: C-arm  Misc: Microscope, microinstruments    TRANSFORAMINAL EPIDURAL INJECTION OF STEROID Right 2020    Procedure: INJECTION, STEROID, EPIDURAL, TRANSFORAMINAL APPROACH;  Surgeon: Ramiro Calvillo MD;  Location: [facility] PAIN MGT;  Service: Pain Management;  Laterality: Right;  Right TF ASHISH L4-L5, L5-S1    PTcannot come earlier than 12:30    TRANSFORAMINAL EPIDURAL INJECTION OF STEROID Right 2020    Procedure: INJECTION, STEROID, EPIDURAL, TRANSFORAMINAL APPROACH;  Surgeon: Ramiro Calvillo MD;  Location: [facility] PAIN MGT;  Service: Pain Management;  Laterality: Right;  RIGHT TF ASHISH L4/5 and L5/S1       Review of patient's allergies indicates:   Allergen Reactions    Influenza virus vaccines Other (See Comments)     Caused fever, chills, and made tremors worse       Family History       Problem Relation (Age of Onset)    Cataracts Father    Heart attack Father    Heart disease Maternal Uncle, Paternal Uncle            Tobacco Use    Smoking status: Former     Current packs/day: 0.00     Average packs/day: 1 pack/day for 40.0 years (40.0 ttl pk-yrs)     Types: Cigarettes     Start date: 1974     Quit date: 2014     Years since quittin.4     Passive exposure: Past    Smokeless tobacco: Never   Substance and Sexual Activity    Alcohol use: Yes     Comment: socially    Drug use: No    Sexual activity: Not on file       Review of Systems   Constitutional:  Negative for chills and fever.   Genitourinary:  Positive for hematuria.       Objective:     Temp:  [98.1 °F (36.7 °C)-100.3 °F (37.9 °C)] 98.7 °F (37.1 °C)  Pulse:  [77-87] 79  Resp:  [16-20] 20  SpO2:  [94 %-100 %] 97 %  BP: (110-151)/(64-88) 135/69  Weight: 55.5 kg (122 lb 5.7 oz)  Body mass index is 21 kg/m².           Drains       None                    Physical  "Exam  Constitutional:       General: He is not in acute distress.     Appearance: Normal appearance.   HENT:      Head: Normocephalic and atraumatic.   Eyes:      Extraocular Movements: Extraocular movements intact.      Pupils: Pupils are equal, round, and reactive to light.   Cardiovascular:      Rate and Rhythm: Normal rate.   Pulmonary:      Effort: Pulmonary effort is normal. No respiratory distress.   Abdominal:      General: Abdomen is flat. There is no distension.      Tenderness: There is no abdominal tenderness. There is no right CVA tenderness or left CVA tenderness.   Skin:     Coloration: Skin is not jaundiced.   Neurological:      General: No focal deficit present.      Mental Status: He is alert and oriented to person, place, and time.   Psychiatric:         Mood and Affect: Mood normal.         Behavior: Behavior normal.          Significant Labs:    BMP:  Recent Labs   Lab 07/15/25  1957      K 4.0   *   CO2 22*   BUN 24*   CREATININE 0.8   CALCIUM 8.6*       CBC:  Recent Labs   Lab 07/15/25  1947   WBC 12.68   HGB 13.1*   HCT 40.4          Blood Culture: No results for input(s): "LABBLOO" in the last 168 hours.  CMP:   Recent Labs   Lab 07/15/25  1957   GLU 92      K 4.0   *   CO2 22*   BUN 24*   CREATININE 0.8   CALCIUM 8.6*   MG 2.0     Urine Culture: No results for input(s): "LABURIN" in the last 168 hours.  Urine Studies:   Recent Labs   Lab 07/15/25  2117   COLORU Yellow  Yellow   APPEARANCEUA Clear  Clear   PHUR 6.0  6.0   SPECGRAV 1.020  1.020   PROTEINUA Negative  Negative   GLUCUA Negative  Negative   BILIRUBINUA Negative  Negative   OCCULTUA 2+*  3+*   NITRITE Negative  Negative   UROBILINOGEN Negative  Negative   LEUKOCYTESUR Trace*  1+*   RBCUA >100*  >100*   WBCUA 13*  13*   BACTERIA Rare  None   HYALINECASTS 4*  0       Significant Imaging:  CT: I have reviewed all results within the past 24 hours and my personal findings are:  as noted in " HPI

## 2025-07-16 NOTE — PT/OT/SLP EVAL
Occupational Therapy  Co- Evaluation  PT present for co- treatment due to pt's multiple medical comorbidities and functional/cognition deficits requiring two skilled therapists to appropriately progress pt's musculoskeletal strength, neuromuscular control, and pain/ activity tolerance while taking into consideration medical acuity and pt safety.    Name: Hector Kellogg  MRN: [id number]  Admitting Diagnosis: Encephalopathy  Recent Surgery: Procedure(s) (LRB):  CYSTOSCOPY, WITH URETERAL STENT INSERTION (Left)  PYELOGRAM, RETROGRADE (Bilateral) * Day of Surgery *    Recommendations:     Discharge Recommendations:    Discharge Equipment Recommendations:  none  Barriers to discharge:  None    Assessment:     Hector Kellogg is a 80 y.o. male with a medical diagnosis of Encephalopathy. He present with intermittent command following, following less than 25% during the session. However He tolerated lateral steps at EOB fairly well today with no increase reports of pain. Performance deficits affecting function: weakness, impaired endurance, impaired functional mobility, impaired self care skills, gait instability, impaired balance, decreased lower extremity function, decreased safety awareness, pain, impaired cardiopulmonary response to activity, impaired coordination.      Rehab Prognosis: Good; patient would benefit from acute skilled OT services to address these deficits and reach maximum level of function.       Plan:     Patient to be seen 4 x/week to address the above listed problems via self-care/home management, neuromuscular re-education, therapeutic activities, therapeutic exercises  Plan of Care Expires: 08/06/25  Plan of Care Reviewed with: patient, spouse    Subjective     Chief Complaint: None  Patient/Family Comments/goals: Pt. States     Occupational Profile:  Living Environment: Lives with spouse in a H with a t/s combo  Previous level of function: Recently requiring increase assistance with bathing  and dressing task , Ind to self feed PTA. Fxl mob in home and W/c in community   Equipment Used at Home: wheelchair, walker, rolling, rollator, grab bar, shower chair  Assistance upon Discharge: Spouse    Pain/Comfort:  Pain Rating 1: 0/10    Patients cultural, spiritual, Religion conflicts given the current situation: no    Objective:     Communicated with: Nurse prior to session.  Patient found HOB elevated with Condom Catheter, telemetry, EEG upon OT entry to room.    General Precautions: Standard, fall, seizure  Orthopedic Precautions: N/A  Braces: N/A  Respiratory Status: Room air    Occupational Performance:    Bed Mobility:    Patient completed Supine to Sit with maximal assistance    Functional Mobility/Transfers:  Patient completed Sit <> Stand Transfer with minimum assistance  with  rolling walker   Functional Mobility: Pt. Completed lateral steps to HOB with Min A x 2person, NBOS    Activities of Daily Living:  Upper Body Dressing: total assistance don gown anteriorly      Cognitive/Visual Perceptual:  Cognitive/Psychosocial Skills:     -       Oriented to: Person only   -       Follows Commands/attention:Follows less 25 % of 1- commands  -       Communication: clear/fluent  -       Memory: No Deficits noted  -       Safety awareness/insight to disability: impaired   -       Mood/Affect/Coping skills/emotional control: Appropriate to situation    Physical Exam:   Attempted to asses formally however majority was Assessed functionally  -Poor command following poor ability to sustain attention  Upper Extremity Range of Motion:     -       Right Upper Extremity: WFL  -       Left Upper Extremity: WFL  Upper Extremity Strength:    -       Right Upper Extremity: WFL  -       Left Upper Extremity: WFL   Strength:    -       Right Upper Extremity: WFL  -       Left Upper Extremity: WFL    AMPAC 6 Click ADL:  AMPAC Total Score: 12    Treatment & Education:  Pt educated on role of occupational therapy, POC,  and safety during ADLs and functional mobility. Pt and OT discussed importance of safe, continued mobility to optimize daily living skills. Pt verbalized understanding. Pt given instruction to call for medical staff/nurse for assistance.       Patient left HOB elevated with all lines intact and call button in reach and family present    GOALS:   Multidisciplinary Problems       Occupational Therapy Goals          Problem: Occupational Therapy    Goal Priority Disciplines Outcome Interventions   Occupational Therapy Goal     OT, PT/OT Progressing    Description: Goals to be met by: 8/6/2025     Patient will increase functional independence with ADLs by performing:    UE Dressing with Supervision.  LE Dressing with Supervision.  Grooming while standing at sink with Supervision.  Toileting from toilet with Supervision for hygiene and clothing management.   Supine to sit with Supervision.  Toilet transfer to toilet with Supervision.                         DME Justifications:  No DME recommended requiring DME justifications    History:     Past Medical History:   Diagnosis Date    Cataract     Chronic back pain greater than 3 months duration     COPD (chronic obstructive pulmonary disease)     Coronary artery disease     3 stents    DDD (degenerative disc disease), lumbar 05/16/2020    Glaucoma suspect of both eyes     Hyperlipidemia     Hypertension     MI, old 2006    Parkinson disease     Renal disorder     Kidney stones         Past Surgical History:   Procedure Laterality Date    CARDIAC CATHETERIZATION  2006    x 3    CARDIAC CATHETERIZATION  10/11/2015    x 2    CATARACT EXTRACTION W/  INTRAOCULAR LENS IMPLANT Right 03/06/2018    Dr. Liu    CORONARY ANGIOPLASTY  2006, 2015    3 stents    CYSTOSCOPY W/ URETERAL STENT PLACEMENT Left 9/13/2023    Procedure: CYSTOSCOPY, WITH URETERAL STENT PLACEMENT;  Surgeon: Jerel Cullen MD;  Location: [facility] OR [address] [place] [address];  Service: Urology;  Laterality: Left;  1.5 HR     EXTRACORPOREAL SHOCK WAVE LITHOTRIPSY Left 4/20/2021    Procedure: LITHOTRIPSY-EXTRACORPOREAL SHOCK WAVE;  Surgeon: Ottoniel Obando MD;  Location: [facility] OR;  Service: Urology;  Laterality: Left;    EXTRACORPOREAL SHOCK WAVE LITHOTRIPSY Left 9/13/2023    Procedure: LITHOTRIPSY, ESWL;  Surgeon: Jerel Cullen MD;  [facility];  Service: Urology;  Laterality: Left;    EYE SURGERY      cataracts bilateral    HEMORRHOID SURGERY      INJECTION OF ANESTHETIC AGENT AROUND NERVE Bilateral 11/4/2020    Procedure: BLOCK, NERVE BILATERAL L2,3,4,5;  Surgeon: Ramiro Calvillo MD;  Location: [facility] PAIN MGT;  Service: Pain Management;  Laterality: Bilateral;  BLOCK, NERVE BILATERAL L2,3,4,5    INJECTION OF FACET JOINT Right 8/5/2020    Procedure: INJECTION, FACET JOINT, L4-L5 , L5-S1;  Surgeon: Ramiro Calvillo MD;  Location: [facility] PAIN MGT;  Service: Pain Management;  Laterality: Right;    LAMINOFORAMINOTOMY OF SPINE USING MINIMALLY INVASIVE TECHNIQUE N/A 2/1/2021    Procedure: MIS L4-5 Laminectomy;  Surgeon: Bernard Sheldon MD;  Location: [facility] OR;  Service: Neurosurgery;  Laterality: N/A;  Anesthesia: General  Nerve Monitoring: EMG/SEP  Position: Prone  Bed: [place] Frame on andres  Headrest: Prone View  Radiology: C-arm  Misc: Microscope, microinstruments    TRANSFORAMINAL EPIDURAL INJECTION OF STEROID Right 5/27/2020    Procedure: INJECTION, STEROID, EPIDURAL, TRANSFORAMINAL APPROACH;  Surgeon: Ramiro Calvillo MD;  Location: [facility] PAIN MGT;  Service: Pain Management;  Laterality: Right;  Right TF ASHISH L4-L5, L5-S1    PTcannot come earlier than 12:30    TRANSFORAMINAL EPIDURAL INJECTION OF STEROID Right 9/9/2020    Procedure: INJECTION, STEROID, EPIDURAL, TRANSFORAMINAL APPROACH;  Surgeon: Ramiro Calvillo MD;  Location: [facility] PAIN MGT;  Service: Pain Management;  Laterality: Right;  RIGHT TF ASHISH L4/5 and L5/S1       Time Tracking:     OT Date of Treatment: 07/16/25  OT Start Time: 1108  OT Stop Time:  1130  OT Total Time (min): 22 min    Billable Minutes:Evaluation 10  Therapeutic Activity 12    7/16/2025

## 2025-07-16 NOTE — ASSESSMENT & PLAN NOTE
Likely multifactorial (UTI vs stercoral colitis vs baclofen toxicity)  - Tmax 100.3 F on admission, remaining vitals stable   - CBC with WBC of 12.68  - CMP grossly unremarkable   - BNP and trop wnl  - UA with 13 WBC, >100 RBC  - UCX and BCX pending  - CXR without consolidation  - CT AP with left ureteral calculus; no obstruction or hydronephrosis. Also notes distention of the rectum with stool, mild rectal wall thickening   - CT head without acute abnormality   - given vanc and zosyn in the ED, previous urine cultures with no growth. De-escalate to ceftriaxone   - urology consulted 2/2 concern for infected stone   - brown bomb ordered on admission followed by oral bowel regimen  - neuro checks q4h  - tele

## 2025-07-16 NOTE — ED PROVIDER NOTES
Encounter Date: 7/15/2025       History     Chief Complaint   Patient presents with    AMS     Per family, pt fell this morning, woke up weak and altered. - LOC, - head trauma, - blood thinners. Baseline neuro AxO x3     HPI  80-year-old male with a history of COPD, Parkinson's, kidney stones, hypertension and hyperlipidemia who presents with altered mental status.  Typically he is able to walk around and have conversations but today he has been globally fatigued and unable to walk on his own.  No fever at home but he has 100.3 here.  No falls or injuries.  No focal weakness.  He has been complaining of pain with urination and a history of UTIs.  No chest pain or coughing.  No vomiting or diarrhea.  He also has had some shaking episodes but no seizure-like activity.  Review of patient's allergies indicates:   Allergen Reactions    Influenza virus vaccines Other (See Comments)     Caused fever, chills, and made tremors worse     Past Medical History:   Diagnosis Date    Cataract     Chronic back pain greater than 3 months duration     COPD (chronic obstructive pulmonary disease)     Coronary artery disease     3 stents    DDD (degenerative disc disease), lumbar 05/16/2020    Glaucoma suspect of both eyes     Hyperlipidemia     Hypertension     MI, old 2006    Parkinson disease     Renal disorder     Kidney stones     Past Surgical History:   Procedure Laterality Date    CARDIAC CATHETERIZATION  2006    x 3    CARDIAC CATHETERIZATION  10/11/2015    x 2    CATARACT EXTRACTION W/  INTRAOCULAR LENS IMPLANT Right 03/06/2018    Dr. Liu    CORONARY ANGIOPLASTY  2006, 2015    3 stents    CYSTOSCOPY W/ URETERAL STENT PLACEMENT Left 9/13/2023    Procedure: CYSTOSCOPY, WITH URETERAL STENT PLACEMENT;  Surgeon: Jerel Cullen MD;  Location: [facility] OR [address] [place] [address];  Service: Urology;  Laterality: Left;  1.5 HR    EXTRACORPOREAL SHOCK WAVE LITHOTRIPSY Left 4/20/2021    Procedure: LITHOTRIPSY-EXTRACORPOREAL SHOCK WAVE;  Surgeon:  Ottoniel Obando MD;  Location: [facility], [place], operated by [facility] OR;  Service: Urology;  Laterality: Left;    EXTRACORPOREAL SHOCK WAVE LITHOTRIPSY Left 9/13/2023    Procedure: LITHOTRIPSY, ESWL;  Surgeon: Jerel Cullen MD;  [facility];  Service: Urology;  Laterality: Left;    EYE SURGERY      cataracts bilateral    HEMORRHOID SURGERY      INJECTION OF ANESTHETIC AGENT AROUND NERVE Bilateral 11/4/2020    Procedure: BLOCK, NERVE BILATERAL L2,3,4,5;  Surgeon: Ramiro Calvillo MD;  Location: [facility], [place], operated by [facility]IN [facility]GT;  Service: Pain Management;  Laterality: Bilateral;  BLOCK, NERVE BILATERAL L2,3,4,5    INJECTION OF FACET JOINT Right 8/5/2020    Procedure: INJECTION, FACET JOINT, L4-L5 , L5-S1;  Surgeon: Ramiro Calvillo MD;  Location: [facility], [place], operated by [facility]IN [facility]GT;  Service: Pain Management;  Laterality: Right;    LAMINOFORAMINOTOMY OF SPINE USING MINIMALLY INVASIVE TECHNIQUE N/A 2/1/2021    Procedure: MIS L4-5 Laminectomy;  Surgeon: Bernard Sheldon MD;  Location: [facility] OR;  Service: Neurosurgery;  Laterality: N/A;  Anesthesia: General  Nerve Monitoring: EMG/SEP  Position: Prone  Bed: [facility] [address]  Headrest: Prone View  Radiology: C-arm  Misc: Microscope, microinstruments    TRANSFORAMINAL EPIDURAL INJECTION OF STEROID Right 5/27/2020    Procedure: INJECTION, STEROID, EPIDURAL, TRANSFORAMINAL APPROACH;  Surgeon: Ramiro Calvillo MD;  Location: [facility], [place], operated by [facility]IN [facility]GT;  Service: Pain Management;  Laterality: Right;  Right TF ASHISH L4-L5, L5-S1    PTcannot come earlier than 12:30    TRANSFORAMINAL EPIDURAL INJECTION OF STEROID Right 9/9/2020    Procedure: INJECTION, STEROID, EPIDURAL, TRANSFORAMINAL APPROACH;  Surgeon: Ramiro Calvillo MD;  Location: [facility], [place], operated by [facility]IN [facility]GT;  Service: Pain Management;  Laterality: Right;  RIGHT TF ASHISH L4/5 and L5/S1     Family History   Problem Relation Name Age of Onset    Heart attack Father 76     Cataracts Father 76     Heart disease Maternal Uncle      Heart disease Paternal Uncle      Hypertension Neg Hx      Asthma Neg  Hx      Emphysema Neg Hx      Amblyopia Neg Hx      Blindness Neg Hx      Macular degeneration Neg Hx      Retinal detachment Neg Hx      Strabismus Neg Hx       Social History[1]  Review of Systems    Physical Exam     Initial Vitals [07/15/25 1749]   BP Pulse Resp Temp SpO2   (!) 141/88 86 18 98.6 °F (37 °C) 100 %      MAP       --         Physical Exam    Nursing note and vitals reviewed.  Constitutional:   Globally weak, able to follow commands and protecting airway   HENT:   Head: Normocephalic and atraumatic.   Nose: Nose normal.   Eyes: Conjunctivae and EOM are normal. Pupils are equal, round, and reactive to light.   Neck:   Normal range of motion.  Cardiovascular:  Normal rate, regular rhythm, normal heart sounds and intact distal pulses.           Pulmonary/Chest: Breath sounds normal. No respiratory distress. He has no wheezes. He has no rhonchi. He has no rales.   Abdominal: Abdomen is soft. He exhibits no distension. There is no abdominal tenderness. There is no rebound and no guarding.   Musculoskeletal:         General: No tenderness or edema. Normal range of motion.      Cervical back: Normal range of motion.     Neurological:   Eyes closed but we will wake and follow commands.  No focal deficits in upper or lower extremities.  No facial droop.   Skin: Skin is warm and dry.         ED Course   Procedures  Labs Reviewed   COMPREHENSIVE METABOLIC PANEL - Abnormal       Result Value    Sodium 142      Potassium 4.0      Chloride 112 (*)     CO2 22 (*)     Glucose 92      BUN 24 (*)     Creatinine 0.8      Calcium 8.6 (*)     Protein Total 6.0      Albumin 3.3 (*)     Bilirubin Total 0.9      ALP 76      AST 25      ALT 7 (*)     Anion Gap 8      eGFR >60     URINALYSIS, REFLEX TO URINE CULTURE - Abnormal    Color, UA Yellow      Appearance, UA Clear      pH, UA 6.0      Spec Grav UA 1.020      Protein, UA Negative      Glucose, UA Negative      Ketones, UA 2+ (*)     Bilirubin, UA Negative      Blood,  UA 3+ (*)     Nitrites, UA Negative      Urobilinogen, UA Negative      Leukocyte Esterase, UA 1+ (*)    CBC WITH DIFFERENTIAL - Abnormal    WBC 12.68      RBC 4.11 (*)     HGB 13.1 (*)     HCT 40.4      MCV 98      MCH 31.9 (*)     MCHC 32.4      RDW 12.7      Platelet Count 165      MPV 9.9      Nucleated RBC 0      Neut % 81.2 (*)     Lymph % 8.6 (*)     Mono % 9.2      Eos % 0.4      Basophil % 0.2      Imm Grans % 0.4      Neut # 10.30 (*)     Lymph # 1.09      Mono # 1.17 (*)     Eos # 0.05      Baso # 0.02      Imm Grans # 0.05 (*)    URINALYSIS MICROSCOPIC - Abnormal    RBC, UA >100 (*)     WBC, UA 13 (*)     Bacteria, UA None      Yeast, UA None      Squamous Epithelial Cells, UA <1      Hyaline Casts, UA 0      Microscopic Comment       HEPATITIS C ANTIBODY - Normal    Hep C Ab Interp Non-Reactive     HIV 1 / 2 ANTIBODY - Normal    HIV 1/2 Ag/Ab Non-Reactive     B-TYPE NATRIURETIC PEPTIDE - Normal    BNP 55     MAGNESIUM - Normal    Magnesium  2.0     TROPONIN I HIGH SENSITIVITY - Normal    Troponin High Sensitive 4     TSH - Normal    TSH 1.112     SARS-COV2 (COVID) WITH FLU/RSV BY PCR - Normal    INFLUENZA A BY PCR Negative      INFLUENZA B BY PCR Negative      Respiratory Syncytial Virus PCR Negative      SARS-CoV-2 PCR Negative     CULTURE, BLOOD   CULTURE, BLOOD   CULTURE, URINE   CBC W/ AUTO DIFFERENTIAL    Narrative:     The following orders were created for panel order CBC auto differential.  Procedure                               Abnormality         Status                     ---------                               -----------         ------                     CBC with Differential[[id number]]       Abnormal            Final result                 Please view results for these tests on the individual orders.   HEP C VIRUS HOLD SPECIMEN   URINALYSIS   PROCALCITONIN   [place] URINE HOLD          Imaging Results              X-Ray Chest AP Portable (Final result)  Result time 07/15/25 20:26:19  "     Final result by Charlie Garcia MD (07/15/25 20:26:19)                   Impression:      No focal consolidation identified on this single view.    Suspected pulmonary emphysema.      Electronically signed by: Charlie Garcia MD  Date:    07/15/2025  Time:    20:26               Narrative:    EXAMINATION:  XR CHEST AP PORTABLE    CLINICAL HISTORY:  Provided history is "ams;  ".    TECHNIQUE:  One view of the chest.    COMPARISON:  04/02/2025.    FINDINGS:  Patient is rotated.  Cardiomediastinal silhouette is stable and not significantly enlarged.  Atherosclerotic calcifications overlie the aortic arch.  There are coarse interstitial lung markings but no large focal area of consolidation.  Probable pulmonary emphysema.  No sizable pleural effusion.  No pneumothorax.                                       CT Head Without Contrast (Final result)  Result time 07/15/25 19:23:35      Final result by Marcelo Dixon MD (07/15/25 19:23:35)                   Impression:      1. Allowing for motion artifact, no convincing acute intracranial abnormalities noting sequela of chronic microvascular ischemic change and senescent change.  2. Focus of induration overlies the left frontal calvarium, correlation with any history of recent injury to the region.      Electronically signed by: Marcelo Dixon MD  Date:    07/15/2025  Time:    19:23               Narrative:    EXAMINATION:  CT HEAD WITHOUT CONTRAST    CLINICAL HISTORY:  Mental status change, unknown cause;    TECHNIQUE:  Low dose axial images were obtained through the head.  Coronal and sagittal reformations were also performed. Contrast was not administered.    COMPARISON:  None.    FINDINGS:  There is generalized cerebral volume loss.  There is hypoattenuation in a periventricular fashion, likely sequela of chronic microvascular ischemic change.  There is no evidence of acute major vascular territory infarct, hemorrhage, or mass.  There is no hydrocephalus.  " There are no abnormal extra-axial fluid collections.  The paranasal sinuses and mastoid air cells are clear, and there is no evidence of calvarial fracture.  The visualized soft tissues are remarkable for a small focus of induration overlying the left frontal calvarium..                                       Medications   acetaminophen tablet 1,000 mg (1,000 mg Oral Given 7/15/25 2049)     Medical Decision Making  80-year-old male who presents with global fatigue, altered mental status.  History of UTIs.  Here, temp 100.3°.  No focal deficits.  Otherwise vitals stable.  I suspect he likely has been infection of some sort.  Sepsis bundle ordered.  CT head ordered from triage.  No acute findings.  Very likely will require admission but pending full workup.  We will hold off on antibiotics as he has not unstable and we do not yet have a source.    Here and does have some white blood cells but has more red cells.  He has a history of kidney stones in his really not able to tell me about any discomfort, though family reports he did report back pain earlier today.  We will get a CT to make sure there was not an infected ureteral stone.  If negative, we will still admit to hospital medicine.  Signed out pending CT and rate.    Amount and/or Complexity of Data Reviewed  Labs: ordered.  Radiology: ordered.    Risk  OTC drugs.              Attending Attestation:   Physician Attestation Statement for Resident:  As the supervising MD   Physician Attestation Statement: I have personally seen and examined this patient.   I agree with the above history.  -:   As the supervising MD I agree with the above PE.     As the supervising MD I agree with the above treatment, course, plan, and disposition.                                               Clinical Impression:  Final diagnoses:  [W19.XXXA] Fall  [R50.9] Fever, unspecified fever cause (Primary)       Hector Kellogg presents with ifnection without sepsis secondary to  "Unknown.    Interventions include:    Antibiotics:         Fluid Resuscitation:   Fluid Not Needed - Patient is not hypotensive and/or lactate is less than 4.0.     Labs and Imaging:   Recent Labs   Lab 07/15/25  2040   POCLAC 1.2     No results found for: "CULTBLD"   Additional cultures were collected as indicated and imaging reviewed to identify infection source.    Hemodynamic Support and Monitoring:  Vasopressors were not needed     The patient was re-evaluated at Admission Date and Time: Obs date: N/A N/A N/A and patient and/or surrogate was updated on plan of care.    The following services were consulted:None.         Critical care time spent on the evaluation and treatment of severe organ dysfunction, review of pertinent labs and imaging studies, discussions with consulting providers and discussions with patient/family: 35 minutes.           [1]   Social History  Tobacco Use    Smoking status: Former     Current packs/day: 0.00     Average packs/day: 1 pack/day for 40.0 years (40.0 ttl pk-yrs)     Types: Cigarettes     Start date: 1974     Quit date: 2014     Years since quittin.4     Passive exposure: Past    Smokeless tobacco: Never   Substance Use Topics    Alcohol use: Yes     Comment: socially    Drug use: No        Argelia Joyner MD  07/15/25 [id number]    "

## 2025-07-16 NOTE — ED NOTES
Telemetry Verification   Patient placed on Telemetry Box  Verified with War Room  Tech    Box # [id number]   Rate    Rhythm

## 2025-07-16 NOTE — ANESTHESIA PREPROCEDURE EVALUATION
07/16/2025  Hector Kellogg is a 80 y.o., male.  Pre-operative evaluation for Procedure(s) (LRB):  CYSTOSCOPY, WITH URETERAL STENT INSERTION (Left)    Hector Kellogg is a 80 y.o. male   Last TTE 10 years ago with normal biV fxn, PASP 34mmHg    Patient Active Problem List   Diagnosis    Coronary artery disease    Hyperlipidemia    Essential hypertension    Neuropathic pain    Parkinson disease    Anxiety    Chronic low back pain    Pulmonary HTN    Centrilobular emphysema    Exercise hypoxemia    Glaucoma suspect of both eyes    NS (nuclear sclerosis), left    Refractive error    Senile nuclear sclerosis    Post-operative state    Claudication    Nephrolithiasis    Lumbosacral radiculopathy    DDD (degenerative disc disease), lumbar    Spondylosis without myelopathy    Chronic pain    Lung nodules    Osteoarthritis of spine    Spondylolisthesis of lumbar region    Gross hematuria    Sacroiliitis    Thyroid nodule    Aortic atherosclerosis    Pain of right hip    Trochanteric bursitis of right hip    Decreased appetite    Constipation    BPH with urinary obstruction    Recurrent cystitis    Acute cystitis with hematuria    Olecranon bursitis of right elbow    Nocturia    Psychosis due to Parkinson's disease    Palliative care by specialist    Cramps, muscle, general    Hypoalbuminemia    Stercoral colitis    Left ureteral calculus    Normocytic anemia    Acute encephalopathy       Past Surgical History:   Procedure Laterality Date    CARDIAC CATHETERIZATION  2006    x 3    CARDIAC CATHETERIZATION  10/11/2015    x 2    CATARACT EXTRACTION W/  INTRAOCULAR LENS IMPLANT Right 03/06/2018    Dr. Liu    CORONARY ANGIOPLASTY  2006, 2015    3 stents    CYSTOSCOPY W/ URETERAL STENT PLACEMENT Left 9/13/2023    Procedure: CYSTOSCOPY, WITH URETERAL STENT  PLACEMENT;  Surgeon: Jerel Cullen MD;  Location: [facility] OR 1ST FLR;  Service: Urology;  Laterality: Left;  1.5 HR    EXTRACORPOREAL SHOCK WAVE LITHOTRIPSY Left 4/20/2021    Procedure: LITHOTRIPSY-EXTRACORPOREAL SHOCK WAVE;  Surgeon: Ottoniel Obando MD;  Location: [facility] OR;  Service: Urology;  Laterality: Left;    EXTRACORPOREAL SHOCK WAVE LITHOTRIPSY Left 9/13/2023    Procedure: LITHOTRIPSY, ESWL;  Surgeon: Jerel Cullen MD;  Location: [facility] OR 1ST FLR;  Service: Urology;  Laterality: Left;    EYE SURGERY      cataracts bilateral    HEMORRHOID SURGERY      INJECTION OF ANESTHETIC AGENT AROUND NERVE Bilateral 11/4/2020    Procedure: BLOCK, NERVE BILATERAL L2,3,4,5;  Surgeon: Ramiro Calvillo MD;  Location: [facility] PAIN MGT;  Service: Pain Management;  Laterality: Bilateral;  BLOCK, NERVE BILATERAL L2,3,4,5    INJECTION OF FACET JOINT Right 8/5/2020    Procedure: INJECTION, FACET JOINT, L4-L5 , L5-S1;  Surgeon: Ramiro Calvillo MD;  Location: [facility] PAIN MGT;  Service: Pain Management;  Laterality: Right;    LAMINOFORAMINOTOMY OF SPINE USING MINIMALLY INVASIVE TECHNIQUE N/A 2/1/2021    Procedure: MIS L4-5 Laminectomy;  Surgeon: Bernard Sheldon MD;  Location: [facility];  Service: Neurosurgery;  Laterality: N/A;  Anesthesia: General  Nerve Monitoring: EMG/SEP  Position: Prone  Bed: [facility] [place]  Headrest: Prone View  Radiology: C-arm  Misc: Microscope, microinstruments    TRANSFORAMINAL EPIDURAL INJECTION OF STEROID Right 5/27/2020    Procedure: INJECTION, STEROID, EPIDURAL, TRANSFORAMINAL APPROACH;  Surgeon: Ramiro Calvillo MD;  Location: [facility] PAIN MGT;  Service: Pain Management;  Laterality: Right;  Right TF ASHISH L4-L5, L5-S1    PTcannot come earlier than 12:30    TRANSFORAMINAL EPIDURAL INJECTION OF STEROID Right 9/9/2020    Procedure: INJECTION, STEROID, EPIDURAL, TRANSFORAMINAL APPROACH;  Surgeon: Ramiro Calvillo MD;  Location: [facility] PAIN MGT;  Service: Pain Management;  Laterality: Right;   "RIGHT TF ASHISH L4/5 and L5/S1       Social History[1]    Medications Ordered Prior to Encounter[2]    Review of patient's allergies indicates:   Allergen Reactions    Influenza virus vaccines Other (See Comments)     Caused fever, chills, and made tremors worse       Labs:  CBC:   Recent Labs     07/15/25  1947   WBC 12.68   RBC 4.11*   HGB 13.1*   HCT 40.4      MCV 98   MCH 31.9*   MCHC 32.4       CMP:   Recent Labs     07/15/25  1957      K 4.0   *   CO2 22*   BUN 24*   CREATININE 0.8   GLU 92   MG 2.0   CALCIUM 8.6*   ALBUMIN 3.3*   PROT 6.0   ALKPHOS 76   ALT 7*   AST 25   BILITOT 0.9       INR  No results for input(s): "PT", "INR", "PROTIME", "APTT" in the last 72 hours.      Diagnostic Studies:    EKG:   Results for orders placed or performed during the hospital encounter of 08/26/24   EKG 12-lead    Collection Time: 08/26/24  5:46 PM   Result Value Ref Range    QRS Duration 120 ms    [facility] QTC Calculation 444 ms    Narrative    Test Reason : R55,    Vent. Rate : 075 BPM     Atrial Rate : 075 BPM     P-R Int : 130 ms          QRS Dur : 120 ms      QT Int : 398 ms       P-R-T Axes : 067 067 058 degrees     QTc Int : 444 ms    Normal sinus rhythm  Right bundle branch block  Abnormal ECG  When compared with ECG of 15-SEP-2023 19:40,  No significant change was found  Confirmed by Carie PATEL, Roshan WRIGHT ([id number]) on 8/28/2024 2:05:05 PM    Referred By: [email or web address]   SELF           Confirmed By:Roshan Darnell MD       TTE:  No results found for this or any previous visit.  No results found for: "EF"   Results for orders placed or performed during the hospital encounter of 11/06/15   2D Echo w/ Color Flow Doppler    Collection Time: 11/06/15  4:00 PM   Result Value Ref Range    EF + QEF 55 55 - 65    Diastolic Dysfunction No     Est. PA Systolic Pressure 34.36     Tricuspid Valve Regurgitation TRIVIAL        DEXTER:  No results found for this or any previous visit.    Stress Test:  No results found for this or " "any previous visit.       LHC:  No results found for this or any previous visit.       PFT:  No results found for: "FEV1", "FVC", "QIS7KNV", "TLC", "DLCO"     ALLERGIES:     Review of patient's allergies indicates:   Allergen Reactions    Influenza virus vaccines Other (See Comments)     Caused fever, chills, and made tremors worse     LDA:          Lines/Drains/Airways       Peripheral Intravenous Line  Duration             Peripheral IV Single Lumen 07/15/25 2100 20 G Right Antecubital <1 day    Peripheral IV Single Lumen 07/15/25 2135 18 G Left Antecubital <1 day                   Anesthesia Evaluation      Airway   Mallampati: II  TM distance: > 6 cm  Neck ROM: Normal ROM  Dental    (+) Intact    Pulmonary    (+) COPD  Cardiovascular   (+) hypertension, CAD    Rate: Normal    Neuro/Psych      GI/Hepatic/Renal    (+) chronic renal disease    Endo/Other    Abdominal                         Pre-op Assessment    I have reviewed the Patient Summary Reports.     I have reviewed the Nursing Notes. I have reviewed the NPO Status.   I have reviewed the Medications.     Review of Systems  Anesthesia Hx:  No problems with previous Anesthesia             Denies Family Hx of Anesthesia complications.    Denies Personal Hx of Anesthesia complications.                    Cardiovascular:     Hypertension   CAD           hyperlipidemia                               Pulmonary:   COPD                     Renal/:  Chronic Renal Disease                Hepatic/GI:  Hepatic/GI Normal                    Neurological:           Parkinson's disease                            Endocrine:  Endocrine Normal                Physical Exam  General: Well nourished    Airway:  Mallampati: II   Mouth Opening: Normal  TM Distance: > 6 cm  Tongue: Normal  Neck ROM: Normal ROM    Dental:  Intact    Chest/Lungs:  Normal Respiratory Rate    Heart:  Rate: Normal      Anesthesia Plan  Type of Anesthesia, risks & benefits discussed:    Anesthesia Type: " Gen Natural Airway, MAC  Intra-op Monitoring Plan: Standard ASA Monitors  Post Op Pain Control Plan: multimodal analgesia and IV/PO Opioids PRN  Induction:  IV  Airway Plan: Direct, Post-Induction  Informed Consent: Informed consent signed with the Patient and all parties understand the risks and agree with anesthesia plan.  All questions answered. Patient consented to blood products? Yes  ASA Score: 3  Day of Surgery Review of History & Physical: H&P Update referred to the surgeon/provider.    Ready For Surgery From Anesthesia Perspective.     .             [1]  Social History  Socioeconomic History    Marital status:    Tobacco Use    Smoking status: Former     Current packs/day: 0.00     Average packs/day: 1 pack/day for 40.0 years (40.0 ttl pk-yrs)     Types: Cigarettes     Start date: 1974     Quit date: 2014     Years since quittin.4     Passive exposure: Past    Smokeless tobacco: Never   Substance and Sexual Activity    Alcohol use: Yes     Comment: socially    Drug use: No     Social Drivers of Health     Financial Resource Strain: Low Risk  (2025)    Overall Financial Resource Strain (CARDIA)     Difficulty of Paying Living Expenses: Not hard at all   Food Insecurity: No Food Insecurity (2025)    Hunger Vital Sign     Worried About Running Out of Food in the Last Year: Never true     Ran Out of Food in the Last Year: Never true   Transportation Needs: No Transportation Needs (2025)    PRAPARE - Transportation     Lack of Transportation (Medical): No     Lack of Transportation (Non-Medical): No   Physical Activity: Inactive (2025)    Exercise Vital Sign     Days of Exercise per Week: 0 days     Minutes of Exercise per Session: 0 min   Stress: No Stress Concern Present (2025)    Citizen of [place] [employer] - Occupational Stress Questionnaire     Feeling of Stress : Not at all   Housing Stability: Low Risk  (2025)    Housing Stability  Vital Sign     Unable to Pay for Housing in the Last Year: No     Number of Times Moved in the Last Year: 0     Homeless in the Last Year: No   [2]  No current facility-administered medications on file prior to encounter.     Current Outpatient Medications on File Prior to Encounter   Medication Sig Dispense Refill    albuterol (PROVENTIL/VENTOLIN HFA) 90 mcg/actuation inhaler INHALE 2 PUFFS BY MOUTH EVERY 6 HOURS AS NEEDED FOR WHEEZING FOR SHORTNESS OF BREATH 9 g 3    amLODIPine (NORVASC) 5 MG tablet Take 1 tablet by mouth once daily 30 tablet 11    aspirin (ECOTRIN) 81 MG EC tablet Take 81 mg by mouth once daily.      baclofen (LIORESAL) 10 MG tablet Take 1 tablet (10 mg total) by mouth 3 (three) times daily. 270 tablet 1    BREZTRI AEROSPHERE 160-9-4.8 mcg/actuation HFAA INHALE 2 PUFFS INTO LUNGS TWICE DAILY 11 g 11    carbidopa-levodopa  mg (SINEMET)  mg per tablet TAKE 1 & 1/2 (ONE & ONE-HALF) TABLETS BY MOUTH FIVE TIMES DAILY 675 tablet 3    citalopram (CELEXA) 20 MG tablet Take 1 tablet by mouth once daily 90 tablet 3    ezetimibe (ZETIA) 10 mg tablet Take 1 tablet by mouth once daily 90 tablet 0    ibuprofen (ADVIL,MOTRIN) 800 MG tablet Take 800 mg by mouth as needed.      loratadine (CLARITIN) 10 mg tablet Take 10 mg by mouth as needed for Allergies.      meloxicam (MOBIC) 7.5 MG tablet Take 1 tablet (7.5 mg total) by mouth once daily. 30 tablet 0    metoprolol succinate (TOPROL-XL) 50 MG 24 hr tablet Take 1 tablet by mouth once daily 90 tablet 3    multivitamin capsule Take 1 capsule by mouth once daily.      potassium citrate (UROCIT-K 10) 10 mEq (1,080 mg) TbSR Take 1 tablet (10 mEq total) by mouth 2 (two) times daily with meals. 180 tablet 3    rosuvastatin (CRESTOR) 40 MG Tab TAKE 1 TABLET BY MOUTH NIGHTLY AT BEDTIME 90 tablet 3

## 2025-07-16 NOTE — ASSESSMENT & PLAN NOTE
- followed by neurology outpatient   - continue home sinemet five times daily   - delirium precautions

## 2025-07-16 NOTE — NURSING
Pt noted in bed in fetal position, large stool noted to floor and bed.  Rectal enema held per NATTY Kirk. Pt cleaned, linen changed. Pt passed Raza. NAD noted. Spouse remains at bedside. Will continue to monitor.

## 2025-07-16 NOTE — ED NOTES
Bed: [facility]  Expected date:   Expected time:   Means of arrival:   Comments:  [place]

## 2025-07-16 NOTE — PT/OT/SLP EVAL
"Physical Therapy Co-Evaluation    Patient Name:  Hector Kellogg   MRN:  [id number]    Recommendations:     Discharge Recommendations: High Intensity Therapy   Discharge Equipment Recommendations: none   Barriers to discharge: Pt currently requiring increased level of assistance with mobility      Assessment:     Hector Kellogg is a 80 y.o. male admitted with a medical diagnosis of Encephalopathy.  He presents with the following impairments/functional limitations: weakness, impaired endurance, impaired functional mobility, gait instability, impaired balance, decreased upper extremity function, decreased lower extremity function, decreased coordination, impaired cognition, decreased safety awareness, impaired cardiopulmonary response to activity.    Pleasantly confused, AAOx1, followed <25% commands, spontaneous movements noted in all extremities. Pt currently requires 2 person assist and presented with unsteadiness with amb. Pt will benefit from skilled PT services while in house in order to address the aforementioned deficits.      Rehab Prognosis: Good; patient would benefit from acute skilled PT services to address these deficits and reach maximum level of function.    Recent Surgery: Procedure(s) (LRB):  CYSTOSCOPY, WITH URETERAL STENT INSERTION (Left)  PYELOGRAM, RETROGRADE (Bilateral) * Day of Surgery *    Plan:     During this hospitalization, patient to be seen 4 x/week to address the identified rehab impairments via gait training, therapeutic activities, therapeutic exercises, neuromuscular re-education and progress toward the following goals:    Plan of Care Expires:  08/16/25    Subjective     "We're in the [place]"    Pain/Comfort:  Pain Rating 1: 0/10    Patients cultural, spiritual, Jain conflicts given the current situation: no    Living Environment:  Per family, pt and spouse reside in [facility] with [facility]. Pt has tub/shower combo with shower chair and grab bars  Prior to admission, " patients level of function was mod I RW HH distances, w/c long distances.  Equipment used at home: wheelchair, walker, rolling, rollator, grab bar, shower chair.  DME owned (not currently used): rollator.  Upon discharge, patient will have assistance from family.    Objective:     Communicated with RN prior to session.  Patient found HOB elevated with Condom Catheter, telemetry, EEG, oxygen  upon PT entry to room.    General Precautions: Standard, fall, seizure  Orthopedic Precautions:N/A   Braces: N/A  Respiratory Status: Nasal cannula, flow 1 L/min    Exams:  Cognitive Exam:  Patient is oriented to Person  BLE ROM: WFL  BLE Strength: spontaneous movements noted    Functional Mobility:  Bed Mobility:     Supine to Sit: maximal assistance  Sit to Supine: maximal assistance  Transfers:    Sit to Stand:  minimum assistance and of 2 persons with no AD  Gait: pt amb 4 lateral steps Brynn x2 and presented with unsteadiness, decreased coordination, and B shortened steps  Balance:   Good sitting balance SBA  Fair-poor standing balance      AM-PAC 6 CLICK MOBILITY  Total Score:9       Treatment & Education:  Educated pt on PT role/POC  Educated pt on importance of OOB activity and daily ambulation   Pt educated on proper body mechanics, safety techniques, and energy conservation with PT facilitation and cueing throughout session   Pt verbalized understanding      Patient left HOB elevated with all lines intact, call button in reach, bed alarm on, RN notified, and OT and family present.    GOALS:   Multidisciplinary Problems       Physical Therapy Goals          Problem: Physical Therapy    Goal Priority Disciplines Outcome Interventions   Physical Therapy Goal     PT, PT/OT Progressing    Description: Goals to be met by: 2025     Patient will increase functional independence with mobility by performin. Supine to sit with Minimal assistance  2. Sit to supine with Minimal assistance  3. Sit to stand transfer with  Supervision  4. Bed to chair transfer with Supervision using LRAD  5. Gait  x 100 feet with Minimal Assistance using LRAD.   6. Ascend/descend 1 stair with no Handrails Minimal Assistance using LRAD.                              History:     Past Medical History:   Diagnosis Date    Cataract     Chronic back pain greater than 3 months duration     COPD (chronic obstructive pulmonary disease)     Coronary artery disease     3 stents    DDD (degenerative disc disease), lumbar 05/16/2020    Glaucoma suspect of both eyes     Hyperlipidemia     Hypertension     MI, old 2006    Parkinson disease     Renal disorder     Kidney stones       Past Surgical History:   Procedure Laterality Date    CARDIAC CATHETERIZATION  2006    x 3    CARDIAC CATHETERIZATION  10/11/2015    x 2    CATARACT EXTRACTION W/  INTRAOCULAR LENS IMPLANT Right 03/06/2018    Dr. Liu    CORONARY ANGIOPLASTY  2006, 2015    3 stents    CYSTOSCOPY W/ URETERAL STENT PLACEMENT Left 9/13/2023    Procedure: CYSTOSCOPY, WITH URETERAL STENT PLACEMENT;  Surgeon: Jerel Cullen MD;  Location: [facility] OR [address] [place] [address];  Service: Urology;  Laterality: Left;  1.5 HR    EXTRACORPOREAL SHOCK WAVE LITHOTRIPSY Left 4/20/2021    Procedure: LITHOTRIPSY-EXTRACORPOREAL SHOCK WAVE;  Surgeon: Ottoniel Obando MD;  [facility];  Service: Urology;  Laterality: Left;    EXTRACORPOREAL SHOCK WAVE LITHOTRIPSY Left 9/13/2023    Procedure: LITHOTRIPSY, ESWL;  Surgeon: Jerel Cullen MD;  Location: [facility] OR [address] [place] [address];  Service: Urology;  Laterality: Left;    EYE SURGERY      cataracts bilateral    HEMORRHOID SURGERY      INJECTION OF ANESTHETIC AGENT AROUND NERVE Bilateral 11/4/2020    Procedure: BLOCK, NERVE BILATERAL L2,3,4,5;  Surgeon: Ramiro Calvillo MD;  Location: [facility] PAIN MGT;  Service: Pain Management;  Laterality: Bilateral;  BLOCK, NERVE BILATERAL L2,3,4,5    INJECTION OF FACET JOINT Right 8/5/2020    Procedure: INJECTION, FACET JOINT, L4-L5 , L5-S1;  Surgeon: Ramiro SWENSON  MD Rajinder;  Location: [facility] [place] PAIN MGT;  Service: Pain Management;  Laterality: Right;    LAMINOFORAMINOTOMY OF SPINE USING MINIMALLY INVASIVE TECHNIQUE N/A 2/1/2021    Procedure: MIS L4-5 Laminectomy;  Surgeon: Bernard Sheldon MD;  Location: [facility] OR;  Service: Neurosurgery;  Laterality: N/A;  Anesthesia: General  Nerve Monitoring: EMG/SEP  Position: Prone  Bed: [facility] on [place]  Headrest: Prone View  Radiology: C-arm  Misc: Microscope, microinstruments    TRANSFORAMINAL EPIDURAL INJECTION OF STEROID Right 5/27/2020    Procedure: INJECTION, STEROID, EPIDURAL, TRANSFORAMINAL APPROACH;  Surgeon: Ramiro Calvillo MD;  Location: [facility] [place] PAIN T;  Service: Pain Management;  Laterality: Right;  Right TF ASHISH L4-L5, L5-S1    PTcannot come earlier than 12:30    TRANSFORAMINAL EPIDURAL INJECTION OF STEROID Right 9/9/2020    Procedure: INJECTION, STEROID, EPIDURAL, TRANSFORAMINAL APPROACH;  Surgeon: Ramiro Calvillo MD;  Location: [facility] [place] PAIN MGT;  Service: Pain Management;  Laterality: Right;  RIGHT TF ASHISH L4/5 and L5/S1       Time Tracking:     PT Received On: 07/16/25  PT Start Time: 1108     PT Stop Time: 1130  PT Total Time (min): 22 min     Billable Minutes: Evaluation 11 and Neuromuscular Re-education 11    Co-treatment performed due to patient's multiple deficits requiring two skilled therapists to appropriately and safely assess patient's strength and endurance while facilitating functional tasks in addition to accommodating for patient's activity tolerance.       07/16/2025

## 2025-07-16 NOTE — OP NOTE
[facility]  Operative Note    Date: 07/16/2025    Pre-Op Diagnosis: Left UVJ stone    Post-Op Diagnosis: same    Procedure(s) Performed:    1. Cystoscopy with left ureteral JJ stent placement  2. Fluoroscopy < 1 h  3. Bilateral retrograde pyelogram     Specimen(s): Urine for culture    Staff Surgeon: Jerel Cullen MD    Assistant Surgeon: Collin Mccann DO    Anesthesia: Monitored Local Anesthesia with Sedation    Indications: Hector Kellogg is a 80 y.o. male with left UVJ, renal stones and mental status changes. .    Findings:  Severe J hooking present bilaterally. Bilateral RPG with no hydronpehrosis, right extrarenal pelvis,  and no filling defects bilaterally. No pyonephrosis on stent placement.    Estimated Blood Loss: min    Drains:  6 Yi x 22 cm left JJ ureteral stent without strings    Procedure in Detail:  After risks, benefits and possible complications of the procedure were explained, the patient elected to undergo the procedure and informed consent was obtained. All questions were answered in the raven-operative area. The patient was transferred to the cystoscopy suite and placed on the fluoroscopy table in the supine position.  SCDs were applied and working. Time out was performed, raven-procedural antibiotics were given. Anesthesia was administered.  After adequate anesthesia the patient was placed in dorsal lithotomy position and prepped and draped in the usual sterile fashion.     A rigid cystoscope in a 22 Fr sheath was introduced into the patients bladder per urethra. This passed easily.  The entire urethra was visualized and revealed no strictures or masses.  Cystoscopy was performed which showed the right and left ureteral orifices in the normal anatomic position.  There were no bladder tumors, no  trabeculations, and no stones.       The left UO was identified and a motion wire was inserted to the level of the renal pelvis. A 5 Fr  open-ended ureteral catheter was advanced  over the motion wire and placement was confirmed using fluoroscopy. The wire was then removed and a retrograde pyelogram (RPG) was then performed which showed the above findings. The wire was then replaced and the ureteral catheter removed, leaving the wire in place.    This was then repeated on the right side, revealing the above findings. The ureteral catheter was then removed and the wire replaced.    We then passed a 6 Fr x 22 cm JJ ureteral stent without strings over the wire to the level of the renal pelvis under direct vision as well as flouroscopy. The guide wire was removed.  A 180 degree coil was observed in the renal pelvis as well as the bladder using fluoroscopy.  A 180 degree coil was also seen using direct visualization in the bladder.     The patient tolerated the procedure well and was transferred to the recovery room in stable condition.      Disposition: The patient will be transferred  back to the floor for further monitoring.      Collin Mccann DO

## 2025-07-16 NOTE — PLAN OF CARE
Pt arrived to unit via stretcher, AAOX1, spouse at bedside. Pt breathing via mouth, O2 Sats 83-85%, O2/2L/NC. Tele monitor intact. Skin w/d to touch, ecchymosis noted to BUE. Male condom cath applied at this time for incontinent episodes. Pt cleaned up at this time. [place] pt and spouse to room and staff. NAD noted at present. Will continue to monitor.  Problem: Skin Injury Risk Increased  Goal: Skin Health and Integrity  Outcome: Progressing     Problem: Adult Inpatient Plan of Care  Goal: Plan of Care Review  7/16/2025 0432 by Ismael Gabriel, RN  Outcome: Progressing  7/16/2025 0431 by Ismael Gabriel, RN  Flowsheets (Taken 7/16/2025 0431)  Plan of Care Reviewed With: patient  Goal: Patient-Specific Goal (Individualized)  Outcome: Progressing  Goal: Absence of Hospital-Acquired Illness or Injury  Outcome: Progressing  Goal: Optimal Comfort and Wellbeing  Outcome: Progressing  Goal: Readiness for Transition of Care  Outcome: Progressing     Problem: Fall Injury Risk  Goal: Absence of Fall and Fall-Related Injury  Outcome: Progressing     Problem: Infection  Goal: Absence of Infection Signs and Symptoms  Outcome: Progressing

## 2025-07-16 NOTE — CONSULTS
[facility]  Urology  Consult Note    Patient Name: Hector Kellogg  MRN: [id number]  Admission Date: 7/15/2025  Hospital Length of Stay: 0   Code Status: Full Code   Attending Provider: Kodi Alegria DO   Consulting Provider: Collin Mccann DO  Primary Care Physician: Blue Mcgill DO  Principal Problem:Acute encephalopathy    Inpatient consult to Urology  Consult performed by: Collin Mccann DO  Consult ordered by: Fany Thompson MD  Reason for consult: Ureteral stone.        Subjective:     HPI:  80-year-old male with a history of COPD, Parkinson's, kidney stones, hypertension and hyperlipidemia who presents with altered mental status and fatigue. CT performed in the ED showed a  5 mm left distal ureteral stone without hydronephrosis. Urology consulted for ureteral stone.    On assessment the patient is AFVSS. Denies fevers, chills. Has had some hematuria. No CVA tenderness on exam. Patient of Dr Cullen, last seen 5/2024. Prior treatment of large >3cm stone burden in L kidney. He has had multiple stone procedures. Last procedure was L ESWL for large L renal pelvic stone. Due to cognitive issues after anesthesia, further stone procedures were held. On Urocit-K.     WBC 12.68, Hg 13.1. Cr is 0.8 consistent with baseline. UA nitrite negative, >100 RBC, 13 WBC, rare bacteria and <1 SEC.     CT scan with 2x5 mm left UVJ stone without hydronephrosis, also present are large nonobstructive lower pole sstones with 2 large simple cysts. Simple cyst and small non-obstructive nephrolithiasis on the right. No hydronephrosis. Prostatomegaly present (~90g). Large stool burden.         Past Medical History:   Diagnosis Date    Cataract     Chronic back pain greater than 3 months duration     COPD (chronic obstructive pulmonary disease)     Coronary artery disease     3 stents    DDD (degenerative disc disease), lumbar 05/16/2020    Glaucoma suspect of both eyes     Hyperlipidemia      Hypertension     MI, old 2006    Parkinson disease     Renal disorder     Kidney stones       Past Surgical History:   Procedure Laterality Date    CARDIAC CATHETERIZATION  2006    x 3    CARDIAC CATHETERIZATION  10/11/2015    x 2    CATARACT EXTRACTION W/  INTRAOCULAR LENS IMPLANT Right 03/06/2018    Dr. Liu    CORONARY ANGIOPLASTY  2006, 2015    3 stents    CYSTOSCOPY W/ URETERAL STENT PLACEMENT Left 9/13/2023    Procedure: CYSTOSCOPY, WITH URETERAL STENT PLACEMENT;  Surgeon: Jerel Cullen MD;  Location: [facility] OR 1ST FLR;  Service: Urology;  Laterality: Left;  1.5 HR    EXTRACORPOREAL SHOCK WAVE LITHOTRIPSY Left 4/20/2021    Procedure: LITHOTRIPSY-EXTRACORPOREAL SHOCK WAVE;  Surgeon: Ottoniel Obando MD;  Location: [facility] OR;  Service: Urology;  Laterality: Left;    EXTRACORPOREAL SHOCK WAVE LITHOTRIPSY Left 9/13/2023    Procedure: LITHOTRIPSY, ESWL;  Surgeon: Jerel Cullen MD;  Location: [facility] OR [facility] FLR;  Service: Urology;  Laterality: Left;    EYE SURGERY      cataracts bilateral    HEMORRHOID SURGERY      INJECTION OF ANESTHETIC AGENT AROUND NERVE Bilateral 11/4/2020    Procedure: BLOCK, NERVE BILATERAL L2,3,4,5;  Surgeon: Ramiro Calvillo MD;  Location: [facility] PAIN MGT;  Service: Pain Management;  Laterality: Bilateral;  BLOCK, NERVE BILATERAL L2,3,4,5    INJECTION OF FACET JOINT Right 8/5/2020    Procedure: INJECTION, FACET JOINT, L4-L5 , L5-S1;  Surgeon: Ramiro Calvillo MD;  Location: [facility] PAIN MGT;  Service: Pain Management;  Laterality: Right;    LAMINOFORAMINOTOMY OF SPINE USING MINIMALLY INVASIVE TECHNIQUE N/A 2/1/2021    Procedure: MIS L4-5 Laminectomy;  Surgeon: Bernard Sheldon MD;  Location: [facility];  Service: Neurosurgery;  Laterality: N/A;  Anesthesia: General  Nerve Monitoring: EMG/SEP  Position: Prone  Bed: [address] Frame on [place]  Headrest: Prone View  Radiology: C-arm  Misc: Microscope, microinstruments    TRANSFORAMINAL EPIDURAL INJECTION OF STEROID Right 5/27/2020     Procedure: INJECTION, STEROID, EPIDURAL, TRANSFORAMINAL APPROACH;  Surgeon: Ramiro Calvillo MD;  Location: [facility] PAIN MGT;  Service: Pain Management;  Laterality: Right;  Right TF ASHISH L4-L5, L5-S1    PTcannot come earlier than 12:30    TRANSFORAMINAL EPIDURAL INJECTION OF STEROID Right 2020    Procedure: INJECTION, STEROID, EPIDURAL, TRANSFORAMINAL APPROACH;  Surgeon: Ramiro Calvillo MD;  Location: [facility] PAIN MGT;  Service: Pain Management;  Laterality: Right;  RIGHT TF ASHISH L4/5 and L5/S1       Review of patient's allergies indicates:   Allergen Reactions    Influenza virus vaccines Other (See Comments)     Caused fever, chills, and made tremors worse       Family History       Problem Relation (Age of Onset)    Cataracts Father    Heart attack Father    Heart disease Maternal Uncle, Paternal Uncle            Tobacco Use    Smoking status: Former     Current packs/day: 0.00     Average packs/day: 1 pack/day for 40.0 years (40.0 ttl pk-yrs)     Types: Cigarettes     Start date: 1974     Quit date: 2014     Years since quittin.4     Passive exposure: Past    Smokeless tobacco: Never   Substance and Sexual Activity    Alcohol use: Yes     Comment: socially    Drug use: No    Sexual activity: Not on file       Review of Systems   Constitutional:  Negative for chills and fever.   Genitourinary:  Positive for hematuria.       Objective:     Temp:  [98.1 °F (36.7 °C)-100.3 °F (37.9 °C)] 98.7 °F (37.1 °C)  Pulse:  [77-87] 79  Resp:  [16-20] 20  SpO2:  [94 %-100 %] 97 %  BP: (110-151)/(64-88) 135/69  Weight: 55.5 kg (122 lb 5.7 oz)  Body mass index is 21 kg/m².           Drains       None                    Physical Exam  Constitutional:       General: He is not in acute distress.     Appearance: Normal appearance.   HENT:      Head: Normocephalic and atraumatic.   Eyes:      Extraocular Movements: Extraocular movements intact.      Pupils: Pupils are equal, round, and reactive to light.  "  Cardiovascular:      Rate and Rhythm: Normal rate.   Pulmonary:      Effort: Pulmonary effort is normal. No respiratory distress.   Abdominal:      General: Abdomen is flat. There is no distension.      Tenderness: There is no abdominal tenderness. There is no right CVA tenderness or left CVA tenderness.   Skin:     Coloration: Skin is not jaundiced.   Neurological:      General: No focal deficit present.      Mental Status: He is alert and oriented to person, place, and time.   Psychiatric:         Mood and Affect: Mood normal.         Behavior: Behavior normal.          Significant Labs:    BMP:  Recent Labs   Lab 07/15/25  1957      K 4.0   *   CO2 22*   BUN 24*   CREATININE 0.8   CALCIUM 8.6*       CBC:  Recent Labs   Lab 07/15/25  1947   WBC 12.68   HGB 13.1*   HCT 40.4          Blood Culture: No results for input(s): "LABBLOO" in the last 168 hours.  CMP:   Recent Labs   Lab 07/15/25  1957   GLU 92      K 4.0   *   CO2 22*   BUN 24*   CREATININE 0.8   CALCIUM 8.6*   MG 2.0     Urine Culture: No results for input(s): "LABURIN" in the last 168 hours.  Urine Studies:   Recent Labs   Lab 07/15/25  2117   COLORU Yellow  Yellow   APPEARANCEUA Clear  Clear   PHUR 6.0  6.0   SPECGRAV 1.020  1.020   PROTEINUA Negative  Negative   GLUCUA Negative  Negative   BILIRUBINUA Negative  Negative   OCCULTUA 2+*  3+*   NITRITE Negative  Negative   UROBILINOGEN Negative  Negative   LEUKOCYTESUR Trace*  1+*   RBCUA >100*  >100*   WBCUA 13*  13*   BACTERIA Rare  None   HYALINECASTS 4*  0       Significant Imaging:  CT: I have reviewed all results within the past 24 hours and my personal findings are:  as noted in HPI                     Assessment and Plan:     Left ureteral calculus  80-year-old male with a history of COPD, Parkinson's, kidney stones, hypertension and hyperlipidemia who presents with altered mental status and fatigue. CT performed in the ED showed a  5 mm left distal " ureteral stone without hydronephrosis. Urology consulted for ureteral stone.    -- Due to frailty of patient will add on for left ureteral stent placement this afternoon  -- NPO   -- Will consent  -- Continue Rocephin  -- F/u cultures  -- Will arrange outpatient follow up with Dr. Christie for definitive stone management        VTE Risk Mitigation (From admission, onward)           Ordered     enoxaparin injection 40 mg  Daily         07/16/25 0435     IP VTE HIGH RISK PATIENT  Once         07/16/25 0435     Place sequential compression device  Until discontinued         07/16/25 0435                    Thank you for your consult. I will sign off. Please contact us if you have any additional questions.    Collin Mccann, DO  Urology  [facility]

## 2025-07-16 NOTE — PLAN OF CARE
Problem: Occupational Therapy  Goal: Occupational Therapy Goal  Description: Goals to be met by: 8/6/2025     Patient will increase functional independence with ADLs by performing:    UE Dressing with Supervision.  LE Dressing with Supervision.  Grooming while standing at sink with Supervision.  Toileting from toilet with Supervision for hygiene and clothing management.   Supine to sit with Supervision.  Toilet transfer to toilet with Supervision.    Outcome: Progressing

## 2025-07-16 NOTE — SUBJECTIVE & OBJECTIVE
Past Medical History:   Diagnosis Date    Cataract     Chronic back pain greater than 3 months duration     COPD (chronic obstructive pulmonary disease)     Coronary artery disease     3 stents    DDD (degenerative disc disease), lumbar 05/16/2020    Glaucoma suspect of both eyes     Hyperlipidemia     Hypertension     MI, old 2006    Parkinson disease     Renal disorder     Kidney stones       Past Surgical History:   Procedure Laterality Date    CARDIAC CATHETERIZATION  2006    x 3    CARDIAC CATHETERIZATION  10/11/2015    x 2    CATARACT EXTRACTION W/  INTRAOCULAR LENS IMPLANT Right 03/06/2018    Dr. Liu    CORONARY ANGIOPLASTY  2006, 2015    3 stents    CYSTOSCOPY W/ URETERAL STENT PLACEMENT Left 9/13/2023    Procedure: CYSTOSCOPY, WITH URETERAL STENT PLACEMENT;  Surgeon: Jerel Cullen MD;  Location: [facility] OR [address] [place] [address];  Service: Urology;  Laterality: Left;  1.5 HR    EXTRACORPOREAL SHOCK WAVE LITHOTRIPSY Left 4/20/2021    Procedure: LITHOTRIPSY-EXTRACORPOREAL SHOCK WAVE;  Surgeon: Ottoniel Obando MD;  Location: [facility] OR;  Service: Urology;  Laterality: Left;    EXTRACORPOREAL SHOCK WAVE LITHOTRIPSY Left 9/13/2023    Procedure: LITHOTRIPSY, ESWL;  Surgeon: Jerel Cullen MD;  Location: [address];  Service: Urology;  Laterality: Left;    EYE SURGERY      cataracts bilateral    HEMORRHOID SURGERY      INJECTION OF ANESTHETIC AGENT AROUND NERVE Bilateral 11/4/2020    Procedure: BLOCK, NERVE BILATERAL L2,3,4,5;  Surgeon: Ramiro Calvillo MD;  Location: [facility] PAIN MGT;  Service: Pain Management;  Laterality: Bilateral;  BLOCK, NERVE BILATERAL L2,3,4,5    INJECTION OF FACET JOINT Right 8/5/2020    Procedure: INJECTION, FACET JOINT, L4-L5 , L5-S1;  Surgeon: Ramiro Calvillo MD;  Location: [facility] PAIN MGT;  Service: Pain Management;  Laterality: Right;    LAMINOFORAMINOTOMY OF SPINE USING MINIMALLY INVASIVE TECHNIQUE N/A 2/1/2021    Procedure: MIS L4-5 Laminectomy;  Surgeon: Bernard Sheldon MD;  Location:  [facility] OR;  Service: Neurosurgery;  Laterality: N/A;  Anesthesia: General  Nerve Monitoring: EMG/SEP  Position: Prone  Bed: Eliot Frame on andres  Headrest: Prone View  Radiology: C-arm  Misc: Microscope, microinstruments    TRANSFORAMINAL EPIDURAL INJECTION OF STEROID Right 5/27/2020    Procedure: INJECTION, STEROID, EPIDURAL, TRANSFORAMINAL APPROACH;  Surgeon: Ramiro Calvillo MD;  Location: [facility] PAIN MGT;  Service: Pain Management;  Laterality: Right;  Right TF ASHISH L4-L5, L5-S1    PTcannot come earlier than 12:30    TRANSFORAMINAL EPIDURAL INJECTION OF STEROID Right 9/9/2020    Procedure: INJECTION, STEROID, EPIDURAL, TRANSFORAMINAL APPROACH;  Surgeon: Ramiro Calvillo MD;  Location: [facility] PAIN MGT;  Service: Pain Management;  Laterality: Right;  RIGHT TF ASHISH L4/5 and L5/S1       Review of patient's allergies indicates:   Allergen Reactions    Influenza virus vaccines Other (See Comments)     Caused fever, chills, and made tremors worse       No current facility-administered medications on file prior to encounter.     Current Outpatient Medications on File Prior to Encounter   Medication Sig    albuterol (PROVENTIL/VENTOLIN HFA) 90 mcg/actuation inhaler INHALE 2 PUFFS BY MOUTH EVERY 6 HOURS AS NEEDED FOR WHEEZING FOR SHORTNESS OF BREATH    amLODIPine (NORVASC) 5 MG tablet Take 1 tablet by mouth once daily    aspirin (ECOTRIN) 81 MG EC tablet Take 81 mg by mouth once daily.    baclofen (LIORESAL) 10 MG tablet Take 1 tablet (10 mg total) by mouth 3 (three) times daily.    BREZTRI AEROSPHERE 160-9-4.8 mcg/actuation HFAA INHALE 2 PUFFS INTO LUNGS TWICE DAILY    carbidopa-levodopa  mg (SINEMET)  mg per tablet TAKE 1 & 1/2 (ONE & ONE-HALF) TABLETS BY MOUTH FIVE TIMES DAILY    citalopram (CELEXA) 20 MG tablet Take 1 tablet by mouth once daily    ezetimibe (ZETIA) 10 mg tablet Take 1 tablet by mouth once daily    ibuprofen (ADVIL,MOTRIN) 800 MG tablet Take 800 mg by mouth as needed.    loratadine  (CLARITIN) 10 mg tablet Take 10 mg by mouth as needed for Allergies.    meloxicam (MOBIC) 7.5 MG tablet Take 1 tablet (7.5 mg total) by mouth once daily.    metoprolol succinate (TOPROL-XL) 50 MG 24 hr tablet Take 1 tablet by mouth once daily    multivitamin capsule Take 1 capsule by mouth once daily.    potassium citrate (UROCIT-K 10) 10 mEq (1,080 mg) TbSR Take 1 tablet (10 mEq total) by mouth 2 (two) times daily with meals.    rosuvastatin (CRESTOR) 40 MG Tab TAKE 1 TABLET BY MOUTH NIGHTLY AT BEDTIME     Family History       Problem Relation (Age of Onset)    Cataracts Father    Heart attack Father    Heart disease Maternal Uncle, Paternal Uncle          Tobacco Use    Smoking status: Former     Current packs/day: 0.00     Average packs/day: 1 pack/day for 40.0 years (40.0 ttl pk-yrs)     Types: Cigarettes     Start date: 1974     Quit date: 2014     Years since quittin.4     Passive exposure: Past    Smokeless tobacco: Never   Substance and Sexual Activity    Alcohol use: Yes     Comment: socially    Drug use: No    Sexual activity: Not on file     Review of Systems   Unable to perform ROS: Mental status change     Objective:     Vital Signs (Most Recent):  Temp: 98.7 °F (37.1 °C) (25 0400)  Pulse: 79 (25 0400)  Resp: 20 (25 0400)  BP: 135/69 (25 0400)  SpO2: 97 % (25 0400) Vital Signs (24h Range):  Temp:  [98.1 °F (36.7 °C)-100.3 °F (37.9 °C)] 98.7 °F (37.1 °C)  Pulse:  [77-87] 79  Resp:  [16-20] 20  SpO2:  [94 %-100 %] 97 %  BP: (110-151)/(64-88) 135/69     Weight: 55.5 kg (122 lb 5.7 oz)  Body mass index is 21 kg/m².     Physical Exam  Vitals and nursing note reviewed.   Constitutional:       General: He is not in acute distress.     Appearance: He is well-developed. He is ill-appearing (chronically ill appearing). He is not toxic-appearing or diaphoretic.   HENT:      Head: Normocephalic and atraumatic.   Eyes:      Extraocular Movements: Extraocular movements  intact.   Cardiovascular:      Rate and Rhythm: Normal rate and regular rhythm.      Heart sounds: Normal heart sounds.   Pulmonary:      Effort: Pulmonary effort is normal. No respiratory distress.      Breath sounds: No wheezing.      Comments: On 2L NC  Abdominal:      General: There is distension.      Tenderness: There is no abdominal tenderness.      Comments: Hypoactive bowel sounds    Musculoskeletal:         General: No tenderness. Normal range of motion.      Right lower leg: No edema.      Left lower leg: No edema.   Skin:     General: Skin is warm and dry.      Findings: No rash.   Neurological:      Mental Status: He is alert. He is disoriented.      Comments: Only oriented to person and place (hospital but unsure which one) - wife reports his mental status waxes and wanes at baseline but he is more confused than normal.        Significant Labs: All pertinent labs within the past 24 hours have been reviewed.  CBC:   Recent Labs   Lab 07/15/25  1947   WBC 12.68   HGB 13.1*   HCT 40.4        CMP:   Recent Labs   Lab 07/15/25  1957      K 4.0   *   CO2 22*   GLU 92   BUN 24*   CREATININE 0.8   CALCIUM 8.6*   PROT 6.0   ALBUMIN 3.3*   BILITOT 0.9   ALKPHOS 76   AST 25   ALT 7*   ANIONGAP 8     Urine Studies:   Recent Labs   Lab 07/15/25  2117   COLORU Yellow  Yellow   APPEARANCEUA Clear  Clear   PHUR 6.0  6.0   SPECGRAV 1.020  1.020   PROTEINUA Negative  Negative   GLUCUA Negative  Negative   BILIRUBINUA Negative  Negative   OCCULTUA 2+*  3+*   NITRITE Negative  Negative   UROBILINOGEN Negative  Negative   LEUKOCYTESUR Trace*  1+*   RBCUA >100*  >100*   WBCUA 13*  13*   BACTERIA Rare  None   HYALINECASTS 4*  0       Significant Imaging: I have reviewed all pertinent imaging results/findings within the past 24 hours.  Imaging Results               CT Abdomen Pelvis With IV Contrast NO Oral Contrast (Final result)  Result time 07/16/25 00:56:41      Final result by Mathieu  Dangelo WALLER MD (07/16/25 00:56:41)                   Impression:      There is a distal left ureteral calculus noted, measuring approximately 2.7 x 5.6 mm on axial imaging, as discussed above, however without evidence for hydroureteronephrosis or obstructive uropathy.    Bilateral nonobstructing nephrolithiasis is also noted.    Urinary bladder wall thickening may relate to components of lack of distention and chronic change however clinical correlation is otherwise needed.    Enlarged heterogeneous prostate.    Mild to moderate distention of the rectum with stool, mild rectal wall thickening with perirectal haziness, correlation for rectal colitis to include stercoral colitis.    Additional findings as above.    This report was flagged in Epic as abnormal.      Electronically signed by: Dangelo Murdock  Date:    07/16/2025  Time:    00:56               Narrative:    EXAMINATION:  CT ABDOMEN PELVIS WITH IV CONTRAST    CLINICAL HISTORY:  Flank pain, kidney stone suspected;    TECHNIQUE:  Low dose axial images, sagittal and coronal reformations were obtained from the lung bases to the pubic symphysis following the IV administration of 100 mL of Omnipaque 350 oral contrast was not utilized.  Single phase postcontrast CT examination of the abdomen and pelvis is submitted peer    COMPARISON:  CT examination of the abdomen and pelvis renal stone study May 6, 2024, CT examination abdomen and pelvis August 24, 2023    FINDINGS:  There is artifact including motion artifact present, this diminishes image quality and diminishes the sensitivity of the exam.  The lung bases demonstrate motion artifact, chronic and atelectatic appearing change.  There is appearance of a small hiatal hernia.  The stomach is decompressed, wall and fold thickening may relate to lack of distention.    There is mild to moderate gallbladder distention, there is no pericholecystic or peripancreatic inflammatory change and there is no abnormal pancreatic  or biliary ductal dilatation, when accounting for limitations of the exam there is no evidence for acute process of the liver, gallbladder, pancreas, spleen or adrenal glands.    Renal hypodensities are noted, those of which are of adequate size to measure, on the left are consistent with cysts, the most prominent at the midpole measuring approximately 6 cm on axial imaging, 13.3 Hounsfield units.  Nonobstructing nephrolithiasis of the right kidney is noted.  There is no evidence for right-sided ureteral calculus or obstructive uropathy.  Caliceal calculi at the lower pole of the left kidney appears similar to the prior study.    As seen on axial image 126 there is a calculus along the distal 3rd of the left ureter just proximal to the left ureterovesicular junction measuring approximately 2.7 x 5.6 mm on axial imaging, with the measurement of 5.6 mm along the axis of the distal ureter.  There is no evidence for hydroureter or obstructive uropathy.  There is no evidence for significant periureteral or perinephric inflammatory change.    The abdominal aorta demonstrates atherosclerotic change, including prominent irregular eccentric mural thrombus, along the infrarenal abdominal aorta, with appearance of ulcerative plaque, this appears similar to the prior CT examination of August 24, 2023.    The prostate appears enlarged measuring approximately 4.8 x 5.7 cm and appears heterogeneous.  Calcifications/mineralization associated with the prostate also noted.  Urinary bladder wall thickening may relate to components of lack of distention as well as needed    Hypodensity associated with the right iliopsoas muscle is again noted as best seen on axial image 134 measuring 2.4 cm, stable appearance.    Evaluation of the bowel is limited due to artifact, however there is no evidence for small bowel obstructive process.  The appendix is not identified.  Mild air and stool noted throughout the colon.  Diverticuli of the sigmoid  "colon are noted, there is no evidence for focal inflamed diverticulum to suggest acute diverticulitis.    There is mild-to-moderate distention of the rectum with stool, mild rectal wall thickening with perirectal haziness, correlation for rectal colitis to include stercoral colitis is needed.    There is no evidence for free intraperitoneal air.    The visualized osseous structures demonstrate chronic change, multilevel chronic change of the spine is noted, including mild grade 1 retrolisthesis of L2 with respect L3, grade 1/2 anterolisthesis of L4 with respect L5, mild grade 1 anterolisthesis of L5 with respect S1, appearance of bilateral pars defects at L5.                                       X-Ray Chest AP Portable (Final result)  Result time 07/15/25 20:26:19      Final result by Charlie Garcia MD (07/15/25 20:26:19)                   Impression:      No focal consolidation identified on this single view.    Suspected pulmonary emphysema.      Electronically signed by: Charlie Garcia MD  Date:    07/15/2025  Time:    20:26               Narrative:    EXAMINATION:  XR CHEST AP PORTABLE    CLINICAL HISTORY:  Provided history is "ams;  ".    TECHNIQUE:  One view of the chest.    COMPARISON:  04/02/2025.    FINDINGS:  Patient is rotated.  Cardiomediastinal silhouette is stable and not significantly enlarged.  Atherosclerotic calcifications overlie the aortic arch.  There are coarse interstitial lung markings but no large focal area of consolidation.  Probable pulmonary emphysema.  No sizable pleural effusion.  No pneumothorax.                                       CT Head Without Contrast (Final result)  Result time 07/15/25 19:23:35      Final result by Marcelo Dixon MD (07/15/25 19:23:35)                   Impression:      1. Allowing for motion artifact, no convincing acute intracranial abnormalities noting sequela of chronic microvascular ischemic change and senescent change.  2. Focus of " induration overlies the left frontal calvarium, correlation with any history of recent injury to the region.      Electronically signed by: Marcelo Dixon MD  Date:    07/15/2025  Time:    19:23               Narrative:    EXAMINATION:  CT HEAD WITHOUT CONTRAST    CLINICAL HISTORY:  Mental status change, unknown cause;    TECHNIQUE:  Low dose axial images were obtained through the head.  Coronal and sagittal reformations were also performed. Contrast was not administered.    COMPARISON:  None.    FINDINGS:  There is generalized cerebral volume loss.  There is hypoattenuation in a periventricular fashion, likely sequela of chronic microvascular ischemic change.  There is no evidence of acute major vascular territory infarct, hemorrhage, or mass.  There is no hydrocephalus.  There are no abnormal extra-axial fluid collections.  The paranasal sinuses and mastoid air cells are clear, and there is no evidence of calvarial fracture.  The visualized soft tissues are remarkable for a small focus of induration overlying the left frontal calvarium..

## 2025-07-16 NOTE — PLAN OF CARE
[facility] - [facility]  Discharge Assessment    Primary Care Provider: Blue Mcgill DO     Discharge Assessment (most recent)       BRIEF DISCHARGE ASSESSMENT - 07/16/25 1149          Discharge Planning    Assessment Type Discharge Planning Brief Assessment     Resource/Environmental Concerns none     Support Systems Spouse/significant other;Children     Equipment Currently Used at Home wheelchair;rollator;walker, rolling;grab bar;shower chair     Current Living Arrangements home     Patient/Family Anticipates Transition to home with family;other (see comments)   Pending PT/OT    Patient/Family Anticipated Services at Transition home health care;rehabilitation services     DME Needed Upon Discharge  none     Discharge Plan A Home     Discharge Plan B Home Health;Skilled Nursing Facility                   Pt is a 80 y.o. male admitted with acute encephalopathy and has a PMH of Parkinson's, HTN and CAD. He lives with his wife in a single story house with no steps to enter. He requires assistance with his bathing a dressing. He will have transportation home. [facility] Discharge Packet given to patient and/or family with understanding verbalized.   name and number and estimated discharge date written on white board in patient's room with request to call for any questions or concerns.  Will continue to follow for needs.Discharge Plan A and Plan B have been determined by review of patient's clinical status, future medical and therapeutic needs, and coverage/benefits for post-acute care in coordination with multidisciplinary team members.    Yuniel Warner RN,BSN

## 2025-07-16 NOTE — ASSESSMENT & PLAN NOTE
Patient with known CAD s/p stent placement, which is controlled   - Will continue MTP, zetia, ASA and Statin and monitor for S/Sx of angina/ACS.   - Continue to monitor on telemetry.

## 2025-07-16 NOTE — ANESTHESIA POSTPROCEDURE EVALUATION
Anesthesia Post Evaluation    Patient: Hector Kellogg    Procedure(s) Performed: Procedure(s) (LRB):  CYSTOSCOPY, WITH URETERAL STENT INSERTION (Left)  PYELOGRAM, RETROGRADE (Bilateral)    Final Anesthesia Type: general      Patient location during evaluation: PACU  Patient participation: Yes- Able to Participate  Level of consciousness: awake and alert  Post-procedure vital signs: reviewed and not stable  Pain management: adequate  Airway patency: patent    PONV status at discharge: No PONV  Anesthetic complications: no      Cardiovascular status: blood pressure returned to baseline  Respiratory status: unassisted  Hydration status: euvolemic  Follow-up not needed.          Vitals Value Taken Time   /82 07/16/25 18:05   Temp 36.7 °C (98.1 °F) 07/16/25 15:30   Pulse 84 07/16/25 18:27   Resp 19 07/16/25 18:16   SpO2 98 % 07/16/25 18:27   Vitals shown include unfiled device data.      No case tracking events are documented in the log.      Pain/Lis Score: Pain Rating Prior to Med Admin: 6 (7/16/2025  5:46 PM)  Lis Score: 9 (7/16/2025  4:30 PM)

## 2025-07-16 NOTE — CARE UPDATE
Called to bedside per nursing staff. Patient had several large, solid bowel movements prior to being given the ordered enema. Patient was also awake and standing up, talking to his wife which was an improvement in his mental status from my initial evaluation.   Brown bomb enema discontinued, continue oral regimen in AM.     Nikki Lutz PA-C  [facility]   [facility] [address]

## 2025-07-16 NOTE — ACP (ADVANCE CARE PLANNING)
Advance Care Planning      Date: 07/16/2025      Code Status  The patient is disoriented and unable to participate in the conversation. I engaged the patient's wife in a conversation about the patient's preferences for care at the very end of life. They wish to have CPR and other invasive treatments performed when their heart and/or breathing stops. I communicated to the patient's wife that a Full Code status would be placed in their chart.      Nikki Lutz PA-C  Hospital Medicine [facility] [address]

## 2025-07-16 NOTE — ASSESSMENT & PLAN NOTE
Most recent hemoglobin and hematocrit are listed below.  Recent Labs     07/15/25  1947   HGB 13.1*   HCT 40.4     Plan  - Monitor serial CBC: Daily  - Transfuse PRBC if patient becomes hemodynamically unstable, symptomatic or H/H drops below 7/21.  - Patient has not received any PRBC transfusions to date  - Patient's anemia is currently stable

## 2025-07-16 NOTE — HPI
Hector Kellogg is a 80 y.o. male with Parkinson's disease, hypertension, hyperlipidemia, CAD, anxiety, emphysema and CAD being admitted to hospital medicine for management of infected renal stone. Patient disoriented and unable to provide history, details given per wife at bedside. She reports patient woke up yesterday morning with generalized weakness, inability to ambulate even with assistance. Endorses associated increased confusion, decreased alertness and complaints of left flank pain. He did fall when attempting to stand out of bed yesterday but did not hit his head/sustain any injuries. She reports recent medication changes, states his baclofen dosing was increased from 5 mg BID to 10 mg TID to better control his muscle pain within the past week. She denies fever, chills, diaphoresis, complaints of dysuria, cough, congestion, nausea or vomiting. Denies constipation, states he has been passing soft/loose stools but did note hard stool in the rectum when wiping.        In ED: Tmax 100.3 F, mildly hypertensive, remaining vitals wnl. CBC with WBC 12.68, mild anemia. CMP grossly unremarkable. BNP and troponin wnl. UA with 13 WBC, >100 RBC. CT AP revealed  2.7 x 5.6 mm distal left ureteral calculus without evidence for hydroureteronephrosis or obstructive uropathy. Also notable for mild to moderate distention of the rectum with stool, mild rectal wall thickening with perirectal haziness. CXR without consolidation. CT head without acute process. Given IV vanc and zosyn in the ED. Urology consulted. Admitted to medicine for further management.

## 2025-07-16 NOTE — PROCEDURES
VIDEO ELECTROENCEPHALOGRAM  REPORT    DATE OF SERVICE:7/16/25  EEG NUMBER: [id number]   REQUESTED BY:  Dr Alegria  LOCATION OF SERVICE:  [facility]    METHODOLOGY   Electroencephalographic (EEG) recording is with electrodes placed according to the International 10-20 placement system.  Thirty two (32) channels of digital signal (sampling rate of 512/sec) including T1 and T2 was simultaneously recorded from the scalp and may include  EKG, EMG, and/or eye monitors.  Recording band pass was 0.1 to 512 hz.  Digital video recording of the patient is simultaneously recorded with the EEG.  The patient is instructed report clinical symptoms which may occur during the recording session.  EEG and video recording is stored and archived in digital format.  Activation procedures which include photic stimulation, hyperventilation and instructing patients to perform simple task are done in selected patients.   The EEG is displayed on a monitor screen and can be reviewed using different montages.  Computer assisted analysis is employed to detect spike and electrographic seizure activity.   The entire record is submitted for computer analysis.  The entire recording is visually reviewed and the times identified by computer analysis as being spikes or seizures are reviewed again.  Compresses spectral analysis (CSA) is also performed on the activity recorded from each individual channel.  This is displayed as a power display of frequencies from 0 to 30 Hz over time.   The CSA is reviewed looking for asymmetries in power between homologous areas of the scalp and then compared with the original EEG recording.     [employer] software was also utilized in the review of this study.  This software suite analyzes the EEG recording in multiple domains.  Coherence and rhythmicity is computed to identify EEG sections which may contain organized seizures.  Each channel undergoes analysis to detect presence of spike and sharp waves which have special and  morphological characteristic of epileptic activity.  The routine EEG recording is converted from spacial into frequency domain.  This is then displayed comparing homologous areas to identify areas of significant asymmetry.  Algorithm to identify non-cortically generated artifact is used to separate eye movement, EMG and other artifact from the EEG.      ELECTROENCEPHALOGRAM:    RECORDING TIMES:  Start on 7/16/25 at 08:43  Stop on 7/16/25 at 12:10    A total of 1 hrs and 21 min of EEG recording was obtained.    Indication: 80 y.o. male with Parkinson's disease, hypertension, hyperlipidemia, CAD, anxiety, emphysema and CAD being admitted to hospital medicine for management of infected renal stone.  EEG to evaluate encephalopathy in the setting of infection.    State of Consciousness:   Awake and asleep    Background:   The background is mildly disorganized, symmetric and continuous.  The record consists of intermixed 3-6 hz theta and delta activity which occasionally organizes into a 5-6 hz posterior dominant rhythm that is poorly sustained but symmetric.  Occasional triphasic waves are seen.    Sleep:   Transition into a quiet state with poorly formed sleep architecture is seen.    Epileptiform Abnormalities  None    Seizures/Events:   None    EKG:   Regular rate and rhythm on single lead EKG    Activating procedures:   Hyperventilation and photic stimulation are not performed     Impression:   This is an abnormal EEG study due to diffuse slowing and disorganization of the background consistent with a moderate diffuse encephalopathy of nonspecific etiology.  No epileptiform discharges or focal abnormalities are noted.       Kareen Veloz MD  [facility]   Department of Neurology/Epilepsy

## 2025-07-17 ENCOUNTER — TELEPHONE (OUTPATIENT)
Dept: UROLOGY | Facility: CLINIC | Age: 80
End: 2025-07-17
Payer: MEDICARE

## 2025-07-17 DIAGNOSIS — N20.1 LEFT URETERAL STONE: ICD-10-CM

## 2025-07-17 DIAGNOSIS — N39.0 RECURRENT UTI: ICD-10-CM

## 2025-07-17 DIAGNOSIS — N20.0 KIDNEY STONE ON LEFT SIDE: ICD-10-CM

## 2025-07-17 DIAGNOSIS — N20.0 KIDNEY STONE: Primary | ICD-10-CM

## 2025-07-17 LAB
ALBUMIN SERPL BCP-MCNC: 3.1 G/DL (ref 3.5–5.2)
ANION GAP (OHS): 8 MMOL/L (ref 8–16)
BACTERIA UR CULT: NO GROWTH
BACTERIA UR CULT: NO GROWTH
BUN SERPL-MCNC: 29 MG/DL (ref 8–23)
CALCIUM SERPL-MCNC: 9.1 MG/DL (ref 8.7–10.5)
CHLORIDE SERPL-SCNC: 107 MMOL/L (ref 95–110)
CO2 SERPL-SCNC: 27 MMOL/L (ref 23–29)
CREAT SERPL-MCNC: 1 MG/DL (ref 0.5–1.4)
ERYTHROCYTE [DISTWIDTH] IN BLOOD BY AUTOMATED COUNT: 12.6 % (ref 11.5–14.5)
GFR SERPLBLD CREATININE-BSD FMLA CKD-EPI: >60 ML/MIN/1.73/M2
GLUCOSE SERPL-MCNC: 81 MG/DL (ref 70–110)
HCT VFR BLD AUTO: 38.3 % (ref 40–54)
HGB BLD-MCNC: 12.4 GM/DL (ref 14–18)
MAGNESIUM SERPL-MCNC: 1.9 MG/DL (ref 1.6–2.6)
MCH RBC QN AUTO: 32.1 PG (ref 27–31)
MCHC RBC AUTO-ENTMCNC: 32.4 G/DL (ref 32–36)
MCV RBC AUTO: 99 FL (ref 82–98)
PHOSPHATE SERPL-MCNC: 3.2 MG/DL (ref 2.7–4.5)
PLATELET # BLD AUTO: 150 K/UL (ref 150–450)
PMV BLD AUTO: 10 FL (ref 9.2–12.9)
POTASSIUM SERPL-SCNC: 4 MMOL/L (ref 3.5–5.1)
RBC # BLD AUTO: 3.86 M/UL (ref 4.6–6.2)
SODIUM SERPL-SCNC: 142 MMOL/L (ref 136–145)
WBC # BLD AUTO: 7 K/UL (ref 3.9–12.7)

## 2025-07-17 PROCEDURE — 94761 N-INVAS EAR/PLS OXIMETRY MLT: CPT

## 2025-07-17 PROCEDURE — 25000003 PHARM REV CODE 250

## 2025-07-17 PROCEDURE — 25000003 PHARM REV CODE 250: Performed by: HOSPITALIST

## 2025-07-17 PROCEDURE — 36415 COLL VENOUS BLD VENIPUNCTURE: CPT

## 2025-07-17 PROCEDURE — 21400001 HC TELEMETRY ROOM

## 2025-07-17 PROCEDURE — 96375 TX/PRO/DX INJ NEW DRUG ADDON: CPT

## 2025-07-17 PROCEDURE — 63600175 PHARM REV CODE 636 W HCPCS

## 2025-07-17 RX ORDER — CARBIDOPA AND LEVODOPA 25; 100 MG/1; MG/1
1 TABLET ORAL 4 TIMES DAILY
Status: DISCONTINUED | OUTPATIENT
Start: 2025-07-17 | End: 2025-07-19 | Stop reason: HOSPADM

## 2025-07-17 RX ADMIN — CARBIDOPA AND LEVODOPA 1 SPLIT TABLET: 25; 100 TABLET ORAL at 05:07

## 2025-07-17 RX ADMIN — SENNOSIDES 8.6 MG: 8.6 TABLET, FILM COATED ORAL at 09:07

## 2025-07-17 RX ADMIN — CARBIDOPA AND LEVODOPA 1 SPLIT TABLET: 25; 100 TABLET ORAL at 10:07

## 2025-07-17 RX ADMIN — AMLODIPINE BESYLATE 5 MG: 5 TABLET ORAL at 08:07

## 2025-07-17 RX ADMIN — CARBIDOPA AND LEVODOPA 1 TABLET: 25; 100 TABLET ORAL at 05:07

## 2025-07-17 RX ADMIN — ASPIRIN 81 MG: 81 TABLET, COATED ORAL at 08:07

## 2025-07-17 RX ADMIN — POLYETHYLENE GLYCOL 3350 17 G: 17 POWDER, FOR SOLUTION ORAL at 08:07

## 2025-07-17 RX ADMIN — ATORVASTATIN CALCIUM 80 MG: 40 TABLET, FILM COATED ORAL at 09:07

## 2025-07-17 RX ADMIN — CARBIDOPA AND LEVODOPA 1 SPLIT TABLET: 25; 100 TABLET ORAL at 03:07

## 2025-07-17 RX ADMIN — SENNOSIDES 8.6 MG: 8.6 TABLET, FILM COATED ORAL at 08:07

## 2025-07-17 RX ADMIN — CARBIDOPA AND LEVODOPA 1 TABLET: 25; 100 TABLET ORAL at 08:07

## 2025-07-17 RX ADMIN — TIOTROPIUM BROMIDE INHALATION SPRAY 2 PUFF: 3.12 SPRAY, METERED RESPIRATORY (INHALATION) at 08:07

## 2025-07-17 RX ADMIN — METOPROLOL SUCCINATE 50 MG: 50 TABLET, EXTENDED RELEASE ORAL at 08:07

## 2025-07-17 RX ADMIN — EZETIMIBE 10 MG: 10 TABLET ORAL at 09:07

## 2025-07-17 RX ADMIN — CITALOPRAM HYDROBROMIDE 20 MG: 20 TABLET ORAL at 08:07

## 2025-07-17 RX ADMIN — CEFTRIAXONE SODIUM 1 G: 1 INJECTION, POWDER, FOR SOLUTION INTRAMUSCULAR; INTRAVENOUS at 05:07

## 2025-07-17 RX ADMIN — POLYETHYLENE GLYCOL 3350 17 G: 17 POWDER, FOR SOLUTION ORAL at 09:07

## 2025-07-17 RX ADMIN — CARBIDOPA AND LEVODOPA 1 TABLET: 25; 100 TABLET ORAL at 09:07

## 2025-07-17 RX ADMIN — CARBIDOPA AND LEVODOPA 1 TABLET: 25; 100 TABLET ORAL at 03:07

## 2025-07-17 RX ADMIN — FLUTICASONE FUROATE AND VILANTEROL TRIFENATATE 1 PUFF: 200; 25 POWDER RESPIRATORY (INHALATION) at 08:07

## 2025-07-17 RX ADMIN — ENOXAPARIN SODIUM 40 MG: 40 INJECTION SUBCUTANEOUS at 05:07

## 2025-07-17 NOTE — PLAN OF CARE
Problem: Skin Injury Risk Increased  Goal: Skin Health and Integrity  Outcome: Progressing     Problem: Adult Inpatient Plan of Care  Goal: Plan of Care Review  Outcome: Progressing  Goal: Patient-Specific Goal (Individualized)  Outcome: Progressing  Goal: Absence of Hospital-Acquired Illness or Injury  Outcome: Progressing  Goal: Optimal Comfort and Wellbeing  Outcome: Progressing  Goal: Readiness for Transition of Care  Outcome: Progressing     Problem: Fall Injury Risk  Goal: Absence of Fall and Fall-Related Injury  Outcome: Progressing     Problem: Infection  Goal: Absence of Infection Signs and Symptoms  Outcome: Progressing     Problem: Fatigue  Goal: Improved Activity Tolerance  Outcome: Progressing     Problem: Hypertension Acute  Goal: Blood Pressure Within Desired Range  Outcome: Progressing     Problem: Pain Acute  Goal: Optimal Pain Control and Function  Outcome: Progressing

## 2025-07-17 NOTE — TELEPHONE ENCOUNTER
Called pt in regards to scheduling PO appt in 2 weeks w/Dr Cullen. Spoke to pt wife and offered her 7/31/25 at 12:45 pm for the X-rays and 2:20 pm with Dr Cullen. Informed pt wife of the locations of both appts. Pt wife verbalized understanding.

## 2025-07-17 NOTE — ASSESSMENT & PLAN NOTE
Most recent hemoglobin and hematocrit are listed below.  Recent Labs     07/15/25  1947 07/17/25  0608   HGB 13.1* 12.4*   HCT 40.4 38.3*     Plan  - Monitor serial CBC: Daily  - Transfuse PRBC if patient becomes hemodynamically unstable, symptomatic or H/H drops below 7/21.  - Patient has not received any PRBC transfusions to date  - Patient's anemia is currently stable

## 2025-07-17 NOTE — HOSPITAL COURSE
Presented with encephalopathy suspected secondary to baclofen toxicity and infected L ureteral stone s/p 7/16 cystoscopy with left ureteral JJ stent placement, bilateral retrograde pyelogram. Admission UCx no growth; operative UCx no growth as well. Ceftriaxone changed to cefpodoxime x 2 wk course. PTOT recommends high intensity therapy; family initially with preference for home health on discharge, now requests SNF placement. Mentation back to baseline. Urology Dr Cullen will see in about 2 weeks with KUB.

## 2025-07-17 NOTE — SUBJECTIVE & OBJECTIVE
Interval History: no AEON or complaints. Per wife and daughter at bedside, mentation is nearing his baseline. Admission UCx no growth; continue awaiting operative UCx which is pending. On ceftriaxone. PTOT recommends high intensity therapy; family planning for home health on discharge. Urology Dr Cullen will see in about 2 weeks with KUB.     Review of Systems   Unable to perform ROS: Dementia     Objective:     Vital Signs (Most Recent):  Temp: 97.8 °F (36.6 °C) (07/17/25 1107)  Pulse: 78 (07/17/25 1234)  Resp: 18 (07/17/25 1107)  BP: 134/61 (07/17/25 1107)  SpO2: 95 % (07/17/25 1107) Vital Signs (24h Range):  Temp:  [97.3 °F (36.3 °C)-98.3 °F (36.8 °C)] 97.8 °F (36.6 °C)  Pulse:  [70-89] 78  Resp:  [12-21] 18  SpO2:  [93 %-100 %] 95 %  BP: ()/(52-96) 134/61     Weight: 55.5 kg (122 lb 5.7 oz)  Body mass index is 21 kg/m².    Intake/Output Summary (Last 24 hours) at 7/17/2025 1331  Last data filed at 7/17/2025 0951  Gross per 24 hour   Intake 857.52 ml   Output 700 ml   Net 157.52 ml         Physical Exam  Vitals reviewed.   Constitutional:       General: He is not in acute distress.     Appearance: He is not diaphoretic.   HENT:      Head: Normocephalic and atraumatic.      Nose: Nose normal.      Mouth/Throat:      Pharynx: No oropharyngeal exudate.   Eyes:      General: No scleral icterus.        Right eye: No discharge.         Left eye: No discharge.      Conjunctiva/sclera: Conjunctivae normal.   Neck:      Thyroid: No thyromegaly.      Vascular: No JVD.      Trachea: No tracheal deviation.   Cardiovascular:      Rate and Rhythm: Normal rate and regular rhythm.      Heart sounds: Normal heart sounds. No murmur heard.     No friction rub.   Pulmonary:      Effort: Pulmonary effort is normal. No respiratory distress.   Abdominal:      General: There is no distension.      Palpations: Abdomen is soft. There is no mass.   Musculoskeletal:         General: No tenderness or deformity.      Cervical back: Neck  supple.   Skin:     General: Skin is warm and dry.      Coloration: Skin is not pale.      Findings: No erythema or rash.   Neurological:      General: No focal deficit present.      Mental Status: He is alert. Mental status is at baseline.      Motor: No abnormal muscle tone.               Significant Labs: All pertinent labs within the past 24 hours have been reviewed.    Significant Imaging: I have reviewed all pertinent imaging results/findings within the past 24 hours.

## 2025-07-17 NOTE — ASSESSMENT & PLAN NOTE
80-year-old male with a history of COPD, Parkinson's, kidney stones, hypertension and hyperlipidemia who presents with altered mental status and fatigue. CT performed in the ED showed a  5 mm left distal ureteral stone without hydronephrosis. Urology consulted for ureteral stone. Now s/p Ureteroscopy with left stent placement with Dr. Cullen    -- Will follow up in 2 weeks with Dr. Cullen  -- Order placed for KUB in 2 weeks  -- Recommend 2 weeks of culture appropriate antibiotics   -- Rest of care per primary   -- Urology signing off. Please reach out with any questions or concerns

## 2025-07-17 NOTE — ASSESSMENT & PLAN NOTE
Likely multifactorial (UTI vs stercoral colitis vs baclofen toxicity)  - Tmax 100.3 F on admission, remaining vitals stable   - CBC with WBC of 12.68  - CMP grossly unremarkable   - BNP and trop wnl  - UA with 13 WBC, >100 RBC  - BCX NGTD  - CXR without consolidation  - CT AP with left ureteral calculus; no obstruction or hydronephrosis. Also notes distention of the rectum with stool, mild rectal wall thickening   - CT head without acute abnormality   - given vanc and zosyn in the ED, previous urine cultures with no growth. De-escalate to ceftriaxone   - urology consulted 2/2 concern for infected stone   - brown bomb ordered on admission followed by oral bowel regimen  - neuro checks q4h  - tele   s/p 7/16 cystoscopy with left ureteral JJ stent placement, bilateral retrograde pyelogram. Admission UCx no growth; operative UCx pending. On ceftriaxone. Urology Dr Cullen will see in about 2 weeks with KUB.

## 2025-07-17 NOTE — PROGRESS NOTES
[facility]  Urology  Progress Note    Patient Name: Hector Kellogg  MRN: [id number]  Admission Date: 7/15/2025  Hospital Length of Stay: 1 days  Code Status: Full Code   Attending Provider: Ritika Cornell MD   Primary Care Physician: Blue Mcgill DO    Subjective:     HPI:  80-year-old male with a history of COPD, Parkinson's, kidney stones, hypertension and hyperlipidemia who presents with altered mental status and fatigue. CT performed in the ED showed a  5 mm left distal ureteral stone without hydronephrosis. Urology consulted for ureteral stone.    On assessment the patient is AFVSS. Denies fevers, chills. Has had some hematuria. No CVA tenderness on exam. Patient of Dr Cullen, last seen 5/2024. Prior treatment of large >3cm stone burden in L kidney. He has had multiple stone procedures. Last procedure was L ESWL for large L renal pelvic stone. Due to cognitive issues after anesthesia, further stone procedures were held. On Urocit-K.     WBC 12.68, Hg 13.1. Cr is 0.8 consistent with baseline. UA nitrite negative, >100 RBC, 13 WBC, rare bacteria and <1 SEC.     CT scan with 2x5 mm left UVJ stone without hydronephrosis, also present are large nonobstructive lower pole sstones with 2 large simple cysts. Simple cyst and small non-obstructive nephrolithiasis on the right. No hydronephrosis. Prostatomegaly present (~90g). Large stool burden.         Interval History: NAEON. AFVSS. Now s/p cystoscopy with left ureteral stent placement on 7/16. Denies any fevers/chills/SOB. Passing urine spontaneously. H/H 12.4/38 this AM. Denies any flank pain Urine culture is still pending from 7/16. He denies any complaints this AM    Review of Systems Per HPI  Objective:     Temp:  [97.3 °F (36.3 °C)-98.3 °F (36.8 °C)] 98.3 °F (36.8 °C)  Pulse:  [71-90] 76  Resp:  [12-21] 18  SpO2:  [93 %-100 %] 93 %  BP: ()/(52-96) 142/70     Body mass index is 21 kg/m².           Drains       Drain  Duration              Male External Urinary Catheter 07/17/25 0531 <1 day                     Physical Exam  HENT:      Head: Normocephalic.   Eyes:      Extraocular Movements: Extraocular movements intact.   Cardiovascular:      Rate and Rhythm: Normal rate and regular rhythm.   Pulmonary:      Effort: Pulmonary effort is normal.   Abdominal:      General: Abdomen is flat. There is no distension.      Palpations: Abdomen is soft.      Tenderness: There is no abdominal tenderness. There is no right CVA tenderness or left CVA tenderness.   Musculoskeletal:         General: Normal range of motion.   Skin:     General: Skin is warm and dry.      Coloration: Skin is not jaundiced.   Neurological:      General: No focal deficit present.      Mental Status: He is alert.           Significant Labs:    BMP:  Recent Labs   Lab 07/15/25  1957      K 4.0   *   CO2 22*   BUN 24*   CREATININE 0.8   CALCIUM 8.6*       CBC:   Recent Labs   Lab 07/15/25  1947   WBC 12.68   HGB 13.1*   HCT 40.4          All pertinent labs results from the past 24 hours have been reviewed.    Significant Imaging:  All pertinent imaging results/findings from the past 24 hours have been reviewed.                  Assessment/Plan:     Left ureteral calculus  80-year-old male with a history of COPD, Parkinson's, kidney stones, hypertension and hyperlipidemia who presents with altered mental status and fatigue. CT performed in the ED showed a  5 mm left distal ureteral stone without hydronephrosis. Urology consulted for ureteral stone. Now s/p Ureteroscopy with left stent placement with Dr. Cullen    -- Will follow up in 2 weeks with Dr. Cullen  -- Order placed for KUB in 2 weeks  -- Recommend 2 weeks of culture appropriate antibiotics   -- Rest of care per primary   -- Urology signing off. Please reach out with any questions or concerns            VTE Risk Mitigation (From admission, onward)           Ordered     enoxaparin injection 40 mg  Daily          07/16/25 0435     IP VTE HIGH RISK PATIENT  Once         07/16/25 0435     Place sequential compression device  Until discontinued         07/16/25 0435                    Bossman Viramontes MD  Urology  [facility] - [facility]

## 2025-07-17 NOTE — SUBJECTIVE & OBJECTIVE
Interval History: NAEON. AFVSS. Now s/p cystoscopy with left ureteral stent placement on 7/16. Denies any fevers/chills/SOB. Passing urine spontaneously. H/H 12.4/38 this AM. Denies any flank pain Urine culture is still pending from 7/16. He denies any complaints this AM    Review of Systems Per HPI  Objective:     Temp:  [97.3 °F (36.3 °C)-98.3 °F (36.8 °C)] 98.3 °F (36.8 °C)  Pulse:  [71-90] 76  Resp:  [12-21] 18  SpO2:  [93 %-100 %] 93 %  BP: ()/(52-96) 142/70     Body mass index is 21 kg/m².           Drains       Drain  Duration             Male External Urinary Catheter 07/17/25 0531 <1 day                     Physical Exam  HENT:      Head: Normocephalic.   Eyes:      Extraocular Movements: Extraocular movements intact.   Cardiovascular:      Rate and Rhythm: Normal rate and regular rhythm.   Pulmonary:      Effort: Pulmonary effort is normal.   Abdominal:      General: Abdomen is flat. There is no distension.      Palpations: Abdomen is soft.      Tenderness: There is no abdominal tenderness. There is no right CVA tenderness or left CVA tenderness.   Musculoskeletal:         General: Normal range of motion.   Skin:     General: Skin is warm and dry.      Coloration: Skin is not jaundiced.   Neurological:      General: No focal deficit present.      Mental Status: He is alert.           Significant Labs:    BMP:  Recent Labs   Lab 07/15/25  1957      K 4.0   *   CO2 22*   BUN 24*   CREATININE 0.8   CALCIUM 8.6*       CBC:   Recent Labs   Lab 07/15/25  1947   WBC 12.68   HGB 13.1*   HCT 40.4          All pertinent labs results from the past 24 hours have been reviewed.    Significant Imaging:  All pertinent imaging results/findings from the past 24 hours have been reviewed.

## 2025-07-17 NOTE — NURSING
Call received from micro r/t only receiving one set of blood cultures. On call made aware, no new order noted at this time. Pt IV rocephin administered as ordered. NAD noted. Spouse remains at bedside. Will continue to monitor.

## 2025-07-17 NOTE — PROGRESS NOTES
[facility] [address] [place] [address] Medicine  Progress Note    Patient Name: Hector Kellogg  MRN: [id number]  Patient Class: IP- Inpatient   Admission Date: 7/15/2025  Length of Stay: 1 days  Attending Physician: Ritika Cornell MD  Primary Care Provider: Blue Mcgill DO        Subjective     Principal Problem:Encephalopathy        HPI:  Hector Kellogg is a 80 y.o. male with Parkinson's disease, hypertension, hyperlipidemia, CAD, anxiety, emphysema and CAD being admitted to hospital medicine for management of infected renal stone. Patient disoriented and unable to provide history, details given per wife at bedside. She reports patient woke up yesterday morning with generalized weakness, inability to ambulate even with assistance. Endorses associated increased confusion, decreased alertness and complaints of left flank pain. He did fall when attempting to stand out of bed yesterday but did not hit his head/sustain any injuries. She reports recent medication changes, states his baclofen dosing was increased from 5 mg BID to 10 mg TID to better control his muscle pain within the past week. She denies fever, chills, diaphoresis, complaints of dysuria, cough, congestion, nausea or vomiting. Denies constipation, states he has been passing soft/loose stools but did note hard stool in the rectum when wiping.        In ED: Tmax 100.3 F, mildly hypertensive, remaining vitals wnl. CBC with WBC 12.68, mild anemia. CMP grossly unremarkable. BNP and troponin wnl. UA with 13 WBC, >100 RBC. CT AP revealed  2.7 x 5.6 mm distal left ureteral calculus without evidence for hydroureteronephrosis or obstructive uropathy. Also notable for mild to moderate distention of the rectum with stool, mild rectal wall thickening with perirectal haziness. CXR without consolidation. CT head without acute process. Given IV vanc and zosyn in the ED. Urology consulted. Admitted to medicine for further management.     Overview/Hospital  Course:  Presented with encephalopathy suspected secondary to baclofen toxicity and infected L ureteral stone s/p 7/16 cystoscopy with left ureteral JJ stent placement, bilateral retrograde pyelogram. Admission UCx no growth; operative UCx pending. On ceftriaxone. PTOT recommends high intensity therapy; family planning for home health on discharge. Urology Dr Cullen will see in about 2 weeks with KUB.     Interval History: no AEON or complaints. Per wife and daughter at bedside, mentation is nearing his baseline. Admission UCx no growth; continue awaiting operative UCx which is pending. On ceftriaxone. PTOT recommends high intensity therapy; family planning for home health on discharge. Urology Dr Cullen will see in about 2 weeks with KUB.     Review of Systems   Unable to perform ROS: Dementia     Objective:     Vital Signs (Most Recent):  Temp: 97.8 °F (36.6 °C) (07/17/25 1107)  Pulse: 78 (07/17/25 1234)  Resp: 18 (07/17/25 1107)  BP: 134/61 (07/17/25 1107)  SpO2: 95 % (07/17/25 1107) Vital Signs (24h Range):  Temp:  [97.3 °F (36.3 °C)-98.3 °F (36.8 °C)] 97.8 °F (36.6 °C)  Pulse:  [70-89] 78  Resp:  [12-21] 18  SpO2:  [93 %-100 %] 95 %  BP: ()/(52-96) 134/61     Weight: 55.5 kg (122 lb 5.7 oz)  Body mass index is 21 kg/m².    Intake/Output Summary (Last 24 hours) at 7/17/2025 1331  Last data filed at 7/17/2025 0951  Gross per 24 hour   Intake 857.52 ml   Output 700 ml   Net 157.52 ml         Physical Exam  Vitals reviewed.   Constitutional:       General: He is not in acute distress.     Appearance: He is not diaphoretic.   HENT:      Head: Normocephalic and atraumatic.      Nose: Nose normal.      Mouth/Throat:      Pharynx: No oropharyngeal exudate.   Eyes:      General: No scleral icterus.        Right eye: No discharge.         Left eye: No discharge.      Conjunctiva/sclera: Conjunctivae normal.   Neck:      Thyroid: No thyromegaly.      Vascular: No JVD.      Trachea: No tracheal deviation.   Cardiovascular:       Rate and Rhythm: Normal rate and regular rhythm.      Heart sounds: Normal heart sounds. No murmur heard.     No friction rub.   Pulmonary:      Effort: Pulmonary effort is normal. No respiratory distress.   Abdominal:      General: There is no distension.      Palpations: Abdomen is soft. There is no mass.   Musculoskeletal:         General: No tenderness or deformity.      Cervical back: Neck supple.   Skin:     General: Skin is warm and dry.      Coloration: Skin is not pale.      Findings: No erythema or rash.   Neurological:      General: No focal deficit present.      Mental Status: He is alert. Mental status is at baseline.      Motor: No abnormal muscle tone.               Significant Labs: All pertinent labs within the past 24 hours have been reviewed.    Significant Imaging: I have reviewed all pertinent imaging results/findings within the past 24 hours.      Assessment & Plan  Encephalopathy  Constipation  Acute cystitis with hematuria  Stercoral colitis  Left ureteral calculus  Likely multifactorial (UTI vs stercoral colitis vs baclofen toxicity)  - Tmax 100.3 F on admission, remaining vitals stable   - CBC with WBC of 12.68  - CMP grossly unremarkable   - BNP and trop wnl  - UA with 13 WBC, >100 RBC  - BCX NGTD  - CXR without consolidation  - CT AP with left ureteral calculus; no obstruction or hydronephrosis. Also notes distention of the rectum with stool, mild rectal wall thickening   - CT head without acute abnormality   - given vanc and zosyn in the ED, previous urine cultures with no growth. De-escalate to ceftriaxone   - urology consulted 2/2 concern for infected stone   - brown bomb ordered on admission followed by oral bowel regimen  - neuro checks q4h  - tele   s/p 7/16 cystoscopy with left ureteral JJ stent placement, bilateral retrograde pyelogram. Admission UCx no growth; operative UCx pending. On ceftriaxone. Urology Dr Cullen will see in about 2 weeks with KUB.     Coronary artery  disease  Hyperlipidemia  Patient with known CAD s/p stent placement, which is controlled   - Will continue MTP, zetia, ASA and Statin and monitor for S/Sx of angina/ACS.   - Continue to monitor on telemetry.   Essential hypertension  - chronic, controlled  - continue home amlodipine and MTP  - vitals q4h   Parkinson disease  - followed by neurology outpatient   - continue home sinemet five times daily   - delirium precautions   Centrilobular emphysema  - chronic, controlled  - sub breztri for formulary alternative  - albuterol prn for rescue   - prn oxygen   Cramps, muscle, general  - continue home mobic   Hypoalbuminemia  Lab Results   Component Value Date    ALBUMIN 3.1 (L) 07/17/2025   - noted   Normocytic anemia  Most recent hemoglobin and hematocrit are listed below.  Recent Labs     07/15/25  1947 07/17/25  0608   HGB 13.1* 12.4*   HCT 40.4 38.3*     Plan  - Monitor serial CBC: Daily  - Transfuse PRBC if patient becomes hemodynamically unstable, symptomatic or H/H drops below 7/21.  - Patient has not received any PRBC transfusions to date  - Patient's anemia is currently stable  VTE Risk Mitigation (From admission, onward)           Ordered     enoxaparin injection 40 mg  Daily         07/16/25 0435     IP VTE HIGH RISK PATIENT  Once         07/16/25 0435     Place sequential compression device  Until discontinued         07/16/25 0435                    Discharge Planning   RAFAEL: 7/18/2025     Code Status: Full Code   Medical Readiness for Discharge Date:   Discharge Plan A: Home Health, Home with family                  Please place Justification for DME      Ritika Cornell MD  Department of Hospital Medicine   [facility]

## 2025-07-17 NOTE — PROGRESS NOTES
Kidney stone    Recurrent UTI  -     Case Request Operating Room: CYSTOURETEROSCOPY, W/ LITHOTRIPSY & URETERAL CATH FOR STEERABLE VACUUM ASPIRATION OF KIDNEY (SURE), URETEROSCOPY, WITH LASER LITHOTRIPSY    Kidney stone on left side  -     Case Request Operating Room: CYSTOURETEROSCOPY, W/ LITHOTRIPSY & URETERAL CATH FOR STEERABLE VACUUM ASPIRATION OF KIDNEY (SURE), URETEROSCOPY, WITH LASER LITHOTRIPSY    Left ureteral stone  -     Case Request Operating Room: CYSTOURETEROSCOPY, W/ LITHOTRIPSY & URETERAL CATH FOR STEERABLE VACUUM ASPIRATION OF KIDNEY (SURE), URETEROSCOPY, WITH LASER LITHOTRIPSY

## 2025-07-18 LAB
ALBUMIN SERPL BCP-MCNC: 3.5 G/DL (ref 3.5–5.2)
ANION GAP (OHS): 9 MMOL/L (ref 8–16)
BUN SERPL-MCNC: 21 MG/DL (ref 8–23)
CALCIUM SERPL-MCNC: 9.6 MG/DL (ref 8.7–10.5)
CHLORIDE SERPL-SCNC: 105 MMOL/L (ref 95–110)
CO2 SERPL-SCNC: 27 MMOL/L (ref 23–29)
CREAT SERPL-MCNC: 0.9 MG/DL (ref 0.5–1.4)
ERYTHROCYTE [DISTWIDTH] IN BLOOD BY AUTOMATED COUNT: 12.3 % (ref 11.5–14.5)
GFR SERPLBLD CREATININE-BSD FMLA CKD-EPI: >60 ML/MIN/1.73/M2
GLUCOSE SERPL-MCNC: 97 MG/DL (ref 70–110)
HCT VFR BLD AUTO: 41 % (ref 40–54)
HGB BLD-MCNC: 13.4 GM/DL (ref 14–18)
HOLD SPECIMEN: NORMAL
MAGNESIUM SERPL-MCNC: 1.9 MG/DL (ref 1.6–2.6)
MCH RBC QN AUTO: 32.1 PG (ref 27–31)
MCHC RBC AUTO-ENTMCNC: 32.7 G/DL (ref 32–36)
MCV RBC AUTO: 98 FL (ref 82–98)
PHOSPHATE SERPL-MCNC: 2.8 MG/DL (ref 2.7–4.5)
PLATELET # BLD AUTO: 175 K/UL (ref 150–450)
PMV BLD AUTO: 9.7 FL (ref 9.2–12.9)
POTASSIUM SERPL-SCNC: 4.1 MMOL/L (ref 3.5–5.1)
RBC # BLD AUTO: 4.18 M/UL (ref 4.6–6.2)
SODIUM SERPL-SCNC: 141 MMOL/L (ref 136–145)
WBC # BLD AUTO: 8.02 K/UL (ref 3.9–12.7)

## 2025-07-18 PROCEDURE — 21400001 HC TELEMETRY ROOM

## 2025-07-18 PROCEDURE — 25000003 PHARM REV CODE 250: Performed by: NURSE PRACTITIONER

## 2025-07-18 PROCEDURE — 97112 NEUROMUSCULAR REEDUCATION: CPT

## 2025-07-18 PROCEDURE — 63600175 PHARM REV CODE 636 W HCPCS

## 2025-07-18 PROCEDURE — 36415 COLL VENOUS BLD VENIPUNCTURE: CPT

## 2025-07-18 PROCEDURE — 94761 N-INVAS EAR/PLS OXIMETRY MLT: CPT

## 2025-07-18 PROCEDURE — 97535 SELF CARE MNGMENT TRAINING: CPT

## 2025-07-18 PROCEDURE — 25000003 PHARM REV CODE 250

## 2025-07-18 PROCEDURE — 96376 TX/PRO/DX INJ SAME DRUG ADON: CPT

## 2025-07-18 PROCEDURE — 85027 COMPLETE CBC AUTOMATED: CPT

## 2025-07-18 PROCEDURE — 83735 ASSAY OF MAGNESIUM: CPT

## 2025-07-18 PROCEDURE — 25000003 PHARM REV CODE 250: Performed by: HOSPITALIST

## 2025-07-18 PROCEDURE — 80069 RENAL FUNCTION PANEL: CPT

## 2025-07-18 PROCEDURE — 94640 AIRWAY INHALATION TREATMENT: CPT

## 2025-07-18 RX ORDER — CEFPODOXIME PROXETIL 200 MG/1
200 TABLET, FILM COATED ORAL EVERY 12 HOURS
Status: DISCONTINUED | OUTPATIENT
Start: 2025-07-19 | End: 2025-07-19 | Stop reason: HOSPADM

## 2025-07-18 RX ORDER — CEFPODOXIME PROXETIL 100 MG/1
100 TABLET, FILM COATED ORAL 2 TIMES DAILY
Qty: 24 TABLET | Refills: 0 | Status: SHIPPED | OUTPATIENT
Start: 2025-07-19 | End: 2025-07-31

## 2025-07-18 RX ORDER — MIRTAZAPINE 7.5 MG/1
7.5 TABLET, FILM COATED ORAL NIGHTLY
Status: DISCONTINUED | OUTPATIENT
Start: 2025-07-18 | End: 2025-07-19 | Stop reason: HOSPADM

## 2025-07-18 RX ADMIN — ACETAMINOPHEN 650 MG: 325 TABLET ORAL at 08:07

## 2025-07-18 RX ADMIN — CARBIDOPA AND LEVODOPA 1 TABLET: 25; 100 TABLET ORAL at 06:07

## 2025-07-18 RX ADMIN — ATORVASTATIN CALCIUM 80 MG: 40 TABLET, FILM COATED ORAL at 08:07

## 2025-07-18 RX ADMIN — CARBIDOPA AND LEVODOPA 1 TABLET: 25; 100 TABLET ORAL at 02:07

## 2025-07-18 RX ADMIN — EZETIMIBE 10 MG: 10 TABLET ORAL at 08:07

## 2025-07-18 RX ADMIN — METOPROLOL SUCCINATE 50 MG: 50 TABLET, EXTENDED RELEASE ORAL at 10:07

## 2025-07-18 RX ADMIN — CEFTRIAXONE SODIUM 1 G: 1 INJECTION, POWDER, FOR SOLUTION INTRAMUSCULAR; INTRAVENOUS at 04:07

## 2025-07-18 RX ADMIN — AMLODIPINE BESYLATE 5 MG: 5 TABLET ORAL at 10:07

## 2025-07-18 RX ADMIN — SENNOSIDES 8.6 MG: 8.6 TABLET, FILM COATED ORAL at 08:07

## 2025-07-18 RX ADMIN — ASPIRIN 81 MG: 81 TABLET, COATED ORAL at 10:07

## 2025-07-18 RX ADMIN — MIRTAZAPINE 7.5 MG: 7.5 TABLET, FILM COATED ORAL at 08:07

## 2025-07-18 RX ADMIN — CARBIDOPA AND LEVODOPA 1 TABLET: 25; 100 TABLET ORAL at 08:07

## 2025-07-18 RX ADMIN — CARBIDOPA AND LEVODOPA 1 TABLET: 25; 100 TABLET ORAL at 10:07

## 2025-07-18 RX ADMIN — ENOXAPARIN SODIUM 40 MG: 40 INJECTION SUBCUTANEOUS at 06:07

## 2025-07-18 RX ADMIN — FLUTICASONE FUROATE AND VILANTEROL TRIFENATATE 1 PUFF: 200; 25 POWDER RESPIRATORY (INHALATION) at 01:07

## 2025-07-18 RX ADMIN — TIOTROPIUM BROMIDE INHALATION SPRAY 2 PUFF: 3.12 SPRAY, METERED RESPIRATORY (INHALATION) at 01:07

## 2025-07-18 RX ADMIN — CITALOPRAM HYDROBROMIDE 20 MG: 20 TABLET ORAL at 10:07

## 2025-07-18 NOTE — ASSESSMENT & PLAN NOTE
Most recent hemoglobin and hematocrit are listed below.  Recent Labs     07/15/25  1947 07/17/25  0608 07/18/25  0545   HGB 13.1* 12.4* 13.4*   HCT 40.4 38.3* 41.0     Plan  - Monitor serial CBC: Daily  - Transfuse PRBC if patient becomes hemodynamically unstable, symptomatic or H/H drops below 7/21.  - Patient has not received any PRBC transfusions to date  - Patient's anemia is currently stable

## 2025-07-18 NOTE — PLAN OF CARE
[facility] was unable to schedule a 1 week hospital follow up Julia from pt navigation sent a message to pt PCP to contact pt for a follow up appointment.

## 2025-07-18 NOTE — PLAN OF CARE
Met with Pt wife and daughter to review discharge recommendation of SNF and is agreeable to plan    Patient/family provided list of facilities in-network with patient's payor plan. Providers that are owned, operated, or affiliated with [facility] are included on the list.     Notified that referral sent to below listed facilities from in-network list based on proximity to home/family support:    [facility]        If an additional preferred facility not listed above is identified, additional referral to be sent. If above facilities unable to accept, will send additional referrals to in-network providers.      07/18/25 1443   Post-Acute Status   Post-Acute Authorization Placement   Post-Acute Placement Status Referrals Sent   Home Health Status Discharge Plan Changed   Hospital Resources/Appts/Education Provided Provided patient/caregiver with written discharge plan information;Provided education on problems/symptoms using teachback;Appointments scheduled and added to AVS   Discharge Delays None known at this time   Discharge Plan   Discharge Plan A Skilled Nursing Facility   Discharge Plan B Home Health      contacted [facility] via [employer] to inform that the discharge has been postponed.     Discharge Plan A and Plan B have been determined by review of patient's clinical status, future medical and therapeutic needs, and coverage/benefits for post-acute care in coordination with multidisciplinary team members.  Yuniel Warner, RN,BSN      Yuniel Warner RN,BSN

## 2025-07-18 NOTE — ASSESSMENT & PLAN NOTE
Likely multifactorial (UTI vs stercoral colitis vs baclofen toxicity)  - Tmax 100.3 F on admission, remaining vitals stable   - CBC with WBC of 12.68  - CMP grossly unremarkable   - BNP and trop wnl  - UA with 13 WBC, >100 RBC  - BCX NGTD  - CXR without consolidation  - CT AP with left ureteral calculus; no obstruction or hydronephrosis. Also notes distention of the rectum with stool, mild rectal wall thickening   - CT head without acute abnormality   - given vanc and zosyn in the ED, previous urine cultures with no growth. De-escalate to ceftriaxone   - urology consulted 2/2 concern for infected stone   - brown bomb ordered on admission followed by oral bowel regimen  - neuro checks q4h  - tele   s/p 7/16 cystoscopy with left ureteral JJ stent placement, bilateral retrograde pyelogram. Admission UCx no growth; operative UCx NGTD. On ceftriaxone, switch to cefpodoxime. Urology Dr Cullen will see in about 2 weeks with KUB.

## 2025-07-18 NOTE — PT/OT/SLP PROGRESS
Occupational Therapy   Treatment    Name: Hector Kellogg  MRN: [id number]  Admitting Diagnosis:  Encephalopathy  2 Days Post-Op    Recommendations:     Discharge Recommendations: High Intensity Therapy  Discharge Equipment Recommendations:  none  Barriers to discharge:  None    Assessment:   CO-TX with PT for pt safety and max participation with both disciplines.     Hector Kellogg is a 80 y.o. male with a medical diagnosis of Encephalopathy.  He presents with poor balance AEB L posterior lean with poor correction. Performance deficits affecting function are weakness, impaired endurance, impaired functional mobility, gait instability, impaired balance, decreased upper extremity function, decreased lower extremity function, decreased coordination, impaired cognition, decreased safety awareness, impaired cardiopulmonary response to activity.     Rehab Prognosis:  Good; patient would benefit from acute skilled OT services to address these deficits and reach maximum level of function.       Plan:     Patient to be seen 4 x/week to address the above listed problems via self-care/home management, therapeutic activities, therapeutic exercises, neuromuscular re-education  Plan of Care Expires: 08/06/25  Plan of Care Reviewed with: patient, spouse, daughter    Subjective     Chief Complaint: none stated  Patient/Family Comments/goals: return home  Pain/Comfort:  Pain Rating 1: 0/10    Objective:     Communicated with: Nsg prior to session.  Patient found HOB elevated with PureWick, telemetry upon OT entry to room.    General Precautions: Standard, fall, seizure    Orthopedic Precautions:N/A  Braces: N/A  Respiratory Status: Room air     Occupational Performance:     Bed Mobility:    Patient completed Scooting/Bridging with maximal assistance and 2 persons  Patient completed Supine to Sit with maximal assistance  Patient completed Sit to Supine with maximal assistance     Functional Mobility/Transfers:  Functional  Mobility: NT 2/2 poor sitting balance and BLE coordination deficits.    Activities of Daily Living:  Grooming: Mod A for balance seated EOB CGA to initiate facial hygiene using wash cloth  Upper Body Dressing: moderate assistance adjust gown  Lower Body Dressing: maximal assistance don socks    Therapeutic Activity:  Pt performed reach and grasp activity by obtaining cup from Therapist and given back in a different target location. Pt needed Mod- Max A for balance; activity was used to address truncal control and reaching across midline.    [facility] 6 Click ADL: 12    Treatment & Education:  Pt educated on role and purpose of therapy  Pt educated on goal setting  Pt educated on benefits of OOB activity  Pt educated on self advocacy     Patient left HOB elevated with all lines intact, call button in reach, nsg notified, and family present    GOALS:   Multidisciplinary Problems       Occupational Therapy Goals          Problem: Occupational Therapy    Goal Priority Disciplines Outcome Interventions   Occupational Therapy Goal     OT, PT/OT Progressing    Description: Goals to be met by: 8/6/2025     Patient will increase functional independence with ADLs by performing:    UE Dressing with Supervision.  LE Dressing with Supervision.  Grooming while standing at sink with Supervision.  Toileting from toilet with Supervision for hygiene and clothing management.   Supine to sit with Supervision.  Toilet transfer to toilet with Supervision.                         DME Justifications:  No DME recommended requiring DME justifications    Time Tracking:     OT Date of Treatment: 07/18/25  OT Start Time: 1134  OT Stop Time: 1157  OT Total Time (min): 23 min    Billable Minutes:Self Care/Home Management 23    OT/LORENA: OT          7/18/2025

## 2025-07-18 NOTE — DISCHARGE SUMMARY
[facility] [address] [place] [address] Medicine  Discharge Summary      Patient Name: Hector Kellogg  MRN: [id number]  RC: [id number]  Patient Class: IP- Inpatient  Admission Date: 7/15/2025  Hospital Length of Stay: 4 d  Discharge Date and Time: 07/19/2025 1:12 PM  Attending Physician: Ritika Cornell MD   Discharging Provider: Ritika Cornell MD  Primary Care Provider: Blue Mcgill DO  [facility] Medicine Team: [facility]OSP MED  Ritika Cornell MD  Primary Care Team: [facility]    HPI:   Hector Kellogg is a 80 y.o. male with Parkinson's disease, hypertension, hyperlipidemia, CAD, anxiety, emphysema and CAD being admitted to hospital medicine for management of infected renal stone. Patient disoriented and unable to provide history, details given per wife at bedside. She reports patient woke up yesterday morning with generalized weakness, inability to ambulate even with assistance. Endorses associated increased confusion, decreased alertness and complaints of left flank pain. He did fall when attempting to stand out of bed yesterday but did not hit his head/sustain any injuries. She reports recent medication changes, states his baclofen dosing was increased from 5 mg BID to 10 mg TID to better control his muscle pain within the past week. She denies fever, chills, diaphoresis, complaints of dysuria, cough, congestion, nausea or vomiting. Denies constipation, states he has been passing soft/loose stools but did note hard stool in the rectum when wiping.        In ED: Tmax 100.3 F, mildly hypertensive, remaining vitals wnl. CBC with WBC 12.68, mild anemia. CMP grossly unremarkable. BNP and troponin wnl. UA with 13 WBC, >100 RBC. CT AP revealed  2.7 x 5.6 mm distal left ureteral calculus without evidence for hydroureteronephrosis or obstructive uropathy. Also notable for mild to moderate distention of the rectum with stool, mild rectal wall thickening with perirectal haziness. CXR without consolidation. CT  head without acute process. Given IV vanc and zosyn in the ED. Urology consulted. Admitted to medicine for further management.     Procedure(s) (LRB):  CYSTOSCOPY, WITH URETERAL STENT INSERTION (Left)  PYELOGRAM, RETROGRADE (Bilateral)      Hospital Course:   Presented with encephalopathy suspected secondary to baclofen toxicity and infected L ureteral stone s/p 7/16 cystoscopy with left ureteral JJ stent placement, bilateral retrograde pyelogram. Admission UCx no growth; operative UCx no growth as well. Ceftriaxone changed to cefpodoxime x 2 wk course. Mentation course c/b hospital delirium. PTOT recommends high intensity therapy; family initially with preference for SNF placement, then ultimately changed mind to take home with HH. Urology Dr Cullen will see in about 2 weeks with KUB.  Low dose seroquel added for persistent lack of sleep/agitation despite remeron trial    Goals of Care Treatment Preferences:  Code Status: Full Code    Health care agent: Eileen Kellogg (Spouse) [phone]  Health care agent number: [phone]          What is most important right now is to focus on remaining as independent as possible, symptom/pain control, improvement in condition but with limits to invasive therapies.  Accordingly, we have decided that the best plan to meet the patient's goals includes continuing with treatment.         Consults:   Consults (From admission, onward)          Status Ordering Provider     Inpatient consult to Urology  Once        Provider:  (Not yet assigned)    Completed MARTÍN RODRIGUES            Assessment & Plan  Encephalopathy  Constipation  Acute cystitis with hematuria  Stercoral colitis  Left ureteral calculus  Likely multifactorial (UTI vs stercoral colitis vs baclofen toxicity)  - Tmax 100.3 F on admission, remaining vitals stable   - CBC with WBC of 12.68  - CMP grossly unremarkable   - BNP and trop wnl  - UA with 13 WBC, >100 RBC  - BCX NGTD  - CXR without consolidation  - CT AP  with left ureteral calculus; no obstruction or hydronephrosis. Also notes distention of the rectum with stool, mild rectal wall thickening   - CT head without acute abnormality   - given vanc and zosyn in the ED, previous urine cultures with no growth. De-escalate to ceftriaxone   - urology consulted 2/2 concern for infected stone   - brown bomb ordered on admission followed by oral bowel regimen  - neuro checks q4h  - tele   s/p 7/16 cystoscopy with left ureteral JJ stent placement, bilateral retrograde pyelogram. Admission UCx no growth; operative UCx NGTD. On ceftriaxone, switch to cefpodoxime. Urology Dr Cullen will see in about 2 weeks with KUB.     Coronary artery disease  Hyperlipidemia  Patient with known CAD s/p stent placement, which is controlled   - Will continue MTP, zetia, ASA and Statin and monitor for S/Sx of angina/ACS.   - Continue to monitor on telemetry.   Essential hypertension  - chronic, controlled  - continue home amlodipine and MTP  - vitals q4h   Parkinson disease  - followed by neurology outpatient   - continue home sinemet five times daily   - delirium precautions   Centrilobular emphysema  - chronic, controlled  - sub breztri for formulary alternative  - albuterol prn for rescue   - prn oxygen   Cramps, muscle, general  - continue home mobic   Hypoalbuminemia  Lab Results   Component Value Date    ALBUMIN 3.7 07/19/2025   - noted   Normocytic anemia  Most recent hemoglobin and hematocrit are listed below.  Recent Labs     07/17/25  0608 07/18/25  0545 07/19/25  0635   HGB 12.4* 13.4* 14.7   HCT 38.3* 41.0 45.1     Plan  - Monitor serial CBC: Daily  - Transfuse PRBC if patient becomes hemodynamically unstable, symptomatic or H/H drops below 7/21.  - Patient has not received any PRBC transfusions to date  - Patient's anemia is currently stable    Final Active Diagnoses:    Diagnosis Date Noted POA    PRINCIPAL PROBLEM:  Encephalopathy [G93.40] 07/16/2025 Yes    Hypoalbuminemia [E88.09]  07/16/2025 Yes    Stercoral colitis [K52.89] 07/16/2025 Yes    Left ureteral calculus [N20.1] 07/16/2025 Yes    Normocytic anemia [D64.9] 07/16/2025 Yes    Cramps, muscle, general [R25.2] 04/09/2025 Yes    Acute cystitis with hematuria [N30.01] 09/04/2024 Yes    Constipation [K59.00] 11/15/2023 Yes    Centrilobular emphysema [J43.2] 12/13/2017 Yes    Essential hypertension [I10] 08/20/2014 Yes    Coronary artery disease [I25.10] 08/20/2014 Yes    Parkinson disease [G20.A1] 08/20/2014 Yes    Hyperlipidemia [E78.5] 08/20/2014 Yes      Problems Resolved During this Admission:       Discharged Condition: stable    Disposition: Home-Health Care Sv    Follow Up:   Contact information for follow-up providers       Blue Mcgill,  Follow up.    Specialty: Internal Medicine  Contact information:  [address] [facility] [address]  [phone]                       Contact information for after-discharge care       Home Medical Care       [facility] .    Service: Home Health Services  Contact information:  [address] [facility] [address]  [phone]                                 Patient Instructions:      X-Ray KUB   Standing Status: Future Standing Exp. Date: 07/16/26     Order Specific Question Answer Comments   May the Radiologist modify the order per protocol to meet the clinical needs of the patient? Yes    Release to patient Immediate      Diet Cardiac     Notify your health care provider if you experience any of the following:  temperature >100.4     Notify your health care provider if you experience any of the following:  persistent nausea and vomiting or diarrhea     Notify your health care provider if you experience any of the following:  severe uncontrolled pain     Notify your health care provider if you experience any of the following:  redness, tenderness, or signs of infection (pain, swelling, redness, odor or green/yellow discharge around incision site)      Notify your health care provider if you experience any of the following:  increased confusion or weakness     Notify your health care provider if you experience any of the following:  persistent dizziness, light-headedness, or visual disturbances     Activity as tolerated       Significant Diagnostic Studies: N/A    Pending Diagnostic Studies:       Procedure Component Value Units Date/Time    Niacin (vitamin B3) [[id number]] Collected: 07/17/25 0608    Order Status: Sent Lab Status: In process Updated: 07/17/25 0628    Specimen: Blood     Procalcitonin [[id number]] Collected: 07/15/25 2029    Order Status: Sent Lab Status: No result     Specimen: Blood     Vitamin B1 [[id number]] Collected: 07/17/25 0609    Order Status: Sent Lab Status: In process Updated: 07/17/25 0627    Specimen: Blood     Vitamin B2 [[id number]] Collected: 07/17/25 0609    Order Status: Sent Lab Status: In process Updated: 07/17/25 0626    Specimen: Blood     Vitamin B6 [[id number]] Collected: 07/17/25 0608    Order Status: Sent Lab Status: In process Updated: 07/17/25 0627    Specimen: Blood            Medications:  Reconciled Home Medications:      Medication List        START taking these medications      cefpodoxime 100 MG tablet  Commonly known as: VANTIN  Take 1 tablet (100 mg total) by mouth 2 (two) times daily. for 12 days     QUEtiapine 25 MG Tab  Commonly known as: SEROQUEL  Take 1 tablet (25 mg total) by mouth nightly as needed (insomnia).            CONTINUE taking these medications      albuterol 90 mcg/actuation inhaler  Commonly known as: PROVENTIL/VENTOLIN HFA  INHALE 2 PUFFS BY MOUTH EVERY 6 HOURS AS NEEDED FOR WHEEZING FOR SHORTNESS OF BREATH     amLODIPine 5 MG tablet  Commonly known as: NORVASC  Take 1 tablet by mouth once daily     aspirin 81 MG EC tablet  Commonly known as: ECOTRIN  Take 81 mg by mouth once daily.     DANIEL AEROSPHERE 160-9-4.8 mcg/actuation Hfaa  Generic drug: budesonide-glycopyr-formoterol  INHALE  2 PUFFS INTO LUNGS TWICE DAILY     carbidopa-levodopa  mg  mg per tablet  Commonly known as: SINEMET  TAKE 1 & 1/2 (ONE & ONE-HALF) TABLETS BY MOUTH FIVE TIMES DAILY     citalopram 20 MG tablet  Commonly known as: CeleXA  Take 1 tablet by mouth once daily     ezetimibe 10 mg tablet  Commonly known as: ZETIA  Take 1 tablet by mouth once daily     loratadine 10 mg tablet  Commonly known as: CLARITIN  Take 10 mg by mouth as needed for Allergies.     meloxicam 7.5 MG tablet  Commonly known as: MOBIC  Take 1 tablet (7.5 mg total) by mouth once daily.     metoprolol succinate 50 MG 24 hr tablet  Commonly known as: TOPROL-XL  Take 1 tablet by mouth once daily     multivitamin capsule  Take 1 capsule by mouth once daily.     potassium citrate 10 mEq (1,080 mg) Tbsr  Commonly known as: UROCIT-K 10  Take 1 tablet (10 mEq total) by mouth 2 (two) times daily with meals.     rosuvastatin 40 MG Tab  Commonly known as: CRESTOR  TAKE 1 TABLET BY MOUTH NIGHTLY AT BEDTIME            STOP taking these medications      baclofen 10 MG tablet  Commonly known as: LIORESAL              Indwelling Lines/Drains at time of discharge:   Lines/Drains/Airways       Drain  Duration             Male External Urinary Catheter 07/17/25 0575 1 day                        Time spent on the discharge of patient: 45 minutes of time spent on discharge, including examining the patient, providing discharge instructions, arranging follow up, and documentation.            Ritika Cornell MD  Department of Hospital Medicine  [facility]

## 2025-07-18 NOTE — PLAN OF CARE
[facility]      HOME HEALTH ORDERS  FACE TO FACE ENCOUNTER    Patient Name: Hector Kellogg  YOB: 1945    PCP: Blue Mcgill DO   PCP Address: [address] / [place] [address]  PCP Phone Number: [phone]  PCP Fax: [phone]    Encounter Date: 7/15/25    Admit to Home Health    Diagnoses:  Active Hospital Problems    Diagnosis  POA    *Encephalopathy [G93.40]  Yes    Hypoalbuminemia [E88.09]  Yes    Stercoral colitis [K52.89]  Yes    Left ureteral calculus [N20.1]  Yes    Normocytic anemia [D64.9]  Yes    Cramps, muscle, general [R25.2]  Yes    Acute cystitis with hematuria [N30.01]  Yes    Constipation [K59.00]  Yes    Centrilobular emphysema [J43.2]  Yes     FEV1 0.99 (43%) predicted  Continue breztri twice daily for control  Albuterol for rescue and prevention.     88% on room air at rest.  Benefits from POC of choice.       Essential hypertension [I10]  Yes    Coronary artery disease [I25.10]  Yes     Stents x 3 2006    PCI of RCA and OM 10/15 with KARINA  LVEF 55% 11/15      Parkinson disease [G20.A1]  Yes    Hyperlipidemia [E78.5]  Yes      Resolved Hospital Problems   No resolved problems to display.       Follow Up Appointments:  Future Appointments   Date Time Provider Department Center   7/31/2025 12:45 PM [facility] OIC-XRAY [facility] XRAY [facility]   7/31/2025  2:20 PM Jerel Cullen MD [facility] UROLOGY [facility] include [facility] beds at the [facility]   11/6/2025 10:00 AM Keila Jones MD [facility] PAL MED [facility] include [facility] beds at the [facility]   1/6/2026  9:40 AM LAB, SBP SBP LAB [facility]   1/13/2026 11:20 AM Blue Mcgill DO [facility] IM [place]       Allergies:  Review of patient's allergies indicates:   Allergen Reactions    Influenza virus vaccines Other (See Comments)     Caused fever, chills, and made tremors worse       Medications: Review discharge medications with patient and family and provide education.    Current Medications[1]     Medication List        START taking these medications       cefpodoxime 100 MG tablet  Commonly known as: VANTIN  Take 1 tablet (100 mg total) by mouth 2 (two) times daily. for 12 days     QUEtiapine 25 MG Tab  Commonly known as: SEROQUEL  Take 1 tablet (25 mg total) by mouth nightly as needed (insomnia).            CONTINUE taking these medications      albuterol 90 mcg/actuation inhaler  Commonly known as: PROVENTIL/VENTOLIN HFA  INHALE 2 PUFFS BY MOUTH EVERY 6 HOURS AS NEEDED FOR WHEEZING FOR SHORTNESS OF BREATH     amLODIPine 5 MG tablet  Commonly known as: NORVASC  Take 1 tablet by mouth once daily     aspirin 81 MG EC tablet  Commonly known as: ECOTRIN  Take 81 mg by mouth once daily.     BREZTRI AEROSPHERE 160-9-4.8 mcg/actuation Hfaa  Generic drug: budesonide-glycopyr-formoterol  INHALE 2 PUFFS INTO LUNGS TWICE DAILY     carbidopa-levodopa  mg  mg per tablet  Commonly known as: SINEMET  TAKE 1 & 1/2 (ONE & ONE-HALF) TABLETS BY MOUTH FIVE TIMES DAILY     citalopram 20 MG tablet  Commonly known as: CeleXA  Take 1 tablet by mouth once daily     ezetimibe 10 mg tablet  Commonly known as: ZETIA  Take 1 tablet by mouth once daily     loratadine 10 mg tablet  Commonly known as: CLARITIN  Take 10 mg by mouth as needed for Allergies.     meloxicam 7.5 MG tablet  Commonly known as: MOBIC  Take 1 tablet (7.5 mg total) by mouth once daily.     metoprolol succinate 50 MG 24 hr tablet  Commonly known as: TOPROL-XL  Take 1 tablet by mouth once daily     multivitamin capsule  Take 1 capsule by mouth once daily.     potassium citrate 10 mEq (1,080 mg) Tbsr  Commonly known as: UROCIT-K 10  Take 1 tablet (10 mEq total) by mouth 2 (two) times daily with meals.     rosuvastatin 40 MG Tab  Commonly known as: CRESTOR  TAKE 1 TABLET BY MOUTH NIGHTLY AT BEDTIME            STOP taking these medications      baclofen 10 MG tablet  Commonly known as: LIORESAL                I have seen and examined this patient within the last 30 days. My clinical findings that support the need  for the home health skilled services and home bound status are the following:no   Weakness/numbness causing balance and gait disturbance due to Infection and Weakness/Debility making it taxing to leave home.  Mental confusion making it unsafe for patient to leave home alone due to  Dementia.     Diet:   cardiac diet    Referrals/ Consults  Physical Therapy to evaluate and treat. Evaluate for home safety and equipment needs; Establish/upgrade home exercise program. Perform / instruct on therapeutic exercises, gait training, transfer training, and Range of Motion.  Occupational Therapy to evaluate and treat. Evaluate home environment for safety and equipment needs. Perform/Instruct on transfers, ADL training, ROM, and therapeutic exercises.    Activities:   activity as tolerated    Nursing:   Agency to admit patient within 24 hours of hospital discharge unless specified on physician order or at patient request    SN to complete comprehensive assessment including routine vital signs. Instruct on disease process and s/s of complications to report to MD. Review/verify medication list sent home with the patient at time of discharge  and instruct patient/caregiver as needed. Frequency may be adjusted depending on start of care date.     Skilled nurse to perform up to 3 visits PRN for symptoms related to diagnosis    Notify MD if SBP > 160 or < 90; DBP > 90 or < 50; HR > 120 or < 50; Temp > 101; O2 < 88%; Other:       Ok to schedule additional visits based on staff availability and patient request on consecutive days within the home health episode.    When multiple disciplines ordered:    Start of Care occurs on Sunday - Wednesday schedule remaining discipline evaluations as ordered on separate consecutive days following the start of care.    Thursday SOC -schedule subsequent evaluations Friday and Monday the following week.     Friday - Saturday SOC - schedule subsequent discipline evaluations on consecutive days starting  Monday of the following week.    For all post-discharge communication and subsequent orders please contact patient's primary care physician. If unable to reach primary care physician or do not receive response within 30 minutes, please contact  for clinical staff order clarification    Miscellaneous   Routine Skin for Bedridden Patients: Instruct patient/caregiver to apply moisture barrier cream to all skin folds and wet areas in perineal area daily and after baths and all bowel movements.    Home Health Aide:  Nursing Weekly, Physical Therapy Three times weekly, and Occupational Therapy Three times weekly    Wound Care Orders  no    I certify that this patient is confined to his home and needs intermittent skilled nursing care, physical therapy, and occupational therapy.               [1]   Current Facility-Administered Medications   Medication Dose Route Frequency Provider Last Rate Last Admin    acetaminophen tablet 650 mg  650 mg Oral Q8H PRN Nikki Lutz PA-C   650 mg at 07/18/25 2041    acetaminophen tablet 650 mg  650 mg Oral Q4H PRN Nikki Lutz PA-C        albuterol inhaler 2 puff  2 puff Inhalation Q6H PRN Nikki Lutz PA-C        albuterol-ipratropium 2.5 mg-0.5 mg/3 mL nebulizer solution 3 mL  3 mL Nebulization Q6H PRN Nikki Lutz PA-C        aluminum-magnesium hydroxide-simethicone 200-200-20 mg/5 mL suspension 30 mL  30 mL Oral QID PRN Nikki Lutz PA-C        amLODIPine tablet 5 mg  5 mg Oral Daily Nikki Lutz PA-C   5 mg at 07/19/25 0957    aspirin EC tablet 81 mg  81 mg Oral Daily Nikki Lutz PA-C   81 mg at 07/19/25 0957    atorvastatin tablet 80 mg  80 mg Oral QHS Nikki Lutz PA-C   80 mg at 07/18/25 2041    carbidopa-levodopa  mg per tablet 1 tablet  1 tablet Oral QID Ritika Cornell MD   1 tablet at 07/19/25 0957    cefpodoxime tablet 200 mg  200 mg Oral Q12H Ritika Cornell MD   200 mg at 07/19/25 0957    citalopram tablet 20 mg   20 mg Oral Daily Nikki Lutz PA-C   20 mg at 07/19/25 0957    dextrose 50% injection 12.5 g  12.5 g Intravenous PRN Nikki Lutz PA-C        dextrose 50% injection 25 g  25 g Intravenous PRN Nikki Lutz PA-C        enoxaparin injection 40 mg  40 mg Subcutaneous Daily Nikki Lutz PA-C   40 mg at 07/18/25 1820    ezetimibe tablet 10 mg  10 mg Oral QHS Nikki Lutz PA-C   10 mg at 07/18/25 2042    fluticasone furoate-vilanteroL 200-25 mcg/dose diskus inhaler 1 puff  1 puff Inhalation Daily Nikki Lutz PA-C   1 puff at 07/19/25 0819    glucagon (human recombinant) injection 1 mg  1 mg Intramuscular PRN Nikki Lutz PA-C        glucose chewable tablet 16 g  16 g Oral PRN Nikki Lutz PA-C        glucose chewable tablet 24 g  24 g Oral PRN Nikki Lutz PA-C        melatonin tablet 6 mg  6 mg Oral Nightly PRN Nikki Lutz PA-C        metoprolol succinate (TOPROL-XL) 24 hr tablet 50 mg  50 mg Oral Daily Nikki Lutz PA-C   50 mg at 07/19/25 0957    mirtazapine tablet 7.5 mg  7.5 mg Oral QHS Shaunna Khoury, NP   7.5 mg at 07/18/25 2042    naloxone 0.4 mg/mL injection 0.02 mg  0.02 mg Intravenous PRN Nikki Lutz PA-C        ondansetron disintegrating tablet 8 mg  8 mg Oral Q8H PRN Nikki Lutz PA-C        polyethylene glycol packet 17 g  17 g Oral BID Nikki Lutz PA-C   17 g at 07/19/25 0957    senna tablet 8.6 mg  8.6 mg Oral BID Nikki Lutz PA-C   8.6 mg at 07/19/25 0957    sodium chloride 0.9% flush 10 mL  10 mL Intravenous Q12H PRN Nikki Lutz PA-C        tiotropium bromide 2.5 mcg/actuation inhaler 2 puff  2 puff Inhalation Daily Nikki Lutz PA-C   2 puff at 07/19/25 0818

## 2025-07-18 NOTE — PROGRESS NOTES
[facility] [address] [place] [address] Medicine  Progress Note    Patient Name: Hector Kellogg  MRN: [id number]  Patient Class: IP- Inpatient   Admission Date: 7/15/2025  Length of Stay: 2 days  Attending Physician: Ritika Cornell MD  Primary Care Provider: Blue Mcgill DO        Subjective     Principal Problem:Encephalopathy        HPI:  Hector Kellogg is a 80 y.o. male with Parkinson's disease, hypertension, hyperlipidemia, CAD, anxiety, emphysema and CAD being admitted to hospital medicine for management of infected renal stone. Patient disoriented and unable to provide history, details given per wife at bedside. She reports patient woke up yesterday morning with generalized weakness, inability to ambulate even with assistance. Endorses associated increased confusion, decreased alertness and complaints of left flank pain. He did fall when attempting to stand out of bed yesterday but did not hit his head/sustain any injuries. She reports recent medication changes, states his baclofen dosing was increased from 5 mg BID to 10 mg TID to better control his muscle pain within the past week. She denies fever, chills, diaphoresis, complaints of dysuria, cough, congestion, nausea or vomiting. Denies constipation, states he has been passing soft/loose stools but did note hard stool in the rectum when wiping.        In ED: Tmax 100.3 F, mildly hypertensive, remaining vitals wnl. CBC with WBC 12.68, mild anemia. CMP grossly unremarkable. BNP and troponin wnl. UA with 13 WBC, >100 RBC. CT AP revealed  2.7 x 5.6 mm distal left ureteral calculus without evidence for hydroureteronephrosis or obstructive uropathy. Also notable for mild to moderate distention of the rectum with stool, mild rectal wall thickening with perirectal haziness. CXR without consolidation. CT head without acute process. Given IV vanc and zosyn in the ED. Urology consulted. Admitted to medicine for further management.     Overview/Hospital  Course:  Presented with encephalopathy suspected secondary to baclofen toxicity and infected L ureteral stone s/p 7/16 cystoscopy with left ureteral JJ stent placement, bilateral retrograde pyelogram. Admission UCx no growth; operative UCx no growth as well. Ceftriaxone changed to cefpodoxime x 2 wk course. PTOT recommends high intensity therapy; family initially with preference for home health on discharge, now requests SNF placement. Mentation back to baseline. Urology Dr Cullen will see in about 2 weeks with KUB.     Interval History: no AEON or complaints. Pt reports feeling better today. Family initially with preference for home health on discharge, now requests SNF placement.     Review of Systems   Unable to perform ROS: Dementia     Objective:     Vital Signs (Most Recent):  Temp: 97.9 °F (36.6 °C) (07/18/25 1045)  Pulse: 82 (07/18/25 1305)  Resp: 16 (07/18/25 1305)  BP: 136/67 (07/18/25 1045)  SpO2: (!) 93 % (07/18/25 1305) Vital Signs (24h Range):  Temp:  [97.7 °F (36.5 °C)-98.2 °F (36.8 °C)] 97.9 °F (36.6 °C)  Pulse:  [] 82  Resp:  [16-20] 16  SpO2:  [93 %-97 %] 93 %  BP: (129-169)/(61-78) 136/67     Weight: 55.5 kg (122 lb 5.7 oz)  Body mass index is 21 kg/m².    Intake/Output Summary (Last 24 hours) at 7/18/2025 1449  Last data filed at 7/18/2025 0406  Gross per 24 hour   Intake --   Output 1400 ml   Net -1400 ml         Physical Exam  Vitals reviewed.   Constitutional:       General: He is not in acute distress.     Appearance: He is not diaphoretic.   HENT:      Head: Normocephalic and atraumatic.      Nose: Nose normal.      Mouth/Throat:      Pharynx: No oropharyngeal exudate.   Eyes:      General: No scleral icterus.        Right eye: No discharge.         Left eye: No discharge.      Conjunctiva/sclera: Conjunctivae normal.   Neck:      Thyroid: No thyromegaly.      Vascular: No JVD.      Trachea: No tracheal deviation.   Cardiovascular:      Rate and Rhythm: Normal rate and regular rhythm.       Heart sounds: Normal heart sounds. No murmur heard.     No friction rub.   Pulmonary:      Effort: Pulmonary effort is normal. No respiratory distress.   Abdominal:      General: There is no distension.      Palpations: Abdomen is soft. There is no mass.   Musculoskeletal:         General: No tenderness or deformity.      Cervical back: Neck supple.   Skin:     General: Skin is warm and dry.      Coloration: Skin is not pale.      Findings: No erythema or rash.   Neurological:      General: No focal deficit present.      Mental Status: He is alert. Mental status is at baseline.      Motor: No abnormal muscle tone.               Significant Labs: All pertinent labs within the past 24 hours have been reviewed.    Significant Imaging: I have reviewed all pertinent imaging results/findings within the past 24 hours.      Assessment & Plan  Encephalopathy  Constipation  Acute cystitis with hematuria  Stercoral colitis  Left ureteral calculus  Likely multifactorial (UTI vs stercoral colitis vs baclofen toxicity)  - Tmax 100.3 F on admission, remaining vitals stable   - CBC with WBC of 12.68  - CMP grossly unremarkable   - BNP and trop wnl  - UA with 13 WBC, >100 RBC  - BCX NGTD  - CXR without consolidation  - CT AP with left ureteral calculus; no obstruction or hydronephrosis. Also notes distention of the rectum with stool, mild rectal wall thickening   - CT head without acute abnormality   - given vanc and zosyn in the ED, previous urine cultures with no growth. De-escalate to ceftriaxone   - urology consulted 2/2 concern for infected stone   - brown bomb ordered on admission followed by oral bowel regimen  - neuro checks q4h  - tele   s/p 7/16 cystoscopy with left ureteral JJ stent placement, bilateral retrograde pyelogram. Admission UCx no growth; operative UCx NGTD. On ceftriaxone, switch to cefpodoxime. Urology Dr Cullen will see in about 2 weeks with KUB.     Coronary artery disease  Hyperlipidemia  Patient with known  CAD s/p stent placement, which is controlled   - Will continue MTP, zetia, ASA and Statin and monitor for S/Sx of angina/ACS.   - Continue to monitor on telemetry.   Essential hypertension  - chronic, controlled  - continue home amlodipine and MTP  - vitals q4h   Parkinson disease  - followed by neurology outpatient   - continue home sinemet five times daily   - delirium precautions   Centrilobular emphysema  - chronic, controlled  - sub breztri for formulary alternative  - albuterol prn for rescue   - prn oxygen   Cramps, muscle, general  - continue home mobic   Hypoalbuminemia  Lab Results   Component Value Date    ALBUMIN 3.5 07/18/2025   - noted   Normocytic anemia  Most recent hemoglobin and hematocrit are listed below.  Recent Labs     07/15/25  1947 07/17/25  0608 07/18/25  0545   HGB 13.1* 12.4* 13.4*   HCT 40.4 38.3* 41.0     Plan  - Monitor serial CBC: Daily  - Transfuse PRBC if patient becomes hemodynamically unstable, symptomatic or H/H drops below 7/21.  - Patient has not received any PRBC transfusions to date  - Patient's anemia is currently stable  VTE Risk Mitigation (From admission, onward)           Ordered     enoxaparin injection 40 mg  Daily         07/16/25 0435     IP VTE HIGH RISK PATIENT  Once         07/16/25 0435     Place sequential compression device  Until discontinued         07/16/25 0435                    Discharge Planning   RAFAEL: 7/21/2025     Code Status: Full Code   Medical Readiness for Discharge Date: 7/18/2025  Discharge Plan A: Skilled Nursing Facility   Discharge Delays: None known at this time              Please place Justification for DME      Ritika Cornell MD  Department of Hospital Medicine   [facility]

## 2025-07-18 NOTE — PLAN OF CARE
Met with Pt and wife to review discharge recommendation of Resume Home Health and is agreeable to plan    Patient/family provided list of facilities in-network with patient's payor plan. Providers that are owned, operated, or affiliated with [facility] are included on the list.     Notified that referral sent to below listed facilities from in-network list based on proximity to home/family support:    On license of [facility]    Patient has declined to select a preferred provider and elects placement with the first accepting in network provider that is available to provide services as ordered by the physician        07/18/25 1029   Post-Acute Status   Post-Acute Authorization Home Health   Home Health Status Referrals Sent   Hospital Resources/Appts/Education Provided Provided patient/caregiver with written discharge plan information;Provided education on problems/symptoms using teachback   Discharge Delays None known at this time     Discharge Plan A and Plan B have been determined by review of patient's clinical status, future medical and therapeutic needs, and coverage/benefits for post-acute care in coordination with multidisciplinary team members.  Yuniel Warner RN,BSN      1044[facility]   Will continue to update plan as needed.  Yuniel Warner RN,BSN

## 2025-07-18 NOTE — SUBJECTIVE & OBJECTIVE
Interval History: no AEON or complaints. Pt reports feeling better today. Family initially with preference for home health on discharge, now requests SNF placement.     Review of Systems   Unable to perform ROS: Dementia     Objective:     Vital Signs (Most Recent):  Temp: 97.9 °F (36.6 °C) (07/18/25 1045)  Pulse: 82 (07/18/25 1305)  Resp: 16 (07/18/25 1305)  BP: 136/67 (07/18/25 1045)  SpO2: (!) 93 % (07/18/25 1305) Vital Signs (24h Range):  Temp:  [97.7 °F (36.5 °C)-98.2 °F (36.8 °C)] 97.9 °F (36.6 °C)  Pulse:  [] 82  Resp:  [16-20] 16  SpO2:  [93 %-97 %] 93 %  BP: (129-169)/(61-78) 136/67     Weight: 55.5 kg (122 lb 5.7 oz)  Body mass index is 21 kg/m².    Intake/Output Summary (Last 24 hours) at 7/18/2025 1449  Last data filed at 7/18/2025 0406  Gross per 24 hour   Intake --   Output 1400 ml   Net -1400 ml         Physical Exam  Vitals reviewed.   Constitutional:       General: He is not in acute distress.     Appearance: He is not diaphoretic.   HENT:      Head: Normocephalic and atraumatic.      Nose: Nose normal.      Mouth/Throat:      Pharynx: No oropharyngeal exudate.   Eyes:      General: No scleral icterus.        Right eye: No discharge.         Left eye: No discharge.      Conjunctiva/sclera: Conjunctivae normal.   Neck:      Thyroid: No thyromegaly.      Vascular: No JVD.      Trachea: No tracheal deviation.   Cardiovascular:      Rate and Rhythm: Normal rate and regular rhythm.      Heart sounds: Normal heart sounds. No murmur heard.     No friction rub.   Pulmonary:      Effort: Pulmonary effort is normal. No respiratory distress.   Abdominal:      General: There is no distension.      Palpations: Abdomen is soft. There is no mass.   Musculoskeletal:         General: No tenderness or deformity.      Cervical back: Neck supple.   Skin:     General: Skin is warm and dry.      Coloration: Skin is not pale.      Findings: No erythema or rash.   Neurological:      General: No focal deficit present.       Mental Status: He is alert. Mental status is at baseline.      Motor: No abnormal muscle tone.               Significant Labs: All pertinent labs within the past 24 hours have been reviewed.    Significant Imaging: I have reviewed all pertinent imaging results/findings within the past 24 hours.

## 2025-07-18 NOTE — PHARMACY MED REC
"        Admission Medication History     The home medication history was taken by Agata Mendieta.    You may go to "Admission" then "Reconcile Home Medications" tabs to review and/or act upon these items.     The home medication list has been updated by the Pharmacy department.   Please read ALL comments highlighted in yellow.   Please address this information as you see fit.    Feel free to contact us if you have any questions or require assistance.      The medications listed below were removed from the home medication list. Please reorder if appropriate:  Patient reports no longer taking the following medication(s):  Lioresal 10 Mg   Motrin 800 Mg     Medications listed below were obtained from: Patient/family and Analytic software- [employer] Medications   Medication Sig    Albuterol (Proventil/Ventolin Hfa) 90 Mcg/Actuation Inhaler Inhale 2 Puffs By Mouth Every 6 Hours As Needed For Wheezing For Shortness Of Breath      Amlodipine (Norvasc) 5 Mg Tablet Take 1 Tablet By Mouth Once Daily      Aspirin (Ecotrin) 81 Mg Ec Tablet Take 81 Mg By Mouth Once Daily.      Breztri Aerosphere 160-9-4.8 Mcg/Actuation Hfaa Inhale 2 Puffs Into Lungs 2 (Two) Times Daily      Carbidopa-Levodopa  Mg (Sinemet)  Mg Per Tablet Take 1 & 1/2 (One & One-Half) Tablets By Mouth 5 (Five) Times Daily      Citalopram (Celexa) 20 Mg Tablet Take 1 Tablet By Mouth Once Daily      Ezetimibe (Zetia) 10 Mg Tablet Take 1 Tablet By Mouth Once Daily      Loratadine (Claritin) 10 Mg Tablet Take 10 Mg By Mouth As Needed For Allergies.      Meloxicam (Mobic) 7.5 Mg Tablet Take 1 Tablet (7.5 Mg Total) By Mouth Once Daily.      Metoprolol Succinate (Toprol-Xl) 50 Mg 24 Hr Tablet Take 1 Tablet By Mouth Once Daily      Multivitamin Capsule Take 1 Capsule By Mouth Once Daily.      Potassium Citrate (Urocit-K 10) 10 Meq (1,080 Mg) Tbsr Take 1 Tablet (10 Meq Total) By Mouth 2 (Two) Times Daily With Meals.      Rosuvastatin (Crestor) 40 Mg Tab Take " 1 Tablet By Mouth Nightly At Bedtime      Baclofen (Lioresal) 10 Mg Tablet Take 1 Tablet (10 Mg Total) By Mouth 3 (Three) Times Daily.         Agata Mendieta  [id number]           .

## 2025-07-18 NOTE — ASSESSMENT & PLAN NOTE
Most recent hemoglobin and hematocrit are listed below.  Recent Labs     07/17/25  0608 07/18/25  0545 07/19/25  0635   HGB 12.4* 13.4* 14.7   HCT 38.3* 41.0 45.1     Plan  - Monitor serial CBC: Daily  - Transfuse PRBC if patient becomes hemodynamically unstable, symptomatic or H/H drops below 7/21.  - Patient has not received any PRBC transfusions to date  - Patient's anemia is currently stable

## 2025-07-18 NOTE — PT/OT/SLP PROGRESS
"Physical Therapy Treatment    Patient Name:  Hector Kellogg   MRN:  [id number]    Recommendations:     Discharge Recommendations: High Intensity Therapy  Discharge Equipment Recommendations: none  Barriers to discharge: Inaccessible home, Decreased caregiver support, and requiring increased level of skilled assist    Assessment:     Hector Kellogg is a 80 y.o. male admitted with a medical diagnosis of Encephalopathy.  He presents with the following impairments/functional limitations: weakness, impaired endurance, impaired functional mobility, gait instability, impaired balance, decreased upper extremity function, decreased lower extremity function, decreased coordination, impaired cognition, decreased safety awareness, impaired cardiopulmonary response to activity Patient agreeable to therapy session this date, requiring frequent redirection and attention to task. Wife present and daughter updated to see current functional status, educated on safest level of mobility being bed level at this time to avoid risk of falls. Lacking coordination of movements during bed mobility and sitting EOB. Patient will continue to benefit from skilled PT during this admit to address BLE strength and endurance deficits, and maximize independence with functional mobility.    Rehab Prognosis: Good and Fair; patient would benefit from acute skilled PT services to address these deficits and reach maximum level of function.    Recent Surgery: Procedure(s) (LRB):  CYSTOSCOPY, WITH URETERAL STENT INSERTION (Left)  PYELOGRAM, RETROGRADE (Bilateral) 2 Days Post-Op    Plan:     During this hospitalization, patient to be seen 4 x/week to address the identified rehab impairments via gait training, therapeutic activities, therapeutic exercises, neuromuscular re-education and progress toward the following goals:    Plan of Care Expires:  08/16/25    Subjective     Chief Complaint: "I don't want to go back to work"  Patient/Family " Comments/goals: family education on current level of function  Pain/Comfort:  Pain Rating 1: 0/10  Pain Rating Post-Intervention 1: 0/10      Objective:     Communicated with RN prior to session.  Patient found HOB elevated with PureWick, telemetry upon PT entry to room.     General Precautions: Standard, fall, seizure  Orthopedic Precautions: N/A  Braces: N/A  Respiratory Status: Room air     Functional Mobility:  Bed Mobility:     Scooting: maximal assistance and of 2 persons  Supine to Sit: maximal assistance  Sit to Supine: maximal assistance  Balance: sitting balance: poor with frequent spontaneous multidirectional leans evident, incorporated reaching activity with improved accuracy when reaching intentionally for objects, multisensory cues for core/truncal activation outside MITRA, constant cues for feet to remain in contact with the floor     AM-PAC 6 CLICK MOBILITY  Turning over in bed (including adjusting bedclothes, sheets and blankets)?: 2  Sitting down on and standing up from a chair with arms (e.g., wheelchair, bedside commode, etc.): 1  Moving from lying on back to sitting on the side of the bed?: 2  Moving to and from a bed to a chair (including a wheelchair)?: 1  Need to walk in hospital room?: 1  Climbing 3-5 steps with a railing?: 1  Basic Mobility Total Score: 8       Treatment & Education:  Patient educated on calling for assistance for any needs to improve overall safety awareness.  Patient educated on current level of function and progression towards therapeutic goals.  Co-treatment with OT due to patient's poor activity tolerance and medical complexity requiring skilled assistance from 2 therapists.  All items placed within reach, and notified on call light usage for assistance with any needs for fall prevention.    Patient left HOB elevated with all lines intact, call button in reach, bed alarm on, and family present..    GOALS:   Multidisciplinary Problems       Physical Therapy Goals           Problem: Physical Therapy    Goal Priority Disciplines Outcome Interventions   Physical Therapy Goal     PT, PT/OT Progressing    Description: Goals to be met by: 2025     Patient will increase functional independence with mobility by performin. Supine to sit with Minimal assistance  2. Sit to supine with Minimal assistance  3. Sit to stand transfer with Supervision  4. Bed to chair transfer with Supervision using LRAD  5. Gait  x 100 feet with Minimal Assistance using LRAD.   6. Ascend/descend 1 stair with no Handrails Minimal Assistance using LRAD.                            DME Justifications:  No DME recommended requiring DME justifications    Time Tracking:     PT Received On: 25  PT Start Time: 1134     PT Stop Time: 1157  PT Total Time (min): 23 min     Billable Minutes: Neuromuscular Re-education 23       PT/PTA: PT     Number of PTA visits since last PT visit: 0     2025

## 2025-07-19 ENCOUNTER — NURSE TRIAGE (OUTPATIENT)
Dept: ADMINISTRATIVE | Facility: CLINIC | Age: 80
End: 2025-07-19
Payer: MEDICARE

## 2025-07-19 VITALS
RESPIRATION RATE: 18 BRPM | OXYGEN SATURATION: 95 % | BODY MASS INDEX: 20.89 KG/M2 | TEMPERATURE: 98 F | HEART RATE: 108 BPM | SYSTOLIC BLOOD PRESSURE: 126 MMHG | HEIGHT: 64 IN | WEIGHT: 122.38 LBS | DIASTOLIC BLOOD PRESSURE: 67 MMHG

## 2025-07-19 LAB
ALBUMIN SERPL BCP-MCNC: 3.7 G/DL (ref 3.5–5.2)
ANION GAP (OHS): 10 MMOL/L (ref 8–16)
BUN SERPL-MCNC: 15 MG/DL (ref 8–23)
CALCIUM SERPL-MCNC: 9.7 MG/DL (ref 8.7–10.5)
CHLORIDE SERPL-SCNC: 107 MMOL/L (ref 95–110)
CO2 SERPL-SCNC: 22 MMOL/L (ref 23–29)
CREAT SERPL-MCNC: 0.9 MG/DL (ref 0.5–1.4)
ERYTHROCYTE [DISTWIDTH] IN BLOOD BY AUTOMATED COUNT: 12.2 % (ref 11.5–14.5)
GFR SERPLBLD CREATININE-BSD FMLA CKD-EPI: >60 ML/MIN/1.73/M2
GLUCOSE SERPL-MCNC: 90 MG/DL (ref 70–110)
HCT VFR BLD AUTO: 45.1 % (ref 40–54)
HGB BLD-MCNC: 14.7 GM/DL (ref 14–18)
MAGNESIUM SERPL-MCNC: 2.1 MG/DL (ref 1.6–2.6)
MCH RBC QN AUTO: 32.4 PG (ref 27–31)
MCHC RBC AUTO-ENTMCNC: 32.6 G/DL (ref 32–36)
MCV RBC AUTO: 99 FL (ref 82–98)
PHOSPHATE SERPL-MCNC: 3.4 MG/DL (ref 2.7–4.5)
PLATELET # BLD AUTO: 190 K/UL (ref 150–450)
PMV BLD AUTO: 9.8 FL (ref 9.2–12.9)
POTASSIUM SERPL-SCNC: 5 MMOL/L (ref 3.5–5.1)
RBC # BLD AUTO: 4.54 M/UL (ref 4.6–6.2)
SODIUM SERPL-SCNC: 139 MMOL/L (ref 136–145)
WBC # BLD AUTO: 6.52 K/UL (ref 3.9–12.7)

## 2025-07-19 PROCEDURE — 99900035 HC TECH TIME PER 15 MIN (STAT)

## 2025-07-19 PROCEDURE — 94761 N-INVAS EAR/PLS OXIMETRY MLT: CPT

## 2025-07-19 PROCEDURE — 85027 COMPLETE CBC AUTOMATED: CPT

## 2025-07-19 PROCEDURE — 80069 RENAL FUNCTION PANEL: CPT

## 2025-07-19 PROCEDURE — 83735 ASSAY OF MAGNESIUM: CPT

## 2025-07-19 PROCEDURE — 94640 AIRWAY INHALATION TREATMENT: CPT

## 2025-07-19 PROCEDURE — 25000003 PHARM REV CODE 250: Performed by: HOSPITALIST

## 2025-07-19 PROCEDURE — 25000003 PHARM REV CODE 250

## 2025-07-19 PROCEDURE — 36415 COLL VENOUS BLD VENIPUNCTURE: CPT

## 2025-07-19 RX ORDER — QUETIAPINE FUMARATE 25 MG/1
25 TABLET, FILM COATED ORAL NIGHTLY PRN
Qty: 30 TABLET | Refills: 0 | Status: SHIPPED | OUTPATIENT
Start: 2025-07-19 | End: 2025-07-19

## 2025-07-19 RX ORDER — QUETIAPINE FUMARATE 25 MG/1
25 TABLET, FILM COATED ORAL NIGHTLY PRN
Qty: 30 TABLET | Refills: 0 | Status: SHIPPED | OUTPATIENT
Start: 2025-07-19 | End: 2026-07-19

## 2025-07-19 RX ADMIN — SENNOSIDES 8.6 MG: 8.6 TABLET, FILM COATED ORAL at 09:07

## 2025-07-19 RX ADMIN — METOPROLOL SUCCINATE 50 MG: 50 TABLET, EXTENDED RELEASE ORAL at 09:07

## 2025-07-19 RX ADMIN — POLYETHYLENE GLYCOL 3350 17 G: 17 POWDER, FOR SOLUTION ORAL at 09:07

## 2025-07-19 RX ADMIN — CEFPODOXIME PROXETIL 200 MG: 200 TABLET, FILM COATED ORAL at 09:07

## 2025-07-19 RX ADMIN — AMLODIPINE BESYLATE 5 MG: 5 TABLET ORAL at 09:07

## 2025-07-19 RX ADMIN — CITALOPRAM HYDROBROMIDE 20 MG: 20 TABLET ORAL at 09:07

## 2025-07-19 RX ADMIN — TIOTROPIUM BROMIDE INHALATION SPRAY 2 PUFF: 3.12 SPRAY, METERED RESPIRATORY (INHALATION) at 08:07

## 2025-07-19 RX ADMIN — CARBIDOPA AND LEVODOPA 1 TABLET: 25; 100 TABLET ORAL at 09:07

## 2025-07-19 RX ADMIN — FLUTICASONE FUROATE AND VILANTEROL TRIFENATATE 1 PUFF: 200; 25 POWDER RESPIRATORY (INHALATION) at 08:07

## 2025-07-19 RX ADMIN — ASPIRIN 81 MG: 81 TABLET, COATED ORAL at 09:07

## 2025-07-19 NOTE — PLAN OF CARE
07/19/25 1134   Post-Acute Status   Post-Acute Authorization Home Health   Home Health Status Set-up Complete/Auth obtained   Discharge Plan   Discharge Plan A Home Health   Discharge Plan B Home Health     Discharge Plan A and Plan B have been determined by review of patient's clinical status, future medical and therapeutic needs, and coverage/benefits for post-acute care in coordination with multidisciplinary team members.    Patient accepted by [facility]. Notified agency of patient's discharge and uploaded orders in Epic.      Alanna Harris RN  Weekend  - [facility] [place]  [id number]

## 2025-07-19 NOTE — PLAN OF CARE
[facility] - [facility]  Discharge Final Note    Primary Care Provider: Blue Mcgill DO    Expected Discharge Date: 7/19/2025    Patient cleared for discharge from case management standpoint.    Final Discharge Note (most recent)       Final Note - 07/19/25 1134          Post-Acute Status    Post-Acute Authorization Home Health     Home Health Status Set-up Complete/Auth obtained                   Important Message from Medicare  Important Message from Medicare regarding Discharge Appeal Rights: Signed/date by patient/caregiver     Date IMM was signed: 07/18/25  Time IMM was signed: 1122     Follow-up providers       Blue Mcgill DO   Specialty: Internal Medicine   Relationship: PCP - General    [address] [facility]  [place] [address]   Phone: [phone]       Next Steps: Follow up              After-discharge care                Home Medical Care       [facility]   Service: Home Health Services    [address] [place] [address] [address]  [place] [address]   Phone: [phone]                             Future Appointments   Date Time Provider Department Center   7/29/2025 10:00 AM Blue Mcgill DO [facility]   7/31/2025 12:45 PM NOM OIC-XRAY [facility] XRAY [facility]   7/31/2025  2:20 PM Jerel Cullen MD [facility] UROLOGY [facility]   11/6/2025 10:00 AM Keila Jones MD [facility] PAL MED [facility]   1/6/2026  9:40 AM LAB, SBPH SBPH LAB [facility]   1/13/2026 11:20 AM Blue Mcgill DO [facility] IM [facility]       Alanna Harris RN  Weekend  - [facility] [place] [facility]  [phone]

## 2025-07-19 NOTE — PLAN OF CARE
Hospital follow-up appointment was scheduled by [facility].  Patient follow-up appointment was scheduled for 7/29/25 at 10:00 am at [facility].      Dirk LOZANO, RSW  Case Management

## 2025-07-19 NOTE — NURSING
Discharge instructions and education given to family. Verbalized understanding. Removal of IV. No redness, swelling or drainage noted. Telemetry removed.  Pt escort requested/family at bedside.

## 2025-07-20 ENCOUNTER — PATIENT MESSAGE (OUTPATIENT)
Facility: CLINIC | Age: 80
End: 2025-07-20
Payer: MEDICARE

## 2025-07-20 ENCOUNTER — PATIENT MESSAGE (OUTPATIENT)
Dept: INTERNAL MEDICINE | Facility: CLINIC | Age: 80
End: 2025-07-20
Payer: MEDICARE

## 2025-07-20 DIAGNOSIS — G47.00 INSOMNIA, UNSPECIFIED TYPE: Primary | ICD-10-CM

## 2025-07-20 NOTE — TELEPHONE ENCOUNTER
Dr. Jones responded per Secure chat.  Pt. daughter is called and informed that she should not give Seroquel with those reactions. Daughter states she will reach out to patient neurologist on Monday.

## 2025-07-20 NOTE — TELEPHONE ENCOUNTER
"Severe involuntary movements of his legs, arms, and tongue after  taking seroquel. Has been going on for 45 minutes.  He had the medication last night while hospitalized and had a milder but same reaction. Today the dosage is "doubled',per caller and the reactions are greater.  They are asking for advice.    Reason for Disposition   [1] Caller has URGENT medicine question about med that primary care doctor (or NP/PA) or specialist prescribed AND [2] triager unable to answer question    Additional Information   Negative: [1] Intentional drug overdose AND [2] suicidal thoughts or ideas   Negative: MORE THAN A DOUBLE DOSE of a prescription or over-the-counter (OTC) drug   Negative: [1] DOUBLE DOSE (an extra dose or lesser amount) of prescription drug AND [2] any symptoms (e.g., dizziness, nausea, pain, sleepiness)   Negative: [1] DOUBLE DOSE (an extra dose or lesser amount) of over-the-counter (OTC) drug AND [2] any symptoms (e.g., dizziness, nausea, pain, sleepiness)   Negative: Took another person's prescription drug    Protocols used: Medication Question Call-A-AH    "

## 2025-07-21 ENCOUNTER — PATIENT OUTREACH (OUTPATIENT)
Dept: ADMINISTRATIVE | Facility: CLINIC | Age: 80
End: 2025-07-21
Payer: MEDICARE

## 2025-07-21 LAB — BACTERIA BLD CULT: NORMAL

## 2025-07-21 RX ORDER — MIRTAZAPINE 15 MG/1
15 TABLET, FILM COATED ORAL NIGHTLY
Qty: 30 TABLET | Refills: 11 | Status: SHIPPED | OUTPATIENT
Start: 2025-07-21 | End: 2026-07-21

## 2025-07-21 NOTE — PROGRESS NOTES
C3 nurse spoke with Hector Kellogg's daughter Kelly for a TCC post hospital discharge follow up call. The patient has a scheduled HOSFU appointment with Blue Mcgill DO on 07/19/25 @ 1000.

## 2025-07-22 LAB
NIACIN SERPL-MCNC: <5 NG/ML
NICOTINAMIDE SERPL-MCNC: 7.3 NG/ML (ref 5–48)
NICOTINURATE SERPL-MCNC: <5 NG/ML
VIT B2 SERPL-MCNC: 11 MCG/L (ref 1–19)
W VITAMIN B1: 82 UG/L
W VITAMIN B6: 6 UG/L

## 2025-07-23 ENCOUNTER — E-CONSULT (OUTPATIENT)
Dept: CARDIOLOGY | Facility: CLINIC | Age: 80
End: 2025-07-23
Payer: MEDICARE

## 2025-07-23 DIAGNOSIS — I25.10 CORONARY ARTERY DISEASE INVOLVING NATIVE CORONARY ARTERY OF NATIVE HEART WITHOUT ANGINA PECTORIS: Primary | ICD-10-CM

## 2025-07-23 NOTE — CONSULTS
[facility] - Cardiology - [facility]  Response for E-Consult     Patient Name: Hector Kellogg  MRN: [id number]  Primary Care Provider: Blue Mcgill DO   Requesting Provider: Isis Lee MD  E-Consult to General Cardiology  Consult performed by: Omaira Salinas MD  Consult ordered by: Isis Lee MD          Recommendation: Aspirin can be held for one week prior to surgery and restarted ASAP.    Additional future steps to consider: none    Total time of Consultation: 5 minute    I did not speak to the requesting provider verbally about this.     *This eConsult is based on the clinical data available to me and is furnished without benefit of a physical examination. The eConsult will need to be interpreted in light of any clinical issues or changes in patient status not available to me at the time of filing this eConsults. Significant changes in patient condition or level of acuity should result in immediate formal consultation and reevaluation. Please alert me if you have further questions.    Thank you for this eConsult referral.     MD [facility] - Cardiology - [facility]

## 2025-07-29 ENCOUNTER — OFFICE VISIT (OUTPATIENT)
Dept: INTERNAL MEDICINE | Facility: CLINIC | Age: 80
End: 2025-07-29
Payer: MEDICARE

## 2025-07-29 VITALS
TEMPERATURE: 97 F | SYSTOLIC BLOOD PRESSURE: 120 MMHG | BODY MASS INDEX: 21.51 KG/M2 | OXYGEN SATURATION: 99 % | DIASTOLIC BLOOD PRESSURE: 70 MMHG | RESPIRATION RATE: 18 BRPM | HEART RATE: 99 BPM | HEIGHT: 64 IN | WEIGHT: 126 LBS

## 2025-07-29 DIAGNOSIS — J43.2 CENTRILOBULAR EMPHYSEMA: Primary | ICD-10-CM

## 2025-07-29 DIAGNOSIS — M43.16 SPONDYLOLISTHESIS OF LUMBAR REGION: ICD-10-CM

## 2025-07-29 DIAGNOSIS — I70.0 AORTIC ATHEROSCLEROSIS: ICD-10-CM

## 2025-07-29 DIAGNOSIS — N40.1 BPH WITH URINARY OBSTRUCTION: ICD-10-CM

## 2025-07-29 DIAGNOSIS — I73.9 CLAUDICATION: ICD-10-CM

## 2025-07-29 DIAGNOSIS — E04.1 THYROID NODULE: ICD-10-CM

## 2025-07-29 DIAGNOSIS — F41.9 ANXIETY: ICD-10-CM

## 2025-07-29 DIAGNOSIS — M79.2 NEUROPATHIC PAIN: ICD-10-CM

## 2025-07-29 DIAGNOSIS — I27.20 PULMONARY HTN: Chronic | ICD-10-CM

## 2025-07-29 DIAGNOSIS — N20.1 LEFT URETERAL CALCULUS: ICD-10-CM

## 2025-07-29 DIAGNOSIS — F06.8 PSYCHOSIS DUE TO PARKINSON'S DISEASE: ICD-10-CM

## 2025-07-29 DIAGNOSIS — I25.10 CORONARY ARTERY DISEASE INVOLVING NATIVE CORONARY ARTERY OF NATIVE HEART WITHOUT ANGINA PECTORIS: ICD-10-CM

## 2025-07-29 DIAGNOSIS — I10 ESSENTIAL HYPERTENSION: ICD-10-CM

## 2025-07-29 DIAGNOSIS — G20.A1 PSYCHOSIS DUE TO PARKINSON'S DISEASE: ICD-10-CM

## 2025-07-29 DIAGNOSIS — N20.0 NEPHROLITHIASIS: ICD-10-CM

## 2025-07-29 DIAGNOSIS — N13.8 BPH WITH URINARY OBSTRUCTION: ICD-10-CM

## 2025-07-29 DIAGNOSIS — M51.369 DEGENERATION OF INTERVERTEBRAL DISC OF LUMBAR REGION, UNSPECIFIED WHETHER PAIN PRESENT: ICD-10-CM

## 2025-07-29 PROCEDURE — 3074F SYST BP LT 130 MM HG: CPT | Mod: CPTII,S$GLB,, | Performed by: INTERNAL MEDICINE

## 2025-07-29 PROCEDURE — 99999 PR PBB SHADOW E&M-EST. PATIENT-LVL IV: CPT | Mod: PBBFAC,,, | Performed by: INTERNAL MEDICINE

## 2025-07-29 PROCEDURE — 99215 OFFICE O/P EST HI 40 MIN: CPT | Mod: S$GLB,,, | Performed by: INTERNAL MEDICINE

## 2025-07-29 PROCEDURE — 1125F AMNT PAIN NOTED PAIN PRSNT: CPT | Mod: CPTII,S$GLB,, | Performed by: INTERNAL MEDICINE

## 2025-07-29 PROCEDURE — 1159F MED LIST DOCD IN RCRD: CPT | Mod: CPTII,S$GLB,, | Performed by: INTERNAL MEDICINE

## 2025-07-29 PROCEDURE — 3078F DIAST BP <80 MM HG: CPT | Mod: CPTII,S$GLB,, | Performed by: INTERNAL MEDICINE

## 2025-07-29 PROCEDURE — 1111F DSCHRG MED/CURRENT MED MERGE: CPT | Mod: CPTII,S$GLB,, | Performed by: INTERNAL MEDICINE

## 2025-07-29 PROCEDURE — G2211 COMPLEX E/M VISIT ADD ON: HCPCS | Mod: S$GLB,,, | Performed by: INTERNAL MEDICINE

## 2025-07-29 NOTE — PROGRESS NOTES
"Subjective     Patient ID: Hector Kellogg is a 80 y.o. male.    Chief Complaint: Hospital Follow Up    HPI  Pt with CAD S/P NSTEMI and PCI in 2006, HTN, HLD, DJD Lumbar spine, thyroid nodule, anxiety, insomnia, COPD, Pulm HTN, Elevated PSA, Sacroiliitis and Parkinson's disease here for follow up of kidney stones.     KIDNEY STONE HISTORY:  He has B/L kidney stones, with a recent hospitalization for a 2 x 2.7 x 5.6 mm distal left ureteral calculus. He currently reports darker-colored urine. He has not passed any stones to date. During hospitalization, he underwent cystoscopy with left urethral stent insertion. He currently denies fever, chills, or flank pain.    RECENT HOSPITALIZATION COURSE:  During his recent hospitalization, he experienced confusion and an episode where he was "out of it" with shoulder shaking and subsequent fall. He developed a low-grade fever of 100.3°F with an elevated white blood cell count of 12.6. urinalysis showed red and white blood cells. He received Vancin and Zosyn antibiotics. Pt was discharged on Vantin with his last dose to be given today. He has since returned to baseline mental status.     BLOOD PRESSURE:  He reports low blood pressure readings from home health visits: 82/50 last week and 100/50 yesterday. Amlodipine has been held and Metoprolol dose has been reduced by half. He remains asymptomatic with these medication adjustments, denying dizziness or lightheadedness.    SLEEP:  He reports significant improvement in sleep quality since starting Remeron 15 mg at bedtime, recently sleeping through the entire night for the first time. Seroquel was discontinued due to side effects. He appears more alert due to improved sleep patterns.  Review of Systems   Constitutional:  Negative for activity change, appetite change, chills, diaphoresis, fatigue, fever and unexpected weight change.   HENT:  Negative for nasal congestion, mouth sores, postnasal drip, rhinorrhea, sinus " pressure/congestion, sneezing, sore throat, trouble swallowing and voice change.    Eyes:  Negative for discharge, itching and visual disturbance.   Respiratory:  Negative for cough, chest tightness, shortness of breath and wheezing.    Cardiovascular:  Negative for chest pain, palpitations and leg swelling.   Gastrointestinal:  Negative for abdominal pain, blood in stool, constipation, diarrhea, nausea and vomiting.   Endocrine: Negative for cold intolerance and heat intolerance.   Genitourinary:  Negative for difficulty urinating, dysuria, flank pain, hematuria and urgency.   Musculoskeletal:  Positive for arthralgias and back pain. Negative for myalgias and neck pain.   Integumentary:  Negative for rash and wound.   Allergic/Immunologic: Negative for environmental allergies and food allergies.   Neurological:  Positive for tremors and memory loss. Negative for dizziness, seizures, syncope, weakness and headaches.   Hematological:  Negative for adenopathy. Does not bruise/bleed easily.   Psychiatric/Behavioral:  Positive for sleep disturbance. Negative for confusion, self-injury and suicidal ideas. The patient is nervous/anxious.           Objective     Physical Exam  Constitutional:       General: He is not in acute distress.     Appearance: Normal appearance. He is well-developed. He is not ill-appearing, toxic-appearing or diaphoretic.   HENT:      Head: Normocephalic and atraumatic.      Right Ear: External ear normal.      Left Ear: External ear normal.      Nose: Nose normal.      Mouth/Throat:      Pharynx: No oropharyngeal exudate.   Eyes:      General: No scleral icterus.        Right eye: No discharge.         Left eye: No discharge.      Extraocular Movements: Extraocular movements intact.      Conjunctiva/sclera: Conjunctivae normal.      Pupils: Pupils are equal, round, and reactive to light.   Neck:      Thyroid: No thyromegaly.      Vascular: No JVD.   Cardiovascular:      Rate and Rhythm: Normal  rate and regular rhythm.      Pulses: Normal pulses.      Heart sounds: Normal heart sounds. No murmur heard.  Pulmonary:      Effort: Pulmonary effort is normal. No respiratory distress.      Breath sounds: Normal breath sounds. No wheezing or rales.   Abdominal:      General: Bowel sounds are normal. There is no distension.      Palpations: Abdomen is soft.      Tenderness: There is no abdominal tenderness. There is no right CVA tenderness, left CVA tenderness, guarding or rebound.   Musculoskeletal:      Cervical back: Normal range of motion and neck supple. No rigidity.      Right lower leg: No edema.      Left lower leg: No edema.   Lymphadenopathy:      Cervical: No cervical adenopathy.   Skin:     General: Skin is warm and dry.      Capillary Refill: Capillary refill takes less than 2 seconds.      Coloration: Skin is not pale.      Findings: No rash.   Neurological:      General: No focal deficit present.      Mental Status: He is alert. Mental status is at baseline.      Cranial Nerves: No cranial nerve deficit.      Sensory: No sensory deficit.      Motor: No weakness.      Coordination: Coordination normal.      Gait: Gait normal.      Deep Tendon Reflexes: Reflexes normal.   Psychiatric:         Mood and Affect: Mood normal.         Behavior: Behavior normal.         Thought Content: Thought content normal.         Judgment: Judgment normal.            Assessment and Plan     1. Centrilobular emphysema  Overview:  FEV1 0.99 (43%) predicted  Continue breztri twice daily for control  Albuterol for rescue and prevention.     88% on room air at rest.  Benefits from POC of choice.       2. Coronary artery disease involving native coronary artery of native heart without angina pectoris  Overview:  Stents x 3 2006    PCI of RCA and OM 10/15 with KARINA  LVEF 55% 11/15      3. Essential hypertension    4. Pulmonary HTN    5. Claudication    6. Aortic atherosclerosis    7. BPH with urinary obstruction    8.  Nephrolithiasis    9. Thyroid nodule    10. Spondylolisthesis of lumbar region    11. Left ureteral calculus         Left distal ureteral calculous- s/p cysto with stent placement   -repeat cysto scheduled for 8/13/25 by Dr Cullen.      CAD- S/P NSTEMI and PCI in 2006, with repeat CATH(10/11/15) placing stents to RCA and OM1- stable       Continue BB/statin/ASA; pt has stopped the Imdur 2/2 to low BP      HTN- Norvasc 5 mg on hold 2/2 low BP, may restart at 1/2 dose if BP > 130/80.       HLD- controlled on Crestor      Parkinson's disease- stable on Sinemet, managed by Neuro   -Baclofen stopped 2/2 possible toxicity       COPD- stable, followed by Pulm      Mild Pulmonary HTN- stable      Anxiety- stable on Celexa 20 mg daily      Thyroid nodule- followed by ENT      DJD Lumbar spine- stable, stopped the gabapentin 2/2 sedation       Sacroiliitis- stable      Claudication- stable      Elevated PSA/BPH- followed by urology     Insomnia- controlled on Remeron 15 mg qHS       Over 1/2 of 40 minute visit spent reviewing pt's medical records, education/discussion of pt's medical conditions and medical management

## 2025-07-30 ENCOUNTER — PATIENT MESSAGE (OUTPATIENT)
Dept: CARDIOLOGY | Facility: CLINIC | Age: 80
End: 2025-07-30
Payer: MEDICARE

## 2025-07-31 ENCOUNTER — HOSPITAL ENCOUNTER (OUTPATIENT)
Dept: RADIOLOGY | Facility: HOSPITAL | Age: 80
Discharge: HOME | End: 2025-07-31
Payer: MEDICARE

## 2025-07-31 ENCOUNTER — OFFICE VISIT (OUTPATIENT)
Dept: UROLOGY | Facility: CLINIC | Age: 80
End: 2025-07-31
Payer: MEDICARE

## 2025-07-31 VITALS
HEART RATE: 74 BPM | HEIGHT: 64 IN | BODY MASS INDEX: 21.53 KG/M2 | WEIGHT: 126.13 LBS | DIASTOLIC BLOOD PRESSURE: 59 MMHG | SYSTOLIC BLOOD PRESSURE: 107 MMHG

## 2025-07-31 DIAGNOSIS — R31.0 HEMATURIA, GROSS: ICD-10-CM

## 2025-07-31 DIAGNOSIS — N20.1 LEFT URETERAL CALCULUS: ICD-10-CM

## 2025-07-31 DIAGNOSIS — G20.A2 PARKINSON'S DISEASE WITH FLUCTUATING MANIFESTATIONS, UNSPECIFIED WHETHER DYSKINESIA PRESENT: ICD-10-CM

## 2025-07-31 DIAGNOSIS — N13.8 BPH WITH URINARY OBSTRUCTION: ICD-10-CM

## 2025-07-31 DIAGNOSIS — N40.1 BPH WITH URINARY OBSTRUCTION: ICD-10-CM

## 2025-07-31 DIAGNOSIS — N20.0 KIDNEY STONE ON LEFT SIDE: ICD-10-CM

## 2025-07-31 DIAGNOSIS — E78.00 PURE HYPERCHOLESTEROLEMIA: ICD-10-CM

## 2025-07-31 DIAGNOSIS — N20.1 LEFT URETERAL STONE: Primary | ICD-10-CM

## 2025-07-31 PROCEDURE — 99999 PR PBB SHADOW E&M-EST. PATIENT-LVL IV: CPT | Mod: PBBFAC,,, | Performed by: UROLOGY

## 2025-07-31 PROCEDURE — 1126F AMNT PAIN NOTED NONE PRSNT: CPT | Mod: CPTII,S$GLB,, | Performed by: UROLOGY

## 2025-07-31 PROCEDURE — 87186 SC STD MICRODIL/AGAR DIL: CPT | Performed by: UROLOGY

## 2025-07-31 PROCEDURE — 1101F PT FALLS ASSESS-DOCD LE1/YR: CPT | Mod: CPTII,S$GLB,, | Performed by: UROLOGY

## 2025-07-31 PROCEDURE — 1159F MED LIST DOCD IN RCRD: CPT | Mod: CPTII,S$GLB,, | Performed by: UROLOGY

## 2025-07-31 PROCEDURE — 1111F DSCHRG MED/CURRENT MED MERGE: CPT | Mod: CPTII,S$GLB,, | Performed by: UROLOGY

## 2025-07-31 PROCEDURE — 3288F FALL RISK ASSESSMENT DOCD: CPT | Mod: CPTII,S$GLB,, | Performed by: UROLOGY

## 2025-07-31 PROCEDURE — 3078F DIAST BP <80 MM HG: CPT | Mod: CPTII,S$GLB,, | Performed by: UROLOGY

## 2025-07-31 PROCEDURE — 74018 RADEX ABDOMEN 1 VIEW: CPT | Mod: 26,,, | Performed by: RADIOLOGY

## 2025-07-31 PROCEDURE — 3074F SYST BP LT 130 MM HG: CPT | Mod: CPTII,S$GLB,, | Performed by: UROLOGY

## 2025-07-31 PROCEDURE — 99215 OFFICE O/P EST HI 40 MIN: CPT | Mod: 57,S$GLB,, | Performed by: UROLOGY

## 2025-07-31 PROCEDURE — 74018 RADEX ABDOMEN 1 VIEW: CPT | Mod: TC

## 2025-07-31 RX ORDER — EZETIMIBE 10 MG/1
10 TABLET ORAL DAILY
Qty: 90 TABLET | Refills: 3 | Status: SHIPPED | OUTPATIENT
Start: 2025-07-31

## 2025-07-31 RX ORDER — DUTASTERIDE 0.5 MG/1
0.5 CAPSULE, LIQUID FILLED ORAL DAILY
Qty: 30 CAPSULE | Refills: 11 | Status: SHIPPED | OUTPATIENT
Start: 2025-07-31 | End: 2025-08-30

## 2025-07-31 NOTE — PATIENT INSTRUCTIONS
Stop ASA 3 days before ( Sunday) your surgery on 8/13/25    Tiffany Dow, my surgery scheduler will schedule your surgery ([phone]).    The risks and benefits of the procedure were discussed with the patient in detail.    The patient was told to stop all blood thinners prior to surgery.  Stop ASA and ASA products ( all NSADIS) 3 days before

## 2025-07-31 NOTE — PROGRESS NOTES
CC: left ureteral stone and kidney stone follow up    History of Present Illness    CHIEF COMPLAINT:  Patient presents today for follow-up of kidney stone problems    HISTORY OF PRESENT ILLNESS:  He reports persistent dark and bloody urine following kidney stone procedure. He experiences frequent urination, which is attributed to ureteral stent causing bladder irritation. CT on July 15th revealed a benign kidney cyst, large stones in the left kidney, and a distal left ureteral calculus causing potential blockage near the bladder. He was asymptomatic at initial presentation but due to concerns about potential complications, a stent was placed.    MEDICAL HISTORY:  He has a history of myocardial infarction with cardiac stent placement. Imaging shows significant prostatic enlargement potentially impacting urinary anatomy and bladder function.    ANESTHESIA HISTORY:  He reports a history of prolonged recovery following general anesthesia, specifically noting that he remains significantly impaired for approximately four days after anesthetic procedures. He expresses concern about his unique response to anesthesia, indicating a consistent pattern of extended post-anesthetic recovery.    MEDICATIONS:  He is currently taking baby aspirin and completed his course of antibiotics today.      ROS:  General: -fever, -chills, -fatigue, -weight gain, -weight loss  Eyes: -vision changes, -redness, -discharge  ENT: -ear pain, -nasal congestion, -sore throat  Cardiovascular: -chest pain, -palpitations, -lower extremity edema  Respiratory: -cough, -shortness of breath  Gastrointestinal: -abdominal pain, -nausea, -vomiting, -diarrhea, -constipation, -blood in stool  Genitourinary: -dysuria, +hematuria, -frequency, +urine changes, +abnormal urine appearance, +excessive urination  Musculoskeletal: -joint pain, -muscle pain  Skin: -rash, -lesion  Neurological: -headache, -dizziness, -numbness, -tingling  Psychiatric: -anxiety,  -depression, -sleep difficulty        Hector Kellogg is a 80 y.o. man who is here for the evaluation of Results    A new pt referred by his PCP, Blue Mcgill DO     Date: 07/16/2025     Pre-Op Diagnosis: Left UVJ stone     Post-Op Diagnosis: same     Procedure(s) Performed:    1. Cystoscopy with left ureteral JJ stent placement  2. Fluoroscopy < 1 h  3. Bilateral retrograde pyelogram      Specimen(s): Urine for culture     Staff Surgeon: Jerel Cullen MD     Assistant Surgeon: Collin Mccann DO     Anesthesia: Monitored Local Anesthesia with Sedation     Indications: Hector Kellogg is a 80 y.o. male with left UVJ, renal stones and mental status changes. .     Findings:  Severe J hooking present bilaterally. Bilateral RPG with no hydronpehrosis, right extrarenal pelvis,  and no filling defects bilaterally. No pyonephrosis on stent placement.     Estimated Blood Loss: min     Drains:  6 [place]ian x 22 cm left JJ ureteral stent without strings       Past Medical History:   Diagnosis Date    Cataract     Chronic back pain greater than 3 months duration     COPD (chronic obstructive pulmonary disease)     Coronary artery disease     3 stents    DDD (degenerative disc disease), lumbar 05/16/2020    Glaucoma suspect of both eyes     Hyperlipidemia     Hypertension     MI, old 2006    Parkinson disease     Renal disorder     Kidney stones     Past Surgical History:   Procedure Laterality Date    CARDIAC CATHETERIZATION  2006    x 3    CARDIAC CATHETERIZATION  10/11/2015    x 2    CATARACT EXTRACTION W/  INTRAOCULAR LENS IMPLANT Right 03/06/2018    Dr. Liu    CORONARY ANGIOPLASTY  2006, 2015    3 stents    CYSTOSCOPY W/ URETERAL STENT PLACEMENT Left 9/13/2023    Procedure: CYSTOSCOPY, WITH URETERAL STENT PLACEMENT;  Surgeon: Jerel Cullen MD;  Location: [facility] OR [address] [place] [address];  Service: Urology;  Laterality: Left;  1.5 HR    CYSTOSCOPY W/ URETERAL STENT PLACEMENT Left 7/16/2025    Procedure: CYSTOSCOPY, WITH  URETERAL STENT INSERTION;  Surgeon: Jerel Cullen MD;  Location: [facility] OR 1ST FLR;  Service: Urology;  Laterality: Left;    EXTRACORPOREAL SHOCK WAVE LITHOTRIPSY Left 4/20/2021    Procedure: LITHOTRIPSY-EXTRACORPOREAL SHOCK WAVE;  Surgeon: Ottoniel Obando MD;  Location: [facility];  Service: Urology;  Laterality: Left;    EXTRACORPOREAL SHOCK WAVE LITHOTRIPSY Left 9/13/2023    Procedure: LITHOTRIPSY, ESWL;  Surgeon: Jerel Cullen MD;  Location: [facility] 1ST FLR;  Service: Urology;  Laterality: Left;    EYE SURGERY      cataracts bilateral    HEMORRHOID SURGERY      INJECTION OF ANESTHETIC AGENT AROUND NERVE Bilateral 11/4/2020    Procedure: BLOCK, NERVE BILATERAL L2,3,4,5;  Surgeon: Ramiro Calvillo MD;  Location: [facility] PAIN MGT;  Service: Pain Management;  Laterality: Bilateral;  BLOCK, NERVE BILATERAL L2,3,4,5    INJECTION OF FACET JOINT Right 8/5/2020    Procedure: INJECTION, FACET JOINT, L4-L5 , L5-S1;  Surgeon: Ramiro Calvillo MD;  Location: [facility] PAIN MGT;  Service: Pain Management;  Laterality: Right;    LAMINOFORAMINOTOMY OF SPINE USING MINIMALLY INVASIVE TECHNIQUE N/A 2/1/2021    Procedure: MIS L4-5 Laminectomy;  Surgeon: Bernard Sheldon MD;  Location: [facility];  Service: Neurosurgery;  Laterality: N/A;  Anesthesia: General  Nerve Monitoring: EMG/SEP  Position: Prone  Bed: [facility] on Alpha  Headrest: Prone View  Radiology: C-arm  Misc: Microscope, microinstruments    RETROGRADE PYELOGRAPHY Bilateral 7/16/2025    Procedure: PYELOGRAM, RETROGRADE;  Surgeon: Jerel Cullen MD;  Location: [facility] 1ST FLR;  Service: Urology;  Laterality: Bilateral;    TRANSFORAMINAL EPIDURAL INJECTION OF STEROID Right 5/27/2020    Procedure: INJECTION, STEROID, EPIDURAL, TRANSFORAMINAL APPROACH;  Surgeon: Ramiro Calvillo MD;  Location: [facility] PAIN MGT;  Service: Pain Management;  Laterality: Right;  Right TF ASHISH L4-L5, L5-S1    PTcannot come earlier than 12:30    TRANSFORAMINAL EPIDURAL INJECTION OF STEROID Right  9/9/2020    Procedure: INJECTION, STEROID, EPIDURAL, TRANSFORAMINAL APPROACH;  Surgeon: Ramiro Calvillo MD;  [facility];  Service: Pain Management;  Laterality: Right;  RIGHT TF ASHISH L4/5 and L5/S1     Social History[1]  Family History   Problem Relation Name Age of Onset    Heart attack Father 76     Cataracts Father 76     Heart disease Maternal Uncle      Heart disease Paternal Uncle      Hypertension Neg Hx      Asthma Neg Hx      Emphysema Neg Hx      Amblyopia Neg Hx      Blindness Neg Hx      Macular degeneration Neg Hx      Retinal detachment Neg Hx      Strabismus Neg Hx       Allergy:  Review of patient's allergies indicates:   Allergen Reactions    Influenza virus vaccines Other (See Comments)     Caused fever, chills, and made tremors worse     Encounter Medications[2]  Review of Systems   ROS  Physical Exam     Vitals:    07/31/25 1419   BP: (!) 107/59   Pulse: 74     Physical Exam  Genitalia:  Scrotum: no rash or lesion  Normal symmetric epididymis without masses  Normal vas palpated  Normal size, symmetric testicles with no masses   Normal urethral meatus with no discharge  Normal circumcised penis with no lesion   Rectal:  Normal perineum and anus upon inspection.  Normal tone, no masses or tenderness;     LABS:  Lab Results   Component Value Date    PSA 4.58 (H) 07/01/2025    PSA 6.2 (H) 07/14/2023    PSA 3.9 06/22/2022     Results for orders placed or performed in visit on 07/01/25   Prostate Specific Antigen, Diagnostic    Collection Time: 07/01/25  8:55 AM   Result Value Ref Range    Prostate Specific Antigen 4.58 (H) <=4.00 ng/mL     Lab Results   Component Value Date    CREATININE 0.9 07/19/2025    CREATININE 0.9 07/18/2025    CREATININE 1.0 07/17/2025     No results found for this or any previous visit.  Urine Culture   Date Value Ref Range Status   07/16/2025 No Growth  Final     Urine Culture, Routine   Date Value Ref Range Status   02/03/2025 No significant growth  Final      Hemoglobin A1C   Date Value Ref Range Status   07/22/2024 5.7 (H) 4.0 - 5.6 % Final     Comment:     ADA Screening Guidelines:  5.7-6.4%  Consistent with prediabetes  >or=6.5%  Consistent with diabetes    High levels of fetal hemoglobin interfere with the HbA1C  assay. Heterozygous hemoglobin variants (HbS, HgC, etc)do  not significantly interfere with this assay.   However, presence of multiple variants may affect accuracy.     07/14/2023 5.7 (H) 4.0 - 5.6 % Final     Comment:     ADA Screening Guidelines:  5.7-6.4%  Consistent with prediabetes  >or=6.5%  Consistent with diabetes    High levels of fetal hemoglobin interfere with the HbA1C  assay. Heterozygous hemoglobin variants (HbS, HgC, etc)do  not significantly interfere with this assay.   However, presence of multiple variants may affect accuracy.       Hemoglobin A1c   Date Value Ref Range Status   07/01/2025 5.6 4.0 - 5.6 % Final     Comment:     ADA Screening Guidelines:  5.7-6.4%  Consistent with prediabetes  >=6.5%  Consistent with diabetes    High levels of fetal hemoglobin interfere with the HbA1C  assay. Heterozygous hemoglobin variants (HbS, HgC, etc)do  not significantly interfere with this assay.   However, presence of multiple variants may affect accuracy.       Radiology:  KUB today  There is a left double-J ureteric stent. There are left renal calculi. There is a coronary stent. There is DJD. Lung bases are clear. No obstruction, ileus, or perforation seen.     CT 7/15/25  There is a distal left ureteral calculus noted, measuring approximately 2.7 x 5.6 mm on axial imaging, as discussed above, however without evidence for hydroureteronephrosis or obstructive uropathy.     Bilateral nonobstructing nephrolithiasis is also noted.     Urinary bladder wall thickening may relate to components of lack of distention and chronic change however clinical correlation is otherwise needed.     Enlarged heterogeneous prostate.     Mild to moderate  "distention of the rectum with stool, mild rectal wall thickening with perirectal haziness, correlation for rectal colitis to include stercoral colitis.     Assessment and Plan:  Hector Fountain" was seen today for results.    Diagnoses and all orders for this visit:    Left ureteral stone    Kidney stone on left side    Parkinson's disease with fluctuating manifestations, unspecified whether dyskinesia present    Hematuria, gross  -     Urine Culture High Risk ($$)    BPH with urinary obstruction    Other orders  -     dutasteride (AVODART) 0.5 mg capsule; Take 1 capsule (0.5 mg total) by mouth once daily.      Assessment & Plan    N20.0 Calculus of kidney  N20.1 Calculus of ureter  N28.1 Cyst of kidney, acquired  N40.3 Nodular prostate with lower urinary tract symptoms  R31.9 Hematuria, unspecified  R35.0 Frequency of micturition  T88.59XD Other complications of anesthesia, subsequent encounter  I25.2 Old myocardial infarction  Z95.5 Presence of coronary angioplasty implant and graft  Z96.0 Presence of urogenital implants    KIDNEY AND URETERAL CALCULI:  - Kidney stones present, with large stone in left kidney and potential blockage in distal left ureter.  - Previously placed ureteral stent due to concern for infection.  - XR confirms stent placement and shows J-hook shape due to enlarged prostate.  - Propose left ureteroscopy to clear stones from kidney and ureter using new procedure with built-in aspiration device for more effective stone removal.  - Will leave stent with strings attached post-procedure for easier removal after 5 days.  - Consider continuing prophylactic antibiotics to keep urine sterile.  - Ordered urine sample to ensure everything is good before continuing antibiotics.  - Scheduled left ureteroscopy for September 13th.    ENLARGED PROSTATE:  - XR confirms stent placement and shows J-hook shape due to enlarged prostate.  - Started Flomax to minimize stent-related irritation and help empty bladder " better. Discontinue if experiencing dizziness or weakness.    HEMATURIA:  - Explained reason for dark urine is likely due to stent irritation.    URINARY FREQUENCY:  - Discussed frequent urination is due to bladder irritation from stent.  - Patient to drink plenty of water.    PRESENCE OF UROGENITAL IMPLANT:  - Previously placed ureteral stent due to concern for infection.  - Will leave stent with strings attached post-procedure for easier removal after 5 days.  - Explained reason for dark urine is likely due to stent irritation.  - Discussed frequent urination is due to bladder irritation from stent.  - Started Flomax to minimize stent-related irritation and help empty bladder better. Discontinue if experiencing dizziness or weakness.    OLD MYOCARDIAL INFARCTION / CORONARY ANGIOPLASTY:  - Continued baby aspirin. Stop on the Sunday before the scheduled surgery (September 13th).    ANESTHESIA CONCERNS:  - Discuss anesthesia concerns with anesthesiologist before the procedure.    FOLLOW-UP:  - Follow up on Wednesday, September 13th for outpatient procedure.          Stop ASA 3 days before ( Sunday) your surgery on 8/13/25  Start Avodart for enlarged prostate with LUTS.    Follow-up:  Follow up in about 13 days (around 8/13/2025), or left CVAC ureteropyeloscopy laser lithotripsy stones removal, cysto left ureteral stent replacement.    This note was generated with the assistance of ambient listening technology. Verbal consent was obtained by the patient and accompanying visitor(s) for the recording of patient appointment to facilitate this note. I attest to having reviewed and edited the generated note for accuracy, though some syntax or spelling errors may persist. Please contact the author of this note for any clarification.            [1]   Social History  Tobacco Use    Smoking status: Former     Current packs/day: 0.00     Average packs/day: 1 pack/day for 40.0 years (40.0 ttl pk-yrs)     Types: Cigarettes      Start date: 1974     Quit date: 2014     Years since quittin.5     Passive exposure: Past    Smokeless tobacco: Never   Substance Use Topics    Alcohol use: Yes     Comment: socially    Drug use: No   [2]   Outpatient Encounter Medications as of 2025   Medication Sig Dispense Refill    albuterol (PROVENTIL/VENTOLIN HFA) 90 mcg/actuation inhaler INHALE 2 PUFFS BY MOUTH EVERY 6 HOURS AS NEEDED FOR WHEEZING FOR SHORTNESS OF BREATH 9 g 3    aspirin (ECOTRIN) 81 MG EC tablet Take 81 mg by mouth once daily.      BREZTRI AEROSPHERE 160-9-4.8 mcg/actuation HFAA INHALE 2 PUFFS INTO LUNGS TWICE DAILY 11 g 11    carbidopa-levodopa  mg (SINEMET)  mg per tablet TAKE 1 & 1/2 (ONE & ONE-HALF) TABLETS BY MOUTH FIVE TIMES DAILY (Patient taking differently: Take 1 tablet by mouth 4 (four) times daily. Pt takes 1 tablet QID) 675 tablet 3    cefpodoxime (VANTIN) 100 MG tablet Take 1 tablet (100 mg total) by mouth 2 (two) times daily. for 12 days 24 tablet 0    citalopram (CELEXA) 20 MG tablet Take 1 tablet by mouth once daily 90 tablet 3    ezetimibe (ZETIA) 10 mg tablet Take 1 tablet by mouth once daily 90 tablet 0    loratadine (CLARITIN) 10 mg tablet Take 10 mg by mouth as needed for Allergies.      meloxicam (MOBIC) 7.5 MG tablet Take 1 tablet (7.5 mg total) by mouth once daily. 30 tablet 0    metoprolol succinate (TOPROL-XL) 50 MG 24 hr tablet Take 1 tablet by mouth once daily 90 tablet 3    mirtazapine (REMERON) 15 MG tablet Take 1 tablet (15 mg total) by mouth every evening. 30 tablet 11    multivitamin capsule Take 1 capsule by mouth once daily.      rosuvastatin (CRESTOR) 40 MG Tab TAKE 1 TABLET BY MOUTH NIGHTLY AT BEDTIME 90 tablet 3    amLODIPine (NORVASC) 5 MG tablet Take 1 tablet by mouth once daily (Patient not taking: Reported on 2025) 30 tablet 11    dutasteride (AVODART) 0.5 mg capsule Take 1 capsule (0.5 mg total) by mouth once daily. 30 capsule 11    potassium citrate (UROCIT-K 10)  10 mEq (1,080 mg) TbSR Take 1 tablet (10 mEq total) by mouth 2 (two) times daily with meals. 180 tablet 3    [DISCONTINUED] baclofen (LIORESAL) 10 MG tablet Take 1 tablet (10 mg total) by mouth 3 (three) times daily. 270 tablet 1    [DISCONTINUED] ibuprofen (ADVIL,MOTRIN) 800 MG tablet Take 800 mg by mouth as needed.      [DISCONTINUED] QUEtiapine (SEROQUEL) 25 MG Tab Take 1 tablet (25 mg total) by mouth nightly as needed (insomnia or non-redirectable agitation). 30 tablet 0    [DISCONTINUED] acetaminophen tablet 650 mg       [DISCONTINUED] acetaminophen tablet 650 mg       [DISCONTINUED] albuterol inhaler 2 puff       [DISCONTINUED] albuterol-ipratropium 2.5 mg-0.5 mg/3 mL nebulizer solution 3 mL       [DISCONTINUED] aluminum-magnesium hydroxide-simethicone 200-200-20 mg/5 mL suspension 30 mL       [DISCONTINUED] amLODIPine tablet 5 mg       [DISCONTINUED] aspirin EC tablet 81 mg       [DISCONTINUED] atorvastatin tablet 80 mg       [DISCONTINUED] carbidopa-levodopa  mg per tablet 1 tablet       [DISCONTINUED] cefpodoxime tablet 200 mg       [DISCONTINUED] cefTRIAXone injection 1 g       [DISCONTINUED] citalopram tablet 20 mg       [DISCONTINUED] dextrose 50% injection 12.5 g       [DISCONTINUED] dextrose 50% injection 25 g       [DISCONTINUED] enoxaparin injection 40 mg       [DISCONTINUED] ezetimibe tablet 10 mg       [DISCONTINUED] fluticasone furoate-vilanteroL 200-25 mcg/dose diskus inhaler 1 puff       [DISCONTINUED] glucagon (human recombinant) injection 1 mg       [DISCONTINUED] glucose chewable tablet 16 g       [DISCONTINUED] glucose chewable tablet 24 g       [DISCONTINUED] melatonin tablet 6 mg       [DISCONTINUED] metoprolol succinate (TOPROL-XL) 24 hr tablet 50 mg       [DISCONTINUED] mirtazapine tablet 7.5 mg       [DISCONTINUED] naloxone 0.4 mg/mL injection 0.02 mg       [DISCONTINUED] ondansetron disintegrating tablet 8 mg       [DISCONTINUED] polyethylene glycol packet 17 g        [DISCONTINUED] senna tablet 8.6 mg       [DISCONTINUED] sodium chloride 0.9% flush 10 mL       [DISCONTINUED] tiotropium bromide 2.5 mcg/actuation inhaler 2 puff        No facility-administered encounter medications on file as of 7/31/2025.

## 2025-08-02 LAB — BACTERIA UR CULT: ABNORMAL

## 2025-08-04 ENCOUNTER — TELEPHONE (OUTPATIENT)
Dept: UROLOGY | Facility: HOSPITAL | Age: 80
End: 2025-08-04
Payer: MEDICARE

## 2025-08-04 ENCOUNTER — TELEPHONE (OUTPATIENT)
Dept: UROLOGY | Facility: CLINIC | Age: 80
End: 2025-08-04
Payer: MEDICARE

## 2025-08-04 DIAGNOSIS — N30.90 CYSTITIS: Primary | ICD-10-CM

## 2025-08-04 RX ORDER — AMOXICILLIN AND CLAVULANATE POTASSIUM 875; 125 MG/1; MG/1
1 TABLET, FILM COATED ORAL 2 TIMES DAILY
Qty: 14 TABLET | Refills: 0 | Status: SHIPPED | OUTPATIENT
Start: 2025-08-04 | End: 2025-08-11

## 2025-08-04 NOTE — TELEPHONE ENCOUNTER
Urine Culture 10,000 - 49,999 cfu/ml Enterococcus faecalis Abnormal         Resulting Agency: [facility]     Susceptibility       Enterococcus faecalis     GABRIELA     Ampicillin <=2 µg/ml Sensitive     Nitrofurantoin <=32 µg/ml Sensitive     Vancomycin 2 µg/ml Sensitive              Cystitis  -     amoxicillin-clavulanate 875-125mg (AUGMENTIN) 875-125 mg per tablet; Take 1 tablet by mouth 2 (two) times daily. for 7 days  Dispense: 14 tablet; Refill: 0       Please have him start Augmentin 5 days before his surgery to treat UTI.

## 2025-08-04 NOTE — TELEPHONE ENCOUNTER
Spoke with daughter, in reference to starting Augmentin 5 days prior to surgery to treat urinary  tract infection,per DR Cullen.

## 2025-08-11 ENCOUNTER — PATIENT MESSAGE (OUTPATIENT)
Dept: PALLIATIVE MEDICINE | Facility: CLINIC | Age: 80
End: 2025-08-11
Payer: MEDICARE

## 2025-08-11 ENCOUNTER — TELEPHONE (OUTPATIENT)
Dept: UROLOGY | Facility: CLINIC | Age: 80
End: 2025-08-11
Payer: MEDICARE

## 2025-08-11 DIAGNOSIS — G20.A2 PARKINSON'S DISEASE WITHOUT DYSKINESIA, WITH FLUCTUATING MANIFESTATIONS: ICD-10-CM

## 2025-08-11 DIAGNOSIS — M54.17 LUMBOSACRAL RADICULOPATHY: ICD-10-CM

## 2025-08-11 RX ORDER — MELOXICAM 7.5 MG/1
7.5 TABLET ORAL DAILY
Qty: 90 TABLET | Refills: 1 | Status: SHIPPED | OUTPATIENT
Start: 2025-08-11

## 2025-08-12 ENCOUNTER — TELEPHONE (OUTPATIENT)
Dept: UROLOGY | Facility: CLINIC | Age: 80
End: 2025-08-12
Payer: MEDICARE

## 2025-08-13 ENCOUNTER — ANESTHESIA (OUTPATIENT)
Dept: SURGERY | Facility: HOSPITAL | Age: 80
End: 2025-08-13
Payer: MEDICARE

## 2025-08-13 ENCOUNTER — HOSPITAL ENCOUNTER (OUTPATIENT)
Facility: HOSPITAL | Age: 80
Discharge: HOME | End: 2025-08-13
Attending: UROLOGY | Admitting: UROLOGY
Payer: MEDICARE

## 2025-08-13 ENCOUNTER — ANESTHESIA EVENT (OUTPATIENT)
Dept: SURGERY | Facility: HOSPITAL | Age: 80
End: 2025-08-13
Payer: MEDICARE

## 2025-08-13 VITALS
OXYGEN SATURATION: 95 % | SYSTOLIC BLOOD PRESSURE: 153 MMHG | DIASTOLIC BLOOD PRESSURE: 73 MMHG | TEMPERATURE: 98 F | RESPIRATION RATE: 18 BRPM | HEART RATE: 80 BPM

## 2025-08-13 DIAGNOSIS — N20.0 NEPHROLITHIASIS: ICD-10-CM

## 2025-08-13 DIAGNOSIS — N20.0 KIDNEY STONE ON LEFT SIDE: ICD-10-CM

## 2025-08-13 DIAGNOSIS — N20.1 LEFT URETERAL STONE: ICD-10-CM

## 2025-08-13 DIAGNOSIS — N39.0 RECURRENT UTI: ICD-10-CM

## 2025-08-13 PROCEDURE — 25000003 PHARM REV CODE 250: Performed by: NURSE ANESTHETIST, CERTIFIED REGISTERED

## 2025-08-13 PROCEDURE — 36000709 HC OR TIME LEV III EA ADD 15 MIN: Performed by: UROLOGY

## 2025-08-13 PROCEDURE — 63600175 PHARM REV CODE 636 W HCPCS: Performed by: NURSE ANESTHETIST, CERTIFIED REGISTERED

## 2025-08-13 PROCEDURE — 27201423 OPTIME MED/SURG SUP & DEVICES STERILE SUPPLY: Performed by: UROLOGY

## 2025-08-13 PROCEDURE — 37000008 HC ANESTHESIA 1ST 15 MINUTES: Performed by: UROLOGY

## 2025-08-13 PROCEDURE — C1769 GUIDE WIRE: HCPCS | Performed by: UROLOGY

## 2025-08-13 PROCEDURE — 37000009 HC ANESTHESIA EA ADD 15 MINS: Performed by: UROLOGY

## 2025-08-13 PROCEDURE — 36000707: Performed by: UROLOGY

## 2025-08-13 PROCEDURE — C1747 OPTIME ENDOSCOPE, SINGLE, URINARY TRACT: HCPCS | Performed by: UROLOGY

## 2025-08-13 PROCEDURE — 36000706: Performed by: UROLOGY

## 2025-08-13 PROCEDURE — 36000708 HC OR TIME LEV III 1ST 15 MIN: Performed by: UROLOGY

## 2025-08-13 PROCEDURE — 63600175 PHARM REV CODE 636 W HCPCS

## 2025-08-13 PROCEDURE — C2617 STENT, NON-COR, TEM W/O DEL: HCPCS | Performed by: UROLOGY

## 2025-08-13 PROCEDURE — 71000044 HC DOSC ROUTINE RECOVERY FIRST HOUR: Performed by: UROLOGY

## 2025-08-13 PROCEDURE — 53899 UNLISTED PX URINARY SYSTEM: CPT | Mod: LT,,, | Performed by: UROLOGY

## 2025-08-13 PROCEDURE — 71000016 HC POSTOP RECOV ADDL HR: Performed by: UROLOGY

## 2025-08-13 PROCEDURE — 25000003 PHARM REV CODE 250

## 2025-08-13 PROCEDURE — 71000015 HC POSTOP RECOV 1ST HR: Performed by: UROLOGY

## 2025-08-13 PROCEDURE — 52356 CYSTO/URETERO W/LITHOTRIPSY: CPT | Mod: LT,,, | Performed by: UROLOGY

## 2025-08-13 PROCEDURE — 82365 CALCULUS SPECTROSCOPY: CPT | Performed by: UROLOGY

## 2025-08-13 DEVICE — STENT URETERAL UNIV 6FR 20CM: Type: IMPLANTABLE DEVICE | Site: URETER | Status: FUNCTIONAL

## 2025-08-13 RX ORDER — DEXAMETHASONE SODIUM PHOSPHATE 4 MG/ML
INJECTION, SOLUTION INTRA-ARTICULAR; INTRALESIONAL; INTRAMUSCULAR; INTRAVENOUS; SOFT TISSUE
Status: DISCONTINUED | OUTPATIENT
Start: 2025-08-13 | End: 2025-08-13

## 2025-08-13 RX ORDER — PROPOFOL 10 MG/ML
VIAL (ML) INTRAVENOUS
Status: DISCONTINUED | OUTPATIENT
Start: 2025-08-13 | End: 2025-08-13

## 2025-08-13 RX ORDER — ACETAMINOPHEN 10 MG/ML
INJECTION, SOLUTION INTRAVENOUS
Status: DISCONTINUED | OUTPATIENT
Start: 2025-08-13 | End: 2025-08-13

## 2025-08-13 RX ORDER — FENTANYL CITRATE 50 UG/ML
INJECTION, SOLUTION INTRAMUSCULAR; INTRAVENOUS
Status: DISCONTINUED | OUTPATIENT
Start: 2025-08-13 | End: 2025-08-13

## 2025-08-13 RX ORDER — NITROFURANTOIN 25; 75 MG/1; MG/1
100 CAPSULE ORAL 2 TIMES DAILY
Qty: 14 CAPSULE | Refills: 0 | Status: SHIPPED | OUTPATIENT
Start: 2025-08-13 | End: 2025-08-20

## 2025-08-13 RX ORDER — LIDOCAINE HYDROCHLORIDE 20 MG/ML
INJECTION, SOLUTION EPIDURAL; INFILTRATION; INTRACAUDAL; PERINEURAL
Status: DISCONTINUED | OUTPATIENT
Start: 2025-08-13 | End: 2025-08-13

## 2025-08-13 RX ORDER — ROCURONIUM BROMIDE 10 MG/ML
INJECTION, SOLUTION INTRAVENOUS
Status: DISCONTINUED | OUTPATIENT
Start: 2025-08-13 | End: 2025-08-13

## 2025-08-13 RX ORDER — ONDANSETRON HYDROCHLORIDE 2 MG/ML
INJECTION, SOLUTION INTRAVENOUS
Status: DISCONTINUED | OUTPATIENT
Start: 2025-08-13 | End: 2025-08-13

## 2025-08-13 RX ADMIN — ROCURONIUM BROMIDE 20 MG: 10 INJECTION, SOLUTION INTRAVENOUS at 12:08

## 2025-08-13 RX ADMIN — LIDOCAINE HYDROCHLORIDE 50 MG: 20 INJECTION, SOLUTION EPIDURAL; INFILTRATION; INTRACAUDAL; PERINEURAL at 11:08

## 2025-08-13 RX ADMIN — ONDANSETRON 4 MG: 2 INJECTION INTRAMUSCULAR; INTRAVENOUS at 01:08

## 2025-08-13 RX ADMIN — ACETAMINOPHEN 1000 MG: 10 INJECTION INTRAVENOUS at 01:08

## 2025-08-13 RX ADMIN — ROCURONIUM BROMIDE 50 MG: 10 INJECTION, SOLUTION INTRAVENOUS at 11:08

## 2025-08-13 RX ADMIN — PROPOFOL 30 MG: 10 INJECTION, EMULSION INTRAVENOUS at 11:08

## 2025-08-13 RX ADMIN — DEXAMETHASONE SODIUM PHOSPHATE 4 MG: 4 INJECTION, SOLUTION INTRAMUSCULAR; INTRAVENOUS at 11:08

## 2025-08-13 RX ADMIN — ROCURONIUM BROMIDE 10 MG: 10 INJECTION, SOLUTION INTRAVENOUS at 12:08

## 2025-08-13 RX ADMIN — SODIUM CHLORIDE, SODIUM LACTATE, POTASSIUM CHLORIDE, AND CALCIUM CHLORIDE: .6; .31; .03; .02 INJECTION, SOLUTION INTRAVENOUS at 11:08

## 2025-08-13 RX ADMIN — FENTANYL CITRATE 50 MCG: 50 INJECTION, SOLUTION INTRAMUSCULAR; INTRAVENOUS at 12:08

## 2025-08-13 RX ADMIN — FENTANYL CITRATE 50 MCG: 50 INJECTION, SOLUTION INTRAMUSCULAR; INTRAVENOUS at 11:08

## 2025-08-13 RX ADMIN — AMPICILLIN 1 G: 1 INJECTION, POWDER, FOR SOLUTION INTRAMUSCULAR; INTRAVENOUS at 11:08

## 2025-08-13 RX ADMIN — GENTAMICIN SULFATE 320 MG: 40 INJECTION, SOLUTION INTRAMUSCULAR; INTRAVENOUS at 11:08

## 2025-08-13 RX ADMIN — SUGAMMADEX 200 MG: 100 INJECTION, SOLUTION INTRAVENOUS at 01:08

## 2025-08-13 RX ADMIN — PROPOFOL 50 MG: 10 INJECTION, EMULSION INTRAVENOUS at 11:08

## 2025-08-15 ENCOUNTER — PATIENT MESSAGE (OUTPATIENT)
Dept: UROLOGY | Facility: CLINIC | Age: 80
End: 2025-08-15
Payer: MEDICARE

## 2025-08-23 LAB
CELL MATERIAL STONE EST-MCNT: NORMAL %
LABORATORY COMMENT REPORT: NORMAL
SPECIMEN SOURCE: NORMAL

## 2025-08-28 ENCOUNTER — PATIENT MESSAGE (OUTPATIENT)
Dept: INTERNAL MEDICINE | Facility: CLINIC | Age: 80
End: 2025-08-28
Payer: MEDICARE

## 2025-09-04 ENCOUNTER — TELEPHONE (OUTPATIENT)
Dept: PALLIATIVE MEDICINE | Facility: CLINIC | Age: 80
End: 2025-09-04
Payer: MEDICARE

## (undated) DEVICE — SHIELD EYE PLASTIC 3100G

## (undated) DEVICE — DRAPE STERI 32 X 50

## (undated) DEVICE — UNDERGLOVES BIOGEL PI SZ 6 LF

## (undated) DEVICE — COVER CAMERA OPERATING ROOM

## (undated) DEVICE — HYDRODISSECTOR NUCLEUS 27GX7/8

## (undated) DEVICE — DRAPE LEICA MICROSCOPE

## (undated) DEVICE — UNDERGLOVES BIOGEL PI SIZE 7.5

## (undated) DEVICE — SOL WATER STRL IRR 1000ML

## (undated) DEVICE — EXTRACTOR TIPLESS 2.4FRX1115CM

## (undated) DEVICE — SUT VICRYL PLUS 2-0 CT1 18

## (undated) DEVICE — SOL IRRI STRL WATER 1000ML

## (undated) DEVICE — Device

## (undated) DEVICE — ADHESIVE DERMABOND ADVANCED

## (undated) DEVICE — CATH POLLACK OPEN-END FLEXI-TI

## (undated) DEVICE — SEE MEDLINE ITEM 157150

## (undated) DEVICE — SOL BETADINE 5%

## (undated) DEVICE — UNDERGLOVES BIOGEL PI SIZE 8.5

## (undated) DEVICE — DRAPE ABDOMINAL TIBURON 14X11

## (undated) DEVICE — TRAY CYSTO BASIN OMC

## (undated) DEVICE — DIFFUSER

## (undated) DEVICE — DRESSING AQUACEL FOAM 5 X 5

## (undated) DEVICE — FIBER QUANTA OPT SDT 272UM

## (undated) DEVICE — CLIPPER BLADE MOD 4406 (CAREF)

## (undated) DEVICE — CARTRIDGE OIL

## (undated) DEVICE — SEE MEDLINE ITEM 146292

## (undated) DEVICE — KIT GREY EYE

## (undated) DEVICE — UNDERGLOVE BIOGEL PI SZ 6.5 LF

## (undated) DEVICE — NDL FLTR 5MCRN BLNT TIP 18GX1

## (undated) DEVICE — APPLICATOR CHLORAPREP ORN 26ML

## (undated) DEVICE — DRAPE STERI-DRAPE 1000 17X11IN

## (undated) DEVICE — DRESSING MEPILEX BORDER 4 X 4

## (undated) DEVICE — SYR ONLY LUER LOCK 20CC

## (undated) DEVICE — SUT VICRYL PLUS 0 CT1 18IN

## (undated) DEVICE — SOLUTION BSS PLUS

## (undated) DEVICE — NDL SPINAL 18GX3.5 SPINOCAN

## (undated) DEVICE — GUIDEWIRE STR TIP HIWIRE 150CM

## (undated) DEVICE — GUIDE WIRE MOTION .035 X 150CM

## (undated) DEVICE — SYR 10CC LUER LOCK

## (undated) DEVICE — SOL NACL IRR 3000ML

## (undated) DEVICE — SPONGE LAP 4X18 PREWASHED

## (undated) DEVICE — CORD BIPOLAR 12 FOOT

## (undated) DEVICE — DRESSING LEUKOPLAST FLEX 1X3IN

## (undated) DEVICE — BUR BONE CUT MICRO TPS 3X3.8MM

## (undated) DEVICE — STRIP MG FML-GLO .06 - ORDER F

## (undated) DEVICE — GLOVE BIOGEL SKINSENSE PI 8.0

## (undated) DEVICE — PACK CYSTOSCOPY III SIRUS

## (undated) DEVICE — DRAPE C ARM 42 X 120 10/BX

## (undated) DEVICE — SHILED EYE METAL W/COVER WHITE

## (undated) DEVICE — GARTER EYE ADULT

## (undated) DEVICE — SHEILD & GARTERS FOX METAL EYE

## (undated) DEVICE — BLADE ELECTRO EDGE INSULATED

## (undated) DEVICE — TUBE FRAZIER 5MM 2FT SOFT TIP

## (undated) DEVICE — ELECTRODE REM PLYHSV RETURN 9

## (undated) DEVICE — SUT MCRYL PLUS 4-0 PS2 27IN

## (undated) DEVICE — SEE MEDLINE ITEM 156905

## (undated) DEVICE — GOWN SURGICAL X-LARGE

## (undated) DEVICE — STENT URETERAL UNIV 6FR 26CM
Type: IMPLANTABLE DEVICE | Site: URETER | Status: NON-FUNCTIONAL
Removed: 2025-07-16

## (undated) DEVICE — ADAPTER HOSE 10FT 8MM

## (undated) DEVICE — UNDERGLOVES BIOGEL PI SZ 7 LF

## (undated) DEVICE — HEMOSTAT SURGICEL 4X8IN

## (undated) DEVICE — KIT SURGIFLO HEMOSTATIC MATRIX